# Patient Record
Sex: MALE | Race: WHITE | NOT HISPANIC OR LATINO | Employment: FULL TIME | URBAN - METROPOLITAN AREA
[De-identification: names, ages, dates, MRNs, and addresses within clinical notes are randomized per-mention and may not be internally consistent; named-entity substitution may affect disease eponyms.]

---

## 2017-04-11 ENCOUNTER — HOSPITAL ENCOUNTER (EMERGENCY)
Facility: HOSPITAL | Age: 57
Discharge: HOME | End: 2017-04-11
Attending: EMERGENCY MEDICINE | Admitting: EMERGENCY MEDICINE
Payer: COMMERCIAL

## 2017-04-11 ENCOUNTER — APPOINTMENT (EMERGENCY)
Dept: RADIOLOGY | Facility: HOSPITAL | Age: 57
End: 2017-04-11
Payer: COMMERCIAL

## 2017-04-11 VITALS
HEIGHT: 68 IN | SYSTOLIC BLOOD PRESSURE: 161 MMHG | HEART RATE: 71 BPM | BODY MASS INDEX: 27.58 KG/M2 | OXYGEN SATURATION: 97 % | DIASTOLIC BLOOD PRESSURE: 97 MMHG | RESPIRATION RATE: 18 BRPM | WEIGHT: 182 LBS | TEMPERATURE: 97.5 F

## 2017-04-11 DIAGNOSIS — M25.561 RIGHT ANTERIOR KNEE PAIN: Primary | ICD-10-CM

## 2017-04-11 PROCEDURE — 99283 EMERGENCY DEPT VISIT LOW MDM: CPT

## 2017-04-11 PROCEDURE — 73564 X-RAY EXAM KNEE 4 OR MORE: CPT

## 2017-04-11 RX ORDER — NAPROXEN 500 MG/1
500 TABLET ORAL 2 TIMES DAILY WITH MEALS
Qty: 14 TABLET | Refills: 0 | Status: SHIPPED | OUTPATIENT
Start: 2017-04-11 | End: 2017-09-20

## 2017-04-11 RX ORDER — OXYCODONE HYDROCHLORIDE AND ACETAMINOPHEN 5; 325 MG/1; MG/1
1 TABLET ORAL EVERY 4 HOURS PRN
Qty: 10 TABLET | Refills: 0 | Status: SHIPPED | OUTPATIENT
Start: 2017-04-11 | End: 2017-04-21

## 2017-09-20 ENCOUNTER — HOSPITAL ENCOUNTER (EMERGENCY)
Facility: HOSPITAL | Age: 57
Discharge: HOME | End: 2017-09-20
Payer: COMMERCIAL

## 2017-09-20 VITALS
SYSTOLIC BLOOD PRESSURE: 136 MMHG | HEIGHT: 68 IN | WEIGHT: 175 LBS | RESPIRATION RATE: 18 BRPM | OXYGEN SATURATION: 97 % | TEMPERATURE: 97.3 F | HEART RATE: 78 BPM | DIASTOLIC BLOOD PRESSURE: 80 MMHG | BODY MASS INDEX: 26.52 KG/M2

## 2017-09-20 DIAGNOSIS — M77.8 RIGHT SHOULDER TENDONITIS: Primary | ICD-10-CM

## 2017-09-20 PROCEDURE — 99283 EMERGENCY DEPT VISIT LOW MDM: CPT

## 2017-09-20 RX ORDER — MELOXICAM 7.5 MG/1
7.5 TABLET ORAL 2 TIMES DAILY
Qty: 30 TABLET | Refills: 0 | Status: SHIPPED | OUTPATIENT
Start: 2017-09-20 | End: 2018-09-19 | Stop reason: ALTCHOICE

## 2017-09-20 RX ORDER — PREDNISONE 20 MG/1
40 TABLET ORAL DAILY
Qty: 14 TABLET | Refills: 0 | Status: SHIPPED | OUTPATIENT
Start: 2017-09-20 | End: 2017-09-27

## 2018-07-24 DIAGNOSIS — I10 ESSENTIAL HYPERTENSION: Primary | ICD-10-CM

## 2018-07-25 RX ORDER — AMLODIPINE BESYLATE AND BENAZEPRIL HYDROCHLORIDE 10; 20 MG/1; MG/1
CAPSULE ORAL
Qty: 30 CAPSULE | Refills: 2 | Status: SHIPPED | OUTPATIENT
Start: 2018-07-25 | End: 2018-09-19 | Stop reason: SDDI

## 2018-09-19 ENCOUNTER — HOSPITAL ENCOUNTER (INPATIENT)
Facility: HOSPITAL | Age: 58
LOS: 15 days | Discharge: HOME | DRG: 753 | End: 2018-10-04
Attending: EMERGENCY MEDICINE | Admitting: PSYCHIATRY & NEUROLOGY
Payer: COMMERCIAL

## 2018-09-19 DIAGNOSIS — F31.81 BIPOLAR 2 DISORDER, MAJOR DEPRESSIVE EPISODE (HCC): Chronic | ICD-10-CM

## 2018-09-19 DIAGNOSIS — G47.00 INSOMNIA: ICD-10-CM

## 2018-09-19 DIAGNOSIS — Z00.8 MEDICAL CLEARANCE FOR PSYCHIATRIC ADMISSION: ICD-10-CM

## 2018-09-19 DIAGNOSIS — R45.851 SUICIDAL IDEATION: Primary | ICD-10-CM

## 2018-09-19 DIAGNOSIS — Z00.8 MEDICAL CLEARANCE FOR PSYCHIATRIC ADMISSION: Primary | ICD-10-CM

## 2018-09-19 LAB
AMPHETAMINES SERPL QL SCN: NEGATIVE
ANION GAP SERPL CALCULATED.3IONS-SCNC: 6 MMOL/L (ref 4–13)
APAP SERPL-MCNC: <2 UG/ML (ref 10–30)
BACTERIA UR QL AUTO: ABNORMAL /HPF
BARBITURATES UR QL: NEGATIVE
BASOPHILS # BLD AUTO: 0.03 THOUSANDS/ΜL (ref 0–0.1)
BASOPHILS NFR BLD AUTO: 0 % (ref 0–1)
BENZODIAZ UR QL: NEGATIVE
BILIRUB UR QL STRIP: NEGATIVE
BUN SERPL-MCNC: 16 MG/DL (ref 5–25)
CALCIUM SERPL-MCNC: 8.9 MG/DL (ref 8.3–10.1)
CHLORIDE SERPL-SCNC: 109 MMOL/L (ref 100–108)
CLARITY UR: CLEAR
CLARITY, POC: CLEAR
CO2 SERPL-SCNC: 24 MMOL/L (ref 21–32)
COCAINE UR QL: NEGATIVE
COLOR UR: YELLOW
COLOR, POC: YELLOW
CREAT SERPL-MCNC: 1.17 MG/DL (ref 0.6–1.3)
EOSINOPHIL # BLD AUTO: 0.1 THOUSAND/ΜL (ref 0–0.61)
EOSINOPHIL NFR BLD AUTO: 1 % (ref 0–6)
ERYTHROCYTE [DISTWIDTH] IN BLOOD BY AUTOMATED COUNT: 14.3 % (ref 11.6–15.1)
ETHANOL SERPL-MCNC: <3 MG/DL (ref 0–3)
FOLATE SERPL-MCNC: >20 NG/ML (ref 3.1–17.5)
GFR SERPL CREATININE-BSD FRML MDRD: 69 ML/MIN/1.73SQ M
GLUCOSE SERPL-MCNC: 85 MG/DL (ref 65–140)
GLUCOSE UR STRIP-MCNC: NEGATIVE MG/DL
HCT VFR BLD AUTO: 40.3 % (ref 36.5–49.3)
HGB BLD-MCNC: 13.6 G/DL (ref 12–17)
HGB UR QL STRIP.AUTO: NEGATIVE
HYALINE CASTS #/AREA URNS LPF: ABNORMAL /LPF
IMM GRANULOCYTES # BLD AUTO: 0.02 THOUSAND/UL (ref 0–0.2)
IMM GRANULOCYTES NFR BLD AUTO: 0 % (ref 0–2)
KETONES UR STRIP-MCNC: NEGATIVE MG/DL
LEUKOCYTE ESTERASE UR QL STRIP: ABNORMAL
LYMPHOCYTES # BLD AUTO: 1.91 THOUSANDS/ΜL (ref 0.6–4.47)
LYMPHOCYTES NFR BLD AUTO: 24 % (ref 14–44)
MCH RBC QN AUTO: 31.2 PG (ref 26.8–34.3)
MCHC RBC AUTO-ENTMCNC: 33.7 G/DL (ref 31.4–37.4)
MCV RBC AUTO: 92 FL (ref 82–98)
METHADONE UR QL: NEGATIVE
MONOCYTES # BLD AUTO: 0.73 THOUSAND/ΜL (ref 0.17–1.22)
MONOCYTES NFR BLD AUTO: 9 % (ref 4–12)
NEUTROPHILS # BLD AUTO: 5.32 THOUSANDS/ΜL (ref 1.85–7.62)
NEUTS SEG NFR BLD AUTO: 66 % (ref 43–75)
NITRITE UR QL STRIP: NEGATIVE
NON-SQ EPI CELLS URNS QL MICRO: ABNORMAL /HPF
NRBC BLD AUTO-RTO: 0 /100 WBCS
OPIATES UR QL SCN: POSITIVE
PCP UR QL: NEGATIVE
PH UR STRIP.AUTO: 6 [PH] (ref 4.5–8)
PLATELET # BLD AUTO: 146 THOUSANDS/UL (ref 149–390)
PMV BLD AUTO: 10.8 FL (ref 8.9–12.7)
POTASSIUM SERPL-SCNC: 3.9 MMOL/L (ref 3.5–5.3)
PROT UR STRIP-MCNC: NEGATIVE MG/DL
RBC # BLD AUTO: 4.36 MILLION/UL (ref 3.88–5.62)
RBC #/AREA URNS AUTO: ABNORMAL /HPF
SALICYLATES SERPL-MCNC: 4 MG/DL (ref 3–20)
SODIUM SERPL-SCNC: 139 MMOL/L (ref 136–145)
SP GR UR STRIP.AUTO: 1.02 (ref 1–1.03)
THC UR QL: POSITIVE
TSH SERPL DL<=0.05 MIU/L-ACNC: 1.41 UIU/ML (ref 0.36–3.74)
UROBILINOGEN UR QL STRIP.AUTO: 0.2 E.U./DL
WBC # BLD AUTO: 8.11 THOUSAND/UL (ref 4.31–10.16)
WBC #/AREA URNS AUTO: ABNORMAL /HPF

## 2018-09-19 PROCEDURE — 80320 DRUG SCREEN QUANTALCOHOLS: CPT | Performed by: EMERGENCY MEDICINE

## 2018-09-19 PROCEDURE — 36415 COLL VENOUS BLD VENIPUNCTURE: CPT | Performed by: EMERGENCY MEDICINE

## 2018-09-19 PROCEDURE — 90471 IMMUNIZATION ADMIN: CPT

## 2018-09-19 PROCEDURE — 93005 ELECTROCARDIOGRAM TRACING: CPT

## 2018-09-19 PROCEDURE — 80048 BASIC METABOLIC PNL TOTAL CA: CPT | Performed by: EMERGENCY MEDICINE

## 2018-09-19 PROCEDURE — 81001 URINALYSIS AUTO W/SCOPE: CPT

## 2018-09-19 PROCEDURE — 80329 ANALGESICS NON-OPIOID 1 OR 2: CPT | Performed by: EMERGENCY MEDICINE

## 2018-09-19 PROCEDURE — 90715 TDAP VACCINE 7 YRS/> IM: CPT | Performed by: EMERGENCY MEDICINE

## 2018-09-19 PROCEDURE — 99285 EMERGENCY DEPT VISIT HI MDM: CPT

## 2018-09-19 PROCEDURE — 80307 DRUG TEST PRSMV CHEM ANLYZR: CPT | Performed by: EMERGENCY MEDICINE

## 2018-09-19 PROCEDURE — 85025 COMPLETE CBC W/AUTO DIFF WBC: CPT | Performed by: EMERGENCY MEDICINE

## 2018-09-19 PROCEDURE — 84443 ASSAY THYROID STIM HORMONE: CPT | Performed by: EMERGENCY MEDICINE

## 2018-09-19 PROCEDURE — 82746 ASSAY OF FOLIC ACID SERUM: CPT | Performed by: PSYCHIATRY & NEUROLOGY

## 2018-09-19 RX ORDER — RISPERIDONE 1 MG/1
1 TABLET, ORALLY DISINTEGRATING ORAL EVERY 6 HOURS PRN
Status: CANCELLED | OUTPATIENT
Start: 2018-09-19

## 2018-09-19 RX ORDER — ACETAMINOPHEN 325 MG/1
650 TABLET ORAL EVERY 6 HOURS PRN
Status: CANCELLED | OUTPATIENT
Start: 2018-09-19

## 2018-09-19 RX ORDER — TRAZODONE HYDROCHLORIDE 50 MG/1
50 TABLET ORAL
Status: DISCONTINUED | OUTPATIENT
Start: 2018-09-19 | End: 2018-09-27

## 2018-09-19 RX ORDER — BENZTROPINE MESYLATE 1 MG/1
1 TABLET ORAL EVERY 6 HOURS PRN
Status: CANCELLED | OUTPATIENT
Start: 2018-09-19

## 2018-09-19 RX ORDER — BENZTROPINE MESYLATE 1 MG/1
1 TABLET ORAL EVERY 6 HOURS PRN
Status: DISCONTINUED | OUTPATIENT
Start: 2018-09-19 | End: 2018-10-04 | Stop reason: HOSPADM

## 2018-09-19 RX ORDER — IBUPROFEN 400 MG/1
800 TABLET ORAL EVERY 8 HOURS PRN
Status: CANCELLED | OUTPATIENT
Start: 2018-09-19

## 2018-09-19 RX ORDER — HYDROXYZINE HYDROCHLORIDE 25 MG/1
25 TABLET, FILM COATED ORAL EVERY 6 HOURS PRN
COMMUNITY
End: 2019-06-25 | Stop reason: ALTCHOICE

## 2018-09-19 RX ORDER — IBUPROFEN 400 MG/1
800 TABLET ORAL EVERY 8 HOURS PRN
Status: DISCONTINUED | OUTPATIENT
Start: 2018-09-19 | End: 2018-10-04 | Stop reason: HOSPADM

## 2018-09-19 RX ORDER — HALOPERIDOL 5 MG/ML
5 INJECTION INTRAMUSCULAR EVERY 6 HOURS PRN
Status: DISCONTINUED | OUTPATIENT
Start: 2018-09-19 | End: 2018-10-04 | Stop reason: HOSPADM

## 2018-09-19 RX ORDER — ESCITALOPRAM OXALATE 20 MG/1
20 TABLET ORAL DAILY
Status: ON HOLD | COMMUNITY
End: 2018-10-04

## 2018-09-19 RX ORDER — IBUPROFEN 600 MG/1
600 TABLET ORAL EVERY 8 HOURS PRN
Status: CANCELLED | OUTPATIENT
Start: 2018-09-19

## 2018-09-19 RX ORDER — RISPERIDONE 1 MG/1
1 TABLET, ORALLY DISINTEGRATING ORAL EVERY 6 HOURS PRN
Status: DISCONTINUED | OUTPATIENT
Start: 2018-09-19 | End: 2018-10-04 | Stop reason: HOSPADM

## 2018-09-19 RX ORDER — ACETAMINOPHEN 325 MG/1
650 TABLET ORAL EVERY 6 HOURS PRN
Status: DISCONTINUED | OUTPATIENT
Start: 2018-09-19 | End: 2018-10-04 | Stop reason: HOSPADM

## 2018-09-19 RX ORDER — MAGNESIUM HYDROXIDE/ALUMINUM HYDROXICE/SIMETHICONE 120; 1200; 1200 MG/30ML; MG/30ML; MG/30ML
30 SUSPENSION ORAL EVERY 4 HOURS PRN
Status: CANCELLED | OUTPATIENT
Start: 2018-09-19

## 2018-09-19 RX ORDER — IBUPROFEN 600 MG/1
600 TABLET ORAL EVERY 8 HOURS PRN
Status: DISCONTINUED | OUTPATIENT
Start: 2018-09-19 | End: 2018-10-04 | Stop reason: HOSPADM

## 2018-09-19 RX ORDER — HALOPERIDOL 5 MG
5 TABLET ORAL EVERY 6 HOURS PRN
Status: CANCELLED | OUTPATIENT
Start: 2018-09-19

## 2018-09-19 RX ORDER — LAMOTRIGINE 100 MG/1
100 TABLET ORAL DAILY
COMMUNITY
End: 2018-10-04 | Stop reason: HOSPADM

## 2018-09-19 RX ORDER — TRAZODONE HYDROCHLORIDE 50 MG/1
50 TABLET ORAL
Status: CANCELLED | OUTPATIENT
Start: 2018-09-19

## 2018-09-19 RX ORDER — HALOPERIDOL 5 MG/ML
5 INJECTION INTRAMUSCULAR EVERY 6 HOURS PRN
Status: CANCELLED | OUTPATIENT
Start: 2018-09-19

## 2018-09-19 RX ORDER — BENZTROPINE MESYLATE 1 MG/ML
1 INJECTION INTRAMUSCULAR; INTRAVENOUS EVERY 6 HOURS PRN
Status: CANCELLED | OUTPATIENT
Start: 2018-09-19

## 2018-09-19 RX ORDER — MAGNESIUM HYDROXIDE/ALUMINUM HYDROXICE/SIMETHICONE 120; 1200; 1200 MG/30ML; MG/30ML; MG/30ML
30 SUSPENSION ORAL EVERY 4 HOURS PRN
Status: DISCONTINUED | OUTPATIENT
Start: 2018-09-19 | End: 2018-10-04 | Stop reason: HOSPADM

## 2018-09-19 RX ORDER — HYDROXYZINE HYDROCHLORIDE 25 MG/1
25 TABLET, FILM COATED ORAL EVERY 6 HOURS PRN
Status: CANCELLED | OUTPATIENT
Start: 2018-09-19

## 2018-09-19 RX ORDER — HALOPERIDOL 5 MG
5 TABLET ORAL EVERY 6 HOURS PRN
Status: DISCONTINUED | OUTPATIENT
Start: 2018-09-19 | End: 2018-10-04 | Stop reason: HOSPADM

## 2018-09-19 RX ORDER — BENZTROPINE MESYLATE 1 MG/ML
1 INJECTION INTRAMUSCULAR; INTRAVENOUS EVERY 6 HOURS PRN
Status: DISCONTINUED | OUTPATIENT
Start: 2018-09-19 | End: 2018-10-04 | Stop reason: HOSPADM

## 2018-09-19 RX ORDER — HYDROXYZINE HYDROCHLORIDE 25 MG/1
25 TABLET, FILM COATED ORAL EVERY 6 HOURS PRN
Status: DISCONTINUED | OUTPATIENT
Start: 2018-09-19 | End: 2018-10-04 | Stop reason: HOSPADM

## 2018-09-19 RX ADMIN — TETANUS TOXOID, REDUCED DIPHTHERIA TOXOID AND ACELLULAR PERTUSSIS VACCINE, ADSORBED 0.5 ML: 5; 2.5; 8; 8; 2.5 SUSPENSION INTRAMUSCULAR at 20:12

## 2018-09-19 RX ADMIN — TRAZODONE HYDROCHLORIDE 50 MG: 50 TABLET ORAL at 22:29

## 2018-09-19 NOTE — ED NOTES
Patient had an attempted suicide after [facility] for "[facility]"  Patient states he smoked THC 2 months ago, but not recent  Patient took 6 lexepro to end his life  Patient is currently homeless  Patient denied homicidal ideation, auditory and visual hallucinations  Patient willing to sign himself in

## 2018-09-19 NOTE — ED PROVIDER NOTES
History  Chief Complaint   Patient presents with    Psychiatric Evaluation     Pt took 6 Lexapro 20mg approx 20 min ago and sliced his left FA with a knife, he stated that he is going to take a knife and finish the job if he doesn't get help  Patient is a 69-year-old male presenting to the ED today after suicide attempt  Patient states he was kicked out of house yesterday for having a urine positive for marijuana, which he states he has not smoked in approximately 2 months  Says he started cut his left wrist to kill himself and then realized that he could just take his Lexapro to overdose, states he took 6 of his 20 mg Lexapro approximately 20 minutes ago  Patient states that he has approximately 6 or 7 suicide attempts in this past   States if he were to leave today he would try to kill himself by their cutting himself, overdosing or drinking self to death  Denies any homicidal ideation, audio or visual hallucinations, alcohol or drug usage today  Denies fever, night sweats, chills, headache, dizziness, chest pain, shortness of breath, abdominal pain, nausea/vomiting, diarrhea /constipation, dysuria  History provided by:  Patient   used: No    Psychiatric Evaluation   Presenting symptoms: depression, self-mutilation, suicidal thoughts, suicidal threats and suicide attempt    Presenting symptoms: no aggressive behavior, no agitation, no bizarre behavior, no delusions, no disorganized speech, no disorganized thought process, no hallucinations, no homicidal ideas and no paranoid behavior    Associated symptoms: no abdominal pain, no appetite change, no chest pain, no fatigue and no headaches        Prior to Admission Medications   Prescriptions Last Dose Informant Patient Reported? Taking?    Multiple Vitamins-Minerals (ONE-A-DAY 50 PLUS PO) 9/19/2018 at Unknown time  Yes Yes   Sig: Take by mouth   escitalopram (LEXAPRO) 20 mg tablet 9/19/2018 at Unknown time  Yes Yes   Sig: Take 20 mg by mouth daily   esomeprazole (NexIUM) 20 mg capsule 9/19/2018 at Unknown time  Yes Yes   Sig: Take 20 mg by mouth daily in the early morning   hydrOXYzine HCL (ATARAX) 25 mg tablet 9/19/2018 at Unknown time  Yes Yes   Sig: Take 25 mg by mouth every 6 (six) hours as needed for itching   lamoTRIgine (LaMICtal) 100 mg tablet 9/19/2018 at Unknown time  Yes Yes   Sig: Take 100 mg by mouth daily      Facility-Administered Medications: None       Past Medical History:   Diagnosis Date    Drug therapy     Hypertension 01/2012    Psychiatric disorder     depression, anxiety       Past Surgical History:   Procedure Laterality Date    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    HERNIA REPAIR  1997       Family History   Problem Relation Age of Onset    Cancer Family     Mental illness Family      I have reviewed and agree with the history as documented  Social History   Substance Use Topics    Smoking status: Current Every Day Smoker     Packs/day: 0 50     Types: Cigarettes, Cigars    Smokeless tobacco: Never Used    Alcohol use No        Review of Systems   Constitutional: Negative for activity change, appetite change, chills, diaphoresis, fatigue and fever  HENT: Negative for congestion, postnasal drip, rhinorrhea, sinus pain, sinus pressure, sneezing and sore throat  Respiratory: Negative for apnea, cough, chest tightness, shortness of breath, wheezing and stridor  Cardiovascular: Negative for chest pain, palpitations and leg swelling  Gastrointestinal: Negative for abdominal distention, abdominal pain, constipation, diarrhea, nausea and vomiting  Genitourinary: Negative for difficulty urinating, dysuria, frequency and urgency  Musculoskeletal: Negative for arthralgias, back pain, gait problem, joint swelling, myalgias, neck pain and neck stiffness  Skin: Negative for color change, pallor, rash and wound     Neurological: Negative for dizziness, facial asymmetry, weakness, light-headedness, numbness and headaches  Psychiatric/Behavioral: Positive for dysphoric mood, self-injury and suicidal ideas  Negative for agitation, hallucinations, homicidal ideas and paranoia  Physical Exam  ED Triage Vitals   Temp Pulse Resp BP SpO2   -- -- -- -- --      Temp src Heart Rate Source Patient Position - Orthostatic VS BP Location FiO2 (%)   -- -- -- -- --      Pain Score       --           Orthostatic Vital Signs  There were no vitals filed for this visit  Physical Exam   Constitutional: He is oriented to person, place, and time  He appears well-developed and well-nourished  No distress  HENT:   Head: Normocephalic and atraumatic  Right Ear: External ear normal    Left Ear: External ear normal    Nose: Nose normal    Eyes: Conjunctivae are normal  Right eye exhibits no discharge  Left eye exhibits no discharge  No scleral icterus  Neck: Normal range of motion  Neck supple  Cardiovascular: Normal rate, regular rhythm, normal heart sounds and intact distal pulses  Exam reveals no gallop and no friction rub  No murmur heard  Pulmonary/Chest: Effort normal and breath sounds normal  No respiratory distress  He has no wheezes  He has no rales  Abdominal: Soft  Bowel sounds are normal  He exhibits no distension and no mass  There is no tenderness  There is no guarding  Musculoskeletal: Normal range of motion  He exhibits no edema, tenderness or deformity  Neurological: He is alert and oriented to person, place, and time  Skin: Skin is warm and dry  No rash noted  He is not diaphoretic  No erythema  No pallor  Approximately 3 1cm shallow clean cuts noted   Psychiatric: He exhibits a depressed mood  He expresses suicidal ideation  He expresses suicidal plans  Patient states that in the past he has had multiple suicide attempts in the past including shooting, hanging and overdosing   Nursing note and vitals reviewed        ED Medications  Medications tetanus-diphtheria-acellular pertussis (BOOSTRIX) IM injection 0 5 mL (not administered)       Diagnostic Studies  Results Reviewed     Procedure Component Value Units Date/Time    TSH [[id number]]  (Normal) Collected:  09/19/18 1804    Lab Status:  Final result Specimen:  Blood from Arm, Right Updated:  09/19/18 1857     TSH 3RD GENERATON 1 410 uIU/mL     Narrative:         Patients undergoing fluorescein dye angiography may retain small amounts of fluorescein in the body for 48-72 hours post procedure  Samples containing fluorescein can produce falsely depressed TSH values  If the patient had this procedure,a specimen should be resubmitted post fluorescein clearance  Basic metabolic panel [[id number]]  (Abnormal) Collected:  09/19/18 1804    Lab Status:  Final result Specimen:  Blood from Arm, Right Updated:  09/19/18 1857     Sodium 139 mmol/L      Potassium 3 9 mmol/L      Chloride 109 (H) mmol/L      CO2 24 mmol/L      ANION GAP 6 mmol/L      BUN 16 mg/dL      Creatinine 1 17 mg/dL      Glucose 85 mg/dL      Calcium 8 9 mg/dL      eGFR 69 ml/min/1 73sq m     Narrative:         National Kidney Disease Education Program recommendations are as follows:  GFR calculation is accurate only with a steady state creatinine  Chronic Kidney disease less than 60 ml/min/1 73 sq  meters  Kidney failure less than 15 ml/min/1 73 sq  meters      Ethanol [[id number]]  (Normal) Collected:  09/19/18 1804    Lab Status:  Final result Specimen:  Blood from Arm, Right Updated:  09/19/18 1844     Ethanol Lvl <3 mg/dL     Salicylate level [[id number]]  (Normal) Collected:  09/19/18 1804    Lab Status:  Final result Specimen:  Blood from Arm, Right Updated:  34/46/62 0717     Salicylate Lvl 4 mg/dL     Acetaminophen level [[id number]]  (Abnormal) Collected:  09/19/18 1804    Lab Status:  Final result Specimen:  Blood from Arm, Right Updated:  09/19/18 1844     Acetaminophen Level <2 (L) ug/mL     Rapid drug screen, urine [[id number]]  (Abnormal) Collected:  09/19/18 1748    Lab Status:  Final result Specimen:  Urine from Urine, Other Updated:  09/19/18 1830     Amph/Meth UR Negative     Barbiturate Ur Negative     Benzodiazepine Urine Negative     Cocaine Urine Negative     Methadone Urine Negative     Opiate Urine Positive (A)     PCP Ur Negative     THC Urine Positive (A)    Narrative:         Presumptive report  If requested, specimen will be sent to reference lab for confirmation  FOR MEDICAL PURPOSES ONLY  IF CONFIRMATION NEEDED PLEASE CONTACT THE LAB WITHIN 5 DAYS      Drug Screen Cutoff Levels:  AMPHETAMINE/METHAMPHETAMINES  1000 ng/mL  BARBITURATES     200 ng/mL  BENZODIAZEPINES     200 ng/mL  COCAINE      300 ng/mL  METHADONE      300 ng/mL  OPIATES      300 ng/mL  PHENCYCLIDINE     25 ng/mL  THC       50 ng/mL    CBC and differential [[id number]]  (Abnormal) Collected:  09/19/18 1804    Lab Status:  Final result Specimen:  Blood from Arm, Right Updated:  09/19/18 1817     WBC 8 11 Thousand/uL      RBC 4 36 Million/uL      Hemoglobin 13 6 g/dL      Hematocrit 40 3 %      MCV 92 fL      MCH 31 2 pg      MCHC 33 7 g/dL      RDW 14 3 %      MPV 10 8 fL      Platelets 038 (L) Thousands/uL      nRBC 0 /100 WBCs      Neutrophils Relative 66 %      Immat GRANS % 0 %      Lymphocytes Relative 24 %      Monocytes Relative 9 %      Eosinophils Relative 1 %      Basophils Relative 0 %      Neutrophils Absolute 5 32 Thousands/µL      Immature Grans Absolute 0 02 Thousand/uL      Lymphocytes Absolute 1 91 Thousands/µL      Monocytes Absolute 0 73 Thousand/µL      Eosinophils Absolute 0 10 Thousand/µL      Basophils Absolute 0 03 Thousands/µL     Urine Microscopic [[id number]]  (Abnormal) Collected:  09/19/18 1746    Lab Status:  Final result Specimen:  Urine from Urine, Clean Catch Updated:  09/19/18 1803     RBC, UA None Seen /hpf      WBC, UA 4-10 (A) /hpf      Epithelial Cells None Seen /hpf      Bacteria, UA None Seen /hpf      Hyaline Casts, UA 3-5 (A) /lpf POCT urinalysis dipstick [[id number]]  (Normal) Resulted:  09/19/18 1749    Lab Status:  Final result Updated:  09/19/18 1749     Color, UA yellow     Clarity, UA clear    ED Urine Macroscopic [[id number]]  (Abnormal) Collected:  09/19/18 1746    Lab Status:  Final result Specimen:  Urine Updated:  09/19/18 1747     Color, UA Yellow     Clarity, UA Clear     pH, UA 6 0     Leukocytes, UA Trace (A)     Nitrite, UA Negative     Protein, UA Negative mg/dl      Glucose, UA Negative mg/dl      Ketones, UA Negative mg/dl      Urobilinogen, UA 0 2 E U /dl      Bilirubin, UA Negative     Blood, UA Negative     Specific Gravity, UA 1 020    Narrative:       [facility] RESULT                 No orders to display         Procedures  Procedures      Phone Consults  ED Phone Contact    ED Course  ED Course as of Sep 19 1916   Wed Sep 19, 2018   1856 Discussed patient with Crisis, they are unsure if any male beds are available at this time but they will follow up, and come see patient  MDM  CritCare Time    Disposition  Final diagnoses:   Suicidal ideation     Time reflects when diagnosis was documented in both MDM as applicable and the Disposition within this note     Time User Action Codes Description Comment    9/19/2018  7:16 PM Abi Ye Add [D23 [id number]] Suicidal ideation       ED Disposition     None      Follow-up Information    None         Patient's Medications   Discharge Prescriptions    No medications on file     No discharge procedures on file  ED Provider  Attending physically available and evaluated Shahnaz Eckert I managed the patient along with the ED Attending      Electronically Signed by         Mary Carmen Ayala DO  09/19/18 1916

## 2018-09-20 PROBLEM — F31.81 BIPOLAR 2 DISORDER, MAJOR DEPRESSIVE EPISODE (HCC): Chronic | Status: ACTIVE | Noted: 2018-09-20

## 2018-09-20 PROBLEM — F90.2 ATTENTION DEFICIT HYPERACTIVITY DISORDER (ADHD), COMBINED TYPE: Chronic | Status: ACTIVE | Noted: 2018-09-20

## 2018-09-20 LAB
ALBUMIN SERPL BCP-MCNC: 3.3 G/DL (ref 3.5–5)
ALP SERPL-CCNC: 82 U/L (ref 46–116)
ALT SERPL W P-5'-P-CCNC: 72 U/L (ref 12–78)
ANION GAP SERPL CALCULATED.3IONS-SCNC: 5 MMOL/L (ref 4–13)
AST SERPL W P-5'-P-CCNC: 44 U/L (ref 5–45)
ATRIAL RATE: 74 BPM
BASOPHILS # BLD AUTO: 0.04 THOUSANDS/ΜL (ref 0–0.1)
BASOPHILS NFR BLD AUTO: 1 % (ref 0–1)
BILIRUB SERPL-MCNC: 0.65 MG/DL (ref 0.2–1)
BUN SERPL-MCNC: 18 MG/DL (ref 5–25)
CALCIUM SERPL-MCNC: 8.5 MG/DL (ref 8.3–10.1)
CHLORIDE SERPL-SCNC: 109 MMOL/L (ref 100–108)
CHOLEST SERPL-MCNC: 185 MG/DL (ref 50–200)
CO2 SERPL-SCNC: 25 MMOL/L (ref 21–32)
CREAT SERPL-MCNC: 1.14 MG/DL (ref 0.6–1.3)
EOSINOPHIL # BLD AUTO: 0.18 THOUSAND/ΜL (ref 0–0.61)
EOSINOPHIL NFR BLD AUTO: 3 % (ref 0–6)
ERYTHROCYTE [DISTWIDTH] IN BLOOD BY AUTOMATED COUNT: 14.5 % (ref 11.6–15.1)
GFR SERPL CREATININE-BSD FRML MDRD: 71 ML/MIN/1.73SQ M
GLUCOSE SERPL-MCNC: 87 MG/DL (ref 65–140)
HCT VFR BLD AUTO: 41.1 % (ref 36.5–49.3)
HDLC SERPL-MCNC: 29 MG/DL (ref 40–60)
HGB BLD-MCNC: 13.9 G/DL (ref 12–17)
IMM GRANULOCYTES # BLD AUTO: 0.01 THOUSAND/UL (ref 0–0.2)
IMM GRANULOCYTES NFR BLD AUTO: 0 % (ref 0–2)
LDLC SERPL CALC-MCNC: 125 MG/DL (ref 0–100)
LYMPHOCYTES # BLD AUTO: 1.48 THOUSANDS/ΜL (ref 0.6–4.47)
LYMPHOCYTES NFR BLD AUTO: 23 % (ref 14–44)
MCH RBC QN AUTO: 31.4 PG (ref 26.8–34.3)
MCHC RBC AUTO-ENTMCNC: 33.8 G/DL (ref 31.4–37.4)
MCV RBC AUTO: 93 FL (ref 82–98)
MONOCYTES # BLD AUTO: 0.73 THOUSAND/ΜL (ref 0.17–1.22)
MONOCYTES NFR BLD AUTO: 11 % (ref 4–12)
NEUTROPHILS # BLD AUTO: 4.08 THOUSANDS/ΜL (ref 1.85–7.62)
NEUTS SEG NFR BLD AUTO: 62 % (ref 43–75)
NONHDLC SERPL-MCNC: 156 MG/DL
NRBC BLD AUTO-RTO: 0 /100 WBCS
P AXIS: 63 DEGREES
PLATELET # BLD AUTO: 145 THOUSANDS/UL (ref 149–390)
PMV BLD AUTO: 11.2 FL (ref 8.9–12.7)
POTASSIUM SERPL-SCNC: 4.3 MMOL/L (ref 3.5–5.3)
PR INTERVAL: 216 MS
PROT SERPL-MCNC: 6.8 G/DL (ref 6.4–8.2)
QRS AXIS: -10 DEGREES
QRSD INTERVAL: 100 MS
QT INTERVAL: 382 MS
QTC INTERVAL: 424 MS
RBC # BLD AUTO: 4.43 MILLION/UL (ref 3.88–5.62)
RPR SER QL: NORMAL
SODIUM SERPL-SCNC: 139 MMOL/L (ref 136–145)
T WAVE AXIS: 68 DEGREES
TRIGL SERPL-MCNC: 154 MG/DL
TSH SERPL DL<=0.05 MIU/L-ACNC: 1.39 UIU/ML (ref 0.36–3.74)
VENTRICULAR RATE: 74 BPM
WBC # BLD AUTO: 6.52 THOUSAND/UL (ref 4.31–10.16)

## 2018-09-20 PROCEDURE — 80061 LIPID PANEL: CPT | Performed by: PSYCHIATRY & NEUROLOGY

## 2018-09-20 PROCEDURE — 99223 1ST HOSP IP/OBS HIGH 75: CPT | Performed by: PSYCHIATRY & NEUROLOGY

## 2018-09-20 PROCEDURE — 99253 IP/OBS CNSLTJ NEW/EST LOW 45: CPT | Performed by: PHYSICIAN ASSISTANT

## 2018-09-20 PROCEDURE — 85025 COMPLETE CBC W/AUTO DIFF WBC: CPT | Performed by: PSYCHIATRY & NEUROLOGY

## 2018-09-20 PROCEDURE — 80053 COMPREHEN METABOLIC PANEL: CPT | Performed by: PSYCHIATRY & NEUROLOGY

## 2018-09-20 PROCEDURE — 93010 ELECTROCARDIOGRAM REPORT: CPT | Performed by: INTERNAL MEDICINE

## 2018-09-20 PROCEDURE — 84443 ASSAY THYROID STIM HORMONE: CPT | Performed by: PSYCHIATRY & NEUROLOGY

## 2018-09-20 PROCEDURE — 86592 SYPHILIS TEST NON-TREP QUAL: CPT | Performed by: PSYCHIATRY & NEUROLOGY

## 2018-09-20 RX ORDER — ESCITALOPRAM OXALATE 20 MG/1
20 TABLET ORAL DAILY
Status: DISCONTINUED | OUTPATIENT
Start: 2018-09-20 | End: 2018-10-04 | Stop reason: HOSPADM

## 2018-09-20 RX ORDER — LAMOTRIGINE 100 MG/1
100 TABLET ORAL DAILY
Status: DISCONTINUED | OUTPATIENT
Start: 2018-09-20 | End: 2018-09-25

## 2018-09-20 RX ADMIN — LAMOTRIGINE 100 MG: 100 TABLET ORAL at 15:49

## 2018-09-20 RX ADMIN — ESCITALOPRAM OXALATE 20 MG: 20 TABLET, FILM COATED ORAL at 15:49

## 2018-09-20 NOTE — PROGRESS NOTES
Pt is a 201 admission from [facility] ED  Pt recently lost his place of residence at [facility] because of a positive THC test in his urine  Pt states he was "Very angry" and he "wanted to end things " Pt proceeded to take 6 Lexapro pills and cut his arm in a suicide attempt  Pt has a history of suicide attempts  Pt has attempted to hang himself, shoot himself and has overdose attempts in the past  Pt states most of his "depression is from women " Pt denies HI, hallucinations  Pt states he is depressed and does have anxiety  Pt stated he still wants to "end things, and if I wasn't here I would cut myself with a pocket knife " Pt does contract for safety while on the unit  Pt is calm and cooperative with care

## 2018-09-20 NOTE — ED NOTES
Patient is accepted at [facility]  Patient is accepted by Dr Tonio Bridges per Chelsie Dickerson  Patient may go to the floor after report is completed    Nurse report is to be called to [phone] prior to patient transfer

## 2018-09-20 NOTE — ED NOTES
Second attempt to give the floor report first time I was put on hold and then hung up on  Second time a pt answered and then a nurse came to the phone and placed me on hold for over 8mins then hung up on again        Pj Courser, MARY JO  09/19/18 [id number]

## 2018-09-20 NOTE — PROGRESS NOTES
Patient is medication compliant and cooperative with care  Pt is present in the milieu, but resorts back to his room  Pt denies feeling suicidal currently, advised that he is just feeling tired and wanted to lay down  Will continue to monitor

## 2018-09-20 NOTE — CONSULTS
History and Physical - General Surgery   Robina Rodgers 63 y o  male MRN: [id number]  Unit/Bed#: IE4 618-69 Encounter: [id number]    History of Present Illness     HPI:  Robina Rodgers is a 63 y o  male who presented with worsening depression and suicidal attempt by overdosing on 6 Lexapro ( 20 mg each ) and cutting his left forearm with a knife  Patient admits to being kicked out of the [facility] where he was residing secondary to having a positive urine for marijuana  He stated that he had not smoked any for the past 2 months  He became very despondent after recently losing his job at a [employer] in August and now becoming homeless, pushing him over the edge  He admitted to multiple past suicidal attempts which included hanging, shooting himself, and overdosing on medications  He denies homicidal ideations or hallucinations  Patient states being treated for hypertension and GERD with complaints of hip and knee pain with ambulation  Now admitted to the inpatient psych unit for further evaluation and treatment of worsening depression with suicidal attempt by cutting and overdosing on medications  Review of Systems   Constitutional: Negative for chills, diaphoresis, fatigue, fever and unexpected weight change  HENT: Negative for congestion, ear pain, hearing loss, postnasal drip, rhinorrhea, sore throat and trouble swallowing  The states breaking both his upper and lower dentures  Eyes: Negative for photophobia, redness and visual disturbance  Respiratory: Negative for cough, chest tightness, shortness of breath, wheezing and stridor  Cardiovascular: Negative for chest pain, palpitations and leg swelling  He has history of hypertension  Gastrointestinal: Negative for abdominal distention, abdominal pain, constipation, diarrhea, nausea and vomiting  States history of GERD and is taking Nexium daily     Endocrine: Negative for cold intolerance, heat intolerance, polydipsia, polyphagia and polyuria  States nocturia X 2  Denies any other urinary complaints  Genitourinary: Negative for decreased urine volume, difficulty urinating, dysuria, flank pain, frequency, hematuria and urgency  Musculoskeletal: Positive for arthralgias, back pain and myalgias  Negative for gait problem and joint swelling  States history of lumbar spinal stenosis and has knee and hip pain with ambulation  Skin: Negative for color change, pallor and rash  Neurological: Negative for dizziness, tremors, seizures, syncope, facial asymmetry, speech difficulty, weakness, light-headedness, numbness and headaches  Hematological: Negative for adenopathy  Does not bruise/bleed easily  Psychiatric/Behavioral: Positive for dysphoric mood, self-injury and suicidal ideas  Negative for behavioral problems, confusion and hallucinations  Historical Information   Past Medical History:   Diagnosis Date    Alcohol abuse     Depression     DJD (degenerative joint disease) of knee     bilateral    Drug therapy     GERD (gastroesophageal reflux disease)     Hypertension 01/2012    Psychiatric disorder     depression, anxiety    Self-injurious behavior     Spinal stenosis of lumbar region     Suicide attempt [facility])      Past Surgical History:   Procedure Laterality Date    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    HERNIA REPAIR  1997     Social History   History   Alcohol Use No     Comment: Stopped drinking for several months     History   Drug Use    Types: Marijuana     Comment: Last used marijuana 2 months ago       History   Smoking Status    Current Every Day Smoker    Packs/day: 0 50    Types: Cigarettes, Cigars   Smokeless Tobacco    Never Used     Comment: Years     Family History:   Family History   Problem Relation Age of Onset    Cancer Family     Mental illness Family        Meds/Allergies   Current Facility-Administered Medications   Medication Dose Route Frequency    acetaminophen (TYLENOL) tablet 650 mg  650 mg Oral Q6H PRN    aluminum-magnesium hydroxide-simethicone (MYLANTA) 200-200-20 mg/5 mL oral suspension 30 mL  30 mL Oral Q4H PRN    benztropine (COGENTIN) injection 1 mg  1 mg Intramuscular Q6H PRN    benztropine (COGENTIN) tablet 1 mg  1 mg Oral Q6H PRN    haloperidol (HALDOL) tablet 5 mg  5 mg Oral Q6H PRN    haloperidol lactate (HALDOL) injection 5 mg  5 mg Intramuscular Q6H PRN    hydrOXYzine HCL (ATARAX) tablet 25 mg  25 mg Oral Q6H PRN    ibuprofen (MOTRIN) tablet 600 mg  600 mg Oral Q8H PRN    ibuprofen (MOTRIN) tablet 800 mg  800 mg Oral Q8H PRN    magnesium hydroxide (MILK OF MAGNESIA) 400 mg/5 mL oral suspension 30 mL  30 mL Oral Daily PRN    risperiDONE (RisperDAL M-TABS) dispersible tablet 1 mg  1 mg Oral Q6H PRN    traZODone (DESYREL) tablet 50 mg  50 mg Oral HS PRN     Prescriptions Prior to Admission   Medication    escitalopram (LEXAPRO) 20 mg tablet    esomeprazole (NexIUM) 20 mg capsule    hydrOXYzine HCL (ATARAX) 25 mg tablet    lamoTRIgine (LaMICtal) 100 mg tablet    Multiple Vitamins-Minerals (ONE-A-DAY 50 PLUS PO)     No Known Allergies    Objective     /56 (BP Location: Right arm)   Pulse 62   Temp 98 4 °F (36 9 °C) (Oral)   Resp 18   Ht 5' 8" (1 727 m)   Wt 76 2 kg (167 lb 15 9 oz)   SpO2 95%   BMI 25 54 kg/m²     Current Vitals:   Blood Pressure: 108/56 (09/20/18 0707)  Pulse: 62 (09/20/18 0707)  Temperature: 98 4 °F (36 9 °C) (09/20/18 0707)  Temp Source: Oral (09/20/18 0707)  Respirations: 18 (09/20/18 0707)  Height: 5' 8" (172 7 cm) (09/19/18 2237)  Weight - Scale: 76 2 kg (167 lb 15 9 oz) (09/19/18 2237)  SpO2: 95 % (09/20/18 0707)    No intake or output data in the 24 hours ending 09/20/18 1038    Invasive Devices          No matching active lines, drains, or airways          Physical Exam   Constitutional: He is oriented to person, place, and time   He appears well-developed and well-nourished  No distress  HENT:   Head: Normocephalic and atraumatic  Right Ear: External ear normal    Left Ear: External ear normal    Nose: Nose normal    Mouth/Throat: Oropharynx is clear and moist    He has no teeth or dentures intact  Eyes: Conjunctivae and EOM are normal  Pupils are equal, round, and reactive to light  No scleral icterus  Neck: Normal range of motion  Neck supple  No tracheal deviation present  No thyromegaly present  Cardiovascular: Normal rate, regular rhythm, normal heart sounds and intact distal pulses  Exam reveals no gallop and no friction rub  No murmur heard  Pulmonary/Chest: Effort normal and breath sounds normal  No respiratory distress  He has no wheezes  He has no rales  Abdominal: Soft  Bowel sounds are normal  He exhibits no distension and no mass  There is no tenderness  There is no rebound and no guarding  Musculoskeletal: Normal range of motion  He exhibits no edema, tenderness or deformity  Lymphadenopathy:     He has no cervical adenopathy  Neurological: He is alert and oriented to person, place, and time  He has normal reflexes  No cranial nerve deficit  He exhibits normal muscle tone  Coordination normal    Skin: Skin is warm and dry  No rash noted  He is not diaphoretic  No erythema  He as superficial scabbed scrape markings of his left forearm with mild surrounding skin erythema and without warmth  Psychiatric: His behavior is normal  Thought content normal    Patient appears to have a subdued and depressed mood but is cooperative and appropriate              Lab Results:   Admission on 09/19/2018   Component Date Value    TSH 3RD GENERATON 09/19/2018 1 410     WBC 09/19/2018 8 11     RBC 09/19/2018 4 36     Hemoglobin 09/19/2018 13 6     Hematocrit 09/19/2018 40 3     MCV 09/19/2018 92     MCH 09/19/2018 31 2     MCHC 09/19/2018 33 7     RDW 09/19/2018 14 3     MPV 09/19/2018 10 8     Platelets 29/45/7877 146*    nRBC 09/19/2018 0     Neutrophils Relative 09/19/2018 66     Immat GRANS % 09/19/2018 0     Lymphocytes Relative 09/19/2018 24     Monocytes Relative 09/19/2018 9     Eosinophils Relative 09/19/2018 1     Basophils Relative 09/19/2018 0     Neutrophils Absolute 09/19/2018 5 32     Immature Grans Absolute 09/19/2018 0 02     Lymphocytes Absolute 09/19/2018 1 91     Monocytes Absolute 09/19/2018 0 73     Eosinophils Absolute 09/19/2018 0 10     Basophils Absolute 09/19/2018 0 03     Sodium 09/19/2018 139     Potassium 09/19/2018 3 9     Chloride 09/19/2018 109*    CO2 09/19/2018 24     ANION GAP 09/19/2018 6     BUN 09/19/2018 16     Creatinine 09/19/2018 1 17     Glucose 09/19/2018 85     Calcium 09/19/2018 8 9     eGFR 09/19/2018 69     Amph/Meth UR 09/19/2018 Negative     Barbiturate Ur 09/19/2018 Negative     Benzodiazepine Urine 09/19/2018 Negative     Cocaine Urine 09/19/2018 Negative     Methadone Urine 09/19/2018 Negative     Opiate Urine 09/19/2018 Positive*    PCP Ur 09/19/2018 Negative     THC Urine 09/19/2018 Positive*    Color, UA 09/19/2018 yellow     Clarity, UA 09/19/2018 clear     Ethanol Lvl 03/33/5747 <3     Salicylate Lvl 02/62/9833 4     Acetaminophen Level 09/19/2018 <2*    Color, UA 09/19/2018 Yellow     Clarity, UA 09/19/2018 Clear     pH, UA 09/19/2018 6 0     Leukocytes, UA 09/19/2018 Trace*    Nitrite, UA 09/19/2018 Negative     Protein, UA 09/19/2018 Negative     Glucose, UA 09/19/2018 Negative     Ketones, UA 09/19/2018 Negative     Urobilinogen, UA 09/19/2018 0 2     Bilirubin, UA 09/19/2018 Negative     Blood, UA 09/19/2018 Negative     Specific Gravity, UA 09/19/2018 1 020     RBC, UA 09/19/2018 None Seen     WBC, UA 09/19/2018 4-10*    Epithelial Cells 09/19/2018 None Seen     Bacteria, UA 09/19/2018 None Seen     Hyaline Casts, UA 09/19/2018 3-5*    Folate 09/19/2018 >20 0*    WBC 09/20/2018 6 52     RBC 09/20/2018 4 43     Hemoglobin 09/20/2018 13 9     Hematocrit 09/20/2018 41 1     MCV 09/20/2018 93     MCH 09/20/2018 31 4     MCHC 09/20/2018 33 8     RDW 09/20/2018 14 5     MPV 09/20/2018 11 2     Platelets 04/99/8273 145*    nRBC 09/20/2018 0     Neutrophils Relative 09/20/2018 62     Immat GRANS % 09/20/2018 0     Lymphocytes Relative 09/20/2018 23     Monocytes Relative 09/20/2018 11     Eosinophils Relative 09/20/2018 3     Basophils Relative 09/20/2018 1     Neutrophils Absolute 09/20/2018 4 08     Immature Grans Absolute 09/20/2018 0 01     Lymphocytes Absolute 09/20/2018 1 48     Monocytes Absolute 09/20/2018 0 73     Eosinophils Absolute 09/20/2018 0 18     Basophils Absolute 09/20/2018 0 04     Sodium 09/20/2018 139     Potassium 09/20/2018 4 3     Chloride 09/20/2018 109*    CO2 09/20/2018 25     ANION GAP 09/20/2018 5     BUN 09/20/2018 18     Creatinine 09/20/2018 1 14     Glucose 09/20/2018 87     Calcium 09/20/2018 8 5     AST 09/20/2018 44     ALT 09/20/2018 72     Alkaline Phosphatase 09/20/2018 82     Total Protein 09/20/2018 6 8     Albumin 09/20/2018 3 3*    Total Bilirubin 09/20/2018 0 65     eGFR 09/20/2018 71     Cholesterol 09/20/2018 185     Triglycerides 09/20/2018 154*    HDL, Direct 09/20/2018 29*    LDL Calculated 09/20/2018 125*    Non-HDL-Chol (CHOL-HDL) 09/20/2018 156     TSH 3RD GENERATON 09/20/2018 1 390      Imaging: none  EKG, Pathology, and Other Studies: none    Assessment/Plan     Assessment:  1  This is a depressed appearing 64 yo white male who states worsening depression secondary to recent life stressors and suicidal attempt by cutting left forearm and overdosing on 6 Lexapro  2  Multiple past history of suicidal attempts  3  History of alcohol abuse- stopped drinking for several months  4  History of hypertension  5  GERD- takes Nexium 20 mg daily  6  Knee and hip pain with ambulation- chronic  7  History of lumbar spinal stenosis  8   Mild thrombocytopenia on labs- Plts- 145 K        Plan:  1  Admit to inpatient psych unit for further evaluation and treatment of worsening depression and suicidal attempt with similar past history  Counseling / Coordination of Care  Total floor / unit time spent today 1 hour  Greater than 50% of total time was spent with the patient and / or family counseling and / or coordination of care        Margarita Bradford PA-C  9/20/2018

## 2018-09-20 NOTE — PLAN OF CARE
Anxiety     Anxiety is at manageable level Progressing        Depression     Treatment Goal: Demonstrate behavioral control of depressive symptoms, verbalize feelings of improved mood/affect, and adopt new coping skills prior to discharge Progressing     Verbalize thoughts and feelings Progressing     Refrain from harming self Progressing     Refrain from [address] from self-neglect Progressing     Attend and participate in unit activities, including therapeutic, recreational, and educational groups Progressing     Complete daily ADLs, including personal hygiene independently, as able Progressing        Risk for Self Injury/Neglect     Treatment Goal: Remain safe during length of stay, learn and adopt new coping skills, and be free of self-injurious ideation, impulses and acts at the time of discharge Progressing     Verbalize thoughts and feelings Progressing     Refrain from harming self Progressing     Recognize maladaptive responses and adopt new coping mechanisms Progressing     Complete daily ADLs, including personal hygiene independently, as able Progressing

## 2018-09-20 NOTE — H&P
Psychiatric Evaluation - Behavioral Health     Identification Data:Chuy Bangura 63 y o  male MRN: [id number]  Unit/Bed#: GU3 949-84 Encounter: [id number]    Chief Complaint:   Depressed with suicide attempt  History of present illness:    Patient is a 63 yrs old male admitted voluntarily following a suicide attempt which he attributes to feeling overwhelmed by multiple stressors including losing his job and homelessness  Also, a woman he was interested in got engaged  A random urine test showed THC and he was evicted from his place  He feels that he has no reason to go on anymore and does not want to be alive and indicates that if he leaves the hospital the way he feels now, he is going to kill himself  Other than lack of of energy and anxiety in some guilt feelings and hopelessness, he does not endorse any other neurovegetative signs of depression  He reports that he is receiving mental health treatment at [address] [place] [address]  The most recent notes that I could find through [facility] system is from February 2015  Denies drinking alcoholic beverages at this time  Psychiatric Review Of Systems:  Change in sleep: no  Appetite changes: no  Weight changes: no  Change in energy/anergy: yes  Change in interest/pleasure/anhedonia: no  Somatic symptoms: no  Anxiety/panic: yes  Manic symptoms: no  Guilt feelings:yes  Hopeless: yes  Self injurious behavior/risky behavior: yes    Historical Information     Past Psychiatric History:     Multiple admissions with at least 6 previous suicide attempts  He does endorse episodes of what appears to be hypomania  The patient states that he has not attempted suicide or been hospitalized for at least 30 years  He is receiving psychiatric treatment through [address] [place] [address] Dr Tash Purcell  Childhood evidence of ADHD  Considers himself smart but did not apply himself  Got bored easily and was restless and fidgety and disruptive    He did a lot of dangerous stuff causing multiple falls and concussions  Substance Abuse History:  Heavy drinking x 30 years with admissions to alcohol rehab x 13  Also history of polysubstance abuse  This included crank, cocaine and crystal meth  He has a history of intravenous use as well but denies sharing needles  He indicates that he has hep C but is HIV negative  Family Psychiatric History:     Uncle attempted suicide several times  Social History:  Developmental:not close to family- Oldest of 4 and the only male  Defiant with family  Education: high school diploma/GED  Marital history: single  Living arrangement, social support: the patient is homeless-No contact with siblings-never  and has no children  Occupational History: unemployed-worked at a [employer] for two and half years but was fired on the spot after he was caught eating a product, mashed potatoes  Access to firearms:Denies    Traumatic History:   Abuse:none is reported  Other Traumatic Events: none    Past Medical History:   Diagnosis Date    Alcohol abuse     Depression     Drug therapy     GERD (gastroesophageal reflux disease)     Hypertension 01/2012    Knee pain, bilateral     Psychiatric disorder     depression, anxiety    Self-injurious behavior     Spinal stenosis of lumbar region     Suicide attempt [facility])        Medical Review Of Systems:  Pertinent items are noted in HPI  Meds/Allergies   all current active meds have been reviewed  No Known Allergies  Objective      Mental Status Evaluation:  Appearance:  {Marginal/poor hygiene and Poor eye contact   Behavior:  cooperative and psychomotor retardation   Speech:   Language Normal rate and Normal volume  No overt abnormality   Mood:  depressed   Affect:    Thought process constricted and mood-congruent  Goal directed and coherent   Associations: Tightly connected   Thought Content:  Does not verbalize delusional material   Perceptual Disturbances: Denies hallucinations and does not appear to be responding to internal stimuli   Risk Potential: Suicidal Ideations without plan   Orientation  Oriented x 3   Memory grossly intact   Attention/Concentration attention span appeared shorter than expected for age   [place] Stains of knowledge aware of current events, Aware of past history and vocabulary Average   Insight:  limited   Judgment: Good judgment   Gait/Station: normal gait/station and normal balance   Motor Activity: No abnormal movement noted         Lab Results: I have personally reviewed pertinent lab results  Imaging Studies: no pertinent data  EKG, Pathology, and Other Studies:no data    Code Status:Full code    Patient Strengths/Assets: cooperative    Patient Barriers/Limitations: difficulty adapting, financial instability, homeless, lack of financial means, lack of social/family support, lack of stable employment, poor insight, poor support system    Assessment/Plan     Principal Problem:    Bipolar 2 disorder, major depressive episode ([facility] [address] )  Active Problems:    Attention deficit hyperactivity disorder (ADHD), combined type    Plan: We will resume lamotrigine and Lexapro  Risks, benefits and possible side effects of Medications:   Risks, benefits, and possible side effects of medications explained to patient and patient verbalizes understanding

## 2018-09-20 NOTE — PLAN OF CARE
Problem: Ineffective Coping  Goal: Participates in unit activities  Interventions:  - Provide therapeutic environment   - Provide required programming   - Redirect inappropriate behaviors    Outcome: Progressing  Pt was cooperative during bedside self assessment interview  He did respond to direct staff requests to abstain from cursing during the interview  Pt reports that he will be ready for discharge when he is no longer thinking of killing himself  Pt  Holds himself responsible for relapse and [facility]

## 2018-09-20 NOTE — CASE MANAGEMENT
Met with pt to review Tx plan and initiate discharge planning  He reports he is single, no children  Has recently been accepted into [facility] the [facility], but his urine tested positive for [facility] and they have now kicked him out  He has not held a steady jopb in years, last working for a [employer] but had too many run in's with [employer] and drinking that they fired him  Prior to the he was a   Pt has not had stable housing for several years  He currently has no income, no supports  He served in the [employer] for 1 year in [place] - but would not elaborate about his discharge  Pt denies access to weapons, denies current drug or ETTOH use  Has hx of at least 13x in rehab years ago, used to smoke crack, drink 1 pint of vodka qd, and smoke 1-2 bowls of marijuana qd  But states he most recently only got high about 2 5 months ago  He has no PCP, but goes to the [facility] for his mental health Tx   Dr Johnathon Mosqueda and Alice President are his Dr  And therapist

## 2018-09-20 NOTE — ED NOTES
Insurance Authorization:   Phone call placed to [facility]  Phone number: [phone]  Spoke to Danelle Perez  3 days approved    Level of care: Inpatient Mental Health  Review on 9/21/18  Authorization # [id number]

## 2018-09-21 PROCEDURE — 99232 SBSQ HOSP IP/OBS MODERATE 35: CPT | Performed by: PSYCHIATRY & NEUROLOGY

## 2018-09-21 RX ADMIN — LAMOTRIGINE 100 MG: 100 TABLET ORAL at 08:30

## 2018-09-21 RX ADMIN — ESCITALOPRAM OXALATE 20 MG: 20 TABLET, FILM COATED ORAL at 08:30

## 2018-09-21 NOTE — PLAN OF CARE
Problem: Ineffective Coping  Goal: Participates in unit activities  Interventions:  - Provide therapeutic environment   - Provide required programming   - Redirect inappropriate behaviors    Outcome: Progressing  Pt did attend morning groups with min  prompting yet returns to bed between groups  He missed afternoon session due to sleeping soundly  Pt continues to display a negative attitude toward therapeutic intervention but has been cooperative in groups

## 2018-09-21 NOTE — PROGRESS NOTES
Patients states, "I don't want to be alive"  Patient expresses that he intends to commit suicide, "as soon as they let me out of here"  Patient did contract for safety but stated that he would not attempt to hurt himself in the hospital only because, "they have to many ways to keep you alive " Patient reports having a history of violence but denies violence in the last 2 years  Patient has an irritable edge but was cooperative through interview

## 2018-09-21 NOTE — PLAN OF CARE
Depression     Treatment Goal: Demonstrate behavioral control of depressive symptoms, verbalize feelings of improved mood/affect, and adopt new coping skills prior to discharge Not Progressing        Risk for Self Injury/Neglect     Treatment Goal: Remain safe during length of stay, learn and adopt new coping skills, and be free of self-injurious ideation, impulses and acts at the time of discharge Not Progressing        Patient reports SI     Anxiety     Anxiety is at manageable level Progressing        Depression     Verbalize thoughts and feelings Progressing     Refrain from harming self Progressing     Refrain from isolation Progressing     Refrain from self-neglect Progressing     Complete daily ADLs, including personal hygiene independently, as able Progressing        Risk for Self Injury/Neglect     Verbalize thoughts and feelings Progressing     Refrain from harming self Progressing     Recognize maladaptive responses and adopt new coping mechanisms Progressing     Complete daily ADLs, including personal hygiene independently, as able Progressing

## 2018-09-21 NOTE — CASE MANAGEMENT
Have notified [facility] of this admission  I have also faxed a copy of the KRISTEN to her asking for them to send us copies of pt's most recent medication list  I have been directed to call Aimee Marshall at that site when we are aware of discharge  Placed a call to Roya Miranda [facility] [phone] informing him of the [facility], serving only in [id number]  The purpose of the call was o see if Lisandro Or would be eligible for Demetris's services in finding housing for this Vet   Await his call back
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
## 2018-09-21 NOTE — PROGRESS NOTES
Patient seclusive to room and spent evening in bed  Patient would not engage in conversation and would only answer yes or no questions or would not answer at all  He did state he was tired

## 2018-09-21 NOTE — MEDICAL STUDENT
Progress Note - Behavioral Health   Neel Enriquez 63 y o  male MRN: [id number]  Unit/Bed#: ST9 562-01 Encounter: [id number]    Assessment/Plan   Principal Problem:    Bipolar 2 disorder, major depressive episode ([address] )  Active Problems:    Attention deficit hyperactivity disorder (ADHD), combined type      Behavior over the last 24 hours:    Pt seen for continuing care and review with treatment team  Staff report the patient has been unsocial, short and irritable with staff, and angry in group  Pt is homeless and has no income  Today patient was in dinning room with arms crossed, guarded but willing to talk  Pt states he is doing "alright" his mood is the "same" but he denies depression and anxiety  He states he is just "pissed off, mostly at myself for getting myself into this mess " Pt has mild psychomotor aggitation, denies any physical complaints  Pt states that he has been taking his meds willingly but that they aren't doing anything   Pt complains that group doesn't help either, it just makes him more "pissed off" Pt states that he is too old with "no future and no hope "      Sleep: pt reports sleeping okay  Appetite: pt reports good appetite  Medication side effects: no   ROS: no complaints    Mental Status Evaluation:  Appearance:  age appropriate   Behavior:  guarded   Speech:  normal pitch and normal volume   Mood:  constricted   Affect:  constricted and mood-congruent   Thought Process:  circumstantial   Thought Content:  normal, denies VH/AH   Perceptual Disturbances: None   Risk Potential: Suicidal Ideations with plan pt states that if he were to leave he would hang himself  and just end it   Sensorium:  person and place   Cognition:  grossly intact   Consciousness:  alert and awake    Attention: [place] than expected   Insight:  poor   Judgment: poor judgment, states he would end his life if he were let out today   Gait/Station: normal gait/station   Motor Activity: mild psychomotor aggitation Progress Toward Goals: not progressing    Recommended Treatment: Continue with pharmacotherapy, group therapy, milieu therapy and occupational therapy  Risks, benefits and possible side effects of Medications:   Patient does not verbalize understanding at this time and will require further explanation  Medications:   current meds:   Current Facility-Administered Medications   Medication Dose Route Frequency    acetaminophen (TYLENOL) tablet 650 mg  650 mg Oral Q6H PRN    aluminum-magnesium hydroxide-simethicone (MYLANTA) 200-200-20 mg/5 mL oral suspension 30 mL  30 mL Oral Q4H PRN    benztropine (COGENTIN) injection 1 mg  1 mg Intramuscular Q6H PRN    benztropine (COGENTIN) tablet 1 mg  1 mg Oral Q6H PRN    escitalopram (LEXAPRO) tablet 20 mg  20 mg Oral Daily    haloperidol (HALDOL) tablet 5 mg  5 mg Oral Q6H PRN    haloperidol lactate (HALDOL) injection 5 mg  5 mg Intramuscular Q6H PRN    hydrOXYzine HCL (ATARAX) tablet 25 mg  25 mg Oral Q6H PRN    ibuprofen (MOTRIN) tablet 600 mg  600 mg Oral Q8H PRN    ibuprofen (MOTRIN) tablet 800 mg  800 mg Oral Q8H PRN    lamoTRIgine (LaMICtal) tablet 100 mg  100 mg Oral Daily    magnesium hydroxide (MILK OF MAGNESIA) 400 mg/5 mL oral suspension 30 mL  30 mL Oral Daily PRN    risperiDONE (RisperDAL M-TABS) dispersible tablet 1 mg  1 mg Oral Q6H PRN    traZODone (DESYREL) tablet 50 mg  50 mg Oral HS PRN     Labs: unremarkable    Counseling / Coordination of Care  Total floor / unit time spent today 30 minutes  Greater than 50% of total time was spent with the patient and / or family counseling and / or coordination of care   A description of the counseling / coordination of care:

## 2018-09-22 PROCEDURE — 99232 SBSQ HOSP IP/OBS MODERATE 35: CPT | Performed by: PSYCHIATRY & NEUROLOGY

## 2018-09-22 RX ORDER — QUETIAPINE FUMARATE 25 MG/1
50 TABLET, FILM COATED ORAL
Status: DISCONTINUED | OUTPATIENT
Start: 2018-09-22 | End: 2018-09-23

## 2018-09-22 RX ADMIN — NICOTINE 1 PATCH: 7 PATCH, EXTENDED RELEASE TRANSDERMAL at 09:14

## 2018-09-22 RX ADMIN — LAMOTRIGINE 100 MG: 100 TABLET ORAL at 08:24

## 2018-09-22 RX ADMIN — QUETIAPINE 50 MG: 25 TABLET ORAL at 21:22

## 2018-09-22 RX ADMIN — ESCITALOPRAM OXALATE 20 MG: 20 TABLET, FILM COATED ORAL at 08:24

## 2018-09-22 NOTE — PROGRESS NOTES
Patient initially stated he was "a little bit depressed" and denied SI but through conversation reported SI with a plan to cut his arms after discharge  Patient's affect became somewhat tearful and reported he had feelings of guilt  Patient has been out in milieu at times and watching movies  Patient is calm and cooperative

## 2018-09-22 NOTE — PLAN OF CARE
Depression     Treatment Goal: Demonstrate behavioral control of depressive symptoms, verbalize feelings of improved mood/affect, and adopt new coping skills prior to discharge Not Progressing        Risk for Self Injury/Neglect     Treatment Goal: Remain safe during length of stay, learn and adopt new coping skills, and be free of self-injurious ideation, impulses and acts at the time of discharge Not Progressing        Patient is reporting suicidal intent to commit suicide by cutting wrists at discharge     Anxiety     Anxiety is at manageable level Progressing        Depression     Verbalize thoughts and feelings Progressing     Refrain from harming self Progressing     Refrain from isolation Progressing     Refrain from self-neglect Progressing     Complete daily ADLs, including personal hygiene independently, as able Progressing        Risk for Self Injury/Neglect     Verbalize thoughts and feelings Progressing     Refrain from harming self Progressing     Recognize maladaptive responses and adopt new coping mechanisms Progressing     Complete daily ADLs, including personal hygiene independently, as able Progressing

## 2018-09-22 NOTE — PROGRESS NOTES
Pt cooperative  Admits to SI, but contracts for safety  Denies AH/VH/HI  Feels depressed and anxious  Somewhat irritable
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
## 2018-09-23 PROCEDURE — 99232 SBSQ HOSP IP/OBS MODERATE 35: CPT | Performed by: PSYCHIATRY & NEUROLOGY

## 2018-09-23 RX ORDER — QUETIAPINE FUMARATE 100 MG/1
100 TABLET, FILM COATED ORAL
Status: DISCONTINUED | OUTPATIENT
Start: 2018-09-23 | End: 2018-09-25

## 2018-09-23 RX ADMIN — NICOTINE 1 PATCH: 7 PATCH, EXTENDED RELEASE TRANSDERMAL at 08:47

## 2018-09-23 RX ADMIN — LAMOTRIGINE 100 MG: 100 TABLET ORAL at 08:42

## 2018-09-23 RX ADMIN — ESCITALOPRAM OXALATE 20 MG: 20 TABLET, FILM COATED ORAL at 08:42

## 2018-09-23 RX ADMIN — QUETIAPINE FUMARATE 100 MG: 100 TABLET ORAL at 21:03

## 2018-09-23 NOTE — PROGRESS NOTES
Admits to feeling depressed, but denies SI  Denies pain or other problems  Remained calm and cooperative  Did not attend group   Medication compliant

## 2018-09-23 NOTE — PLAN OF CARE
Risk for Self Injury/Neglect     Treatment Goal: Remain safe during length of stay, learn and adopt new coping skills, and be free of self-injurious ideation, impulses and acts at the time of discharge Not Progressing        Patient has SI    Anxiety     Anxiety is at manageable level Progressing        Depression     Treatment Goal: Demonstrate behavioral control of depressive symptoms, verbalize feelings of improved mood/affect, and adopt new coping skills prior to discharge Progressing     Verbalize thoughts and feelings Progressing     Refrain from harming self Progressing     Refrain from isolation Progressing     Refrain from self-neglect Progressing     Complete daily ADLs, including personal hygiene independently, as able Progressing        Risk for Self Injury/Neglect     Verbalize thoughts and feelings Progressing     Refrain from harming self Progressing     Recognize maladaptive responses and adopt new coping mechanisms Progressing     Complete daily ADLs, including personal hygiene independently, as able Progressing

## 2018-09-23 NOTE — PROGRESS NOTES
Progress Note - Behavioral Health   Dilan Peres 63 y o  male MRN: [id number]  Unit/Bed#: LO1 996-83 Encounter: [id number]    Patient seen, chart reviewed, discussed with staff  Nursing staff notes the patient continues to be seclusive to his room but is cooperative with medications  Continues to complain of depression to staff  Patient was seen after breakfast this morning lying in bed  Patient states that he did sleep a little better last night with Seroquel  Patient states that he is feeling angry and irritable because people keep asking me the same questions"  States that he does not want to talk about his past and is trying to forget everything  Patient did answer questions this morning but was visibly angry and irritated  Contracts for safety here in the hospital but states that he would likely attempt suicide if he is discharged from the hospital     Physically denies any complaints of pain or discomfort      Behavior over the last 24 hours:  unchanged  Sleep:  Improved  Appetite: normal  Medication side effects: No  ROS: no complaints  /71 (BP Location: Right arm)   Pulse 78   Temp 98 1 °F (36 7 °C) (Tympanic)   Resp 18   Ht 5' 8" (1 727 m)   Wt 76 2 kg (167 lb 15 9 oz)   SpO2 96%   BMI 25 54 kg/m²     Medications:   Current Facility-Administered Medications   Medication Dose Route Frequency    acetaminophen (TYLENOL) tablet 650 mg  650 mg Oral Q6H PRN    aluminum-magnesium hydroxide-simethicone (MYLANTA) 200-200-20 mg/5 mL oral suspension 30 mL  30 mL Oral Q4H PRN    benztropine (COGENTIN) injection 1 mg  1 mg Intramuscular Q6H PRN    benztropine (COGENTIN) tablet 1 mg  1 mg Oral Q6H PRN    escitalopram (LEXAPRO) tablet 20 mg  20 mg Oral Daily    haloperidol (HALDOL) tablet 5 mg  5 mg Oral Q6H PRN    haloperidol lactate (HALDOL) injection 5 mg  5 mg Intramuscular Q6H PRN    hydrOXYzine HCL (ATARAX) tablet 25 mg  25 mg Oral Q6H PRN    ibuprofen (MOTRIN) tablet 600 mg  600 mg Oral Q8H PRN    ibuprofen (MOTRIN) tablet 800 mg  800 mg Oral Q8H PRN    lamoTRIgine (LaMICtal) tablet 100 mg  100 mg Oral Daily    magnesium hydroxide (MILK OF MAGNESIA) 400 mg/5 mL oral suspension 30 mL  30 mL Oral Daily PRN    nicotine (NICODERM CQ) 7 mg/24hr TD 24 hr patch 1 patch  1 patch Transdermal Daily    QUEtiapine (SEROquel) tablet 50 mg  50 mg Oral HS    risperiDONE (RisperDAL M-TABS) dispersible tablet 1 mg  1 mg Oral Q6H PRN    traZODone (DESYREL) tablet 50 mg  50 mg Oral HS PRN       Labs:   Admission on 09/19/2018   Component Date Value Ref Range Status    TSH 3RD GENERATON 09/19/2018 1 410  0 358 - 3 740 uIU/mL Final    WBC 09/19/2018 8 11  4 31 - 10 16 Thousand/uL Final    RBC 09/19/2018 4 36  3 88 - 5 62 Million/uL Final    Hemoglobin 09/19/2018 13 6  12 0 - 17 0 g/dL Final    Hematocrit 09/19/2018 40 3  36 5 - 49 3 % Final    MCV 09/19/2018 92  82 - 98 fL Final    MCH 09/19/2018 31 2  26 8 - 34 3 pg Final    MCHC 09/19/2018 33 7  31 4 - 37 4 g/dL Final    RDW 09/19/2018 14 3  11 6 - 15 1 % Final    MPV 09/19/2018 10 8  8 9 - 12 7 fL Final    Platelets 60/47/1180 146* 149 - 390 Thousands/uL Final    nRBC 09/19/2018 0  /100 WBCs Final    Neutrophils Relative 09/19/2018 66  43 - 75 % Final    Immat GRANS % 09/19/2018 0  0 - 2 % Final    Lymphocytes Relative 09/19/2018 24  14 - 44 % Final    Monocytes Relative 09/19/2018 9  4 - 12 % Final    Eosinophils Relative 09/19/2018 1  0 - 6 % Final    Basophils Relative 09/19/2018 0  0 - 1 % Final    Neutrophils Absolute 09/19/2018 5 32  1 85 - 7 62 Thousands/µL Final    Immature Grans Absolute 09/19/2018 0 02  0 00 - 0 20 Thousand/uL Final    Lymphocytes Absolute 09/19/2018 1 91  0 60 - 4 47 Thousands/µL Final    Monocytes Absolute 09/19/2018 0 73  0 17 - 1 22 Thousand/µL Final    Eosinophils Absolute 09/19/2018 0 10  0 00 - 0 61 Thousand/µL Final    Basophils Absolute 09/19/2018 0 03  0 00 - 0 10 Thousands/µL Final    Sodium 09/19/2018 139  136 - 145 mmol/L Final    Potassium 09/19/2018 3 9  3 5 - 5 3 mmol/L Final    Chloride 09/19/2018 109* 100 - 108 mmol/L Final    CO2 09/19/2018 24  21 - 32 mmol/L Final    ANION GAP 09/19/2018 6  4 - 13 mmol/L Final    BUN 09/19/2018 16  5 - 25 mg/dL Final    Creatinine 09/19/2018 1 17  0 60 - 1 30 mg/dL Final    Glucose 09/19/2018 85  65 - 140 mg/dL Final    Calcium 09/19/2018 8 9  8 3 - 10 1 mg/dL Final    eGFR 09/19/2018 69  ml/min/1 73sq m Final    Amph/Meth UR 09/19/2018 Negative  Negative Final    Barbiturate Ur 09/19/2018 Negative  Negative Final    Benzodiazepine Urine 09/19/2018 Negative  Negative Final    Cocaine Urine 09/19/2018 Negative  Negative Final    Methadone Urine 09/19/2018 Negative  Negative Final    Opiate Urine 09/19/2018 Positive* Negative Final    PCP Ur 09/19/2018 Negative  Negative Final    THC Urine 09/19/2018 Positive* Negative Final    Color, UA 09/19/2018 yellow   Final    Clarity, UA 09/19/2018 clear   Final    Ethanol Lvl 09/19/2018 <3  0 - 3 mg/dL Final    Salicylate Lvl 59/47/4081 4  3 - 20 mg/dL Final    Acetaminophen Level 09/19/2018 <2* 10 - 30 ug/mL Final    Color, UA 09/19/2018 Yellow   Final    Clarity, UA 09/19/2018 Clear   Final    pH, UA 09/19/2018 6 0  4 5 - 8 0 Final    Leukocytes, UA 09/19/2018 Trace* Negative Final    Nitrite, UA 09/19/2018 Negative  Negative Final    Protein, UA 09/19/2018 Negative  Negative mg/dl Final    Glucose, UA 09/19/2018 Negative  Negative mg/dl Final    Ketones, UA 09/19/2018 Negative  Negative mg/dl Final    Urobilinogen, UA 09/19/2018 0 2  0 2, 1 0 E U /dl E U /dl Final    Bilirubin, UA 09/19/2018 Negative  Negative Final    Blood, UA 09/19/2018 Negative  Negative Final    Specific Gravity, UA 09/19/2018 1 020  1 003 - 1 030 Final    RBC, UA 09/19/2018 None Seen  None Seen, 0-5 /hpf Final    WBC, UA 09/19/2018 4-10* None Seen, 0-5, 5-55, 5-65 /hpf Final    Epithelial Cells 09/19/2018 None Seen  None Seen, Occasional /hpf Final    Bacteria, UA 09/19/2018 None Seen  None Seen, Occasional /hpf Final    Hyaline Casts, UA 09/19/2018 3-5* None Seen /lpf Final    Folate 09/19/2018 >20 0* 3 1 - 17 5 ng/mL Final    WBC 09/20/2018 6 52  4 31 - 10 16 Thousand/uL Final    RBC 09/20/2018 4 43  3 88 - 5 62 Million/uL Final    Hemoglobin 09/20/2018 13 9  12 0 - 17 0 g/dL Final    Hematocrit 09/20/2018 41 1  36 5 - 49 3 % Final    MCV 09/20/2018 93  82 - 98 fL Final    MCH 09/20/2018 31 4  26 8 - 34 3 pg Final    MCHC 09/20/2018 33 8  31 4 - 37 4 g/dL Final    RDW 09/20/2018 14 5  11 6 - 15 1 % Final    MPV 09/20/2018 11 2  8 9 - 12 7 fL Final    Platelets 22/59/4842 145* 149 - 390 Thousands/uL Final    nRBC 09/20/2018 0  /100 WBCs Final    Neutrophils Relative 09/20/2018 62  43 - 75 % Final    Immat GRANS % 09/20/2018 0  0 - 2 % Final    Lymphocytes Relative 09/20/2018 23  14 - 44 % Final    Monocytes Relative 09/20/2018 11  4 - 12 % Final    Eosinophils Relative 09/20/2018 3  0 - 6 % Final    Basophils Relative 09/20/2018 1  0 - 1 % Final    Neutrophils Absolute 09/20/2018 4 08  1 85 - 7 62 Thousands/µL Final    Immature Grans Absolute 09/20/2018 0 01  0 00 - 0 20 Thousand/uL Final    Lymphocytes Absolute 09/20/2018 1 48  0 60 - 4 47 Thousands/µL Final    Monocytes Absolute 09/20/2018 0 73  0 17 - 1 22 Thousand/µL Final    Eosinophils Absolute 09/20/2018 0 18  0 00 - 0 61 Thousand/µL Final    Basophils Absolute 09/20/2018 0 04  0 00 - 0 10 Thousands/µL Final    Sodium 09/20/2018 139  136 - 145 mmol/L Final    Potassium 09/20/2018 4 3  3 5 - 5 3 mmol/L Final    Chloride 09/20/2018 109* 100 - 108 mmol/L Final    CO2 09/20/2018 25  21 - 32 mmol/L Final    ANION GAP 09/20/2018 5  4 - 13 mmol/L Final    BUN 09/20/2018 18  5 - 25 mg/dL Final    Creatinine 09/20/2018 1 14  0 60 - 1 30 mg/dL Final    Glucose 09/20/2018 87  65 - 140 mg/dL Final    Calcium 09/20/2018 8 5  8 3 - 10 1 mg/dL Final    AST 09/20/2018 44  5 - 45 U/L Final    ALT 09/20/2018 72  12 - 78 U/L Final    Alkaline Phosphatase 09/20/2018 82  46 - 116 U/L Final    Total Protein 09/20/2018 6 8  6 4 - 8 2 g/dL Final    Albumin 09/20/2018 3 3* 3 5 - 5 0 g/dL Final    Total Bilirubin 09/20/2018 0 65  0 20 - 1 00 mg/dL Final    eGFR 09/20/2018 71  ml/min/1 73sq m Final    Cholesterol 09/20/2018 185  50 - 200 mg/dL Final    Triglycerides 09/20/2018 154* <=150 mg/dL Final    HDL, Direct 09/20/2018 29* 40 - 60 mg/dL Final    LDL Calculated 09/20/2018 125* 0 - 100 mg/dL Final    Non-HDL-Chol (CHOL-HDL) 09/20/2018 156  mg/dl Final    RPR 09/20/2018 Non-Reactive  Non-Reactive Final    TSH 3RD GENERATON 09/20/2018 1 390  0 358 - 3 740 uIU/mL Final    Ventricular Rate 09/19/2018 74  BPM Final    Atrial Rate 09/19/2018 74  BPM Final    NM Interval 09/19/2018 216  ms Final    QRSD Interval 09/19/2018 100  ms Final    QT Interval 09/19/2018 382  ms Final    QTC Interval 09/19/2018 424  ms Final    P Axis 09/19/2018 63  degrees Final    QRS Axis 09/19/2018 -10  degrees Final    T Wave [place] 09/19/2018 68  degrees Final       Mental Status Evaluation:  Appearance:  age appropriate and casually dressed, variable eye contact   Behavior:  uncooperative and Irritable   Speech:  normal pitch and normal volume   Mood:  angry and irritable   Affect:  mood-congruent   Thought Process:  Poverty of thought   Thought Content:  No delusions voiced   Perceptual Disturbances: Denies auditory or visual hallucinations   Risk Potential: Suicidal Ideations with plan To hang self discharge, Homicidal Ideations none and Potential for Aggression No   Sensorium:  person, place and time/date   Cognition:  grossly intact   Consciousness:  awake    Attention: attention span appeared shorter than expected for age   Insight:  limited   Judgment: fair   Gait/Station: Not observed, patient lying in bed   Motor Activity: no abnormal movements     Progress Toward Goals:  Minimal change    Assessment/Plan   Principal Problem:    Bipolar 2 disorder, major depressive episode (HCC)  Active Problems:    Attention deficit hyperactivity disorder (ADHD), combined type      Recommended Treatment:   Will increase Seroquel to 100 mg at HS for residual symptoms and mood lability  Continue with Lexapro 20 mg daily and Lamictal 100 mg daily  Continue to encourage in milieu participation    Continue with group therapy, milieu therapy and occupational therapy  Risks, benefits and possible side effects of Medications:   Risks, benefits, and possible side effects of medications explained to patient and patient verbalizes understanding  Counseling / Coordination of Care  Total floor / unit time spent today 25 minutes  Greater than 50% of total time was spent with the patient and / or family counseling and / or coordination of care  A description of the counseling / coordination of care:  Medication management, chart review, patient interview

## 2018-09-24 PROCEDURE — 99232 SBSQ HOSP IP/OBS MODERATE 35: CPT | Performed by: PSYCHIATRY & NEUROLOGY

## 2018-09-24 RX ORDER — HYDROXYZINE 50 MG/1
50 TABLET, FILM COATED ORAL 3 TIMES DAILY
Status: DISCONTINUED | OUTPATIENT
Start: 2018-09-24 | End: 2018-10-01

## 2018-09-24 RX ADMIN — LAMOTRIGINE 100 MG: 100 TABLET ORAL at 08:27

## 2018-09-24 RX ADMIN — QUETIAPINE FUMARATE 100 MG: 100 TABLET ORAL at 21:01

## 2018-09-24 RX ADMIN — NICOTINE 1 PATCH: 7 PATCH, EXTENDED RELEASE TRANSDERMAL at 08:28

## 2018-09-24 RX ADMIN — HYDROXYZINE HYDROCHLORIDE 50 MG: 50 TABLET, FILM COATED ORAL at 17:02

## 2018-09-24 RX ADMIN — ESCITALOPRAM OXALATE 20 MG: 20 TABLET, FILM COATED ORAL at 08:28

## 2018-09-24 RX ADMIN — HYDROXYZINE HYDROCHLORIDE 50 MG: 50 TABLET, FILM COATED ORAL at 21:01

## 2018-09-24 NOTE — PROGRESS NOTES
Pt is present in the milieu, scant in conversation - selectively social with peers  Pt is medication compliant  Pt has an irritable edge, is scant but makes needs known  Pt is currently out in group, will monitor

## 2018-09-24 NOTE — PLAN OF CARE
Problem: Ineffective Coping  Goal: Participates in unit activities  Interventions:  - Provide therapeutic environment   - Provide required programming   - Redirect inappropriate behaviors    Outcome: Progressing  Attends group if prompted to get out of bed yet continues to avocate for use of pot

## 2018-09-24 NOTE — PROGRESS NOTES
Pt is calm and cooperative  Pt is seen in the milieu but does not associate with peers  Pt states that he is depressed today because he "doesn't want to be here " Pt denies SI, anxiety  Pt is medication compliant
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
## 2018-09-24 NOTE — PROGRESS NOTES
Pt reports feeling the same as when he came in  Still reports some anxiety and depression  Medication compliant  denies SI, and he agrees to notify staff if he becomes suicidal  No aggression or irritability this evening

## 2018-09-25 PROCEDURE — 99232 SBSQ HOSP IP/OBS MODERATE 35: CPT | Performed by: PSYCHIATRY & NEUROLOGY

## 2018-09-25 RX ORDER — LAMOTRIGINE 25 MG/1
75 TABLET ORAL 2 TIMES DAILY
Status: DISCONTINUED | OUTPATIENT
Start: 2018-09-25 | End: 2018-10-04 | Stop reason: HOSPADM

## 2018-09-25 RX ORDER — QUETIAPINE FUMARATE 100 MG/1
200 TABLET, FILM COATED ORAL
Status: DISCONTINUED | OUTPATIENT
Start: 2018-09-25 | End: 2018-09-26

## 2018-09-25 RX ADMIN — HYDROXYZINE HYDROCHLORIDE 50 MG: 50 TABLET, FILM COATED ORAL at 21:13

## 2018-09-25 RX ADMIN — HYDROXYZINE HYDROCHLORIDE 50 MG: 50 TABLET, FILM COATED ORAL at 17:28

## 2018-09-25 RX ADMIN — LAMOTRIGINE 100 MG: 100 TABLET ORAL at 08:41

## 2018-09-25 RX ADMIN — LAMOTRIGINE 75 MG: 25 TABLET ORAL at 17:28

## 2018-09-25 RX ADMIN — QUETIAPINE FUMARATE 200 MG: 100 TABLET ORAL at 21:13

## 2018-09-25 RX ADMIN — ESCITALOPRAM OXALATE 20 MG: 20 TABLET, FILM COATED ORAL at 08:41

## 2018-09-25 RX ADMIN — NICOTINE 1 PATCH: 7 PATCH, EXTENDED RELEASE TRANSDERMAL at 08:42

## 2018-09-25 RX ADMIN — HYDROXYZINE HYDROCHLORIDE 50 MG: 50 TABLET, FILM COATED ORAL at 08:41
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
## 2018-09-25 NOTE — PLAN OF CARE
Depression     Treatment Goal: Demonstrate behavioral control of depressive symptoms, verbalize feelings of improved mood/affect, and adopt new coping skills prior to discharge Not Progressing     Verbalize thoughts and feelings Not Progressing        Risk for Self Injury/Neglect     Verbalize thoughts and feelings Not Progressing     Recognize maladaptive responses and adopt new coping mechanisms Not Progressing          Anxiety     Anxiety is at manageable level Progressing        Depression     Refrain from harming self Progressing     Refrain from isolation Progressing     Refrain from self-neglect Progressing     Attend and participate in unit activities, including therapeutic, recreational, and educational groups Progressing     Complete daily ADLs, including personal hygiene independently, as able Progressing        Risk for Self Injury/Neglect     Treatment Goal: Remain safe during length of stay, learn and adopt new coping skills, and be free of self-injurious ideation, impulses and acts at the time of discharge Progressing     Refrain from harming self Progressing     Complete daily ADLs, including personal hygiene independently, as able Progressing

## 2018-09-25 NOTE — PLAN OF CARE
Problem: Ineffective Coping  Goal: Participates in unit activities  Interventions:  - Provide therapeutic environment   - Provide required programming   - Redirect inappropriate behaviors    Outcome: Progressing  Pt attending groups with min  prompting now  He completed an art expression project and titled it "[place]"  He depicted unity as a peaceful thing yet reported that he doesn't want unity  Pt also focused on his thoughts that people in general are all "F  [place] Stupid " He was unable to articulate his thoughts any further

## 2018-09-25 NOTE — PROGRESS NOTES
Pt appears irritable and depressed  Pt denied pain and stated his "stomach feels fine " Denies all symptoms / SI HI  Minimal interest in verbal communication with nurses and peers  Present in milieu but not social   Compliant with meds

## 2018-09-26 PROCEDURE — 99232 SBSQ HOSP IP/OBS MODERATE 35: CPT | Performed by: PSYCHIATRY & NEUROLOGY

## 2018-09-26 RX ORDER — QUETIAPINE FUMARATE 300 MG/1
300 TABLET, FILM COATED ORAL
Status: DISCONTINUED | OUTPATIENT
Start: 2018-09-26 | End: 2018-10-04 | Stop reason: HOSPADM

## 2018-09-26 RX ADMIN — LAMOTRIGINE 75 MG: 25 TABLET ORAL at 08:26

## 2018-09-26 RX ADMIN — ESCITALOPRAM OXALATE 20 MG: 20 TABLET, FILM COATED ORAL at 08:25

## 2018-09-26 RX ADMIN — HYDROXYZINE HYDROCHLORIDE 50 MG: 50 TABLET, FILM COATED ORAL at 08:25

## 2018-09-26 RX ADMIN — HYDROXYZINE HYDROCHLORIDE 25 MG: 25 TABLET ORAL at 16:40

## 2018-09-26 RX ADMIN — LAMOTRIGINE 75 MG: 25 TABLET ORAL at 17:36

## 2018-09-26 RX ADMIN — QUETIAPINE FUMARATE 300 MG: 300 TABLET ORAL at 21:01

## 2018-09-26 RX ADMIN — HYDROXYZINE HYDROCHLORIDE 50 MG: 50 TABLET, FILM COATED ORAL at 21:01

## 2018-09-26 RX ADMIN — NICOTINE 1 PATCH: 7 PATCH, EXTENDED RELEASE TRANSDERMAL at 08:26

## 2018-09-26 RX ADMIN — HYDROXYZINE HYDROCHLORIDE 50 MG: 50 TABLET, FILM COATED ORAL at 17:36

## 2018-09-26 NOTE — PROGRESS NOTES
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

## 2018-09-26 NOTE — PLAN OF CARE
Anxiety     Anxiety is at manageable level Progressing        Depression     Treatment Goal: Demonstrate behavioral control of depressive symptoms, verbalize feelings of improved mood/affect, and adopt new coping skills prior to discharge Progressing     Verbalize thoughts and feelings Progressing     Refrain from harming self Progressing     Refrain from [address] from self-neglect Progressing     Attend and participate in unit activities, including therapeutic, recreational, and educational groups Progressing     Complete daily ADLs, including personal hygiene independently, as able Progressing        Risk for Self Injury/Neglect     Treatment Goal: Remain safe during length of stay, learn and adopt new coping skills, and be free of self-injurious ideation, impulses and acts at the time of discharge Progressing     Verbalize thoughts and feelings Progressing     Refrain from harming self Progressing     Complete daily ADLs, including personal hygiene independently, as able Progressing          Risk for Self Injury/Neglect     Recognize maladaptive responses and adopt new coping mechanisms Not Progressing

## 2018-09-26 NOTE — PLAN OF CARE
Anxiety     Anxiety is at manageable level Progressing        Depression     Treatment Goal: Demonstrate behavioral control of depressive symptoms, verbalize feelings of improved mood/affect, and adopt new coping skills prior to discharge Progressing     Verbalize thoughts and feelings Progressing     Refrain from harming self Progressing     Refrain from [address] from self-neglect Progressing     Attend and participate in unit activities, including therapeutic, recreational, and educational groups Progressing     Complete daily ADLs, including personal hygiene independently, as able Progressing        Ineffective Coping     Participates in unit activities Progressing        Risk for Self Injury/Neglect     Treatment Goal: Remain safe during length of stay, learn and adopt new coping skills, and be free of self-injurious ideation, impulses and acts at the time of discharge Progressing     Verbalize thoughts and feelings Progressing     Refrain from harming self Progressing     Recognize maladaptive responses and adopt new coping mechanisms Progressing     Complete daily ADLs, including personal hygiene independently, as able Progressing

## 2018-09-26 NOTE — CASE MANAGEMENT
Met with pt as he lay in bed  He was agreeable to meet and mostly cordial  He states he has no place he can think of to live p dc  I asked about his contacting his sister to get contact information on an Uncle who may allow him top stay with him  He states his sister may not talk to him because he was not nice to her last contact  We then talked about that and he states he yelled at her form not allowing him to sleep in her garage  I asked if he felt he owed her an apology and he declined  I ask him if he had his own home with a garage and an unemployed addict wanted to sleep in his garage would osei allow that to happen and he responded, "Carmel Morales no " I then asked why he would be angry with his sister for saying no to him, and he looked away  He next stated he wants to go to rehab  When I began to question his drugs of choice and last use and last rehab - he claims he hasn't used in 4+ months  His drugs of choice were 2 bowls of marijuana qd(Last use 1 month ago), Vodka(last use 4 mo  Ago) and he smoked cocaine (last use 6 mo  Ago) I then suggested that if he has been clean for months, then rehab may not be an option  He yelled, Why not?" I stated because he hasn't been actively using and he stated that was a lie and who did I hear that from  I paused and stated , "You just told me that  Were you lying?" He lay back down and said no, but I need a place to live so get me into rehab  We then talked about the appropriatness of that comment  He finally agreed he would call his sister and Jolynn Mai of the [employer] and he would also call the [facility] to try to mend that relationship   I complimented him for remaining most calm and cooperative and he accepted that compliment

## 2018-09-26 NOTE — PROGRESS NOTES
Patient spent most of evening sitting in small TV room; no interaction with peers  Patient did engage in conversation but eye contact was poor and did not initiate conversation  Blames all his problems on others saying he wouldn't be in this mess if his sister had let him stay in her garage and states he got fired because of someone else  States he has no money and no place to go  Denies SI and depression but states he is sick of this place and wants out  When asked how he plans to live if he were discharged, he would not answer

## 2018-09-26 NOTE — PROGRESS NOTES
Progress Note - Behavioral Health   Lashanda Dumont 63 y o  male MRN: [id number]  Unit/Bed#: UZ7 403-12 Encounter: [id number]    The patient was seen for continuing care and reviewed with treatment team   Staff reports that the patient has been irritable, seclusive and continues to blame others for his situation  The patient tells me he is very depressed and anxious about his living situation  He said he feels very irritable because he is not working and feels unproductive and unwanted  He has been having arguments with other patients over changing the TV channel and things like that  He acknowledges that he has an impulse control problem with his anger and says that he thinks he would really benefit from anger management classes  He said he slept well and his appetite is good  He says he is not feeling suicidal today although "suicide is always an option"  He said he will not harm himself on the unit  He says he still has angry thoughts towards others but does not have homicidal ideation  He said he thought about it and he does not want to get more guilt from harming anyone      Mental Status Evaluation:  Appearance:  Intermittent eye contact, disheveled   Behavior:  calm and cooperative   Mood:  irritable, anxious and depressed   Affect: constricted   Speech: Normal rate and Normal volume   Thought Process:  Goal directed and coherent   Thought Content:  Does not verbalize delusional material   Perceptual Disturbances: Denies hallucinations and does not appear to be responding to internal stimuli   Risk Potential: No suicidal or homicidal ideation   Attention/Concentration [place] than expected   Orientation:   Oriented x3   Gait/Station: normal gait/station and normal balance   Motor Activity: No abnormal movement noted     Progress Toward Goals:  Slowly improving    Assessment/Plan    Principal Problem:    Bipolar 2 disorder, major depressive episode ([facility] [address] )  Active Problems:    Attention deficit hyperactivity disorder (ADHD), combined type      Recommended Treatment:      Continue Lexapro 20 mg daily  Continue lamotrigine 75 mg b i d       Increase Seroquel to 300 mg HS  Continue with pharmacotherapy, group therapy, milieu therapy and occupational therapy  The patient will be maintained on the following medications:    Current Facility-Administered Medications:  acetaminophen 650 mg Oral Q6H PRN Chavaartemio Luna MD   aluminum-magnesium hydroxide-simethicone 30 mL Oral Q4H PRN Chava MD Jeremy   benztropine 1 mg Intramuscular Q6H PRN Chava Jeremy, MD   benztropine 1 mg Oral Q6H PRN Chava Jeremy, MD   escitalopram 20 mg Oral Daily Mimi Jain MD   haloperidol 5 mg Oral Q6H PRN Chava Jeremy, MD   haloperidol lactate 5 mg Intramuscular Q6H PRN Chava Jeremy, MD   hydrOXYzine HCL 25 mg Oral Q6H PRN Chava Jeremy, MD   hydrOXYzine HCL 50 mg Oral TID NEHEMIAH Goodson   ibuprofen 600 mg Oral Q8H PRN Chava Jeremy, MD   ibuprofen 800 mg Oral Q8H PRN Chava MD Jeremy   lamoTRIgine 75 mg Oral BID Mimi Jain MD   magnesium hydroxide 30 mL Oral Daily PRN Chava Jeremy, MD   nicotine 1 patch Transdermal Daily Dawn Bermudez PA-C   QUEtiapine 200 mg Oral HS Mimi Jain MD   risperiDONE 1 mg Oral Q6H PRN Chava MD Jeremy   traZODone 50 mg Oral HS PRN Chava MD Jeremy       Risks, benefits and possible side effects of Medications:   Patient does not verbalize understanding at this time and will require further explanation

## 2018-09-26 NOTE — PLAN OF CARE
Problem: Ineffective Coping  Goal: Participates in unit activities  Interventions:  - Provide therapeutic environment   - Provide required programming   - Redirect inappropriate behaviors    Outcome: Progressing  Pt calmer and more cooperative today  He was able to reflect on his anger style during anger quiz when given a non verbal way to communicate (through the use of tokens for yes responses)He identified that his anger stems from trust issues

## 2018-09-27 PROCEDURE — 99232 SBSQ HOSP IP/OBS MODERATE 35: CPT | Performed by: PSYCHIATRY & NEUROLOGY

## 2018-09-27 RX ORDER — TRAZODONE HYDROCHLORIDE 100 MG/1
100 TABLET ORAL
Status: DISCONTINUED | OUTPATIENT
Start: 2018-09-27 | End: 2018-10-04 | Stop reason: HOSPADM

## 2018-09-27 RX ORDER — TRAZODONE HYDROCHLORIDE 50 MG/1
50 TABLET ORAL
Status: DISCONTINUED | OUTPATIENT
Start: 2018-09-27 | End: 2018-10-04 | Stop reason: HOSPADM

## 2018-09-27 RX ADMIN — NICOTINE 1 PATCH: 7 PATCH, EXTENDED RELEASE TRANSDERMAL at 08:11

## 2018-09-27 RX ADMIN — TRAZODONE HYDROCHLORIDE 100 MG: 100 TABLET ORAL at 21:07

## 2018-09-27 RX ADMIN — HYDROXYZINE HYDROCHLORIDE 50 MG: 50 TABLET, FILM COATED ORAL at 08:10

## 2018-09-27 RX ADMIN — LAMOTRIGINE 75 MG: 25 TABLET ORAL at 17:11

## 2018-09-27 RX ADMIN — ESCITALOPRAM OXALATE 20 MG: 20 TABLET, FILM COATED ORAL at 08:10

## 2018-09-27 RX ADMIN — HYDROXYZINE HYDROCHLORIDE 50 MG: 50 TABLET, FILM COATED ORAL at 17:11

## 2018-09-27 RX ADMIN — ALUMINUM HYDROXIDE, MAGNESIUM HYDROXIDE, AND SIMETHICONE 30 ML: 200; 200; 20 SUSPENSION ORAL at 17:41

## 2018-09-27 RX ADMIN — QUETIAPINE FUMARATE 300 MG: 300 TABLET ORAL at 21:07

## 2018-09-27 RX ADMIN — HYDROXYZINE HYDROCHLORIDE 50 MG: 50 TABLET, FILM COATED ORAL at 21:07

## 2018-09-27 RX ADMIN — LAMOTRIGINE 75 MG: 25 TABLET ORAL at 08:10

## 2018-09-27 NOTE — PROGRESS NOTES
Pt calm and cooperative  Pt reports depression and anxiety but states that he "feels better because he may have a shot to get into [facility] " Pt reports that today is a "better day " Pt denies SI/HI at this time  Pt medication compliant

## 2018-09-27 NOTE — PROGRESS NOTES
Pt compliant with medication and treatment  Pt calm and cooperative  Pt stated he is feeling "ok", but expressed anger towards brother in law for not allowing him to live there  Stated he plans to move with family in [place] at discharge  Will continue to monitor

## 2018-09-27 NOTE — PROGRESS NOTES
Progress Note - Behavioral Health   Delphine Tucker 63 y o  male MRN: [id number]  Unit/Bed#: WG7 773-49 Encounter: [id number]    The patient was seen for continuing care and reviewed with treatment team   Staff reports that the patient has cooperative and medication compliant  He is still depressed and telling staff that he would kill himself if he went home  He has been participating in groups  The patient tells me today his depression is a 6 or 7/10 and his anxiety is a 4 or 5/10  He said he is not having any suicidal thoughts here however if he is discharged when "life slaps me in the face" he says he might kill himself by drinking a bottle of vodka and overdosing on medications or cutting his arms  He is no longer expressing homicidal ideation  Instead he says now "they are all fine, the problem is me"  He says yesterday he made phone calls to the rescue [place] and they will not take him back until March of next year  He also called Demetris with the [address] but has not heard back him  He called his sister and ended up having a fight and cursing at her  Now he says he feels guilty about that but he has no plans to call her back  His current plan is to save up $135 dollars and get to [address] [place] [address] somehow since his uncle is there  His appetite has been fine  He says that he had trouble falling asleep last night and only slept about 4 hours      Mental Status Evaluation:  Appearance:  Disheveled, intermittent eye contact   Behavior:  calm and cooperative   Mood:  Apathetic   Affect: constricted   Speech: Normal rate and Normal volume   Thought Process:  Goal directed and coherent   Thought Content:  Does not verbalize delusional material   Perceptual Disturbances: Denies hallucinations and does not appear to be responding to internal stimuli   Risk Potential: Conditional suicidal ideations if discharged   Attention/Concentration [place] than expected   Orientation:   Oriented x3   Gait/Station: normal gait/station and normal balance   Motor Activity: No abnormal movement noted     Progress Toward Goals:  Slowly improving    Assessment/Plan    Principal Problem:    Bipolar 2 disorder, major depressive episode (HCC)  Active Problems:    Attention deficit hyperactivity disorder (ADHD), combined type      Recommended Treatment:      Continue Lexapro 20 mg daily  Continue Atarax 50 mg t i d       Continue on lamictal 75 mg b i d  Continue Seroquel 300 mg hs  Increase trazodone to 100 mg hs  Continue with pharmacotherapy, group therapy, milieu therapy and occupational therapy  The patient will be maintained on the following medications:    Current Facility-Administered Medications:  acetaminophen 650 mg Oral Q6H PRN Josafat Costa MD   aluminum-magnesium hydroxide-simethicone 30 mL Oral Q4H PRN Josafat Costa MD   benztropine 1 mg Intramuscular Q6H PRN Josafat Costa MD   benztropine 1 mg Oral Q6H PRN Josafat Costa MD   escitalopram 20 mg Oral Daily Racquel Negrete MD   haloperidol 5 mg Oral Q6H PRN Josafat Costa MD   haloperidol lactate 5 mg Intramuscular Q6H PRN Josafat Costa MD   hydrOXYzine HCL 25 mg Oral Q6H PRN Josafat Costa MD   hydrOXYzine HCL 50 mg Oral TID Tashia Alonzo, CRCONNOR   ibuprofen 600 mg Oral Q8H PRLYN Costa MD   ibuprofen 800 mg Oral Q8H PRLYN Costa MD   lamoTRIgine 75 mg Oral BID Racquel Negrete MD   magnesium hydroxide 30 mL Oral Daily PRN Josafat Costa MD   nicotine 1 patch Transdermal Daily Tad Lemus PA-C   QUEtiapine 300 mg Oral HS Tashia Alonzo, CRNP   risperiDONE 1 mg Oral Q6H PRN Josafat Costa MD   traZODone 50 mg Oral HS PRN Josafat Costa MD       Risks, benefits and possible side effects of Medications:   Patient does not verbalize understanding at this time and will require further explanation

## 2018-09-27 NOTE — CASE MANAGEMENT
SPOKE TO  AND HE PLACED THE CALLS HE PROMISED, BUT HAS NOT RECEIVED A CALL BACK FROM [facility]   DID TALK TO HIS SISTER, BUT THEN WAS YELLED AT BY HIS BROTHER-IN-LAW   NEXT CALLED THE "[employer] [facility] AND WAS TOLD HE WAS NOT WELCOME TO RETURN TILL MARCH 2019  I have placed calls to [facility] [employer] [employer]  And left a VM asking fr a call back  I next called a local rehab to ask if a person would be eligible under the circumstances of pt's recent substance use  He reports no ETOH for past 4 mo , no Cocaine use for past 6 mo  And occassional use of Marijuana  He would not be eligible for inpatient stay

## 2018-09-27 NOTE — PLAN OF CARE
Problem: Ineffective Coping  Goal: Participates in unit activities  Interventions:  - Provide therapeutic environment   - Provide required programming   - Redirect inappropriate behaviors    Outcome: Progressing  Pt less defensive and attempting to make amends with his family (unsuccessfully)He reports that processing in therapy groups is" no longer bullshit" Pt better motivated to work toward compliance and housing

## 2018-09-27 NOTE — PROGRESS NOTES
Pt was calm and cooperative with care  Pt was medication compliant  Pt reported depression, and stated "If I were home, I would cut myself"  Pt appeared anxious  Pt was more social with staff  By the end of the conversation, pt reported feeling hopeful towards the future  Will continue to monitor

## 2018-09-27 NOTE — CASE MANAGEMENT
Spoke to [address] and he asked that we fax Psych eval, H&P and med list and he will try to get pt into the [facility]   Demetris plans to visit on 9/28

## 2018-09-28 PROCEDURE — 99232 SBSQ HOSP IP/OBS MODERATE 35: CPT | Performed by: PSYCHIATRY & NEUROLOGY

## 2018-09-28 RX ADMIN — TRAZODONE HYDROCHLORIDE 100 MG: 100 TABLET ORAL at 21:38

## 2018-09-28 RX ADMIN — QUETIAPINE FUMARATE 300 MG: 300 TABLET ORAL at 21:38

## 2018-09-28 RX ADMIN — HYDROXYZINE HYDROCHLORIDE 50 MG: 50 TABLET, FILM COATED ORAL at 21:39

## 2018-09-28 RX ADMIN — LAMOTRIGINE 75 MG: 25 TABLET ORAL at 08:20

## 2018-09-28 RX ADMIN — NICOTINE 1 PATCH: 7 PATCH, EXTENDED RELEASE TRANSDERMAL at 08:22

## 2018-09-28 RX ADMIN — ALUMINUM HYDROXIDE, MAGNESIUM HYDROXIDE, AND SIMETHICONE 30 ML: 200; 200; 20 SUSPENSION ORAL at 17:47

## 2018-09-28 RX ADMIN — HYDROXYZINE HYDROCHLORIDE 50 MG: 50 TABLET, FILM COATED ORAL at 08:20

## 2018-09-28 RX ADMIN — HYDROXYZINE HYDROCHLORIDE 50 MG: 50 TABLET, FILM COATED ORAL at 17:13

## 2018-09-28 RX ADMIN — LAMOTRIGINE 75 MG: 25 TABLET ORAL at 17:13

## 2018-09-28 RX ADMIN — ESCITALOPRAM OXALATE 20 MG: 20 TABLET, FILM COATED ORAL at 08:20

## 2018-09-28 NOTE — PLAN OF CARE
Anxiety     Anxiety is at manageable level Progressing        Depression     Treatment Goal: Demonstrate behavioral control of depressive symptoms, verbalize feelings of improved mood/affect, and adopt new coping skills prior to discharge Progressing     Verbalize thoughts and feelings Progressing     Refrain from harming self Progressing     Refrain from isolation Progressing     Refrain from self-neglect Progressing     Complete daily ADLs, including personal hygiene independently, as able Progressing        Risk for Self Injury/Neglect     Treatment Goal: Remain safe during length of stay, learn and adopt new coping skills, and be free of self-injurious ideation, impulses and acts at the time of discharge Progressing     Verbalize thoughts and feelings Progressing     Refrain from harming self Progressing     Recognize maladaptive responses and adopt new coping mechanisms Progressing     Complete daily ADLs, including personal hygiene independently, as able Progressing

## 2018-09-28 NOTE — CASE MANAGEMENT
[facility] was [facility] with [facility] and the interview went well   Juan Dotson was given clinical information and will now try to convince the Director of [employer] to allow Todd Luu back into the program  We will await that decision

## 2018-09-28 NOTE — PLAN OF CARE
Problem: Ineffective Coping  Goal: Participates in unit activities  Interventions:  - Provide therapeutic environment   - Provide required programming   - Redirect inappropriate behaviors    Outcome: Progressing  Pt continues to attend all groups as assigned and fully engages in group tasks  He has been exploring healthy problem solving options and outward anger has decreased when he discusses his stressors

## 2018-09-28 NOTE — PROGRESS NOTES
Patient reports having "a little" depression and anxiety  When asked if he had SI patient answered "not yet"  Patient implied that he would have SI if his housing did not work out well  Patient is calm and cooperative

## 2018-09-28 NOTE — PROGRESS NOTES
Progress Note - Behavioral Health   Lashanda Dumont 63 y o  male MRN: [id number]  Unit/Bed#: QU4 882-99 Encounter: [id number]    The patient was seen for continuing care and reviewed with treatment team   Staff reports that the patient has been calm and cooperative  He continues to have depression and anxiety but it is improving related to his possible housing opportunities from the [place]  The patient states his depression is 4/10  He said he is feeling "anticipation" towards the pending decision about [employer]  He is very hopeful about this working out for him  He is not currently having suicidal ideation but does report that that is where his mind will go if he does not get this housing opportunity  Denies homicidal ideation  States that he knows he caused all these problems himself and it is up to him to get things straightened out  He spoke a little bit about his desire to stay sober so he does not keeps messing things up  He does not believe in NA or anything like that but says he has an ex boss who will be supportive to him  Sleeping and eating okay      Mental Status Evaluation:  Appearance:  Good eye contact and disheveled   Behavior:  calm and cooperative   Mood:  anxious and depressed   Affect: constricted   Speech: Normal rate and Normal volume   Thought Process:  Goal directed and coherent   Thought Content:  Does not verbalize delusional material   Perceptual Disturbances: Denies hallucinations and does not appear to be responding to internal stimuli   Risk Potential: No suicidal or homicidal ideation   Attention/Concentration [place] than expected   Orientation:   Oriented x3   Gait/Station: normal gait/station and normal balance   Motor Activity: No abnormal movement noted     Progress Toward Goals:  Slowly improving    Assessment/Plan    Principal Problem:    Bipolar 2 disorder, major depressive episode ([facility] )  Active Problems:    Attention deficit hyperactivity disorder (ADHD), combined type      Recommended Treatment:      Continue Lexapro 20 mg daily  Continue Atarax 50 mg t i d       Continue lamotrigine 75 mg b i d       Continue Seroquel 300 mg HS     Continue trazodone 100 mg HS  Continue with pharmacotherapy, group therapy, milieu therapy and occupational therapy  The patient will be maintained on the following medications:    Current Facility-Administered Medications:  acetaminophen 650 mg Oral Q6H PRN Josafat Costa MD   aluminum-magnesium hydroxide-simethicone 30 mL Oral Q4H PRN Josafat Costa MD   benztropine 1 mg Intramuscular Q6H PRN Josafat Costa MD   benztropine 1 mg Oral Q6H PRN Josafat Costa MD   escitalopram 20 mg Oral Daily Racquel Negrete MD   haloperidol 5 mg Oral Q6H PRN Josafat Costa MD   haloperidol lactate 5 mg Intramuscular Q6H PRN Josafat Costa MD   hydrOXYzine HCL 25 mg Oral Q6H PRN Josafat Costa MD   hydrOXYzine HCL 50 mg Oral TID Caroorrmikhail Linear, CRNP   ibuprofen 600 mg Oral Q8H PRN Josafat Costa MD   ibuprofen 800 mg Oral Q8H PRN Josafat Costa MD   lamoTRIgine 75 mg Oral BID Racquel Negrete MD   magnesium hydroxide 30 mL Oral Daily PRN Josafat Costa MD   nicotine 1 patch Transdermal Daily Tad Lemus PA-C   QUEtiapine 300 mg Oral HS Deborrah Linear, CRNP   risperiDONE 1 mg Oral Q6H PRN Josafat Costa MD   traZODone 100 mg Oral HS Deborrah Linear, CRNP   traZODone 50 mg Oral HS PRN Tashia Linear, CRNP       Risks, benefits and possible side effects of Medications:   Patient does not verbalize understanding at this time and will require further explanation

## 2018-09-29 PROCEDURE — 99232 SBSQ HOSP IP/OBS MODERATE 35: CPT | Performed by: PSYCHIATRY & NEUROLOGY

## 2018-09-29 RX ADMIN — QUETIAPINE FUMARATE 300 MG: 300 TABLET ORAL at 21:24

## 2018-09-29 RX ADMIN — HYDROXYZINE HYDROCHLORIDE 50 MG: 50 TABLET, FILM COATED ORAL at 17:18

## 2018-09-29 RX ADMIN — LAMOTRIGINE 75 MG: 25 TABLET ORAL at 17:18

## 2018-09-29 RX ADMIN — HYDROXYZINE HYDROCHLORIDE 50 MG: 50 TABLET, FILM COATED ORAL at 08:24

## 2018-09-29 RX ADMIN — NICOTINE 1 PATCH: 7 PATCH, EXTENDED RELEASE TRANSDERMAL at 08:27

## 2018-09-29 RX ADMIN — HYDROXYZINE HYDROCHLORIDE 50 MG: 50 TABLET, FILM COATED ORAL at 21:03

## 2018-09-29 RX ADMIN — TRAZODONE HYDROCHLORIDE 100 MG: 100 TABLET ORAL at 21:01

## 2018-09-29 RX ADMIN — LAMOTRIGINE 75 MG: 25 TABLET ORAL at 08:24

## 2018-09-29 RX ADMIN — ESCITALOPRAM OXALATE 20 MG: 20 TABLET, FILM COATED ORAL at 08:24

## 2018-09-29 NOTE — PROGRESS NOTES
Patient reports being anxious and depressed  He denies SI  Patient is out in milieu, minimal engagement with others  Patient is calm and cooperative  Problem: Patient Care Overview  Goal: Plan of Care Review  Plan of care reviewed, all questions and concerns addressed. Patient vital signs remain normal and stable for patient, with no complaints of SOB, headaches, or dizziness. Patient urine output remains adequate, Patient is tolerating diet well with no complaints of nausea or vomiting. Patient pain well controlled with ordered pain medications. Patient is resting quietly with side rails up and call light with in reach. Will continue to monitor patient status.

## 2018-09-29 NOTE — PROGRESS NOTES
Progress Note - Behavioral Health   Yesi Toure 63 y o  male MRN: [id number]  Unit/Bed#: XB2 562-01 Encounter: [id number]    Assessment/Plan   Principal Problem:    Bipolar 2 disorder, major depressive episode ([address] )  Active Problems:    Attention deficit hyperactivity disorder (ADHD), combined type      Subjective:  Patient reports passive death wishes that are conditional upon finding stable housing  If he had somewhere to go he reports he would not feel suicidal   Currently in the hospital he is not suicidal and contracts for safety  Depression rated as moderate with hopelessness, helplessness, and guilt  Does report sleeping and eating well  Was slightly irritable, but overall cooperative  Anxiety reported as mild due to disposition plan being unknown  Focused on the [facility]  No agitation  Denied psychosis and does not show manic symptoms  Medication compliant  Tolerating medications well without somatic complaints  Has facial twitching which he said began after he stopped smoking marijuana  Does not appear to be from Seroquel        Current Medications:  Current Facility-Administered Medications   Medication Dose Route Frequency    acetaminophen (TYLENOL) tablet 650 mg  650 mg Oral Q6H PRN    aluminum-magnesium hydroxide-simethicone (MYLANTA) 200-200-20 mg/5 mL oral suspension 30 mL  30 mL Oral Q4H PRN    benztropine (COGENTIN) injection 1 mg  1 mg Intramuscular Q6H PRN    benztropine (COGENTIN) tablet 1 mg  1 mg Oral Q6H PRN    escitalopram (LEXAPRO) tablet 20 mg  20 mg Oral Daily    haloperidol (HALDOL) tablet 5 mg  5 mg Oral Q6H PRN    haloperidol lactate (HALDOL) injection 5 mg  5 mg Intramuscular Q6H PRN    hydrOXYzine HCL (ATARAX) tablet 25 mg  25 mg Oral Q6H PRN    hydrOXYzine HCL (ATARAX) tablet 50 mg  50 mg Oral TID    ibuprofen (MOTRIN) tablet 600 mg  600 mg Oral Q8H PRN    ibuprofen (MOTRIN) tablet 800 mg  800 mg Oral Q8H PRN    lamoTRIgine (LaMICtal) tablet 75 mg  75 mg Oral BID    magnesium hydroxide (MILK OF MAGNESIA) 400 mg/5 mL oral suspension 30 mL  30 mL Oral Daily PRN    nicotine (NICODERM CQ) 7 mg/24hr TD 24 hr patch 1 patch  1 patch Transdermal Daily    QUEtiapine (SEROquel) tablet 300 mg  300 mg Oral HS    risperiDONE (RisperDAL M-TABS) dispersible tablet 1 mg  1 mg Oral Q6H PRN    traZODone (DESYREL) tablet 100 mg  100 mg Oral HS    traZODone (DESYREL) tablet 50 mg  50 mg Oral HS PRN       Behavioral Health Medications: all current active meds have been reviewed and continue current psychiatric medications  Vitals:  Vitals:    09/29/18 0708   BP: 114/68   Pulse: 75   Resp: 16   Temp: 98 5 °F (36 9 °C)   SpO2: 95%       Laboratory results:    I have personally reviewed all pertinent laboratory/tests results    Most Recent Labs:   Lab Results   Component Value Date    WBC 6 52 09/20/2018    RBC 4 43 09/20/2018    HGB 13 9 09/20/2018    HCT 41 1 09/20/2018     (L) 09/20/2018    RDW 14 5 09/20/2018    NEUTROABS 4 08 09/20/2018     09/20/2018    K 4 3 09/20/2018     (H) 09/20/2018    CO2 25 09/20/2018    BUN 18 09/20/2018    CREATININE 1 14 09/20/2018    GLUC 87 09/20/2018    CALCIUM 8 5 09/20/2018    AST 44 09/20/2018    ALT 72 09/20/2018    ALKPHOS 82 09/20/2018    TP 6 8 09/20/2018    ALB 3 3 (L) 09/20/2018    TBILI 0 65 09/20/2018    CHOLESTEROL 185 09/20/2018    HDL 29 (L) 09/20/2018    TRIG 154 (H) 09/20/2018    LDLCALC 125 (H) 09/20/2018    NONHDLC 156 09/20/2018    PNJ9HCISJBIQ 1 390 09/20/2018    RPR Non-Reactive 09/20/2018       Psychiatric Review of Systems:  Behavior over the last 24 hours:  unchanged  Sleep: normal  Appetite: normal  Medication side effects: No  ROS: no complaints    Mental Status Evaluation:  Appearance:  casually dressed   Behavior:  guarded   Speech:  normal pitch and normal volume   Mood:  dysthymic   Affect:  constricted   Language appropriate   Thought Process:  goal directed and linear   Thought Content:  normal  Denied delusions/obsessions   Perceptual Disturbances: None   Risk Potential: Passive SI  Denied HI  Potential for aggression: no   Sensorium:  person, place and time/date   Cognition:  grossly intact   Consciousness:  alert and awake    Recent and Remote Memory intact   Attention: attention span and concentration were age appropriate   Insight:  partial   Judgment: partial   Gait/Station: normal gait/station and normal balance   Motor Activity: Facial tics/grimacing     Progress Toward Goals: unchanged    Recommended Treatment: Continue with group therapy, milieu therapy and occupational therapy  1   Continue current medications  2  Disposition planning    Risks, benefits and possible side effects of Medications:   Risks, benefits, and possible side effects of medications explained to patient and patient verbalizes understanding        Nirali Gardner PA-C

## 2018-09-29 NOTE — PROGRESS NOTES
Pt reports having an "OK" day  Reports he's waiting to hear whether he will be able to return to [facility] or not  Enjoyed staying there in past  Says there's a 50% chance that he will commit suicide upon discharge  Contracts for safety on unit

## 2018-09-30 PROCEDURE — 99232 SBSQ HOSP IP/OBS MODERATE 35: CPT | Performed by: PSYCHIATRY & NEUROLOGY

## 2018-09-30 RX ADMIN — ESCITALOPRAM OXALATE 20 MG: 20 TABLET, FILM COATED ORAL at 08:27

## 2018-09-30 RX ADMIN — NICOTINE 1 PATCH: 7 PATCH, EXTENDED RELEASE TRANSDERMAL at 08:30

## 2018-09-30 RX ADMIN — ALUMINUM HYDROXIDE, MAGNESIUM HYDROXIDE, AND SIMETHICONE 30 ML: 200; 200; 20 SUSPENSION ORAL at 20:15

## 2018-09-30 RX ADMIN — HYDROXYZINE HYDROCHLORIDE 50 MG: 50 TABLET, FILM COATED ORAL at 08:27

## 2018-09-30 RX ADMIN — HYDROXYZINE HYDROCHLORIDE 50 MG: 50 TABLET, FILM COATED ORAL at 17:26

## 2018-09-30 RX ADMIN — LAMOTRIGINE 75 MG: 25 TABLET ORAL at 08:27

## 2018-09-30 RX ADMIN — HYDROXYZINE HYDROCHLORIDE 50 MG: 50 TABLET, FILM COATED ORAL at 21:39

## 2018-09-30 RX ADMIN — QUETIAPINE FUMARATE 300 MG: 300 TABLET ORAL at 21:39

## 2018-09-30 RX ADMIN — TRAZODONE HYDROCHLORIDE 100 MG: 100 TABLET ORAL at 21:39

## 2018-09-30 RX ADMIN — LAMOTRIGINE 75 MG: 25 TABLET ORAL at 17:26

## 2018-09-30 NOTE — PROGRESS NOTES
Pt c/o depression  Says he will have to see what living arrangements can be made for him as outpt, but was "unsure" about suicide because of this  Reports he will have to wait until Monday to determine the outcome  Remained visible in milieu, but not really interacting with peers other than in group  Medication compliant

## 2018-09-30 NOTE — PROGRESS NOTES
Progress Note - Behavioral Health   Alena Joyce 63 y o  male MRN: [id number]  Unit/Bed#: FX4 023-72 Encounter: [id number]    Assessment/Plan   Principal Problem:    Bipolar 2 disorder, major depressive episode ([address] )  Active Problems:    Attention deficit hyperactivity disorder (ADHD), combined type      Subjective:  Patient seclusive to room this morning  Is focused on case management and housing  States he is not suicidal while on unit  Reports if discharged without some form of housing he will attempt suicide  Contracts for safety  Appears disinterested and somewhat blunted this morning  States he is "content," at this time  No signs of edyta and denied psychosis  Denied excessive anxiety  Depression rated as chronic with lack of energy and anhedonia  Medication compliant  Tolerating medications well without serious side effects       Current Medications:  Current Facility-Administered Medications   Medication Dose Route Frequency    acetaminophen (TYLENOL) tablet 650 mg  650 mg Oral Q6H PRN    aluminum-magnesium hydroxide-simethicone (MYLANTA) 200-200-20 mg/5 mL oral suspension 30 mL  30 mL Oral Q4H PRN    benztropine (COGENTIN) injection 1 mg  1 mg Intramuscular Q6H PRN    benztropine (COGENTIN) tablet 1 mg  1 mg Oral Q6H PRN    escitalopram (LEXAPRO) tablet 20 mg  20 mg Oral Daily    haloperidol (HALDOL) tablet 5 mg  5 mg Oral Q6H PRN    haloperidol lactate (HALDOL) injection 5 mg  5 mg Intramuscular Q6H PRN    hydrOXYzine HCL (ATARAX) tablet 25 mg  25 mg Oral Q6H PRN    hydrOXYzine HCL (ATARAX) tablet 50 mg  50 mg Oral TID    ibuprofen (MOTRIN) tablet 600 mg  600 mg Oral Q8H PRN    ibuprofen (MOTRIN) tablet 800 mg  800 mg Oral Q8H PRN    lamoTRIgine (LaMICtal) tablet 75 mg  75 mg Oral BID    magnesium hydroxide (MILK OF MAGNESIA) 400 mg/5 mL oral suspension 30 mL  30 mL Oral Daily PRN    nicotine (NICODERM CQ) 7 mg/24hr TD 24 hr patch 1 patch  1 patch Transdermal Daily    QUEtiapine (SEROquel) tablet 300 mg  300 mg Oral HS    risperiDONE (RisperDAL M-TABS) dispersible tablet 1 mg  1 mg Oral Q6H PRN    traZODone (DESYREL) tablet 100 mg  100 mg Oral HS    traZODone (DESYREL) tablet 50 mg  50 mg Oral HS PRN       Behavioral Health Medications: all current active meds have been reviewed and continue current psychiatric medications  Vitals:  Vitals:    09/30/18 0711   BP: 122/69   Pulse: 74   Resp: 16   Temp: 98 4 °F (36 9 °C)   SpO2: 94%       Laboratory results:    I have personally reviewed all pertinent laboratory/tests results  Most Recent Labs:   Lab Results   Component Value Date    WBC 6 52 09/20/2018    RBC 4 43 09/20/2018    HGB 13 9 09/20/2018    HCT 41 1 09/20/2018     (L) 09/20/2018    RDW 14 5 09/20/2018    NEUTROABS 4 08 09/20/2018     09/20/2018    K 4 3 09/20/2018     (H) 09/20/2018    CO2 25 09/20/2018    BUN 18 09/20/2018    CREATININE 1 14 09/20/2018    GLUC 87 09/20/2018    CALCIUM 8 5 09/20/2018    AST 44 09/20/2018    ALT 72 09/20/2018    ALKPHOS 82 09/20/2018    TP 6 8 09/20/2018    ALB 3 3 (L) 09/20/2018    TBILI 0 65 09/20/2018    CHOLESTEROL 185 09/20/2018    HDL 29 (L) 09/20/2018    TRIG 154 (H) 09/20/2018    LDLCALC 125 (H) 09/20/2018    [place] 156 09/20/2018    TDZ6KVFJWABE 1 390 09/20/2018    RPR Non-Reactive 09/20/2018       Psychiatric Review of Systems:  Behavior over the last 24 hours:  unchanged  Sleep: normal  Appetite: normal  Medication side effects: No  ROS: no complaints    Mental Status Evaluation:  Appearance:  casually dressed   Behavior:  guarded   Speech:  soft   Mood:  anxious and depressed   Affect:  constricted   Language sparse   Thought Process:  goal directed and linear   Thought Content:  normal  Denied delusions/obsessions   Perceptual Disturbances: None   Risk Potential: Passive death wishes  Denied HI   Potential for aggression: No   Sensorium:  person, place and time/date   Cognition:  grossly intact   Consciousness: awake    Recent and Remote Memory intact   Attention: attention span and concentration were age appropriate   Insight:  partial   Judgment: partial   Gait/Station: normal gait/station and normal balance   Motor Activity: facial tics/grimacing     Progress Toward Goals: unchanged    Recommended Treatment: Continue with group therapy, milieu therapy and occupational therapy  1   Continue current medications  2  Disposition planning     Risks, benefits and possible side effects of Medications:   Risks, benefits, and possible side effects of medications explained to patient and patient verbalizes understanding        Galina Gonzales PA-C

## 2018-09-30 NOTE — PROGRESS NOTES
Patient reports being anxious and depressed  He has been laying in bed today  Patient denies SI  Patient is calm and cooperative

## 2018-10-01 PROCEDURE — 99232 SBSQ HOSP IP/OBS MODERATE 35: CPT | Performed by: PSYCHIATRY & NEUROLOGY

## 2018-10-01 RX ORDER — HYDROXYZINE HYDROCHLORIDE 25 MG/1
25 TABLET, FILM COATED ORAL 3 TIMES DAILY
Status: DISCONTINUED | OUTPATIENT
Start: 2018-10-01 | End: 2018-10-04 | Stop reason: HOSPADM

## 2018-10-01 RX ADMIN — HYDROXYZINE HYDROCHLORIDE 25 MG: 25 TABLET ORAL at 21:02

## 2018-10-01 RX ADMIN — NICOTINE 1 PATCH: 7 PATCH, EXTENDED RELEASE TRANSDERMAL at 08:25

## 2018-10-01 RX ADMIN — TRAZODONE HYDROCHLORIDE 100 MG: 100 TABLET ORAL at 21:02

## 2018-10-01 RX ADMIN — LAMOTRIGINE 75 MG: 25 TABLET ORAL at 17:16

## 2018-10-01 RX ADMIN — HYDROXYZINE HYDROCHLORIDE 50 MG: 50 TABLET, FILM COATED ORAL at 08:24

## 2018-10-01 RX ADMIN — LAMOTRIGINE 75 MG: 25 TABLET ORAL at 08:24

## 2018-10-01 RX ADMIN — ESCITALOPRAM OXALATE 20 MG: 20 TABLET, FILM COATED ORAL at 08:24

## 2018-10-01 RX ADMIN — QUETIAPINE FUMARATE 300 MG: 300 TABLET ORAL at 21:02

## 2018-10-01 RX ADMIN — HYDROXYZINE HYDROCHLORIDE 25 MG: 25 TABLET ORAL at 17:16

## 2018-10-01 NOTE — PLAN OF CARE
Problem: Ineffective Coping  Goal: Participates in unit activities  Interventions:  - Provide therapeutic environment   - Provide required programming   - Redirect inappropriate behaviors    Outcome: Progressing  Pt attending groups with min  prompting yet displayed a more quiet demeanor during group discussion  He engaged in some of mid morning seated exercise yet needed staff redirection to maintain full focus to the video  (displayed periods of staring)

## 2018-10-01 NOTE — CASE MANAGEMENT
Pt has now signed a full [facility] for [address] ) from that site will be coming on 10/2 with pt's past  Jina Vuong to interview pt for possible acceptance back into the [facility]  Pt is pleased with this news and feels he has hit his new bottom and is willing to work with them this round  Pt reports that his last try at [facility] he became involved with a woman who convinced him to break the ground rules of [employer] and when he was caught, he got kicked out of that program  Ga Escalona is now remorseful and states he is ready to change

## 2018-10-01 NOTE — PROGRESS NOTES
Pt calm and cooperative  Pt reports depression and anxiety but that it is "getting better " Pt reports he is "feeling positive" this evening and anticipating his meeting with [facility] tomorrow  Pt denies SI/HI  Pt seen in the milieu not associating with peers  Pt also seen in his room praying for a good outcome tomorrow  Pt medication compliant

## 2018-10-01 NOTE — PROGRESS NOTES
Pt was calm and cooperative with care  Pt was medication compliant  Pt reported depression and anxiety related to discharge  Pt has passive SI reporting that he might try to hurt himself, outside the hospital, depending on his housing situation  Pt had a flat affect but is hopeful regarding his future  Pt was out in the milieu, but with minimal interaction with his peers  Will continue to monitor

## 2018-10-01 NOTE — PROGRESS NOTES
Pt denies SI tonight, but is much more tentative when discussing his future  Was much more on edge while discussing this tonight as compared to previous two nights  Continues to contract for safety  Went further to discuss strained relations with 3 sisters  Was disappointed in their apparent  refusal to assist him with housing  Provided support for pt  Britntey given for indigestion-Effective

## 2018-10-01 NOTE — CASE MANAGEMENT
[facility] have [facility] to see if any decisions have been made regarding Marquis Robert and being accepted to [facility]   I will await his call back

## 2018-10-02 PROCEDURE — 99232 SBSQ HOSP IP/OBS MODERATE 35: CPT | Performed by: PSYCHIATRY & NEUROLOGY

## 2018-10-02 RX ADMIN — NICOTINE 1 PATCH: 7 PATCH, EXTENDED RELEASE TRANSDERMAL at 08:38

## 2018-10-02 RX ADMIN — LAMOTRIGINE 75 MG: 25 TABLET ORAL at 08:35

## 2018-10-02 RX ADMIN — TRAZODONE HYDROCHLORIDE 100 MG: 100 TABLET ORAL at 21:01

## 2018-10-02 RX ADMIN — ESCITALOPRAM OXALATE 20 MG: 20 TABLET, FILM COATED ORAL at 08:36

## 2018-10-02 RX ADMIN — LAMOTRIGINE 75 MG: 25 TABLET ORAL at 17:16

## 2018-10-02 RX ADMIN — HYDROXYZINE HYDROCHLORIDE 25 MG: 25 TABLET ORAL at 21:01

## 2018-10-02 RX ADMIN — QUETIAPINE FUMARATE 300 MG: 300 TABLET ORAL at 21:01

## 2018-10-02 RX ADMIN — HYDROXYZINE HYDROCHLORIDE 25 MG: 25 TABLET ORAL at 17:16

## 2018-10-02 RX ADMIN — HYDROXYZINE HYDROCHLORIDE 25 MG: 25 TABLET ORAL at 08:36

## 2018-10-02 NOTE — PROGRESS NOTES
Progress Note - Behavioral Health   Shahnaz Eckert 63 y o  male MRN: [id number]  Unit/Bed#: DJ4 567-97 Encounter: [id number]    The patient was seen for continuing care and reviewed with treatment team  Staff reports that the patient has been depressed, anxious, but calmed pleasant and cooperative  [facility] assessed him at 10:00 a m  This morning  The patient says he is feeling very optimistic about getting into [facility]  He said the interview went quite well this morning and they were happy that he is finally starting to admit his choices caused most of his problems  He said he is not depressed anymore, he just feels very anxious waiting for the final decision  He said he is not suicidal at all and is definitely more future oriented  He is looking for to going to [address] [place] [address] and getting a job  He said he has been trying to identify his triggers towards relapse on drugs and alcohol and he feels that heavy metal music is 1 so he can no longer listen to that  He also says that women lead to his relapse because he gets fixated on them and they become my God  He said that time at [facility] explained all the house rules to him and he feels that he will be able to meet all the criteria as long as he can stay sober  He said his sleep and appetite have been good      Mental Status Evaluation:  Appearance:  Adequate hygiene and grooming and Good eye contact   Behavior:  calm, cooperative and friendly   Mood:  anxious   Affect: constricted   Speech: Normal rate and Normal volume   Thought Process:  Goal directed and coherent   Thought Content:  Does not verbalize delusional material   Perceptual Disturbances: Denies hallucinations and does not appear to be responding to internal stimuli   Risk Potential: No suicidal or homicidal ideation   Attention/Concentration [place] than expected   Orientation:   Oriented x3   Gait/Station: normal gait/station and normal balance   Motor Activity: No abnormal movement noted     Progress Toward Goals:  Slowly improving    Assessment/Plan    Principal Problem:    Bipolar 2 disorder, major depressive episode (HCC)  Active Problems:    Attention deficit hyperactivity disorder (ADHD), combined type      Recommended Treatment:      Continue Lexapro 20 mg daily  Continue Atarax 25 mg t i d       Continue lamotrigine 75 mg b i d       Continue Seroquel 300 mg HS  Continue trazodone 100 mg HS  Continue with pharmacotherapy, group therapy, milieu therapy and occupational therapy  The patient will be maintained on the following medications:    Current Facility-Administered Medications:  acetaminophen 650 mg Oral Q6H PRN Milka Hawthorne MD   aluminum-magnesium hydroxide-simethicone 30 mL Oral Q4H PRN Milka Hawthorne MD   benztropine 1 mg Intramuscular Q6H PRLYN Hawthorne MD   benztropine 1 mg Oral Q6H PRLYN Hawthorne MD   escitalopram 20 mg Oral Daily Kassi Villar MD   haloperidol 5 mg Oral Q6H PRLYN Hawthorne MD   haloperidol lactate 5 mg Intramuscular Q6H PRLYN Hawthorne MD   hydrOXYzine HCL 25 mg Oral Q6H PRLYN Hawthorne MD   hydrOXYzine HCL 25 mg Oral TID [place] Alert, CRNP   ibuprofen 600 mg Oral Q8H PRLYN Hawthorne MD   ibuprofen 800 mg Oral Q8H PRLYN Hawthorne MD   lamoTRIgine 75 mg Oral BID Kassi Villar MD   magnesium hydroxide 30 mL Oral Daily PRN Milka Hawthorne MD   nicotine 1 patch Transdermal Daily Ary Cockayne, PA-C   QUEtiapine 300 mg Oral HS Damian Alert, CRNP   risperiDONE 1 mg Oral Q6H PRN Milka Hawthorne MD   traZODone 100 mg Oral HS Damian Alert, CRNP   traZODone 50 mg Oral HS PRN [place] Alert, CRNP       Risks, benefits and possible side effects of Medications:   Patient does not verbalize understanding at this time and will require further explanation

## 2018-10-02 NOTE — CASE MANAGEMENT
Martinez Ellsworth of [facility] was in today to interview Lion Cruz and reported that the interview went well  Laura Barrera will report to his own Tx team and call with their decision by Wednesday afternoon

## 2018-10-02 NOTE — PLAN OF CARE
Problem: Ineffective Coping  Goal: Participates in unit activities  Interventions:  - Provide therapeutic environment   - Provide required programming   - Redirect inappropriate behaviors    Outcome: Progressing  Pt  Attended all groups without prompting  He reported relief from guided imagery session and was actively descriptive of self in art expression group  He describes self as a gilamonster,usually quiet unless provoked then hisses to appear deadly

## 2018-10-02 NOTE — PROGRESS NOTES
Pt calm, pleasant and brightens on approach; remains medication compliant and denies SI/HI, reports less depression and anxiety  Pt visible on the unit, but not socializing with peers  Will continue to monitor

## 2018-10-03 PROCEDURE — 99231 SBSQ HOSP IP/OBS SF/LOW 25: CPT | Performed by: PSYCHIATRY & NEUROLOGY

## 2018-10-03 RX ADMIN — HYDROXYZINE HYDROCHLORIDE 25 MG: 25 TABLET ORAL at 17:25

## 2018-10-03 RX ADMIN — NICOTINE 1 PATCH: 7 PATCH, EXTENDED RELEASE TRANSDERMAL at 08:20

## 2018-10-03 RX ADMIN — LAMOTRIGINE 75 MG: 25 TABLET ORAL at 08:19

## 2018-10-03 RX ADMIN — HYDROXYZINE HYDROCHLORIDE 25 MG: 25 TABLET ORAL at 21:04

## 2018-10-03 RX ADMIN — QUETIAPINE FUMARATE 300 MG: 300 TABLET ORAL at 21:04

## 2018-10-03 RX ADMIN — LAMOTRIGINE 75 MG: 25 TABLET ORAL at 17:25

## 2018-10-03 RX ADMIN — HYDROXYZINE HYDROCHLORIDE 25 MG: 25 TABLET ORAL at 08:19

## 2018-10-03 RX ADMIN — TRAZODONE HYDROCHLORIDE 100 MG: 100 TABLET ORAL at 21:04

## 2018-10-03 RX ADMIN — ESCITALOPRAM OXALATE 20 MG: 20 TABLET, FILM COATED ORAL at 08:19

## 2018-10-03 NOTE — PLAN OF CARE

## 2018-10-03 NOTE — PROGRESS NOTES
Patient spent evening in small TV room; attended and participated in evening wrap up  Reports anxiety and depression about discharge and where he will live but denies SI/HI

## 2018-10-03 NOTE — PROGRESS NOTES
Pt compliant with medications  Pt calm, cooperative, and pleasant  Pt verbalized being excited about leaving and was saying he now has "a reason to live " Denies all symptoms  Visualized in milieu and social with peers

## 2018-10-03 NOTE — PLAN OF CARE
Problem: Ineffective Coping  Goal: Participates in unit activities  Interventions:  - Provide therapeutic environment   - Provide required programming   - Redirect inappropriate behaviors    Outcome: Progressing  Pt reports plan to give up [address] now that he can go back to [employer]  He completed a revised relapse prevention plan that was more appropriate for his needs  Engaged in groups and displayed very upbeat demeanor at being given a second chance

## 2018-10-03 NOTE — CASE MANAGEMENT
Received a call from Josephine Merchant at [facility] and they have accepted this pt  Pt will be discharged on 10/4 at 10:30 via Erika Jernigan to [phone]  I have informed pt and he was ecstatic, dancing, raising his arms overhead krish Obrien request was sent for 10:30 pick-up on 10/4  I have placed a call to [phone] to make arrangements for pt's aftercare  I will await call back

## 2018-10-04 VITALS
SYSTOLIC BLOOD PRESSURE: 132 MMHG | HEIGHT: 68 IN | WEIGHT: 167.99 LBS | RESPIRATION RATE: 16 BRPM | BODY MASS INDEX: 25.46 KG/M2 | OXYGEN SATURATION: 94 % | TEMPERATURE: 97.8 F | HEART RATE: 76 BPM | DIASTOLIC BLOOD PRESSURE: 81 MMHG

## 2018-10-04 PROCEDURE — 99239 HOSP IP/OBS DSCHRG MGMT >30: CPT | Performed by: PSYCHIATRY & NEUROLOGY

## 2018-10-04 RX ORDER — QUETIAPINE FUMARATE 300 MG/1
300 TABLET, FILM COATED ORAL
Qty: 21 TABLET | Refills: 0 | Status: SHIPPED | OUTPATIENT
Start: 2018-10-04 | End: 2019-06-25 | Stop reason: ALTCHOICE

## 2018-10-04 RX ORDER — TRAZODONE HYDROCHLORIDE 100 MG/1
100 TABLET ORAL
Qty: 21 TABLET | Refills: 0 | Status: SHIPPED | OUTPATIENT
Start: 2018-10-04

## 2018-10-04 RX ORDER — LAMOTRIGINE 25 MG/1
75 TABLET ORAL 2 TIMES DAILY
Qty: 126 TABLET | Refills: 0 | Status: SHIPPED | OUTPATIENT
Start: 2018-10-04 | End: 2019-06-25 | Stop reason: ALTCHOICE

## 2018-10-04 RX ORDER — ESCITALOPRAM OXALATE 20 MG/1
20 TABLET ORAL DAILY
Qty: 21 TABLET | Refills: 0 | Status: SHIPPED | OUTPATIENT
Start: 2018-10-04

## 2018-10-04 RX ADMIN — LAMOTRIGINE 75 MG: 25 TABLET ORAL at 08:49

## 2018-10-04 RX ADMIN — ESCITALOPRAM OXALATE 20 MG: 20 TABLET, FILM COATED ORAL at 08:49

## 2018-10-04 RX ADMIN — NICOTINE 1 PATCH: 7 PATCH, EXTENDED RELEASE TRANSDERMAL at 08:50

## 2018-10-04 RX ADMIN — HYDROXYZINE HYDROCHLORIDE 25 MG: 25 TABLET ORAL at 08:49

## 2018-10-04 NOTE — PROGRESS NOTES
Pt was calm and cooperative with care  Pt was medication compliant  Pt denied all symptoms and is looking forward to discharge  Pt was out in the milieu, interacting with his peers  Will continue to monitor

## 2018-10-04 NOTE — DISCHARGE SUMMARY
Discharge Summary - [address] El[address]ina Stack 63 y o  male MRN: [id number]  Unit/Bed#: PC2 562-01 Encounter: [id number]     Admission Date: 9/19/2018         Discharge Date: 10/4/2018 10:45 AM    Attending Psychiatrist: Dr Neo Berger    Reason for Admission/HPI:  The patient is a 60-year-old  male who was admitted on a voluntary 201 commitment basis following a suicide attempt which he attributed to feeling overwhelmed by multiple stressors including losing his job, homelessness, and the engagement of a woman he was interested in to another man  He recently lost his housing after a UDS showed THC in his urine  After that he felt he did not want to go on living any more  He was also experiencing increased anxiety, lack of energy, some guilty feelings, and hopelessness        Meds/Allergies     current meds:   Current Facility-Administered Medications   Medication Dose Route Frequency    acetaminophen (TYLENOL) tablet 650 mg  650 mg Oral Q6H PRN    aluminum-magnesium hydroxide-simethicone (MYLANTA) 200-200-20 mg/5 mL oral suspension 30 mL  30 mL Oral Q4H PRN    benztropine (COGENTIN) injection 1 mg  1 mg Intramuscular Q6H PRN    benztropine (COGENTIN) tablet 1 mg  1 mg Oral Q6H PRN    escitalopram (LEXAPRO) tablet 20 mg  20 mg Oral Daily    haloperidol (HALDOL) tablet 5 mg  5 mg Oral Q6H PRN    haloperidol lactate (HALDOL) injection 5 mg  5 mg Intramuscular Q6H PRN    hydrOXYzine HCL (ATARAX) tablet 25 mg  25 mg Oral Q6H PRN    hydrOXYzine HCL (ATARAX) tablet 25 mg  25 mg Oral TID    ibuprofen (MOTRIN) tablet 600 mg  600 mg Oral Q8H PRN    ibuprofen (MOTRIN) tablet 800 mg  800 mg Oral Q8H PRN    lamoTRIgine (LaMICtal) tablet 75 mg  75 mg Oral BID    magnesium hydroxide (MILK OF MAGNESIA) 400 mg/5 mL oral suspension 30 mL  30 mL Oral Daily PRN    nicotine (NICODERM CQ) 7 mg/24hr TD 24 hr patch 1 patch  1 patch Transdermal Daily    QUEtiapine (SEROquel) tablet 300 mg  300 mg Oral HS    risperiDONE (RisperDAL M-TABS) dispersible tablet 1 mg  1 mg Oral Q6H PRN    traZODone (DESYREL) tablet 100 mg  100 mg Oral HS    traZODone (DESYREL) tablet 50 mg  50 mg Oral HS PRN       No Known Allergies    Objective     Vital signs in last 24 hours:  Temp:  [97 8 °F (36 6 °C)-97 9 °F (36 6 °C)] 97 8 °F (36 6 °C)  HR:  [76-82] 76  Resp:  [16] 16  BP: (132-137)/(81-82) 132/81    No intake or output data in the 24 hours ending 10/04/18 [address] [place] [address] Course: The patient was admitted to the inpatient psychiatric unit and started on every 15 minutes precautions  During the hospitalization the patient was attending individual therapy, group therapy, milieu therapy and occupational therapy  Psychiatric medications were titrated over the hospital stay  To address depressive symptoms, mood instability, impulsivity and insomnia the patient was started on antidepressant Lexapro, mood stabilizer Lamictal, antipsychotic medication Seroquel, anxiolytic medication Hydroxyzine and hypnotic medication Trazodone  Medication doses were titrated during the hospital course  Prior to beginning of treatment medications risks and benefits and possible side effects including risk of rash related to treatment with Lamictal, risk of parkinsonian symptoms, Tardive Dyskinesia and metabolic syndrome related to treatment with antipsychotic medications, risk of cardiovascular events in elderly related to treatment with antipsychotic medications and risk of suicidality related to treatment with antidepressants were reviewed with the patient  The patient verbalized understanding and agreement for treatment  At the beginning of admission the patient was very irritable and depressed  He had increased anxiety  He was having homicidal ideation toward several people who we felt wronged him in the past   He also had thoughts to kill himself since he felt he had no way out of his situation    Patient's symptoms improved gradually over the hospital course  At the end of treatment the patient was doing well  He developed insight into his situation and started taking responsibility for his own poor choices which led to his eviction  He spoke at length about changes he was going to make in his personal life in order to make a new living situation work  He was accepted into [facility] which greatly improved his mood  He was less irritable and more engaged in treatment  He no longer had any homicidal thoughts or suicidal thoughts  Mood was stable at the time of discharge  The patient denied suicidal ideation, intent or plan at the time of discharge and denied homicidal ideation, intent or plan at the time of discharge  There was no overt psychosis at the time of discharge  Sleep and appetite were improved  The patient was tolerating medications and was not reporting any significant side effects at the time of discharge  Since the patient was doing well at the end of the hospitalization, treatment team felt that the patient could be safely discharged to outpatient care  The outpatient follow up was arranged by the unit  upon discharge      Mental Status at Time of Discharge:   Appearance:  Adequate hygiene and grooming and Good eye contact   Behavior:  calm, cooperative and friendly   Speech:   Language: Normal rate and Normal volume  No overt abnormality   Mood:  euthymic   Affect:   Associations: appropriate  Tightly connected   Thought Process:  Goal directed and coherent   Thought Content:  Does not verbalize delusional material   Perceptual Disturbances: Denies hallucinations and does not appear to be responding to internal stimuli     Risk Potential: No suicidal or homicidal ideation   Orientation   Language Oriented x 3  No overt abnormality   Memory  Fund of knowledge grossly intact  Not assessed   Attention/Concentration Nice than expected   Insight:  Good insight   Judgment: Good judgment   Gait/Station: Not observed   Motor Activity: No abnormal movement noted       Admission Diagnosis:  Principal Problem:    Bipolar 2 disorder, major depressive episode ([facility] )  Active Problems:    Attention deficit hyperactivity disorder (ADHD), combined type      Discharge Diagnosis:     Principal Problem:    Bipolar 2 disorder, major depressive episode ([facility] )  Active Problems:    Attention deficit hyperactivity disorder (ADHD), combined type  Resolved Problems:    * No resolved hospital problems   *      Lab results:    Admission on 09/19/2018, Discharged on 10/04/2018   Component Date Value    TSH 3RD GENERATON 09/19/2018 1 410     WBC 09/19/2018 8 11     RBC 09/19/2018 4 36     Hemoglobin 09/19/2018 13 6     Hematocrit 09/19/2018 40 3     MCV 09/19/2018 92     MCH 09/19/2018 31 2     MCHC 09/19/2018 33 7     RDW 09/19/2018 14 3     MPV 09/19/2018 10 8     Platelets 85/56/4672 146*    nRBC 09/19/2018 0     Neutrophils Relative 09/19/2018 66     Immat GRANS % 09/19/2018 0     Lymphocytes Relative 09/19/2018 24     Monocytes Relative 09/19/2018 9     Eosinophils Relative 09/19/2018 1     Basophils Relative 09/19/2018 0     Neutrophils Absolute 09/19/2018 5 32     Immature Grans Absolute 09/19/2018 0 02     Lymphocytes Absolute 09/19/2018 1 91     Monocytes Absolute 09/19/2018 0 73     Eosinophils Absolute 09/19/2018 0 10     Basophils Absolute 09/19/2018 0 03     Sodium 09/19/2018 139     Potassium 09/19/2018 3 9     Chloride 09/19/2018 109*    CO2 09/19/2018 24     ANION GAP 09/19/2018 6     BUN 09/19/2018 16     Creatinine 09/19/2018 1 17     Glucose 09/19/2018 85     Calcium 09/19/2018 8 9     eGFR 09/19/2018 69     Amph/Meth UR 09/19/2018 Negative     Barbiturate Ur 09/19/2018 Negative     Benzodiazepine Urine 09/19/2018 Negative     Cocaine Urine 09/19/2018 Negative     Methadone Urine 09/19/2018 Negative     Opiate Urine 09/19/2018 Positive*    PCP Ur 09/19/2018 Negative     THC Urine 09/19/2018 Positive*    Color, UA 09/19/2018 yellow     Clarity, UA 09/19/2018 clear     Ethanol Lvl 19/34/7629 <3     Salicylate Lvl 93/26/0180 4     Acetaminophen Level 09/19/2018 <2*    Color, UA 09/19/2018 Yellow     Clarity, UA 09/19/2018 Clear     pH, UA 09/19/2018 6 0     Leukocytes, UA 09/19/2018 Trace*    Nitrite, UA 09/19/2018 Negative     Protein, UA 09/19/2018 Negative     Glucose, UA 09/19/2018 Negative     Ketones, UA 09/19/2018 Negative     Urobilinogen, UA 09/19/2018 0 2     Bilirubin, UA 09/19/2018 Negative     Blood, UA 09/19/2018 Negative     Specific Gravity, UA 09/19/2018 1 020     RBC, UA 09/19/2018 None Seen     WBC, UA 09/19/2018 4-10*    Epithelial Cells 09/19/2018 None Seen     Bacteria, UA 09/19/2018 None Seen     Hyaline Casts, UA 09/19/2018 3-5*    Folate 09/19/2018 >20 0*    WBC 09/20/2018 6 52     RBC 09/20/2018 4 43     Hemoglobin 09/20/2018 13 9     Hematocrit 09/20/2018 41 1     MCV 09/20/2018 93     MCH 09/20/2018 31 4     MCHC 09/20/2018 33 8     RDW 09/20/2018 14 5     MPV 09/20/2018 11 2     Platelets 56/00/2282 145*    nRBC 09/20/2018 0     Neutrophils Relative 09/20/2018 62     Immat GRANS % 09/20/2018 0     Lymphocytes Relative 09/20/2018 23     Monocytes Relative 09/20/2018 11     Eosinophils Relative 09/20/2018 3     Basophils Relative 09/20/2018 1     Neutrophils Absolute 09/20/2018 4 08     Immature Grans Absolute 09/20/2018 0 01     Lymphocytes Absolute 09/20/2018 1 48     Monocytes Absolute 09/20/2018 0 73     Eosinophils Absolute 09/20/2018 0 18     Basophils Absolute 09/20/2018 0 04     Sodium 09/20/2018 139     Potassium 09/20/2018 4 3     Chloride 09/20/2018 109*    CO2 09/20/2018 25     ANION GAP 09/20/2018 5     BUN 09/20/2018 18     Creatinine 09/20/2018 1 14     Glucose 09/20/2018 87     Calcium 09/20/2018 8 5     AST 09/20/2018 44     ALT 09/20/2018 72     Alkaline Phosphatase 09/20/2018 82     Total Protein 09/20/2018 6 8     Albumin 09/20/2018 3 3*    Total Bilirubin 09/20/2018 0 65     eGFR 09/20/2018 71     Cholesterol 09/20/2018 185     Triglycerides 09/20/2018 154*    HDL, Direct 09/20/2018 29*    LDL Calculated 09/20/2018 125*    Non-HDL-Chol (CHOL-HDL) 09/20/2018 156     RPR 09/20/2018 Non-Reactive     TSH 3RD GENERATON 09/20/2018 1 390     Ventricular Rate 09/19/2018 74     Atrial Rate 09/19/2018 74     WA Interval 09/19/2018 216     QRSD Interval 09/19/2018 100     QT Interval 09/19/2018 382     QTC Interval 09/19/2018 424     P Axis 09/19/2018 63     QRS [place] 09/19/2018 -10     T Wave Axis 09/19/2018 68        Discharge Medications:    See after visit summary for reconciled discharge medications provided to patient and family  Discharge instructions/Information to patient and family:     See after visit summary for information provided to patient and family  Provisions for Follow-Up Care:    See after visit summary for information related to follow-up care and any pertinent home health orders  Discharge Statement     I spent 20 minutes discharging the patient  This time was spent on the day of discharge  I had direct contact with the patient on the day of discharge

## 2018-10-04 NOTE — NURSING NOTE
Pt was discharged accompanied by transport  AVS and scripts reviewed with pt  Pt's belongings returned  Will continue to monitor

## 2018-10-04 NOTE — PLAN OF CARE
Problem: Ineffective Coping  Goal: Participates in unit activities  Interventions:  - Provide therapeutic environment   - Provide required programming   - Redirect inappropriate behaviors    Outcome: Completed Date Met: 10/04/18  Pt  displayed bright, social and appropriately jovial demeanor re:his scheduled discharge this morning  He was able to discuss signs of wellness and reflected back on missed signs of illness that had lead to past drug use

## 2018-10-04 NOTE — PROGRESS NOTES
Progress Note - Behavioral Health   Yoli Dunbar 63 y o  male MRN: @MRN   Unit/Bed#: SP3 906-18 Encounter: [id number]        The patient was seen for continuing care and reviewed with staff  Report from staff regarding this patient received and discussed, and records reviewed prior to seeing this patient   Patient condition is stable  He is no longer severely depressed, not anxious not psychotic not manic his appropriately smiling and stating that his mood is good  Admitting the patient to reached his baseline  The patient is known to the writer before when he was deeply depressed  At this point of time add patient is no longer depressed  Sleep is improved  Appetite normal    Medication side effects:no complaints  ROS: No     Mental Status Evaluation:    Appearance:  dressed appropriately, casually dressed   Behavior:  pleasant, cooperative, calm   Mood:  improved, anxious   Affect: appropriate, reactive, brighter, less constricted    Speech:  normal rate and volume, normal pitch   Language: appropriate   Thought Process:  concrete   Associations: concrete associations   Thought Content:  normal   Perceptual Disturbances: no auditory hallucinations, no visual hallucinations, denies auditory hallucinations when asked, does not appear responding to internal stimuli   Risk Potential: Suicidal ideation - None, contracts for safety on the unit  Homicidal ideation - None  Potential for aggression - No   Sensorium:  oriented to person, place and time   Memory:  recent and remote memory grossly intact   Consciousness:  alert and awake   Attention: attention span and concentration are normal   Fund of Knowledge: awareness of current events appropriate   Insight:  improved   Judgment: improved   Muscle Tone: normal   Gait/Station: normal gait/station and normal balance   Motor Activity: no abnormal movements         Laboratory results:  I have personally reviewed all pertinent laboratory results      No results for input(s): HGBA1C, NA, K, CL, CO2, GLUCOSE, CREATININE, BUN, MG, PHOS in the last 72 hours  Invalid input(s): CA  No results for input(s): WBC, RBC, HGB, HCT, MCV, MCH, RDW, PLT in the last 72 hours  No results for input(s): CREATININE, BUN, NA, K, CL, CO2, GLUCOSE, PROT, ALT, AST, BILIDIR in the last 72 hours  Invalid input(s): CA, AKLPHOS  No results for input(s): ALKPHOS, AST, ALT, GGT, BILITOT, BILIDIR, ALBUMIN, INR, AMYLASE, LIPASE in the last 72 hours  No results for input(s): TROPONINI, CKMB, CKTOTAL in the last 72 hours  Invalid input(s): PBNP  No results for input(s): CHOL, LDLDIRECT, HDL, TRIG in the last 72 hours  No results for input(s): CRP, SEDRATE, LORENA, HAV, HEPAIGM, HEPBIGM, HEPBCAB, HEPCAB in the last 72 hours  Invalid input(s): HEAG    CBC: No results for input(s): WBC, RBC, HGB, HCT, PLT in the last 72 hours  BMP: No results for input(s): NA, K, CL, CO2, BUN, GLU in the last 72 hours  Invalid input(s): CREA        Progress Toward Goals: significant improvement    Assessment/Plan   Principal Problem:    Bipolar 2 disorder, major depressive episode (HCC)  Active Problems:    Attention deficit hyperactivity disorder (ADHD), combined type      Recommended Treatment:   Continue the present medications the patient condition improved  Patient is  reaching baseline  Planned medication and treatment changes: All current active medications have been reviewed  Continue treatment with group therapy, milieu therapy, occupational therapy and medication management  Risks / Benefits of Treatment:    Risks, benefits, and possible side effects of medications explained to patient and patient verbalizes understanding and agreement for treatment  Counseling / Coordination of Care:    Patient's progress discussed with staff in treatment team meeting  Medication changes reviewed with staff in treatment team meeting        ** Please Note: This note has been constructed using a voice recognition system   **

## 2018-10-04 NOTE — PROGRESS NOTES
Pt calm, pleasant and cooperative  Pt seen in the milieu watching television with his peers  Pt denies all s/s and is "happy to be going to [facility] " Pt medication compliant

## 2018-10-04 NOTE — PLAN OF CARE
Anxiety     Anxiety is at manageable level Adequate for Discharge        Depression     Treatment Goal: Demonstrate behavioral control of depressive symptoms, verbalize feelings of improved mood/affect, and adopt new coping skills prior to discharge Adequate for Discharge     Verbalize thoughts and feelings Adequate for Discharge     Refrain from harming self Adequate for Discharge     Refrain from isolation Adequate for Discharge     Refrain from self-neglect Adequate for Discharge     Attend and participate in unit activities, including therapeutic, recreational, and educational groups Adequate for Discharge     Complete daily ADLs, including personal hygiene independently, as able Adequate for Discharge        DISCHARGE PLANNING     Discharge to home or other facility with appropriate resources Adequate for Discharge        Ineffective Coping     Participates in unit activities Adequate for Discharge        Risk for Self Injury/Neglect     Treatment Goal: Remain safe during length of stay, learn and adopt new coping skills, and be free of self-injurious ideation, impulses and acts at the time of discharge Adequate for Discharge     Verbalize thoughts and feelings Adequate for Discharge     Refrain from harming self Adequate for Discharge     Recognize maladaptive responses and adopt new coping mechanisms Adequate for Discharge     Complete daily ADLs, including personal hygiene independently, as able Adequate for Discharge

## 2018-10-08 ENCOUNTER — HOSPITAL ENCOUNTER (EMERGENCY)
Facility: HOSPITAL | Age: 58
Discharge: HOME | End: 2018-10-08
Attending: EMERGENCY MEDICINE
Payer: COMMERCIAL

## 2018-10-08 VITALS
OXYGEN SATURATION: 96 % | SYSTOLIC BLOOD PRESSURE: 142 MMHG | WEIGHT: 180 LBS | DIASTOLIC BLOOD PRESSURE: 92 MMHG | TEMPERATURE: 97.4 F | BODY MASS INDEX: 27.37 KG/M2 | HEART RATE: 72 BPM | RESPIRATION RATE: 17 BRPM

## 2018-10-08 DIAGNOSIS — R10.13 EPIGASTRIC ABDOMINAL PAIN: Primary | ICD-10-CM

## 2018-10-08 LAB
ATRIAL RATE: 79 BPM
P AXIS: 63 DEGREES
PR INTERVAL: 210 MS
QRS AXIS: -24 DEGREES
QRSD INTERVAL: 100 MS
QT INTERVAL: 352 MS
QTC INTERVAL: 403 MS
T WAVE AXIS: 87 DEGREES
VENTRICULAR RATE: 79 BPM

## 2018-10-08 PROCEDURE — 99283 EMERGENCY DEPT VISIT LOW MDM: CPT

## 2018-10-08 PROCEDURE — 93010 ELECTROCARDIOGRAM REPORT: CPT | Performed by: INTERNAL MEDICINE

## 2018-10-08 PROCEDURE — 93005 ELECTROCARDIOGRAM TRACING: CPT

## 2018-10-08 RX ORDER — ASPIRIN 81 MG/1
TABLET ORAL EVERY 24 HOURS
COMMUNITY
Start: 2017-02-08 | End: 2019-06-25 | Stop reason: ALTCHOICE

## 2018-10-08 RX ORDER — AMLODIPINE BESYLATE AND BENAZEPRIL HYDROCHLORIDE 10; 20 MG/1; MG/1
CAPSULE ORAL EVERY 24 HOURS
COMMUNITY
Start: 2018-04-18 | End: 2019-06-25 | Stop reason: ALTCHOICE

## 2018-10-08 RX ORDER — AMITRIPTYLINE HYDROCHLORIDE 25 MG/1
TABLET, FILM COATED ORAL EVERY 24 HOURS
COMMUNITY
Start: 2016-12-06 | End: 2019-06-25 | Stop reason: ALTCHOICE

## 2018-10-08 RX ORDER — HYDROCHLOROTHIAZIDE 25 MG/1
25 TABLET ORAL
COMMUNITY
Start: 2014-10-23 | End: 2019-06-25 | Stop reason: ALTCHOICE

## 2018-10-08 RX ORDER — NAPROXEN 500 MG/1
500 TABLET ORAL
COMMUNITY
Start: 2018-02-01 | End: 2018-10-22

## 2018-10-08 RX ORDER — PANTOPRAZOLE SODIUM 20 MG/1
40 TABLET, DELAYED RELEASE ORAL DAILY
Qty: 28 TABLET | Refills: 0 | Status: SHIPPED | OUTPATIENT
Start: 2018-10-08 | End: 2018-10-10 | Stop reason: DRUGHIGH

## 2018-10-08 RX ORDER — IBUPROFEN 200 MG
400 TABLET ORAL EVERY 6 HOURS
COMMUNITY
End: 2018-10-22 | Stop reason: SDUPTHER

## 2018-10-08 RX ORDER — SUCRALFATE 1 G/1
1 TABLET ORAL 4 TIMES DAILY
Qty: 56 TABLET | Refills: 0 | Status: SHIPPED | OUTPATIENT
Start: 2018-10-08 | End: 2019-06-25 | Stop reason: ALTCHOICE

## 2018-10-08 NOTE — SOCIAL WORK
Pt does not have PCP listed  CM met with Pt at bedside who reported he would like to be scheduled with a PCP  Pt has [facility] listed as his PCP on his insurance card  Pt is agreeable  CM contacted [facility] and spoke with Deri Goldberg who scheduled Pt an appointment for Wednesday, Oct 10th at 1pm  CM provided Pt with appointment card  Pt reported he will be able to attend

## 2018-10-08 NOTE — DISCHARGE INSTRUCTIONS
Gastroesophageal Reflux Disease   WHAT YOU NEED TO KNOW:   Gastroesophageal reflux occurs when acid and food in the stomach back up into the esophagus  Gastroesophageal reflux disease (GERD) is reflux that occurs more than twice a week for a few weeks  It usually causes heartburn and other symptoms  GERD can cause other health problems over time if it is not treated  DISCHARGE INSTRUCTIONS:   Return to the emergency department if:   · You feel full and cannot burp or vomit  · You have severe chest pain and sudden trouble breathing  · Your bowel movements are black, bloody, or tarry-looking  · Your vomit looks like coffee grounds or has blood in it  Contact your healthcare provider if:   · You vomit large amounts, or you vomit often  · You have trouble breathing after you vomit  · You have trouble swallowing, or pain with swallowing  · You are losing weight without trying  · Your symptoms get worse or do not improve with treatment  · You have questions or concerns about your condition or care  Medicines:   · Medicines  are used to decrease stomach acid  Medicine may also be used to help your lower esophageal sphincter and stomach contract (tighten) more  · Take your medicine as directed  Contact your healthcare provider if you think your medicine is not helping or if you have side effects  Tell him of her if you are allergic to any medicine  Keep a list of the medicines, vitamins, and herbs you take  Include the amounts, and when and why you take them  Bring the list or the pill bottles to follow-up visits  Carry your medicine list with you in case of an emergency  Manage GERD:   · Do not have foods or drinks that may increase heartburn  These include chocolate, peppermint, fried or fatty foods, drinks that contain caffeine, or carbonated drinks (soda)  Other foods include spicy foods, onions, tomatoes, and tomato-based foods   Do not have foods or drinks that can irritate your esophagus, such as citrus fruits, juices, and alcohol  · Do not eat large meals  When you eat a lot of food at one time, your stomach needs more acid to digest it  Eat 6 small meals each day instead of 3 large ones, and eat slowly  Do not eat meals 2 to 3 hours before bedtime  · Elevate the head of your bed  Place 6-inch blocks under the head of your bed frame  You may also use more than one pillow under your head and shoulders while you sleep  · Maintain a healthy weight  If you are overweight, weight loss may help relieve symptoms of GERD  · Do not smoke  Smoking weakens the lower esophageal sphincter and increases the risk of GERD  Ask your healthcare provider for information if you currently smoke and need help to quit  E-cigarettes or smokeless tobacco still contain nicotine  Talk to your healthcare provider before you use these products  · Do not wear clothing that is tight around your waist   Tight clothing can put pressure on your stomach and cause or worsen GERD symptoms  Follow up with your healthcare provider as directed:  Write down your questions so you remember to ask them during your visits  © 2017 [address] [facility] Information is for End User's use only and may not be sold, redistributed or otherwise used for commercial purposes  All illustrations and images included in CareNotes® are the copyrighted property of [employer]  or [employer]  The above information is an  only  It is not intended as medical advice for individual conditions or treatments  Talk to your doctor, nurse or pharmacist before following any medical regimen to see if it is safe and effective for you  Epigastric Pain   WHAT YOU NEED TO KNOW:   Epigastric pain is felt in the middle of the upper abdomen, between the ribs and the bellybutton  The pain may be mild or severe  Pain may spread from or to another part of your body   Epigastric pain may be a sign of a serious health problem that needs to be treated  DISCHARGE INSTRUCTIONS:   Call 911 for any of the following:   · You have any of the following signs of a heart attack:      ¨ Squeezing, pressure, or pain in your chest that lasts longer than 5 minutes or returns    ¨ Discomfort or pain in your back, neck, jaw, stomach, or arm     ¨ Trouble breathing    ¨ Nausea or vomiting    ¨ Lightheadedness or a sudden cold sweat, especially with chest pain or trouble breathing    · You have severe pain that radiates to your jaw or back  Return to the emergency department if:   · You have severe pain that starts suddenly and quickly gets worse  · You cannot have a bowel movement and are vomiting  · You vomit or cough up blood  · You see blood in your urine or bowel movement  · You feel drowsy and your breathing is slower than usual   Contact your healthcare provider if:   · You have a fever or chills  · You have yellowing of your skin or the whites of your eyes  · You vomit often or several times in a row  · You lose weight without trying  · You have symptoms for longer than 2 weeks  · You have questions or concerns about your condition or care  Medicines:   · Medicines  may be given to treat pain or stop vomiting  You may also need medicines to reduce or control stomach acid, or treat an infection  · Take your medicine as directed  Contact your healthcare provider if you think your medicine is not helping or if you have side effects  Tell him of her if you are allergic to any medicine  Keep a list of the medicines, vitamins, and herbs you take  Include the amounts, and when and why you take them  Bring the list or the pill bottles to follow-up visits  Carry your medicine list with you in case of an emergency  Follow up with your healthcare provider as directed:  Write down your questions so you remember to ask them during your visits     Manage your symptoms:   · Keep a record of your symptoms  Include when the pain starts, how long it lasts, and if it is sharp or dull  Also include any foods you ate or activities you did before the pain started  Keep track of anything that helped the pain  · Eat a variety of healthy foods  Healthy foods include fruits, vegetables, whole-grain breads, low-fat dairy products, beans, lean meats, and fish  Ask if you need to be on a special diet  Certain foods may cause your pain, such as alcohol or foods that are high in fat  You may need to eat smaller meals and to eat more often than usual     · Drink liquids as directed  Ask how much liquid to drink each day and which liquids are best for you  Do not have drinks that contain alcohol or caffeine  © 2017 [address] [employer] Information is for End User's use only and may not be sold, redistributed or otherwise used for commercial purposes  All illustrations and images included in CareNotes® are the copyrighted property of A [employer] M [employer]  or Gabriel Parks  The above information is an  only  It is not intended as medical advice for individual conditions or treatments  Talk to your doctor, nurse or pharmacist before following any medical regimen to see if it is safe and effective for you

## 2018-10-08 NOTE — ED ATTENDING ATTESTATION
Constantin Marques DO, saw and evaluated the patient  I have discussed the patient with the resident/non-physician practitioner and agree with the resident's/non-physician practitioner's findings, Plan of Care, and MDM as documented in the resident's/non-physician practitioner's note, except where noted  All available labs and Radiology studies were reviewed  At this point I agree with the current assessment done in the Emergency Department  I have conducted an independent evaluation of this patient a history and physical is as follows: The patient is a 60-year-old male complaining of heartburn for approximately 2 weeks  Patient states he has episodes of gastritis previously and feels similar previous episodes  Worse throughout the night and upon awakening this morning  Does admit to previous heavy alcohol consumption and states he drank approximately a L of vodka daily but states he has been off of alcohol and crack cocaine recently  Denies any chest pain, shortness of breath, fever, chills, headache, calf pain tenderness or asymmetry  Physical exam:  Afebrile, heart is regular without murmurs or gallops, lungs are clear, abdomen soft nontender nondistended, there is no zoster rash, there is no lower extremity edema or calf tenderness  EKG is normal sinus rhythm with no acute ischemic changes and normal intervals  Patient remains asymptomatic at this time  Patient brought a prescription for PPI and Carafate, follow up with primary care physician, return if worsens        Critical Care Time  CritCare Time    Procedures

## 2018-10-08 NOTE — ED PROVIDER NOTES
History  Chief Complaint   Patient presents with    Heartburn     pt states his acid reflux is bothering him again  pt states no pain because he "took some generic pill "     HPI   43-year-old male with past medical history of bipolar disorder, depression, alcohol abuse, and acid reflux presents the ED with chest pain that woke him from sleep at 0600 this morning  Patient describes a burning pain that radiates from his epigastric abdomen up to his mid sternal chest   Pain is worse with lying flat,  It is not exertional, and is better after eating  Denies any radiation of pain to neck, arms, back  No nausea, vomiting, or shortness of breath  Notes some lightheadedness  Pain was relieved by an unknown over-the-counter medication, and he has no pain in the ED at this time  Patient reports similar pain 2 evenings ago, and states he has had it intermittently since he stopped drinking alcohol in August    Patient has no history of cardiac problems  He does not have a primary care physician at this time  No fever or recent illness  Prior to Admission Medications   Prescriptions Last Dose Informant Patient Reported? Taking?    Multiple Vitamins-Minerals (ONE-A-DAY 50 PLUS PO) Unknown at Unknown time  Yes No   Sig: Take by mouth   QUEtiapine (SEROquel) 300 mg tablet Unknown at Unknown time  No No   Sig: Take 1 tablet (300 mg total) by mouth daily at bedtime   escitalopram (LEXAPRO) 20 mg tablet Unknown at Unknown time  No No   Sig: Take 1 tablet (20 mg total) by mouth daily   esomeprazole (NexIUM) 20 mg capsule Unknown at Unknown time  Yes No   Sig: Take 20 mg by mouth daily in the early morning   hydrOXYzine HCL (ATARAX) 25 mg tablet Unknown at Unknown time  Yes No   Sig: Take 25 mg by mouth every 6 (six) hours as needed for itching   lamoTRIgine (LaMICtal) 25 mg tablet Unknown at Unknown time  No No   Sig: Take 3 tablets (75 mg total) by mouth 2 (two) times a day for 21 days   traZODone (DESYREL) 100 mg tablet Unknown at Unknown time  No No   Sig: Take 1 tablet (100 mg total) by mouth daily at bedtime      Facility-Administered Medications: None       Past Medical History:   Diagnosis Date    Alcohol abuse     Depression     Drug therapy     GERD (gastroesophageal reflux disease)     Hypertension 01/2012    Knee pain, bilateral     Psychiatric disorder     depression, anxiety    Self-injurious behavior     Spinal stenosis of lumbar region     [facility]icide attempt [facility])        Past Surgical History:   Procedure Laterality Date    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    HERNIA REPAIR  1997       Family History   Problem Relation Age of Onset    Cancer Family     Mental illness Family      I have reviewed and agree with the history as documented  Social History   Substance Use Topics    Smoking status: Current Every Day Smoker     Packs/day: 0 50     Types: Cigarettes, Cigars    Smokeless tobacco: Never Used      Comment: Years    Alcohol use No      Comment: Stopped drinking for several months        Review of Systems   Constitutional: Negative for chills and fever  HENT: Negative for congestion, rhinorrhea and sore throat  Respiratory: Negative for cough and shortness of breath  Cardiovascular: Positive for chest pain  Negative for palpitations  Gastrointestinal: Positive for abdominal pain  Negative for nausea and vomiting  Genitourinary: Negative for dysuria and hematuria  Musculoskeletal: Negative for arthralgias and myalgias  Skin: Negative for rash  Neurological: Positive for light-headedness  Negative for dizziness, weakness, numbness and headaches  All other systems reviewed and are negative        Physical Exam  ED Triage Vitals [10/08/18 0945]   Temperature Pulse Respirations Blood Pressure SpO2   (!) 97 4 °F (36 3 °C) 86 20 160/87 98 %      Temp Source Heart Rate Source Patient Position - Orthostatic VS BP Location FiO2 (%)   Tympanic Monitor Sitting Right arm --      Pain Score       No Pain           Orthostatic Vital Signs  Vitals:    10/08/18 1030 10/08/18 1045 10/08/18 1100 10/08/18 1115   BP:    142/92   Pulse: 74 74 72 72   Patient Position - Orthostatic VS:           Physical Exam   Constitutional: He is oriented to person, place, and time  He appears well-developed and well-nourished  No distress  HENT:   Head: Normocephalic and atraumatic  Right Ear: External ear normal    Left Ear: External ear normal    Mouth/Throat: Oropharynx is clear and moist    Eyes: Pupils are equal, round, and reactive to light  Conjunctivae are normal    Neck: Normal range of motion  Neck supple  Cardiovascular: Normal rate, regular rhythm, normal heart sounds and intact distal pulses  Exam reveals no gallop and no friction rub  No murmur heard  Pulmonary/Chest: Effort normal and breath sounds normal  No respiratory distress  He has no wheezes  He has no rhonchi  He has no rales  Abdominal: Soft  Bowel sounds are normal  He exhibits no distension  There is no tenderness  Musculoskeletal: Normal range of motion  Lymphadenopathy:     He has no cervical adenopathy  Neurological: He is alert and oriented to person, place, and time  He has normal strength  No cranial nerve deficit or sensory deficit  Skin: Skin is warm and dry  He is not diaphoretic         ED Medications  Medications - No data to display    Diagnostic Studies  Results Reviewed     None                 No orders to display         Procedures  ECG 12 Lead Documentation  Date/Time: 10/8/2018 11:01 AM  Performed by: Olu Shankar  Authorized by: Olu Shankar     ECG reviewed by me, the ED Provider: yes    Patient location:  ED  Previous ECG:     Previous ECG:  Compared to current    Similarity:  No change  Interpretation:     Interpretation: normal    Rate:     ECG rate:  79    ECG rate assessment: normal    Rhythm:     Rhythm: sinus rhythm and A-V block    Ectopy:     Ectopy: none    QRS:     QRS axis:  Left QRS intervals:  Normal  Conduction:     Conduction: abnormal      Abnormal conduction: 1st degree    ST segments:     ST segments:  Normal  T waves:     T waves: normal            Phone Consults  ED Phone Contact    ED Course                               MDM  Number of Diagnoses or Management Options  Epigastric abdominal pain:   Diagnosis management comments: 58-year-old male with past medical history of bipolar disorder, depression, alcohol abuse, and acid reflux presenting with burning epigastric chest pain, now resolved after an unknown OTC medication  Will get EKG to evaluate for cardiac abnormality  Pt is currently asymptomatic at this time  Will discharge with PPI and sucralfate prescriptions and advise to follow up with PCP  CritCare Time    Disposition  Final diagnoses:   Epigastric abdominal pain     Time reflects when diagnosis was documented in both MDM as applicable and the Disposition within this note     Time User Action Codes Description Comment    10/8/2018 10:46 AM Aurora Graf Add [R10 13] Epigastric abdominal pain       ED Disposition     ED Disposition Condition Comment    Discharge  Albertina Díaz discharge to home/self care      Condition at discharge: Stable        Follow-up Information     Follow up With Specialties Details Why Cassy Ibanez   Call to get an appointment with a primary care physician  [phone]      or your PCP  Schedule an appointment as soon as possible for a visit            Patient's Medications   Discharge Prescriptions    PANTOPRAZOLE (PROTONIX) 20 MG TABLET    Take 2 tablets (40 mg total) by mouth daily for 14 days       Start Date: 10/8/2018 End Date: 10/22/2018       Order Dose: 40 mg       Quantity: 28 tablet    Refills: 0    SUCRALFATE (CARAFATE) 1 G TABLET    Take 1 tablet (1 g total) by mouth 4 (four) times a day for 14 days       Start Date: 10/8/2018 End Date: 10/22/2018       Order Dose: 1 g       Quantity: 56 tablet    Refills: 0     No discharge procedures on file  ED Provider  Attending physically available and evaluated Zofia Blandon I managed the patient along with the ED Attending      Electronically Signed by         Mary Lou Park MD  10/08/18 6896

## 2018-10-09 ENCOUNTER — TELEPHONE (OUTPATIENT)
Dept: PSYCHOLOGY | Facility: CLINIC | Age: 58
End: 2018-10-09

## 2018-10-09 NOTE — TELEPHONE ENCOUNTER
[facility] Intake Assessment     Presenting Stressors: RECOVERING DRUG ADDICT AND ALCOHOLIC HAS DEPRESSION FROM WAS IN-PT 2 WEEKS AGO AT [facility] 3 TO KILL SELF  Referral Source: [address]   he is employed at [employer] to weapons? No  Is he a smoker?  yes    Symptoms: suicidal ideation, depressed mood, anxiety and NO PLAN        Current Outpatient Prescriptions:     amitriptyline (ELAVIL) 25 mg tablet, every 24 hours, Disp: , Rfl:     amLODIPine-benazepril (LOTREL) 10-20 MG per capsule, every 24 hours, Disp: , Rfl:     aspirin (ASPIR-81) 81 mg EC tablet, every 24 hours, Disp: , Rfl:     escitalopram (LEXAPRO) 20 mg tablet, Take 1 tablet (20 mg total) by mouth daily, Disp: 21 tablet, Rfl: 0    esomeprazole (NexIUM) 20 mg capsule, Take 20 mg by mouth daily in the early morning, Disp: , Rfl:     hydrochlorothiazide (HYDRODIURIL) 25 mg tablet, 25 mg, Disp: , Rfl:     hydrOXYzine HCL (ATARAX) 25 mg tablet, Take 25 mg by mouth every 6 (six) hours as needed for itching, Disp: , Rfl:     ibuprofen (MOTRIN) 200 mg tablet, Take 400 mg by mouth every 6 (six) hours, Disp: , Rfl:     lamoTRIgine (LaMICtal) 25 mg tablet, Take 3 tablets (75 mg total) by mouth 2 (two) times a day for 21 days, Disp: 126 tablet, Rfl: 0    Multiple Vitamins-Minerals (ONE-A-DAY 50 PLUS PO), Take by mouth, Disp: , Rfl:     naproxen (NAPROSYN) 500 mg tablet, Take 500 mg by mouth, Disp: , Rfl:     pantoprazole (PROTONIX) 20 mg tablet, Take 2 tablets (40 mg total) by mouth daily for 14 days, Disp: 28 tablet, Rfl: 0    QUEtiapine (SEROquel) 300 mg tablet, Take 1 tablet (300 mg total) by mouth daily at bedtime, Disp: 21 tablet, Rfl: 0    sucralfate (CARAFATE) 1 g tablet, Take 1 tablet (1 g total) by mouth 4 (four) times a day for 14 days, Disp: 56 tablet, Rfl: 0    traZODone (DESYREL) 100 mg tablet, Take 1 tablet (100 mg total) by mouth daily at bedtime, Disp: 21 tablet, Rfl: 0    Medications: SEE ABOVE    Allergies Allergen Reactions    Haloperidol      Other reaction(s): jittery    Other        Allergies: SEE ABOVE    Provisional Diagnosis:   Axis I:DEPRESSION WITH ANXIETY   Axis II: NONE    Substance Abuse: There are suspicions of alcohol abuse reported by the patient      Psychiatric Treatment History:     Current psychiatrist:DR ACEVEDO  Therapist: Zina Cormier  [facility] Supports: NO  The patient requires ambulatory assistance: NO    Legal Issues: NO LEGAL ISSUES    Action: NONE    ACCEPTED Appointment Date: NONE    DENIED Reason: NONE

## 2018-10-10 ENCOUNTER — OFFICE VISIT (OUTPATIENT)
Dept: INTERNAL MEDICINE CLINIC | Facility: CLINIC | Age: 58
End: 2018-10-10
Payer: COMMERCIAL

## 2018-10-10 VITALS
BODY MASS INDEX: 27 KG/M2 | HEIGHT: 68 IN | TEMPERATURE: 97.9 F | DIASTOLIC BLOOD PRESSURE: 70 MMHG | WEIGHT: 178.13 LBS | SYSTOLIC BLOOD PRESSURE: 122 MMHG | HEART RATE: 80 BPM

## 2018-10-10 DIAGNOSIS — K21.9 GASTROESOPHAGEAL REFLUX DISEASE WITHOUT ESOPHAGITIS: Primary | ICD-10-CM

## 2018-10-10 DIAGNOSIS — F41.9 ANXIETY DISORDER: ICD-10-CM

## 2018-10-10 DIAGNOSIS — F17.200 TOBACCO DEPENDENCE SYNDROME: ICD-10-CM

## 2018-10-10 DIAGNOSIS — F10.21 ALCOHOL DEPENDENCE IN REMISSION (HCC): ICD-10-CM

## 2018-10-10 DIAGNOSIS — F32.A DEPRESSIVE DISORDER: ICD-10-CM

## 2018-10-10 DIAGNOSIS — F31.81 BIPOLAR 2 DISORDER, MAJOR DEPRESSIVE EPISODE (HCC): Chronic | ICD-10-CM

## 2018-10-10 DIAGNOSIS — I10 BENIGN ESSENTIAL HYPERTENSION: ICD-10-CM

## 2018-10-10 PROBLEM — Z00.00 ENCOUNTER FOR MEDICAL EXAMINATION TO ESTABLISH CARE: Status: ACTIVE | Noted: 2018-10-10

## 2018-10-10 PROCEDURE — 3725F SCREEN DEPRESSION PERFORMED: CPT | Performed by: INTERNAL MEDICINE

## 2018-10-10 PROCEDURE — 99203 OFFICE O/P NEW LOW 30 MIN: CPT | Performed by: INTERNAL MEDICINE

## 2018-10-10 RX ORDER — RANITIDINE 300 MG/1
300 TABLET ORAL
Qty: 90 TABLET | Refills: 1 | Status: SHIPPED | OUTPATIENT
Start: 2018-10-10 | End: 2018-11-20

## 2018-10-10 RX ORDER — INFLUENZA VIRUS VACCINE 15; 15; 15; 15 UG/.5ML; UG/.5ML; UG/.5ML; UG/.5ML
SUSPENSION INTRAMUSCULAR
Refills: 0 | COMMUNITY
Start: 2018-10-08 | End: 2019-06-25 | Stop reason: ALTCHOICE

## 2018-10-10 NOTE — PATIENT INSTRUCTIONS
Chronic Hypertension   AMBULATORY CARE:   Hypertension  is high blood pressure (BP)  Your BP is the force of your blood moving against the walls of your arteries  Normal BP is less than 120/80  Prehypertension is between 120/80 and 139/89  Hypertension is 140/90 or higher  Hypertension causes your BP to get so high that your heart has to work much harder than normal  This can damage your heart  Chronic hypertension is a long-term condition that you can control with a healthy lifestyle or medicines  A controlled blood pressure helps protect your organs, such as your heart, lungs, brain, and kidneys  Common symptoms include the following:   · Headache     · Blurred vision    · Chest pain     · Dizziness or weakness     · Trouble breathing     · Nosebleeds  Call 911 for any of the following:   · You have discomfort in your chest that feels like squeezing, pressure, fullness, or pain  · You become confused or have difficulty speaking  · You suddenly feel lightheaded or have trouble breathing  · You have pain or discomfort in your back, neck, jaw, stomach, or arm  Seek care immediately if:   · You have a severe headache or vision loss  · You have weakness in an arm or leg  Contact your healthcare provider if:   · You feel faint, dizzy, confused, or drowsy  · You have been taking your BP medicine and your BP is still higher than your healthcare provider says it should be  · You have questions or concerns about your condition or care  Treatment for chronic hypertension  may include medicine to lower your BP and lower your cholesterol level  A low cholesterol level helps prevent heart disease and makes it easier to control your blood pressure  Heart disease can make your blood pressure harder to control  You may also need to make lifestyle changes  Take your medicine exactly as directed    Manage chronic hypertension:  Talk with your healthcare provider about these and other ways to manage hypertension:  · Take your BP at home  Sit and rest for 5 minutes before you take your BP  Extend your arm and support it on a flat surface  Your arm should be at the same level as your heart  Follow the directions that came with your BP monitor  If possible, take at least 2 BP readings each time  Take your BP at least twice a day at the same times each day, such as morning and evening  Keep a record of your BP readings and bring it to your follow-up visits  Ask your healthcare provider what your blood pressure should be  · Limit sodium (salt) as directed  Too much sodium can affect your fluid balance  Check labels to find low-sodium or no-salt-added foods  Some low-sodium foods use potassium salts for flavor  Too much potassium can also cause health problems  Your healthcare provider will tell you how much sodium and potassium are safe for you to have in a day  He or she may recommend that you limit sodium to 2,300 mg a day  · Follow the meal plan recommended by your healthcare provider  A dietitian or your provider can give you more information on low-sodium plans or the DASH (Dietary Approaches to Stop Hypertension) eating plan  The DASH plan is low in sodium, unhealthy fats, and total fat  It is high in potassium, calcium, and fiber  · Exercise to maintain a healthy weight  Exercise at least 30 minutes per day, on most days of the week  This will help decrease your blood pressure  Ask about the best exercise plan for you  · Decrease stress  This may help lower your BP  Learn ways to relax, such as deep breathing or listening to music  · Limit alcohol  Women should limit alcohol to 1 drink a day  Men should limit alcohol to 2 drinks a day  A drink of alcohol is 12 ounces of beer, 5 ounces of wine, or 1½ ounces of liquor  · Do not smoke  Nicotine and other chemicals in cigarettes and cigars can increase your BP and also cause lung damage   Ask your healthcare provider for information if you currently smoke and need help to quit  E-cigarettes or smokeless tobacco still contain nicotine  Talk to your healthcare provider before you use these products  Follow up with your healthcare provider as directed: You will need to return to have your BP checked and to have other lab tests done  Write down your questions so you remember to ask them during your visits  © 2017 [address] [employer] Information is for End User's use only and may not be sold, redistributed or otherwise used for commercial purposes  All illustrations and images included in CareNotes® are the copyrighted property of A D A M [employer]  or Gabriel Parks  The above information is an  only  It is not intended as medical advice for individual conditions or treatments  Talk to your doctor, nurse or pharmacist before following any medical regimen to see if it is safe and effective for you  Gastroesophageal Reflux Disease   AMBULATORY CARE:   Gastroesophageal reflux  reflux occurs when acid and food in the stomach back up into the esophagus  Gastroesophageal reflux disease (GERD) is reflux that occurs more than twice a week for a few weeks  It usually causes heartburn and other symptoms  GERD can cause other health problems over time if it is not treated  Common symptoms include:  Heartburn is the most common symptom of GERD  You may feel burning pain in your chest or below the breast bone  This usually occurs after meals and spreads to your neck, jaw, or shoulder  The pain gets better when you change positions  You may also have any of the following:  · Bitter or acid taste in your mouth    · Dry cough    · Trouble swallowing or pain with swallowing    · Hoarseness or sore throat    · Frequent burping or hiccups    · Feeling of fullness soon after you start eating  Seek care immediately if:  · You feel full and cannot burp or vomit      · You have severe chest pain and sudden trouble breathing  · Your bowel movements are black, bloody, or tarry-looking  · Your vomit looks like coffee grounds or has blood in it  Contact your healthcare provider if:   · You vomit large amounts, or you vomit often  · You have trouble breathing after you vomit  · You have trouble swallowing, or pain with swallowing  · You are losing weight without trying  · Your symptoms get worse or do not improve with treatment  · You have questions or concerns about your condition or care  Treatment for GERD:  Your healthcare provider may prescribe medicine to decrease stomach acid  He may also prescribe medicine that help your esophagus and stomach move food and liquid to your intestines  Surgery may be done if other treatments do not work  You may need surgery to wrap the upper part of the stomach around the esophageal sphincter  This will strengthen the sphincter and prevent reflux  Manage GERD:   · Do not have foods or drinks that may increase heartburn  These include chocolate, peppermint, fried or fatty foods, drinks that contain caffeine, or carbonated drinks (soda)  Other foods include spicy foods, onions, tomatoes, and tomato-based foods  Do not have foods or drinks that can irritate your esophagus, such as citrus fruits, juices, and alcohol  · Do not eat large meals  When you eat a lot of food at one time, your stomach needs more acid to digest it  Eat 6 small meals each day instead of 3 large ones, and eat slowly  Do not eat meals 2 to 3 hours before bedtime  · Elevate the head of your bed  Place 6-inch blocks under the head of your bed frame  You may also use more than one pillow under your head and shoulders while you sleep  · Maintain a healthy weight  If you are overweight, weight loss may help relieve symptoms of GERD  · Do not smoke  Smoking weakens the lower esophageal sphincter and increases the risk of GERD   Ask your healthcare provider for information if you currently smoke and need help to quit  E-cigarettes or smokeless tobacco still contain nicotine  Talk to your healthcare provider before you use these products  · Do not wear clothing that is tight around your waist   Tight clothing can put pressure on your stomach and cause or worsen GERD symptoms  Follow up with your healthcare provider as directed:  Write down your questions so you remember to ask them during your visits  © 2017 [address] Information is for End User's use only and may not be sold, redistributed or otherwise used for commercial purposes  All illustrations and images included in CareNotes® are the copyrighted property of [employer]  or Gabriel Parks  The above information is an  only  It is not intended as medical advice for individual conditions or treatments  Talk to your doctor, nurse or pharmacist before following any medical regimen to see if it is safe and effective for you

## 2018-10-10 NOTE — PROGRESS NOTES
CLINIC VISIT NOTE  Jamie La   63 y o  male   MRN: [id number]    ASSESSMENT/PLAN:  Diagnoses and all orders for this visit:    Gastroesophageal reflux disease without esophagitis - well controlled   - discontinue Nexium   - start trial of ranitidine 300 milligrams daily at bedtime   - advised to call us if this new medication does not control symptoms    Bipolar 2 disorder, major depressive episode ([facility] ) - well controlled   - continue medications as prescribed   - follow with Psychiatry as scheduled    Benign essential hypertension - well controlled   - continue medications as prescribed   - call with refills needed    Alcohol dependence in remission ([facility] ) - well controlled   - continue therapy and counseling as scheduled    Anxiety disorder - well controlled   - continue medications and counseling    Depressive disorder - well controlled    Tobacco dependence syndrome   - current half pack-a-day smoker   - patient in pre contemplative phase, not ready to quit at this time   - informed patient of the multitude of resources we have it for when he is ready to quit    Other orders  -     amitriptyline (ELAVIL) 25 mg tablet; every 24 hours  -     amLODIPine-benazepril (LOTREL) 10-20 MG per capsule; every 24 hours  -     aspirin (ASPIR-81) 81 mg EC tablet; every 24 hours  -     hydrochlorothiazide (HYDRODIURIL) 25 mg tablet; 25 mg  -     ibuprofen (MOTRIN) 200 mg tablet; Take 400 mg by mouth every 6 (six) hours  -     naproxen (NAPROSYN) 500 mg tablet; Take 500 mg by mouth  -     FLUARIX QUADRIVALENT 0 5 ML GIRISH; inject 0 5 milliliter intramuscularly    Health Maintenance:  Colonoscopy - patient declines at this time  States he will think about it  Reports family history of terminal cancer in both parents, however he he does not know what kind  Will revisit at six-month follow-up  Schedule a follow-up appointment in 6 months       Chief Complaint:   Establish care    History of Present Illness:  61-year-old male with past medical history of hypertension, GERD, polysubstance abuse in remission, nicotine dependence, and bipolar/depression with suicidal ideation presents to establish care with a primary care physician  Patient underwent inpatient psychiatric admission in September for suicidal ideation, depression, and self-mutilation  His medications were adjusted and patient much improved over the course of his stay  He states he feels well, is stable, compliant with medications, and has excellent follow-up with both counselors and a psychiatrist    He also states he has no further issues with joint pain and does not currently need referrals to PT or OT  Symptoms of heartburn which have been chronically intermittent were relieved quickly with start of Nexium 2 days ago  Remaining review of systems are negative and patient denies any other symptoms including fever, chills, CP, SOB, abd pain, N/V/D, dysuria, difficulty urinating, or any other symptoms  His right index finger is missing following a drill press accident at work in approximately the year 2000, with which she has had no further issues  Review of Systems   Constitutional: Negative for activity change, appetite change, chills, diaphoresis and fatigue  HENT: Negative  Respiratory: Negative for cough, chest tightness and shortness of breath  Cardiovascular: Negative for chest pain, palpitations and leg swelling  Gastrointestinal: Negative for abdominal pain, diarrhea, nausea and vomiting  Genitourinary: Negative for dysuria and frequency  Musculoskeletal: Negative for gait problem and joint swelling  Skin: Negative for rash  Neurological: Negative for dizziness, weakness, light-headedness and headaches         Objective:  Vitals:    10/10/18 1316   BP: 122/70   BP Location: Left arm   Patient Position: Sitting   Cuff Size: Adult   Pulse: 80   Temp: 97 9 °F (36 6 °C)   TempSrc: Oral   Weight: 80 8 kg (178 lb 2 1 oz)   Height: 5' 7 75" (1 721 m)     Physical Exam   Constitutional: He is oriented to person, place, and time  He appears well-developed and well-nourished  No distress  HENT:   Head: Normocephalic and atraumatic  Eyes: Pupils are equal, round, and reactive to light  Conjunctivae and EOM are normal  No scleral icterus  Neck: Normal range of motion  Neck supple  No tracheal deviation present  Cardiovascular: Normal rate, regular rhythm and normal heart sounds  No murmur heard  Pulmonary/Chest: Effort normal and breath sounds normal  No respiratory distress  He has no wheezes  He has no rales  Abdominal: Soft  Bowel sounds are normal  He exhibits no distension  There is no tenderness  Musculoskeletal: Normal range of motion  He exhibits no edema, tenderness or deformity  R index finger missing distal to the MCP joint, chronic   Neurological: He is alert and oriented to person, place, and time  No cranial nerve deficit  Coordination normal    Skin: Skin is warm and dry  No rash noted  He is not diaphoretic  No erythema  Psychiatric: He has a normal mood and affect   His behavior is normal  Judgment and thought content normal          Current Outpatient Prescriptions:     amitriptyline (ELAVIL) 25 mg tablet, every 24 hours, Disp: , Rfl:     amLODIPine-benazepril (LOTREL) 10-20 MG per capsule, every 24 hours, Disp: , Rfl:     aspirin (ASPIR-81) 81 mg EC tablet, every 24 hours, Disp: , Rfl:     escitalopram (LEXAPRO) 20 mg tablet, Take 1 tablet (20 mg total) by mouth daily, Disp: 21 tablet, Rfl: 0    esomeprazole (NexIUM) 20 mg capsule, Take 20 mg by mouth daily in the early morning, Disp: , Rfl:     hydrochlorothiazide (HYDRODIURIL) 25 mg tablet, 25 mg, Disp: , Rfl:     hydrOXYzine HCL (ATARAX) 25 mg tablet, Take 25 mg by mouth every 6 (six) hours as needed for itching, Disp: , Rfl:     ibuprofen (MOTRIN) 200 mg tablet, Take 400 mg by mouth every 6 (six) hours, Disp: , Rfl:     lamoTRIgine (LaMICtal) 25 mg tablet, Take 3 tablets (75 mg total) by mouth 2 (two) times a day for 21 days, Disp: 126 tablet, Rfl: 0    Multiple Vitamins-Minerals (ONE-A-DAY 50 PLUS PO), Take by mouth, Disp: , Rfl:     QUEtiapine (SEROquel) 300 mg tablet, Take 1 tablet (300 mg total) by mouth daily at bedtime, Disp: 21 tablet, Rfl: 0    traZODone (DESYREL) 100 mg tablet, Take 1 tablet (100 mg total) by mouth daily at bedtime, Disp: 21 tablet, Rfl: 0    FLUARIX QUADRIVALENT 0 5 ML GIRISH, inject 0 5 milliliter intramuscularly, Disp: , Rfl: 0    naproxen (NAPROSYN) 500 mg tablet, Take 500 mg by mouth, Disp: , Rfl:     pantoprazole (PROTONIX) 20 mg tablet, Take 2 tablets (40 mg total) by mouth daily for 14 days, Disp: 28 tablet, Rfl: 0    sucralfate (CARAFATE) 1 g tablet, Take 1 tablet (1 g total) by mouth 4 (four) times a day for 14 days, Disp: 56 tablet, Rfl: 0    Past Medical History:   Diagnosis Date    Alcohol abuse     Depression     Drug therapy     GERD (gastroesophageal reflux disease)     Hypertension 01/2012    Knee pain, bilateral     Psychiatric disorder     depression, anxiety    Self-injurious behavior     Spinal stenosis of lumbar region     Suicide attempt [facility])      Past Surgical History:   Procedure Laterality Date    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    HERNIA REPAIR  1997     Social History     Social History    Marital status: Single     Spouse name: N/A    Number of children: 0    Years of education: N/A     Occupational History    Unemployed      Recently lost his job in August at [address] History Main Topics    Smoking status: Current Every Day Smoker     Packs/day: 0 50     Types: Cigarettes, Cigars    Smokeless tobacco: Never Used      Comment: Years    Alcohol use No      Comment: Stopped drinking for several months    Drug use: No      Comment: Last used marijuana 2 months ago      Sexual activity: No     Other Topics Concern    Not on file     Social History Narrative    He was told to leave the [facility] when he tested positive for BEGUM [facility]  Now states that he is unemployed and homeless  He has 3 sisters , 2 of which live in [place] and the other in [place]  Family History   Problem Relation Age of Onset    Cancer Family     Mental illness Family        ==  [place] Curling, [place] [address] [facility] Internal Medicine PGY-1    [facility]  [address]   [facility] [place] , [address] [address] [facility] [facility]  Office: [phone]  Fax: [phone]

## 2018-10-22 ENCOUNTER — OFFICE VISIT (OUTPATIENT)
Dept: INTERNAL MEDICINE CLINIC | Facility: CLINIC | Age: 58
End: 2018-10-22
Payer: COMMERCIAL

## 2018-10-22 VITALS
DIASTOLIC BLOOD PRESSURE: 88 MMHG | HEIGHT: 68 IN | BODY MASS INDEX: 28 KG/M2 | TEMPERATURE: 98.3 F | SYSTOLIC BLOOD PRESSURE: 142 MMHG | HEART RATE: 100 BPM | WEIGHT: 184.75 LBS

## 2018-10-22 DIAGNOSIS — M54.50 ACUTE LEFT-SIDED LOW BACK PAIN WITHOUT SCIATICA: Primary | ICD-10-CM

## 2018-10-22 DIAGNOSIS — F17.200 CURRENT EVERY DAY SMOKER: ICD-10-CM

## 2018-10-22 PROCEDURE — 99213 OFFICE O/P EST LOW 20 MIN: CPT | Performed by: INTERNAL MEDICINE

## 2018-10-22 RX ORDER — IBUPROFEN 400 MG/1
400 TABLET ORAL EVERY 6 HOURS PRN
Qty: 56 TABLET | Refills: 0 | Status: SHIPPED | OUTPATIENT
Start: 2018-10-22 | End: 2019-03-20 | Stop reason: SDDI

## 2018-10-22 RX ORDER — METHOCARBAMOL 500 MG/1
500 TABLET, FILM COATED ORAL 4 TIMES DAILY PRN
Qty: 56 TABLET | Refills: 0 | Status: SHIPPED | OUTPATIENT
Start: 2018-10-22 | End: 2019-03-20 | Stop reason: SDDI

## 2018-10-22 NOTE — PROGRESS NOTES
Assessment/Plan:      Diagnoses and all orders for this visit:    Acute left-sided low back pain without sciatica  -     methocarbamol (ROBAXIN) 500 mg tablet; Take 1 tablet (500 mg total) by mouth 4 (four) times a day as needed for muscle spasms  -     ibuprofen (MOTRIN) 400 mg tablet; Take 1 tablet (400 mg total) by mouth every 6 (six) hours as needed for mild pain    Current every day smoker        Patient told to return to clinic if pain does not improve in the next week, or if it worsens, or if he develops fevers or systemic symptoms  Subjective:     Patient ID: Dilan Peres is a 63 y o  male  Case of a 56-year-old male with past medical history significant for hypertension, bipolar type 2, current every day smoker, alcohol dependence in remission, hepatitis-C antibody positive, lumbar spinal stenosis, old left basal ganglial lacunar infarct and chronic low back pain  Patient comes to the clinic this afternoon for same-day appointment for increasing left lower back pain times 2-3 days  Patient states the pain is worse while walking up hills and does not get worse while walking down pills  Patient states that he does not have pain at rest and has moderate pain with walking across a flat surface  Patient denies any fevers, recent use of IV drugs, tuberculosis, sexual activity  Patient refers a history of spinal stenosis that was diagnosed a few years ago at which time he states he had an MRI and a CT scan  He originally was treated with physical therapy, but states that his pain increased with physical therapy in was told to stop going  Patient states that he normally takes ibuprofen for his chronic low back pain which did help before but has not tried taking ibuprofen over the past 2-3 days  Of note, patient was recently seen in the emergency department for GERD on 09/09/2018 at which time a UDS was positive for opiates and THC  Patient currently lives at the [facility]          Review of Systems   Constitutional: Negative for appetite change, chills, diaphoresis, fatigue, fever and unexpected weight change  HENT: Negative for sore throat  Eyes: Negative for visual disturbance  Respiratory: Negative for cough, chest tightness, shortness of breath and wheezing  Cardiovascular: Negative for chest pain, palpitations and leg swelling  Gastrointestinal: Negative for abdominal distention, abdominal pain, blood in stool, constipation, diarrhea, nausea and vomiting  Genitourinary: Negative for difficulty urinating, flank pain and urgency  Musculoskeletal: Positive for back pain  Negative for myalgias  Skin: Negative for pallor and rash  Neurological: Negative for dizziness, weakness, light-headedness and headaches  Objective:     Physical Exam   Constitutional: He is oriented to person, place, and time  He appears well-developed and well-nourished  BMI 28, smells of smoke   HENT:   Head: Normocephalic and atraumatic  Mouth/Throat: No oropharyngeal exudate  Eyes: Conjunctivae are normal  No scleral icterus  Neck: Normal range of motion  Cardiovascular: Normal rate and regular rhythm  Exam reveals no gallop and no friction rub  No murmur heard  Pulmonary/Chest: Effort normal and breath sounds normal  No respiratory distress  He has no wheezes  Musculoskeletal: Normal range of motion  He exhibits no edema or deformity  Missing right 2nd digit beyond MCP and PIP joints, no point tenderness along spine, no paraspinal tenderness to palpation, positive pain with extension of spine, increased pain with straight leg raise of left side beyond 30°   Neurological: He is alert and oriented to person, place, and time  Nursing note and vitals reviewed

## 2018-11-20 ENCOUNTER — OFFICE VISIT (OUTPATIENT)
Dept: INTERNAL MEDICINE CLINIC | Facility: CLINIC | Age: 58
End: 2018-11-20
Payer: COMMERCIAL

## 2018-11-20 VITALS
HEART RATE: 88 BPM | TEMPERATURE: 97.7 F | SYSTOLIC BLOOD PRESSURE: 100 MMHG | HEIGHT: 68 IN | BODY MASS INDEX: 28.53 KG/M2 | WEIGHT: 188.27 LBS | DIASTOLIC BLOOD PRESSURE: 72 MMHG

## 2018-11-20 DIAGNOSIS — K21.9 GASTROESOPHAGEAL REFLUX DISEASE WITHOUT ESOPHAGITIS: Primary | ICD-10-CM

## 2018-11-20 PROCEDURE — 99213 OFFICE O/P EST LOW 20 MIN: CPT | Performed by: INTERNAL MEDICINE

## 2018-11-20 PROCEDURE — 3008F BODY MASS INDEX DOCD: CPT | Performed by: INTERNAL MEDICINE

## 2018-11-20 RX ORDER — ESOMEPRAZOLE MAGNESIUM 40 MG/1
40 CAPSULE, DELAYED RELEASE ORAL DAILY
Qty: 90 CAPSULE | Refills: 1 | Status: SHIPPED | OUTPATIENT
Start: 2018-11-20 | End: 2018-12-02 | Stop reason: ALTCHOICE

## 2018-11-20 RX ORDER — PANTOPRAZOLE SODIUM 40 MG/1
TABLET, DELAYED RELEASE ORAL
COMMUNITY
Start: 2018-05-01 | End: 2018-11-20

## 2018-11-20 NOTE — PATIENT INSTRUCTIONS
Gastroesophageal Reflux Disease   AMBULATORY CARE:   Gastroesophageal reflux  reflux occurs when acid and food in the stomach back up into the esophagus  Gastroesophageal reflux disease (GERD) is reflux that occurs more than twice a week for a few weeks  It usually causes heartburn and other symptoms  GERD can cause other health problems over time if it is not treated  Common symptoms include:  Heartburn is the most common symptom of GERD  You may feel burning pain in your chest or below the breast bone  This usually occurs after meals and spreads to your neck, jaw, or shoulder  The pain gets better when you change positions  You may also have any of the following:  · Bitter or acid taste in your mouth    · Dry cough    · Trouble swallowing or pain with swallowing    · Hoarseness or sore throat    · Frequent burping or hiccups    · Feeling of fullness soon after you start eating  Seek care immediately if:  · You feel full and cannot burp or vomit  · You have severe chest pain and sudden trouble breathing  · Your bowel movements are black, bloody, or tarry-looking  · Your vomit looks like coffee grounds or has blood in it  Contact your healthcare provider if:   · You vomit large amounts, or you vomit often  · You have trouble breathing after you vomit  · You have trouble swallowing, or pain with swallowing  · You are losing weight without trying  · Your symptoms get worse or do not improve with treatment  · You have questions or concerns about your condition or care  Treatment for GERD:  Your healthcare provider may prescribe medicine to decrease stomach acid  He may also prescribe medicine that help your esophagus and stomach move food and liquid to your intestines  Surgery may be done if other treatments do not work  You may need surgery to wrap the upper part of the stomach around the esophageal sphincter  This will strengthen the sphincter and prevent reflux     Manage GERD: · Do not have foods or drinks that may increase heartburn  These include chocolate, peppermint, fried or fatty foods, drinks that contain caffeine, or carbonated drinks (soda)  Other foods include spicy foods, onions, tomatoes, and tomato-based foods  Do not have foods or drinks that can irritate your esophagus, such as citrus fruits, juices, and alcohol  · Do not eat large meals  When you eat a lot of food at one time, your stomach needs more acid to digest it  Eat 6 small meals each day instead of 3 large ones, and eat slowly  Do not eat meals 2 to 3 hours before bedtime  · Elevate the head of your bed  Place 6-inch blocks under the head of your bed frame  You may also use more than one pillow under your head and shoulders while you sleep  · Maintain a healthy weight  If you are overweight, weight loss may help relieve symptoms of GERD  · Do not smoke  Smoking weakens the lower esophageal sphincter and increases the risk of GERD  Ask your healthcare provider for information if you currently smoke and need help to quit  E-cigarettes or smokeless tobacco still contain nicotine  Talk to your healthcare provider before you use these products  · Do not wear clothing that is tight around your waist   Tight clothing can put pressure on your stomach and cause or worsen GERD symptoms  Follow up with your healthcare provider as directed:  Write down your questions so you remember to ask them during your visits  © 2017 [address] [employer] Information is for End User's use only and may not be sold, redistributed or otherwise used for commercial purposes  All illustrations and images included in CareNotes® are the copyrighted property of [employer]  or [employer]  The above information is an  only  It is not intended as medical advice for individual conditions or treatments   Talk to your doctor, nurse or pharmacist before following any medical regimen to see if it is safe and effective for you

## 2018-11-20 NOTE — PROGRESS NOTES
INTERNAL MEDICINE FOLLOW-UP OFFICE VISIT  [facility]  [address] [place] [address]    NAME: Anuel Dinh  AGE: 63 y o  SEX: male    DATE OF ENCOUNTER: 11/20/2018    Assessment and Plan     Diagnoses and all orders for this visit:    Gastroesophageal reflux disease without esophagitis  -     esomeprazole (NexIUM) 40 MG capsule; Take 1 capsule (40 mg total) by mouth daily    Other orders  -     Discontinue: pantoprazole (PROTONIX) 40 mg tablet; take 1 tablet by oral route  twice a day        No orders of the defined types were placed in this encounter  GERD - Improved with PPI 1 month ago, was subsequently titrated down to H2 blocker but now symptoms have recurred  Will restart PPI  Stressed the importance of avoiding tobacco , alcohol , and foods that trigger his GERD like spicy foods        - Counseling Documentation: patient was counseled regarding: diagnostic results, instructions for management, risk factor reductions, prognosis, patient and family education, impressions, risks and benefits of treatment options and importance of compliance with treatment  - Counseling Time: counseling time more than 50% of visit: 20 minutes  - Barriers to treatment: no  - Medication Side Effects: Adverse side effects of medications were reviewed with the patient/guardian today  Chief Complaint     Chief Complaint   Patient presents with    Heartburn     PT "FLARE UP;"       History of Present Illness     Heartburn   He reports no abdominal pain, no belching, no chest pain, no choking, no coughing, no dysphagia, no early satiety, no heartburn, no hoarse voice, no nausea, no sore throat or no wheezing  This is a chronic problem  The current episode started more than 1 year ago  The problem has been waxing and waning  The symptoms are aggravated by certain foods, ETOH, caffeine and smoking   Pertinent negatives include no anemia, fatigue, melena, muscle weakness, orthopnea or weight loss  Risk factors include caffeine use, ETOH use and smoking/tobacco exposure  He has tried a histamine-2 antagonist for the symptoms  The treatment provided no relief  Past procedures do not include an EGD or a UGI  The following portions of the patient's history were reviewed and updated as appropriate: allergies, current medications, past family history, past medical history, past social history, past surgical history and problem list     Review of Systems     Review of Systems   Constitutional: Negative for appetite change, chills, diaphoresis, fatigue, fever, unexpected weight change and weight loss  HENT: Negative for congestion, hoarse voice, rhinorrhea and sore throat  Eyes: Negative for photophobia and visual disturbance  Respiratory: Negative for cough, choking, shortness of breath and wheezing  Cardiovascular: Negative for chest pain, palpitations and leg swelling  Gastrointestinal: Negative for abdominal pain, anal bleeding, blood in stool, constipation, diarrhea, dysphagia, heartburn, melena, nausea and vomiting  Genitourinary: Negative for decreased urine volume, difficulty urinating, dysuria, flank pain, frequency, hematuria and urgency  Musculoskeletal: Negative for arthralgias, back pain, joint swelling, myalgias and muscle weakness  Skin: Negative for color change and rash  Neurological: Negative for dizziness, seizures, facial asymmetry, speech difficulty, numbness and headaches  Psychiatric/Behavioral: Negative for agitation, confusion and decreased concentration  The patient is not nervous/anxious          Active Problem List     Patient Active Problem List   Diagnosis    Bipolar 2 disorder, major depressive episode ([facility] )    Attention deficit hyperactivity disorder (ADHD), combined type    Alcohol dependence in remission ([facility] )    Anxiety disorder    Depressive disorder    Benign essential hypertension    Gastroesophageal reflux disease    Hyperlipidemia  Knee pain    Low back pain    Spinal stenosis of lumbar region    Tobacco dependence syndrome    Vitamin D deficiency    Encounter for medical examination to establish care       Objective     /72 (BP Location: Left arm, Patient Position: Sitting, Cuff Size: Adult)   Pulse 88   Temp 97 7 °F (36 5 °C) (Oral)   Ht 5' 7 5" (1 715 m)   Wt 85 4 kg (188 lb 4 4 oz)   BMI 29 05 kg/m²     Physical Exam   Constitutional: He is oriented to person, place, and time  He appears well-developed and well-nourished  No distress  HENT:   Head: Normocephalic and atraumatic  Nose: Nose normal    Mouth/Throat: Oropharynx is clear and moist  No oropharyngeal exudate  Eyes: Pupils are equal, round, and reactive to light  EOM are normal  Right eye exhibits no discharge  Left eye exhibits no discharge  No scleral icterus  Neck: Normal range of motion  Neck supple  No JVD present  Cardiovascular: Normal rate, regular rhythm, normal heart sounds and intact distal pulses  Exam reveals no gallop and no friction rub  No murmur heard  Pulmonary/Chest: Effort normal and breath sounds normal  No respiratory distress  He has no wheezes  He has no rales  He exhibits no tenderness  Abdominal: Soft  Bowel sounds are normal  He exhibits no distension and no mass  There is no tenderness  There is no rebound and no guarding  Musculoskeletal: Normal range of motion  He exhibits no edema, tenderness or deformity  Lymphadenopathy:     He has no cervical adenopathy  Neurological: He is alert and oriented to person, place, and time  He has normal reflexes  Skin: Skin is warm and dry  No rash noted  He is not diaphoretic  No erythema  No pallor  Psychiatric: He has a normal mood and affect   His behavior is normal        Pertinent Laboratory/Diagnostic Studies:  CBC:   Lab Results   Component Value Date/Time    WBC 6 52 09/20/2018 06:39 AM    RBC 4 43 09/20/2018 06:39 AM    HGB 13 9 09/20/2018 06:39 AM    HCT 41 1 09/20/2018 06:39 AM    MCV 93 09/20/2018 06:39 AM    MCH 31 4 09/20/2018 06:39 AM    MCHC 33 8 09/20/2018 06:39 AM    RDW 14 5 09/20/2018 06:39 AM    MPV 11 2 09/20/2018 06:39 AM     (L) 09/20/2018 06:39 AM    NRBC 0 09/20/2018 06:39 AM    NEUTOPHILPCT 62 09/20/2018 06:39 AM    LYMPHOPCT 23 09/20/2018 06:39 AM    MONOPCT 11 09/20/2018 06:39 AM    EOSPCT 3 09/20/2018 06:39 AM    BASOPCT 1 09/20/2018 06:39 AM    NEUTROABS 4 08 09/20/2018 06:39 AM    LYMPHSABS 1 48 09/20/2018 06:39 AM    MONOSABS 0 73 09/20/2018 06:39 AM    EOSABS 0 18 09/20/2018 06:39 AM     Chemistry Profile:   Lab Results   Component Value Date/Time    K 4 3 09/20/2018 06:39 AM     (H) 09/20/2018 06:39 AM    CO2 25 09/20/2018 06:39 AM    BUN 18 09/20/2018 06:39 AM    CREATININE 1 14 09/20/2018 06:39 AM    GLUC 87 09/20/2018 06:39 AM    CALCIUM 8 5 09/20/2018 06:39 AM    AST 44 09/20/2018 06:39 AM    ALT 72 09/20/2018 06:39 AM    ALKPHOS 82 09/20/2018 06:39 AM    EGFR 71 09/20/2018 06:39 AM     CBC:     Results from last 6 Months  Lab Units 09/20/18  0639   WBC Thousand/uL 6 52   RBC Million/uL 4 43   HEMOGLOBIN g/dL 13 9   HEMATOCRIT % 41 1   MCV fL 93   MCH pg 31 4   MCHC g/dL 33 8   RDW % 14 5   MPV fL 11 2   PLATELETS Thousands/uL 145*   NRBC AUTO /100 WBCs 0   NEUTROS PCT % 62   LYMPHS PCT % 23   MONOS PCT % 11   EOS PCT % 3   BASOS PCT % 1   NEUTROS ABS Thousands/µL 4 08   LYMPHS ABS Thousands/µL 1 48   MONOS ABS Thousand/µL 0 73   EOS ABS Thousand/µL 0 18       Current Medications     Current Outpatient Prescriptions:     amitriptyline (ELAVIL) 25 mg tablet, every 24 hours, Disp: , Rfl:     amLODIPine-benazepril (LOTREL) 10-20 MG per capsule, every 24 hours, Disp: , Rfl:     aspirin (ASPIR-81) 81 mg EC tablet, every 24 hours, Disp: , Rfl:     escitalopram (LEXAPRO) 20 mg tablet, Take 1 tablet (20 mg total) by mouth daily, Disp: 21 tablet, Rfl: 0    esomeprazole (NexIUM) 40 MG capsule, Take 1 capsule (40 mg total) by mouth daily, Disp: 90 capsule, Rfl: 1    FLUARIX QUADRIVALENT 0 5 ML GIRISH, inject 0 5 milliliter intramuscularly, Disp: , Rfl: 0    hydrochlorothiazide (HYDRODIURIL) 25 mg tablet, 25 mg, Disp: , Rfl:     hydrOXYzine HCL (ATARAX) 25 mg tablet, Take 25 mg by mouth every 6 (six) hours as needed for itching, Disp: , Rfl:     ibuprofen (MOTRIN) 400 mg tablet, Take 1 tablet (400 mg total) by mouth every 6 (six) hours as needed for mild pain, Disp: 56 tablet, Rfl: 0    lamoTRIgine (LaMICtal) 25 mg tablet, Take 3 tablets (75 mg total) by mouth 2 (two) times a day for 21 days, Disp: 126 tablet, Rfl: 0    methocarbamol (ROBAXIN) 500 mg tablet, Take 1 tablet (500 mg total) by mouth 4 (four) times a day as needed for muscle spasms, Disp: 56 tablet, Rfl: 0    Multiple Vitamins-Minerals (ONE-A-DAY 50 PLUS PO), Take by mouth, Disp: , Rfl:     QUEtiapine (SEROquel) 300 mg tablet, Take 1 tablet (300 mg total) by mouth daily at bedtime, Disp: 21 tablet, Rfl: 0    sucralfate (CARAFATE) 1 g tablet, Take 1 tablet (1 g total) by mouth 4 (four) times a day for 14 days, Disp: 56 tablet, Rfl: 0    traZODone (DESYREL) 100 mg tablet, Take 1 tablet (100 mg total) by mouth daily at bedtime, Disp: 21 tablet, Rfl: 0    Health Maintenance     Health Maintenance   Topic Date Due    Hepatitis C Screening  1960    CRC Screening: Colonoscopy  1960    Pneumococcal PPSV23 Medium Risk Adult (1 of 1 - PPSV23) 03/26/1980    DTaP,Tdap,and Td Vaccines (2 - Td) 09/19/2028    INFLUENZA VACCINE  Completed     Immunization History   Administered Date(s) Administered    Influenza 11/02/2016, 10/08/2018    Tdap 09/19/2018       Apoorva RUIZ  Internal Medicine PGY-2  11/20/2018 4:06 PM

## 2018-11-28 ENCOUNTER — TELEPHONE (OUTPATIENT)
Dept: INTERNAL MEDICINE CLINIC | Facility: CLINIC | Age: 58
End: 2018-11-28

## 2018-11-28 NOTE — TELEPHONE ENCOUNTER
nexium is not covered by pt's insurance  They will only cover omeprazole  Please send new script   If this is not an option a prior auth will be needed for the nexium

## 2018-12-02 DIAGNOSIS — K21.9 GERD (GASTROESOPHAGEAL REFLUX DISEASE): Primary | ICD-10-CM

## 2018-12-02 RX ORDER — RANITIDINE 300 MG/1
300 CAPSULE ORAL EVERY EVENING
Qty: 90 CAPSULE | Refills: 1 | Status: SHIPPED | OUTPATIENT
Start: 2018-12-02 | End: 2019-06-25 | Stop reason: ALTCHOICE

## 2018-12-02 NOTE — TELEPHONE ENCOUNTER
His prescription should have been changed as his GERD is well controlled, I sent Zantac to his pharmacy to take once daily at bedtime  Thank you

## 2019-01-29 ENCOUNTER — OFFICE VISIT (OUTPATIENT)
Dept: INTERNAL MEDICINE CLINIC | Facility: CLINIC | Age: 59
End: 2019-01-29

## 2019-01-29 VITALS
SYSTOLIC BLOOD PRESSURE: 140 MMHG | BODY MASS INDEX: 30.2 KG/M2 | HEIGHT: 68 IN | TEMPERATURE: 97.5 F | WEIGHT: 199.3 LBS | HEART RATE: 96 BPM | DIASTOLIC BLOOD PRESSURE: 90 MMHG

## 2019-01-29 DIAGNOSIS — M25.552 LEFT HIP PAIN: Primary | ICD-10-CM

## 2019-01-29 PROCEDURE — 99213 OFFICE O/P EST LOW 20 MIN: CPT | Performed by: INTERNAL MEDICINE

## 2019-01-29 RX ORDER — GABAPENTIN 100 MG/1
100 CAPSULE ORAL 3 TIMES DAILY
Qty: 90 CAPSULE | Refills: 0 | Status: SHIPPED | OUTPATIENT
Start: 2019-01-29 | End: 2019-03-20 | Stop reason: SDDI

## 2019-01-29 NOTE — PROGRESS NOTES
Assessment/Plan:    No problem-specific Assessment & Plan notes found for this encounter  Diagnoses and all orders for this visit:    Left hip pain  -     gabapentin (NEURONTIN) 100 mg capsule; Take 1 capsule (100 mg total) by mouth 3 (three) times a day  -     VAS PRISCILA & waveform analysis, multiple levels; Future    Patient's description of pain in the buttock area that occurs with walking a specific distance that resolves quickly with rest is suspicious for claudication  He reports it is only occurring on the left and only in the buttock/hip region  There is no tenderness on exam and range of motion is normal  He has a 40 pack year smoking history and has a history of hyperlipidemia, so is certainly at risk for vascular disease, though does not have a formal diagnosis of it currently  Posterior tibial pulses 1+ bilaterally  Will check PRISCILA and refer to vascular surgery if there is evidence of vascular disease  Pt's description of pain that shoots down his leg and history of spinal stenosis are also concerning for nerve pain such as radiculopathy or (more likely) sciatica  Unable to fully perform straight leg raise as table does not fully recline, though it was negative on my attempt  Will trial gabapentin 100 TID  Subjective:      Patient ID: Gucci Landry is a 63 y o  male  Here for hip pain  Started 6 months ago, gradually worsened to the point that it is hard to walk now  Has to stop and rest  Notices numbness after walking for a little while  Taking ibuprofen and robaxin which both do not help  Does have a history of spinal stenosis  No pain while sitting  Walked 2 blocks here today and was very painful  Seems to always happen after a block or two  Pain feels like something rubbing against something  Numbness and pain are both in the left buttock area  No other pain in legs or elsewhere though sometimes the pain does radiate down his leg when it gets severe      Current smoker since age 13, pack a day, lately half a pack  The following portions of the patient's history were reviewed and updated as appropriate: past medical history and past social history  Review of Systems   Constitutional: Negative for chills, fever and unexpected weight change  Respiratory: Negative for shortness of breath  Cardiovascular: Negative for chest pain and leg swelling  Gastrointestinal: Negative for abdominal pain  Genitourinary: Negative for difficulty urinating  Denies incontinence   Neurological: Negative for weakness and numbness  Hematological: Negative  Psychiatric/Behavioral: Negative  Objective:      /90   Pulse 96   Temp 97 5 °F (36 4 °C)   Ht 5' 7 5" (1 715 m)   Wt 90 4 kg (199 lb 4 7 oz)   BMI 30 75 kg/m²          Physical Exam   Constitutional: He is oriented to person, place, and time  He appears well-developed and well-nourished  No distress  HENT:   Head: Normocephalic and atraumatic  Cardiovascular: Normal rate, regular rhythm and intact distal pulses  No murmur heard  Pulmonary/Chest: Effort normal and breath sounds normal  He has no wheezes  He has no rales  Musculoskeletal: Normal range of motion  He exhibits no tenderness  Straight leg raise negative bilaterally   Neurological: He is alert and oriented to person, place, and time  He exhibits normal muscle tone  Sensation intact to light touch over bilateral lower extremities   Skin: He is not diaphoretic  Psychiatric: He has a normal mood and affect   His behavior is normal

## 2019-03-20 ENCOUNTER — LAB (OUTPATIENT)
Dept: LAB | Facility: HOSPITAL | Age: 59
End: 2019-03-20
Payer: COMMERCIAL

## 2019-03-20 ENCOUNTER — HOSPITAL ENCOUNTER (EMERGENCY)
Facility: HOSPITAL | Age: 59
Discharge: HOME | End: 2019-03-20
Attending: EMERGENCY MEDICINE | Admitting: EMERGENCY MEDICINE
Payer: COMMERCIAL

## 2019-03-20 ENCOUNTER — OFFICE VISIT (OUTPATIENT)
Dept: INTERNAL MEDICINE CLINIC | Facility: CLINIC | Age: 59
End: 2019-03-20

## 2019-03-20 ENCOUNTER — TRANSCRIBE ORDERS (OUTPATIENT)
Dept: RADIOLOGY | Facility: HOSPITAL | Age: 59
End: 2019-03-20

## 2019-03-20 ENCOUNTER — APPOINTMENT (EMERGENCY)
Dept: RADIOLOGY | Facility: HOSPITAL | Age: 59
End: 2019-03-20
Payer: COMMERCIAL

## 2019-03-20 ENCOUNTER — HOSPITAL ENCOUNTER (OUTPATIENT)
Dept: RADIOLOGY | Facility: HOSPITAL | Age: 59
Discharge: HOME | End: 2019-03-20
Payer: COMMERCIAL

## 2019-03-20 VITALS
TEMPERATURE: 97.4 F | HEART RATE: 96 BPM | WEIGHT: 194 LBS | DIASTOLIC BLOOD PRESSURE: 90 MMHG | BODY MASS INDEX: 29.4 KG/M2 | HEIGHT: 68 IN | SYSTOLIC BLOOD PRESSURE: 134 MMHG

## 2019-03-20 VITALS
WEIGHT: 180 LBS | RESPIRATION RATE: 20 BRPM | HEART RATE: 92 BPM | SYSTOLIC BLOOD PRESSURE: 189 MMHG | DIASTOLIC BLOOD PRESSURE: 107 MMHG | OXYGEN SATURATION: 94 % | TEMPERATURE: 97.5 F | BODY MASS INDEX: 27.28 KG/M2 | HEIGHT: 68 IN

## 2019-03-20 DIAGNOSIS — R07.81 PLEURITIC CHEST PAIN: ICD-10-CM

## 2019-03-20 DIAGNOSIS — R07.81 PLEURITIC CHEST PAIN: Primary | ICD-10-CM

## 2019-03-20 DIAGNOSIS — I26.99 OTHER ACUTE PULMONARY EMBOLISM WITHOUT ACUTE COR PULMONALE (HCC): Primary | ICD-10-CM

## 2019-03-20 DIAGNOSIS — R07.9 CHEST PAIN: ICD-10-CM

## 2019-03-20 DIAGNOSIS — R79.89 ELEVATED D-DIMER: ICD-10-CM

## 2019-03-20 LAB
ANION GAP SERPL CALCULATED.3IONS-SCNC: 8 MMOL/L (ref 4–13)
BASOPHILS # BLD AUTO: 0.03 THOUSANDS/ΜL (ref 0–0.1)
BASOPHILS NFR BLD AUTO: 0 % (ref 0–1)
BUN SERPL-MCNC: 16 MG/DL (ref 5–25)
CALCIUM SERPL-MCNC: 9.1 MG/DL (ref 8.3–10.1)
CHLORIDE SERPL-SCNC: 105 MMOL/L (ref 100–108)
CO2 SERPL-SCNC: 24 MMOL/L (ref 21–32)
CREAT SERPL-MCNC: 1.02 MG/DL (ref 0.6–1.3)
DEPRECATED D DIMER PPP: 2867 NG/ML (FEU)
EOSINOPHIL # BLD AUTO: 0.12 THOUSAND/ΜL (ref 0–0.61)
EOSINOPHIL NFR BLD AUTO: 2 % (ref 0–6)
ERYTHROCYTE [DISTWIDTH] IN BLOOD BY AUTOMATED COUNT: 14.6 % (ref 11.6–15.1)
GFR SERPL CREATININE-BSD FRML MDRD: 81 ML/MIN/1.73SQ M
GLUCOSE SERPL-MCNC: 95 MG/DL (ref 65–140)
HCT VFR BLD AUTO: 44.9 % (ref 36.5–49.3)
HGB BLD-MCNC: 15.3 G/DL (ref 12–17)
IMM GRANULOCYTES # BLD AUTO: 0.02 THOUSAND/UL (ref 0–0.2)
IMM GRANULOCYTES NFR BLD AUTO: 0 % (ref 0–2)
LYMPHOCYTES # BLD AUTO: 1.95 THOUSANDS/ΜL (ref 0.6–4.47)
LYMPHOCYTES NFR BLD AUTO: 24 % (ref 14–44)
MCH RBC QN AUTO: 30.7 PG (ref 26.8–34.3)
MCHC RBC AUTO-ENTMCNC: 34.1 G/DL (ref 31.4–37.4)
MCV RBC AUTO: 90 FL (ref 82–98)
MONOCYTES # BLD AUTO: 0.84 THOUSAND/ΜL (ref 0.17–1.22)
MONOCYTES NFR BLD AUTO: 10 % (ref 4–12)
NEUTROPHILS # BLD AUTO: 5.27 THOUSANDS/ΜL (ref 1.85–7.62)
NEUTS SEG NFR BLD AUTO: 64 % (ref 43–75)
NRBC BLD AUTO-RTO: 0 /100 WBCS
PLATELET # BLD AUTO: 125 THOUSANDS/UL (ref 149–390)
PMV BLD AUTO: 11.7 FL (ref 8.9–12.7)
POTASSIUM SERPL-SCNC: 3.9 MMOL/L (ref 3.5–5.3)
RBC # BLD AUTO: 4.98 MILLION/UL (ref 3.88–5.62)
SODIUM SERPL-SCNC: 137 MMOL/L (ref 136–145)
TROPONIN I SERPL-MCNC: <0.02 NG/ML
WBC # BLD AUTO: 8.23 THOUSAND/UL (ref 4.31–10.16)

## 2019-03-20 PROCEDURE — 85025 COMPLETE CBC W/AUTO DIFF WBC: CPT | Performed by: EMERGENCY MEDICINE

## 2019-03-20 PROCEDURE — 99213 OFFICE O/P EST LOW 20 MIN: CPT | Performed by: INTERNAL MEDICINE

## 2019-03-20 PROCEDURE — 93005 ELECTROCARDIOGRAM TRACING: CPT

## 2019-03-20 PROCEDURE — 3008F BODY MASS INDEX DOCD: CPT | Performed by: INTERNAL MEDICINE

## 2019-03-20 PROCEDURE — 99285 EMERGENCY DEPT VISIT HI MDM: CPT

## 2019-03-20 PROCEDURE — 85379 FIBRIN DEGRADATION QUANT: CPT

## 2019-03-20 PROCEDURE — 84484 ASSAY OF TROPONIN QUANT: CPT | Performed by: EMERGENCY MEDICINE

## 2019-03-20 PROCEDURE — 71046 X-RAY EXAM CHEST 2 VIEWS: CPT

## 2019-03-20 PROCEDURE — 71275 CT ANGIOGRAPHY CHEST: CPT

## 2019-03-20 PROCEDURE — 36415 COLL VENOUS BLD VENIPUNCTURE: CPT

## 2019-03-20 PROCEDURE — 80048 BASIC METABOLIC PNL TOTAL CA: CPT | Performed by: EMERGENCY MEDICINE

## 2019-03-20 RX ADMIN — RIVAROXABAN 15 MG: 15 TABLET, FILM COATED ORAL at 19:48

## 2019-03-20 RX ADMIN — IOHEXOL 85 ML: 350 INJECTION, SOLUTION INTRAVENOUS at 18:57

## 2019-03-20 NOTE — ED PROVIDER NOTES
History  Chief Complaint   Patient presents with    Shortness of Breath     Pt reports having pain on R side and being SOB for 3 days  Pt reports being sent here by PCP     This is a 25-year-old male with a history of hypertension hyperlipidemia who presents with an abnormal blood test   Since Monday, the patient woke up experiencing right-sided chest pain  He describes the pain as sharp, right lower ribcage, nonradiating, constant, without any associated symptoms  Pain is exacerbated by deep inspiration and coughing  He has not tried taking anything for the pain  Denies any inciting event such as trauma  Denies nausea/vomiting, diaphoresis, back/arm/neck pain, lightheadedness/dizziness, change in vision, shortness of breath, palpitations  Because the pain was getting worse, he saw his primary care doctor today  A D-dimer was ordered which resulted as elevated  He was sent to the emergency department for evaluation  Denies history of DVT/PE (also, no objective results indicating DVT/PE), unilateral calf pain/swelling, hemoptysis, recent trauma/surgery (</= 4 weeks ago requiring general anesthesia), recent travel, cancer/cancer treatment (in last 6 months), exogenous estrogen use  Denies history of diabetes, obesity, family history of CAD (before age 72), personal history of MI, PAD, CVA  Denies fever/chills, nausea/vomiting, lightheadedness/dizziness, numbness/weakness, headache, change in vision, URI symptoms, neck pain, palpitations, shortness of breath, cough, back pain, flank pain, abdominal pain, diarrhea, hematochezia, melena, dysuria, hematuria  7:25 PM  multiple right-sided segmental pulmonary emboli without evidence of cardiac strain  The patient meets criteria for outpatient  treatment for pulmonary embolism by Hestia Criteria      The patient meets none of the following criteria:  -  Hemodynamic instability (sBP < 100, HR > 100, or clinical judgement )  -  Need for thrombolysis or embelectomy  -  Active bleeding or high risk for bleeding (GI bleed or surgery <2 weeks ago, stroke <1month ago, bleeding disorder or platelet count < 75 X 10^9/L, uncontrolled HTN SBP>180 or DBP >110, clinical judgement)  -  >24 hrs on supplemental oxygen required to maintain SaO2 > 90%  -  PE diagnosed on anticoagulation  -  Severe pain needed IV pain meds  -  Medical or social reason for admission  -  Creatinine clearance <30 ml/min  -  Severe liver impairment  -  Pregnant  -  Documented history of HIT    Patient is low risk by Hestia criteria (0% mortality and 2% VTE recurrence)    Will do an anti-coagulant, either  - Eliquis (10mg PO BID x7days then 5mg PO BID x3 months)  or   - Xarelto (15 mg twice daily with food for 21 days followed by 20 mg once daily with food x3 months)  or  - Pradaxa (150 mg twice daily)            Prior to Admission Medications   Prescriptions Last Dose Informant Patient Reported? Taking?    FLUARIX QUADRIVALENT 0 5 ML GIRISH Unknown at Unknown time Self Yes No   Sig: inject 0 5 milliliter intramuscularly   Multiple Vitamins-Minerals (ONE-A-DAY 50 PLUS PO) More than a month at Unknown time Self Yes No   Sig: Take by mouth   QUEtiapine (SEROquel) 300 mg tablet 3/19/2019 at Unknown time Self No Yes   Sig: Take 1 tablet (300 mg total) by mouth daily at bedtime   amLODIPine-benazepril (LOTREL) 10-20 MG per capsule 3/20/2019 at Unknown time Self Yes Yes   Sig: every 24 hours   amitriptyline (ELAVIL) 25 mg tablet 3/20/2019 at Unknown time Self Yes Yes   Sig: every 24 hours   aspirin (ASPIR-81) 81 mg EC tablet Not Taking at Unknown time Self Yes No   Sig: every 24 hours   escitalopram (LEXAPRO) 20 mg tablet 3/20/2019 at Unknown time Self No Yes   Sig: Take 1 tablet (20 mg total) by mouth daily   hydrOXYzine HCL (ATARAX) 25 mg tablet Unknown at Unknown time Self Yes No   Sig: Take 25 mg by mouth every 6 (six) hours as needed for itching   hydrochlorothiazide (HYDRODIURIL) 25 mg tablet 3/20/2019 at Unknown time Self Yes Yes   Si mg   lamoTRIgine (LaMICtal) 25 mg tablet 3/20/2019 at Unknown time Self No Yes   Sig: Take 3 tablets (75 mg total) by mouth 2 (two) times a day for 21 days   ranitidine (ZANTAC) 300 MG capsule 3/19/2019 at Unknown time  No Yes   Sig: Take 1 capsule (300 mg total) by mouth every evening   sucralfate (CARAFATE) 1 g tablet  Self No No   Sig: Take 1 tablet (1 g total) by mouth 4 (four) times a day for 14 days   traZODone (DESYREL) 100 mg tablet 3/19/2019 at Unknown time Self No Yes   Sig: Take 1 tablet (100 mg total) by mouth daily at bedtime      Facility-Administered Medications: None       Past Medical History:   Diagnosis Date    Alcohol abuse     Depression     Drug therapy     GERD (gastroesophageal reflux disease)     Hypertension 2012    Knee pain, bilateral     Psychiatric disorder     depression, anxiety    Self-injurious behavior     Spinal stenosis of lumbar region     Suicide attempt [facility])        Past Surgical History:   Procedure Laterality Date    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    HERNIA REPAIR         Family History   Problem Relation Age of Onset    No Known Problems Mother     No Known Problems Father     No Known Problems Sister     No Known Problems Brother      I have reviewed and agree with the history as documented  Social History     Tobacco Use    Smoking status: Current Every Day Smoker     Packs/day: 0 50     Types: Cigarettes, Cigars    Smokeless tobacco: Never Used    Tobacco comment: PT "3 CIGARS A DAY"   Substance Use Topics    Alcohol use: No     Comment: Stopped drinking for several months    Drug use: No     Types: Marijuana     Comment: Last used marijuana 2 months ago  Review of Systems   Constitutional: Negative for chills, fatigue and fever  HENT: Negative for rhinorrhea, sore throat and trouble swallowing  Eyes: Negative for photophobia and visual disturbance     Respiratory: Positive for chest tightness  Negative for cough and shortness of breath  Cardiovascular: Positive for chest pain  Negative for palpitations and leg swelling  Gastrointestinal: Negative for abdominal pain, blood in stool, diarrhea, nausea and vomiting  Endocrine: Negative for polyuria  Genitourinary: Negative for dysuria, flank pain and hematuria  Musculoskeletal: Negative for back pain and neck pain  Skin: Negative for color change and rash  Allergic/Immunologic: Negative for immunocompromised state  Neurological: Negative for dizziness, weakness, light-headedness, numbness and headaches  All other systems reviewed and are negative  Physical Exam  ED Triage Vitals [03/20/19 1727]   Temperature Pulse Respirations Blood Pressure SpO2   97 5 °F (36 4 °C) (!) 106 20 (!) 190/105 94 %      Temp Source Heart Rate Source Patient Position - Orthostatic VS BP Location FiO2 (%)   Tympanic Monitor Sitting Left arm --      Pain Score       5             Orthostatic Vital Signs  Vitals:    03/20/19 1727 03/20/19 1757 03/20/19 1923   BP: (!) 190/105 (!) 182/105 (!) 189/107   Pulse: (!) 106 93 92   Patient Position - Orthostatic VS: Sitting Lying        Physical Exam   Constitutional: Vital signs are normal  He appears well-developed and well-nourished  He is cooperative  No distress  HENT:   Mouth/Throat: Uvula is midline and oropharynx is clear and moist    Eyes: Pupils are equal, round, and reactive to light  Conjunctivae, EOM and lids are normal    Neck: Trachea normal  No thyroid mass and no thyromegaly present  Cardiovascular: Normal rate, regular rhythm, normal heart sounds, intact distal pulses and normal pulses  No murmur heard  Pulmonary/Chest: Effort normal and breath sounds normal    Tenderness over right lower ribcage  No crepitus or deformity  Abdominal: Soft  Normal appearance and bowel sounds are normal  There is no tenderness   There is no rebound, no guarding, no CVA tenderness and negative Ventura's sign    Neurological: He is alert  Skin: Skin is warm, dry and intact  Psychiatric: He has a normal mood and affect  His speech is normal and behavior is normal  Thought content normal        ED Medications  Medications   iohexol (OMNIPAQUE) 350 MG/ML injection (MULTI-DOSE) 85 mL (85 mL Intravenous Given 3/20/19 1857)   rivaroxaban (XARELTO) tablet 15 mg (15 mg Oral Given 3/20/19 1948)       Diagnostic Studies  Results Reviewed     Procedure Component Value Units Date/Time    Troponin I [[id number]]  (Normal) Collected:  03/20/19 1753    Lab Status:  Final result Specimen:  Blood from Arm, Right Updated:  03/20/19 1824     Troponin I <0 02 ng/mL     Basic metabolic panel [[id number]] Collected:  03/20/19 1751    Lab Status:  Final result Specimen:  Blood from Arm, Right Updated:  03/20/19 1821     Sodium 137 mmol/L      Potassium 3 9 mmol/L      Chloride 105 mmol/L      CO2 24 mmol/L      ANION GAP 8 mmol/L      BUN 16 mg/dL      Creatinine 1 02 mg/dL      Glucose 95 mg/dL      Calcium 9 1 mg/dL      eGFR 81 ml/min/1 73sq m     Narrative:       National Kidney Disease Education Program recommendations are as follows:  GFR calculation is accurate only with a steady state creatinine  Chronic Kidney disease less than 60 ml/min/1 73 sq  meters  Kidney failure less than 15 ml/min/1 73 sq  meters      CBC and differential [[id number]]  (Abnormal) Collected:  03/20/19 1751    Lab Status:  Final result Specimen:  Blood from Arm, Right Updated:  03/20/19 1808     WBC 8 23 Thousand/uL      RBC 4 98 Million/uL      Hemoglobin 15 3 g/dL      Hematocrit 44 9 %      MCV 90 fL      MCH 30 7 pg      MCHC 34 1 g/dL      RDW 14 6 %      MPV 11 7 fL      Platelets 329 Thousands/uL      nRBC 0 /100 WBCs      Neutrophils Relative 64 %      Immat GRANS % 0 %      Lymphocytes Relative 24 %      Monocytes Relative 10 %      Eosinophils Relative 2 %      Basophils Relative 0 %      Neutrophils Absolute 5 27 Thousands/µL      Immature Grans Absolute 0 02 Thousand/uL      Lymphocytes Absolute 1 95 Thousands/µL      Monocytes Absolute 0 84 Thousand/µL      Eosinophils Absolute 0 12 Thousand/µL      Basophils Absolute 0 03 Thousands/µL                  CTA ED chest PE study   Final Result by Karthikeyan Barraza MD (03/20 1923)         1  Acute pulmonary emboli throughout right upper, middle and lower lobe segmental branches  No evidence of acutely elevated right-sided pressures  2   Subsegmental atelectasis and airspace opacities at the right lung base may represent developing pulmonary infarct  3   Small hiatal hernia        I personally discussed this study with Sesar Osman Marylu on 3/20/2019 at 7:17 PM                               Workstation performed: [id number]               Procedures  ECG 12 Lead Documentation  Date/Time: 3/20/2019 5:54 PM  Performed by: Alban Altman MD  Authorized by: Alban Altman MD     ECG reviewed by me, the ED Provider: yes    Patient location:  ED  Previous ECG:     Previous ECG:  Compared to current    Similarity:  No change  Interpretation:     Interpretation: normal    Rate:     ECG rate:  92    ECG rate assessment: normal    Rhythm:     Rhythm: sinus rhythm    Ectopy:     Ectopy: none    QRS:     QRS axis:  Normal    QRS intervals:  Normal  Conduction:     Conduction: normal    ST segments:     ST segments:  Normal  T waves:     T waves: normal            Phone Consults  ED Phone Contact    ED Course         HEART Risk Score      Most Recent Value   History  0 Filed at: 03/20/2019 1753   ECG  0 Filed at: 03/20/2019 1753   Age  1 Filed at: 03/20/2019 1753   Risk Factors  2 Filed at: 03/20/2019 1753   Troponin  0 Filed at: 03/20/2019 1753   Heart Score Risk Calculator   History  0 Filed at: 03/20/2019 1753   ECG  0 Filed at: 03/20/2019 1753   Age  1 Filed at: 03/20/2019 1753   Risk Factors  2 Filed at: 03/20/2019 1753   Troponin  0 Filed at: 03/20/2019 1753   HEART Score  3 Filed at: 03/20/2019 1753   HEART Score  3 Filed at: 03/20/2019 1753            PERC Rule for PE      Most Recent Value   PERC Rule for PE   Age >=50  1 Filed at: 03/20/2019 1754   HR >=100  1 Filed at: 03/20/2019 1754   O2 Sat on room air < 95%  1 Filed at: 03/20/2019 1754   History of PE or DVT  0 Filed at: 03/20/2019 1754   Recent trauma or surgery  0 Filed at: 03/20/2019 1754   Hemoptysis  0 Filed at: 03/20/2019 1754   Exogenous estrogen  0 Filed at: 03/20/2019 1754   Unilateral leg swelling  0 Filed at: 03/20/2019 1754   PERC Rule for PE Results  3 Filed at: 03/20/2019 1754                Wells' Criteria for PE      Most Recent Value   Wells' Criteria for PE   Clinical signs and symptoms of DVT  0 Filed at: 03/20/2019 1754   PE is primary diagnosis or equally likely  0 Filed at: 03/20/2019 1754   HR >100  1 5 Filed at: 03/20/2019 1754   Immobilization at least 3 days or Surgery in the previous 4 weeks  0 Filed at: 03/20/2019 1754   Previous, objectively diagnosed PE or DVT  0 Filed at: 03/20/2019 1754   Hemoptysis  0 Filed at: 03/20/2019 1754   Malignancy with treatment within 6 months or palliative  0 Filed at: 03/20/2019 1754   Wells' Criteria Total  1 5 Filed at: 03/20/2019 1754            MDM  Number of Diagnoses or Management Options  Diagnosis management comments: Concern for PE  Likely musculoskeletal however  Will check basic lab work with EKG  PE study  Disposition pending results        Disposition  Final diagnoses:   Other acute pulmonary embolism without acute cor pulmonale (HCC)   Chest pain     Time reflects when diagnosis was documented in both MDM as applicable and the Disposition within this note     Time User Action Codes Description Comment    3/20/2019  7:55 PM Steve ENGEL Add [I26 99] Other acute pulmonary embolism without acute cor pulmonale ([facility] [address] )     3/20/2019  7:55 PM Sami Anglin Add [R07 9] Chest pain       ED Disposition     ED Disposition Condition Date/Time Comment    Discharge Stable Wed Mar 20, 2019 7:55 PM Rachna Fong discharge to home/self care  Follow-up Information     Follow up With Specialties Details Why Contact Info Additional [address] Emergency Department Emergency Medicine Go to  If symptoms worsen [address]  [phone]  ED, [address] [facility], [place], [place], [address], [address] Internal Medicine Call in 1 day for follow up [address]  [address] [facility]  [phone]             Patient's Medications   Discharge Prescriptions    RIVAROXABAN (XARELTO) 15 MG TABLET    Take 1 tablet (15 mg total) by mouth 2 (two) times a day with meals for 21 days       Start Date: 3/20/2019 End Date: 4/10/2019       Order Dose: 15 mg       Quantity: 42 tablet    Refills: 0    RIVAROXABAN (XARELTO) 20 MG TABLET    Take 1 tablet (20 mg total) by mouth daily for 30 days       Start Date: 3/20/2019 End Date: 4/19/2019       Order Dose: 20 mg       Quantity: 30 tablet    Refills: 0     No discharge procedures on file  ED Provider  Attending physically available and evaluated Rachna Fong I managed the patient along with the ED Attending      Electronically Signed by         Km Rader MD  03/20/19 4934

## 2019-03-20 NOTE — PROGRESS NOTES
Pt seen today in office 3/20 for acute onset R-sided chest pain  CXR was normal and I spoke with patient about those results  However, his D-dimer is significantly elevated at almost 3,000  I am concerned for pulmonary embolism  I spoke with patient at 15:30 on 3/20 and explained the results to patient and need for additional imaging STAT (CTA PE study) to definitively r/o PE  Pt states he will might not be able to go this evening to the emergency department for the imaging and if unable will go first thing in the morning  I explained to the patient the need to go tonight as a blood clot in the lungs can be very serious  Patient is stable without shortness of breath or chest pain though I also explained to patient that this could change at any moment  I stressed the need for further imaging ASAP so patient can be started on appropriate treatment if necessary so that his condition does not worsen  Pt verbalizes understanding of the risks associated with delaying possible definitive diagnosis and treatment, which as stated above can result in acute worsening of his condition and possible result in even death    Pt also verbalized understanding that if he were to develop acute shortness of breath or chest pain he needs to be evaluated immediately in the ED

## 2019-03-20 NOTE — ASSESSMENT & PLAN NOTE
Patient with complaints of right-sided chest pain x4 days  No identifiable trauma  Worse with inspiration  Described as sharp in nature without radiation  Denies shortness of breath or cough  He is saturating 97% on room air  He is not tachycardic  Does have history of nicotine dependence 1/3 PPD smoker times 20+ years  Lungs are clear to auscultation without crackles or dullness to percussion bilaterally    Unclear etiology:  Consider pneumothorax/atelectasis vs PE versus malignancy versus a lower suspicion for infectious etiology such as pneumonia    May be musculoskeletal though do not think this is costochondritis as chest wall is not tender with palpation    Wells PE score 0 - low-risk, though PERC rule not satisfied due to patient's age therefore cannot rule out PE, patient needs D-dimer --> if greater than 500 for patient's age will need CTA to definitively rule out PE    Will also check CXR PA and lateral

## 2019-03-20 NOTE — ED ATTENDING ATTESTATION
Araseli Milton DO, saw and evaluated the patient  I have discussed the patient with the resident/non-physician practitioner and agree with the resident's/non-physician practitioner's findings, Plan of Care, and MDM as documented in the resident's/non-physician practitioner's note, except where noted  All available labs and Radiology studies were reviewed  I was present for key portions of any procedure(s) performed by the resident/non-physician practitioner and I was immediately available to provide assistance  At this point I agree with the current assessment done in the Emergency Department  I have conducted an independent evaluation of this patient a history and physical is as follows:    62 yo male presents for evaluation of sharp R sided lower chest pain for past few days  Rated 5/10, constant, worse with deep breath, cough  No other c/o at this time  Went to his PMD, ordered outpt labs which revealed a D-dimer of 2500  Pt was referred to ED for further evaluation  No hx of DVT/PE, no risk factors for DVT/PE  Imp: chest pain, elevated D-dimer plan: CTA PE study  Reassess        Critical Care Time  Procedures

## 2019-03-20 NOTE — DISCHARGE INSTRUCTIONS
Pulmonary Embolism   WHAT YOU NEED TO KNOW:   A pulmonary embolism (PE) is the sudden blockage of a blood vessel in the lungs by an embolus  An embolus is a small piece of blood clot, fat, air, or tumor cells  The embolus cuts off the blood supply to your lungs  A pulmonary embolism can become life-threatening  DISCHARGE INSTRUCTIONS:   Call 911 for any of the following:   · You feel lightheaded, short of breath, and have chest pain  · You cough up blood  · You have a seizure  · You have slurred speech, increased sleepiness, or problems seeing, talking, or thinking  · You have weakness or cannot move your arm or leg on one side of your body  Seek care immediately if:   · You feel faint  · You have a severe headache  · Your heart is beating faster than normal   Contact your healthcare provider if:   · The skin on any part of your legs or hips turns purple  · Your gums or nose bleed  · You see blood in your urine or bowel movements  · Your bowel movements are black or darker than normal     · You have questions or concerns about your condition or care  Medicines:   · Blood thinners  help treat the PE and prevent new clots from forming  Examples of blood thinners include heparin, rivaroxaban, apixiban, and warfarin  The following are general safety guidelines to follow while you are taking a blood thinner:     ¨ Watch for bleeding and bruising  Watch for bleeding from your gums or nose  Watch for blood in your urine and bowel movements  Use a soft washcloth on your skin, and a soft toothbrush to brush your teeth  This can keep your skin and gums from bleeding  If you shave, use an electric shaver  Do not play contact sports  ¨ Tell your dentist and other healthcare providers that you take a blood thinner  Wear a bracelet or necklace that says you take this medicine  ¨ Do not start or stop any medicines unless your healthcare provider tells you to   Many medicines cannot be used with blood thinners  ¨ Tell your healthcare provider right away if you forget to take the blood thinner , or if you take too much  ¨ Warfarin  is a blood thinner that you may need to take  The following are additional things you should be aware of if you take warfarin:    § Foods and medicines can affect the amount of warfarin in your blood  Do not make major changes to your diet  Warfarin works best when you eat about the same amount of vitamin K every day  Vitamin K is found in green leafy vegetables and certain other foods  Ask for more information about what to eat or not to eat  § You will need to see your healthcare provider for follow-up visits  You will need regular blood tests to decide how much warfarin you need  · Take your medicine as directed  Contact your healthcare provider if you think your medicine is not helping or if you have side effects  Tell him or her if you are allergic to any medicine  Keep a list of the medicines, vitamins, and herbs you take  Include the amounts, and when and why you take them  Bring the list or the pill bottles to follow-up visits  Carry your medicine list with you in case of an emergency  Prevent another PE:   · Wear pressure stockings  The stockings are tight and put pressure on your legs  This improves blood flow and helps prevent clots  Wear the stockings during the day  Do not wear them when you sleep  · Exercise regularly  Ask about the best exercise plan for you  When you travel by car or work at a desk, take breaks to stand up and move around as much as possible  Rotate your feet in circles often if you sit for a long period of time  · Maintain a healthy weight  Ask your healthcare provider how much you should weigh  Ask him to help you create a weight loss plan if you are overweight  · Do not smoke  Nicotine and other chemicals in cigarettes and cigars can damage blood vessels and increase your risk for another PE   Ask your healthcare provider for information if you currently smoke and need help to quit  E-cigarettes or smokeless tobacco still contain nicotine  Talk to your healthcare provider before you use these products  Follow up with your healthcare provider as directed:  Write down your questions so you remember to ask them during your visits  © 2017 [address] [employer] Information is for End User's use only and may not be sold, redistributed or otherwise used for commercial purposes  All illustrations and images included in CareNotes® are the copyrighted property of [employer] , [employer]  or [employer]  The above information is an  only  It is not intended as medical advice for individual conditions or treatments  Talk to your doctor, nurse or pharmacist before following any medical regimen to see if it is safe and effective for you

## 2019-03-20 NOTE — PROGRESS NOTES
INTERNAL MEDICINE FOLLOW-UP OFFICE VISIT  [facility]  [address] [place] [address]    NAME: Megan Huggins  AGE: 63 y o  SEX: male    DATE OF ENCOUNTER: 3/20/2019    Assessment and Plan     Problem List Items Addressed This Visit        Other    Pleuritic chest pain - Primary     Patient with complaints of right-sided chest pain x4 days  No identifiable trauma  Worse with inspiration  Described as sharp in nature without radiation  Denies shortness of breath or cough  He is saturating 97% on room air  He is not tachycardic  Does have history of nicotine dependence 1/3 PPD smoker times 20+ years  Lungs are clear to auscultation without crackles or dullness to percussion bilaterally    Unclear etiology:  Consider pneumothorax/atelectasis vs PE versus malignancy versus a lower suspicion for infectious etiology such as pneumonia  May be musculoskeletal though do not think this is costochondritis as chest wall is not tender with palpation    Wells PE score 0 - low-risk, though PERC rule not satisfied due to patient's age therefore cannot rule out PE, patient needs D-dimer --> if greater than 500 for patient's age will need CTA to definitively rule out PE    Will also check CXR PA and lateral         Relevant Orders    XR chest pa & lateral (Completed)    D-dimer, quantitative (Completed)          Orders Placed This Encounter   Procedures    XR chest pa & lateral    D-dimer, quantitative       - Counseling Documentation: patient was counseled regarding: diagnostic results, instructions for management, risk factor reductions, prognosis, patient and family education, impressions, risks and benefits of treatment options and importance of compliance with treatment  - Counseling Time: not applicable  - Barriers to treatment: none  - Medication Side Effects: Adverse side effects of medications were reviewed with the patient/guardian today    - Self Referrals: No    Chief Complaint     Chief Complaint   Patient presents with    Pain     right flank       History of Present Illness     See above assessment and plan for HPI      The following portions of the patient's history were reviewed and updated as appropriate: allergies, current medications, past family history, past medical history, past social history, past surgical history and problem list     Review of Systems     Review of Systems   Constitutional: Negative for chills and fever  HENT: Negative  Respiratory: Negative for cough, shortness of breath and wheezing  Cardiovascular: Positive for chest pain (Right-sided, worse with inspiration)  Neurological: Negative for weakness  Hematological: Negative for adenopathy  Psychiatric/Behavioral: Negative  Active Problem List     Patient Active Problem List   Diagnosis    Bipolar 2 disorder, major depressive episode ([facility] )    Attention deficit hyperactivity disorder (ADHD), combined type    Alcohol dependence in remission ([facility] )    Anxiety disorder    Depressive disorder    Benign essential hypertension    Gastroesophageal reflux disease    Hyperlipidemia    Knee pain    Low back pain    Spinal stenosis of lumbar region    Tobacco dependence syndrome    Vitamin D deficiency    Encounter for medical examination to establish care    Left hip pain    Pleuritic chest pain       Objective     /90   Pulse 96   Temp (!) 97 4 °F (36 3 °C)   Ht 5' 7 5" (1 715 m)   Wt 88 kg (194 lb 0 1 oz)   BMI 29 94 kg/m²     Physical Exam   Constitutional: He is oriented to person, place, and time  He appears well-developed and well-nourished  No distress  HENT:   Head: Normocephalic and atraumatic  Eyes: Pupils are equal, round, and reactive to light  Neck: Normal range of motion  Cardiovascular: Normal rate and regular rhythm  Exam reveals no friction rub  No murmur heard  Pulmonary/Chest: Effort normal  He has no wheezes   He has no rales (No crackles)  He exhibits no tenderness (Nontender with palpation)  Tympanic to percussion bilateral lung fields  Right-sided chest pain with inspiration   Abdominal: There is tenderness (No right upper quadrant tenderness appreciated)  Neurological: He is alert and oriented to person, place, and time  Skin: Skin is warm and dry  He is not diaphoretic  Vitals reviewed        Pertinent Laboratory/Diagnostic Studies:  CBC:   Lab Results   Component Value Date/Time    WBC 6 52 09/20/2018 06:39 AM    RBC 4 43 09/20/2018 06:39 AM    HGB 13 9 09/20/2018 06:39 AM    HCT 41 1 09/20/2018 06:39 AM    MCV 93 09/20/2018 06:39 AM    MCH 31 4 09/20/2018 06:39 AM    MCHC 33 8 09/20/2018 06:39 AM    RDW 14 5 09/20/2018 06:39 AM    MPV 11 2 09/20/2018 06:39 AM     (L) 09/20/2018 06:39 AM    NRBC 0 09/20/2018 06:39 AM    NEUTOPHILPCT 62 09/20/2018 06:39 AM    LYMPHOPCT 23 09/20/2018 06:39 AM    MONOPCT 11 09/20/2018 06:39 AM    EOSPCT 3 09/20/2018 06:39 AM    BASOPCT 1 09/20/2018 06:39 AM    NEUTROABS 4 08 09/20/2018 06:39 AM    LYMPHSABS 1 48 09/20/2018 06:39 AM    MONOSABS 0 73 09/20/2018 06:39 AM    EOSABS 0 18 09/20/2018 06:39 AM     Chemistry Profile:   Lab Results   Component Value Date/Time    K 4 3 09/20/2018 06:39 AM     (H) 09/20/2018 06:39 AM    CO2 25 09/20/2018 06:39 AM    BUN 18 09/20/2018 06:39 AM    CREATININE 1 14 09/20/2018 06:39 AM    GLUC 87 09/20/2018 06:39 AM    CALCIUM 8 5 09/20/2018 06:39 AM    AST 44 09/20/2018 06:39 AM    ALT 72 09/20/2018 06:39 AM    ALKPHOS 82 09/20/2018 06:39 AM    EGFR 71 09/20/2018 06:39 AM     Coagulation Studies: No results found for: PROTIME, INR, PTT  Cardiac Studies: No results found for: NTBNP, BNP, TROPONINI, POCTROP      Current Medications     Current Outpatient Medications:     escitalopram (LEXAPRO) 20 mg tablet, Take 1 tablet (20 mg total) by mouth daily, Disp: 21 tablet, Rfl: 0    FLUARIX QUADRIVALENT 0 5 ML GIRISH, inject 0 5 milliliter intramuscularly, Disp: , Rfl: 0    QUEtiapine (SEROquel) 300 mg tablet, Take 1 tablet (300 mg total) by mouth daily at bedtime, Disp: 21 tablet, Rfl: 0    ranitidine (ZANTAC) 300 MG capsule, Take 1 capsule (300 mg total) by mouth every evening, Disp: 90 capsule, Rfl: 1    traZODone (DESYREL) 100 mg tablet, Take 1 tablet (100 mg total) by mouth daily at bedtime, Disp: 21 tablet, Rfl: 0    amitriptyline (ELAVIL) 25 mg tablet, every 24 hours, Disp: , Rfl:     amLODIPine-benazepril (LOTREL) 10-20 MG per capsule, every 24 hours, Disp: , Rfl:     aspirin (ASPIR-81) 81 mg EC tablet, every 24 hours, Disp: , Rfl:     hydrochlorothiazide (HYDRODIURIL) 25 mg tablet, 25 mg, Disp: , Rfl:     hydrOXYzine HCL (ATARAX) 25 mg tablet, Take 25 mg by mouth every 6 (six) hours as needed for itching, Disp: , Rfl:     lamoTRIgine (LaMICtal) 25 mg tablet, Take 3 tablets (75 mg total) by mouth 2 (two) times a day for 21 days, Disp: 126 tablet, Rfl: 0    Multiple Vitamins-Minerals (ONE-A-DAY 50 PLUS PO), Take by mouth, Disp: , Rfl:     sucralfate (CARAFATE) 1 g tablet, Take 1 tablet (1 g total) by mouth 4 (four) times a day for 14 days, Disp: 56 tablet, Rfl: 0    Health Maintenance     Health Maintenance   Topic Date Due    Hepatitis C Screening  1960    CRC Screening: Colonoscopy  1960    BMI: Followup Plan  03/26/1979    Pneumococcal PPSV23 Medium Risk Adult (1 of 1 - PPSV23) 03/26/1980    BMI: Adult  03/20/2020    DTaP,Tdap,and Td Vaccines (2 - Td) 09/19/2028    INFLUENZA VACCINE  Completed    HEPATITIS B VACCINES  Aged Out     Immunization History   Administered Date(s) Administered    INFLUENZA 11/02/2016, 10/08/2018    Tdap 09/19/2018       Charu Cross Cardio  3/20/2019 3:27 PM

## 2019-03-21 LAB
ATRIAL RATE: 92 BPM
P AXIS: 68 DEGREES
PR INTERVAL: 194 MS
QRS AXIS: -23 DEGREES
QRSD INTERVAL: 100 MS
QT INTERVAL: 326 MS
QTC INTERVAL: 403 MS
T WAVE AXIS: 85 DEGREES
VENTRICULAR RATE: 92 BPM

## 2019-03-21 PROCEDURE — 93010 ELECTROCARDIOGRAM REPORT: CPT | Performed by: INTERNAL MEDICINE

## 2019-06-25 ENCOUNTER — HOSPITAL ENCOUNTER (EMERGENCY)
Facility: HOSPITAL | Age: 59
Discharge: HOME | End: 2019-06-25
Attending: EMERGENCY MEDICINE | Admitting: EMERGENCY MEDICINE
Payer: COMMERCIAL

## 2019-06-25 ENCOUNTER — APPOINTMENT (EMERGENCY)
Dept: NON INVASIVE DIAGNOSTICS | Facility: HOSPITAL | Age: 59
End: 2019-06-25
Payer: COMMERCIAL

## 2019-06-25 VITALS
OXYGEN SATURATION: 100 % | DIASTOLIC BLOOD PRESSURE: 77 MMHG | BODY MASS INDEX: 27.35 KG/M2 | RESPIRATION RATE: 20 BRPM | HEART RATE: 76 BPM | TEMPERATURE: 98.2 F | SYSTOLIC BLOOD PRESSURE: 123 MMHG | WEIGHT: 179.9 LBS

## 2019-06-25 DIAGNOSIS — M79.661 RIGHT CALF PAIN: Primary | ICD-10-CM

## 2019-06-25 DIAGNOSIS — M79.609 LIMB PAIN: ICD-10-CM

## 2019-06-25 PROCEDURE — 93971 EXTREMITY STUDY: CPT

## 2019-06-25 PROCEDURE — 99284 EMERGENCY DEPT VISIT MOD MDM: CPT | Performed by: EMERGENCY MEDICINE

## 2019-06-25 PROCEDURE — 99283 EMERGENCY DEPT VISIT LOW MDM: CPT

## 2019-06-25 PROCEDURE — 93971 EXTREMITY STUDY: CPT | Performed by: INTERNAL MEDICINE

## 2019-06-25 RX ORDER — HYDROXYZINE HYDROCHLORIDE 25 MG/1
25 TABLET, FILM COATED ORAL EVERY 6 HOURS PRN
COMMUNITY
Start: 2019-04-15 | End: 2020-01-24 | Stop reason: ALTCHOICE

## 2019-06-25 RX ORDER — QUETIAPINE FUMARATE 300 MG/1
300 TABLET, FILM COATED ORAL
COMMUNITY
Start: 2019-04-03

## 2020-01-24 ENCOUNTER — APPOINTMENT (EMERGENCY)
Dept: CT IMAGING | Facility: HOSPITAL | Age: 60
End: 2020-01-24
Payer: COMMERCIAL

## 2020-01-24 ENCOUNTER — HOSPITAL ENCOUNTER (EMERGENCY)
Facility: HOSPITAL | Age: 60
Discharge: HOME | End: 2020-01-24
Attending: EMERGENCY MEDICINE | Admitting: EMERGENCY MEDICINE
Payer: COMMERCIAL

## 2020-01-24 ENCOUNTER — APPOINTMENT (EMERGENCY)
Dept: RADIOLOGY | Facility: HOSPITAL | Age: 60
End: 2020-01-24
Payer: COMMERCIAL

## 2020-01-24 VITALS
OXYGEN SATURATION: 95 % | HEART RATE: 70 BPM | SYSTOLIC BLOOD PRESSURE: 167 MMHG | BODY MASS INDEX: 27.37 KG/M2 | RESPIRATION RATE: 25 BRPM | TEMPERATURE: 97.3 F | WEIGHT: 180 LBS | DIASTOLIC BLOOD PRESSURE: 89 MMHG

## 2020-01-24 DIAGNOSIS — R07.9 CHEST PAIN: Primary | ICD-10-CM

## 2020-01-24 DIAGNOSIS — R10.9 ABDOMINAL PAIN: ICD-10-CM

## 2020-01-24 LAB
ALBUMIN SERPL BCP-MCNC: 3.5 G/DL (ref 3.5–5)
ALP SERPL-CCNC: 82 U/L (ref 46–116)
ALT SERPL W P-5'-P-CCNC: 73 U/L (ref 12–78)
ANION GAP SERPL CALCULATED.3IONS-SCNC: 8 MMOL/L (ref 4–13)
AST SERPL W P-5'-P-CCNC: 45 U/L (ref 5–45)
BASOPHILS # BLD AUTO: 0.03 THOUSANDS/ΜL (ref 0–0.1)
BASOPHILS NFR BLD AUTO: 0 % (ref 0–1)
BILIRUB SERPL-MCNC: 0.3 MG/DL (ref 0.2–1)
BUN SERPL-MCNC: 17 MG/DL (ref 5–25)
CALCIUM SERPL-MCNC: 10.3 MG/DL (ref 8.3–10.1)
CHLORIDE SERPL-SCNC: 103 MMOL/L (ref 100–108)
CO2 SERPL-SCNC: 32 MMOL/L (ref 21–32)
CREAT SERPL-MCNC: 1.08 MG/DL (ref 0.6–1.3)
EOSINOPHIL # BLD AUTO: 0.13 THOUSAND/ΜL (ref 0–0.61)
EOSINOPHIL NFR BLD AUTO: 2 % (ref 0–6)
ERYTHROCYTE [DISTWIDTH] IN BLOOD BY AUTOMATED COUNT: 13.4 % (ref 11.6–15.1)
GFR SERPL CREATININE-BSD FRML MDRD: 75 ML/MIN/1.73SQ M
GLUCOSE SERPL-MCNC: 81 MG/DL (ref 65–140)
HCT VFR BLD AUTO: 40.9 % (ref 36.5–49.3)
HGB BLD-MCNC: 13.7 G/DL (ref 12–17)
IMM GRANULOCYTES # BLD AUTO: 0.02 THOUSAND/UL (ref 0–0.2)
IMM GRANULOCYTES NFR BLD AUTO: 0 % (ref 0–2)
LIPASE SERPL-CCNC: 352 U/L (ref 73–393)
LYMPHOCYTES # BLD AUTO: 2.14 THOUSANDS/ΜL (ref 0.6–4.47)
LYMPHOCYTES NFR BLD AUTO: 25 % (ref 14–44)
MCH RBC QN AUTO: 30.9 PG (ref 26.8–34.3)
MCHC RBC AUTO-ENTMCNC: 33.5 G/DL (ref 31.4–37.4)
MCV RBC AUTO: 92 FL (ref 82–98)
MONOCYTES # BLD AUTO: 0.66 THOUSAND/ΜL (ref 0.17–1.22)
MONOCYTES NFR BLD AUTO: 8 % (ref 4–12)
NEUTROPHILS # BLD AUTO: 5.65 THOUSANDS/ΜL (ref 1.85–7.62)
NEUTS SEG NFR BLD AUTO: 65 % (ref 43–75)
NRBC BLD AUTO-RTO: 0 /100 WBCS
PLATELET # BLD AUTO: 116 THOUSANDS/UL (ref 149–390)
PMV BLD AUTO: 11.9 FL (ref 8.9–12.7)
POTASSIUM SERPL-SCNC: 3.5 MMOL/L (ref 3.5–5.3)
PROT SERPL-MCNC: 7.1 G/DL (ref 6.4–8.2)
RBC # BLD AUTO: 4.43 MILLION/UL (ref 3.88–5.62)
SODIUM SERPL-SCNC: 143 MMOL/L (ref 136–145)
TROPONIN I SERPL-MCNC: <0.02 NG/ML
WBC # BLD AUTO: 8.63 THOUSAND/UL (ref 4.31–10.16)

## 2020-01-24 PROCEDURE — 83690 ASSAY OF LIPASE: CPT

## 2020-01-24 PROCEDURE — 84484 ASSAY OF TROPONIN QUANT: CPT

## 2020-01-24 PROCEDURE — 71046 X-RAY EXAM CHEST 2 VIEWS: CPT

## 2020-01-24 PROCEDURE — 71275 CT ANGIOGRAPHY CHEST: CPT

## 2020-01-24 PROCEDURE — 36415 COLL VENOUS BLD VENIPUNCTURE: CPT

## 2020-01-24 PROCEDURE — 85025 COMPLETE CBC W/AUTO DIFF WBC: CPT

## 2020-01-24 PROCEDURE — 93005 ELECTROCARDIOGRAM TRACING: CPT

## 2020-01-24 PROCEDURE — 99285 EMERGENCY DEPT VISIT HI MDM: CPT

## 2020-01-24 PROCEDURE — 80053 COMPREHEN METABOLIC PANEL: CPT

## 2020-01-24 PROCEDURE — 96374 THER/PROPH/DIAG INJ IV PUSH: CPT

## 2020-01-24 PROCEDURE — 99285 EMERGENCY DEPT VISIT HI MDM: CPT | Performed by: EMERGENCY MEDICINE

## 2020-01-24 RX ORDER — MAGNESIUM HYDROXIDE/ALUMINUM HYDROXICE/SIMETHICONE 120; 1200; 1200 MG/30ML; MG/30ML; MG/30ML
30 SUSPENSION ORAL ONCE
Status: COMPLETED | OUTPATIENT
Start: 2020-01-24 | End: 2020-01-24

## 2020-01-24 RX ORDER — FAMOTIDINE 20 MG/1
20 TABLET, FILM COATED ORAL 2 TIMES DAILY
Qty: 30 TABLET | Refills: 0 | Status: SHIPPED | OUTPATIENT
Start: 2020-01-24

## 2020-01-24 RX ORDER — LIDOCAINE HYDROCHLORIDE 20 MG/ML
15 SOLUTION OROPHARYNGEAL ONCE
Status: COMPLETED | OUTPATIENT
Start: 2020-01-24 | End: 2020-01-24

## 2020-01-24 RX ADMIN — FAMOTIDINE 20 MG: 10 INJECTION INTRAVENOUS at 19:48

## 2020-01-24 RX ADMIN — ALUMINUM HYDROXIDE, MAGNESIUM HYDROXIDE, AND SIMETHICONE 30 ML: 200; 200; 20 SUSPENSION ORAL at 19:47

## 2020-01-24 RX ADMIN — IOHEXOL 85 ML: 350 INJECTION, SOLUTION INTRAVENOUS at 18:52

## 2020-01-24 RX ADMIN — LIDOCAINE HYDROCHLORIDE 15 ML: 20 SOLUTION ORAL; TOPICAL at 19:47

## 2020-01-24 NOTE — ED NOTES
Patient reports a 4/10 pain in abdomen  Physician notified        Dionna Hernandez RN  01/24/20 1071

## 2020-01-25 NOTE — ED NOTES
Patient removed all monitoring devices and was going to remove IV  No discharge vitals were obtained as patient wanted to leave and was becoming agitated       Werner Skiff, RN  01/24/20 2021

## 2020-01-25 NOTE — ED PROVIDER NOTES
History  Chief Complaint   Patient presents with    Abdominal Pain     To ED with c/o lower chest/upper abd  pain x 3 days  Patient states that he kevin fatigued and tired  Denies any SOB, nausea, vomiting  Swelling in extremities  Was given Nitro by a friend with improvement   Chest Pain     56-year-old male presents for evaluation of epigastric abdominal pain and lower chest pain this been ongoing for the last 3 days  Patient reports a history of acid reflux type symptoms and has been taking Tums with mild relief  Patient was also given a nitro tablet from a friend and symptoms resolved shortly after but uncertain if they were related  Patient denies associated shortness of breath, nausea, vomiting or radiation of symptoms  Per chart review, patient did have a pulmonary embolism last year and states he completed his course of anticoagulation  Patient states the chest pain is intermittent but currently is asymptomatic  Prior to Admission Medications   Prescriptions Last Dose Informant Patient Reported? Taking?    QUEtiapine (SEROquel) 300 mg tablet   Yes No   Sig: Take 300 mg by mouth   escitalopram (LEXAPRO) 20 mg tablet  Self No No   Sig: Take 1 tablet (20 mg total) by mouth daily   traZODone (DESYREL) 100 mg tablet  Self No No   Sig: Take 1 tablet (100 mg total) by mouth daily at bedtime      Facility-Administered Medications: None       Past Medical History:   Diagnosis Date    Alcohol abuse     Depression     Drug therapy     GERD (gastroesophageal reflux disease)     Hypertension 01/2012    Knee pain, bilateral     Psychiatric disorder     depression, anxiety    Self-injurious behavior     Spinal stenosis of lumbar region     Suicide attempt [facility])        Past Surgical History:   Procedure Laterality Date    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    HERNIA REPAIR  1997       Family History   Problem Relation Age of Onset    No Known Problems Mother     No Known Problems Father  No Known Problems Sister     No Known Problems Brother      I have reviewed and agree with the history as documented  Social History     Tobacco Use    Smoking status: Current Every Day Smoker     Packs/day: 0 50     Types: Cigarettes, Cigars    Smokeless tobacco: Never Used    Tobacco comment: PT "3 CIGARS A DAY"   Substance Use Topics    Alcohol use: No     Comment: Stopped drinking for several months    Drug use: No     Types: Marijuana     Comment: Last used marijuana 2 months ago  Review of Systems   Constitutional: Negative for chills, diaphoresis and fever  HENT: Negative for congestion and rhinorrhea  Eyes: Negative for pain and visual disturbance  Respiratory: Negative for cough, shortness of breath and wheezing  Cardiovascular: Positive for chest pain  Negative for leg swelling  Gastrointestinal: Positive for abdominal pain  Negative for diarrhea, nausea and vomiting  Genitourinary: Negative for difficulty urinating, dysuria, frequency and urgency  Musculoskeletal: Negative for back pain and neck pain  Skin: Negative for color change and rash  Neurological: Negative for syncope, numbness and headaches  All other systems reviewed and are negative  Physical Exam  Physical Exam   Constitutional: He is oriented to person, place, and time  He appears well-developed and well-nourished  HENT:   Head: Normocephalic and atraumatic  Eyes: Conjunctivae and EOM are normal    Neck: Normal range of motion  Neck supple  Cardiovascular: Normal rate and regular rhythm  Pulmonary/Chest: Effort normal and breath sounds normal  No respiratory distress  He has no wheezes  He has no rales  Abdominal: Soft  Bowel sounds are normal  There is tenderness in the epigastric area  There is no guarding  Musculoskeletal: Normal range of motion  He exhibits no edema or tenderness  Neurological: He is alert and oriented to person, place, and time  No cranial nerve deficit  Skin: Skin is warm  No erythema  Psychiatric: He has a normal mood and affect  His behavior is normal    Nursing note and vitals reviewed        Vital Signs  ED Triage Vitals [01/24/20 1701]   Temperature Pulse Respirations Blood Pressure SpO2   (!) 97 3 °F (36 3 °C) 84 20 123/69 98 %      Temp Source Heart Rate Source Patient Position - Orthostatic VS BP Location FiO2 (%)   Tympanic Monitor Sitting Left arm --      Pain Score       3           Vitals:    01/24/20 1715 01/24/20 1745 01/24/20 1815 01/24/20 1845   BP: 141/79 147/84 143/83 167/89   Pulse: 70 69  70   Patient Position - Orthostatic VS: Sitting  Sitting Lying         Visual Acuity      ED Medications  Medications   iohexol (OMNIPAQUE) 350 MG/ML injection (MULTI-DOSE) 100 mL (85 mL Intravenous Given 1/24/20 1852)   aluminum-magnesium hydroxide-simethicone (MYLANTA) 200-200-20 mg/5 mL oral suspension 30 mL (30 mL Oral Given 1/24/20 1947)   famotidine (PEPCID) injection 20 mg (20 mg Intravenous Given 1/24/20 1948)   Lidocaine Viscous HCl (XYLOCAINE) 2 % mucosal solution 15 mL (15 mL Swish & Spit Given 1/24/20 1947)       Diagnostic Studies  Results Reviewed     Procedure Component Value Units Date/Time    Comprehensive metabolic panel [[id number]]  (Abnormal) Collected:  01/24/20 1707    Lab Status:  Final result Specimen:  Blood from Arm, Right Updated:  01/24/20 1819     Sodium 143 mmol/L      Potassium 3 5 mmol/L      Chloride 103 mmol/L      CO2 32 mmol/L      ANION GAP 8 mmol/L      BUN 17 mg/dL      Creatinine 1 08 mg/dL      Glucose 81 mg/dL      Calcium 10 3 mg/dL      AST 45 U/L      ALT 73 U/L      Alkaline Phosphatase 82 U/L      Total Protein 7 1 g/dL      Albumin 3 5 g/dL      Total Bilirubin 0 30 mg/dL      eGFR 75 ml/min/1 73sq m     Narrative:       [place] guidelines for Chronic Kidney Disease (CKD):     Stage 1 with normal or high GFR (GFR > 90 mL/min/1 73 square meters)    Stage 2 Mild CKD (GFR = 60-89 mL/min/1 73 square meters)    Stage 3A Moderate CKD (GFR = 45-59 mL/min/1 73 square meters)    Stage 3B Moderate CKD (GFR = 30-44 mL/min/1 73 square meters)    Stage 4 Severe CKD (GFR = 15-29 mL/min/1 73 square meters)    Stage 5 End Stage CKD (GFR <15 mL/min/1 73 square meters)  Note: GFR calculation is accurate only with a steady state creatinine    Lipase [[id number]]  (Normal) Collected:  01/24/20 1707    Lab Status:  Final result Specimen:  Blood from Arm, Right Updated:  01/24/20 1819     Lipase 352 u/L     Troponin I [[id number]]  (Normal) Collected:  01/24/20 1707    Lab Status:  Final result Specimen:  Blood from Arm, Right Updated:  01/24/20 1740     Troponin I <0 02 ng/mL     CBC and differential [[id number]]  (Abnormal) Collected:  01/24/20 1707    Lab Status:  Final result Specimen:  Blood from Arm, Right Updated:  01/24/20 1736     WBC 8 63 Thousand/uL      RBC 4 43 Million/uL      Hemoglobin 13 7 g/dL      Hematocrit 40 9 %      MCV 92 fL      MCH 30 9 pg      MCHC 33 5 g/dL      RDW 13 4 %      MPV 11 9 fL      Platelets 303 Thousands/uL      nRBC 0 /100 WBCs      Neutrophils Relative 65 %      Immat GRANS % 0 %      Lymphocytes Relative 25 %      Monocytes Relative 8 %      Eosinophils Relative 2 %      Basophils Relative 0 %      Neutrophils Absolute 5 65 Thousands/µL      Immature Grans Absolute 0 02 Thousand/uL      Lymphocytes Absolute 2 14 Thousands/µL      Monocytes Absolute 0 66 Thousand/µL      Eosinophils Absolute 0 13 Thousand/µL      Basophils Absolute 0 03 Thousands/µL                  CTA ED chest PE Study   Final Result by Kelly Maier MD (01/24 1939)         1  No evidence of proximal pulmonary artery embolus  Respiratory motion artifact limits evaluation of distal order vessels  2   No acute cardiopulmonary process                    Workstation performed: [id number]         XR chest 2 views    (Results Pending)              Procedures  Procedures         ED Course  ED Course as of Jan 24 2233 Fri Jan 24, 2020   9082 Procedure Note: EKG  Date/Time: 01/24/20 5:13 PM   Performed by: Chioma Walsh  Authorized by: Chioma Walsh  ECG interpreted by me, the ED Provider: yes   The EKG demonstrates:  Rate 68  Rhythm Sinus with 1st degree block  QTc 376  Discordant WARD V3, present on prior              HEART Risk Score      Most Recent Value   History  0 Filed at: 01/24/2020 2232   ECG  1 Filed at: 01/24/2020 2232   Age  1 Filed at: 01/24/2020 2232   Risk Factors  1 Filed at: 01/24/2020 2232   Troponin  0 Filed at: 01/24/2020 2232   Heart Score Risk Calculator   History  0 Filed at: 01/24/2020 2232   ECG  1 Filed at: 01/24/2020 2232   Age  1 Filed at: 01/24/2020 2232   Risk Factors  1 Filed at: 01/24/2020 2232   Troponin  0 Filed at: 01/24/2020 2232   HEART Score  3 Filed at: 01/24/2020 2232   HEART Score  3 Filed at: 01/24/2020 2232                            MDM  Number of Diagnoses or Management Options  Abdominal pain:   Chest pain:   Diagnosis management comments: 56-year-old male presenting with chest pain and abdominal pain  Obtain labs, EKG, chest x-ray  Given history of prior PE just 6 months ago, will obtain CTA PE study  Discussed findings with patient and also discussed admission for observation  Patient adamant that he would like to be discharged  Risks and benefits discussed and patient aware and agreeable with strict return precautions  Will follow-up with PCP  Disposition  Final diagnoses:   Chest pain   Abdominal pain     Time reflects when diagnosis was documented in both MDM as applicable and the Disposition within this note     Time User Action Codes Description Comment    1/24/2020  8:12 PM Chioma Walsh Add [R07 9] Chest pain     1/24/2020  8:12 PM Chioma Walsh Add [R10 9] Abdominal pain       ED Disposition     ED Disposition Condition Date/Time Comment    Discharge Stable Fri Jan 24, 2020  8:12 PM Gary Nephew discharge to home/self care  Follow-up Information     Follow up With Specialties Details Why Contact Info Additional Information    [employer]    [phone]        [address] Emergency Department Emergency Medicine  If symptoms worsen [address] [place] [address] [phone]  ED, [address] [facility], [facility], [address]          Discharge Medication List as of 1/24/2020  8:13 PM      START taking these medications    Details   famotidine (PEPCID) 20 mg tablet Take 1 tablet (20 mg total) by mouth 2 (two) times a day, Starting Fri 1/24/2020, Print         CONTINUE these medications which have NOT CHANGED    Details   escitalopram (LEXAPRO) 20 mg tablet Take 1 tablet (20 mg total) by mouth daily, Starting Thu 10/4/2018, Print      QUEtiapine (SEROquel) 300 mg tablet Take 300 mg by mouth, Starting Wed 4/3/2019, Historical Med      traZODone (DESYREL) 100 mg tablet Take 1 tablet (100 mg total) by mouth daily at bedtime, Starting Thu 10/4/2018, Print           No discharge procedures on file      ED Provider  Electronically Signed by           Dominic Rincon, DO  01/24/20 1095

## 2020-01-27 LAB
ATRIAL RATE: 68 BPM
P AXIS: 56 DEGREES
PR INTERVAL: 224 MS
QRS AXIS: -5 DEGREES
QRSD INTERVAL: 104 MS
QT INTERVAL: 354 MS
QTC INTERVAL: 376 MS
T WAVE AXIS: 82 DEGREES
VENTRICULAR RATE: 68 BPM

## 2020-01-27 PROCEDURE — 93010 ELECTROCARDIOGRAM REPORT: CPT | Performed by: INTERNAL MEDICINE

## 2020-09-09 ENCOUNTER — HOSPITAL ENCOUNTER (EMERGENCY)
Facility: HOSPITAL | Age: 60
Discharge: HOME | End: 2020-09-09
Attending: EMERGENCY MEDICINE | Admitting: EMERGENCY MEDICINE

## 2020-09-09 VITALS
HEART RATE: 85 BPM | OXYGEN SATURATION: 98 % | TEMPERATURE: 98.8 F | SYSTOLIC BLOOD PRESSURE: 136 MMHG | DIASTOLIC BLOOD PRESSURE: 83 MMHG | RESPIRATION RATE: 18 BRPM | BODY MASS INDEX: 27.28 KG/M2 | WEIGHT: 180 LBS | HEIGHT: 68 IN

## 2020-09-09 DIAGNOSIS — S61.215A LACERATION OF LEFT RING FINGER: Primary | ICD-10-CM

## 2020-09-09 PROCEDURE — 99284 EMERGENCY DEPT VISIT MOD MDM: CPT | Performed by: PHYSICIAN ASSISTANT

## 2020-09-09 PROCEDURE — 12001 RPR S/N/AX/GEN/TRNK 2.5CM/<: CPT | Performed by: PHYSICIAN ASSISTANT

## 2020-09-09 PROCEDURE — 99282 EMERGENCY DEPT VISIT SF MDM: CPT

## 2020-09-09 RX ORDER — GINSENG 100 MG
1 CAPSULE ORAL ONCE
Status: COMPLETED | OUTPATIENT
Start: 2020-09-09 | End: 2020-09-09

## 2020-09-09 RX ORDER — LIDOCAINE HYDROCHLORIDE 10 MG/ML
5 INJECTION, SOLUTION EPIDURAL; INFILTRATION; INTRACAUDAL; PERINEURAL ONCE
Status: COMPLETED | OUTPATIENT
Start: 2020-09-09 | End: 2020-09-09

## 2020-09-09 RX ADMIN — LIDOCAINE HYDROCHLORIDE 5 ML: 10 INJECTION, SOLUTION EPIDURAL; INFILTRATION; INTRACAUDAL; PERINEURAL at 17:58

## 2020-09-09 RX ADMIN — BACITRACIN 1 SMALL APPLICATION: 500 OINTMENT TOPICAL at 17:58

## 2020-09-09 NOTE — ED PROVIDER NOTES
History  Chief Complaint   Patient presents with    Finger Laceration     pt was making his lunch about an twent minutes ago and cut his left ring finger      Patient is a 63 y/o M that presents to the ED with laceration to left ring finger that occurred 20 minutes ago  He states he cut his finger on a can  He denies numbness/tingling  He is right handed  Last tetanus was in 2018  History provided by:  Patient  Finger Laceration   Location:  Finger  Finger laceration location:  L ring finger  Length:  2cm  Depth: Through dermis  Quality: straight    Time since incident:  20 minutes  Laceration mechanism:  Metal edge  Pain details:     Quality:  Burning    Severity:  Mild    Timing:  Constant    Progression:  Unchanged  Foreign body present:  No foreign bodies  Relieved by:  Nothing  Worsened by:  Nothing  Ineffective treatments:  None tried  Tetanus status:  Up to date  Associated symptoms: no fever, no numbness, no redness and no swelling        Prior to Admission Medications   Prescriptions Last Dose Informant Patient Reported? Taking?    QUEtiapine (SEROquel) 300 mg tablet   Yes No   Sig: Take 300 mg by mouth   escitalopram (LEXAPRO) 20 mg tablet  Self No No   Sig: Take 1 tablet (20 mg total) by mouth daily   famotidine (PEPCID) 20 mg tablet   No No   Sig: Take 1 tablet (20 mg total) by mouth 2 (two) times a day   traZODone (DESYREL) 100 mg tablet  Self No No   Sig: Take 1 tablet (100 mg total) by mouth daily at bedtime      Facility-Administered Medications: None       Past Medical History:   Diagnosis Date    Alcohol abuse     Depression     Drug therapy     GERD (gastroesophageal reflux disease)     Hypertension 01/2012    Knee pain, bilateral     Psychiatric disorder     depression, anxiety    Self-injurious behavior     Spinal stenosis of lumbar region     Suicide attempt [facility])        Past Surgical History:   Procedure Laterality Date    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    HERNIA REPAIR  1997       Family History   Problem Relation Age of Onset    No Known Problems Mother     No Known Problems Father     No Known Problems Sister     No Known Problems Brother      I have reviewed and agree with the history as documented  E-Cigarette/Vaping     E-Cigarette/Vaping Substances     Social History     Tobacco Use    Smoking status: Current Every Day Smoker     Packs/day: 0 50     Types: Cigarettes, Cigars    Smokeless tobacco: Never Used    Tobacco comment: PT "3 CIGARS A DAY"   Substance Use Topics    Alcohol use: No     Comment: Stopped drinking for several months    Drug use: No     Types: Marijuana     Comment: Last used marijuana 2 months ago  Review of Systems   Constitutional: Negative for chills and fever  Skin: Positive for wound  Neurological: Negative for dizziness, weakness and numbness  All other systems reviewed and are negative  Physical Exam  Physical Exam  Vitals signs and nursing note reviewed  Constitutional:       Appearance: Normal appearance  HENT:      Head: Normocephalic and atraumatic  Nose: Nose normal    Eyes:      Conjunctiva/sclera: Conjunctivae normal    Neck:      Musculoskeletal: Normal range of motion  Cardiovascular:      Rate and Rhythm: Normal rate  Pulses:           Radial pulses are 2+ on the left side  Pulmonary:      Effort: Pulmonary effort is normal    Musculoskeletal:      Left hand: He exhibits laceration (2cm linear laceration to distal left ring finger  Bleeding controlled with pressure  )  He exhibits normal range of motion, no tenderness, no bony tenderness, normal capillary refill, no deformity and no swelling  Normal sensation noted  Normal strength noted  Skin:     General: Skin is warm and dry  Findings: Laceration (2cm linear laceration to distal left ring finger  ) present  Neurological:      General: No focal deficit present        Mental Status: He is alert and oriented to person, place, and time  Sensory: Sensation is intact  No sensory deficit  Motor: Motor function is intact  Vital Signs  ED Triage Vitals [09/09/20 1753]   Temperature Pulse Respirations Blood Pressure SpO2   98 8 °F (37 1 °C) 85 18 136/83 98 %      Temp Source Heart Rate Source Patient Position - Orthostatic VS BP Location FiO2 (%)   Temporal -- Sitting Right arm --      Pain Score       --           Vitals:    09/09/20 1753   BP: 136/83   Pulse: 85   Patient Position - Orthostatic VS: Sitting         Visual Acuity      ED Medications  Medications   lidocaine (PF) (XYLOCAINE-MPF) 1 % injection 5 mL (5 mL Infiltration Given 9/9/20 1758)   bacitracin topical ointment 1 small application (1 small application Topical Given 9/9/20 1758)       Diagnostic Studies  Results Reviewed     None                 No orders to display              Procedures  Laceration repair    Date/Time: 9/9/2020 6:00 PM  Performed by: Rach Cordero PA-C  Authorized by: Rach Cordero PA-C   Consent: Verbal consent obtained  Risks and benefits: risks, benefits and alternatives were discussed  Body area: upper extremity  Location details: left ring finger  Laceration length: 2 cm  Foreign bodies: no foreign bodies  Tendon involvement: none  Nerve involvement: none  Vascular damage: no  Anesthesia: local infiltration    Anesthesia:  Local Anesthetic: lidocaine 1% without epinephrine  Anesthetic total: 2 mL      Procedure Details:  Preparation: Patient was prepped and draped in the usual sterile fashion    Irrigation solution: saline  Irrigation method: tap  Amount of cleaning: standard  Debridement: none  Degree of undermining: none  Skin closure: 4-0 nylon  Number of sutures: 5  Technique: simple  Approximation: close  Approximation difficulty: simple  Dressing: antibiotic ointment and gauze roll  Patient tolerance: Patient tolerated the procedure well with no immediate complications               ED Course MDM  Number of Diagnoses or Management Options  Laceration of left ring finger: new and does not require workup  Patient Progress  Patient progress: improved      Disposition  Final diagnoses:   Laceration of left ring finger     Time reflects when diagnosis was documented in both MDM as applicable and the Disposition within this note     Time User Action Codes Description Comment    9/9/2020  6:19 PM Kimberly Arrow Add [[id number]] Laceration of left ring finger       ED Disposition     ED Disposition Condition Date/Time Comment    Discharge Stable Wed Sep 9, 2020  6:19 PM Richard Baez discharge to home/self care  Follow-up Information     Follow up With Specialties Details Why Contact Info Additional Lisa Collins [address] [facility] Department Emergency Medicine Go in 10 days For suture removal [address] [address] [phone]  ED, [address] [place] [address] Dat Hdez Dean 10          Patient's Medications   Discharge Prescriptions    No medications on file     No discharge procedures on file      PDMP Review     None          ED Provider  Electronically Signed by           Priya Castillo PA-C  09/09/20 [place]

## 2020-09-09 NOTE — DISCHARGE INSTRUCTIONS
Rest, elevate hand  Tylenol/motrin for discomfort  Keep bandage dry and intact for 2 days, then clean daily with soap and water and apply antibiotic ointment  Follow up with family doctor, urgent care or return to ER for suture removal in 7-10 days  Patient Weight (Optional But Required For Cumulative Dose-Numbers And Decimals Only): 110 Most Recent Flp (Optional): pending Completed Therapy?: No Female Completion Statement: After discussing her treatment course we decided to discontinue isotretinoin therapy at this time. I explained that she would need to continue her birth control methods for at least one month after the last dosage. She should also get a pregnancy test one month after the last dose. She shouldn't donate blood for one month after the last dose. She should call with any new symptoms of depression. Dosing Month 3 (Required For Cumulative Dosing): 30mg BID Detail Level: Zone Male Completion Statement: After discussing his treatment course we decided to discontinue isotretinoin therapy at this time. He shouldn't donate blood for one month after the last dose. He should call with any new symptoms of depression. Weight Units: pounds Most Recent Beta Hcg (Optional): Negative Are Labs Available For Review?: Yes Months Of Therapy Completed: 3 Kilograms Preamble Statement (Weight Entered In Details Tab): Reported Weight in pounds: Kilograms Preamble Statement (Weight Entered In Details Tab): Reported Weight in kilograms:

## 2020-09-23 ENCOUNTER — OFFICE VISIT (OUTPATIENT)
Dept: URGENT CARE | Facility: CLINIC | Age: 60
End: 2020-09-23

## 2020-09-23 ENCOUNTER — APPOINTMENT (OUTPATIENT)
Dept: RADIOLOGY | Facility: CLINIC | Age: 60
End: 2020-09-23

## 2020-09-23 VITALS
HEIGHT: 68 IN | TEMPERATURE: 97.8 F | RESPIRATION RATE: 16 BRPM | HEART RATE: 91 BPM | OXYGEN SATURATION: 96 % | DIASTOLIC BLOOD PRESSURE: 100 MMHG | WEIGHT: 177.8 LBS | SYSTOLIC BLOOD PRESSURE: 150 MMHG | BODY MASS INDEX: 26.95 KG/M2

## 2020-09-23 DIAGNOSIS — M22.2X1 PATELLOFEMORAL PAIN SYNDROME OF RIGHT KNEE: Primary | ICD-10-CM

## 2020-09-23 DIAGNOSIS — M25.561 ACUTE PAIN OF RIGHT KNEE: ICD-10-CM

## 2020-09-23 PROCEDURE — 73564 X-RAY EXAM KNEE 4 OR MORE: CPT

## 2020-09-23 PROCEDURE — 99213 OFFICE O/P EST LOW 20 MIN: CPT | Performed by: FAMILY MEDICINE

## 2020-09-23 RX ORDER — MELOXICAM 15 MG/1
15 TABLET ORAL DAILY
Qty: 60 TABLET | Refills: 0 | Status: SHIPPED | OUTPATIENT
Start: 2020-09-23 | End: 2021-06-17 | Stop reason: HOSPADM

## 2020-09-23 NOTE — PROGRESS NOTES
[facility]        NAME: Anuel Dinh is a 62 y o  male  : 1960    MRN: [id number]  DATE: 2020  TIME: 12:13 PM    Assessment and Plan   Patellofemoral pain syndrome of right knee [M22 2X1]  1  Patellofemoral pain syndrome of right knee  Ambulatory referral to Physical Therapy    meloxicam (MOBIC) 15 mg tablet    diclofenac sodium (Voltaren) 1 %   2  Acute pain of right knee  XR knee 4+ vw right injury    Ambulatory referral to Physical Therapy    meloxicam (MOBIC) 15 mg tablet    diclofenac sodium (Voltaren) 1 %         Patient Instructions       Follow up with PCP in 3-5 days  Proceed to  ER if symptoms worsen  Chief Complaint     Chief Complaint   Patient presents with    Knee Pain     x 6 months, denies injury; radiates down shin - pt does landscaping and having difficulty working         History of Present Illness       Patient is a 60-year-old male with a three-week history of right knee pain  He states he notes the pain beginning after starting a new job doing [employer]  Pain is located along the anterior medial aspect knee reproduced with prolonged standing ambulation  He states his pain is most noticeable at the end of his work shifts  He denies any knee locking or clicking  He denies any joint swelling  Review of Systems   Review of Systems   Constitutional: Negative  HENT: Negative  Eyes: Negative  Respiratory: Negative  Cardiovascular: Negative  Gastrointestinal: Negative  Genitourinary: Negative  Musculoskeletal: Positive for arthralgias and myalgias  Skin: Negative  Allergic/Immunologic: Negative  Neurological: Negative  Hematological: Negative  Psychiatric/Behavioral: Negative            Current Medications       Current Outpatient Medications:     escitalopram (LEXAPRO) 20 mg tablet, Take 1 tablet (20 mg total) by mouth daily, Disp: 21 tablet, Rfl: 0    famotidine (PEPCID) 20 mg tablet, Take 1 tablet (20 mg total) by mouth 2 (two) times a day, Disp: 30 tablet, Rfl: 0    QUEtiapine (SEROquel) 300 mg tablet, Take 300 mg by mouth, Disp: , Rfl:     traZODone (DESYREL) 100 mg tablet, Take 1 tablet (100 mg total) by mouth daily at bedtime, Disp: 21 tablet, Rfl: 0    diclofenac sodium (Voltaren) 1 %, Apply 2 g topically 4 (four) times a day, Disp: 1 Tube, Rfl: 0    meloxicam (MOBIC) 15 mg tablet, Take 1 tablet (15 mg total) by mouth daily, Disp: 60 tablet, Rfl: 0    Current Allergies     Allergies as of 09/23/2020 - Reviewed 09/23/2020   Allergen Reaction Noted    Haloperidol  03/18/2015    No known allergies  05/01/2018            The following portions of the patient's history were reviewed and updated as appropriate: allergies, current medications, past family history, past medical history, past social history, past surgical history and problem list      Past Medical History:   Diagnosis Date    Alcohol abuse     Depression     Drug therapy     GERD (gastroesophageal reflux disease)     Hypertension 01/2012    Knee pain, bilateral     Psychiatric disorder     depression, anxiety    Self-injurious behavior     Spinal stenosis of lumbar region     [facility])        Past Surgical History:   Procedure Laterality Date    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    HERNIA REPAIR  1997       Family History   Problem Relation Age of Onset    No Known Problems Mother     No Known Problems Father     No Known Problems Sister     No Known Problems Brother          Medications have been verified  Objective   /100   Pulse 91   Temp 97 8 °F (36 6 °C) (Tympanic)   Resp 16   Ht 5' 8" (1 727 m)   Wt 80 6 kg (177 lb 12 8 oz)   SpO2 96%   BMI 27 03 kg/m²        Physical Exam     Physical Exam  Cardiovascular:      Rate and Rhythm: Normal rate     Pulmonary:      Effort: Pulmonary effort is normal    Musculoskeletal:      Right knee: He exhibits normal range of motion, no swelling, no effusion, no ecchymosis, no LCL laxity and no MCL laxity  Tenderness found  Medial joint line and MCL tenderness noted  Neurological:      Mental Status: He is alert  X-ray findings demonstrate no acute osseous abnormalities

## 2020-10-31 ENCOUNTER — APPOINTMENT (EMERGENCY)
Dept: CT IMAGING | Facility: HOSPITAL | Age: 60
End: 2020-10-31

## 2020-10-31 ENCOUNTER — HOSPITAL ENCOUNTER (EMERGENCY)
Facility: HOSPITAL | Age: 60
Discharge: HOME | End: 2020-10-31
Attending: EMERGENCY MEDICINE | Admitting: EMERGENCY MEDICINE

## 2020-10-31 ENCOUNTER — APPOINTMENT (EMERGENCY)
Dept: RADIOLOGY | Facility: HOSPITAL | Age: 60
End: 2020-10-31

## 2020-10-31 VITALS
TEMPERATURE: 99.4 F | DIASTOLIC BLOOD PRESSURE: 96 MMHG | RESPIRATION RATE: 16 BRPM | SYSTOLIC BLOOD PRESSURE: 182 MMHG | BODY MASS INDEX: 27.28 KG/M2 | WEIGHT: 180 LBS | HEIGHT: 68 IN | HEART RATE: 120 BPM | OXYGEN SATURATION: 94 %

## 2020-10-31 DIAGNOSIS — S09.90XA INJURY OF HEAD, INITIAL ENCOUNTER: ICD-10-CM

## 2020-10-31 DIAGNOSIS — E86.0 DEHYDRATION: ICD-10-CM
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
## 2020-10-31 DIAGNOSIS — Z72.89 ALCOHOL USE: ICD-10-CM

## 2020-10-31 LAB
ALBUMIN SERPL BCP-MCNC: 4.1 G/DL (ref 3.5–5)
ALP SERPL-CCNC: 94 U/L (ref 46–116)
ALT SERPL W P-5'-P-CCNC: 94 U/L (ref 12–78)
AMPHETAMINES SERPL QL SCN: NEGATIVE
ANION GAP SERPL CALCULATED.3IONS-SCNC: 17 MMOL/L (ref 4–13)
APTT PPP: 30 SECONDS (ref 23–37)
AST SERPL W P-5'-P-CCNC: 72 U/L (ref 5–45)
BARBITURATES UR QL: NEGATIVE
BASOPHILS # BLD AUTO: 0.02 THOUSANDS/ΜL (ref 0–0.1)
BASOPHILS NFR BLD AUTO: 0 % (ref 0–1)
BENZODIAZ UR QL: NEGATIVE
BILIRUB SERPL-MCNC: 0.5 MG/DL (ref 0.2–1)
BILIRUB UR QL STRIP: NEGATIVE
BUN SERPL-MCNC: 21 MG/DL (ref 5–25)
CALCIUM SERPL-MCNC: 8.8 MG/DL (ref 8.3–10.1)
CHLORIDE SERPL-SCNC: 104 MMOL/L (ref 100–108)
CK MB SERPL-MCNC: 3.1 NG/ML (ref 0–5)
CK MB SERPL-MCNC: <1 % (ref 0–2.5)
CK SERPL-CCNC: 684 U/L (ref 39–308)
CLARITY UR: CLEAR
CO2 SERPL-SCNC: 21 MMOL/L (ref 21–32)
COCAINE UR QL: NEGATIVE
COLOR UR: ABNORMAL
CREAT SERPL-MCNC: 1.21 MG/DL (ref 0.6–1.3)
EOSINOPHIL # BLD AUTO: 0 THOUSAND/ΜL (ref 0–0.61)
EOSINOPHIL NFR BLD AUTO: 0 % (ref 0–6)
ERYTHROCYTE [DISTWIDTH] IN BLOOD BY AUTOMATED COUNT: 13.6 % (ref 11.6–15.1)
ETHANOL SERPL-MCNC: 115 MG/DL (ref 0–3)
GFR SERPL CREATININE-BSD FRML MDRD: 65 ML/MIN/1.73SQ M
GLUCOSE SERPL-MCNC: 112 MG/DL (ref 65–140)
GLUCOSE UR STRIP-MCNC: NEGATIVE MG/DL
HCT VFR BLD AUTO: 50.1 % (ref 36.5–49.3)
HGB BLD-MCNC: 16.6 G/DL (ref 12–17)
HGB UR QL STRIP.AUTO: NEGATIVE
IMM GRANULOCYTES # BLD AUTO: 0.03 THOUSAND/UL (ref 0–0.2)
IMM GRANULOCYTES NFR BLD AUTO: 0 % (ref 0–2)
INR PPP: 1.02 (ref 0.84–1.19)
KETONES UR STRIP-MCNC: ABNORMAL MG/DL
LACTATE SERPL-SCNC: 5.5 MMOL/L (ref 0.5–2)
LEUKOCYTE ESTERASE UR QL STRIP: NEGATIVE
LYMPHOCYTES # BLD AUTO: 1.82 THOUSANDS/ΜL (ref 0.6–4.47)
LYMPHOCYTES NFR BLD AUTO: 17 % (ref 14–44)
MCH RBC QN AUTO: 31 PG (ref 26.8–34.3)
MCHC RBC AUTO-ENTMCNC: 33.1 G/DL (ref 31.4–37.4)
MCV RBC AUTO: 94 FL (ref 82–98)
METHADONE UR QL: NEGATIVE
MONOCYTES # BLD AUTO: 0.46 THOUSAND/ΜL (ref 0.17–1.22)
MONOCYTES NFR BLD AUTO: 4 % (ref 4–12)
NEUTROPHILS # BLD AUTO: 8.26 THOUSANDS/ΜL (ref 1.85–7.62)
NEUTS SEG NFR BLD AUTO: 79 % (ref 43–75)
NITRITE UR QL STRIP: NEGATIVE
NRBC BLD AUTO-RTO: 0 /100 WBCS
OPIATES UR QL SCN: NEGATIVE
OXYCODONE+OXYMORPHONE UR QL SCN: NEGATIVE
PCP UR QL: NEGATIVE
PH UR STRIP.AUTO: 5.5 [PH]
PLATELET # BLD AUTO: 163 THOUSANDS/UL (ref 149–390)
PMV BLD AUTO: 12 FL (ref 8.9–12.7)
POTASSIUM SERPL-SCNC: 4.2 MMOL/L (ref 3.5–5.3)
PROCALCITONIN SERPL-MCNC: <0.05 NG/ML
PROT SERPL-MCNC: 8.3 G/DL (ref 6.4–8.2)
PROT UR STRIP-MCNC: NEGATIVE MG/DL
PROTHROMBIN TIME: 13.4 SECONDS (ref 11.6–14.5)
RBC # BLD AUTO: 5.35 MILLION/UL (ref 3.88–5.62)
SODIUM SERPL-SCNC: 142 MMOL/L (ref 136–145)
SP GR UR STRIP.AUTO: >=1.03 (ref 1–1.03)
THC UR QL: NEGATIVE
TROPONIN I SERPL-MCNC: <0.02 NG/ML
UROBILINOGEN UR QL STRIP.AUTO: 0.2 E.U./DL
WBC # BLD AUTO: 10.59 THOUSAND/UL (ref 4.31–10.16)

## 2020-10-31 PROCEDURE — 84145 PROCALCITONIN (PCT): CPT | Performed by: EMERGENCY MEDICINE

## 2020-10-31 PROCEDURE — 93005 ELECTROCARDIOGRAM TRACING: CPT

## 2020-10-31 PROCEDURE — 99285 EMERGENCY DEPT VISIT HI MDM: CPT | Performed by: EMERGENCY MEDICINE

## 2020-10-31 PROCEDURE — 99284 EMERGENCY DEPT VISIT MOD MDM: CPT

## 2020-10-31 PROCEDURE — 70450 CT HEAD/BRAIN W/O DYE: CPT

## 2020-10-31 PROCEDURE — 85610 PROTHROMBIN TIME: CPT | Performed by: EMERGENCY MEDICINE

## 2020-10-31 PROCEDURE — 96361 HYDRATE IV INFUSION ADD-ON: CPT

## 2020-10-31 PROCEDURE — 84484 ASSAY OF TROPONIN QUANT: CPT | Performed by: EMERGENCY MEDICINE

## 2020-10-31 PROCEDURE — 81003 URINALYSIS AUTO W/O SCOPE: CPT | Performed by: EMERGENCY MEDICINE

## 2020-10-31 PROCEDURE — 83605 ASSAY OF LACTIC ACID: CPT | Performed by: EMERGENCY MEDICINE

## 2020-10-31 PROCEDURE — 71045 X-RAY EXAM CHEST 1 VIEW: CPT

## 2020-10-31 PROCEDURE — 80307 DRUG TEST PRSMV CHEM ANLYZR: CPT | Performed by: EMERGENCY MEDICINE

## 2020-10-31 PROCEDURE — G1004 CDSM NDSC: HCPCS

## 2020-10-31 PROCEDURE — 85025 COMPLETE CBC W/AUTO DIFF WBC: CPT | Performed by: EMERGENCY MEDICINE

## 2020-10-31 PROCEDURE — 87040 BLOOD CULTURE FOR BACTERIA: CPT | Performed by: EMERGENCY MEDICINE

## 2020-10-31 PROCEDURE — 82550 ASSAY OF CK (CPK): CPT | Performed by: EMERGENCY MEDICINE

## 2020-10-31 PROCEDURE — 96360 HYDRATION IV INFUSION INIT: CPT

## 2020-10-31 PROCEDURE — 80053 COMPREHEN METABOLIC PANEL: CPT | Performed by: EMERGENCY MEDICINE

## 2020-10-31 PROCEDURE — 80320 DRUG SCREEN QUANTALCOHOLS: CPT | Performed by: EMERGENCY MEDICINE

## 2020-10-31 PROCEDURE — 85730 THROMBOPLASTIN TIME PARTIAL: CPT | Performed by: EMERGENCY MEDICINE

## 2020-10-31 PROCEDURE — 82553 CREATINE MB FRACTION: CPT | Performed by: EMERGENCY MEDICINE

## 2020-10-31 PROCEDURE — 36415 COLL VENOUS BLD VENIPUNCTURE: CPT | Performed by: EMERGENCY MEDICINE

## 2020-10-31 RX ORDER — SODIUM CHLORIDE 9 MG/ML
3 INJECTION INTRAVENOUS
Status: DISCONTINUED | OUTPATIENT
Start: 2020-10-31 | End: 2020-10-31 | Stop reason: HOSPADM

## 2020-10-31 RX ADMIN — SODIUM CHLORIDE 1000 ML: 0.9 INJECTION, SOLUTION INTRAVENOUS at 14:55

## 2020-11-01 LAB
ATRIAL RATE: 119 BPM
P AXIS: 61 DEGREES
PR INTERVAL: 198 MS
QRS AXIS: -30 DEGREES
QRSD INTERVAL: 98 MS
QT INTERVAL: 288 MS
QTC INTERVAL: 405 MS
T WAVE AXIS: 81 DEGREES
VENTRICULAR RATE: 119 BPM

## 2020-11-01 PROCEDURE — 93010 ELECTROCARDIOGRAM REPORT: CPT | Performed by: INTERNAL MEDICINE

## 2020-11-05 LAB
BACTERIA BLD CULT: NORMAL
BACTERIA BLD CULT: NORMAL

## 2021-05-31 ENCOUNTER — OFFICE VISIT (OUTPATIENT)
Dept: URGENT CARE | Facility: CLINIC | Age: 61
End: 2021-05-31

## 2021-05-31 ENCOUNTER — APPOINTMENT (OUTPATIENT)
Dept: RADIOLOGY | Facility: CLINIC | Age: 61
End: 2021-05-31

## 2021-05-31 VITALS
DIASTOLIC BLOOD PRESSURE: 99 MMHG | SYSTOLIC BLOOD PRESSURE: 134 MMHG | RESPIRATION RATE: 16 BRPM | HEIGHT: 68 IN | TEMPERATURE: 98.2 F | HEART RATE: 106 BPM | OXYGEN SATURATION: 96 % | WEIGHT: 185 LBS | BODY MASS INDEX: 28.04 KG/M2

## 2021-05-31 DIAGNOSIS — M25.561 ACUTE PAIN OF RIGHT KNEE: ICD-10-CM

## 2021-05-31 DIAGNOSIS — M25.561 ACUTE PAIN OF RIGHT KNEE: Primary | ICD-10-CM

## 2021-05-31 PROCEDURE — 99213 OFFICE O/P EST LOW 20 MIN: CPT | Performed by: PHYSICIAN ASSISTANT

## 2021-05-31 PROCEDURE — 73564 X-RAY EXAM KNEE 4 OR MORE: CPT

## 2021-05-31 NOTE — PROGRESS NOTES
NAME: Shahnaz Eckert is a 62 y o  male  : 1960    MRN: [id number]      Assessment and Plan   Acute pain of right knee [M25 561]  1  Acute pain of right knee  Ambulatory referral to Orthopedic Surgery    CANCELED: XR knee 4+ vw left injury       X-ray right knee:  No acute fractures visualized     discussed with patient this is the same thing that he was here for back in September  Discussed ice and heat being helpful along with ibuprofen /Tylenol  Will place referral for Orthopedics given that this is repeated occurrence  He acknowledges      Patient Instructions   Patient Instructions   Alternate ice and heat to the area  Ibuprofen/ tylenol for pain   Rest  Ice  Light stretching  F/u with ortho- referral placed for you today       Proceed to ER if symptoms worsen  Chief Complaint     Chief Complaint   Patient presents with    Knee Pain     Pt c/o 6/10 sharp pain in R knee that worsens with weight-bearing, walking  Pt walking with cane  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, no known injury  History of Present Illness    Patient presents complaining of right knee pain x1 week  States he had no injury but does remember 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 and feeling some pain  Reports throughout the week his pain has been steady and not getting any better  Reports 6/10 pain with walking  Denies any numbness or tingling to the toes  Denies any history of this knee  Has not tried anything over-the-counter or any ice or heat  Review of Systems   Review of Systems   Constitutional: Negative for chills and fever  Musculoskeletal:        Right knee pain   Skin: Negative for rash  Neurological: Negative for weakness and numbness           Current Medications       Current Outpatient Medications:     diclofenac sodium (Voltaren) 1 %, Apply 2 g topically 4 (four) times a day, Disp: 1 Tube, Rfl: 0    escitalopram (LEXAPRO) 20 mg tablet, Take 1 tablet (20 mg total) by mouth daily, Disp: 21 tablet, Rfl: 0    famotidine (PEPCID) 20 mg tablet, Take 1 tablet (20 mg total) by mouth 2 (two) times a day, Disp: 30 tablet, Rfl: 0    meloxicam (MOBIC) 15 mg tablet, Take 1 tablet (15 mg total) by mouth daily, Disp: 60 tablet, Rfl: 0    QUEtiapine (SEROquel) 300 mg tablet, Take 300 mg by mouth, Disp: , Rfl:     traZODone (DESYREL) 100 mg tablet, Take 1 tablet (100 mg total) by mouth daily at bedtime, Disp: 21 tablet, Rfl: 0    Current Allergies     Allergies as of 05/31/2021 - Reviewed 05/31/2021   Allergen Reaction Noted    Haloperidol  03/18/2015    No known allergies  05/01/2018              Past Medical History:   Diagnosis Date    Alcohol abuse     Depression     Drug therapy     GERD (gastroesophageal reflux disease)     Hypertension 01/2012    Knee pain, bilateral     Psychiatric disorder     depression, anxiety    Self-injurious behavior     Spinal stenosis of lumbar region     Suicide attempt [facility])        Past Surgical History:   Procedure Laterality Date    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    HERNIA REPAIR  1997       Family History   Problem Relation Age of Onset    No Known Problems Mother     No Known Problems Father     No Known Problems Sister     No Known Problems Brother          Medications have been verified  The following portions of the patient's history were reviewed and updated as appropriate: allergies, current medications, past family history, past medical history, past social history, past surgical history and problem list     Objective   /99   Pulse (!) 106   Temp 98 2 °F (36 8 °C)   Resp 16   Ht 5' 8" (1 727 m)   Wt 83 9 kg (185 lb)   SpO2 96%   BMI 28 13 kg/m²      Physical Exam     Physical Exam  Vitals signs and nursing note reviewed  Constitutional:       General: He is not in acute distress  Appearance: Normal appearance  He is not ill-appearing, toxic-appearing or diaphoretic  Musculoskeletal:      Comments: Right knee: No erythema, edema, ecchymosis or abrasions  Tender to palpation over the inferior aspect of the patella and into the patellar tendon bilateral joint aspects  Full extension and flexion with pain  Full strength against resistance with pain  Negative valgus and varus and anterior/posterior drawer test   Full sensation to light touch  Cap refill less than 2 seconds  Neurological:      Mental Status: He is alert

## 2021-05-31 NOTE — PATIENT INSTRUCTIONS
Alternate ice and heat to the area  Ibuprofen/ tylenol for pain   Rest  Ice  Light stretching  F/u with ortho- referral placed for you today

## 2021-06-02 ENCOUNTER — TELEPHONE (OUTPATIENT)
Dept: OBGYN CLINIC | Facility: HOSPITAL | Age: 61
End: 2021-06-02

## 2021-06-02 NOTE — TELEPHONE ENCOUNTER
The assistance office was calling in trying to make an appointment for this patient but did not know what medicaid product insurance he had, he will call back and confirm this information

## 2021-06-13 ENCOUNTER — HOSPITAL ENCOUNTER (EMERGENCY)
Facility: HOSPITAL | Age: 61
Discharge: HOME | DRG: 469 | End: 2021-06-13
Attending: EMERGENCY MEDICINE | Admitting: EMERGENCY MEDICINE
Payer: COMMERCIAL

## 2021-06-13 VITALS
TEMPERATURE: 97.4 F | SYSTOLIC BLOOD PRESSURE: 199 MMHG | OXYGEN SATURATION: 97 % | HEART RATE: 109 BPM | DIASTOLIC BLOOD PRESSURE: 116 MMHG | RESPIRATION RATE: 20 BRPM

## 2021-06-13 DIAGNOSIS — K42.9 UMBILICAL HERNIA WITHOUT OBSTRUCTION AND WITHOUT GANGRENE: Primary | ICD-10-CM

## 2021-06-13 PROCEDURE — 36415 COLL VENOUS BLD VENIPUNCTURE: CPT

## 2021-06-13 PROCEDURE — 99284 EMERGENCY DEPT VISIT MOD MDM: CPT

## 2021-06-13 PROCEDURE — 99282 EMERGENCY DEPT VISIT SF MDM: CPT | Performed by: PHYSICIAN ASSISTANT

## 2021-06-13 NOTE — ED PROVIDER NOTES
History  Chief Complaint   Patient presents with    Abdominal Pain     pt started with abd pain yesterday that is in the middle of his stomach  pt states it hurts more when he stands up  denies vomitting      72-year-old male with history of polysubstance abuse, depression, and GERD presents to the emergency department for evaluation of periumbilical pain beginning yesterday  Pain is only present when standing, resolves when lying down  No history of similar  Denies fevers, chills, sweats, nausea, vomiting, or diarrhea  Prior to Admission Medications   Prescriptions Last Dose Informant Patient Reported? Taking?    QUEtiapine (SEROquel) 300 mg tablet 6/13/2021 at Unknown time  Yes Yes   Sig: Take 300 mg by mouth   diclofenac sodium (Voltaren) 1 %   No No   Sig: Apply 2 g topically 4 (four) times a day   escitalopram (LEXAPRO) 20 mg tablet 6/13/2021 at Unknown time Self No Yes   Sig: Take 1 tablet (20 mg total) by mouth daily   famotidine (PEPCID) 20 mg tablet 6/13/2021 at Unknown time  No Yes   Sig: Take 1 tablet (20 mg total) by mouth 2 (two) times a day   meloxicam (MOBIC) 15 mg tablet 6/13/2021 at Unknown time  No Yes   Sig: Take 1 tablet (15 mg total) by mouth daily   traZODone (DESYREL) 100 mg tablet 6/13/2021 at Unknown time Self No Yes   Sig: Take 1 tablet (100 mg total) by mouth daily at bedtime      Facility-Administered Medications: None       Past Medical History:   Diagnosis Date    Alcohol abuse     Depression     Drug therapy     GERD (gastroesophageal reflux disease)     Hypertension 01/2012    Knee pain, bilateral     Psychiatric disorder     depression, anxiety    Self-injurious behavior     Spinal stenosis of lumbar region     [facility] attempt [facility])        Past Surgical History:   Procedure Laterality Date    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    HERNIA REPAIR  1997       Family History   Problem Relation Age of Onset    No Known Problems Mother     No Known Problems Father     No Known Problems Sister     No Known Problems Brother      I have reviewed and agree with the history as documented  E-Cigarette/Vaping     E-Cigarette/Vaping Substances     Social History     Tobacco Use    Smoking status: Current Every Day Smoker     Packs/day: 0 50     Types: Cigarettes, Cigars    Smokeless tobacco: Never Used    Tobacco comment: PT "3 CIGARS A DAY" - quit smoking cigars   Substance Use Topics    Alcohol use: No     Comment: Stopped drinking for several months - recovered alcoholic    Drug use: No     Types: Marijuana     Comment: Last used marijuana 2 months ago  - no longer smoking       Review of Systems   Constitutional: Negative for chills, diaphoresis and fever  Eyes: Negative for visual disturbance  Respiratory: Negative for cough and shortness of breath  Cardiovascular: Negative for chest pain and palpitations  Gastrointestinal: Positive for abdominal pain  Negative for diarrhea, nausea and vomiting  Genitourinary: Negative for dysuria, flank pain and frequency  Musculoskeletal: Negative for arthralgias and myalgias  Skin: Negative for color change, rash and wound  Allergic/Immunologic: Negative for immunocompromised state  Neurological: Negative for dizziness and light-headedness  Hematological: Does not bruise/bleed easily  Psychiatric/Behavioral: Negative for confusion  The patient is not nervous/anxious  Physical Exam  Physical Exam  Vitals and nursing note reviewed  Constitutional:       Appearance: He is well-developed  HENT:      Head: Normocephalic and atraumatic  Mouth/Throat:      Mouth: Mucous membranes are moist    Eyes:      Conjunctiva/sclera: Conjunctivae normal    Cardiovascular:      Rate and Rhythm: Normal rate and regular rhythm  Heart sounds: No murmur heard  Pulmonary:      Effort: Pulmonary effort is normal  No respiratory distress  Breath sounds: Normal breath sounds     Abdominal: Palpations: Abdomen is soft  Tenderness: There is no abdominal tenderness  There is no guarding or rebound  Negative signs include Ventura's sign and McBurney's sign  Hernia: A hernia is present  Hernia is present in the umbilical area  Musculoskeletal:      Cervical back: Neck supple  Skin:     General: Skin is warm and dry  Capillary Refill: Capillary refill takes less than 2 seconds  Neurological:      General: No focal deficit present  Mental Status: He is alert and oriented to person, place, and time  Psychiatric:         Mood and Affect: Mood normal          Behavior: Behavior normal          Vital Signs  ED Triage Vitals   Temperature Pulse Respirations Blood Pressure SpO2   06/13/21 1145 06/13/21 1137 06/13/21 1137 06/13/21 1137 06/13/21 1137   (!) 97 4 °F (36 3 °C) (!) 111 20 (!) 221/123 95 %      Temp Source Heart Rate Source Patient Position - Orthostatic VS BP Location FiO2 (%)   06/13/21 1145 06/13/21 1145 -- -- --   Temporal Monitor         Pain Score       --                  Vitals:    06/13/21 1137 06/13/21 1145   BP: (!) 221/123 (!) 199/116   Pulse: (!) 111 (!) 109         Visual Acuity      ED Medications  Medications - No data to display    Diagnostic Studies  Results Reviewed     None                 No orders to display              Procedures  Procedures         ED Course                             SBIRT 20yo+      Most Recent Value   SBIRT (23 yo +)   In order to provide better care to our patients, we are screening all of our patients for alcohol and drug use  Would it be okay to ask you these screening questions? Yes Filed at: 06/13/2021 1146   Initial Alcohol Screen: US AUDIT-C    1  How often do you have a drink containing alcohol?  0 Filed at: 06/13/2021 1146   2  How many drinks containing alcohol do you have on a typical day you are drinking? 0 Filed at: 06/13/2021 1146   3a  Male UNDER 65:  How often do you have five or more drinks on one occasion?  0 Filed at: 06/13/2021 1146   Audit-C Score  0 Filed at: 06/13/2021 1146   PETRA: How many times in the past year have you    Used an illegal drug or used a prescription medication for non-medical reasons? Never Filed at: 06/13/2021 1146                    [facility]  Number of Diagnoses or Management Options  Umbilical hernia without obstruction and without gangrene: new and does not require workup  Diagnosis management comments:   Pain is associated with a palpable umbilical hernia, easily reduced  Referred to surgery as an outpatient       Amount and/or Complexity of Data Reviewed  Review and summarize past medical records: yes        Disposition  Final diagnoses:   Umbilical hernia without obstruction and without gangrene     Time reflects when diagnosis was documented in both MDM as applicable and the Disposition within this note     Time User Action Codes Description Comment    6/13/2021 11:50 AM Luis Gómez Add [M28 6] Umbilical hernia without obstruction and without gangrene       ED Disposition     ED Disposition Condition Date/Time Comment    Discharge Stable Ralph Jun 13, 2021 11:50 AM Megan Huggins discharge to home/self care              Follow-up Information     Follow up With Specialties Details Why Contact Info    Parker Ramos MD General Surgery Schedule an appointment as soon as possible for a visit   [address] [place] [address]            Discharge Medication List as of 6/13/2021 11:50 AM      CONTINUE these medications which have NOT CHANGED    Details   escitalopram (LEXAPRO) 20 mg tablet Take 1 tablet (20 mg total) by mouth daily, Starting Thu 10/4/2018, Print      famotidine (PEPCID) 20 mg tablet Take 1 tablet (20 mg total) by mouth 2 (two) times a day, Starting Fri 1/24/2020, Print      meloxicam (MOBIC) 15 mg tablet Take 1 tablet (15 mg total) by mouth daily, Starting Wed 9/23/2020, Normal      QUEtiapine (SEROquel) 300 mg tablet Take 300 mg by mouth, Starting Wed 4/3/2019, Historical Med      traZODone (DESYREL) 100 mg tablet Take 1 tablet (100 mg total) by mouth daily at bedtime, Starting Thu 10/4/2018, Print      diclofenac sodium (Voltaren) 1 % Apply 2 g topically 4 (four) times a day, Starting Wed 9/23/2020, Normal           No discharge procedures on file      PDMP Review     None          ED Provider  Electronically Signed by           Davi Pitts PA-C  06/13/21 5959

## 2021-06-15 ENCOUNTER — HOSPITAL ENCOUNTER (INPATIENT)
Facility: HOSPITAL | Age: 61
LOS: 2 days | Discharge: HOME | DRG: 469 | End: 2021-06-17
Attending: EMERGENCY MEDICINE | Admitting: INTERNAL MEDICINE
Payer: COMMERCIAL

## 2021-06-15 ENCOUNTER — APPOINTMENT (EMERGENCY)
Dept: CT IMAGING | Facility: HOSPITAL | Age: 61
DRG: 469 | End: 2021-06-15
Payer: COMMERCIAL

## 2021-06-15 ENCOUNTER — APPOINTMENT (EMERGENCY)
Dept: RADIOLOGY | Facility: HOSPITAL | Age: 61
DRG: 469 | End: 2021-06-15
Payer: COMMERCIAL

## 2021-06-15 DIAGNOSIS — G25.2 FINE TREMOR: ICD-10-CM

## 2021-06-15 DIAGNOSIS — N17.9 AKI (ACUTE KIDNEY INJURY) (HCC): ICD-10-CM

## 2021-06-15 DIAGNOSIS — R41.82 ALTERED MENTAL STATUS: ICD-10-CM

## 2021-06-15 DIAGNOSIS — Z86.73 HISTORY OF CVA (CEREBROVASCULAR ACCIDENT): ICD-10-CM

## 2021-06-15 DIAGNOSIS — D64.9 ANEMIA: ICD-10-CM

## 2021-06-15 DIAGNOSIS — R65.10 SIRS (SYSTEMIC INFLAMMATORY RESPONSE SYNDROME) (HCC): ICD-10-CM

## 2021-06-15 DIAGNOSIS — R09.02 HYPOXIA: Primary | ICD-10-CM

## 2021-06-15 DIAGNOSIS — R56.9 SEIZURE-LIKE ACTIVITY (HCC): ICD-10-CM

## 2021-06-15 PROBLEM — J96.01 ACUTE RESPIRATORY FAILURE WITH HYPOXIA (HCC): Status: ACTIVE | Noted: 2021-06-15

## 2021-06-15 LAB
ALBUMIN SERPL BCP-MCNC: 3.8 G/DL (ref 3.5–5)
ALP SERPL-CCNC: 76 U/L (ref 46–116)
ALT SERPL W P-5'-P-CCNC: 43 U/L (ref 12–78)
ANION GAP SERPL CALCULATED.3IONS-SCNC: 15 MMOL/L (ref 4–13)
APTT PPP: 26 SECONDS (ref 23–37)
AST SERPL W P-5'-P-CCNC: 30 U/L (ref 5–45)
BASOPHILS # BLD AUTO: 0.03 THOUSANDS/ΜL (ref 0–0.1)
BASOPHILS NFR BLD AUTO: 0 % (ref 0–1)
BILIRUB SERPL-MCNC: 0.6 MG/DL (ref 0.2–1)
BUN SERPL-MCNC: 48 MG/DL (ref 5–25)
CALCIUM SERPL-MCNC: 9.1 MG/DL (ref 8.3–10.1)
CHLORIDE SERPL-SCNC: 101 MMOL/L (ref 100–108)
CO2 SERPL-SCNC: 21 MMOL/L (ref 21–32)
CREAT SERPL-MCNC: 1.65 MG/DL (ref 0.6–1.3)
EOSINOPHIL # BLD AUTO: 0.01 THOUSAND/ΜL (ref 0–0.61)
EOSINOPHIL NFR BLD AUTO: 0 % (ref 0–6)
ERYTHROCYTE [DISTWIDTH] IN BLOOD BY AUTOMATED COUNT: 13.3 % (ref 11.6–15.1)
ETHANOL SERPL-MCNC: 3 MG/DL (ref 0–3)
GFR SERPL CREATININE-BSD FRML MDRD: 44 ML/MIN/1.73SQ M
GLUCOSE SERPL-MCNC: 163 MG/DL (ref 65–140)
HCT VFR BLD AUTO: 39.6 % (ref 36.5–49.3)
HGB BLD-MCNC: 13.5 G/DL (ref 12–17)
IMM GRANULOCYTES # BLD AUTO: 0.11 THOUSAND/UL (ref 0–0.2)
IMM GRANULOCYTES NFR BLD AUTO: 1 % (ref 0–2)
INR PPP: 1.09 (ref 0.84–1.19)
LACTATE SERPL-SCNC: 3.2 MMOL/L (ref 0.5–2)
LYMPHOCYTES # BLD AUTO: 2.61 THOUSANDS/ΜL (ref 0.6–4.47)
LYMPHOCYTES NFR BLD AUTO: 15 % (ref 14–44)
MCH RBC QN AUTO: 31.5 PG (ref 26.8–34.3)
MCHC RBC AUTO-ENTMCNC: 34.1 G/DL (ref 31.4–37.4)
MCV RBC AUTO: 92 FL (ref 82–98)
MONOCYTES # BLD AUTO: 1.31 THOUSAND/ΜL (ref 0.17–1.22)
MONOCYTES NFR BLD AUTO: 7 % (ref 4–12)
NEUTROPHILS # BLD AUTO: 13.75 THOUSANDS/ΜL (ref 1.85–7.62)
NEUTS SEG NFR BLD AUTO: 77 % (ref 43–75)
NRBC BLD AUTO-RTO: 0 /100 WBCS
PLATELET # BLD AUTO: 162 THOUSANDS/UL (ref 149–390)
PMV BLD AUTO: 12.3 FL (ref 8.9–12.7)
POTASSIUM SERPL-SCNC: 3.4 MMOL/L (ref 3.5–5.3)
PROT SERPL-MCNC: 7.6 G/DL (ref 6.4–8.2)
PROTHROMBIN TIME: 14.1 SECONDS (ref 11.6–14.5)
RBC # BLD AUTO: 4.29 MILLION/UL (ref 3.88–5.62)
SODIUM SERPL-SCNC: 137 MMOL/L (ref 136–145)
TROPONIN I SERPL-MCNC: 0.04 NG/ML
WBC # BLD AUTO: 17.82 THOUSAND/UL (ref 4.31–10.16)

## 2021-06-15 PROCEDURE — 71045 X-RAY EXAM CHEST 1 VIEW: CPT

## 2021-06-15 PROCEDURE — 85025 COMPLETE CBC W/AUTO DIFF WBC: CPT | Performed by: EMERGENCY MEDICINE

## 2021-06-15 PROCEDURE — 84146 ASSAY OF PROLACTIN: CPT | Performed by: EMERGENCY MEDICINE

## 2021-06-15 PROCEDURE — 80053 COMPREHEN METABOLIC PANEL: CPT | Performed by: EMERGENCY MEDICINE

## 2021-06-15 PROCEDURE — 83605 ASSAY OF LACTIC ACID: CPT | Performed by: EMERGENCY MEDICINE

## 2021-06-15 PROCEDURE — 85730 THROMBOPLASTIN TIME PARTIAL: CPT | Performed by: EMERGENCY MEDICINE

## 2021-06-15 PROCEDURE — 70450 CT HEAD/BRAIN W/O DYE: CPT

## 2021-06-15 PROCEDURE — 87040 BLOOD CULTURE FOR BACTERIA: CPT | Performed by: EMERGENCY MEDICINE

## 2021-06-15 PROCEDURE — 36415 COLL VENOUS BLD VENIPUNCTURE: CPT | Performed by: EMERGENCY MEDICINE

## 2021-06-15 PROCEDURE — 84484 ASSAY OF TROPONIN QUANT: CPT | Performed by: EMERGENCY MEDICINE

## 2021-06-15 PROCEDURE — 82550 ASSAY OF CK (CPK): CPT | Performed by: INTERNAL MEDICINE

## 2021-06-15 PROCEDURE — 82077 ASSAY SPEC XCP UR&BREATH IA: CPT | Performed by: EMERGENCY MEDICINE

## 2021-06-15 PROCEDURE — 93005 ELECTROCARDIOGRAM TRACING: CPT

## 2021-06-15 PROCEDURE — 99291 CRITICAL CARE FIRST HOUR: CPT | Performed by: EMERGENCY MEDICINE

## 2021-06-15 PROCEDURE — 85610 PROTHROMBIN TIME: CPT | Performed by: EMERGENCY MEDICINE

## 2021-06-15 RX ORDER — TRAZODONE HYDROCHLORIDE 100 MG/1
100 TABLET ORAL
Status: DISCONTINUED | OUTPATIENT
Start: 2021-06-16 | End: 2021-06-17 | Stop reason: HOSPADM

## 2021-06-15 RX ORDER — HEPARIN SODIUM 5000 [USP'U]/ML
5000 INJECTION, SOLUTION INTRAVENOUS; SUBCUTANEOUS EVERY 8 HOURS SCHEDULED
Status: DISCONTINUED | OUTPATIENT
Start: 2021-06-16 | End: 2021-06-17 | Stop reason: HOSPADM

## 2021-06-15 RX ORDER — SODIUM CHLORIDE 9 MG/ML
100 INJECTION, SOLUTION INTRAVENOUS CONTINUOUS
Status: DISCONTINUED | OUTPATIENT
Start: 2021-06-16 | End: 2021-06-16

## 2021-06-15 RX ORDER — ESCITALOPRAM OXALATE 20 MG/1
20 TABLET ORAL DAILY
Status: DISCONTINUED | OUTPATIENT
Start: 2021-06-16 | End: 2021-06-17 | Stop reason: HOSPADM

## 2021-06-15 RX ORDER — CEFEPIME HYDROCHLORIDE 2 G/50ML
2000 INJECTION, SOLUTION INTRAVENOUS ONCE
Status: COMPLETED | OUTPATIENT
Start: 2021-06-15 | End: 2021-06-16

## 2021-06-15 RX ORDER — FAMOTIDINE 20 MG/1
20 TABLET, FILM COATED ORAL DAILY
Status: DISCONTINUED | OUTPATIENT
Start: 2021-06-16 | End: 2021-06-17 | Stop reason: HOSPADM

## 2021-06-15 RX ORDER — QUETIAPINE FUMARATE 300 MG/1
300 TABLET, FILM COATED ORAL DAILY
Status: DISCONTINUED | OUTPATIENT
Start: 2021-06-16 | End: 2021-06-17 | Stop reason: HOSPADM

## 2021-06-15 RX ORDER — CEFEPIME HYDROCHLORIDE 2 G/50ML
2000 INJECTION, SOLUTION INTRAVENOUS EVERY 12 HOURS
Status: DISCONTINUED | OUTPATIENT
Start: 2021-06-16 | End: 2021-06-16

## 2021-06-15 RX ORDER — ACETAMINOPHEN 325 MG/1
650 TABLET ORAL EVERY 6 HOURS PRN
Status: DISCONTINUED | OUTPATIENT
Start: 2021-06-15 | End: 2021-06-17 | Stop reason: HOSPADM

## 2021-06-15 RX ORDER — FAMOTIDINE 20 MG/1
20 TABLET, FILM COATED ORAL 2 TIMES DAILY
Status: DISCONTINUED | OUTPATIENT
Start: 2021-06-16 | End: 2021-06-15 | Stop reason: DRUGHIGH

## 2021-06-15 RX ADMIN — SODIUM CHLORIDE 1000 ML: 0.9 INJECTION, SOLUTION INTRAVENOUS at 21:43

## 2021-06-15 RX ADMIN — SODIUM CHLORIDE 100 ML/HR: 0.9 INJECTION, SOLUTION INTRAVENOUS at 23:59

## 2021-06-15 RX ADMIN — CEFEPIME HYDROCHLORIDE 2000 MG: 2 INJECTION, SOLUTION INTRAVENOUS at 22:56

## 2021-06-16 PROBLEM — Z86.73 HISTORY OF STROKE: Status: ACTIVE | Noted: 2021-06-16

## 2021-06-16 PROBLEM — G93.41 ACUTE METABOLIC ENCEPHALOPATHY: Status: ACTIVE | Noted: 2021-06-16

## 2021-06-16 PROBLEM — R25.1 TREMORS OF NERVOUS SYSTEM: Status: ACTIVE | Noted: 2021-06-16

## 2021-06-16 PROBLEM — R25.1 TREMORS OF NERVOUS SYSTEM: Status: ACTIVE | Noted: 2021-06-15

## 2021-06-16 LAB
AMPHETAMINES SERPL QL SCN: NEGATIVE
ANION GAP SERPL CALCULATED.3IONS-SCNC: 7 MMOL/L (ref 4–13)
ATRIAL RATE: 119 BPM
BARBITURATES UR QL: NEGATIVE
BASOPHILS # BLD AUTO: 0 THOUSANDS/ΜL (ref 0–0.1)
BASOPHILS NFR BLD AUTO: 0 % (ref 0–1)
BENZODIAZ UR QL: NEGATIVE
BILIRUB UR QL STRIP: NEGATIVE
BUN SERPL-MCNC: 31 MG/DL (ref 5–25)
CALCIUM SERPL-MCNC: 8.1 MG/DL (ref 8.3–10.1)
CHLORIDE SERPL-SCNC: 108 MMOL/L (ref 100–108)
CK SERPL-CCNC: 98 U/L (ref 39–308)
CLARITY UR: CLEAR
CO2 SERPL-SCNC: 24 MMOL/L (ref 21–32)
COCAINE UR QL: NEGATIVE
COLOR UR: YELLOW
CREAT SERPL-MCNC: 1.13 MG/DL (ref 0.6–1.3)
EOSINOPHIL # BLD AUTO: 0 THOUSAND/ΜL (ref 0–0.61)
EOSINOPHIL NFR BLD AUTO: 0 % (ref 0–6)
ERYTHROCYTE [DISTWIDTH] IN BLOOD BY AUTOMATED COUNT: 13.5 % (ref 11.6–15.1)
GFR SERPL CREATININE-BSD FRML MDRD: 70 ML/MIN/1.73SQ M
GLUCOSE SERPL-MCNC: 138 MG/DL (ref 65–140)
GLUCOSE UR STRIP-MCNC: NEGATIVE MG/DL
HCT VFR BLD AUTO: 34.1 % (ref 36.5–49.3)
HGB BLD-MCNC: 11.3 G/DL (ref 12–17)
HGB UR QL STRIP.AUTO: NEGATIVE
IMM GRANULOCYTES # BLD AUTO: 0.02 THOUSAND/UL (ref 0–0.2)
IMM GRANULOCYTES NFR BLD AUTO: 0 % (ref 0–2)
KETONES UR STRIP-MCNC: NEGATIVE MG/DL
LACTATE SERPL-SCNC: 1.6 MMOL/L (ref 0.5–2)
LEUKOCYTE ESTERASE UR QL STRIP: NEGATIVE
LYMPHOCYTES # BLD AUTO: 1.36 THOUSANDS/ΜL (ref 0.6–4.47)
LYMPHOCYTES NFR BLD AUTO: 12 % (ref 14–44)
MCH RBC QN AUTO: 30.5 PG (ref 26.8–34.3)
MCHC RBC AUTO-ENTMCNC: 33.1 G/DL (ref 31.4–37.4)
MCV RBC AUTO: 92 FL (ref 82–98)
METHADONE UR QL: NEGATIVE
MONOCYTES # BLD AUTO: 0.97 THOUSAND/ΜL (ref 0.17–1.22)
MONOCYTES NFR BLD AUTO: 9 % (ref 4–12)
NEUTROPHILS # BLD AUTO: 9.03 THOUSANDS/ΜL (ref 1.85–7.62)
NEUTS SEG NFR BLD AUTO: 79 % (ref 43–75)
NITRITE UR QL STRIP: NEGATIVE
OPIATES UR QL SCN: NEGATIVE
OXYCODONE+OXYMORPHONE UR QL SCN: NEGATIVE
P AXIS: 82 DEGREES
PCP UR QL: NEGATIVE
PH UR STRIP.AUTO: 6 [PH]
PLATELET # BLD AUTO: 101 THOUSANDS/UL (ref 149–390)
PLATELET # BLD AUTO: 102 THOUSANDS/UL (ref 149–390)
PMV BLD AUTO: 11.9 FL (ref 8.9–12.7)
PMV BLD AUTO: 12.1 FL (ref 8.9–12.7)
POTASSIUM SERPL-SCNC: 4.1 MMOL/L (ref 3.5–5.3)
PR INTERVAL: 160 MS
PROCALCITONIN SERPL-MCNC: <0.05 NG/ML
PROLACTIN SERPL-MCNC: 38.5 NG/ML (ref 2.5–17.4)
PROT UR STRIP-MCNC: NEGATIVE MG/DL
QRS AXIS: -41 DEGREES
QRSD INTERVAL: 102 MS
QT INTERVAL: 332 MS
QTC INTERVAL: 467 MS
RBC # BLD AUTO: 3.71 MILLION/UL (ref 3.88–5.62)
SODIUM SERPL-SCNC: 139 MMOL/L (ref 136–145)
SP GR UR STRIP.AUTO: 1.01 (ref 1–1.03)
T WAVE AXIS: 94 DEGREES
THC UR QL: POSITIVE
UROBILINOGEN UR QL STRIP.AUTO: 0.2 E.U./DL
VENTRICULAR RATE: 119 BPM
WBC # BLD AUTO: 11.38 THOUSAND/UL (ref 4.31–10.16)

## 2021-06-16 PROCEDURE — 93010 ELECTROCARDIOGRAM REPORT: CPT | Performed by: INTERNAL MEDICINE

## 2021-06-16 PROCEDURE — 99254 IP/OBS CNSLTJ NEW/EST MOD 60: CPT | Performed by: PSYCHIATRY & NEUROLOGY

## 2021-06-16 PROCEDURE — 80048 BASIC METABOLIC PNL TOTAL CA: CPT | Performed by: INTERNAL MEDICINE

## 2021-06-16 PROCEDURE — 81003 URINALYSIS AUTO W/O SCOPE: CPT | Performed by: INTERNAL MEDICINE

## 2021-06-16 PROCEDURE — 80307 DRUG TEST PRSMV CHEM ANLYZR: CPT | Performed by: INTERNAL MEDICINE

## 2021-06-16 PROCEDURE — 85049 AUTOMATED PLATELET COUNT: CPT | Performed by: INTERNAL MEDICINE

## 2021-06-16 PROCEDURE — 99223 1ST HOSP IP/OBS HIGH 75: CPT | Performed by: INTERNAL MEDICINE

## 2021-06-16 PROCEDURE — 97165 OT EVAL LOW COMPLEX 30 MIN: CPT

## 2021-06-16 PROCEDURE — 83605 ASSAY OF LACTIC ACID: CPT | Performed by: INTERNAL MEDICINE

## 2021-06-16 PROCEDURE — 85025 COMPLETE CBC W/AUTO DIFF WBC: CPT | Performed by: INTERNAL MEDICINE

## 2021-06-16 PROCEDURE — 97162 PT EVAL MOD COMPLEX 30 MIN: CPT

## 2021-06-16 PROCEDURE — 92610 EVALUATE SWALLOWING FUNCTION: CPT

## 2021-06-16 PROCEDURE — 84145 PROCALCITONIN (PCT): CPT | Performed by: INTERNAL MEDICINE

## 2021-06-16 RX ORDER — ATORVASTATIN CALCIUM 40 MG/1
40 TABLET, FILM COATED ORAL
Status: DISCONTINUED | OUTPATIENT
Start: 2021-06-16 | End: 2021-06-17 | Stop reason: HOSPADM

## 2021-06-16 RX ORDER — ASPIRIN 81 MG/1
81 TABLET ORAL DAILY
Status: DISCONTINUED | OUTPATIENT
Start: 2021-06-16 | End: 2021-06-17 | Stop reason: HOSPADM

## 2021-06-16 RX ORDER — SODIUM CHLORIDE 9 MG/ML
75 INJECTION, SOLUTION INTRAVENOUS CONTINUOUS
Status: DISCONTINUED | OUTPATIENT
Start: 2021-06-16 | End: 2021-06-17

## 2021-06-16 RX ADMIN — SODIUM CHLORIDE 75 ML/HR: 0.9 INJECTION, SOLUTION INTRAVENOUS at 13:51

## 2021-06-16 RX ADMIN — TRAZODONE HYDROCHLORIDE 100 MG: 100 TABLET ORAL at 00:00

## 2021-06-16 RX ADMIN — ESCITALOPRAM OXALATE 20 MG: 20 TABLET ORAL at 08:40

## 2021-06-16 RX ADMIN — HEPARIN SODIUM 5000 UNITS: 5000 INJECTION INTRAVENOUS; SUBCUTANEOUS at 06:32

## 2021-06-16 RX ADMIN — ASPIRIN 81 MG: 81 TABLET, COATED ORAL at 14:59

## 2021-06-16 RX ADMIN — HEPARIN SODIUM 5000 UNITS: 5000 INJECTION INTRAVENOUS; SUBCUTANEOUS at 22:12

## 2021-06-16 RX ADMIN — QUETIAPINE FUMARATE 300 MG: 300 TABLET ORAL at 08:41

## 2021-06-16 RX ADMIN — ATORVASTATIN CALCIUM 40 MG: 40 TABLET, FILM COATED ORAL at 17:09

## 2021-06-16 RX ADMIN — SODIUM CHLORIDE 100 ML/HR: 0.9 INJECTION, SOLUTION INTRAVENOUS at 08:39

## 2021-06-16 RX ADMIN — CEFEPIME HYDROCHLORIDE 2000 MG: 2 INJECTION, SOLUTION INTRAVENOUS at 10:02

## 2021-06-16 RX ADMIN — HEPARIN SODIUM 5000 UNITS: 5000 INJECTION INTRAVENOUS; SUBCUTANEOUS at 14:57

## 2021-06-16 RX ADMIN — FAMOTIDINE 20 MG: 20 TABLET, FILM COATED ORAL at 08:40

## 2021-06-16 RX ADMIN — METRONIDAZOLE 500 MG: 500 SOLUTION INTRAVENOUS at 00:00

## 2021-06-16 RX ADMIN — SODIUM CHLORIDE 75 ML/HR: 0.9 INJECTION, SOLUTION INTRAVENOUS at 22:13

## 2021-06-16 RX ADMIN — TRAZODONE HYDROCHLORIDE 100 MG: 100 TABLET ORAL at 22:12

## 2021-06-16 RX ADMIN — METRONIDAZOLE 500 MG: 500 SOLUTION INTRAVENOUS at 08:39

## 2021-06-16 RX ADMIN — HEPARIN SODIUM 5000 UNITS: 5000 INJECTION INTRAVENOUS; SUBCUTANEOUS at 00:00

## 2021-06-16 NOTE — ASSESSMENT & PLAN NOTE
· Patient currently oriented x3   · Occasionally throughout the exam patient changing details of his story or stating random details that are not relevant  · Per staff at [facility] this is patient's baseline

## 2021-06-16 NOTE — PROGRESS NOTES
[place]  Progress Note - Shellie Rodriguez 1960, 62 y o  male MRN: [id number]  Unit/Bed#: -01 Encounter: [id number]  Primary Care Provider: Vera Burden DO   Date and time admitted to hospital: 6/15/2021  8:37 PM    * Acute metabolic encephalopathy  Assessment & Plan  Patient was admitted to the hospital for evaluation of acute encephalopathy characterized by episode of shaking, please refer to the ER attending's description of the event  Etiologies include dehydration, acute kidney injury  Urine drug screen is positive for marijuana  He has evidence of old lacunar CVA on CT of the head, need to evaluate for seizures  I do not believe patient had acute hypoxic respiratory failure on admission  Currently his oxygen saturation is 95-97% on room air, lungs are clear to auscultation, patient has no evidence of CHF and chest x-ray shows no CHF, infiltrates or masses  The shaking episode has not recurred since admission    Will monitor patient closely    MARIELY (acute kidney injury) [facility])  Assessment & Plan  Patient was admitted with acute kidney injury  Denies any obstructive urinary complaints  Acute kidney injury is resolving with hydration, I suspect this was prerenal   Patient was taking meloxicam at home which may have caused acute kidney injury    Will continue hydration with normal saline solution at 75 cc an hour and will monitor renal function  Continue urinary retention protocol  Will avoid NSAIDs    SIRS (systemic inflammatory response syndrome) ([facility] [address] )  Assessment & Plan  Patient met criteria for SIRS admission as evidenced by heart of more than 100, leukocytosis of 17,000  No infectious source has been identified yet that could account for those findings    Procalcitonin is normal, leukocytosis is decreasing:  From 75927 to 11 38K    I will observe patient without antibiotics    Bipolar 2 disorder, major depressive episode ([facility] [address] )  Assessment & Plan  Patient denies depression, his moods are at baseline he claims  Continue Seroquel, Lexapro, trazodone    History of CVA (cerebrovascular accident)  Assessment & Plan  CT head revealed old lacunar infarct  Patient is a smoker    Will start aspirin and atorvastatin for secondary prevention        VTE Prophylaxis: in place    Patient Centered Rounds: I rounded with patient's nurse    Current Length of Stay: 1 day(s)    Current Patient Status: Inpatient    Certification Statement: Pt requires additional inpatient hospital stay due to: see assessment and plan        Subjective:   Patient's nurse reports that patient has had no tremors or shakes since admission  Patient denies any chest pain, cough, shortness of breath, dysphagia, abdominal pain, diarrhea, constipation, difficulty urinating, signs of bleeding  All other ROS are negative    Objective:     Vitals:   Temp (24hrs), Av 2 °F (36 8 °C), Min:97 8 °F (36 6 °C), Max:98 6 °F (37 °C)    Temp:  [97 8 °F (36 6 °C)-98 6 °F (37 °C)] 98 2 °F (36 8 °C)  HR:  [] 78  Resp:  [13-20] 18  BP: (106-147)/(67-97) 123/77  SpO2:  [77 %-100 %] 95 %  Body mass index is 25 73 kg/m²  Input and Output Summary (last 24 hours): Intake/Output Summary (Last 24 hours) at 2021 1402  Last data filed at 2021 0736  Gross per 24 hour   Intake 1480 ml   Output 1162 ml   Net 318 ml       Physical Exam:     Physical Exam  Constitutional:       General: He is not in acute distress  Appearance: He is not ill-appearing or toxic-appearing  HENT:      Head: Normocephalic  Mouth/Throat:      Mouth: Mucous membranes are moist       Pharynx: Oropharynx is clear  Eyes:      Conjunctiva/sclera: Conjunctivae normal    Cardiovascular:      Rate and Rhythm: Normal rate and regular rhythm  Heart sounds: No murmur heard  Pulmonary:      Effort: No respiratory distress  Breath sounds: No wheezing or rales     Abdominal:      General: Bowel sounds are normal  There is no distension  Palpations: Abdomen is soft  Tenderness: There is no abdominal tenderness  Musculoskeletal:         General: No swelling  Cervical back: Neck supple  Skin:     General: Skin is warm and dry  Comments: Patient has no lower extremity edema, no rash, cellulitis or ulcers   Neurological:      Mental Status: He is alert  Cranial Nerves: No cranial nerve deficit  Motor: No weakness (Moves all extremities on command)  Comments: No tremors or seizure activity seen   Psychiatric:      Comments: Flat affect             I personally reviewed labs and imaging reports for today  Last 24 Hours Medication List:   Current Facility-Administered Medications   Medication Dose Route Frequency Provider Last Rate    acetaminophen  650 mg Oral Q6H PRN Dave Warren PA-C      aspirin  81 mg Oral Daily Saurabh Mishra MD      atorvastatin  40 mg Oral Daily With Josephine Boo MD      escitalopram  20 mg Oral Daily Dave Warren PA-C      famotidine  20 mg Oral Daily Dave Warren PA-C      heparin (porcine)  5,000 Units Subcutaneous On license of [facility] Dave Warren PA-C      QUEtiapine  300 mg Oral Daily Dave Warren PA-C      sodium chloride  75 mL/hr Intravenous Continuous Saurabh Mishra MD 75 mL/hr (06/16/21 8521)    traZODone  100 mg Oral HS Dave Warren PA-C            Today, Patient Was Seen By: Saurabh Mishra MD    ** Please Note: Dictation voice to text software may have been used in the creation of this document   **

## 2021-06-16 NOTE — ASSESSMENT & PLAN NOTE
Patient was admitted to the hospital for evaluation of acute encephalopathy characterized by episode of shaking, please refer to the ER attending's description of the event  Etiologies include dehydration, acute kidney injury  Urine drug screen is positive for marijuana  He has evidence of old lacunar CVA on CT of the head, need to evaluate for seizures  I do not believe patient had acute hypoxic respiratory failure on admission  Currently his oxygen saturation is 95-97% on room air, lungs are clear to auscultation, patient has no evidence of CHF and chest x-ray shows no CHF, infiltrates or masses      The shaking episode has not recurred since admission    Will monitor patient closely

## 2021-06-16 NOTE — ASSESSMENT & PLAN NOTE
Patient met criteria for SIRS admission as evidenced by heart of more than 100, leukocytosis of 17,000  No infectious source has been identified yet that could account for those findings    Procalcitonin is normal, leukocytosis is decreasing:  From 55824 to 11 38K    I will observe patient without antibiotics

## 2021-06-16 NOTE — PLAN OF CARE
Problem: Potential for Falls  Goal: Patient will remain free of falls  Description: INTERVENTIONS:  - Educate patient/family on patient safety including physical limitations  - Instruct patient to call for assistance with activity   - Consult OT/PT to assist with strengthening/mobility   - Keep Call bell within reach  - Keep bed low and locked with side rails adjusted as appropriate  - Keep care items and personal belongings within reach  - Initiate and maintain comfort rounds  - Make Fall Risk Sign visible to staff  - Offer Toileting every x Hours, in advance of need  - Initiate/Maintain xalarm  - Obtain necessary fall risk management equipment: x  - Apply yellow socks and bracelet for high fall risk patients  - Consider moving patient to room near nurses station  Outcome: Progressing     Problem: Nutrition/Hydration-ADULT  Goal: Nutrient/Hydration intake appropriate for improving, restoring or maintaining nutritional needs  Description: Monitor and assess patient's nutrition/hydration status for malnutrition  Collaborate with interdisciplinary team and initiate plan and interventions as ordered  Monitor patient's weight and dietary intake as ordered or per policy  Utilize nutrition screening tool and intervene as necessary  Determine patient's food preferences and provide high-protein, high-caloric foods as appropriate       INTERVENTIONS:  - Monitor oral intake, urinary output, labs, and treatment plans  - Assess nutrition and hydration status and recommend course of action  - Evaluate amount of meals eaten  - Assist patient with eating if necessary   - Allow adequate time for meals  - Recommend/ encourage appropriate diets, oral nutritional supplements, and vitamin/mineral supplements  - Order, calculate, and assess calorie counts as needed  - Recommend, monitor, and adjust tube feedings and TPN/PPN based on assessed needs  - Assess need for intravenous fluids  - Provide specific nutrition/hydration education as appropriate  - Include patient/family/caregiver in decisions related to nutrition  Outcome: Progressing     Problem: NEUROSENSORY - ADULT  Goal: Achieves stable or improved neurological status  Description: INTERVENTIONS  - Monitor and report changes in neurological status  - Monitor vital signs such as temperature, blood pressure, glucose, and any other labs ordered   - Initiate measures to prevent increased intracranial pressure  - Monitor for seizure activity and implement precautions if appropriate      Outcome: Progressing  Goal: Remains free of injury related to seizures activity  Description: INTERVENTIONS  - Maintain airway, patient safety  and administer oxygen as ordered  - Monitor patient for seizure activity, document and report duration and description of seizure to physician/advanced practitioner  - If seizure occurs,  ensure patient safety during seizure  - Reorient patient post seizure  - Seizure pads on all 4 side rails  - Instruct patient/family to notify RN of any seizure activity including if an aura is experienced  - Instruct patient/family to call for assistance with activity based on nursing assessment  - Administer anti-seizure medications if ordered    Outcome: Progressing  Goal: Achieves maximal functionality and self care  Description: INTERVENTIONS  - Monitor swallowing and airway patency with patient fatigue and changes in neurological status  - Encourage and assist patient to increase activity and self care     - Encourage visually impaired, hearing impaired and aphasic patients to use assistive/communication devices  Outcome: Progressing     Problem: GASTROINTESTINAL - ADULT  Goal: Maintains adequate nutritional intake  Description: INTERVENTIONS:  - Monitor percentage of each meal consumed  - Identify factors contributing to decreased intake, treat as appropriate  - Assist with meals as needed  - Monitor I&O, weight, and lab values if indicated  - Obtain nutrition services referral as needed  Outcome: Progressing     Problem: GENITOURINARY - ADULT  Goal: Maintains or returns to baseline urinary function  Description: INTERVENTIONS:  - Assess urinary function  - Encourage oral fluids to ensure adequate hydration if ordered  - Administer IV fluids as ordered to ensure adequate hydration  - Administer ordered medications as needed  - Offer frequent toileting  - Follow urinary retention protocol if ordered  Outcome: Progressing  Goal: Absence of urinary retention  Description: INTERVENTIONS:  - Assess patients ability to void and empty bladder  - Monitor I/O  - Bladder scan as needed  - Discuss with physician/AP medications to alleviate retention as needed  - Discuss catheterization for long term situations as appropriate  Outcome: Progressing     Problem: METABOLIC, FLUID AND ELECTROLYTES - ADULT  Goal: Electrolytes maintained within normal limits  Description: INTERVENTIONS:  - Monitor labs and assess patient for signs and symptoms of electrolyte imbalances  - Administer electrolyte replacement as ordered  - Monitor response to electrolyte replacements, including repeat lab results as appropriate  - Instruct patient on fluid and nutrition as appropriate  Outcome: Progressing  Goal: Fluid balance maintained  Description: INTERVENTIONS:  - Monitor labs   - Monitor I/O and WT  - Instruct patient on fluid and nutrition as appropriate  - Assess for signs & symptoms of volume excess or deficit  Outcome: Progressing     Problem: PAIN - ADULT  Goal: Verbalizes/displays adequate comfort level or baseline comfort level  Description: Interventions:  - Encourage patient to monitor pain and request assistance  - Assess pain using appropriate pain scale  - Administer analgesics based on type and severity of pain and evaluate response  - Implement non-pharmacological measures as appropriate and evaluate response  - Consider cultural and social influences on pain and pain management  - Notify physician/advanced practitioner if interventions unsuccessful or patient reports new pain  Outcome: Progressing     Problem: SAFETY ADULT  Goal: Patient will remain free of falls  Description: INTERVENTIONS:  - Educate patient/family on patient safety including physical limitations  - Instruct patient to call for assistance with activity   - Consult OT/PT to assist with strengthening/mobility   - Keep Call bell within reach  - Keep bed low and locked with side rails adjusted as appropriate  - Keep care items and personal belongings within reach  - Initiate and maintain comfort rounds  - Make Fall Risk Sign visible to staff  - Offer Toileting every x Hours, in advance of need  - Initiate/Maintain xalarm  - Obtain necessary fall risk management equipment: x  - Apply yellow socks and bracelet for high fall risk patients  - Consider moving patient to room near nurses station  Outcome: Progressing  Goal: Maintain or return to baseline ADL function  Description: INTERVENTIONS:  -  Assess patient's ability to carry out ADLs; assess patient's baseline for ADL function and identify physical deficits which impact ability to perform ADLs (bathing, care of mouth/teeth, toileting, grooming, dressing, etc )  - Assess/evaluate cause of self-care deficits   - Assess range of motion  - Assess patient's mobility; develop plan if impaired  - Assess patient's need for assistive devices and provide as appropriate  - Encourage maximum independence but intervene and supervise when necessary  - Involve family in performance of ADLs  - Assess for home care needs following discharge   - Consider OT consult to assist with ADL evaluation and planning for discharge  - Provide patient education as appropriate  Outcome: Progressing  Goal: Maintains/Returns to pre admission functional level  Description: INTERVENTIONS:  - Perform BMAT or MOVE assessment daily    - Set and communicate daily mobility goal to care team and patient/family/caregiver  - Collaborate with rehabilitation services on mobility goals if consulted  - Perform Range of Motion x times a day  - Reposition patient every x hours  - Dangle patient x times a day  - Stand patient x times a day  - Ambulate patient x times a day  - Out of bed to chair x times a day   - Out of bed for meals x times a day  - Out of bed for toileting  - Record patient progress and toleration of activity level   Outcome: Progressing     Problem: DISCHARGE PLANNING  Goal: Discharge to home or other facility with appropriate resources  Description: INTERVENTIONS:  - Identify barriers to discharge w/patient and caregiver  - Arrange for needed discharge resources and transportation as appropriate  - Identify discharge learning needs (meds, wound care, etc )  - Arrange for interpretive services to assist at discharge as needed  - Refer to Case Management Department for coordinating discharge planning if the patient needs post-hospital services based on physician/advanced practitioner order or complex needs related to functional status, cognitive ability, or social support system  Outcome: Progressing     Problem: Knowledge Deficit  Goal: Patient/family/caregiver demonstrates understanding of disease process, treatment plan, medications, and discharge instructions  Description: Complete learning assessment and assess knowledge base    Interventions:  - Provide teaching at level of understanding  - Provide teaching via preferred learning methods  Outcome: Progressing     Problem: INFECTION - ADULT  Goal: Absence or prevention of progression during hospitalization  Description: INTERVENTIONS:  - Assess and monitor for signs and symptoms of infection  - Monitor lab/diagnostic results  - Monitor all insertion sites, i e  indwelling lines, tubes, and drains  - Monitor endotracheal if appropriate and nasal secretions for changes in amount and color  - [place] appropriate cooling/warming therapies per order  - Administer medications as ordered  - Instruct and encourage patient and family to use good hand hygiene technique  - Identify and instruct in appropriate isolation precautions for identified infection/condition  Outcome: Progressing     Problem: SKIN/TISSUE INTEGRITY - ADULT  Goal: Skin Integrity remains intact(Skin Breakdown Prevention)  Description: Assess:  -Perform Porfirio assessment every x  -Clean and moisturize skin every x  -Inspect skin when repositioning, toileting, and assisting with ADLS  -Assess under medical devices such as x every x  -Assess extremities for adequate circulation and sensation     Bed Management:  -Have minimal linens on bed & keep smooth, unwrinkled  -Change linens as needed when moist or perspiring  -Avoid sitting or lying in one position for more than x hours while in bed  -Keep HOB at [facility]     Toileting:  -Offer bedside commode  -Assess for incontinence every x  -Use incontinent care products after each incontinent episode such as x    Activity:  -Mobilize patient x times a day  -Encourage activity and walks on unit  -Encourage or provide ROM exercises   -Turn and reposition patient every x Hours  -Use appropriate equipment to lift or move patient in bed  -Instruct/ Assist with weight shifting every x when out of bed in chair  -Consider limitation of chair time x hour intervals    Skin Care:  -Avoid use of baby powder, tape, friction and shearing, hot water or constrictive clothing  -Relieve pressure over bony prominences using x  -Do not massage red bony areas    Next Steps:  -Teach patient strategies to minimize risks such as x   -Consider consults to  interdisciplinary teams such as x  Outcome: Progressing     Problem: HEMATOLOGIC - ADULT  Goal: Maintains hematologic stability  Description: INTERVENTIONS  - Assess for signs and symptoms of bleeding or hemorrhage  - Monitor labs  - Administer supportive blood products/factors as ordered and appropriate  Outcome: Progressing     Problem: MUSCULOSKELETAL - ADULT  Goal: Maintain or return mobility to safest level of function  Description: INTERVENTIONS:  - Assess patient's ability to carry out ADLs; assess patient's baseline for ADL function and identify physical deficits which impact ability to perform ADLs (bathing, care of mouth/teeth, toileting, grooming, dressing, etc )  - Assess/evaluate cause of self-care deficits   - Assess range of motion  - Assess patient's mobility  - Assess patient's need for assistive devices and provide as appropriate  - Encourage maximum independence but intervene and supervise when necessary  - Involve family in performance of ADLs  - Assess for home care needs following discharge   - Consider OT consult to assist with ADL evaluation and planning for discharge  - Provide patient education as appropriate  Outcome: Progressing

## 2021-06-16 NOTE — ASSESSMENT & PLAN NOTE
62year old male with bipolar disorder, tobacco use, prior suicide attempt, and prior alcohol abuse, presents from his group home due to a 1-2 minute sudden episode of tremors and gait unsteadiness  Denies loss of consciousness, tongue bite, bowel or bladder incontinence, or amnesia  Unclear etiology, but doubt seizure  · CTH identified chronic infarcts, at least present on imaging since 2015/2017  · Prolactin elevation may be secondary to Risperdal   · No obvious sources of infection identified to explain leukocytosis  He did require 2L NC in ED due to hypoxia but CXR unremarkable  · CK WNL  Plan:  - Will obtain routine EEG given unclear history, prior episodes of fall/"blacking out "    - Will defer additional neuroimaging at this time   - Medical management and supportive care per primary team  Correction of any metabolic or infectious disturbances

## 2021-06-16 NOTE — ASSESSMENT & PLAN NOTE
Patient denies depression, his moods are at baseline he claims      Continue Seroquel, Lexapro, trazodone

## 2021-06-16 NOTE — PLAN OF CARE
Problem: Potential for Falls  Goal: Patient will remain free of falls  Description: INTERVENTIONS:  - Educate patient/family on patient safety including physical limitations  - Instruct patient to call for assistance with activity   - Consult OT/PT to assist with strengthening/mobility   - Keep Call bell within reach  - Keep bed low and locked with side rails adjusted as appropriate  - Keep care items and personal belongings within reach  - Initiate and maintain comfort rounds  - Make Fall Risk Sign visible to staff  - Offer Toileting every two Hours, in advance of need  - Initiate/Maintain bed alarm  - Obtain necessary fall risk management equipment:   - Apply yellow socks and bracelet for high fall risk patients  - Consider moving patient to room near nurses station  Outcome: Progressing

## 2021-06-16 NOTE — ASSESSMENT & PLAN NOTE
- CTH on admission identified chronic areas of infarct in the right frontal and parietal lobes, which were previously identified on MRI in 2017, as well as left caudate infarct, identified as far back as 2015    - Recommend aspirin 81 mg daily and atorvastatin 40 mg daily for stroke prevention   - Patient believes these were asymptomatic strokes

## 2021-06-16 NOTE — SPEECH THERAPY NOTE
Speech Language/Pathology    Speech-Language Pathology Bedside Swallow Evaluation      Patient Name: Robina Rodgers    BVQJP'N Date: 6/16/2021     Problem List  Principal Problem:    Tremors   Active Problems:    Bipolar 2 disorder, major depressive episode ([facility] )    Al[facility] dependence in remission ([facility] )    Tobacco dependence syndrome    AMS (altered mental status)    Acute respiratory failure with hypoxia (HCC)    SIRS (systemic inflammatory response syndrome) (HCC)    MARIELY (acute kidney injury) ([facility] )    History of stroke, incidental finding      Past Medical History  Past Medical History:   Diagnosis Date    Alcohol abuse     Depression     Drug therapy     GERD (gastroesophageal reflux disease)     Hypertension 01/2012    Knee pain, bilateral     Psychiatric disorder     depression, anxiety    Self-injurious behavior     Spinal stenosis of lumbar region     Suicide attempt [facility])        Past Surgical History  Past Surgical History:   Procedure Laterality Date    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   [facility]       Summary   Pt presented with s/s suggestive of mild oral and suspected minimal pharyngeal dysphagia  Pt refuses to sit upright for PO, eating lunch meal horizontally  Pt takes very large bites (half of salmon filet at a time)  Mastication prolonged, lingual palatal mashing  Pt unable to follow cues to slow rate, transfer material, or take smaller bites  Swallow initiation suspected fairly prompt once transferred  Cough x1 while drinking thin liquids w/ mod amount of PO in oral cavity  Risk/s for Aspiration:  Mod     Recommended Diet: soft/level 3 diet and thin liquids   Recommended Form of Meds: whole with liquid   Aspiration precautions and swallowing strategies: upright posture, only feed when fully alert, slow rate of feeding and small bites/sips  Other Recommendations: Continue frequent oral care, SUPERVISE PT AND SET UP FULLY UPRIGHT         Current Medical Status  Pt is a 62 y o  male who presented to [address] [place] [address] with bipolar disorder, ADHD, anxiety, hyperlipidemia, tobacco use, alcohol abuse, and prior suicide attempt who presented to the ED from a rehab facility for evaluation of tremors and altered mental status  The patient reportedly was going to dinner according to chart review, however patient reports that he was going to tell a friend that it was time for medications  He knocked on his friend's door  He began shaking all over and could not control it  He states that it was subtle tremors throughout his whole body  The patient reports that he remembers the episode and he did not lose conscioussness  He denies any prior history of this  Other residents at the facility noted that the patient was unsteady on his feet  They stated that he seemed to be "bouncing off the walls and a door " He almost fell down the stairs  Entire episode lasted approximately 1-2 minutes  There was no report of tongue bite or bowel/bladder incontinence  In the ED, he was identified to have some muscle fasciculations as well as a tremor of the left eye, left side of neck, and bilateral hands  He was tachycardic on arrival to the ED  Blood pressure and other vitals were stable  CTH identified chronic infarcts in the right parietal and right frontal lobes as well as a chronic lacunar infarction in the left caudate and lentiform nuclei  UA revealed THC  Lactic acid initially elevated at 3 2 and then normalized 2 hours later  CK WNL  WBC 17 82  Creatinine 1 65, BUN 48  Prolactin 38 5  He was noted to become hypoxic in the ED and required 2L nasal cannula  CXR unremarkable  He was prophylactically started on cefepime and Flagyl       Of note, there was an MRI brain obtained in 2017 for left eyelid twitching  The chronic bilateral infarcts as described above were identified at that time   He also had a CTH in 2015 after a fall identifying the L sided infarcts       Patient denies any recent medication changes  He denies ever seeing a neurologist in the past  He denies any recurrent tremors since admission  Current Precautions:  Seizure      Allergies:  No known food allergies    Past medical history:  Please see H&P for details    Special Studies:  CXR 6/15/21: No acute abnormality in the chest     CT head 6/15/21: No acute intracranial abnormality  Social/Education/Vocational Hx:  Pt lives [employer]   Current Risks for Dysphagia & Aspiration: AMS and impulsivity  Current Symptoms/Concerns: dentures not present  Current Diet: regular diet and thin liquids   Baseline Diet: regular diet and thin liquids      Baseline Assessment   Behavior/Cognition: alert and agitated, impulsive  Speech/Language Status: able to participate in conversation and able to follow commands inconsistently  Patient Positioning: pt refused to maintain upright position   Pain Status/Interventions/Response to Interventions:  No report of or nonverbal indications of pain  Swallow Mechanism Exam  Facial: symmetrical  Labial: WFL  Lingual: unable to test 2/2 limited command following  Velum: unable to visualize  Mandible: unable to test 2/2 limited command following  Dentition: edentulous  Vocal quality:clear/adequate   Volitional Cough: unable to initiate volitional cough   Respiratory Status: on RA       Consistencies Assessed and Performance   Consistencies Administered: regular texture lunch tray  Materials administered included salmon filet, hard sandwich cookie     Oral Stage: mild  Mastication was sig prolonged with the materials administered today, bite size not appropriate  Mastication eventually effective  Bolus formation and transfer also sig prolonged 2* bolus size  No overt s/s reduced oral control  Pharyngeal Stage: minimal  Swallow Mechanics:  Swallowing initiation appeared prompt  Laryngeal rise was palpated and judged to be within functional limits    Cough x1 while laying down, sipping thin liquid while oral cavity full of PO  Esophageal Concerns: none reported    Strategies and Efficacy: Cued for smaller bites, transfer of material in oral cavity - not effective    Summary and Recommendations (see above)    Results Reviewed with: patient, RN and MD     Treatment Recommended: Yes     Frequency of treatment: As able      Patient Stated Goal: none stated     Dysphagia LTG  -Patient will demonstrate safe and effective oral intake (without overt s/s significant oral/pharyngeal dysphagia including s/s penetration or aspiration) for the highest appropriate diet level  Short Term Goals:    -Pt will tolerate Dysphagia 3/advanced (dental soft) diet and honey thick nectar thick thin liquid with no significant s/s oral or pharyngeal dysphagia across 1-3 diagnostic session/s     -Patient will tolerate trials of upgraded food and/or liquid texture with no significant s/s of oral or pharyngeal dysphagia including aspiration across 1-3 diagnostic sessions     -Patient will comply with a Video/Modified Barium Swallow study for more complete assessment of swallowing anatomy/physiology/aspiration risk and to assess efficacy of treatment techniques so as to best guide treatment plan    Re: Compensatory Strategies    -Patient will demonstrate independent use of recommended safe swallowing strategies during a clinician assessed meal across 1-3 diagnostic sessions                Speech Therapy Prognosis   Prognosis: fair    Prognosis Considerations: age, medical status, prior medical history and cognitive status

## 2021-06-16 NOTE — ASSESSMENT & PLAN NOTE
· Meet sirs criteria due to tachycardia, tachypnea and elevated white blood cell  · Patient presented after "shaking episode" at group home  · Elevated lactic and WBC could be reactive if a seizure did occur  · Patient became hypoxic in the ED requiring 2 L nasal cannula  Cefepime and Flagyl started as precaution in case of aspiration during shaking  · Chest x-ray normal, lungs clear during exam, UA negative  · Patient without tongue biting or emesis during shaking episode  · Procal and blood cultures ordered

## 2021-06-16 NOTE — PHYSICAL THERAPY NOTE
PHYSICAL THERAPY Evaluation    Physical Therapy Evaluation      Patient Active Problem List   Diagnosis    Bipolar 2 disorder, major depressive episode ([facility] )    Attention deficit hyperactivity disorder (ADHD), combined type    Alcohol dependence in remission ([facility] )    Anxiety disorder    Depressive disorder    Benign essential hypertension    Gastroesophageal reflux disease    Hyperlipidemia    Knee pain    Low back pain    Spinal stenosis of lumbar region    Tobacco dependence syndrome    Vitamin D deficiency    Encounter for medical examination to establish care    Left hip pain    Pleuritic chest pain    Elevated d-dimer    Patellofemoral pain syndrome of right knee    AMS (altered mental status)    Acute respiratory failure with hypoxia (HCC)    SIRS (systemic inflammatory response syndrome) ([facility] )    Seizure-like activity ([facility] )    MARIELY (acute kidney injury) ([facility] )       Past Medical History:   Diagnosis Date    Alcohol abuse     Depression     Drug therapy     GERD (gastroesophageal reflux disease)     Hypertension 01/2012    Knee pain, bilateral     Psychiatric disorder     depression, anxiety    Self-injurious behavior     Spinal stenosis of lumbar region     Suicide attempt [facility])        Past Surgical History:   Procedure Laterality Date    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    HERNIA REPAIR  1997 06/16/21 0750   PT Last Visit   PT Visit Date 06/16/21   Note Type   Note type Evaluation   Pain Assessment   Pain Assessment Tool 0-10   Pain Score No Pain   Home Living   Type of Home Other (Comment)  ([place] [facility])   Prior Function   Level of [place] Independent with ADLs and functional mobility   Lives With Facility staff   ADL Assistance Independent   IADLs Independent   Restrictions/Precautions   Other Precautions Seizure   Cognition   Overall Cognitive Status [facility] Arousal/Participation Alert   Orientation Level Oriented to person   Memory Within functional limits   Following Commands Follows all commands and directions without difficulty   RUE Assessment   RUE Assessment WFL   LUE Assessment   LUE Assessment WFL   RLE Assessment   RLE Assessment WFL   LLE Assessment   LLE Assessment WFL   Bed Mobility   Rolling R 7  Independent   Rolling L 7  Independent   Supine to Sit 7  Independent   Sit to Supine 7  Independent   Transfers   Sit to Stand 7  Independent   Stand to Sit 7  Independent   Ambulation/Elevation   Gait pattern   (increased lateral sway, no LOB or unsteadiness)   Gait Assistance 6  Modified independent   Additional items Assist x 1  (decreased awareness for IV line)   Assistive Device None   Distance 50ft without AD, no LOB or unsteadiness, increased lateral sway   Balance   Static Sitting Good   Dynamic Sitting Good   Static Standing Good   Dynamic Standing Good   Ambulatory Good   Activity Tolerance   Activity Tolerance   (no adverse effects to PT IE noted)   Nurse Made Aware [employer]   Assessment   Prognosis Fair   Assessment Pt is a 61 y/o male who presented to ED with c/o seizure like activity  Past medical history of bipolar 2 disorder, alcohol dependence in remission and tobacco dependence who presents from [facility] after seizure-like activity  Dx seizure-like activity, SIRS (tachycardia, tachypnea, elevated WBC), Acute hypocix respiratory failure, EOTH abuse (Sober x 6 months)  Pt currently presents at baseline, flat affect, minimal conversation; able to demo safe mobility, good balance and strength; Pt appears to have decreased awareness for IV line therefore will require supervision while in hospital for all mobility  Pt is safe to return to [facility] when medically ready, no need for skilled PT follow up     Goals   Patient Goals pt does not verbalize goal   Recommendation   PT Discharge Recommendation   (return to facility, no PT needs) PT - OK to Discharge Yes  (When medically ready)   AM-PAC Basic Mobility Inpatient   Turning in Bed Without Bedrails 4   Lying on Back to Sitting on Edge of Flat Bed 4   Moving Bed to Chair 4   Standing Up From Chair 4   Walk in Room 4   Climb 3-5 Stairs 4   Basic Mobility Inpatient Raw Score 24   Basic Mobility Standardized Score 57 68         Ga Pires, PT              Patient Name: Emilee Matos  [id number] Date: 6/16/2021

## 2021-06-16 NOTE — CONSULTS
Consultation - Neurology   Cruzito Neumann 62 y o  male MRN: [id number]  Unit/Bed#: -01 Encounter: [id number]      Assessment/Plan     Tremors of nervous system  Assessment & Plan  62year old male with bipolar disorder, tobacco use, prior suicide attempt, and prior alcohol abuse, presents from his group home due to a 1-2 minute sudden episode of tremors and gait unsteadiness  Denies loss of consciousness, tongue bite, bowel or bladder incontinence, or amnesia  Unclear etiology, but doubt seizure  May be metabolic tremors in the setting of dehydration/MARIELY      · CTH identified chronic infarcts, at least present on imaging since 2015/2017  · Prolactin elevation may be secondary to Risperdal   · No obvious sources of infection identified to explain leukocytosis  He did require 2L NC in ED due to hypoxia but CXR unremarkable  · CK WNL  · BUN 48, Cr 1 65     Plan:  - Will defer routine EEG as do not suspect seizure   - Recommend hydration   - Will defer additional neuroimaging at this time   - Medical management and supportive care per primary team  Correction of any metabolic or infectious disturbances    - No additional inpatient neurologic recs     History of CVA (cerebrovascular accident)  Assessment & Plan  - CTH on admission identified chronic areas of infarct in the right frontal and parietal lobes, which were previously identified on MRI in 2017, as well as left caudate infarct, identified as far back as 2015    - Recommend aspirin 81 mg daily and atorvastatin 40 mg daily for stroke prevention   - Patient believes these were asymptomatic strokes     Alcohol dependence in remission [facility])  Assessment & Plan  - No alcohol intake for at least 6 months per internal medicine team         Recommendations for outpatient neurological follow up have yet to be determined      History of Present Illness     Reason for Consult / Principal Problem: Tremors  Hx and PE limited by: Poor historian     HPI: Cruzito Neumann is a 62 y o  male with bipolar disorder, ADHD, anxiety, hyperlipidemia, tobacco use, alcohol abuse, and prior suicide attempt who presented to the ED from a rehab facility for evaluation of tremors and altered mental status  The patient reportedly was going to dinner according to chart review, however patient reports that he was going to tell a friend that it was time for medications  He knocked on his friend's door  He began shaking all over and could not control it  He states that it was subtle tremors throughout his whole body  The patient reports that he remembers the episode and he did not lose conscioussness  He denies any prior history of this  Other residents at the facility noted that the patient was unsteady on his feet  They stated that he seemed to be "bouncing off the walls and a door " He almost fell down the stairs  Entire episode lasted approximately 1-2 minutes  There was no report of tongue bite or bowel/bladder incontinence  In the ED, he was identified to have some muscle fasciculations as well as a tremor of the left eye, left side of neck, and bilateral hands  He was tachycardic on arrival to the ED  Blood pressure and other vitals were stable  CTH identified chronic infarcts in the right parietal and right frontal lobes as well as a chronic lacunar infarction in the left caudate and lentiform nuclei  UA revealed THC  Lactic acid initially elevated at 3 2 and then normalized 2 hours later  CK WNL  WBC 17 82  Creatinine 1 65, BUN 48  Prolactin 38 5  He was noted to become hypoxic in the ED and required 2L nasal cannula  CXR unremarkable  He was prophylactically started on cefepime and Flagyl  Of note, there was an MRI brain obtained in 2017 for left eyelid twitching  The chronic bilateral infarcts as described above were identified at that time  He also had a CTH in 2015 after a fall identifying the L sided infarcts  Patient denies any recent medication changes   He denies ever seeing a neurologist in the past  He denies any recurrent tremors since admission  Patient was recently seen in the ED on 6/13 for evaluation of abdominal pain  Of note, at the time, his BP was 221/123  Inpatient consult to Neurology  Consult performed by: Keenan Jesus PA-C  Consult ordered by: Raven Rivera MD          Review of Systems   Constitutional: Positive for fatigue  All other systems reviewed and are negative  Historical Information   Past Medical History:   Diagnosis Date    Alcohol abuse     Depression     Drug therapy     GERD (gastroesophageal reflux disease)     Hypertension 01/2012    Knee pain, bilateral     Psychiatric disorder     depression, anxiety    Self-injurious behavior     Spinal stenosis of lumbar region     Suicide attempt [facility])      Past Surgical History:   Procedure Laterality Date    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   233 VIVA     Social History   Social History     Substance and Sexual Activity   Alcohol Use Not Currently    Alcohol/week: 0 0 standard drinks    Comment: Stopped drinking for several months - recovered alcoholic     Social History     Substance and Sexual Activity   Drug Use No    Types: Marijuana    Comment: Last used marijuana 2 months ago  - no longer smoking     E-Cigarette/Vaping     E-Cigarette/Vaping Substances     Social History     Tobacco Use   Smoking Status Current Every Day Smoker    Packs/day: 0 50    Types: Cigarettes, Cigars   Smokeless Tobacco Never Used   Tobacco Comment    PT "3 CIGARS A DAY" - quit smoking cigars     Family History:   Family History   Problem Relation Age of Onset    Depression Mother     Depression Father     No Known Problems Sister     No Known Problems Brother        Review of previous medical records was completed       Meds/Allergies   all current active meds have been reviewed, current meds:   Current Facility-Administered Medications   Medication Dose Route Frequency    acetaminophen (TYLENOL) tablet 650 mg  650 mg Oral Q6H PRN    aspirin (ECOTRIN LOW STRENGTH) EC tablet 81 mg  81 mg Oral Daily    atorvastatin (LIPITOR) tablet 40 mg  40 mg Oral Daily With Dinner    escitalopram (LEXAPRO) tablet 20 mg  20 mg Oral Daily    famotidine (PEPCID) tablet 20 mg  20 mg Oral Daily    heparin (porcine) subcutaneous injection 5,000 Units  5,000 Units Subcutaneous Q8H [address]    QUEtiapine (SEROquel) tablet 300 mg  300 mg Oral Daily    sodium chloride 0 9 % infusion  75 mL/hr Intravenous Continuous    traZODone (DESYREL) tablet 100 mg  100 mg Oral HS    and PTA meds:   Prior to Admission Medications   Prescriptions Last Dose Informant Patient Reported? Taking? QUEtiapine (SEROquel) 300 mg tablet 6/14/2021 at Unknown time  Yes Yes   Sig: Take 300 mg by mouth   diclofenac sodium (Voltaren) 1 %   No No   Sig: Apply 2 g topically 4 (four) times a day   escitalopram (LEXAPRO) 20 mg tablet 6/15/2021 at Unknown time Self No Yes   Sig: Take 1 tablet (20 mg total) by mouth daily   famotidine (PEPCID) 20 mg tablet 6/15/2021 at Unknown time  No Yes   Sig: Take 1 tablet (20 mg total) by mouth 2 (two) times a day   meloxicam (MOBIC) 15 mg tablet   No No   Sig: Take 1 tablet (15 mg total) by mouth daily   traZODone (DESYREL) 100 mg tablet 6/15/2021 at Unknown time Self No Yes   Sig: Take 1 tablet (100 mg total) by mouth daily at bedtime      Facility-Administered Medications: None       Allergies   Allergen Reactions    Haloperidol      Other reaction(s): jittery       Objective   Vitals:Blood pressure 127/83, pulse 94, temperature 98 8 °F (37 1 °C), temperature source Oral, resp  rate 19, height 5' 8" (1 727 m), weight 76 7 kg (169 lb 3 2 oz), SpO2 93 %  ,Body mass index is 25 73 kg/m²  Intake/Output Summary (Last 24 hours) at 6/16/2021 1635  Last data filed at 6/16/2021 0736  Gross per 24 hour   Intake 1480 ml   Output 1162 ml   Net 318 ml       Invasive Devices:    Invasive Devices     Peripheral Intravenous Line            Peripheral IV 06/16/21 Right Forearm <1 day                Physical Exam:   Constitutional: Patient is resting, easily arousable  Appears older than stated age  No acute distress  No diaphoresis  HENT: Normocephalic, atraumatic  Right and left external ear normal  Oropharynx clear and moist  Nose normal  Subtle left eyelid twitching  Eyes: EOMs intact without nystagmus  No scleral icterus or injection  No discharge  Neck: Supple, normal ROM  No stridor noted  Cardiovascular: Regular rate  Pulmonary: No respiratory distress  Effort normal    Musculoskeletal: Normal ROM  No tenderness, edema  R hand finger deformity  Neurological: A&Ox3  Follows all commands  Skin: Warm and dry  No erythema or rashes  Psychiatric: Flat affect  Limited insight  Question degree of psychomotor slowing  Neurologic Exam:  Mental Status: Patient is sleeping on arrival, but easily arousable  Oriented x 3  Follows all commands without difficulty  No dysarthria  No aphasia  Cranial Nerves: Cranial nerves 2-12 intact  Motor: 5/5 strength throughout bilateral upper and lower extremities  Sensation: Sensation to light touch intact throughout  Coordination: No ataxia  L eyelid tremor noted  Slightly tremulous in hands  Gait: Deferred        Lab Results: I have personally reviewed pertinent reports  Imaging Studies: I have personally reviewed pertinent reports  and I have personally reviewed pertinent films in PACS [facility], prior MRI report)  EKG, Pathology, and Other Studies: I have personally reviewed pertinent reports      VTE Prophylaxis: Heparin    Code Status: Level 1 - Full Code

## 2021-06-16 NOTE — ED PROVIDER NOTES
History  Chief Complaint   Patient presents with    Altered Mental Status     61y M gee from [facility] for "shaking" episode  Per EMS, pt reports going to get friend for dinner, knocking on his door and then shaking all over  Pt told EMS he remembered everything but couldn't control the shaking  When I question him, the sequence of events keeps changing and pt will occas use wrong word or say something that doesn't make much sense  When staff arrived from [employer] we were told the patient went upstairs to let other resident know that dinner was ready  Per other residents, he became unsteady on his feet and seemed about to collapse a couple of times and was shaking all over  Was "bouncing off the walls and a door" and almost fell down the stairs  Other residents were able to lower him to the ground  Episode lasted 1-2 minutes at the most   No evidence of b/b incont  When I asked pt why he didn't tell me all of that he said "you didn't ask"  I said that I had asked what happened and he just shrugged  Noted to be having a fine tremor w/ some fasciculations / tremors to the left eye, left side of the neck and b/l hands  History provided by:  Patient, EMS personnel and caregiver  History limited by:  Mental status change   used: No    Altered Mental Status  Presenting symptoms: disorientation    Presenting symptoms comment:  Shaking  Severity:  Severe  Most recent episode: Today  Episode history:  Single  Timing:  Constant  Progression:  Resolved  Chronicity:  New  Context: not head injury, not nursing home resident, not recent change in medication and not recent illness    Associated symptoms: no agitation, no bladder incontinence, no fever, no hallucinations, no nausea and no weakness        Prior to Admission Medications   Prescriptions Last Dose Informant Patient Reported? Taking?    QUEtiapine (SEROquel) 300 mg tablet 6/14/2021 at Unknown time  Yes Yes   Sig: Take 300 mg by mouth   diclofenac sodium (Voltaren) 1 %   No No   Sig: Apply 2 g topically 4 (four) times a day   escitalopram (LEXAPRO) 20 mg tablet 6/15/2021 at Unknown time Self No Yes   Sig: Take 1 tablet (20 mg total) by mouth daily   famotidine (PEPCID) 20 mg tablet 6/15/2021 at Unknown time  No Yes   Sig: Take 1 tablet (20 mg total) by mouth 2 (two) times a day   meloxicam (MOBIC) 15 mg tablet   No No   Sig: Take 1 tablet (15 mg total) by mouth daily   traZODone (DESYREL) 100 mg tablet 6/15/2021 at Unknown time Self No Yes   Sig: Take 1 tablet (100 mg total) by mouth daily at bedtime      Facility-Administered Medications: None       Past Medical History:   Diagnosis Date    Alcohol abuse     Depression     Drug therapy     GERD (gastroesophageal reflux disease)     Hypertension 01/2012    Knee pain, bilateral     Psychiatric disorder     depression, anxiety    Self-injurious behavior     Spinal stenosis of lumbar region     Suicide attempt [facility])        Past Surgical History:   Procedure Laterality Date    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    HERNIA REPAIR  1997       Family History   Problem Relation Age of Onset    Depression Mother     Depression Father     No Known Problems Sister     No Known Problems Brother      I have reviewed and agree with the history as documented  E-Cigarette/Vaping     E-Cigarette/Vaping Substances     Social History     Tobacco Use    Smoking status: Current Every Day Smoker     Packs/day: 0 50     Types: Cigarettes, Cigars    Smokeless tobacco: Never Used    Tobacco comment: PT "3 CIGARS A DAY" - quit smoking cigars   Substance Use Topics    Alcohol use: Not Currently     Alcohol/week: 0 0 standard drinks     Comment: Stopped drinking for several months - recovered alcoholic    Drug use: No     Types: Marijuana     Comment: Last used marijuana 2 months ago  - no longer smoking       Review of Systems   Unable to perform ROS: Mental status change   Constitutional: Negative for fever  Gastrointestinal: Negative for nausea  Genitourinary: Negative for bladder incontinence  Neurological: Negative for weakness  Psychiatric/Behavioral: Negative for agitation and hallucinations  Physical Exam  Physical Exam  Vitals and nursing note reviewed  Constitutional:       General: He is not in acute distress  Appearance: He is not toxic-appearing  Comments: Appears older than stated age, chronically ill   HENT:      Nose: Nose normal       Mouth/Throat:      Mouth: Mucous membranes are moist    Eyes:      Conjunctiva/sclera: Conjunctivae normal    Cardiovascular:      Rate and Rhythm: Regular rhythm  Tachycardia present  Pulmonary:      Effort: Pulmonary effort is normal  No respiratory distress  Breath sounds: Normal breath sounds  No wheezing or rhonchi  Abdominal:      General: Abdomen is flat  Musculoskeletal:         General: No tenderness  Cervical back: Normal range of motion  Skin:     General: Skin is warm  Capillary Refill: Capillary refill takes less than 2 seconds  Neurological:      General: No focal deficit present  Mental Status: He is alert     Psychiatric:         Mood and Affect: Mood normal          Vital Signs  ED Triage Vitals [06/15/21 2038]   Temperature Pulse Respirations Blood Pressure SpO2   97 8 °F (36 6 °C) (!) 120 13 106/67 100 %      Temp Source Heart Rate Source Patient Position - Orthostatic VS BP Location FiO2 (%)   Temporal Monitor Sitting Right arm --      Pain Score       4           Vitals:    06/15/21 2200 06/15/21 2327 06/16/21 1003 06/16/21 1543   BP: 126/85 138/97 123/77 127/83   Pulse: (!) 109 78 78 94   Patient Position - Orthostatic VS:  Sitting Lying Lying         Visual Acuity      ED Medications  Medications   escitalopram (LEXAPRO) tablet 20 mg (20 mg Oral Given 6/16/21 0840)   traZODone (DESYREL) tablet 100 mg (100 mg Oral Given 6/16/21 0000)   QUEtiapine (SEROquel) tablet 300 mg (300 mg Oral Given 6/16/21 0841)   acetaminophen (TYLENOL) tablet 650 mg (has no administration in time range)   heparin (porcine) subcutaneous injection 5,000 Units (5,000 Units Subcutaneous Given 6/16/21 1457)   famotidine (PEPCID) tablet 20 mg (20 mg Oral Given 6/16/21 0840)   sodium chloride 0 9 % infusion (75 mL/hr Intravenous New Bag 6/16/21 1351)   aspirin (ECOTRIN LOW STRENGTH) EC tablet 81 mg (81 mg Oral Given 6/16/21 1459)   atorvastatin (LIPITOR) tablet 40 mg (40 mg Oral Given 6/16/21 1709)   sodium chloride 0 9 % bolus 1,000 mL (0 mL Intravenous Stopped 6/15/21 2144)   cefepime (MAXIPIME) IVPB (premix in dextrose) 2,000 mg 50 mL (0 mg Intravenous Stopped 6/16/21 0116)       Diagnostic Studies  Results Reviewed     Procedure Component Value Units Date/Time    Blood culture #1 [[id number]] Collected: 06/15/21 2216    Lab Status: Preliminary result Specimen: Blood from Arm, Left Updated: 06/16/21 0904     Blood Culture Received in Microbiology Lab  Culture in Progress  Blood culture #2 [[id number]] Collected: 06/15/21 2216    Lab Status: Preliminary result Specimen: Blood from Arm, Left Updated: 06/16/21 0904     Blood Culture Received in Microbiology Lab  Culture in Progress  Prolactin [[id number]]  (Abnormal) Collected: 06/15/21 2043    Lab Status: Final result Specimen: Blood from Arm, Left Updated: 06/16/21 0733     Prolactin 38 5 ng/mL     Lactic acid 2 Hours [[id number]]  (Normal) Collected: 06/16/21 0018    Lab Status: Final result Specimen: Blood from Arm, Right Updated: 06/16/21 0047     LACTIC ACID 1 6 mmol/L     Narrative:      Result may be elevated if tourniquet was used during collection      Rapid drug screen, urine [[id number]]  (Abnormal) Collected: 06/16/21 0018    Lab Status: Final result Specimen: Urine, Clean Catch Updated: 06/16/21 0036     Amph/Meth UR Negative     Barbiturate Ur Negative     Benzodiazepine Urine Negative     Cocaine Urine Negative     Methadone Urine Negative     Opiate Urine Negative     PCP Ur Negative     THC Urine Positive     Oxycodone Urine Negative    Narrative:      Presumptive report  If requested, specimen will be sent to reference lab for confirmation  FOR MEDICAL PURPOSES ONLY  IF CONFIRMATION NEEDED PLEASE CONTACT THE LAB WITHIN 5 DAYS  Drug Screen Cutoff Levels:  AMPHETAMINE/METHAMPHETAMINES  1000 ng/mL  BARBITURATES     200 ng/mL  BENZODIAZEPINES     200 ng/mL  COCAINE      300 ng/mL  METHADONE      300 ng/mL  OPIATES      300 ng/mL  PHENCYCLIDINE     25 ng/mL  THC       50 ng/mL  OXYCODONE      100 ng/mL    CK (with reflex to MB) [[id number]]  (Normal) Collected: 06/15/21 2043    Lab Status: Final result Specimen: Blood from Arm, Left Updated: 06/16/21 0025     Total CK 98 U/L     Lactic acid [[id number]]  (Abnormal) Collected: 06/15/21 2216    Lab Status: Final result Specimen: Blood from Arm, Left Updated: 06/15/21 2249     LACTIC ACID 3 2 mmol/L     Narrative:      Result may be elevated if tourniquet was used during collection      Awildakatelyn Mary [[id number]]  (Normal) Collected: 06/15/21 2216    Lab Status: Final result Specimen: Blood from Arm, Left Updated: 06/15/21 2237     Protime 14 1 seconds      INR 1 09    APTT [[id number]]  (Normal) Collected: 06/15/21 2216    Lab Status: Final result Specimen: Blood from Arm, Left Updated: 06/15/21 2237     PTT 26 seconds     Troponin I [[id number]]  (Normal) Collected: 06/15/21 2043    Lab Status: Final result Specimen: Blood from Arm, Left Updated: 06/15/21 2115     Troponin I 0 04 ng/mL     Comprehensive metabolic panel [[id number]]  (Abnormal) Collected: 06/15/21 2043    Lab Status: Final result Specimen: Blood from Arm, Left Updated: 06/15/21 2113     Sodium 137 mmol/L      Potassium 3 4 mmol/L      Chloride 101 mmol/L      CO2 21 mmol/L      ANION GAP 15 mmol/L      BUN 48 mg/dL      Creatinine 1 65 mg/dL      Glucose 163 mg/dL      Calcium 9 1 mg/dL      AST 30 U/L      ALT 43 U/L      Alkaline Phosphatase 76 U/L Total Protein 7 6 g/dL      Albumin 3 8 g/dL      Total Bilirubin 0 60 mg/dL      eGFR 44 ml/min/1 73sq m     Narrative:      National Kidney Disease Foundation guidelines for Chronic Kidney Disease (CKD):     Stage 1 with normal or high GFR (GFR > 90 mL/min/1 73 square meters)    Stage 2 Mild CKD (GFR = 60-89 mL/min/1 73 square meters)    Stage 3A Moderate CKD (GFR = 45-59 mL/min/1 73 square meters)    Stage 3B Moderate CKD (GFR = 30-44 mL/min/1 73 square meters)    Stage 4 Severe CKD (GFR = 15-29 mL/min/1 73 square meters)    Stage 5 End Stage CKD (GFR <15 mL/min/1 73 square meters)  Note: GFR calculation is accurate only with a steady state creatinine    Ethanol [[id number]]  (Normal) Collected: 06/15/21 2043    Lab Status: Final result Specimen: Blood from Arm, Left Updated: 06/15/21 2107     Ethanol Lvl 3 mg/dL     CBC and differential [[id number]]  (Abnormal) Collected: 06/15/21 2043    Lab Status: Final result Specimen: Blood from Arm, Left Updated: 06/15/21 2049     WBC 17 82 Thousand/uL      RBC 4 29 Million/uL      Hemoglobin 13 5 g/dL      Hematocrit 39 6 %      MCV 92 fL      MCH 31 5 pg      MCHC 34 1 g/dL      RDW 13 3 %      MPV 12 3 fL      Platelets 394 Thousands/uL      nRBC 0 /100 WBCs      Neutrophils Relative 77 %      Immat GRANS % 1 %      Lymphocytes Relative 15 %      Monocytes Relative 7 %      Eosinophils Relative 0 %      Basophils Relative 0 %      Neutrophils Absolute 13 75 Thousands/µL      Immature Grans Absolute 0 11 Thousand/uL      Lymphocytes Absolute 2 61 Thousands/µL      Monocytes Absolute 1 31 Thousand/µL      Eosinophils Absolute 0 01 Thousand/µL      Basophils Absolute 0 03 Thousands/µL                  XR chest 1 view portable   ED Interpretation by Shankar Vo DO (06/15 2125)   No acute findings      Final Result by Lily Morris MD (06/16 1033)      No acute abnormality in the chest                   Workstation performed: [id number]         CT head without contrast   ED Interpretation by Shahnaz Romano DO (06/15 2212)   No acute findings      Final Result by Attila Sethi MD (06/15 2126)      No acute intracranial abnormality                    Workstation performed: [id number]                    Procedures  ECG 12 Lead Documentation Only    Date/Time: 6/15/2021 8:44 PM  Performed by: Shahnaz Romano DO  Authorized by: Shahnaz Romano DO     ECG reviewed by me, the ED Provider: yes    Patient location:  ED  Previous ECG:     Previous ECG:  Compared to current    Similarity:  No change  Interpretation:     Interpretation: non-specific    Rate:     ECG rate:  119    ECG rate assessment: tachycardic    Rhythm:     Rhythm: sinus tachycardia    QRS:     QRS axis:  Left  Other findings:     Other findings: poor R wave progression      CriticalCare Time  Performed by: Shahnaz Romano DO  Authorized by: Shahnaz Romano DO     Critical care provider statement:     Critical care time (minutes):  45    Critical care time was exclusive of:  Separately billable procedures and treating other patients and teaching time    Critical care was necessary to treat or prevent imminent or life-threatening deterioration of the following conditions:  Respiratory failure and CNS failure or compromise    Critical care was time spent personally by me on the following activities:  Blood draw for specimens, obtaining history from patient or surrogate, development of treatment plan with patient or surrogate, evaluation of patient's response to treatment, examination of patient, review of old charts, re-evaluation of patient's condition, ordering and review of radiographic studies, ordering and review of laboratory studies and ordering and performing treatments and interventions    I assumed direction of critical care for this patient from another provider in my specialty: no               ED Course  ED Course as of Jun 16 2137   Tue Noble 15, 2021   2125 1 21 seven months ago Creatinine(!): 1 65   7031 Just informed by nursing pt w/ pulse ox in the upper 70s, good pleth, no complaints  Placed on [place] oxygen  ?may have aspirated during event at facility? SBIRT 22yo+      Most Recent Value   SBIRT (24 yo +)   In order to provide better care to our patients, we are screening all of our patients for alcohol and drug use  Would it be okay to ask you these screening questions? No Filed at: 06/15/2021 2059                    MDM  Number of Diagnoses or Management Options  MARIELY (acute kidney injury) [facility]): new and requires workup  Altered mental status: new and requires workup  Fine tremor: new and requires workup  Hypoxia: new and requires workup  SIRS (systemic inflammatory response syndrome) [facility]): new and requires workup  Diagnosis management comments: 60y M here for evaluation of ams/shaking episode, ?sz like activity  Hx different from patient, staff/ems  Will need ams w/u including cth, labs, ua, ekg    May need admission       Amount and/or Complexity of Data Reviewed  Clinical lab tests: reviewed and ordered  Tests in the radiology section of CPT®: reviewed and ordered  Tests in the medicine section of CPT®: ordered and reviewed  Decide to obtain previous medical records or to obtain history from someone other than the patient: yes  Obtain history from someone other than the patient: yes  Independent visualization of images, tracings, or specimens: yes        Disposition  Final diagnoses:   Hypoxia   SIRS (systemic inflammatory response syndrome) (HCC)   Altered mental status   [facility]   Fine tremor     Time reflects when diagnosis was documented in both MDM as applicable and the Disposition within this note     Time User Action Codes Description Comment    6/15/2021 10:28 PM Mariela Martinez Add [R09 02] Hypoxia     6/15/2021 10:28 PM Mariela Martinez Add [R65 10] SIRS (systemic inflammatory response syndrome) ([facility] ) 6/15/2021 10:28 PM JonMariela tejeda ONIEL Add [R41 82] Altered mental status     6/15/2021 10:29 PM Jonnikhil Mariela ENGLE Add [N17 9] MARIELY (acute kidney injury) ([facility] )     6/15/2021 10:29 PM Mariela Martinez L Add [G25 2] Fine tremor     6/15/2021 11:46 PM Mikal Saini Add [R56 9] Seizure-like activity [facility])       ED Disposition     ED Disposition Condition Date/Time Comment    Admit Stable Tue Noble 15, 2021 10:28 PM Case was discussed with SUSSY Abbasi and the patient's admission status was agreed to be Admission Status: inpatient status to the service of Dr Som Soler   Follow-up Information    None         Current Discharge Medication List      CONTINUE these medications which have NOT CHANGED    Details   escitalopram (LEXAPRO) 20 mg tablet Take 1 tablet (20 mg total) by mouth daily  Qty: 21 tablet, Refills: 0    Associated Diagnoses: Bipolar 2 disorder, major depressive episode (HCC)      famotidine (PEPCID) 20 mg tablet Take 1 tablet (20 mg total) by mouth 2 (two) times a day  Qty: 30 tablet, Refills: 0    Associated Diagnoses: Abdominal pain      QUEtiapine (SEROquel) 300 mg tablet Take 300 mg by mouth      traZODone (DESYREL) 100 mg tablet Take 1 tablet (100 mg total) by mouth daily at bedtime  Qty: 21 tablet, Refills: 0    Associated Diagnoses: Insomnia      diclofenac sodium (Voltaren) 1 % Apply 2 g topically 4 (four) times a day  Qty: 1 Tube, Refills: 0    Associated Diagnoses: Acute pain of right knee; Patellofemoral pain syndrome of right knee      meloxicam (MOBIC) 15 mg tablet Take 1 tablet (15 mg total) by mouth daily  Qty: 60 tablet, Refills: 0    Associated Diagnoses: Acute pain of right knee; Patellofemoral pain syndrome of right knee           No discharge procedures on file      PDMP Review     None          ED Provider  Electronically Signed by           Dottie Henry DO  06/16/21 9368

## 2021-06-16 NOTE — H&P
Shubham Porter  H&P- Cy Doc 1960, 62 y o  male MRN: [id number]  Unit/Bed#: -01 Encounter: [id number]  Primary Care Provider: Aileen Other, DO   Date and time admitted to hospital: 6/15/2021  8:37 PM    * Seizure-like activity [facility])  Assessment & Plan  · Presented from [facility] for a shaking episode  · Staff reports that patient went up stairs to get a another resident per dinner when he became unsteady on his feet and began to shake all over causing him to hit walls and a door  Patient almost fell down the stairs due to shaking but other residents were able to bring him to the ground  · Shaking lasted for about 1-2 minutes  · No incontinence, tongue biting, patient is able to recall the event  · Patient does report some confusion after and feels tired  · Consult neurology  · Patient with elevated lactic     SIRS (systemic inflammatory response syndrome) ([facility] )  Assessment & Plan  · Meet sirs criteria due to tachycardia, tachypnea and elevated white blood cell  · Patient presented after "shaking episode" at group home  · Elevated lactic and WBC could be reactive if a seizure did occur  · Patient became hypoxic in the ED requiring 2 L nasal cannula  Cefepime and Flagyl started as precaution in case of aspiration during shaking  · Chest x-ray normal, lungs clear during exam, UA negative  · Patient without tongue biting or emesis during shaking episode  · Procal and blood cultures ordered  Acute respiratory failure with hypoxia (HCC)  Assessment & Plan  · In the ED patient became hypoxic at 77% on room air    Patient was then placed on 2 L nasal cannula at 95%  · Continue to wean oxygen as able  · Possible cause is aspiration due to "shaking event" that occurred earlier  · Start cefapime and Flagyl  · Meeting SIRS criteria due to tachycardia, tachypnea, elevated white blood cell    AMS (altered mental status)  Assessment & Plan  · Patient currently oriented x3   · Occasionally throughout the exam patient changing details of his story or stating random details that are not relevant  · Per staff at [facility] this is patient's baseline    MARIELY (acute kidney injury) ([facility] )  Assessment & Plan  · Creatinine 1 65 on admission   · Baseline appears to be around 1-1 2   · Received 1 L bolus of fluids   · Continue IVF   · Continue to monitor    Tobacco dependence syndrome  Assessment & Plan  · Smokes a half a pack per day  · Encouraged smoking cessation    Alcohol dependence in remission [facility])  Assessment & Plan  · Patient has been sober for 6 months   · Encouraged continued cessation    Bipolar 2 disorder, major depressive episode ([facility] )  Assessment & Plan  · Continue Seroquel 300 mg daily    VTE Prophylaxis: Heparin  / sequential compression device   Code Status: Level 1 Full Code   POLST: There is no POLST form on file for this patient (pre-hospital)  Discussion with family: No    Anticipated Length of Stay:  Patient will be admitted on an Inpatient basis with an anticipated length of stay of  > 2 midnights  Justification for Hospital Stay: Seizure like activity     Total Time for Visit, including Counseling / Coordination of Care: 60 minutes  Greater than 50% of this total time spent on direct patient counseling and coordination of care  Chief Complaint:   Shaking all over     History of Present Illness:    Марина Orozco is a 62 y o  male with past medical history of bipolar 2 disorder, alcohol dependence in remission and tobacco dependence who presents from [facility] after seizure-like activity  Patient reports that he had gone upstairs to get another resident for dinner  Upon reaching the top of the steps he then felt his whole body shaking, arms more than legs  During the event he knew what was happening but was unable to stop his shaking    Shaking was so severe that he hit walls and was then needed to be brought down to the ground by staff due to concern that he might fall down the steps  Patient denies episode of incontinence, biting his tongue, emesis  Denies history of seizures in the past   He is not on any new medications  Patient has history of alcohol abuse  He has been sober for about 6 months now  Patient smokes cigarettes and marijuana  Denies other drug use  Patient denies chest pain, shortness of breath, abdominal pain, nausea, vomiting, diarrhea, dysuria, fevers or chills  Review of Systems:    Review of Systems   Constitutional: Positive for fatigue  Negative for fever  HENT: Negative for sore throat  Respiratory: Negative for cough, chest tightness and shortness of breath  Cardiovascular: Negative for chest pain  Gastrointestinal: Negative for abdominal distention, abdominal pain, diarrhea, nausea and vomiting  Genitourinary: Negative for difficulty urinating  Musculoskeletal: Negative for arthralgias  Neurological: Negative for weakness and headaches  "Shaking of whole body"   Psychiatric/Behavioral: Negative for agitation and behavioral problems  All other systems reviewed and are negative  Past Medical and Surgical History:     Past Medical History:   Diagnosis Date    Alcohol abuse     Depression     Drug therapy     GERD (gastroesophageal reflux disease)     Hypertension 01/2012    Knee pain, bilateral     Psychiatric disorder     depression, anxiety    Self-injurious behavior     Spinal stenosis of lumbar region     Suicide attempt [facility])        Past Surgical History:   Procedure Laterality Date    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  Authorizing Provider   diclofenac sodium (Voltaren) 1 % Apply 2 g topically 4 (four) times a day 9/23/20   Mckenzie Aguilar DO   escitalopram (LEXAPRO) 20 mg tablet Take 1 tablet (20 mg total) by mouth daily 10/4/18   Barbie Rousseau NEHEMIAH   famotidine (PEPCID) 20 mg tablet Take 1 tablet (20 mg total) by mouth 2 (two) times a day 1/24/20   Kyra eLe DO   meloxicam (MOBIC) 15 mg tablet Take 1 tablet (15 mg total) by mouth daily 9/23/20   Krystal TavonDO   QUEtiapine (SEROquel) 300 mg tablet Take 300 mg by mouth 4/3/19   Historical Provider, MD   traZODone (DESYREL) 100 mg tablet Take 1 tablet (100 mg total) by mouth daily at bedtime 10/4/18   NEHEMIAH Herndon     I have reveiwed home medications using records provided by [facility]  Allergies: Allergies   Allergen Reactions    Haloperidol      Other reaction(s): jittery    No Known Allergies        Social History:     Marital Status: Single   Occupation:  Unknown  Patient Pre-hospital Living Situation:  Group home  Patient Pre-hospital Level of Mobility:  Full  Patient Pre-hospital Diet Restrictions:  Regular  Substance Use History:   Social History     Substance and Sexual Activity   Alcohol Use Not Currently    Alcohol/week: 0 0 standard drinks    Comment: Stopped drinking for several months - recovered alcoholic     Social History     Tobacco Use   Smoking Status Current Every Day Smoker    Packs/day: 0 50    Types: Cigarettes, Cigars   Smokeless Tobacco Never Used   Tobacco Comment    PT "3 CIGARS A DAY" - quit smoking cigars     Social History     Substance and Sexual Activity   Drug Use No    Types: Marijuana    Comment: Last used marijuana 2 months ago  - no longer smoking       Family History:    Family History   Problem Relation Age of Onset    No Known Problems Mother     No Known Problems Father     No Known Problems Sister     No Known Problems Brother        Physical Exam:     Vitals:   Blood Pressure: 138/97 (06/15/21 2327)  Pulse: 78 (06/15/21 2327)  Temperature: 98 6 °F (37 °C) (06/15/21 2327)  Temp Source: Oral (06/15/21 2327)  Respirations: 18 (06/15/21 2327)  Height: 5' 8" (172 7 cm) (06/15/21 2200)  Weight - Scale: 76 7 kg (169 lb 3 2 oz) (06/15/21 2200)  SpO2: 97 % (06/15/21 2327)    Physical Exam  Vitals and nursing note reviewed  Constitutional:       Appearance: Normal appearance  Interventions: Nasal cannula in place  HENT:      Head: Normocephalic  Eyes:      Extraocular Movements: Extraocular movements intact  Pupils: Pupils are equal, round, and reactive to light  Cardiovascular:      Rate and Rhythm: Normal rate and regular rhythm  Heart sounds: No murmur heard  No gallop  Pulmonary:      Effort: No respiratory distress  Breath sounds: Normal breath sounds  No wheezing  Abdominal:      General: Bowel sounds are normal  There is no distension  Tenderness: There is no abdominal tenderness  Musculoskeletal:         General: Normal range of motion  Cervical back: Normal range of motion  Right lower leg: No edema  Left lower leg: No edema  Skin:     General: Skin is warm  Neurological:      General: No focal deficit present  Mental Status: He is alert and oriented to person, place, and time  Mental status is at baseline  Psychiatric:         Mood and Affect: Mood normal          Behavior: Behavior normal          Thought Content: Thought content normal          Additional Data:     Lab Results: I have personally reviewed pertinent reports        Results from last 7 days   Lab Units 06/16/21  0018 06/15/21  2043   WBC Thousand/uL  --  17 82*   HEMOGLOBIN g/dL  --  13 5   HEMATOCRIT %  --  39 6   PLATELETS Thousands/uL 102* 162   NEUTROS PCT %  --  77*   LYMPHS PCT %  --  15   MONOS PCT %  --  7   EOS PCT %  --  0     Results from last 7 days   Lab Units 06/15/21  2043   SODIUM mmol/L 137   POTASSIUM mmol/L 3 4*   CHLORIDE mmol/L 101   CO2 mmol/L 21   BUN mg/dL 48*   CREATININE mg/dL 1 65*   ANION GAP mmol/L 15*   CALCIUM mg/dL 9 1   ALBUMIN g/dL 3 8   TOTAL BILIRUBIN mg/dL 0 60   ALK PHOS U/L 76   ALT U/L 43   AST U/L 30   GLUCOSE RANDOM mg/dL 163*     Results from last 7 days   Lab Units 06/15/21  2216   INR  1 09             Results from last 7 days   Lab Units 06/16/21  0018 06/15/21  2216   LACTIC ACID mmol/L 1 6 3 2*       Imaging: I have personally reviewed pertinent reports  XR chest 1 view portable   ED Interpretation by Trinh Yu DO (06/15 2125)   No acute findings      CT head without contrast   ED Interpretation by Trinh Yu DO (06/15 2212)   No acute findings      Final Result by Srikanth Mas MD (06/15 2126)      No acute intracranial abnormality  Workstation performed: [id number]               [facility] Reviewed: Yes     ** Please Note: This note has been constructed using a voice recognition system   **

## 2021-06-16 NOTE — ASSESSMENT & PLAN NOTE
CT head revealed old lacunar infarct    Patient is a smoker    Will start aspirin and atorvastatin for secondary prevention

## 2021-06-16 NOTE — UTILIZATION REVIEW
Initial Clinical Review    Admission: Date/Time/Statement:   Admission Orders (From admission, onward)     Ordered        06/15/21 2230  Inpatient Admission  Once                   Orders Placed This Encounter   Procedures    Inpatient Admission     Standing Status:   Standing     Number of Occurrences:   1     Order Specific Question:   Level of Care     Answer:   Med Surg [16]     Order Specific Question:   Estimated length of stay     Answer:   More than 2 Midnights     Order Specific Question:   Certification     Answer:   I certify that inpatient services are medically necessary for this patient for a duration of greater than two midnights  See H&P and MD Progress Notes for additional information about the patient's course of treatment  ED Arrival Information     Expected Arrival Acuity    - 6/15/2021 20:36 Emergent         Means of arrival Escorted by Service Admission type    [facility]         Arrival complaint    DIZZY        Chief Complaint   Patient presents with    Altered Mental Status       Initial Presentation: 63 yo m w/hx bipolar, etoh dependence from group home by EMS admitted as inpatient due to seizure like activity, acute hypoxic resp failure, and MARIELY  Presented after felt whole body shaking after walking up stairs, arms more than legs  Was unable to stop shaking, was fully aware of what happened  It was so severe that he hit walls and staff had to help him to the ground so he didn't fall down steps  Did not bite tongue, no new meds, no etoh in 6 months  Exam unremarkable  While in ED, desat to 77%RA, improved to 95% on 2 li  CXR/CT head nothing acute  Neuro consulted, avoiding nephrotoxins, IVF and lab rechecks in progress  Date: 6/16   Day 2: flat affect, no edema or rashes, no seizure activity, exam unremarkable  do not believe pt had acute hypoxic resp failure on admission   His sat is now 95-97%RA, lungs clear, no evidence of chf or other findings  Shaking episode hasn't recurred since admission  Differential includes mariely and dehydration  He had SIRS criteria on arrival; however, no infectious source is identified  procalcitonin is normal and wbc improving  UDS positive for marijuana  MARIELY resolving with IVF, suspect it was prerenal and due to home meloxicam use  Continue IVF, avoid nsaids  Keeping off antbx  Per neuro: Dx is 'altered mental status', episode lasted 1-2 minutes w/sudden tremors and gait unsteadiness  CT head showed chronic infarcts since 2015  Has elevated prolactin, which could be from risperdal  No obvious infectious source, do not suspect seizure  No EEG or further neuro imaging needed  Continue IVF and medical management       6/17: h/h dropped, iron panel ordered; no growth on blood cultures thus far    ED Triage Vitals [06/15/21 2038]   Temperature Pulse Respirations Blood Pressure SpO2   97 8 °F (36 6 °C) (!) 120 13 106/67 100 %      Temp Source Heart Rate Source Patient Position - Orthostatic VS BP Location FiO2 (%)   Temporal Monitor Sitting Right arm --      Pain Score       4          Wt Readings from Last 1 Encounters:   06/15/21 76 7 kg (169 lb 3 2 oz)     Additional Vital Signs:   06/16/21 23:19:12  98 4 °F (36 9 °C)  --  18  125/79  94  --   --  --   06/16/21 15:43:34  98 8 °F (37 1 °C)  94  19  127/83  98  93 %   None (Room air)  Lying       Date/Time  Temp  Pulse  Resp  BP  MAP (mmHg)  SpO2  Calculated FIO2 (%) - Nasal Cannula  Nasal Cannula O2 Flow Rate (L/min)  O2 Device  Patient Position - Orthostatic VS   06/16/21 10:03:56  98 2 °F (36 8 °C)  78  18  123/77  92  95 %  --  --  None (Room air)  Lying   06/16/21 0238  --  --  --  --  --  97 %  --  --  None (Room air)  --   06/15/21 23:27:03  98 6 °F (37 °C)  78  18  138/97  111  97 %  28  2 L/min  Nasal cannula  Sitting   06/15/21 2200  --  109Abnormal   20  126/85  99  95 %  28  2 L/min  Nasal cannula  --   06/15/21 2130  --  106Abnormal   20  147/71  98  77 %Abnormal   --  --  None (Room air)  --       Pertinent Labs/Diagnostic Test Results:        XR chest 1 view portable   ED Interpretation by Dottie Henry DO (06/15 2125)   No acute findings       CT head without contrast   ED Interpretation by Dottie Henry DO (06/15 2212)   No acute findings           XR chest 1 view portable   ED Interpretation by Dottie Henry DO (06/15 2125)   No acute findings      Final Result by Deepika Montemayor MD (06/16 1033)      No acute abnormality in the chest       CT head without contrast   ED Interpretation by Dottie Henry DO (06/15 2212)   No acute findings      Final Result by Nicole Ramon MD (06/15 2126)      No acute intracranial abnormality       6/15 EKG sinus tach, poor R progression, L axis        Results from last 7 days   Lab Units 06/17/21 0439 06/16/21 0448 06/16/21  0018 06/15/21  2043   WBC Thousand/uL 6 84 11 38*  --  17 82*   HEMOGLOBIN g/dL 9 9* 11 3*  --  13 5   HEMATOCRIT % 29 4* 34 1*  --  39 6   PLATELETS Thousands/uL 101* 101* 102* 162   NEUTROS ABS Thousands/µL  --  9 03*  --  13 75*         Results from last 7 days   Lab Units 06/17/21  0439 06/16/21  0448 06/15/21  2043   SODIUM mmol/L 142 139 137   POTASSIUM mmol/L 3 5 4 1 3 4*   CHLORIDE mmol/L 110* 108 101   CO2 mmol/L 21 24 21   ANION GAP mmol/L 11 7 15*   BUN mg/dL 15 31* 48*   CREATININE mg/dL 0 92 1 13 1 65*   EGFR ml/min/1 73sq m 90 70 44   CALCIUM mg/dL 7 5* 8 1* 9 1     Results from last 7 days   Lab Units 06/15/21  2043   AST U/L 30   ALT U/L 43   ALK PHOS U/L 76   TOTAL PROTEIN g/dL 7 6   ALBUMIN g/dL 3 8   TOTAL BILIRUBIN mg/dL 0 60         Results from last 7 days   Lab Units 06/17/21 0439 06/16/21 0448 06/15/21  2043   GLUCOSE RANDOM mg/dL 97 138 163*       Results from last 7 days   Lab Units 06/15/21  2043   CK TOTAL U/L 98     Results from last 7 days   Lab Units 06/15/21  2043   TROPONIN I ng/mL 0 04         Results from last 7 days   Lab Units 06/15/21  8840 PROTIME seconds 14 1   INR  1 09   PTT seconds 26         Results from last 7 days   Lab Units 06/16/21  0018   PROCALCITONIN ng/ml <0 05     Results from last 7 days   Lab Units 06/16/21  0018 06/15/21  2216   LACTIC ACID mmol/L 1 6 3 2*     Results from last 7 days   Lab Units 06/15/21  2043   PROLACTIN ng/mL 38 5*       Results from last 7 days   Lab Units 06/16/21  0019   CLARITY UA  Clear   COLOR UA  Yellow   SPEC GRAV UA  1 010   PH UA  6 0   GLUCOSE UA mg/dl Negative   KETONES UA mg/dl Negative   BLOOD UA  Negative   PROTEIN UA mg/dl Negative   NITRITE UA  Negative   BILIRUBIN UA  Negative   UROBILINOGEN UA E U /dl 0 2   LEUKOCYTES UA  Negative     Results from last 7 days   Lab Units 06/16/21  0018   AMPH/METH  Negative   BARBITURATE UR  Negative   BENZODIAZEPINE UR  Negative   COCAINE UR  Negative   METHADONE URINE  Negative   OPIATE UR  Negative   PCP UR  Negative   THC UR  Positive*     Results from last 7 days   Lab Units 06/15/21  2043   ETHANOL LVL mg/dL 3     Results from last 7 days   Lab Units 06/15/21  2216   BLOOD CULTURE  No Growth at 24 hrs  No Growth at 24 hrs    Specimen Collected: 06/15/21 22:16 Last Resulted: 06/17/21 08:01                    ED Treatment:   Medication Administration from 06/15/2021 2036 to 06/15/2021 2307       Date/Time Order Dose Route Action     06/15/2021 2143 sodium chloride 0 9 % bolus 1,000 mL 1,000 mL Intravenous New Bag     06/15/2021 2256 cefepime (MAXIPIME) IVPB (premix in dextrose) 2,000 mg 50 mL 2,000 mg Intravenous New Bag        Past Medical History:   Diagnosis Date    Alcohol abuse     Depression     Drug therapy     GERD (gastroesophageal reflux disease)     Hypertension 01/2012    Knee pain, bilateral     Psychiatric disorder     depression, anxiety    Self-injurious behavior     Spinal stenosis of lumbar region     Suicide attempt [facility])      Present on Admission:   Bipolar 2 disorder, major depressive episode ([facility] )   [facility]   Tobacco dependence syndrome   SIRS (systemic inflammatory response syndrome) (HCC)   MARIELY (acute kidney injury) [facility]   Acute metabolic encephalopathy      Admitting Diagnosis: Dizziness [R42]  Altered mental status [R41 82]  Fine tremor [G25 2]  Hypoxia [R09 02]  SIRS (systemic inflammatory response syndrome) (HCC) [R65 10]  MARIELY (acute kidney injury) ([facility] ) [N17 9]  Age/Sex: 62 y o  male  Admission Orders:  Scheduled Medications:  cefepime, 2,000 mg, Intravenous, Q12H  escitalopram, 20 mg, Oral, Daily  famotidine, 20 mg, Oral, Daily  heparin (porcine), 5,000 Units, Subcutaneous, Q8H SHANNA  metroNIDAZOLE, 500 mg, Intravenous, Q8H  QUEtiapine, 300 mg, Oral, Daily  traZODone, 100 mg, Oral, HS      Continuous IV Infusions:    sodium chloride 0 9 % infusion   Rate: 100 mL/hr Dose: 100 mL/hr, decreased to 75/hr  Freq: Continuous Route: IV      PRN Meds:  acetaminophen, 650 mg, Oral, Q6H PRN    SCD  House diet      IP CONSULT TO NEUROLOGY  IP CONSULT TO CASE MANAGEMENT    Network Utilization Review Department  ATTENTION: Please call with any questions or concerns to [phone] and carefully listen to the prompts so that you are directed to the right person  All voicemails are confidential   Demarco Badillo all requests for admission clinical reviews, approved or denied determinations and any other requests to dedicated fax number below belonging to the campus where the patient is receiving treatment   List of dedicated fax numbers for the Facilities:  [address] DENIALS (Administrative/Medical Necessity) [phone]   [address] [place] [address] (Maternity/NICU/Pediatrics) 261 [facility] [address] [place] [address] [facility] [id number] [facility] Jannie Benavidez [address] Rosalina Guzman [phone] [facility] [phone]

## 2021-06-16 NOTE — OCCUPATIONAL THERAPY NOTE
Occupational Therapy Evaluation     Patient Name: Roberto Pitts  ITZBH'[id number] Date: 6/16/2021  Problem List  Principal Problem:    Acute metabolic encephalopathy  Active Problems:    Bipolar 2 disorder, major depressive episode ([facility] )    Alcohol dependence in remission ([facility] )    Tobacco dependence syndrome    SIRS (systemic inflammatory response syndrome) (HCC)    MARIELY (acute kidney injury) ([facility] )    History of CVA (cerebrovascular accident)    Tremors of nervous system    Past Medical History  Past Medical History:   Diagnosis Date    Alcohol abuse     Depression     Drug therapy     GERD (gastroesophageal reflux disease)     Hypertension 01/2012    Knee pain, bilateral     Psychiatric disorder     depression, anxiety    Self-injurious behavior     Spinal stenosis of lumbar region     Suicide attempt [facility])      Past Surgical History  Past Surgical History:   Procedure Laterality Date    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    HERNIA REPAIR  1997 06/16/21 1515   OT Last Visit   OT Visit Date 06/16/21   Note Type   Note type Evaluation   Restrictions/Precautions   Other Precautions Bed Alarm  (Seizure)   Pain Assessment   Pain Assessment Tool 0-10   Pain Score No Pain   Home Living   Type of Home Group Home  ([facility], 3rd floor)   Home Layout Stairs to enter with rails   Additional Comments no AD at baseline   Prior Function   Level of [place] Independent with ADLs and functional mobility   Lives With Facility staff   ADL Assistance Independent   IADLs Needs assistance   Psychosocial   Psychosocial (WDL) X   Patient Behaviors/Mood Irritable   Subjective   Subjective Pt received standing in room with nursing staff   ADL   Eating Assistance 7  Independent   Grooming Assistance 7  Independent   UB Bathing Assistance 5  Supervision/Setup   LB Bathing Assistance 5  Supervision/Setup   UB Dressing Assistance 5  Supervision/Setup   LB Dressing Assistance 5  Supervision/Setup   Toileting Assistance 5  Supervision/Setup   Bed Mobility   Supine to Sit 7  Independent   Sit to Supine 7  Independent   Transfers   Sit to Stand 7  Independent   Stand to Sit 7  Independent   Functional Mobility   Functional Mobility 7  Independent   Balance   Static Sitting Good   Dynamic Sitting Good   Static Standing Good   Dynamic Standing Good   Activity Tolerance   Activity Tolerance Patient tolerated treatment well   Nurse Made Aware MARY JO VALDEZ Assessment   RUE Assessment WFL   LUE Assessment   LUE Assessment WFL   Cognition   Overall Cognitive Status Impaired   Arousal/Participation Alert   Attention Within functional limits   Orientation Level Oriented to person;Oriented to place; Disoriented to situation;Disoriented to time   Memory Decreased recall of precautions   Following Commands Follows one step commands without difficulty   Comments Pt denies that he presented s/p episodes of shaking  Assessment   Assessment Pt is a 62 y o  male seen for OT evaluation at [facility], admitted 7/18/2757 w/ Acute metabolic encephalopathy  OT completed brief review of pt's medical and social history  Comorbidities affecting pt's functional performance at time of assessment include: MARIELY, bipolar 2 disorder, hx of CVA, alcohol dependence in remission, tremors of nervous system, etc (see chart)   Prior to admission, pt was living at [facility] and was independent with ADLs  Relied on no AD for mobility  Upon evaluation, pt presents to OT at functional baseline  Based on findings, pt is of low complexity  The patient's raw score on the AM-PAC Daily Activity inpatient short form is 24, standardized score is 57 54, greater than 39 4  Patients at this level are likely to benefit from DC to home  Please refer to the recommendation of the Occupational Therapist for safe DC planning  At this time, OT recommendations at time of discharge are return to facility with no OT needs   No further acute OT needs indicated at this time - Recommend pt continue to be OOB for meals, ambulation to/from BR, perform self care tasks, and mobility in hallway with nursing  D/C from OT caseload with above recommendations     Goals   Patient Goals Pt wishes to get back home   Plan   OT Frequency Eval only  (Pt appears to be at baseline for ADLs/mobility)   Recommendation   OT Discharge Recommendation No rehabilitation needs   AM-PAC Daily Activity Inpatient   Lower Body Dressing 4   Bathing 4   Toileting 4   Upper Body Dressing 4   Grooming 4   Eating 4   Daily Activity Raw Score 24   Daily Activity Standardized Score (Calc for Raw Score >=11) 57 54   AM-PAC Applied Cognition Inpatient   Following a Speech/Presentation 3   Understanding Ordinary Conversation 4   Taking Medications 3   Remembering Where Things Are Placed or Put Away 4   Remembering List of 4-5 Errands 3   Taking Care of Complicated Tasks 3   Applied Cognition Raw Score 20   Applied Cognition Standardized Score 41 76         Marsha Prasad OTR/L

## 2021-06-16 NOTE — ASSESSMENT & PLAN NOTE
· Presented from [address] [facility] for a shaking episode  · Staff reports that patient went up stairs to get a another resident per dinner when he became unsteady on his feet and began to shake all over causing him to hit walls and a door  Patient almost fell down the stairs due to shaking but other residents were able to bring him to the ground  · Shaking lasted for about 1-2 minutes      · No incontinence, tongue biting, patient is able to recall the event  · Patient does report some confusion after and feels tired  · Consult neurology  · Patient with elevated lactic

## 2021-06-16 NOTE — ASSESSMENT & PLAN NOTE
· In the ED patient became hypoxic at 77% on room air    Patient was then placed on 2 L nasal cannula at 95%  · Continue to wean oxygen as able  · Possible cause is aspiration due to "shaking event" that occurred earlier  · Start cefapime and Flagyl  · Meeting SIRS criteria due to tachycardia, tachypnea, elevated white blood cell

## 2021-06-16 NOTE — CASE MANAGEMENT
LOS: 1 day  Pt is not a documented bundle  Pt is not a 30 day readmission  Unplanned readmission score is 11 and green  Message received Jacob Trevino at [employer] inquiring if Pt can be evaluated by [facility] while in the hospital for placement  Acknowledge PT notes that Pt can return home and currently has not skilled needs  Met with Pt  Pt presents AA&Ox3  Discussed role of  and discharge planning  Pt reports he resides in 3sh, 3-4 steps to enter, bedroom on 3rd floor  Pt reports facility assists with medications  Pt reports he is independent with adls and ambulation  Pt reports he has cane but does not use it  Pt reports his PCP is Dr Marty Bowen  Pt reports he does not have POA or living will and declines information  Pt denies of VNA and SNF  Pt reports he has been sober from alcohol for 6 months  Pt reports he went to [address] [place] [address] in past for alcohol rehab  Pt denies hx of inpt tx for mental health treatment  Pt gave permission for CM to call his , Jacob Trevino at [employer]  Call placed to Jacob Trevino at [facility]), informed Jacob Trevino of Pt's status and PT cleared for Pt to return home and has no skilled needs and not looking at placement at this time  Jacob Trevino expressed understanding and  informed CM that they will be in contact with [facility] for eval  CM informed Jacob Trevino that CM will keep him updated on discharge date and any new medications that Pt will need upon discharge

## 2021-06-16 NOTE — ASSESSMENT & PLAN NOTE
62year old male with bipolar disorder, tobacco use, prior suicide attempt, and prior alcohol abuse, presents from his group home due to a 1-2 minute sudden episode of tremors and gait unsteadiness  Denies loss of consciousness, tongue bite, bowel or bladder incontinence, or amnesia  Unclear etiology, but doubt seizure  May be metabolic tremors in the setting of dehydration/MARIELY      · CTH identified chronic infarcts, at least present on imaging since 2015/2017  · Prolactin elevation may be secondary to Risperdal   · No obvious sources of infection identified to explain leukocytosis  He did require 2L NC in ED due to hypoxia but CXR unremarkable  · CK WNL     · BUN 48, Cr 1 65     Plan:  - Will defer routine EEG as do not suspect seizure   - Recommend hydration   - Will defer additional neuroimaging at this time   - Medical management and supportive care per primary team  Correction of any metabolic or infectious disturbances    - No additional inpatient neurologic recs

## 2021-06-16 NOTE — ASSESSMENT & PLAN NOTE
Patient was admitted with acute kidney injury  Denies any obstructive urinary complaints  Acute kidney injury is resolving with hydration, I suspect this was prerenal   Patient was taking meloxicam at home which may have caused acute kidney injury    Will continue hydration with normal saline solution at 75 cc an hour and will monitor renal function    Continue urinary retention protocol  Will avoid NSAIDs

## 2021-06-16 NOTE — ASSESSMENT & PLAN NOTE
· Creatinine 1 65 on admission   · Baseline appears to be around 1-1 2   · Received 1 L bolus of fluids   · Continue IVF   · Continue to monitor

## 2021-06-16 NOTE — ED NOTES
Pt placed on 2LPM oxygen via NC, patient had no positive response to oxygen therapy, rate increased to 818 Jurgen Martinez RN  06/15/21 5941

## 2021-06-16 NOTE — ED TRIAGE NOTES
Pt presents to the ED by [facility] with c/o altered mental status and seizure like activity from [facility]  Pt is said to have absent-like seizure activity, is not oriented upon arrival of the ED

## 2021-06-17 VITALS
HEART RATE: 89 BPM | DIASTOLIC BLOOD PRESSURE: 85 MMHG | BODY MASS INDEX: 25.64 KG/M2 | TEMPERATURE: 98.7 F | SYSTOLIC BLOOD PRESSURE: 144 MMHG | WEIGHT: 169.2 LBS | RESPIRATION RATE: 16 BRPM | OXYGEN SATURATION: 96 % | HEIGHT: 68 IN

## 2021-06-17 PROBLEM — R25.1 TREMORS OF NERVOUS SYSTEM: Status: RESOLVED | Noted: 2021-06-16 | Resolved: 2021-06-17

## 2021-06-17 PROBLEM — N17.9 AKI (ACUTE KIDNEY INJURY) (HCC): Status: RESOLVED | Noted: 2021-06-15 | Resolved: 2021-06-17

## 2021-06-17 LAB
ANION GAP SERPL CALCULATED.3IONS-SCNC: 11 MMOL/L (ref 4–13)
BUN SERPL-MCNC: 15 MG/DL (ref 5–25)
CALCIUM SERPL-MCNC: 7.5 MG/DL (ref 8.3–10.1)
CHLORIDE SERPL-SCNC: 110 MMOL/L (ref 100–108)
CO2 SERPL-SCNC: 21 MMOL/L (ref 21–32)
CREAT SERPL-MCNC: 0.92 MG/DL (ref 0.6–1.3)
ERYTHROCYTE [DISTWIDTH] IN BLOOD BY AUTOMATED COUNT: 13.4 % (ref 11.6–15.1)
FERRITIN SERPL-MCNC: 102 NG/ML (ref 8–388)
GFR SERPL CREATININE-BSD FRML MDRD: 90 ML/MIN/1.73SQ M
GLUCOSE SERPL-MCNC: 97 MG/DL (ref 65–140)
HCT VFR BLD AUTO: 29.4 % (ref 36.5–49.3)
HGB BLD-MCNC: 9.9 G/DL (ref 12–17)
IRON SATN MFR SERPL: 10 %
IRON SERPL-MCNC: 28 UG/DL (ref 65–175)
MCH RBC QN AUTO: 30.9 PG (ref 26.8–34.3)
MCHC RBC AUTO-ENTMCNC: 33.7 G/DL (ref 31.4–37.4)
MCV RBC AUTO: 92 FL (ref 82–98)
PLATELET # BLD AUTO: 101 THOUSANDS/UL (ref 149–390)
PMV BLD AUTO: 12.4 FL (ref 8.9–12.7)
POTASSIUM SERPL-SCNC: 3.5 MMOL/L (ref 3.5–5.3)
PROCALCITONIN SERPL-MCNC: <0.05 NG/ML
RBC # BLD AUTO: 3.2 MILLION/UL (ref 3.88–5.62)
SODIUM SERPL-SCNC: 142 MMOL/L (ref 136–145)
TIBC SERPL-MCNC: 293 UG/DL (ref 250–450)
WBC # BLD AUTO: 6.84 THOUSAND/UL (ref 4.31–10.16)

## 2021-06-17 PROCEDURE — 80048 BASIC METABOLIC PNL TOTAL CA: CPT | Performed by: INTERNAL MEDICINE

## 2021-06-17 PROCEDURE — 83540 ASSAY OF IRON: CPT | Performed by: PHYSICIAN ASSISTANT

## 2021-06-17 PROCEDURE — 83550 IRON BINDING TEST: CPT | Performed by: PHYSICIAN ASSISTANT

## 2021-06-17 PROCEDURE — 84145 PROCALCITONIN (PCT): CPT | Performed by: INTERNAL MEDICINE

## 2021-06-17 PROCEDURE — 85027 COMPLETE CBC AUTOMATED: CPT | Performed by: INTERNAL MEDICINE

## 2021-06-17 PROCEDURE — 99239 HOSP IP/OBS DSCHRG MGMT >30: CPT | Performed by: INTERNAL MEDICINE

## 2021-06-17 PROCEDURE — 82728 ASSAY OF FERRITIN: CPT | Performed by: PHYSICIAN ASSISTANT

## 2021-06-17 RX ORDER — ATORVASTATIN CALCIUM 40 MG/1
40 TABLET, FILM COATED ORAL
Qty: 30 TABLET | Refills: 0 | Status: SHIPPED | OUTPATIENT
Start: 2021-06-17

## 2021-06-17 RX ORDER — ASPIRIN 81 MG/1
81 TABLET ORAL DAILY
Qty: 30 TABLET | Refills: 0 | Status: SHIPPED | OUTPATIENT
Start: 2021-06-17

## 2021-06-17 RX ADMIN — ESCITALOPRAM OXALATE 20 MG: 20 TABLET ORAL at 11:39

## 2021-06-17 RX ADMIN — QUETIAPINE FUMARATE 300 MG: 300 TABLET ORAL at 11:40

## 2021-06-17 RX ADMIN — HEPARIN SODIUM 5000 UNITS: 5000 INJECTION INTRAVENOUS; SUBCUTANEOUS at 05:59

## 2021-06-17 RX ADMIN — FAMOTIDINE 20 MG: 20 TABLET, FILM COATED ORAL at 11:40

## 2021-06-17 RX ADMIN — ASPIRIN 81 MG: 81 TABLET, COATED ORAL at 11:39

## 2021-06-17 NOTE — ASSESSMENT & PLAN NOTE
· Creatinine 1 65 on admission   · Baseline appears to be around 1-1 2   · Received 1 L bolus and maintenance IVF  · Creatinine improved to 0 92, acute kidney injury resolved

## 2021-06-17 NOTE — PLAN OF CARE
Problem: Potential for Falls  Goal: Patient will remain free of falls  Description: INTERVENTIONS:  - Educate patient/family on patient safety including physical limitations  - Instruct patient to call for assistance with activity   - Consult OT/PT to assist with strengthening/mobility   - Keep Call bell within reach  - Keep bed low and locked with side rails adjusted as appropriate  - Keep care items and personal belongings within reach  - Initiate and maintain comfort rounds  - Make Fall Risk Sign visible to staff  - Offer Toileting every 1 Hours, in advance of need  - Initiate/Maintain constantalarm  - Obtain necessary fall risk management equipment: N/A  - Apply yellow socks and bracelet for high fall risk patients  - Consider moving patient to room near nurses station  Outcome: Progressing

## 2021-06-17 NOTE — UTILIZATION REVIEW
Inpatient Admission Authorization Request   NOTIFICATION OF INPATIENT ADMISSION/INPATIENT AUTHORIZATION REQUEST   SERVICING FACILITY:   [address] [address]  Tax ID: [id number]  NPI: [id number]  Place of Service: Inpatient [address] [facility]  60  Place of Service Code: 24     ATTENDING PROVIDER:  Attending Name and NPI#: King Nigel Md [de-identified]  Address: [address] [place] [address]  Phone: [phone]     UTILIZATION REVIEW CONTACT:  Logan Connelly, Utilization   Network Utilization Review Department  Phone: [phone]  Fax [phone]  Email: [email or web address]     PHYSICIAN ADVISORY SERVICES:  FOR [id number] REVIEW - MEDICAL NECESSITY DENIAL  Phone: [phone]  Fax: [phone]  Email: [email or web address]     TYPE OF REQUEST:  Inpatient Status     ADMISSION INFORMATION:  ADMISSION DATE/TIME: 6/15/21 10:30 PM  PATIENT DIAGNOSIS CODE/DESCRIPTION:  Dizziness [R42]  Altered mental status [R41 82]  Fine tremor [G25 2]  Hypoxia [R09 02]  SIRS (systemic inflammatory response syndrome) (HCC) [R65 10]  MARIELY (acute kidney injury) ([facility] ) [N17 9]  DISCHARGE DATE/TIME: No discharge date for patient encounter  DISCHARGE DISPOSITION (IF DISCHARGED): Home/Self Care     IMPORTANT INFORMATION:  Please contact the Berta Weinberg directly with any questions or concerns regarding this request  Department voicemails are confidential     Send requests for admission clinical reviews, concurrent reviews, approvals, and administrative denials due to lack of clinical to fax [phone]

## 2021-06-17 NOTE — DISCHARGE SUMMARY
Shubham Porter     Discharge- Jose Manuel Roach 1960, 62 y o  male MRN: [id number]  Unit/Bed#: -01 Encounter: [id number]  Primary Care Provider: Aileen Waterman,    Date and time admitted to hospital: 6/15/2021  8:37 PM    * Acute metabolic encephalopathy  Assessment & Plan  · Patient presented to the emergency department with altered mental status described as shaking episode"  · CT head negative for acute abnormalities, noted old infarct  · Suspect multifactorial in setting of acute kidney injury, dehydration  · Antibiotics discontinued  · Neurology was consulted, no indication for EEG  · Mentation improved and resolved to baseline    MARIELY (acute kidney injury) ([facility])-resolved as of 6/17/2021  Assessment & Plan  · Creatinine 1 65 on admission   · Baseline appears to be around 1-1 2   · Received 1 L bolus and maintenance IVF  · Creatinine improved to 0 92, acute kidney injury resolved    History of CVA (cerebrovascular accident)  Assessment & Plan  · Noted to have old infarcts on CT head  · Started on ASA and statin for stroke prevention    SIRS (systemic inflammatory response syndrome) ([facility] )  Assessment & Plan  · Meet sirs criteria due to tachycardia, tachypnea and elevated white blood cell  · Patient presented after "shaking episode" at group home  · Elevated lactic and WBC could be reactive if a seizure did occur  · Patient became hypoxic in the ED requiring 2 L nasal cannula  Cefepime and Flagyl started as precaution in case of aspiration during shaking     · Chest x-ray normal, lungs clear during exam, UA negative  · Patient without tongue biting or emesis during shaking episode  · Procal and blood cultures ordered - negative, antibiotics discontinued    Tobacco dependence syndrome  Assessment & Plan  · Smokes a half a pack per day  · Encouraged smoking cessation    Alcohol dependence in remission [facility])  Assessment & Plan  · Patient has been sober for 6 months · Encouraged continued cessation    Bipolar 2 disorder, major depressive episode ([facility] )  Assessment & Plan  · Continue Seroquel 300 mg daily    Tremors of nervous system-resolved as of 6/17/2021  Assessment & Plan  · No evidence of seizure activity  · Neurology consulted, no indication for EEG  · Resolved    Discharging Physician / Practitioner: Santo Jeffries PA-C  PCP: Buffy Baker DO  Admission Date:   Admission Orders (From admission, onward)     Ordered        06/15/21 2230  Inpatient Admission  Once                   Discharge Date: 06/17/21    Medical Problems     Resolved Problems  Date Reviewed: 6/17/2021        Resolved    MARIELY (acute kidney injury) ([facility] ) 6/17/2021     Resolved by  Santo Jeffries PA-C    Tremors of nervous system 6/17/2021     Resolved by  Santo Jeffries PA-C              Consultations During Hospital Stay:  · Neurology    Procedures Performed:   · None    Significant Findings / Test Results:   · CT head:  No acute intracranial abnormality  · CXR:  No acute abnormality  · Creatinine 1 65 on admission, potassium 3 4  · Lactic acid 3 2 on admission  · Blood cultures negative x2 at 24 hours  · UDS + THC  · Urinalysis negative UTI  · Procalcitonin normal    Incidental Findings:   · As above     Test Results Pending at Discharge (will require follow up): · None     Outpatient Tests Requested:  · CBC in 1 week    Complications:  None    Reason for Admission:  Acute metabolic encephalopathy    Hospital Course:     Roberto Pitts is a 62 y o  male patient who originally presented to the hospital on 6/15/2021 due to shaking episode  Past medical history significant for bipolar disorder, tobacco abuse, alcohol abuse  Patient presented to the emergency department with reported tremor like activity  Neurology was consulted and felt this was likely due to metabolic derangement of acute kidney injury  Patient was started on IV hydration and home meloxicam was held    No evidence of urinary retention  Lactic acidosis noted on admission likely due to dehydration/renal dysfunction  Blood cultures negative x2 at 24 hours, initial leukocytosis likely reactive  Patient's mentation returned to baseline in no further evidence of tremors/shaking episodes were noted  Neurology did not feel an EEG was warranted  Patient's creatinine returned to baseline with IV hydration and avoiding nephrotoxic agents  On day of discharge patient was hemodynamically stable and verbalized understanding for requested outpatient follow-up  PT/OT was also consulted and patient was cleared to return to previous environment  Please see above list of diagnoses and related plan for additional information  Condition at Discharge: stable     Discharge Day Visit / Exam:     Subjective:  "I feel fine "  Vitals: Blood Pressure: 144/85 (06/17/21 0707)  Pulse: 89 (06/17/21 0707)  Temperature: 98 7 °F (37 1 °C) (06/17/21 0707)  Temp Source: Oral (06/16/21 1543)  Respirations: 16 (06/17/21 0707)  Height: 5' 8" (172 7 cm) (06/15/21 2200)  Weight - Scale: 76 7 kg (169 lb 3 2 oz) (06/15/21 2200)  SpO2: 96 % (06/17/21 0707)  Exam:   Physical Exam  Vitals and nursing note reviewed  Constitutional:       Appearance: He is well-developed  Comments: Appears comfortable, no acute distress   HENT:      Head: Normocephalic and atraumatic  Eyes:      General: No scleral icterus  Extraocular Movements: Extraocular movements intact  Conjunctiva/sclera: Conjunctivae normal    Cardiovascular:      Rate and Rhythm: Normal rate and regular rhythm  Heart sounds: S1 normal and S2 normal  No murmur heard  Pulmonary:      Effort: Pulmonary effort is normal  No respiratory distress  Breath sounds: Normal breath sounds  No wheezing, rhonchi or rales  Abdominal:      General: Bowel sounds are normal       Palpations: Abdomen is soft  Tenderness: There is no abdominal tenderness   There is no guarding or rebound  Musculoskeletal:      Cervical back: Normal range of motion  Comments: Able to move upper/lower extremities bilaterally without difficulty, no edema   Skin:     General: Skin is warm and dry  Neurological:      Mental Status: He is alert and oriented to person, place, and time  Comments: No focal deficits   Psychiatric:         Mood and Affect: Affect is flat  Speech: Speech normal          Behavior: Behavior normal          Discussion with Family:  Discussed with patient's  Kelly De Oliveira at [employer]  Discharge instructions/Information to patient and family:   See after visit summary for information provided to patient and family  Provisions for Follow-Up Care:  See after visit summary for information related to follow-up care and any pertinent home health orders  Disposition:     Home    For Discharges to [address]:   · Not Applicable to this Patient - Not Applicable to this Patient    Planned Readmission: None     Discharge Statement:  I spent 45 minutes discharging the patient  This time was spent on the day of discharge  I had direct contact with the patient on the day of discharge  Greater than 50% of the total time was spent examining patient, answering all patient questions, arranging and discussing plan of care with patient as well as directly providing post-discharge instructions  Additional time then spent on discharge activities  Discharge Medications:  See after visit summary for reconciled discharge medications provided to patient and family        ** Please Note: This note has been constructed using a voice recognition system **

## 2021-06-17 NOTE — ASSESSMENT & PLAN NOTE
· Meet sirs criteria due to tachycardia, tachypnea and elevated white blood cell  · Patient presented after "shaking episode" at group home  · Elevated lactic and WBC could be reactive if a seizure did occur  · Patient became hypoxic in the ED requiring 2 L nasal cannula  Cefepime and Flagyl started as precaution in case of aspiration during shaking     · Chest x-ray normal, lungs clear during exam, UA negative  · Patient without tongue biting or emesis during shaking episode  · Procal and blood cultures ordered - negative, antibiotics discontinued

## 2021-06-17 NOTE — DISCHARGE INSTR - AVS FIRST PAGE
· Follow-up with PCP within 1 week for post hospitalization follow-up  · Discontinue meloxicam  · Patient was started on aspirin and Lipitor for stroke prevention during this admission  · Please have repeat blood work (CBC and differential) completed in 1 week to monitor blood count    Return to the emergency department for further evaluation with any chest pain/palpitations, shortness of breath, nausea vomiting, abdominal pain, fever/chills

## 2021-06-17 NOTE — ASSESSMENT & PLAN NOTE
· Patient presented to the emergency department with altered mental status described as shaking episode"  · CT head negative for acute abnormalities, noted old infarct  · Suspect multifactorial in setting of acute kidney injury, dehydration  · Antibiotics discontinued  · Neurology was consulted, no indication for EEG  · Mentation improved and resolved to baseline

## 2021-06-17 NOTE — CASE MANAGEMENT
LOS: 2  Patient for discharge  Placed call to Jacqui Earthgiovanni at [phone] and notified him of patient's discharge  He again expressed concerns about patient's ability to complete ALD's, ect and wanted patient to be assessed by [facility] [place]  Notified Radha Oconnor that PT/OT evaluated patient and he did not have any skilled needs and could return to his previous setting  Encourage Radha Oconnor to work with [facility] [place] on assessment once patient returns home  He was agreeable and someone from [facility] will [facility] him home at 11 am   Marcelina Cisneros, patient's nurse of transport time  [place]

## 2021-06-18 NOTE — UTILIZATION REVIEW
Notification of Discharge   This is a Notification of Discharge from our facility [address]  Please be advised that this patient has been discharge from our facility  Below you will find the admission and discharge date and time including the patients disposition  UTILIZATION REVIEW CONTACT:  Opal Hernández  Utilization   Network Utilization Review Department  Phone: [phone] x carefully listen to the prompts  All voicemails are confidential   Email: [email or web address]     PHYSICIAN ADVISORY SERVICES:  FOR [id number] REVIEW - MEDICAL NECESSITY DENIAL  Phone: [phone]  Fax: [phone]  Email: [email or web address]     PRESENTATION DATE: 6/15/2021  8:37 PM  OBERVATION ADMISSION DATE:   INPATIENT ADMISSION DATE: 6/15/21 10:30 PM   DISCHARGE DATE: 6/17/2021 11:44 AM  DISPOSITION: Home/Self Care Home/Self Care      IMPORTANT INFORMATION:  Send all requests for admission clinical reviews, approved or denied determinations and any other requests to dedicated fax number below belonging to the campus where the patient is receiving treatment   List of dedicated fax numbers:  [address] [place] [address] DENIALS (Administrative/Medical Necessity) [phone]   [address] (Maternity/NICU/Pediatrics) [phone]   Jeffery Seen [phone]   Mckenzie Villegas [phone]   Celestino Simpson [phone]   [address] [phone]   [facility]  [phone]   [address] [facility]  [address] [facility] [address] [facility] [phone]

## 2021-06-21 LAB
BACTERIA BLD CULT: NORMAL
BACTERIA BLD CULT: NORMAL

## 2022-05-09 ENCOUNTER — TELEPHONE (OUTPATIENT)
Dept: OBGYN CLINIC | Facility: OTHER | Age: 62
End: 2022-05-09

## 2022-05-09 NOTE — TELEPHONE ENCOUNTER
[facility] called to see if we ordered injections      Advised he never saw ortho    C/b # [phone]

## 2024-01-12 ENCOUNTER — HOSPITAL ENCOUNTER (EMERGENCY)
Facility: HOSPITAL | Age: 64
Discharge: HOME | End: 2024-01-12
Attending: EMERGENCY MEDICINE
Payer: COMMERCIAL

## 2024-01-12 ENCOUNTER — APPOINTMENT (EMERGENCY)
Dept: RADIOLOGY | Facility: HOSPITAL | Age: 64
End: 2024-01-12
Payer: COMMERCIAL

## 2024-01-12 VITALS
HEART RATE: 77 BPM | RESPIRATION RATE: 13 BRPM | SYSTOLIC BLOOD PRESSURE: 128 MMHG | OXYGEN SATURATION: 91 % | TEMPERATURE: 98.1 F | DIASTOLIC BLOOD PRESSURE: 66 MMHG

## 2024-01-12 DIAGNOSIS — R07.9 CHEST PAIN: Primary | ICD-10-CM

## 2024-01-12 DIAGNOSIS — R74.01 TRANSAMINITIS: ICD-10-CM

## 2024-01-12 LAB
2HR DELTA HS TROPONIN: 0 NG/L
ALBUMIN SERPL BCP-MCNC: 3.9 G/DL (ref 3.5–5)
ALP SERPL-CCNC: 90 U/L (ref 34–104)
ALT SERPL W P-5'-P-CCNC: 239 U/L (ref 7–52)
ANION GAP SERPL CALCULATED.3IONS-SCNC: 6 MMOL/L
APAP SERPL-MCNC: <2 UG/ML (ref 10–20)
APTT PPP: 35 SECONDS (ref 23–37)
AST SERPL W P-5'-P-CCNC: 117 U/L (ref 13–39)
ATRIAL RATE: 87 BPM
BASOPHILS # BLD AUTO: 0.02 THOUSANDS/ÂΜL (ref 0–0.1)
BASOPHILS NFR BLD AUTO: 0 % (ref 0–1)
BILIRUB SERPL-MCNC: 0.43 MG/DL (ref 0.2–1)
BUN SERPL-MCNC: 16 MG/DL (ref 5–25)
CALCIUM SERPL-MCNC: 8.6 MG/DL (ref 8.4–10.2)
CARDIAC TROPONIN I PNL SERPL HS: 12 NG/L
CARDIAC TROPONIN I PNL SERPL HS: 12 NG/L
CHLORIDE SERPL-SCNC: 107 MMOL/L (ref 96–108)
CO2 SERPL-SCNC: 24 MMOL/L (ref 21–32)
CREAT SERPL-MCNC: 1.1 MG/DL (ref 0.6–1.3)
EOSINOPHIL # BLD AUTO: 0.1 THOUSAND/ÂΜL (ref 0–0.61)
EOSINOPHIL NFR BLD AUTO: 2 % (ref 0–6)
ERYTHROCYTE [DISTWIDTH] IN BLOOD BY AUTOMATED COUNT: 17.3 % (ref 11.6–15.1)
GFR SERPL CREATININE-BSD FRML MDRD: 71 ML/MIN/1.73SQ M
GLUCOSE SERPL-MCNC: 116 MG/DL (ref 65–140)
HCT VFR BLD AUTO: 40.1 % (ref 36.5–49.3)
HGB BLD-MCNC: 12.3 G/DL (ref 12–17)
IMM GRANULOCYTES # BLD AUTO: 0.02 THOUSAND/UL (ref 0–0.2)
IMM GRANULOCYTES NFR BLD AUTO: 0 % (ref 0–2)
INR PPP: 1.44 (ref 0.84–1.19)
LYMPHOCYTES # BLD AUTO: 1.11 THOUSANDS/ÂΜL (ref 0.6–4.47)
LYMPHOCYTES NFR BLD AUTO: 24 % (ref 14–44)
MCH RBC QN AUTO: 24.9 PG (ref 26.8–34.3)
MCHC RBC AUTO-ENTMCNC: 30.7 G/DL (ref 31.4–37.4)
MCV RBC AUTO: 81 FL (ref 82–98)
MONOCYTES # BLD AUTO: 0.72 THOUSAND/ÂΜL (ref 0.17–1.22)
MONOCYTES NFR BLD AUTO: 16 % (ref 4–12)
NEUTROPHILS # BLD AUTO: 2.67 THOUSANDS/ÂΜL (ref 1.85–7.62)
NEUTS SEG NFR BLD AUTO: 58 % (ref 43–75)
NRBC BLD AUTO-RTO: 0 /100 WBCS
P AXIS: 33 DEGREES
PLATELET # BLD AUTO: 109 THOUSANDS/UL (ref 149–390)
POTASSIUM SERPL-SCNC: 3.9 MMOL/L (ref 3.5–5.3)
PR INTERVAL: 226 MS
PROT SERPL-MCNC: 7.2 G/DL (ref 6.4–8.4)
PROTHROMBIN TIME: 18 SECONDS (ref 11.6–14.5)
QRS AXIS: -33 DEGREES
QRSD INTERVAL: 98 MS
QT INTERVAL: 334 MS
QTC INTERVAL: 401 MS
RBC # BLD AUTO: 4.94 MILLION/UL (ref 3.88–5.62)
SODIUM SERPL-SCNC: 137 MMOL/L (ref 135–147)
T WAVE AXIS: 102 DEGREES
VENTRICULAR RATE: 87 BPM
WBC # BLD AUTO: 4.64 THOUSAND/UL (ref 4.31–10.16)

## 2024-01-12 PROCEDURE — 99285 EMERGENCY DEPT VISIT HI MDM: CPT

## 2024-01-12 PROCEDURE — 80143 DRUG ASSAY ACETAMINOPHEN: CPT | Performed by: EMERGENCY MEDICINE

## 2024-01-12 PROCEDURE — 80053 COMPREHEN METABOLIC PANEL: CPT | Performed by: EMERGENCY MEDICINE

## 2024-01-12 PROCEDURE — 84484 ASSAY OF TROPONIN QUANT: CPT | Performed by: EMERGENCY MEDICINE

## 2024-01-12 PROCEDURE — 85025 COMPLETE CBC W/AUTO DIFF WBC: CPT | Performed by: EMERGENCY MEDICINE

## 2024-01-12 PROCEDURE — 93005 ELECTROCARDIOGRAM TRACING: CPT

## 2024-01-12 PROCEDURE — 36415 COLL VENOUS BLD VENIPUNCTURE: CPT

## 2024-01-12 PROCEDURE — 85610 PROTHROMBIN TIME: CPT | Performed by: EMERGENCY MEDICINE

## 2024-01-12 PROCEDURE — 71045 X-RAY EXAM CHEST 1 VIEW: CPT

## 2024-01-12 PROCEDURE — 99284 EMERGENCY DEPT VISIT MOD MDM: CPT | Performed by: EMERGENCY MEDICINE

## 2024-01-12 PROCEDURE — 85730 THROMBOPLASTIN TIME PARTIAL: CPT | Performed by: EMERGENCY MEDICINE

## 2024-01-12 NOTE — ED NOTES
Contacted Leelee from [facility] to notify that Pt has been discharged. Phone number . .  Voicemail left. Awaiting call back.   Vipul Gonzalez RN  01/12/24 1534       Vipul Gonzalez RN  01/12/24 4567

## 2024-01-13 NOTE — ED PROVIDER NOTES
History  Chief Complaint   Patient presents with    Chest Pain     Pt had sudden on set of chest pain that radiated to left arm. Pt called 911. Ems gave two doses of nitro and aspirin. Pt denies chest pain at this time.      63-year-old male presents for evaluation of chest pain that started shortly prior to arrival.  Patient states he was sitting on the couch watching TV when he had a sudden onset of central chest pain.  Lasted a few minutes and then resolved spontaneously.  Patient states pain resolved prior to EMS arriving and has remained asymptomatic since.  EMS administered nitroglycerin and aspirin.  Patient has no further complaints at this time.        Prior to Admission Medications   Prescriptions Last Dose Informant Patient Reported? Taking?   QUEtiapine (SEROquel) 300 mg tablet   Yes No   Sig: Take 300 mg by mouth   aspirin (ECOTRIN LOW STRENGTH) 81 mg EC tablet   No No   Sig: Take 1 tablet (81 mg total) by mouth daily   atorvastatin (LIPITOR) 40 mg tablet   No No   Sig: Take 1 tablet (40 mg total) by mouth daily with dinner   diclofenac sodium (Voltaren) 1 %   No No   Sig: Apply 2 g topically 4 (four) times a day   escitalopram (LEXAPRO) 20 mg tablet  Self No No   Sig: Take 1 tablet (20 mg total) by mouth daily   famotidine (PEPCID) 20 mg tablet   No No   Sig: Take 1 tablet (20 mg total) by mouth 2 (two) times a day   traZODone (DESYREL) 100 mg tablet  Self No No   Sig: Take 1 tablet (100 mg total) by mouth daily at bedtime      Facility-Administered Medications: None       Past Medical History:   Diagnosis Date    Alcohol abuse     Depression     Drug therapy     GERD (gastroesophageal reflux disease)     Hypertension 01/2012    Knee pain, bilateral     Psychiatric disorder     depression, anxiety    Self-injurious behavior     Spinal stenosis of lumbar region     Suicide attempt (HCC)        Past Surgical History:   Procedure Laterality Date    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    HERNIA REPAIR  " 1997       Family History   Problem Relation Age of Onset    Depression Mother     Depression Father     No Known Problems Sister     No Known Problems Brother      I have reviewed and agree with the history as documented.    E-Cigarette/Vaping     E-Cigarette/Vaping Substances     Social History     Tobacco Use    Smoking status: Every Day     Current packs/day: 0.50     Types: Cigarettes, Cigars    Smokeless tobacco: Never    Tobacco comments:     PT \"3 CIGARS A DAY\" - quit smoking cigars   Substance Use Topics    Alcohol use: Not Currently     Alcohol/week: 0.0 standard drinks of alcohol     Comment: Stopped drinking for several months - recovered alcoholic    Drug use: No     Types: Marijuana     Comment: Last used marijuana 2 months ago.- no longer smoking       Review of Systems   Cardiovascular:  Positive for chest pain.       Physical Exam  Physical Exam  Vitals and nursing note reviewed.   Constitutional:       General: He is not in acute distress.     Appearance: He is well-developed.   HENT:      Head: Normocephalic and atraumatic.      Right Ear: External ear normal.      Left Ear: External ear normal.      Nose: Nose normal.   Eyes:      General: No scleral icterus.  Pulmonary:      Effort: Pulmonary effort is normal. No respiratory distress.   Abdominal:      General: There is no distension.      Palpations: Abdomen is soft.   Musculoskeletal:         General: No deformity. Normal range of motion.      Cervical back: Normal range of motion and neck supple.   Skin:     General: Skin is warm.      Findings: No rash.   Neurological:      General: No focal deficit present.      Mental Status: He is alert.      Gait: Gait normal.   Psychiatric:         Mood and Affect: Mood normal.         Vital Signs  ED Triage Vitals [01/12/24 1139]   Temperature Pulse Respirations Blood Pressure SpO2   98.1 °F (36.7 °C) 100 18 109/61 95 %      Temp src Heart Rate Source Patient Position - Orthostatic VS BP Location FiO2 " "(%)   -- -- -- -- --      Pain Score       --           Vitals:    01/12/24 1139 01/12/24 1230 01/12/24 1300 01/12/24 1400   BP: 109/61 139/69 138/71 128/66   Pulse: 100 74 76 77         Visual Acuity      ED Medications  Medications - No data to display    Diagnostic Studies  Results Reviewed       Procedure Component Value Units Date/Time    Acetaminophen level-\"If concentration is detectable, please discuss with medical  on call.\" [[id number]]  (Abnormal) Collected: 01/12/24 1500    Lab Status: Final result Specimen: Blood from Arm, Left Updated: 01/12/24 1523     Acetaminophen Level <2 ug/mL     HS Troponin I 2hr [[id number]]  (Normal) Collected: 01/12/24 1336    Lab Status: Final result Specimen: Blood from Arm, Left Updated: 01/12/24 1405     hs TnI 2hr 12 ng/L      Delta 2hr hsTnI 0 ng/L     APTT [[id number]]  (Normal) Collected: 01/12/24 1140    Lab Status: Final result Specimen: Blood from Arm, Left Updated: 01/12/24 1219     PTT 35 seconds     Protime-INR [[id number]]  (Abnormal) Collected: 01/12/24 1140    Lab Status: Final result Specimen: Blood from Arm, Left Updated: 01/12/24 1219     Protime 18.0 seconds      INR 1.44    HS Troponin 0hr (reflex protocol) [[id number]]  (Normal) Collected: 01/12/24 1140    Lab Status: Final result Specimen: Blood from Arm, Left Updated: 01/12/24 1209     hs TnI 0hr 12 ng/L     Comprehensive metabolic panel [[id number]]  (Abnormal) Collected: 01/12/24 1140    Lab Status: Final result Specimen: Blood from Arm, Left Updated: 01/12/24 1201     Sodium 137 mmol/L      Potassium 3.9 mmol/L      Chloride 107 mmol/L      CO2 24 mmol/L      ANION GAP 6 mmol/L      BUN 16 mg/dL      Creatinine 1.10 mg/dL      Glucose 116 mg/dL      Calcium 8.6 mg/dL       U/L       U/L      Alkaline Phosphatase 90 U/L      Total Protein 7.2 g/dL      Albumin 3.9 g/dL      Total Bilirubin 0.43 mg/dL      eGFR 71 ml/min/1.73sq m     Narrative:      National Kidney Disease " Foundation guidelines for Chronic Kidney Disease (CKD):     Stage 1 with normal or high GFR (GFR > 90 mL/min/1.73 square meters)    Stage 2 Mild CKD (GFR = 60-89 mL/min/1.73 square meters)    Stage 3A Moderate CKD (GFR = 45-59 mL/min/1.73 square meters)    Stage 3B Moderate CKD (GFR = 30-44 mL/min/1.73 square meters)    Stage 4 Severe CKD (GFR = 15-29 mL/min/1.73 square meters)    Stage 5 End Stage CKD (GFR <15 mL/min/1.73 square meters)  Note: GFR calculation is accurate only with a steady state creatinine    CBC and differential [[id number]]  (Abnormal) Collected: 01/12/24 1140    Lab Status: Final result Specimen: Blood from Arm, Left Updated: 01/12/24 1153     WBC 4.64 Thousand/uL      RBC 4.94 Million/uL      Hemoglobin 12.3 g/dL      Hematocrit 40.1 %      MCV 81 fL      MCH 24.9 pg      MCHC 30.7 g/dL      RDW 17.3 %      Platelets 109 Thousands/uL      nRBC 0 /100 WBCs      Neutrophils Relative 58 %      Immat GRANS % 0 %      Lymphocytes Relative 24 %      Monocytes Relative 16 %      Eosinophils Relative 2 %      Basophils Relative 0 %      Neutrophils Absolute 2.67 Thousands/µL      Immature Grans Absolute 0.02 Thousand/uL      Lymphocytes Absolute 1.11 Thousands/µL      Monocytes Absolute 0.72 Thousand/µL      Eosinophils Absolute 0.10 Thousand/µL      Basophils Absolute 0.02 Thousands/µL                    XR chest 1 view portable   Final Result by Yaw Meza DO (01/12 1451)      No acute cardiopulmonary disease.                  Resident: LISSET DESAI I, the attending radiologist, have reviewed the images and agree with the final report above.      Workstation performed: [id number]                    Procedures  Procedures         ED Course                                             Medical Decision Making  63-year-old male presenting with chest pain now resolved.  Cardiopulmonary evaluation with labs, EKG, troponin.    Discussed transaminitis and importance of close follow-up.   Return precautions discussed.    Amount and/or Complexity of Data Reviewed  Labs: ordered.  Radiology: ordered.             Disposition  Final diagnoses:   Chest pain   Transaminitis     Time reflects when diagnosis was documented in both MDM as applicable and the Disposition within this note       Time User Action Codes Description Comment    1/12/2024  2:11 PM Raciel Mata [R07.9] Chest pain     1/12/2024  2:20 PM Raciel Mata [R74.01] Transaminitis           ED Disposition       ED Disposition   Discharge    Condition   Stable    Date/Time   Fri Jan 12, 2024  2:11 PM    Comment   Chuy Norton discharge to home/self care.                   Follow-up Information       Follow up With Specialties Details Why Contact Info Additional Information    Your primary care provider         [facility]   [address]  [address]  [facility] [address]  [phone] [facility] [place], [address], [address], [facility]  [address]     [phone]     [facility] Emergency Medicine  If symptoms worsen [address] [facility] [id number] [phone] [facility], [address] [place], [place] [address]            Discharge Medication List as of 1/12/2024  3:28 PM        CONTINUE these medications which have NOT CHANGED    Details   aspirin (ECOTRIN LOW STRENGTH) 81 mg EC tablet Take 1 tablet (81 mg total) by mouth daily, Starting Thu 6/17/2021, Print      atorvastatin (LIPITOR) 40 mg tablet Take 1 tablet (40 mg total) by mouth daily with dinner, Starting Thu 6/17/2021, Print      diclofenac sodium (Voltaren) 1 % Apply 2 g topically 4 (four) times a day, Starting Wed 9/23/2020, Normal      escitalopram (LEXAPRO) 20 mg tablet Take 1 tablet (20 mg total) by mouth daily, Starting Thu  10/4/2018, Print      famotidine (PEPCID) 20 mg tablet Take 1 tablet (20 mg total) by mouth 2 (two) times a day, Starting Fri 1/24/2020, Print      QUEtiapine (SEROquel) 300 mg tablet Take 300 mg by mouth, Starting Wed 4/3/2019, Historical Med      traZODone (DESYREL) 100 mg tablet Take 1 tablet (100 mg total) by mouth daily at bedtime, Starting Thu 10/4/2018, Print             No discharge procedures on file.    PDMP Review         Value Time User    PDMP Reviewed  Yes 6/17/2021 10:15 AM Martha Gracia PA-C            ED Provider  Electronically Signed by             Raciel Mata DO  01/12/24 0229     Additional Area 3 Location: Lip Flip

## 2024-05-07 ENCOUNTER — APPOINTMENT (EMERGENCY)
Dept: RADIOLOGY | Facility: HOSPITAL | Age: 64
End: 2024-05-07
Payer: COMMERCIAL

## 2024-05-07 ENCOUNTER — HOSPITAL ENCOUNTER (EMERGENCY)
Facility: HOSPITAL | Age: 64
Discharge: HOME | End: 2024-05-07
Attending: EMERGENCY MEDICINE
Payer: COMMERCIAL

## 2024-05-07 VITALS
HEART RATE: 100 BPM | BODY MASS INDEX: 25.73 KG/M2 | OXYGEN SATURATION: 94 % | TEMPERATURE: 99.4 F | HEIGHT: 68 IN | SYSTOLIC BLOOD PRESSURE: 131 MMHG | DIASTOLIC BLOOD PRESSURE: 81 MMHG | RESPIRATION RATE: 20 BRPM

## 2024-05-07 DIAGNOSIS — R53.83 FATIGUE: Primary | ICD-10-CM

## 2024-05-07 DIAGNOSIS — N28.9 RENAL INSUFFICIENCY: ICD-10-CM

## 2024-05-07 LAB
2HR DELTA HS TROPONIN: -3 NG/L
ALBUMIN SERPL BCP-MCNC: 4.4 G/DL (ref 3.5–5)
ALP SERPL-CCNC: 93 U/L (ref 34–104)
ALT SERPL W P-5'-P-CCNC: 22 U/L (ref 7–52)
ANION GAP SERPL CALCULATED.3IONS-SCNC: 10 MMOL/L (ref 4–13)
APTT PPP: 35 SECONDS (ref 23–37)
AST SERPL W P-5'-P-CCNC: 22 U/L (ref 13–39)
BACTERIA UR QL AUTO: ABNORMAL /HPF
BASE EXCESS BLDA CALC-SCNC: -4 MMOL/L (ref -2–3)
BASOPHILS # BLD AUTO: 0.01 THOUSANDS/ÂΜL (ref 0–0.1)
BASOPHILS NFR BLD AUTO: 0 % (ref 0–1)
BILIRUB SERPL-MCNC: 0.57 MG/DL (ref 0.2–1)
BILIRUB UR QL STRIP: NEGATIVE
BNP SERPL-MCNC: 38 PG/ML (ref 0–100)
BUN SERPL-MCNC: 23 MG/DL (ref 5–25)
CA-I BLD-SCNC: 1.1 MMOL/L (ref 1.12–1.32)
CALCIUM SERPL-MCNC: 9 MG/DL (ref 8.4–10.2)
CARDIAC TROPONIN I PNL SERPL HS: 11 NG/L
CARDIAC TROPONIN I PNL SERPL HS: 14 NG/L
CHLORIDE SERPL-SCNC: 106 MMOL/L (ref 96–108)
CLARITY UR: CLEAR
CO2 SERPL-SCNC: 22 MMOL/L (ref 21–32)
COLOR UR: YELLOW
CREAT SERPL-MCNC: 1.39 MG/DL (ref 0.6–1.3)
EOSINOPHIL # BLD AUTO: 0.01 THOUSAND/ÂΜL (ref 0–0.61)
EOSINOPHIL NFR BLD AUTO: 0 % (ref 0–6)
ERYTHROCYTE [DISTWIDTH] IN BLOOD BY AUTOMATED COUNT: 18.1 % (ref 11.6–15.1)
FLUAV RNA RESP QL NAA+PROBE: NEGATIVE
FLUBV RNA RESP QL NAA+PROBE: NEGATIVE
GFR SERPL CREATININE-BSD FRML MDRD: 53 ML/MIN/1.73SQ M
GLUCOSE SERPL-MCNC: 107 MG/DL (ref 65–140)
GLUCOSE SERPL-MCNC: 110 MG/DL (ref 65–140)
GLUCOSE UR STRIP-MCNC: NEGATIVE MG/DL
HCO3 BLDA-SCNC: 18.7 MMOL/L (ref 24–30)
HCT VFR BLD AUTO: 42.3 % (ref 36.5–49.3)
HCT VFR BLD CALC: 40 % (ref 36.5–49.3)
HGB BLD-MCNC: 13.1 G/DL (ref 12–17)
HGB BLDA-MCNC: 13.6 G/DL (ref 12–17)
HGB UR QL STRIP.AUTO: NEGATIVE
IMM GRANULOCYTES # BLD AUTO: 0.02 THOUSAND/UL (ref 0–0.2)
IMM GRANULOCYTES NFR BLD AUTO: 0 % (ref 0–2)
INR PPP: 1.54 (ref 0.84–1.19)
KETONES UR STRIP-MCNC: NEGATIVE MG/DL
LACTATE SERPL-SCNC: 1.6 MMOL/L (ref 0.5–2)
LEUKOCYTE ESTERASE UR QL STRIP: NEGATIVE
LIPASE SERPL-CCNC: 14 U/L (ref 11–82)
LYMPHOCYTES # BLD AUTO: 0.69 THOUSANDS/ÂΜL (ref 0.6–4.47)
LYMPHOCYTES NFR BLD AUTO: 9 % (ref 14–44)
MCH RBC QN AUTO: 24.1 PG (ref 26.8–34.3)
MCHC RBC AUTO-ENTMCNC: 31 G/DL (ref 31.4–37.4)
MCV RBC AUTO: 78 FL (ref 82–98)
MONOCYTES # BLD AUTO: 0.28 THOUSAND/ÂΜL (ref 0.17–1.22)
MONOCYTES NFR BLD AUTO: 4 % (ref 4–12)
MUCOUS THREADS UR QL AUTO: ABNORMAL
NEUTROPHILS # BLD AUTO: 6.55 THOUSANDS/ÂΜL (ref 1.85–7.62)
NEUTS SEG NFR BLD AUTO: 87 % (ref 43–75)
NITRITE UR QL STRIP: NEGATIVE
NON-SQ EPI CELLS URNS QL MICRO: ABNORMAL /HPF
NRBC BLD AUTO-RTO: 0 /100 WBCS
PCO2 BLD: 20 MMOL/L (ref 21–32)
PCO2 BLD: 26.4 MM HG (ref 42–50)
PH BLD: 7.46 [PH] (ref 7.3–7.4)
PH UR STRIP.AUTO: 6 [PH]
PLATELET # BLD AUTO: 150 THOUSANDS/UL (ref 149–390)
PO2 BLD: 49 MM HG (ref 35–45)
POTASSIUM BLD-SCNC: 3.9 MMOL/L (ref 3.5–5.3)
POTASSIUM SERPL-SCNC: 4 MMOL/L (ref 3.5–5.3)
PROCALCITONIN SERPL-MCNC: 0.13 NG/ML
PROT SERPL-MCNC: 7.9 G/DL (ref 6.4–8.4)
PROT UR STRIP-MCNC: ABNORMAL MG/DL
PROTHROMBIN TIME: 19 SECONDS (ref 11.6–14.5)
RBC # BLD AUTO: 5.43 MILLION/UL (ref 3.88–5.62)
RBC #/AREA URNS AUTO: ABNORMAL /HPF
RSV RNA RESP QL NAA+PROBE: NEGATIVE
SAO2 % BLD FROM PO2: 87 % (ref 60–85)
SARS-COV-2 RNA RESP QL NAA+PROBE: NEGATIVE
SODIUM BLD-SCNC: 139 MMOL/L (ref 136–145)
SODIUM SERPL-SCNC: 138 MMOL/L (ref 135–147)
SP GR UR STRIP.AUTO: 1.02 (ref 1–1.03)
SPECIMEN SOURCE: ABNORMAL
UROBILINOGEN UR STRIP-ACNC: <2 MG/DL
WBC # BLD AUTO: 7.56 THOUSAND/UL (ref 4.31–10.16)
WBC #/AREA URNS AUTO: ABNORMAL /HPF

## 2024-05-07 PROCEDURE — 84145 PROCALCITONIN (PCT): CPT | Performed by: EMERGENCY MEDICINE

## 2024-05-07 PROCEDURE — 81001 URINALYSIS AUTO W/SCOPE: CPT | Performed by: EMERGENCY MEDICINE

## 2024-05-07 PROCEDURE — 83690 ASSAY OF LIPASE: CPT | Performed by: EMERGENCY MEDICINE

## 2024-05-07 PROCEDURE — 82803 BLOOD GASES ANY COMBINATION: CPT

## 2024-05-07 PROCEDURE — 71045 X-RAY EXAM CHEST 1 VIEW: CPT

## 2024-05-07 PROCEDURE — 82330 ASSAY OF CALCIUM: CPT

## 2024-05-07 PROCEDURE — 84132 ASSAY OF SERUM POTASSIUM: CPT

## 2024-05-07 PROCEDURE — 80053 COMPREHEN METABOLIC PANEL: CPT | Performed by: EMERGENCY MEDICINE

## 2024-05-07 PROCEDURE — 96360 HYDRATION IV INFUSION INIT: CPT

## 2024-05-07 PROCEDURE — 96361 HYDRATE IV INFUSION ADD-ON: CPT

## 2024-05-07 PROCEDURE — 99284 EMERGENCY DEPT VISIT MOD MDM: CPT | Performed by: EMERGENCY MEDICINE

## 2024-05-07 PROCEDURE — 99285 EMERGENCY DEPT VISIT HI MDM: CPT

## 2024-05-07 PROCEDURE — 85025 COMPLETE CBC W/AUTO DIFF WBC: CPT | Performed by: EMERGENCY MEDICINE

## 2024-05-07 PROCEDURE — 87040 BLOOD CULTURE FOR BACTERIA: CPT | Performed by: EMERGENCY MEDICINE

## 2024-05-07 PROCEDURE — 84295 ASSAY OF SERUM SODIUM: CPT

## 2024-05-07 PROCEDURE — 82947 ASSAY GLUCOSE BLOOD QUANT: CPT

## 2024-05-07 PROCEDURE — 85014 HEMATOCRIT: CPT

## 2024-05-07 PROCEDURE — 84484 ASSAY OF TROPONIN QUANT: CPT | Performed by: EMERGENCY MEDICINE

## 2024-05-07 PROCEDURE — 83605 ASSAY OF LACTIC ACID: CPT | Performed by: EMERGENCY MEDICINE

## 2024-05-07 PROCEDURE — 0241U HB NFCT DS VIR RESP RNA 4 TRGT: CPT | Performed by: EMERGENCY MEDICINE

## 2024-05-07 PROCEDURE — 83880 ASSAY OF NATRIURETIC PEPTIDE: CPT | Performed by: EMERGENCY MEDICINE

## 2024-05-07 PROCEDURE — 36415 COLL VENOUS BLD VENIPUNCTURE: CPT | Performed by: EMERGENCY MEDICINE

## 2024-05-07 PROCEDURE — 85610 PROTHROMBIN TIME: CPT | Performed by: EMERGENCY MEDICINE

## 2024-05-07 PROCEDURE — 85730 THROMBOPLASTIN TIME PARTIAL: CPT | Performed by: EMERGENCY MEDICINE

## 2024-05-07 RX ADMIN — SODIUM CHLORIDE 1000 ML: 0.9 INJECTION, SOLUTION INTRAVENOUS at 17:37

## 2024-05-08 NOTE — ED PROVIDER NOTES
History  Chief Complaint   Patient presents with    Weakness - Generalized     Pt reports feeling weak. Pt states feeling lightheaded.     64-year-old male presents for evaluation of generalized weakness that started this morning.  Patient denies any specific complaints of fever, chest pain, shortness of breath, abdominal pain, nausea, vomiting.  Denies any new medications.        Prior to Admission Medications   Prescriptions Last Dose Informant Patient Reported? Taking?   QUEtiapine (SEROquel) 300 mg tablet   Yes No   Sig: Take 300 mg by mouth   aspirin (ECOTRIN LOW STRENGTH) 81 mg EC tablet   No No   Sig: Take 1 tablet (81 mg total) by mouth daily   atorvastatin (LIPITOR) 40 mg tablet   No No   Sig: Take 1 tablet (40 mg total) by mouth daily with dinner   diclofenac sodium (Voltaren) 1 %   No No   Sig: Apply 2 g topically 4 (four) times a day   escitalopram (LEXAPRO) 20 mg tablet  Self No No   Sig: Take 1 tablet (20 mg total) by mouth daily   famotidine (PEPCID) 20 mg tablet   No No   Sig: Take 1 tablet (20 mg total) by mouth 2 (two) times a day   traZODone (DESYREL) 100 mg tablet  Self No No   Sig: Take 1 tablet (100 mg total) by mouth daily at bedtime      Facility-Administered Medications: None       Past Medical History:   Diagnosis Date    Alcohol abuse     Depression     Drug therapy     GERD (gastroesophageal reflux disease)     Hypertension 01/2012    Knee pain, bilateral     Psychiatric disorder     depression, anxiety    Self-injurious behavior     Spinal stenosis of lumbar region     Suicide attempt (HCC)        Past Surgical History:   Procedure Laterality Date    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    HERNIA REPAIR  1997       Family History   Problem Relation Age of Onset    Depression Mother     Depression Father     No Known Problems Sister     No Known Problems Brother      I have reviewed and agree with the history as documented.    E-Cigarette/Vaping     E-Cigarette/Vaping Substances  "    Social History     Tobacco Use    Smoking status: Every Day     Current packs/day: 0.50     Types: Cigarettes, Cigars    Smokeless tobacco: Never    Tobacco comments:     PT \"3 CIGARS A DAY\" - quit smoking cigars   Substance Use Topics    Alcohol use: Not Currently     Alcohol/week: 0.0 standard drinks of alcohol     Comment: Stopped drinking for several months - recovered alcoholic    Drug use: No     Types: Marijuana     Comment: Last used marijuana 2 months ago.- no longer smoking       Review of Systems   Constitutional:  Positive for fatigue. Negative for fever.       Physical Exam  Physical Exam  Vitals and nursing note reviewed.   Constitutional:       General: He is not in acute distress.     Appearance: He is well-developed.   HENT:      Head: Normocephalic and atraumatic.      Right Ear: External ear normal.      Left Ear: External ear normal.      Nose: Nose normal.   Eyes:      General: No scleral icterus.  Cardiovascular:      Rate and Rhythm: Normal rate.   Pulmonary:      Effort: Pulmonary effort is normal. No respiratory distress.   Abdominal:      General: There is no distension.      Palpations: Abdomen is soft.      Tenderness: There is no abdominal tenderness.   Musculoskeletal:         General: No deformity. Normal range of motion.      Cervical back: Normal range of motion and neck supple.      Comments: 5/5 strength of bilateral upper and lower extremities   Skin:     General: Skin is warm.      Findings: No rash.   Neurological:      General: No focal deficit present.      Mental Status: He is alert.      Gait: Gait normal.   Psychiatric:         Mood and Affect: Mood normal.         Vital Signs  ED Triage Vitals   Temperature Pulse Respirations Blood Pressure SpO2   05/07/24 1634 05/07/24 1633 05/07/24 1633 05/07/24 1634 05/07/24 1633   99.4 °F (37.4 °C) (!) 115 20 152/93 95 %      Temp Source Heart Rate Source Patient Position - Orthostatic VS BP Location FiO2 (%)   05/07/24 1634 " 05/07/24 1633 -- 05/07/24 1633 --   Temporal Monitor  Left arm       Pain Score       05/07/24 1903       No Pain           Vitals:    05/07/24 1633 05/07/24 1634 05/07/24 1645 05/07/24 1800   BP:  152/93 152/93 131/81   Pulse: (!) 115  104 100         Visual Acuity  Visual Acuity      Flowsheet Row Most Recent Value   L Pupil Size (mm) 3   R Pupil Size (mm) 3            ED Medications  Medications   sodium chloride 0.9 % bolus 1,000 mL (0 mL Intravenous Stopped 5/7/24 2009)       Diagnostic Studies  Results Reviewed       Procedure Component Value Units Date/Time    Urine Microscopic [[id number]]  (Abnormal) Collected: 05/07/24 1858    Lab Status: Final result Specimen: Urine, Other Updated: 05/07/24 1934     RBC, UA 0-1 /hpf      WBC, UA 0-1 /hpf      Epithelial Cells Occasional /hpf      Bacteria, UA Occasional /hpf      MUCUS THREADS Occasional    Narrative:      Microscopic performed by Julienne Godinez.     UA w Reflex to Microscopic w Reflex to Culture [[id number]]  (Abnormal) Collected: 05/07/24 1858    Lab Status: Final result Specimen: Urine, Other Updated: 05/07/24 1928     Color, UA Yellow     Clarity, UA Clear     Specific Gravity, UA 1.025     pH, UA 6.0     Leukocytes, UA Negative     Nitrite, UA Negative     Protein, UA Trace mg/dl      Glucose, UA Negative mg/dl      Ketones, UA Negative mg/dl      Urobilinogen, UA <2.0 mg/dl      Bilirubin, UA Negative     Occult Blood, UA Negative    HS Troponin I 2hr [[id number]]  (Normal) Collected: 05/07/24 1829    Lab Status: Final result Specimen: Blood from Arm, Right Updated: 05/07/24 1903     hs TnI 2hr 11 ng/L      Delta 2hr hsTnI -3 ng/L     HS Troponin I 4hr [[id number]]     Lab Status: No result Specimen: Blood     FLU/RSV/COVID - if FLU/RSV clinically relevant [[id number]]  (Normal) Collected: 05/07/24 1642    Lab Status: Final result Specimen: Nares from Nose Updated: 05/07/24 1731     SARS-CoV-2 Negative     INFLUENZA A PCR Negative     INFLUENZA B PCR  Negative     RSV PCR Negative    Narrative:      FOR PEDIATRIC PATIENTS - copy/paste COVID Guidelines URL to browser: [email or web address]    SARS-CoV-2 assay is a Nucleic Acid Amplification assay intended for the  qualitative detection of nucleic acid from SARS-CoV-2 in nasopharyngeal  swabs. Results are for the presumptive identification of SARS-CoV-2 RNA.    Positive results are indicative of infection with SARS-CoV-2, the virus  causing COVID-19, but do not rule out bacterial infection or co-infection  with other viruses. Laboratories within the [place] and its  territories are required to report all positive results to the appropriate  public health authorities. Negative results do not preclude SARS-CoV-2  infection and should not be used as the sole basis for treatment or other  patient management decisions. Negative results must be combined with  clinical observations, patient history, and epidemiological information.  This test has not been FDA cleared or approved.    This test has been authorized by FDA under an Emergency Use Authorization  (EUA). This test is only authorized for the duration of time the  declaration that circumstances exist justifying the authorization of the  emergency use of an in vitro diagnostic tests for detection of SARS-CoV-2  virus and/or diagnosis of COVID-19 infection under section 564(b)(1) of  the Act, 21 U.S.C. 360bbb-3(b)(1), unless the authorization is terminated  or revoked sooner. The test has been validated but independent review by FDA  and CLIA is pending.    Test performed using [employer]: This RT-PCR assay targets N2,  a region unique to SARS-CoV-2. A conserved region in the E-gene was chosen  for pan-Sarbecovirus detection which includes SARS-CoV-2.    According to CMS-2020-01-R, this platform meets the definition of high-throughput technology.    B-Type Natriuretic Peptide(BNP) [[id number]]  (Normal)  Collected: 05/07/24 1645    Lab Status: Final result Specimen: Blood from Hand, Left Updated: 05/07/24 1720     BNP 38 pg/mL     Procalcitonin [[id number]]  (Normal) Collected: 05/07/24 1642    Lab Status: Final result Specimen: Blood from Arm, Right Updated: 05/07/24 1719     Procalcitonin 0.13 ng/ml     HS Troponin 0hr (reflex protocol) [[id number]]  (Normal) Collected: 05/07/24 1642    Lab Status: Final result Specimen: Blood from Arm, Right Updated: 05/07/24 1719     hs TnI 0hr 14 ng/L     Lactic acid [[id number]]  (Normal) Collected: 05/07/24 1645    Lab Status: Final result Specimen: Blood from Hand, Left Updated: 05/07/24 1712     LACTIC ACID 1.6 mmol/L     Narrative:      Result may be elevated if tourniquet was used during collection.    Comprehensive metabolic panel [[id number]]  (Abnormal) Collected: 05/07/24 1642    Lab Status: Final result Specimen: Blood from Arm, Right Updated: 05/07/24 1710     Sodium 138 mmol/L      Potassium 4.0 mmol/L      Chloride 106 mmol/L      CO2 22 mmol/L      ANION GAP 10 mmol/L      BUN 23 mg/dL      Creatinine 1.39 mg/dL      Glucose 107 mg/dL      Calcium 9.0 mg/dL      AST 22 U/L      ALT 22 U/L      Alkaline Phosphatase 93 U/L      Total Protein 7.9 g/dL      Albumin 4.4 g/dL      Total Bilirubin 0.57 mg/dL      eGFR 53 ml/min/1.73sq m     Narrative:      National Kidney Disease Foundation guidelines for Chronic Kidney Disease (CKD):     Stage 1 with normal or high GFR (GFR > 90 mL/min/1.73 square meters)    Stage 2 Mild CKD (GFR = 60-89 mL/min/1.73 square meters)    Stage 3A Moderate CKD (GFR = 45-59 mL/min/1.73 square meters)    Stage 3B Moderate CKD (GFR = 30-44 mL/min/1.73 square meters)    Stage 4 Severe CKD (GFR = 15-29 mL/min/1.73 square meters)    Stage 5 End Stage CKD (GFR <15 mL/min/1.73 square meters)  Note: GFR calculation is accurate only with a steady state creatinine    Lipase [[id number]]  (Normal) Collected: 05/07/24 8872    Lab Status: Final result  Specimen: Blood from Arm, Right Updated: 05/07/24 1710     Lipase 14 u/L     Protime-INR [[id number]]  (Abnormal) Collected: 05/07/24 1642    Lab Status: Final result Specimen: Blood from Arm, Right Updated: 05/07/24 1710     Protime 19.0 seconds      INR 1.54    APTT [[id number]]  (Normal) Collected: 05/07/24 1642    Lab Status: Final result Specimen: Blood from Arm, Right Updated: 05/07/24 1710     PTT 35 seconds     CBC and differential [[id number]]  (Abnormal) Collected: 05/07/24 1642    Lab Status: Final result Specimen: Blood from Arm, Right Updated: 05/07/24 1655     WBC 7.56 Thousand/uL      RBC 5.43 Million/uL      Hemoglobin 13.1 g/dL      Hematocrit 42.3 %      MCV 78 fL      MCH 24.1 pg      MCHC 31.0 g/dL      RDW 18.1 %      Platelets 150 Thousands/uL      nRBC 0 /100 WBCs      Segmented % 87 %      Immature Grans % 0 %      Lymphocytes % 9 %      Monocytes % 4 %      Eosinophils Relative 0 %      Basophils Relative 0 %      Absolute Neutrophils 6.55 Thousands/µL      Absolute Immature Grans 0.02 Thousand/uL      Absolute Lymphocytes 0.69 Thousands/µL      Absolute Monocytes 0.28 Thousand/µL      Eosinophils Absolute 0.01 Thousand/µL      Basophils Absolute 0.01 Thousands/µL     POCT Blood Gas (CG8+) [[id number]]  (Abnormal) Collected: 05/07/24 1652    Lab Status: Final result Specimen: Venous Updated: 05/07/24 1655     ph, Singh ISTAT 7.460     pCO2, Singh i-STAT 26.4 mm HG      pO2, Singh i-STAT 49.0 mm HG      BE, i-STAT -4 mmol/L      HCO3, Singh i-STAT 18.7 mmol/L      CO2, i-STAT 20 mmol/L      O2 Sat, i-STAT 87 %      SODIUM, I-STAT 139 mmol/l      Potassium, i-STAT 3.9 mmol/L      Calcium, Ionized i-STAT 1.10 mmol/L      Hct, i-STAT 40 %      Hgb, i-STAT 13.6 g/dl      Glucose, i-STAT 110 mg/dl      Specimen Type VENOUS    Blood culture #2 [[id number]] Collected: 05/07/24 1642    Lab Status: In process Specimen: Blood from Arm, Right Updated: 05/07/24 1652    Blood culture #1 [[id number]] Collected: 05/07/24  1645    Lab Status: In process Specimen: Blood from Hand, Left Updated: 05/07/24 1652                   XR chest portable    (Results Pending)              Procedures  Procedures         ED Course                                             Medical Decision Making  64-year-old male presenting with fatigue.  Obtain sepsis/cardiopulmonary evaluation with labs, EKG, blood cultures.  Suspect viral syndrome.    Mild renal insufficiency.  Administer fluids.  Upon reassessment, patient reports significant improvement of symptoms.  Return precautions discussed    Amount and/or Complexity of Data Reviewed  Labs: ordered.  Radiology: ordered.             Disposition  Final diagnoses:   Fatigue   Renal insufficiency     Time reflects when diagnosis was documented in both MDM as applicable and the Disposition within this note       Time User Action Codes Description Comment    5/7/2024  7:43 PM Raciel Mata [R53.83] Fatigue     5/7/2024  7:43 PM Raciel Mata [N28.9] Renal insufficiency           ED Disposition       ED Disposition   Discharge    Condition   Stable    Date/Time   Tue May 7, 2024  7:43 PM    Comment   Chuy Norton discharge to home/self care.                   Follow-up Information       Follow up With Specialties Details Why Contact Info Additional Information    Your primary care provider          [facility] Emergency Medicine  If symptoms worsen [address] [facility] [address] [phone] [facility], [address] [place], [place] [address]            Discharge Medication List as of 5/7/2024  7:43 PM        CONTINUE these medications which have NOT CHANGED    Details   aspirin (ECOTRIN LOW STRENGTH) 81 mg EC tablet Take 1 tablet (81 mg total) by mouth daily, Starting Thu 6/17/2021, Print      atorvastatin (LIPITOR) 40 mg tablet Take 1 tablet (40 mg total) by mouth daily with  dinner, Starting Thu 6/17/2021, Print      diclofenac sodium (Voltaren) 1 % Apply 2 g topically 4 (four) times a day, Starting Wed 9/23/2020, Normal      escitalopram (LEXAPRO) 20 mg tablet Take 1 tablet (20 mg total) by mouth daily, Starting Thu 10/4/2018, Print      famotidine (PEPCID) 20 mg tablet Take 1 tablet (20 mg total) by mouth 2 (two) times a day, Starting Fri 1/24/2020, Print      QUEtiapine (SEROquel) 300 mg tablet Take 300 mg by mouth, Starting Wed 4/3/2019, Historical Med      traZODone (DESYREL) 100 mg tablet Take 1 tablet (100 mg total) by mouth daily at bedtime, Starting Thu 10/4/2018, Print             No discharge procedures on file.    PDMP Review         Value Time User    PDMP Reviewed  Yes 6/17/2021 10:15 AM Martha Gracia PA-C            ED Provider  Electronically Signed by             Raciel Mata DO  05/07/24 5436

## 2024-05-09 ENCOUNTER — APPOINTMENT (EMERGENCY)
Dept: CT IMAGING | Facility: HOSPITAL | Age: 64
DRG: 378 | End: 2024-05-09
Attending: EMERGENCY MEDICINE
Payer: OTHER GOVERNMENT

## 2024-05-09 ENCOUNTER — APPOINTMENT (EMERGENCY)
Dept: RADIOLOGY | Facility: HOSPITAL | Age: 64
DRG: 378 | End: 2024-05-09
Payer: OTHER GOVERNMENT

## 2024-05-09 ENCOUNTER — HOSPITAL ENCOUNTER (INPATIENT)
Facility: HOSPITAL | Age: 64
LOS: 6 days | Discharge: NURSING, REHAB OR LONG-TERM CARE | DRG: 378 | End: 2024-05-15
Attending: EMERGENCY MEDICINE | Admitting: INTERNAL MEDICINE
Payer: OTHER GOVERNMENT

## 2024-05-09 DIAGNOSIS — R10.9 ABDOMINAL PAIN: ICD-10-CM

## 2024-05-09 DIAGNOSIS — F31.81 BIPOLAR 2 DISORDER, MAJOR DEPRESSIVE EPISODE (HCC): Chronic | ICD-10-CM

## 2024-05-09 DIAGNOSIS — M54.50 ACUTE LEFT-SIDED LOW BACK PAIN WITHOUT SCIATICA: ICD-10-CM

## 2024-05-09 DIAGNOSIS — Z86.73 HISTORY OF CVA (CEREBROVASCULAR ACCIDENT): ICD-10-CM

## 2024-05-09 DIAGNOSIS — K74.60 CIRRHOSIS (HCC): ICD-10-CM

## 2024-05-09 DIAGNOSIS — M25.561 ACUTE PAIN OF RIGHT KNEE: ICD-10-CM

## 2024-05-09 DIAGNOSIS — G47.00 INSOMNIA: ICD-10-CM

## 2024-05-09 DIAGNOSIS — K92.2 GI BLEED: ICD-10-CM

## 2024-05-09 DIAGNOSIS — B19.20 HEPATITIS C: ICD-10-CM

## 2024-05-09 DIAGNOSIS — D64.9 SYMPTOMATIC ANEMIA: ICD-10-CM

## 2024-05-09 DIAGNOSIS — G93.40 ACUTE ENCEPHALOPATHY: Primary | ICD-10-CM

## 2024-05-09 DIAGNOSIS — I51.3 LEFT VENTRICULAR APICAL THROMBUS: ICD-10-CM

## 2024-05-09 LAB
2HR DELTA HS TROPONIN: 8 NG/L
4HR DELTA HS TROPONIN: 18 NG/L
ABO GROUP BLD: NORMAL
ABO GROUP BLD: NORMAL
AFP-TM SERPL-MCNC: 3.47 NG/ML (ref 0–9)
ALBUMIN SERPL BCP-MCNC: 3.5 G/DL (ref 3.5–5)
ALP SERPL-CCNC: 57 U/L (ref 34–104)
ALT SERPL W P-5'-P-CCNC: 17 U/L (ref 7–52)
AMMONIA PLAS-SCNC: 75 UMOL/L (ref 18–72)
ANION GAP SERPL CALCULATED.3IONS-SCNC: 14 MMOL/L (ref 4–13)
ANION GAP SERPL CALCULATED.3IONS-SCNC: 8 MMOL/L (ref 4–13)
APAP SERPL-MCNC: 2 UG/ML (ref 10–20)
APTT PPP: 36 SECONDS (ref 23–37)
AST SERPL W P-5'-P-CCNC: 19 U/L (ref 13–39)
BACTERIA UR QL AUTO: NORMAL /HPF
BASOPHILS # BLD AUTO: 0 THOUSANDS/ÂΜL (ref 0–0.1)
BASOPHILS NFR BLD AUTO: 0 % (ref 0–1)
BILIRUB SERPL-MCNC: 0.35 MG/DL (ref 0.2–1)
BILIRUB UR QL STRIP: NEGATIVE
BLD GP AB SCN SERPL QL: NEGATIVE
BUN SERPL-MCNC: 49 MG/DL (ref 5–25)
BUN SERPL-MCNC: 49 MG/DL (ref 5–25)
CALCIUM SERPL-MCNC: 7.6 MG/DL (ref 8.4–10.2)
CALCIUM SERPL-MCNC: 8 MG/DL (ref 8.4–10.2)
CARDIAC TROPONIN I PNL SERPL HS: 16 NG/L
CARDIAC TROPONIN I PNL SERPL HS: 24 NG/L
CARDIAC TROPONIN I PNL SERPL HS: 34 NG/L
CHLORIDE SERPL-SCNC: 107 MMOL/L (ref 96–108)
CHLORIDE SERPL-SCNC: 112 MMOL/L (ref 96–108)
CK SERPL-CCNC: 187 U/L (ref 39–308)
CLARITY UR: CLEAR
CO2 SERPL-SCNC: 17 MMOL/L (ref 21–32)
CO2 SERPL-SCNC: 18 MMOL/L (ref 21–32)
COLOR UR: YELLOW
CREAT SERPL-MCNC: 1.1 MG/DL (ref 0.6–1.3)
CREAT SERPL-MCNC: 1.33 MG/DL (ref 0.6–1.3)
D DIMER PPP FEU-MCNC: 0.79 UG/ML FEU
EOSINOPHIL # BLD AUTO: 0 THOUSAND/ÂΜL (ref 0–0.61)
EOSINOPHIL NFR BLD AUTO: 0 % (ref 0–6)
ERYTHROCYTE [DISTWIDTH] IN BLOOD BY AUTOMATED COUNT: 18 % (ref 11.6–15.1)
ERYTHROCYTE [DISTWIDTH] IN BLOOD BY AUTOMATED COUNT: 18.2 % (ref 11.6–15.1)
ETHANOL SERPL-MCNC: <10 MG/DL
FLUAV RNA RESP QL NAA+PROBE: NEGATIVE
FLUBV RNA RESP QL NAA+PROBE: NEGATIVE
GFR SERPL CREATININE-BSD FRML MDRD: 56 ML/MIN/1.73SQ M
GFR SERPL CREATININE-BSD FRML MDRD: 71 ML/MIN/1.73SQ M
GLUCOSE SERPL-MCNC: 112 MG/DL (ref 65–140)
GLUCOSE SERPL-MCNC: 126 MG/DL (ref 65–140)
GLUCOSE UR STRIP-MCNC: NEGATIVE MG/DL
HCT VFR BLD AUTO: 25.2 % (ref 36.5–49.3)
HCT VFR BLD AUTO: 27.2 % (ref 36.5–49.3)
HCT VFR BLD AUTO: 28.4 % (ref 36.5–49.3)
HGB BLD-MCNC: 7.6 G/DL (ref 12–17)
HGB BLD-MCNC: 8.3 G/DL (ref 12–17)
HGB BLD-MCNC: 8.4 G/DL (ref 12–17)
HGB UR QL STRIP.AUTO: NEGATIVE
IMM GRANULOCYTES # BLD AUTO: 0.05 THOUSAND/UL (ref 0–0.2)
IMM GRANULOCYTES NFR BLD AUTO: 1 % (ref 0–2)
INR PPP: 1.88 (ref 0.84–1.19)
KETONES UR STRIP-MCNC: ABNORMAL MG/DL
LACTATE SERPL-SCNC: 1.6 MMOL/L (ref 0.5–2)
LACTATE SERPL-SCNC: 2.1 MMOL/L (ref 0.5–2)
LACTATE SERPL-SCNC: 2.6 MMOL/L (ref 0.5–2)
LACTATE SERPL-SCNC: 3.5 MMOL/L (ref 0.5–2)
LEUKOCYTE ESTERASE UR QL STRIP: NEGATIVE
LYMPHOCYTES # BLD AUTO: 1.14 THOUSANDS/ÂΜL (ref 0.6–4.47)
LYMPHOCYTES NFR BLD AUTO: 15 % (ref 14–44)
MCH RBC QN AUTO: 23.9 PG (ref 26.8–34.3)
MCH RBC QN AUTO: 24.1 PG (ref 26.8–34.3)
MCHC RBC AUTO-ENTMCNC: 29.6 G/DL (ref 31.4–37.4)
MCHC RBC AUTO-ENTMCNC: 30.2 G/DL (ref 31.4–37.4)
MCV RBC AUTO: 80 FL (ref 82–98)
MCV RBC AUTO: 81 FL (ref 82–98)
MONOCYTES # BLD AUTO: 0.51 THOUSAND/ÂΜL (ref 0.17–1.22)
MONOCYTES NFR BLD AUTO: 7 % (ref 4–12)
NEUTROPHILS # BLD AUTO: 5.86 THOUSANDS/ÂΜL (ref 1.85–7.62)
NEUTS SEG NFR BLD AUTO: 77 % (ref 43–75)
NITRITE UR QL STRIP: NEGATIVE
NON-SQ EPI CELLS URNS QL MICRO: NORMAL /HPF
NRBC BLD AUTO-RTO: 0 /100 WBCS
PH UR STRIP.AUTO: 6 [PH]
PLATELET # BLD AUTO: 120 THOUSANDS/UL (ref 149–390)
PLATELET # BLD AUTO: 94 THOUSANDS/UL (ref 149–390)
POTASSIUM SERPL-SCNC: 3.6 MMOL/L (ref 3.5–5.3)
POTASSIUM SERPL-SCNC: 4 MMOL/L (ref 3.5–5.3)
PROCALCITONIN SERPL-MCNC: 0.41 NG/ML
PROT SERPL-MCNC: 5.8 G/DL (ref 6.4–8.4)
PROT UR STRIP-MCNC: ABNORMAL MG/DL
PROTHROMBIN TIME: 22.1 SECONDS (ref 11.6–14.5)
RBC # BLD AUTO: 3.15 MILLION/UL (ref 3.88–5.62)
RBC # BLD AUTO: 3.51 MILLION/UL (ref 3.88–5.62)
RBC #/AREA URNS AUTO: NORMAL /HPF
RH BLD: POSITIVE
RH BLD: POSITIVE
RSV RNA RESP QL NAA+PROBE: NEGATIVE
SALICYLATES SERPL-MCNC: <5 MG/DL (ref 3–20)
SARS-COV-2 RNA RESP QL NAA+PROBE: NEGATIVE
SODIUM SERPL-SCNC: 138 MMOL/L (ref 135–147)
SODIUM SERPL-SCNC: 138 MMOL/L (ref 135–147)
SP GR UR STRIP.AUTO: 1.01 (ref 1–1.03)
SPECIMEN EXPIRATION DATE: NORMAL
TSH SERPL DL<=0.05 MIU/L-ACNC: 1.87 UIU/ML (ref 0.45–4.5)
UROBILINOGEN UR STRIP-ACNC: <2 MG/DL
WBC # BLD AUTO: 6.15 THOUSAND/UL (ref 4.31–10.16)
WBC # BLD AUTO: 7.56 THOUSAND/UL (ref 4.31–10.16)
WBC #/AREA URNS AUTO: NORMAL /HPF

## 2024-05-09 PROCEDURE — 82077 ASSAY SPEC XCP UR&BREATH IA: CPT | Performed by: EMERGENCY MEDICINE

## 2024-05-09 PROCEDURE — 84443 ASSAY THYROID STIM HORMONE: CPT | Performed by: EMERGENCY MEDICINE

## 2024-05-09 PROCEDURE — 80179 DRUG ASSAY SALICYLATE: CPT | Performed by: EMERGENCY MEDICINE

## 2024-05-09 PROCEDURE — 85027 COMPLETE CBC AUTOMATED: CPT | Performed by: EMERGENCY MEDICINE

## 2024-05-09 PROCEDURE — 84145 PROCALCITONIN (PCT): CPT | Performed by: EMERGENCY MEDICINE

## 2024-05-09 PROCEDURE — 99223 1ST HOSP IP/OBS HIGH 75: CPT | Performed by: INTERNAL MEDICINE

## 2024-05-09 PROCEDURE — 84484 ASSAY OF TROPONIN QUANT: CPT | Performed by: HOSPITALIST

## 2024-05-09 PROCEDURE — C9113 INJ PANTOPRAZOLE SODIUM, VIA: HCPCS | Performed by: HOSPITALIST

## 2024-05-09 PROCEDURE — 0241U HB NFCT DS VIR RESP RNA 4 TRGT: CPT | Performed by: EMERGENCY MEDICINE

## 2024-05-09 PROCEDURE — 85730 THROMBOPLASTIN TIME PARTIAL: CPT | Performed by: EMERGENCY MEDICINE

## 2024-05-09 PROCEDURE — 30233N1 TRANSFUSION OF NONAUTOLOGOUS RED BLOOD CELLS INTO PERIPHERAL VEIN, PERCUTANEOUS APPROACH: ICD-10-PCS | Performed by: EMERGENCY MEDICINE

## 2024-05-09 PROCEDURE — 70450 CT HEAD/BRAIN W/O DYE: CPT

## 2024-05-09 PROCEDURE — 80048 BASIC METABOLIC PNL TOTAL CA: CPT | Performed by: PHYSICIAN ASSISTANT

## 2024-05-09 PROCEDURE — 85014 HEMATOCRIT: CPT | Performed by: PHYSICIAN ASSISTANT

## 2024-05-09 PROCEDURE — 96361 HYDRATE IV INFUSION ADD-ON: CPT

## 2024-05-09 PROCEDURE — 87040 BLOOD CULTURE FOR BACTERIA: CPT | Performed by: EMERGENCY MEDICINE

## 2024-05-09 PROCEDURE — 74177 CT ABD & PELVIS W/CONTRAST: CPT

## 2024-05-09 PROCEDURE — 85025 COMPLETE CBC W/AUTO DIFF WBC: CPT | Performed by: EMERGENCY MEDICINE

## 2024-05-09 PROCEDURE — 85018 HEMOGLOBIN: CPT | Performed by: PHYSICIAN ASSISTANT

## 2024-05-09 PROCEDURE — C9113 INJ PANTOPRAZOLE SODIUM, VIA: HCPCS | Performed by: EMERGENCY MEDICINE

## 2024-05-09 PROCEDURE — 71275 CT ANGIOGRAPHY CHEST: CPT

## 2024-05-09 PROCEDURE — 86923 COMPATIBILITY TEST ELECTRIC: CPT

## 2024-05-09 PROCEDURE — 99285 EMERGENCY DEPT VISIT HI MDM: CPT | Performed by: EMERGENCY MEDICINE

## 2024-05-09 PROCEDURE — 36415 COLL VENOUS BLD VENIPUNCTURE: CPT | Performed by: EMERGENCY MEDICINE

## 2024-05-09 PROCEDURE — 93005 ELECTROCARDIOGRAM TRACING: CPT

## 2024-05-09 PROCEDURE — 85379 FIBRIN DEGRADATION QUANT: CPT | Performed by: EMERGENCY MEDICINE

## 2024-05-09 PROCEDURE — 83605 ASSAY OF LACTIC ACID: CPT

## 2024-05-09 PROCEDURE — 94640 AIRWAY INHALATION TREATMENT: CPT

## 2024-05-09 PROCEDURE — 99223 1ST HOSP IP/OBS HIGH 75: CPT | Performed by: HOSPITALIST

## 2024-05-09 PROCEDURE — 82140 ASSAY OF AMMONIA: CPT | Performed by: EMERGENCY MEDICINE

## 2024-05-09 PROCEDURE — 85610 PROTHROMBIN TIME: CPT | Performed by: EMERGENCY MEDICINE

## 2024-05-09 PROCEDURE — 96360 HYDRATION IV INFUSION INIT: CPT

## 2024-05-09 PROCEDURE — 86850 RBC ANTIBODY SCREEN: CPT | Performed by: EMERGENCY MEDICINE

## 2024-05-09 PROCEDURE — 99285 EMERGENCY DEPT VISIT HI MDM: CPT

## 2024-05-09 PROCEDURE — 80053 COMPREHEN METABOLIC PANEL: CPT | Performed by: EMERGENCY MEDICINE

## 2024-05-09 PROCEDURE — 36430 TRANSFUSION BLD/BLD COMPNT: CPT

## 2024-05-09 PROCEDURE — 71045 X-RAY EXAM CHEST 1 VIEW: CPT

## 2024-05-09 PROCEDURE — 81001 URINALYSIS AUTO W/SCOPE: CPT | Performed by: EMERGENCY MEDICINE

## 2024-05-09 PROCEDURE — 83605 ASSAY OF LACTIC ACID: CPT | Performed by: EMERGENCY MEDICINE

## 2024-05-09 PROCEDURE — 84484 ASSAY OF TROPONIN QUANT: CPT | Performed by: EMERGENCY MEDICINE

## 2024-05-09 PROCEDURE — 82550 ASSAY OF CK (CPK): CPT | Performed by: EMERGENCY MEDICINE

## 2024-05-09 PROCEDURE — 87081 CULTURE SCREEN ONLY: CPT | Performed by: INTERNAL MEDICINE

## 2024-05-09 PROCEDURE — 86901 BLOOD TYPING SEROLOGIC RH(D): CPT | Performed by: EMERGENCY MEDICINE

## 2024-05-09 PROCEDURE — 80143 DRUG ASSAY ACETAMINOPHEN: CPT | Performed by: EMERGENCY MEDICINE

## 2024-05-09 PROCEDURE — 86900 BLOOD TYPING SEROLOGIC ABO: CPT | Performed by: EMERGENCY MEDICINE

## 2024-05-09 PROCEDURE — 82105 ALPHA-FETOPROTEIN SERUM: CPT | Performed by: PHYSICIAN ASSISTANT

## 2024-05-09 PROCEDURE — P9016 RBC LEUKOCYTES REDUCED: HCPCS

## 2024-05-09 RX ORDER — SODIUM CHLORIDE 9 MG/ML
75 INJECTION, SOLUTION INTRAVENOUS CONTINUOUS
Status: DISCONTINUED | OUTPATIENT
Start: 2024-05-09 | End: 2024-05-11

## 2024-05-09 RX ORDER — LEVALBUTEROL INHALATION SOLUTION 0.63 MG/3ML
0.63 SOLUTION RESPIRATORY (INHALATION) ONCE
Status: COMPLETED | OUTPATIENT
Start: 2024-05-09 | End: 2024-05-09

## 2024-05-09 RX ORDER — LACTULOSE 10 G/15ML
20 SOLUTION ORAL 3 TIMES DAILY
Status: DISCONTINUED | OUTPATIENT
Start: 2024-05-09 | End: 2024-05-09

## 2024-05-09 RX ORDER — OCTREOTIDE ACETATE 100 UG/ML
50 INJECTION, SOLUTION INTRAVENOUS; SUBCUTANEOUS ONCE
Status: COMPLETED | OUTPATIENT
Start: 2024-05-09 | End: 2024-05-09

## 2024-05-09 RX ORDER — ESCITALOPRAM OXALATE 20 MG/1
20 TABLET ORAL DAILY
Status: DISCONTINUED | OUTPATIENT
Start: 2024-05-09 | End: 2024-05-15 | Stop reason: HOSPADM

## 2024-05-09 RX ORDER — LACTULOSE 10 G/15ML
20 SOLUTION ORAL ONCE
Status: COMPLETED | OUTPATIENT
Start: 2024-05-09 | End: 2024-05-09

## 2024-05-09 RX ORDER — NICOTINE 21 MG/24HR
1 PATCH, TRANSDERMAL 24 HOURS TRANSDERMAL DAILY
Status: DISCONTINUED | OUTPATIENT
Start: 2024-05-09 | End: 2024-05-09 | Stop reason: SDUPTHER

## 2024-05-09 RX ORDER — ATORVASTATIN CALCIUM 40 MG/1
40 TABLET, FILM COATED ORAL
Status: DISCONTINUED | OUTPATIENT
Start: 2024-05-09 | End: 2024-05-15 | Stop reason: HOSPADM

## 2024-05-09 RX ORDER — FAMOTIDINE 20 MG/1
20 TABLET, FILM COATED ORAL 2 TIMES DAILY
Status: DISCONTINUED | OUTPATIENT
Start: 2024-05-09 | End: 2024-05-15 | Stop reason: HOSPADM

## 2024-05-09 RX ORDER — QUETIAPINE FUMARATE 300 MG/1
300 TABLET, FILM COATED ORAL
Status: DISCONTINUED | OUTPATIENT
Start: 2024-05-09 | End: 2024-05-15 | Stop reason: HOSPADM

## 2024-05-09 RX ORDER — NICOTINE 21 MG/24HR
21 PATCH, TRANSDERMAL 24 HOURS TRANSDERMAL DAILY
Status: DISCONTINUED | OUTPATIENT
Start: 2024-05-10 | End: 2024-05-15 | Stop reason: HOSPADM

## 2024-05-09 RX ORDER — CEFTRIAXONE 1 G/50ML
1000 INJECTION, SOLUTION INTRAVENOUS EVERY 24 HOURS
Status: DISCONTINUED | OUTPATIENT
Start: 2024-05-09 | End: 2024-05-14

## 2024-05-09 RX ORDER — TRAZODONE HYDROCHLORIDE 100 MG/1
100 TABLET ORAL
Status: DISCONTINUED | OUTPATIENT
Start: 2024-05-09 | End: 2024-05-15 | Stop reason: HOSPADM

## 2024-05-09 RX ADMIN — SODIUM CHLORIDE 8 MG/HR: 9 INJECTION, SOLUTION INTRAVENOUS at 20:00

## 2024-05-09 RX ADMIN — SODIUM CHLORIDE 8 MG/HR: 9 INJECTION, SOLUTION INTRAVENOUS at 10:47

## 2024-05-09 RX ADMIN — NICOTINE 1 PATCH: 14 PATCH, EXTENDED RELEASE TRANSDERMAL at 15:32

## 2024-05-09 RX ADMIN — TRAZODONE HYDROCHLORIDE 100 MG: 100 TABLET ORAL at 22:44

## 2024-05-09 RX ADMIN — CEFTRIAXONE 1000 MG: 1 INJECTION, SOLUTION INTRAVENOUS at 16:40

## 2024-05-09 RX ADMIN — ESCITALOPRAM OXALATE 20 MG: 20 TABLET ORAL at 15:33

## 2024-05-09 RX ADMIN — LEVALBUTEROL HYDROCHLORIDE 0.63 MG: 0.63 SOLUTION RESPIRATORY (INHALATION) at 08:56

## 2024-05-09 RX ADMIN — QUETIAPINE FUMARATE 300 MG: 300 TABLET ORAL at 22:44

## 2024-05-09 RX ADMIN — SODIUM CHLORIDE 75 ML/HR: 0.9 INJECTION, SOLUTION INTRAVENOUS at 15:34

## 2024-05-09 RX ADMIN — SODIUM CHLORIDE 80 MG: 9 INJECTION, SOLUTION INTRAVENOUS at 10:08

## 2024-05-09 RX ADMIN — OCTREOTIDE ACETATE 50 MCG: 100 INJECTION, SOLUTION INTRAVENOUS; SUBCUTANEOUS at 18:23

## 2024-05-09 RX ADMIN — IOHEXOL 100 ML: 350 INJECTION, SOLUTION INTRAVENOUS at 07:39

## 2024-05-09 RX ADMIN — SODIUM CHLORIDE 1000 ML: 0.9 INJECTION, SOLUTION INTRAVENOUS at 06:01

## 2024-05-09 RX ADMIN — ASPIRIN 81 MG: 81 TABLET, COATED ORAL at 15:32

## 2024-05-09 RX ADMIN — SODIUM CHLORIDE 75 ML/HR: 0.9 INJECTION, SOLUTION INTRAVENOUS at 17:59

## 2024-05-09 RX ADMIN — LACTULOSE 20 G: 20 SOLUTION ORAL at 09:40

## 2024-05-09 RX ADMIN — OCTREOTIDE ACETATE 50 MCG/HR: 500 INJECTION, SOLUTION INTRAVENOUS; SUBCUTANEOUS at 18:13

## 2024-05-09 RX ADMIN — ATORVASTATIN CALCIUM 40 MG: 40 TABLET, FILM COATED ORAL at 15:33

## 2024-05-09 NOTE — ED NOTES
No s/s of transfustion reaction. Pt resting, appears comfortable. Vitals wnl     Henny Woodward RN  05/09/24 8220

## 2024-05-09 NOTE — H&P
[facility]  H&P  Name: Chuy Norton 64 y.o. male I MRN: [id number]  Unit/Bed#: ED 04 I Date of Admission: 5/9/2024   Date of Service: 5/9/2024 I Hospital Day: 0       Chief Complaint   Patient presents with    Weakness - Generalized     Pt brought in via EMS,for weakness. Brought in 3 days ago for same sx. Weakness, SOB, fatigue.         Cirrhosis (HCC)  Assessment & Plan  Cirrhosis, noted on abd imaging  Suspected related to alcohol abuse  May benefit from additional workup such as varices screening etc.  Lactulose enemas for now as ER suspected mild hepatic encephalopathy which appears improved with non focal CT head   GI following      History of CVA (cerebrovascular accident)  Assessment & Plan  Cont asa, statin    Bipolar 2 disorder, major depressive episode (HCC)  Assessment & Plan  Mood stable  Cont trazodone and seroquel.    * Anemia  Assessment & Plan  Pt with hgb 7.6, baseline normallly 10-13  Transfuse 1 unit PRBC in ER for symptomatic anemia (pt reported dyspnea), suspected UGI bleed  GI consulted   Cont PPI gtt; GI has added octreotide and rocephin   Clears  NPO AM in case of need for endoscopic evaluation       HPI:  Chuy Norton is a 64 y.o. male who presents with shortness of breath and generalized weakness.  Patient states over the past 3 days he has been very fatigued and tired.  The patient does not have cough.  No chest pain.  No fevers.  No chills.  No diarrhea.  He states he does have a known history of cirrhosis but has not had too much investigation of there.  Apparently he was more confused on presentation, this cleared up with lactulose.  No neurological complaints.    Historical Information   Past Medical History:   Diagnosis Date    Alcohol abuse     Depression     Drug therapy     GERD (gastroesophageal reflux disease)     Hypertension 01/2012    Knee pain, bilateral     Psychiatric disorder     depression, anxiety    Self-injurious behavior     Spinal  "stenosis of lumbar region     Suicide attempt (HCC)      Past Surgical History:   Procedure Laterality Date    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    HERNIA REPAIR  1997     Social History   Social History     Substance and Sexual Activity   Alcohol Use Not Currently    Alcohol/week: 0.0 standard drinks of alcohol    Comment: Stopped drinking for several months - recovered alcoholic     Social History     Substance and Sexual Activity   Drug Use No    Types: Marijuana    Comment: Last used marijuana 2 months ago.- no longer smoking     Social History     Tobacco Use   Smoking Status Every Day    Current packs/day: 1.00    Types: Cigarettes, Cigars   Smokeless Tobacco Never   Tobacco Comments    PT \"3 CIGARS A DAY\" - quit smoking cigars     Family History   Problem Relation Age of Onset    Depression Mother     Depression Father     No Known Problems Sister     No Known Problems Brother        Meds/Allergies   Allergies   Allergen Reactions    Haloperidol Tremor     Other reaction(s): jittery       Meds:    Current Facility-Administered Medications:     [COMPLETED] pantoprazole (PROTONIX) 80 mg in sodium chloride 0.9 % 100 mL IVPB, 80 mg, Intravenous, Once, Stopped at 05/09/24 1023 **FOLLOWED BY** pantoprazole (PROTONIX) 80 mg in sodium chloride 0.9 % 100 mL infusion, 8 mg/hr, Intravenous, Continuous, Rogelio Vargas DO, Last Rate: 10 mL/hr at 05/09/24 1047, 8 mg/hr at 05/09/24 1047    Current Outpatient Medications:     aspirin (ECOTRIN LOW STRENGTH) 81 mg EC tablet, Take 1 tablet (81 mg total) by mouth daily, Disp: 30 tablet, Rfl: 0    atorvastatin (LIPITOR) 40 mg tablet, Take 1 tablet (40 mg total) by mouth daily with dinner, Disp: 30 tablet, Rfl: 0    diclofenac sodium (Voltaren) 1 %, Apply 2 g topically 4 (four) times a day, Disp: 1 Tube, Rfl: 0    escitalopram (LEXAPRO) 20 mg tablet, Take 1 tablet (20 mg total) by mouth daily, Disp: 21 tablet, Rfl: 0    famotidine (PEPCID) 20 mg tablet, Take 1 tablet (20 mg " "total) by mouth 2 (two) times a day, Disp: 30 tablet, Rfl: 0    QUEtiapine (SEROquel) 300 mg tablet, Take 300 mg by mouth, Disp: , Rfl:     traZODone (DESYREL) 100 mg tablet, Take 1 tablet (100 mg total) by mouth daily at bedtime, Disp: 21 tablet, Rfl: 0    (Not in a hospital admission)        Review of Systems:    A complete and comprehensive 14 point organ system review was performed and all other systems are negative other than stated above in the HPI    Current Vitals:   Blood Pressure: 119/63 (05/09/24 1245)  Pulse: (!) 117 (05/09/24 1245)  Temperature: 98.1 °F (36.7 °C) (05/09/24 1134)  Temp Source: Oral (05/09/24 1104)  Respirations: 22 (05/09/24 1245)  Height: 5' 8\" (172.7 cm) (05/09/24 0543)  Weight - Scale: 97.8 kg (215 lb 9.8 oz) (05/09/24 0543)  SpO2: 96 % (05/09/24 1245)  SPO2 RA Rest      [facility] ED from 5/9/2024 in  [facility] Emergency Department   SpO2 96 %   SpO2 Activity At Rest   O2 Device None (Room air)   O2 Flow Rate --          No intake or output data in the 24 hours ending 05/09/24 1349  Body mass index is 32.78 kg/m².     Physical Exam:       General: well appearing, no acute distress  HEENT: atraumatic, PERRLA, moist mucosa, normal pharynx, normal tonsils and adenoids, normal tongue, no fluid in sinuses  Neck: Trachea midline, no carotid bruit, no masses  Respiratory: normal chest wall expansion, CTA B, no r/r/w, no rubs  Cardiovascular: RRR, no m/r/g, Normal S1 and S2  Abdomen: Soft, non-tender, non-distended, normal bowel sounds in all quadrants, no hepatosplenomegaly, no tympany  Rectal: deferred  Musculoskeletal: normal ROM in upper and lower extremities  Integumentary: warm, dry, and pink, with no rash, purpura, or petechia  Heme/Lymph: no lymphadenopathy, no bruises  Neurological: Cranial Nerves II-XII grossly intact; no focal deficits in sensation or strength, A  x O x 3  Psychiatric: cooperative with normal mood, affect, and cognition    Lab Results: " "  CBC:   Lab Results   Component Value Date    WBC 6.15 05/09/2024    HGB 7.6 (L) 05/09/2024    HCT 25.2 (L) 05/09/2024    MCV 80 (L) 05/09/2024    PLT 94 (L) 05/09/2024    RBC 3.15 (L) 05/09/2024    MCH 24.1 (L) 05/09/2024    MCHC 30.2 (L) 05/09/2024    RDW 18.0 (H) 05/09/2024    MPV 12.4 06/17/2021    NRBC 0 05/09/2024     CMP:  Lab Results   Component Value Date     05/09/2024     02/28/2023    CO2 17 (L) 05/09/2024    CO2 20 (L) 05/07/2024    CO2 23 02/28/2023    BUN 49 (H) 05/09/2024    BUN 15 02/28/2023    CREATININE 1.33 (H) 05/09/2024    CREATININE 1.28 02/28/2023    GLUCOSE 110 05/07/2024    CALCIUM 8.0 (L) 05/09/2024    CALCIUM 8.3 (L) 02/28/2023    AST 19 05/09/2024    AST 26 02/28/2023    ALT 17 05/09/2024    ALT 29 02/28/2023    ALKPHOS 57 05/09/2024    ALKPHOS 62 02/28/2023    EGFR 56 05/09/2024    EGFR 64 02/28/2023     Lab Results   Component Value Date    TROPONINI <0.02 06/22/2021    CKMB 3.1 10/31/2020    CKTOTAL 187 05/09/2024     Coagulation:   Lab Results   Component Value Date    PT 14.6 06/25/2021    INR 1.88 (H) 05/09/2024    INR 1.2 06/25/2021    Urinalysis:  Lab Results   Component Value Date    COLORU Yellow 05/09/2024    CLARITYU Clear 05/09/2024    SPECGRAV 1.010 05/09/2024    PHUR 6.0 05/09/2024    PHUR 6.0 09/19/2018    LEUKOCYTESUR Negative 05/09/2024    NITRITE Negative 05/09/2024    GLUCOSEU Negative 05/09/2024    KETONESU 10 (1+) (A) 05/09/2024    BILIRUBINUR Negative 05/09/2024    BLOODU Negative 05/09/2024      Amylase: No results found for: \"AMYLASE\"  Lipase:   Lab Results   Component Value Date    LIPASE 14 05/07/2024        Imaging: PE Study with CT abdomen & pelvis with contrast    Result Date: 5/9/2024  Narrative: CT PULMONARY ANGIOGRAM OF THE CHEST AND CT ABDOMEN AND PELVIS WITH INTRAVENOUS CONTRAST INDICATION: elevated d-dimer, tachycardia, sob. COMPARISON: CTA chest PE study 5/9/2024. TECHNIQUE: CT examination of the chest, abdomen and pelvis was performed. " Thin section CT angiographic technique was used in the chest in order to evaluate for pulmonary embolus and coronal 3D MIP postprocessing was performed on the acquisition scanner. Multiplanar 2D reformatted images were created from the source data. This examination, like all CT scans performed in the [facility], was performed utilizing techniques to minimize radiation dose exposure, including the use of iterative reconstruction and automated exposure control. Radiation dose length product (DLP) for this visit: 1406.42 mGy-cm IV Contrast: 100 mL of iohexol (OMNIPAQUE) Enteric Contrast: Not administered. DIAGNOSTIC QUALITY: Chest images are very motion limited. FINDINGS: CHEST PULMONARY ARTERIAL TREE: Motion limited exam. No large central pulmonary arterial embolism. Limited evaluation of the lobar and segmental level vasculature secondary to the advanced motion artifact. LUNGS: Mild left basilar dependent hypoventilatory changes. The patient is tilted to the left. No focal consolidation. No endotracheal or endobronchial lesion. PLEURA: Unremarkable. HEART/AORTA: Unchanged cardiac contours. Coronary artery calcifications. No pericardial effusion.. No thoracic aortic aneurysm. MEDIASTINUM AND NATHANAEL: Large hiatal hernia. Diffuse esophageal distention with associated circumferential wall thickening indicating esophagitis. CHEST WALL AND LOWER NECK: Unremarkable. ABDOMEN LIVER/BILIARY TREE: Nodular hepatic contours may indicate cirrhosis. Subcentimeter hypoattenuating lesion(s), too small to characterize but statistically likely benign, which do not require follow-up (ACR White Paper 2017). No suspicious mass. No biliary dilation. GALLBLADDER: No calcified gallstones. No pericholecystic inflammatory change. SPLEEN: Unremarkable. PANCREAS: Mildly atrophic/fatty involuted. ADRENAL GLANDS: Unremarkable. KIDNEYS/URETERS: Unremarkable. No hydronephrosis. STOMACH AND BOWEL: Fluid distended stomach. Colonic  "diverticulosis without findings of acute diverticulitis. APPENDIX: Noninflamed. ABDOMINOPELVIC CAVITY: No ascites. No pneumoperitoneum. No lymphadenopathy. VESSELS: Unremarkable for patient's age. PELVIS REPRODUCTIVE ORGANS: Unremarkable for patient's age. URINARY BLADDER: Unremarkable. ABDOMINAL WALL/INGUINAL REGIONS: Unremarkable. BONES: No acute fracture or suspicious osseous lesion. Spinal degenerative changes. Unchanged T10 hemangioma.     Impression: Limited assessment for pulmonary arterial embolism secondary to motion artifact. No large central PE. The lobar and segmental level vasculature is obscured. Large hiatal hernia. Diffuse esophageal distention with associated circumferential wall thickening. Fluid distended stomach. No obstruction. Nodular hepatic contours may indicate cirrhosis/hepatocellular disease. The study was marked in EPIC for immediate notification. Workstation performed: [id number]     CT head without contrast    Result Date: 5/9/2024  Narrative: CT BRAIN - WITHOUT CONTRAST INDICATION:   ams.   \"64-year-old male presents for evaluation of generalized weakness, shortness of breath, differential diagnosis includes arrhythmia, ACS, less likely pneumonia given no cough, no fever, otherwise differential includes electrolyte abnormalities, dehydration, viral syndrome, hyperthyroidism  N\"o focal abnormalities on neurologic exam low suspicion for acute intracranial pathology such as bleeding or mass lesion. Ammonia to evaluate for hepatic encephalopathy\" COMPARISON: CT head 6/15/2021. TECHNIQUE:  CT examination of the brain was performed.  Multiplanar 2D reformatted images were created from the source data. Radiation dose length product (DLP) for this visit:  899.35 mGy-cm .  This examination, like all CT scans performed in the [facility], was performed utilizing techniques to minimize radiation dose exposure, including the use of iterative  reconstruction and automated exposure " "control. IMAGE QUALITY:  Diagnostic. FINDINGS: PARENCHYMA: Decreased attenuation is noted in periventricular and subcortical white matter demonstrating an appearance that is statistically most likely to represent moderate microangiopathic change; this appearance is similar when compared to most recent prior examination. Chronic right frontal and right parietal infarcts. Chronic bilateral basal ganglia lacunar infarcts. No CT signs of acute infarction.  No intracranial mass, mass effect or midline shift.  No acute parenchymal hemorrhage. VENTRICLES AND EXTRA-AXIAL SPACES:  Normal for the patient's age. VISUALIZED ORBITS: Normal visualized orbits. PARANASAL SINUSES: Normal visualized paranasal sinuses. CALVARIUM AND EXTRACRANIAL SOFT TISSUES:  Normal.     Impression: Chronic ischemic changes without acute intracranial abnormality. Workstation performed: [id number]     XR chest portable    Result Date: 5/8/2024  Narrative: XR CHEST PORTABLE INDICATION: sepsis. COMPARISON: January 12, 2024 FINDINGS: Low lung volumes, which causes crowding of bronchovascular markings.  Within that limitation, there is no focal lung opacity. No pneumothorax or pleural effusion. Normal cardiomediastinal silhouette. Bones are unremarkable for age. Normal upper abdomen.     Impression: No acute cardiopulmonary disease on this examination, which is somewhat limited secondary to low lung volumes. Workstation performed: [id number]     EKG, Pathology, and Other Studies: I have personally reviewed the results.  VTE   Prophylaxis: In place    Code Status: Prior    Anticipated Length of Stay:  Patient will be admitted on an Inpatient basis with an anticipated length of stay of  greater 2 midnights.       \"This note has been constructed using a voice recognition system\"      Adalberto Kamara MD  5/9/2024, 1:49 PM          "

## 2024-05-09 NOTE — ED PROVIDER NOTES
Called patient to let her know we received her psych eval.  Dr. Woodard wanted her to attend support group.  Pt states she did this in December and is planning on attending this month on the 15th.  Since pt has completed all tasks, I forwarded her to Kristi to schedule her to see the surgeon.   History  Chief Complaint   Patient presents with    Weakness - Generalized     Pt brought in via EMS,for weakness. Brought in 3 days ago for same sx. Weakness, SOB, fatigue.      64-year-old male with history of alcohol abuse depression, GERD, hypertension, depression, anxiety presents for evaluation of generalized weakness for the last few days as well as dyspnea on exertion, denies any specific chest pain denies any orthopnea denies any lower extremity swelling denies any fevers or chills denies any nausea vomiting or diarrhea denies any abdominal pain denies any headache.  He is able to answer all questions appropriately did struggle somewhat on the year however was able to tell me who the president was and able to tell me in detail why he was here at the emergency department, when I questioned him about the year he did say that he does not care what year it is        Prior to Admission Medications   Prescriptions Last Dose Informant Patient Reported? Taking?   QUEtiapine (SEROquel) 300 mg tablet   Yes No   Sig: Take 300 mg by mouth   aspirin (ECOTRIN LOW STRENGTH) 81 mg EC tablet   No No   Sig: Take 1 tablet (81 mg total) by mouth daily   atorvastatin (LIPITOR) 40 mg tablet   No No   Sig: Take 1 tablet (40 mg total) by mouth daily with dinner   diclofenac sodium (Voltaren) 1 %   No No   Sig: Apply 2 g topically 4 (four) times a day   escitalopram (LEXAPRO) 20 mg tablet  Self No No   Sig: Take 1 tablet (20 mg total) by mouth daily   famotidine (PEPCID) 20 mg tablet   No No   Sig: Take 1 tablet (20 mg total) by mouth 2 (two) times a day   traZODone (DESYREL) 100 mg tablet  Self No No   Sig: Take 1 tablet (100 mg total) by mouth daily at bedtime      Facility-Administered Medications: None       Past Medical History:   Diagnosis Date    Alcohol abuse     Depression     Drug therapy     GERD (gastroesophageal reflux disease)     Hypertension 01/2012    Knee pain, bilateral     Psychiatric disorder     depression,  "anxiety    Self-injurious behavior     Spinal stenosis of lumbar region     Suicide attempt (HCC)        Past Surgical History:   Procedure Laterality Date    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    HERNIA REPAIR  1997       Family History   Problem Relation Age of Onset    Depression Mother     Depression Father     No Known Problems Sister     No Known Problems Brother      I have reviewed and agree with the history as documented.    E-Cigarette/Vaping     E-Cigarette/Vaping Substances     Social History     Tobacco Use    Smoking status: Every Day     Current packs/day: 1.00     Types: Cigarettes, Cigars    Smokeless tobacco: Never    Tobacco comments:     PT \"3 CIGARS A DAY\" - quit smoking cigars   Substance Use Topics    Alcohol use: Not Currently     Alcohol/week: 0.0 standard drinks of alcohol     Comment: Stopped drinking for several months - recovered alcoholic    Drug use: No     Types: Marijuana     Comment: Last used marijuana 2 months ago.- no longer smoking       Review of Systems   Constitutional:  Positive for fatigue. Negative for appetite change, chills and fever.   HENT:  Negative for rhinorrhea and sore throat.    Eyes:  Negative for photophobia and visual disturbance.   Respiratory:  Positive for shortness of breath. Negative for cough.    Cardiovascular:  Negative for chest pain and palpitations.   Gastrointestinal:  Negative for abdominal pain and diarrhea.   Genitourinary:  Negative for dysuria, frequency and urgency.   Skin:  Negative for rash.   Neurological:  Negative for dizziness and weakness.   All other systems reviewed and are negative.      Physical Exam  Physical Exam  Vitals and nursing note reviewed.   Constitutional:       General: He is not in acute distress.     Appearance: He is well-developed.   HENT:      Head: Normocephalic and atraumatic.      Right Ear: Tympanic membrane and external ear normal.      Left Ear: Tympanic membrane and external ear normal.   Eyes:      " Conjunctiva/sclera: Conjunctivae normal.      Pupils: Pupils are equal, round, and reactive to light.   Neck:      Vascular: No JVD.      Trachea: No tracheal deviation.   Cardiovascular:      Rate and Rhythm: Normal rate and regular rhythm.      Heart sounds: Normal heart sounds. No murmur heard.     No friction rub. No gallop.   Pulmonary:      Effort: Pulmonary effort is normal. No respiratory distress.      Breath sounds: No stridor. No wheezing or rales.   Abdominal:      General: There is no distension.      Palpations: Abdomen is soft. There is no mass.      Tenderness: There is no abdominal tenderness. There is no guarding or rebound.   Musculoskeletal:         General: Normal range of motion.      Cervical back: Normal range of motion and neck supple.      Right lower leg: No edema.      Left lower leg: No edema.   Skin:     General: Skin is warm and dry.      Coloration: Skin is not pale.      Findings: No erythema or rash.   Neurological:      General: No focal deficit present.      Mental Status: He is alert. Mental status is at baseline.      Cranial Nerves: No cranial nerve deficit.      Motor: No weakness.      Coordination: Coordination normal.      Comments: Oriented to self, place, president, cause for visit however unable to give me the year   Psychiatric:      Comments: Flat affect         Vital Signs  ED Triage Vitals   Temperature Pulse Respirations Blood Pressure SpO2   05/09/24 0543 05/09/24 0543 05/09/24 0543 05/09/24 0543 05/09/24 0615   97.8 °F (36.6 °C) (!) 119 (!) 24 108/63 94 %      Temp Source Heart Rate Source Patient Position - Orthostatic VS BP Location FiO2 (%)   05/09/24 0543 05/09/24 0543 05/09/24 0543 05/09/24 0543 --   Temporal Monitor Lying Right arm       Pain Score       05/09/24 0543       No Pain           Vitals:    05/09/24 1433 05/09/24 1705 05/09/24 1905 05/09/24 2225   BP: 118/69 127/77 147/79 107/63   Pulse: (!) 115 (!) 113 (!) 109 95   Patient Position - Orthostatic  VS:  Lying Lying Lying         Visual Acuity  Visual Acuity      Flowsheet Row Most Recent Value   L Pupil Size (mm) 3   R Pupil Size (mm) 3   L Pupil Shape Round   R Pupil Shape Round            ED Medications  Medications   pantoprazole (PROTONIX) 80 mg in sodium chloride 0.9 % 100 mL IVPB (0 mg Intravenous Stopped 5/9/24 1023)     Followed by   pantoprazole (PROTONIX) 80 mg in sodium chloride 0.9 % 100 mL infusion (8 mg/hr Intravenous New Bag 5/9/24 2000)   atorvastatin (LIPITOR) tablet 40 mg (40 mg Oral Given 5/9/24 1533)   escitalopram (LEXAPRO) tablet 20 mg (20 mg Oral Given 5/9/24 1533)   famotidine (PEPCID) tablet 20 mg (20 mg Oral Not Given 5/9/24 1837)   QUEtiapine (SEROquel) tablet 300 mg (300 mg Oral Given 5/9/24 2244)   traZODone (DESYREL) tablet 100 mg (100 mg Oral Given 5/9/24 2244)   sodium chloride 0.9 % infusion (75 mL/hr Intravenous New Bag 5/9/24 1759)   cefTRIAXone (ROCEPHIN) IVPB (premix in dextrose) 1,000 mg 50 mL (1,000 mg Intravenous New Bag 5/9/24 1640)   octreotide (SandoSTATIN) injection 50 mcg (50 mcg Intravenous Given 5/9/24 1823)     Followed by   octreotide (SandoSTATIN) 500 mcg in sodium chloride 0.9 % 250 mL infusion (50 mcg/hr Intravenous New Bag 5/9/24 1813)   nicotine (NICODERM CQ) 21 mg/24 hr TD 24 hr patch 21 mg (has no administration in time range)   nicotine polacrilex (NICORETTE) gum 2 mg (has no administration in time range)   sodium chloride 0.9 % bolus 1,000 mL (0 mL Intravenous Stopped 5/9/24 1009)   iohexol (OMNIPAQUE) 350 MG/ML injection (MULTI-DOSE) 100 mL (100 mL Intravenous Given 5/9/24 0739)   levalbuterol (XOPENEX) inhalation solution 0.63 mg (0.63 mg Nebulization Given 5/9/24 0856)   lactulose (CHRONULAC) oral solution 20 g (20 g Oral Given 5/9/24 0940)       Diagnostic Studies  Results Reviewed       Procedure Component Value Units Date/Time    AFP tumor marker [[id number]]  (Normal) Collected: 05/09/24 3262    Lab Status: Final result Specimen: Blood from Arm,  Right Updated: 05/09/24 2246     AFP TUMOR MARKER 3.47 ng/mL     Narrative:      [employer] Access chemiluminescent immunoassay. Results cannot be interpreted for the presence or absence of malignant disease. Confirm baseline values for patients being serially monitored.       Hemoglobin and hematocrit, blood [[id number]]     Lab Status: No result Specimen: Blood     Hemoglobin and hematocrit, blood [[id number]]     Lab Status: No result Specimen: Blood     HS Troponin I 4hr [[id number]]  (Normal) Collected: 05/09/24 1822    Lab Status: Final result Specimen: Blood from Arm, Right Updated: 05/09/24 1859     hs TnI 4hr 34 ng/L      Delta 4hr hsTnI 18 ng/L     Hemoglobin and hematocrit, blood [[id number]]  (Abnormal) Collected: 05/09/24 1751    Lab Status: Final result Specimen: Blood from Arm, Right Updated: 05/09/24 1757     Hemoglobin 8.3 g/dL      Hematocrit 27.2 %     Blood culture [[id number]] Collected: 05/09/24 0553    Lab Status: Preliminary result Specimen: Blood from Arm, Right Updated: 05/09/24 1601     Blood Culture Received in Microbiology Lab. Culture in Progress.    Blood culture [[id number]] Collected: 05/09/24 0553    Lab Status: Preliminary result Specimen: Blood from Arm, Left Updated: 05/09/24 1601     Blood Culture Received in Microbiology Lab. Culture in Progress.    HS Troponin I 2hr [[id number]]  (Normal) Collected: 05/09/24 0841    Lab Status: Final result Specimen: Blood from Arm, Right Updated: 05/09/24 0910     hs TnI 2hr 24 ng/L      Delta 2hr hsTnI 8 ng/L     Lactic acid 2 Hours [[id number]]  (Abnormal) Collected: 05/09/24 0841    Lab Status: Final result Specimen: Blood from Arm, Right Updated: 05/09/24 0908     LACTIC ACID 2.6 mmol/L     Narrative:      Result may be elevated if tourniquet was used during collection.    Urine Microscopic [[id number]]  (Normal) Collected: 05/09/24 0801    Lab Status: Final result Specimen: Urine, Clean Catch Updated: 05/09/24 0816     RBC, UA None Seen /hpf       WBC, UA 0-1 /hpf      Epithelial Cells None Seen /hpf      Bacteria, UA None Seen /hpf     UA w Reflex to Microscopic w Reflex to Culture [[id number]]  (Abnormal) Collected: 05/09/24 0801    Lab Status: Final result Specimen: Urine, Clean Catch Updated: 05/09/24 0808     Color, UA Yellow     Clarity, UA Clear     Specific Gravity, UA 1.010     pH, UA 6.0     Leukocytes, UA Negative     Nitrite, UA Negative     Protein, UA Trace mg/dl      Glucose, UA Negative mg/dl      Ketones, UA 10 (1+) mg/dl      Urobilinogen, UA <2.0 mg/dl      Bilirubin, UA Negative     Occult Blood, UA Negative    CBC [[id number]]  (Abnormal) Collected: 05/09/24 0715    Lab Status: Final result Specimen: Blood from Arm, Left Updated: 05/09/24 0751     WBC 6.15 Thousand/uL      RBC 3.15 Million/uL      Hemoglobin 7.6 g/dL      Hematocrit 25.2 %      MCV 80 fL      MCH 24.1 pg      MCHC 30.2 g/dL      RDW 18.0 %      Platelets 94 Thousands/uL     Acetaminophen level-If concentration is detectable, please discuss with medical  on call. [[id number]]  (Abnormal) Collected: 05/09/24 0715    Lab Status: Final result Specimen: Blood from Arm, Left Updated: 05/09/24 0745     Acetaminophen Level 2 ug/mL     Salicylate level [[id number]]  (Normal) Collected: 05/09/24 0715    Lab Status: Final result Specimen: Blood from Arm, Left Updated: 05/09/24 0744     Salicylate Lvl <5.0 mg/dL     Ethanol [[id number]]  (Normal) Collected: 05/09/24 0715    Lab Status: Final result Specimen: Blood from Arm, Left Updated: 05/09/24 0739     Ethanol Lvl <10 mg/dL     TSH, 3rd generation with Free T4 reflex [[id number]]  (Normal) Collected: 05/09/24 0553    Lab Status: Final result Specimen: Blood from Arm, Right Updated: 05/09/24 0644     TSH 3RD GENERATON 1.871 uIU/mL     Comprehensive metabolic panel [[id number]]  (Abnormal) Collected: 05/09/24 0553    Lab Status: Final result Specimen: Blood from Arm, Right Updated: 05/09/24 0644     Sodium 138 mmol/L       Potassium 4.0 mmol/L      Chloride 107 mmol/L      CO2 17 mmol/L      ANION GAP 14 mmol/L      BUN 49 mg/dL      Creatinine 1.33 mg/dL      Glucose 126 mg/dL      Calcium 8.0 mg/dL      AST 19 U/L      ALT 17 U/L      Alkaline Phosphatase 57 U/L      Total Protein 5.8 g/dL      Albumin 3.5 g/dL      Total Bilirubin 0.35 mg/dL      eGFR 56 ml/min/1.73sq m     Narrative:      National Kidney Disease Foundation guidelines for Chronic Kidney Disease (CKD):     Stage 1 with normal or high GFR (GFR > 90 mL/min/1.73 square meters)    Stage 2 Mild CKD (GFR = 60-89 mL/min/1.73 square meters)    Stage 3A Moderate CKD (GFR = 45-59 mL/min/1.73 square meters)    Stage 3B Moderate CKD (GFR = 30-44 mL/min/1.73 square meters)    Stage 4 Severe CKD (GFR = 15-29 mL/min/1.73 square meters)    Stage 5 End Stage CKD (GFR <15 mL/min/1.73 square meters)  Note: GFR calculation is accurate only with a steady state creatinine    CK [[id number]]  (Normal) Collected: 05/09/24 0553    Lab Status: Final result Specimen: Blood from Arm, Right Updated: 05/09/24 0644     Total  U/L     FLU/RSV/COVID - if FLU/RSV clinically relevant [[id number]]  (Normal) Collected: 05/09/24 0553    Lab Status: Final result Specimen: Nares from Nose Updated: 05/09/24 0640     SARS-CoV-2 Negative     INFLUENZA A PCR Negative     INFLUENZA B PCR Negative     RSV PCR Negative    Narrative:      FOR PEDIATRIC PATIENTS - copy/paste COVID Guidelines URL to browser: [email or web address]    SARS-CoV-2 assay is a Nucleic Acid Amplification assay intended for the  qualitative detection of nucleic acid from SARS-CoV-2 in nasopharyngeal  swabs. Results are for the presumptive identification of SARS-CoV-2 RNA.    Positive results are indicative of infection with SARS-CoV-2, the virus  causing COVID-19, but do not rule out bacterial infection or co-infection  with other viruses. Laboratories within the [place] and  its  territories are required to report all positive results to the appropriate  public health authorities. Negative results do not preclude SARS-CoV-2  infection and should not be used as the sole basis for treatment or other  patient management decisions. Negative results must be combined with  clinical observations, patient history, and epidemiological information.  This test has not been FDA cleared or approved.    This test has been authorized by FDA under an Emergency Use Authorization  (EUA). This test is only authorized for the duration of time the  declaration that circumstances exist justifying the authorization of the  emergency use of an in vitro diagnostic tests for detection of SARS-CoV-2  virus and/or diagnosis of COVID-19 infection under section 564(b)(1) of  the Act, 21 U.S.C. 360bbb-3(b)(1), unless the authorization is terminated  or revoked sooner. The test has been validated but independent review by FDA  and CLIA is pending.    Test performed using [employer] GeneXpert: This RT-PCR assay targets N2,  a region unique to SARS-CoV-2. A conserved region in the E-gene was chosen  for pan-Sarbecovirus detection which includes SARS-CoV-2.    According to CMS-2020-01-R, this platform meets the definition of high-throughput technology.    Procalcitonin [[id number]]  (Abnormal) Collected: 05/09/24 0553    Lab Status: Final result Specimen: Blood from Arm, Right Updated: 05/09/24 0638     Procalcitonin 0.41 ng/ml     D-dimer, quantitative [[id number]]  (Abnormal) Collected: 05/09/24 0553    Lab Status: Final result Specimen: Blood from Arm, Right Updated: 05/09/24 0635     D-Dimer, Quant 0.79 ug/ml FEU     Narrative:      In the evaluation for possible pulmonary embolism, in the appropriate (Well's Score of 4 or less) patient, the age adjusted d-dimer cutoff for this patient can be calculated as:    Age x 0.01 (in ug/mL) for Age-adjusted D-dimer exclusion threshold for a patient over 50 years.    Protime-INR  [[id number]]  (Abnormal) Collected: 05/09/24 0553    Lab Status: Final result Specimen: Blood from Arm, Right Updated: 05/09/24 0635     Protime 22.1 seconds      INR 1.88    APTT [[id number]]  (Normal) Collected: 05/09/24 0553    Lab Status: Final result Specimen: Blood from Arm, Right Updated: 05/09/24 0635     PTT 36 seconds     CBC and differential [[id number]]  (Abnormal) Collected: 05/09/24 0553    Lab Status: Final result Specimen: Blood from Arm, Right Updated: 05/09/24 0631     WBC 7.56 Thousand/uL      RBC 3.51 Million/uL      Hemoglobin 8.4 g/dL      Hematocrit 28.4 %      MCV 81 fL      MCH 23.9 pg      MCHC 29.6 g/dL      RDW 18.2 %      Platelets 120 Thousands/uL      nRBC 0 /100 WBCs      Segmented % 77 %      Immature Grans % 1 %      Lymphocytes % 15 %      Monocytes % 7 %      Eosinophils Relative 0 %      Basophils Relative 0 %      Absolute Neutrophils 5.86 Thousands/µL      Absolute Immature Grans 0.05 Thousand/uL      Absolute Lymphocytes 1.14 Thousands/µL      Absolute Monocytes 0.51 Thousand/µL      Eosinophils Absolute 0.00 Thousand/µL      Basophils Absolute 0.00 Thousands/µL     Ammonia [[id number]]  (Abnormal) Collected: 05/09/24 0553    Lab Status: Final result Specimen: Blood from Arm, Right Updated: 05/09/24 0628     Ammonia 75 umol/L     HS Troponin 0hr (reflex protocol) [[id number]]  (Normal) Collected: 05/09/24 0553    Lab Status: Final result Specimen: Blood from Arm, Right Updated: 05/09/24 0628     hs TnI 0hr 16 ng/L     Lactic acid, plasma (w/reflex if result > 2.0) [[id number]]  (Abnormal) Collected: 05/09/24 0553    Lab Status: Final result Specimen: Blood from Arm, Right Updated: 05/09/24 0620     LACTIC ACID 3.5 mmol/L     Narrative:      Result may be elevated if tourniquet was used during collection.                   CT head without contrast   Final Result by Eugenio Villegas MD (05/09 0757)      Chronic ischemic changes without acute intracranial abnormality.             Workstation performed: [id number]         PE Study with CT abdomen & pelvis with contrast   Final Result by Eugenio Villegas MD (05/09 0830)      Limited assessment for pulmonary arterial embolism secondary to motion artifact. No large central PE. The lobar and segmental level vasculature is obscured.      Large hiatal hernia. Diffuse esophageal distention with associated circumferential wall thickening.      Fluid distended stomach. No obstruction.      Nodular hepatic contours may indicate cirrhosis/hepatocellular disease.         The study was marked in EPIC for immediate notification.         Workstation performed: [id number]         XR chest portable   Final Result by Breanne Barnett MD (05/09 2854)      No acute consolidation or congestion   Cardiomegaly            Workstation performed: [id number]                    Procedures  Procedures         ED Course  ED Course as of 05/10/24 0008   u May 09, 2024   0544 Medical record reviewed patient seen 3 days ago for generalized weakness, was initially tachycardic, lab work reviewed essentially unremarkable patient did have improved symptoms with no concern for acute infection discharged back to [facility]   0546 Lab work including blood cultures reviewed negative for 24 hours   0552 Respirations(!): 24   0552 Pulse(!): 119   0605 Procedure Note: EKG  Date/Time: 05/09/24 6:05 AM   Performed by: VERO KRUEGER  Authorized by: VERO KRUEGER  Indications / Diagnosis: Generalized weakness  ECG reviewed by me, the ED Provider: yes   The EKG demonstrates:  Rhythm: sinus tach  Intervals: normal intervals  Axis: normal axis  QRS/Blocks: normal QRS  ST Changes: No acute ST Changes, no STD/WARD.  Similar to previous       0632 Hemoglobin(!): 8.4   0634 Hemoglobin(!): 8.4  Significant  drop from labs 2 days ago where hemoglobin was 13, will recheck   0635 Ammonia(!): 75  Minimally elevated   0637 On further questioning patient states that he has not had  any alcohol for last 2 years, states that he does take Tylenol daily but only 1 extra strength tablet a day and has not taken any excess amount of Tylenol recently also denies any acute bleeding denies any melena   0639 Patient signed out to Dr. Vargas at the end of my shift, he will follow-up the lab work and imaging to establish further disposition.                               SBIRT 20yo+      Flowsheet Row Most Recent Value   Initial Alcohol Screen: US AUDIT-C     1. How often do you have a drink containing alcohol? 0 Filed at: 05/09/2024 0546   2. How many drinks containing alcohol do you have on a typical day you are drinking?  0 Filed at: 05/09/2024 0546   3a. Male UNDER 65: How often do you have five or more drinks on one occasion? 0 Filed at: 05/09/2024 0546   3b. FEMALE Any Age, or MALE 65+: How often do you have 4 or more drinks on one occassion? 0 Filed at: 05/09/2024 0546   Audit-C Score 0 Filed at: 05/09/2024 0546   PETRA: How many times in the past year have you...    Used an illegal drug or used a prescription medication for non-medical reasons? Never Filed at: 05/09/2024 0546                      Medical Decision Making  64-year-old male presents for evaluation of generalized weakness, shortness of breath, differential diagnosis includes arrhythmia, ACS, less likely pneumonia given no cough, no fever, otherwise differential includes electrolyte abnormalities, dehydration, viral syndrome, hyperthyroidism  No focal abnormalities on neurologic exam low suspicion for acute intracranial pathology such as bleeding or mass lesion.  Ammonia to evaluate for hepatic encephalopathy    Amount and/or Complexity of Data Reviewed  Labs: ordered. Decision-making details documented in ED Course.  Radiology: ordered.    Risk  Prescription drug management.  Decision regarding hospitalization.             Disposition  Final diagnoses:   Symptomatic anemia   Acute encephalopathy   Cirrhosis (HCC)   GI bleed     Time  reflects when diagnosis was documented in both MDM as applicable and the Disposition within this note       Time User Action Codes Description Comment    5/9/2024  8:59 AM Rogelio Vargas [D64.9] Symptomatic anemia     5/9/2024  9:00 AM Rogelio Vargas [G93.40] Acute encephalopathy     5/9/2024  9:00 AM Rogelio Vargas [K74.60] Cirrhosis (HCC)     5/9/2024  9:21 AM Rogelio Vargas Modify [D64.9] Symptomatic anemia     5/9/2024  9:21 AM Rogelio Vargas Modify [G93.40] Acute encephalopathy     5/9/2024  9:44 AM Rogelio Vargas [K92.2] GI bleed           ED Disposition       ED Disposition   Admit    Condition   Stable    Date/Time   Thu May 9, 2024 0921    Comment   Case was discussed with Herlinda and the patient's admission status was agreed to be Admission Status: inpatient status to the service of Dr. Pate .               Follow-up Information    None         Current Discharge Medication List        CONTINUE these medications which have NOT CHANGED    Details   aspirin (ECOTRIN LOW STRENGTH) 81 mg EC tablet Take 1 tablet (81 mg total) by mouth daily  Qty: 30 tablet, Refills: 0    Associated Diagnoses: History of CVA (cerebrovascular accident)      atorvastatin (LIPITOR) 40 mg tablet Take 1 tablet (40 mg total) by mouth daily with dinner  Qty: 30 tablet, Refills: 0    Associated Diagnoses: History of CVA (cerebrovascular accident)      diclofenac sodium (Voltaren) 1 % Apply 2 g topically 4 (four) times a day  Qty: 1 Tube, Refills: 0    Associated Diagnoses: Acute pain of right knee; Patellofemoral pain syndrome of right knee      escitalopram (LEXAPRO) 20 mg tablet Take 1 tablet (20 mg total) by mouth daily  Qty: 21 tablet, Refills: 0    Associated Diagnoses: Bipolar 2 disorder, major depressive episode (HCC)      famotidine (PEPCID) 20 mg tablet Take 1 tablet (20 mg total) by mouth 2 (two) times a day  Qty: 30 tablet, Refills: 0    Associated Diagnoses: Abdominal pain      QUEtiapine (SEROquel) 300 mg tablet Take 300 mg  by mouth      traZODone (DESYREL) 100 mg tablet Take 1 tablet (100 mg total) by mouth daily at bedtime  Qty: 21 tablet, Refills: 0    Associated Diagnoses: Insomnia             No discharge procedures on file.    PDMP Review         Value Time User    PDMP Reviewed  Yes 6/17/2021 10:15 AM Martha Gracia PA-C            ED Provider  Electronically Signed by             Valentine Tobar DO  05/10/24 0008

## 2024-05-09 NOTE — ASSESSMENT & PLAN NOTE
Suspected cirrhosis, noted on abd imaging  Suspected related to alcohol abuse  May benefit from additional workup such as varices screening etc.  Lactulose TID for now as ER suspected mild hepatic encephalopathy which appears improved   GI following

## 2024-05-09 NOTE — UTILIZATION REVIEW
NOTIFICATION OF INPATIENT ADMISSION   AUTHORIZATION REQUEST   SERVICING FACILITY:   [place] [address]  Tax ID: [id number]  NPI: [id number] ATTENDING PROVIDER:  Attending Name and NPI#: King Pate Md [[id number]]  Address: [address] [place] [address]  Phone: [phone]   ADMISSION INFORMATION:  Place of Service: Inpatient [facility]  Place of Service Code: 21  Inpatient Admission Date/Time: 5/9/24  9:22 AM  Discharge Date/Time: No discharge date for patient encounter.  Admitting Diagnosis Code/Description:  Weakness [R53.1]     UTILIZATION REVIEW CONTACT:  Landy Green Utilization   Network Utilization Review Department  Phone: [phone]  Fax: [phone]  Email: [email or web address]  Contact for approvals/pending authorizations, clinical reviews, and discharge.     PHYSICIAN ADVISORY SERVICES:  Medical Necessity Denial & Qfmy-eo-Uwnx Review  Phone: [phone]  Fax: [phone]  Email: [email or web address]     DISCHARGE SUPPORT TEAM:  For Patients Discharge Needs & Updates  Phone: [phone] opt. 2 Fax: [phone]  Email: [email or web address]

## 2024-05-09 NOTE — ED NOTES
Transfusion completed at this time with no s/s of reaction. Vitals wnl. Pt appears comfortable with no concerns     Henny Woodward RN  05/09/24 1483

## 2024-05-09 NOTE — ED NOTES
Report called to unit at this time and spoke with rn whose taking over       Henny Woodward, MARY JO  05/09/24 8784

## 2024-05-09 NOTE — ED NOTES
PT educated on s/s of transfusion reaction and to notify staff. Pt has no questions or concerns at this time     Henny Woodward RN  05/09/24 5639

## 2024-05-09 NOTE — ASSESSMENT & PLAN NOTE
Pt with hgb 7.6, on admission baseline normallly 10-13  Dropped to 6.6 - received 1u PRBC morning of 5/10  Transfused 1 unit PRBC in ER for symptomatic anemia (pt reported dyspnea), suspected UGI bleed  GI consulted   Cont PPI gtt; GI has added   Continue octreotide  Continue Rocephin  NPO AM in anticipation endoscopic evaluation

## 2024-05-09 NOTE — ED CARE HANDOFF
Emergency Department Sign Out Note        Sign out and transfer of care from NATIVIDAD Tobar. See Separate Emergency Department note.     The patient, Chuy Norton, was evaluated by the previous provider for generalized weakness, SOB.    Workup Completed:  Receiving IV fluids,     ED Course / Workup Pending (followup):  Labs, imaging pending possible hepatic encephalopathy                                  ED Course as of 05/09/24 1214   Thu May 09, 2024   0804 Patient declines rectal exam for hemoccult     Procedures  Medical Decision Making  Amount and/or Complexity of Data Reviewed  Labs: ordered.  Radiology: ordered.    Risk  Prescription drug management.  Decision regarding hospitalization.      reviewed with GI and hospitalist - no sign of infection - suspect hepatic encephalopathy, symptomatic anemia.    Patient walked to bathroom and developed significant SOB dyspnea, tachypnea - symptoms improved when he rested.    Patient states he had a BM here that was dark black and red - notified admitting team, ordered PRBC and Protonix.      Disposition  Final diagnoses:   Symptomatic anemia   Acute encephalopathy   Cirrhosis (HCC)   GI bleed     Time reflects when diagnosis was documented in both MDM as applicable and the Disposition within this note       Time User Action Codes Description Comment    5/9/2024  8:59 AM Rogelio Vargas [D64.9] Symptomatic anemia     5/9/2024  9:00 AM Rogelio Vargas [G93.40] Acute encephalopathy     5/9/2024  9:00 AM Rogelio Vargas [K74.60] Cirrhosis (HCC)     5/9/2024  9:21 AM Rogelio Vargas [D64.9] Symptomatic anemia     5/9/2024  9:21 AM Rogelio Vargas [G93.40] Acute encephalopathy     5/9/2024  9:44 AM Rogelio Vargas [K92.2] GI bleed           ED Disposition       ED Disposition   Admit    Condition   Stable    Date/Time   Thu May 9, 2024  9:21 AM    Comment   Case was discussed with Herlinda and the patient's admission status was agreed to be Admission Status: inpatient status to  the service of Dr. Pate .               Follow-up Information    None       Patient's Medications   Discharge Prescriptions    No medications on file     No discharge procedures on file.       ED Provider  Electronically Signed by     Rogelio Vargas DO  05/09/24 3041

## 2024-05-09 NOTE — ASSESSMENT & PLAN NOTE
Pt with hgb 7.6, baseline normallly 10-13  Transfuse 1 unit PRBC in ER for symptomatic anemia (pt reported dyspnea)  GI consulted   Cont PPI gtt  Clears  NPO AM in case of need for endoscopic evaluation

## 2024-05-09 NOTE — ASSESSMENT & PLAN NOTE
Cirrhosis, noted on abdominal imaging  Suspected related to alcohol abuse  EGD anticipated to evaluate for varicies  S/P Lactulose enemas as ER suspected mild hepatic encephalopathy which appears improved with non focal CT head   GI following  FFP prior to procedure  NPO at present

## 2024-05-09 NOTE — CONSULTS
Consultation - [facility]     Chuy Norton 64 y.o. male MRN: [id number]  Unit/Bed#: ED 04 Encounter: [id number]    Inpatient consult to gastroenterology  Consult performed by: Pauline Garcia PA-C  Consult ordered by: Adalberto Kamara MD          ASSESSMENT and PLAN  Chuy Norton is a 64 y.o. year old male with a past medical history of anxiety, depression, suicide attempt, h/o alcohol abuse, h/o cocaine use, hypertension, GERD, back pain presents to the ER with fatigue, weakness, shortness of breath and confusion, melena X3 starting today. This admission patient found to be tachycardic up to 115.  Hemoglobin 13.1 -> 8.4 ->7.6. S/P 1u PRBC's. BUN 49, creatinine 1.33, GFR 56,  INR elevated 1.88, ammonia elevated at 75, LFTs normal, blood culture negative x 1 day.  CT Head sowed chronic ischemic changes.  CT C/A/P with contrast was limited but no large PE, there is a large hiatal hernia with esophageal distention and thickening, fluid in the stomach,  liver looked cirrhotic with subcentimeter hypoattenuating lesions too small to characterize but statistically likely benign, no suspicious masses, no ascites.  Patient with cirrhosis and suspected upper GI bleed cannot rule out variceal bleed versus esophagogastro duodenitis, peptic ulcer disease.    1.  Upper GI bleed  2.  GERD/NSAID use  3.  Abn CT esophagus  4.  Symptomatic anemia   -N.p.o., IV fluids   -PPI and octreotide drip   -Ceftriaxone 1 g IV daily   -EGD this admission   -Monitor hemoglobin, transfuse as needed   -NSAID avoidance, stressed to patient   -Case discussed with Dr. Ortega, Dr. Kamara and Mirna Nuñez, of critical care, recommend stepdown 2 level care    5.  Suspected alcoholic cirrhosis   -MELD 3.0 = 15  -outpt cirrhosis work up   -continued sobriety ETOH/cocaine encouraged  -consider checking viral hepatitis panel      A.EV surveillance:  -egd this admission      B. HCC surveillance:   -liver lesion on CT without  ascites, check AFP, consider US vs MRI abd liver protocol      C. AMS suspect Hepatic encephalopathy:   -Mild confusion and asterixis on exam  - improved with p.o. lactulose on hold pending EGD vs started lactulose enemas  -monitor mental status  -defer to primary team regarding ruling out non GI etiology of AMS          Chief Complaint   Patient presents with    Weakness - Generalized     Pt brought in via EMS,for weakness. Brought in 3 days ago for same sx. Weakness, SOB, fatigue.        Physician Requesting Consult: Adalberto Kamara MD    HPI    Chuy Norton is a 64 y.o. year old male with a past medical history of anxiety, depression, suicide attempt, h/o alcohol abuse, h/o cocaine use, hypertension, GERD, back pain presents to the ER with fatigue, weakness, shortness of breath and confusion.  Was having formed brown stools without bleeding.  In the ER he has had 3 loose black stools consistent with melena.  Denies iron, Pepto-Bismol or Kaopectate use.  He is taking ibuprofen daily and possibly aspirin.  Has reflux at baseline controlled with Pepcid.  Denies abdominal pain or distention.  Eating well without weight loss.  Reports he drank alcohol heavily last drink was 1.5 years ago but denies ever being told he had cirrhosis before.  Last used cocaine 3 years ago.  Currently smokes.  Denies any history of viral hepatitis.      This admission patient found to be tachycardic up to 115.  Hemoglobin 13.1 -> 8.4 ->7.6. S/P 1u PRBC's. BUN 49, creatinine 1.33, GFR 56,  INR elevated 1.88, ammonia elevated at 75, LFTs normal, blood culture negative x 1 day.  CT Head sowed chronic ischemic changes.  CT C/A/P with contrast was limited but no large PE, there is a large hiatal hernia with esophageal distention and thickening, fluid in the stomach,  liver looked cirrhotic with subcentimeter hypoattenuating lesions too small to characterize but statistically likely benign, no suspicious masses, no ascites.     He has  "never had a colonoscopy.  Endoscopy 2/2021 at [facility] showed erosive esophagitis.        ROS:   Constitutional: + fatigue, weakness, fever.  HEENT: denies visual disturbance, postnasal drip, sore throat.  Respiratory: denies cough, + shortness of breath.  Cardiovascular: denies chest pain, leg swelling.  Gastrointestinal: as noted above in HPI.  : denies difficulty urinating, dysuria.  Musculoskeletal: denies arthralgias, back pain.  Neurological: denies dizziness, syncope. + confusion  Psychiatric: denies confusion, anxiety.    Historical Information   Past Medical History:   Diagnosis Date    Alcohol abuse     Depression     Drug therapy     GERD (gastroesophageal reflux disease)     Hypertension 01/2012    Knee pain, bilateral     Psychiatric disorder     depression, anxiety    Self-injurious behavior     Spinal stenosis of lumbar region     Suicide attempt (HCC)      Past Surgical History:   Procedure Laterality Date    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    HERNIA REPAIR  1997     Social History   Social History     Substance and Sexual Activity   Alcohol Use Not Currently    Alcohol/week: 0.0 standard drinks of alcohol    Comment: Stopped drinking for several months - recovered alcoholic     Social History     Substance and Sexual Activity   Drug Use No    Types: Marijuana    Comment: Last used marijuana 2 months ago.- no longer smoking     Social History     Tobacco Use   Smoking Status Every Day    Current packs/day: 1.00    Types: Cigarettes, Cigars   Smokeless Tobacco Never   Tobacco Comments    PT \"3 CIGARS A DAY\" - quit smoking cigars     Family History   Problem Relation Age of Onset    Depression Mother     Depression Father     No Known Problems Sister     No Known Problems Brother        Meds/Allergies     Current Facility-Administered Medications   Medication Dose Route Frequency    aspirin (ECOTRIN LOW STRENGTH) EC tablet 81 mg  81 mg Oral Daily    atorvastatin (LIPITOR) tablet 40 mg  40 mg Oral " Daily With Dinner    cefTRIAXone (ROCEPHIN) IVPB (premix in dextrose) 1,000 mg 50 mL  1,000 mg Intravenous Q24H    escitalopram (LEXAPRO) tablet 20 mg  20 mg Oral Daily    famotidine (PEPCID) tablet 20 mg  20 mg Oral BID    nicotine (NICODERM CQ) 14 mg/24hr TD 24 hr patch 1 patch  1 patch Transdermal Daily    octreotide (SandoSTATIN) injection 50 mcg  50 mcg Intravenous Once    Followed by    octreotide (SandoSTATIN) 500 mcg in sodium chloride 0.9 % 250 mL infusion  50 mcg/hr Intravenous Continuous    pantoprazole (PROTONIX) 80 mg in sodium chloride 0.9 % 100 mL infusion  8 mg/hr Intravenous Continuous    QUEtiapine (SEROquel) tablet 300 mg  300 mg Oral HS    sodium chloride 0.9 % infusion  75 mL/hr Intravenous Continuous    traZODone (DESYREL) tablet 100 mg  100 mg Oral HS     (Not in a hospital admission)      Allergies   Allergen Reactions    Haloperidol Tremor     Other reaction(s): jittery       PHYSICAL EXAM:    Constitutional: Well-developed, no acute distress  HEENT: normocephalic, mucous membranes moist.  Neck: Supple  Skin: warm and dry  Respiratory: Lungs are clear to auscultation B/L.  Cardiovascular: Tachycardic, Heart is regular rhythm.  Gastrointestinal: Soft, nontender, mild distended with normal active bowel sounds.  No masses, guarding, rebound.   Rectal Exam: Deferred.  Extremities: No edema.  Neurologic: Nonfocal. A & O ×3. Mild confusion, mild asterixis  Psychiatric: Normal affect.      Lab Results   Component Value Date    GLUCOSE 110 05/07/2024    CALCIUM 8.0 (L) 05/09/2024    K 4.0 05/09/2024    CO2 17 (L) 05/09/2024     05/09/2024    BUN 49 (H) 05/09/2024    CREATININE 1.33 (H) 05/09/2024    CREATININE 1.39 (H) 05/07/2024    CREATININE 1.10 01/12/2024     Lab Results   Component Value Date    WBC 6.15 05/09/2024    WBC 7.56 05/09/2024    WBC 7.56 05/07/2024    HGB 7.6 (L) 05/09/2024    HGB 8.4 (L) 05/09/2024    HGB 13.6 05/07/2024    MCV 80 (L) 05/09/2024    PLT 94 (L) 05/09/2024     " (L) 05/09/2024     05/07/2024     Lab Results   Component Value Date    ALT 17 05/09/2024    ALT 22 05/07/2024     (H) 01/12/2024    AST 19 05/09/2024    AST 22 05/07/2024     (H) 01/12/2024    ALKPHOS 57 05/09/2024    ALKPHOS 93 05/07/2024    ALKPHOS 90 01/12/2024    TBILI 0.35 05/09/2024    TBILI 0.57 05/07/2024    TBILI 0.43 01/12/2024     No results found for: \"AMYLASE\"  Lab Results   Component Value Date    LIPASE 14 05/07/2024     Lab Results   Component Value Date    IRON 28 (L) 06/17/2021    TIBC 293 06/17/2021    FERRITIN 102 06/17/2021     Lab Results   Component Value Date    INR 1.88 (H) 05/09/2024    INR 1.54 (H) 05/07/2024    INR 1.44 (H) 01/12/2024       PE Study with CT abdomen & pelvis with contrast    Result Date: 5/9/2024  Narrative: CT PULMONARY ANGIOGRAM OF THE CHEST AND CT ABDOMEN AND PELVIS WITH INTRAVENOUS CONTRAST INDICATION: elevated d-dimer, tachycardia, sob. COMPARISON: CTA chest PE study 5/9/2024. TECHNIQUE: CT examination of the chest, abdomen and pelvis was performed. Thin section CT angiographic technique was used in the chest in order to evaluate for pulmonary embolus and coronal 3D MIP postprocessing was performed on the acquisition scanner. Multiplanar 2D reformatted images were created from the source data. This examination, like all CT scans performed in the [facility], was performed utilizing techniques to minimize radiation dose exposure, including the use of iterative reconstruction and automated exposure control. Radiation dose length product (DLP) for this visit: 1406.42 mGy-cm IV Contrast: 100 mL of iohexol (OMNIPAQUE) Enteric Contrast: Not administered. DIAGNOSTIC QUALITY: Chest images are very motion limited. FINDINGS: CHEST PULMONARY ARTERIAL TREE: Motion limited exam. No large central pulmonary arterial embolism. Limited evaluation of the lobar and segmental level vasculature secondary to the advanced motion artifact. LUNGS: " Mild left basilar dependent hypoventilatory changes. The patient is tilted to the left. No focal consolidation. No endotracheal or endobronchial lesion. PLEURA: Unremarkable. HEART/AORTA: Unchanged cardiac contours. Coronary artery calcifications. No pericardial effusion.. No thoracic aortic aneurysm. MEDIASTINUM AND NATHANAEL: Large hiatal hernia. Diffuse esophageal distention with associated circumferential wall thickening indicating esophagitis. CHEST WALL AND LOWER NECK: Unremarkable. ABDOMEN LIVER/BILIARY TREE: Nodular hepatic contours may indicate cirrhosis. Subcentimeter hypoattenuating lesion(s), too small to characterize but statistically likely benign, which do not require follow-up (ACR White Paper 2017). No suspicious mass. No biliary dilation. GALLBLADDER: No calcified gallstones. No pericholecystic inflammatory change. SPLEEN: Unremarkable. PANCREAS: Mildly atrophic/fatty involuted. ADRENAL GLANDS: Unremarkable. KIDNEYS/URETERS: Unremarkable. No hydronephrosis. STOMACH AND BOWEL: Fluid distended stomach. Colonic diverticulosis without findings of acute diverticulitis. APPENDIX: Noninflamed. ABDOMINOPELVIC CAVITY: No ascites. No pneumoperitoneum. No lymphadenopathy. VESSELS: Unremarkable for patient's age. PELVIS REPRODUCTIVE ORGANS: Unremarkable for patient's age. URINARY BLADDER: Unremarkable. ABDOMINAL WALL/INGUINAL REGIONS: Unremarkable. BONES: No acute fracture or suspicious osseous lesion. Spinal degenerative changes. Unchanged T10 hemangioma.     Impression: Limited assessment for pulmonary arterial embolism secondary to motion artifact. No large central PE. The lobar and segmental level vasculature is obscured. Large hiatal hernia. Diffuse esophageal distention with associated circumferential wall thickening. Fluid distended stomach. No obstruction. Nodular hepatic contours may indicate cirrhosis/hepatocellular disease. The study was marked in EPIC for immediate notification. Workstation performed:  "[id number]     CT head without contrast    Result Date: 5/9/2024  Narrative: CT BRAIN - WITHOUT CONTRAST INDICATION:   ams.   \"64-year-old male presents for evaluation of generalized weakness, shortness of breath, differential diagnosis includes arrhythmia, ACS, less likely pneumonia given no cough, no fever, otherwise differential includes electrolyte abnormalities, dehydration, viral syndrome, hyperthyroidism  N\"o focal abnormalities on neurologic exam low suspicion for acute intracranial pathology such as bleeding or mass lesion. Ammonia to evaluate for hepatic encephalopathy\" COMPARISON: CT head 6/15/2021. TECHNIQUE:  CT examination of the brain was performed.  Multiplanar 2D reformatted images were created from the source data. Radiation dose length product (DLP) for this visit:  899.35 mGy-cm .  This examination, like all CT scans performed in the [facility], was performed utilizing techniques to minimize radiation dose exposure, including the use of iterative  reconstruction and automated exposure control. IMAGE QUALITY:  Diagnostic. FINDINGS: PARENCHYMA: Decreased attenuation is noted in periventricular and subcortical white matter demonstrating an appearance that is statistically most likely to represent moderate microangiopathic change; this appearance is similar when compared to most recent prior examination. Chronic right frontal and right parietal infarcts. Chronic bilateral basal ganglia lacunar infarcts. No CT signs of acute infarction.  No intracranial mass, mass effect or midline shift.  No acute parenchymal hemorrhage. VENTRICLES AND EXTRA-AXIAL SPACES:  Normal for the patient's age. VISUALIZED ORBITS: Normal visualized orbits. PARANASAL SINUSES: Normal visualized paranasal sinuses. CALVARIUM AND EXTRACRANIAL SOFT TISSUES:  Normal.     Impression: Chronic ischemic changes without acute intracranial abnormality. Workstation performed: [id number]     XR chest portable    Result " Date: 5/8/2024  Narrative: XR CHEST PORTABLE INDICATION: sepsis. COMPARISON: January 12, 2024 FINDINGS: Low lung volumes, which causes crowding of bronchovascular markings.  Within that limitation, there is no focal lung opacity. No pneumothorax or pleural effusion. Normal cardiomediastinal silhouette. Bones are unremarkable for age. Normal upper abdomen.     Impression: No acute cardiopulmonary disease on this examination, which is somewhat limited secondary to low lung volumes. Workstation performed: [id number]       Imaging Studies: I have personally reviewed pertinent reports.      Pathology, and Other Studies: I have personally reviewed pertinent reports.      Patient expressed understanding and had all questions and concerns addressed.    Pauline Garcia PA-C  05/09/24   3:55 PM     Counseling / Coordination of Care  Total floor / unit time spent today 45 minutes.     This chart was completed in part utilizing [employer] speech voice recognition software. Random word insertions, pronoun errors, and incomplete sentences are an occasional consequence of this system due to software limitations, and ambient noise. Any questions or concerns about the content, text, or information contained within the body of this dictation should be directly addressed to the provider for clarification.

## 2024-05-10 ENCOUNTER — APPOINTMENT (INPATIENT)
Dept: PERIOP | Facility: HOSPITAL | Age: 64
DRG: 378 | End: 2024-05-10
Payer: OTHER GOVERNMENT

## 2024-05-10 ENCOUNTER — ANESTHESIA (INPATIENT)
Dept: PERIOP | Facility: HOSPITAL | Age: 64
DRG: 378 | End: 2024-05-10
Payer: OTHER GOVERNMENT

## 2024-05-10 ENCOUNTER — ANESTHESIA (OUTPATIENT)
Dept: ANESTHESIOLOGY | Facility: HOSPITAL | Age: 64
End: 2024-05-10

## 2024-05-10 ENCOUNTER — ANESTHESIA EVENT (OUTPATIENT)
Dept: ANESTHESIOLOGY | Facility: HOSPITAL | Age: 64
End: 2024-05-10

## 2024-05-10 ENCOUNTER — ANESTHESIA EVENT (INPATIENT)
Dept: PERIOP | Facility: HOSPITAL | Age: 64
DRG: 378 | End: 2024-05-10
Payer: OTHER GOVERNMENT

## 2024-05-10 PROBLEM — I51.3 LV (LEFT VENTRICULAR) MURAL THROMBUS: Status: ACTIVE | Noted: 2024-05-10

## 2024-05-10 LAB
ABO GROUP BLD BPU: NORMAL
ALBUMIN SERPL BCP-MCNC: 2.8 G/DL (ref 3.5–5)
ALP SERPL-CCNC: 39 U/L (ref 34–104)
ALT SERPL W P-5'-P-CCNC: 13 U/L (ref 7–52)
ANION GAP SERPL CALCULATED.3IONS-SCNC: 6 MMOL/L (ref 4–13)
AST SERPL W P-5'-P-CCNC: 16 U/L (ref 13–39)
ATRIAL RATE: 104 BPM
ATRIAL RATE: 120 BPM
BASOPHILS # BLD AUTO: 0.02 THOUSANDS/ÂΜL (ref 0–0.1)
BASOPHILS NFR BLD AUTO: 0 % (ref 0–1)
BILIRUB SERPL-MCNC: 0.45 MG/DL (ref 0.2–1)
BPU ID: NORMAL
BUN SERPL-MCNC: 45 MG/DL (ref 5–25)
CA-I BLD-SCNC: 1.13 MMOL/L (ref 1.12–1.32)
CALCIUM ALBUM COR SERPL-MCNC: 9.2 MG/DL (ref 8.3–10.1)
CALCIUM SERPL-MCNC: 8.2 MG/DL (ref 8.4–10.2)
CHLORIDE SERPL-SCNC: 114 MMOL/L (ref 96–108)
CO2 SERPL-SCNC: 21 MMOL/L (ref 21–32)
CREAT SERPL-MCNC: 1.18 MG/DL (ref 0.6–1.3)
CROSSMATCH: NORMAL
EOSINOPHIL # BLD AUTO: 0.06 THOUSAND/ÂΜL (ref 0–0.61)
EOSINOPHIL NFR BLD AUTO: 1 % (ref 0–6)
ERYTHROCYTE [DISTWIDTH] IN BLOOD BY AUTOMATED COUNT: 17.6 % (ref 11.6–15.1)
GFR SERPL CREATININE-BSD FRML MDRD: 65 ML/MIN/1.73SQ M
GLUCOSE SERPL-MCNC: 105 MG/DL (ref 65–140)
GLUCOSE SERPL-MCNC: 76 MG/DL (ref 65–140)
HCT VFR BLD AUTO: 21.5 % (ref 36.5–49.3)
HCT VFR BLD AUTO: 24.7 % (ref 36.5–49.3)
HCT VFR BLD AUTO: 27.6 % (ref 36.5–49.3)
HCT VFR BLD AUTO: 28.4 % (ref 36.5–49.3)
HCT VFR BLD AUTO: 29.6 % (ref 36.5–49.3)
HGB BLD-MCNC: 6.6 G/DL (ref 12–17)
HGB BLD-MCNC: 7.5 G/DL (ref 12–17)
HGB BLD-MCNC: 8.8 G/DL (ref 12–17)
HGB BLD-MCNC: 9.2 G/DL (ref 12–17)
HGB BLD-MCNC: 9.3 G/DL (ref 12–17)
IMM GRANULOCYTES # BLD AUTO: 0.04 THOUSAND/UL (ref 0–0.2)
IMM GRANULOCYTES NFR BLD AUTO: 1 % (ref 0–2)
INR PPP: 1.59 (ref 0.84–1.19)
INR PPP: 2.22 (ref 0.84–1.19)
LYMPHOCYTES # BLD AUTO: 1.96 THOUSANDS/ÂΜL (ref 0.6–4.47)
LYMPHOCYTES NFR BLD AUTO: 31 % (ref 14–44)
MAGNESIUM SERPL-MCNC: 1.7 MG/DL (ref 1.9–2.7)
MCH RBC QN AUTO: 25 PG (ref 26.8–34.3)
MCHC RBC AUTO-ENTMCNC: 30.4 G/DL (ref 31.4–37.4)
MCV RBC AUTO: 82 FL (ref 82–98)
MONOCYTES # BLD AUTO: 0.56 THOUSAND/ÂΜL (ref 0.17–1.22)
MONOCYTES NFR BLD AUTO: 9 % (ref 4–12)
NEUTROPHILS # BLD AUTO: 3.71 THOUSANDS/ÂΜL (ref 1.85–7.62)
NEUTS SEG NFR BLD AUTO: 58 % (ref 43–75)
NRBC BLD AUTO-RTO: 0 /100 WBCS
P AXIS: 53 DEGREES
P AXIS: 62 DEGREES
PHOSPHATE SERPL-MCNC: 2.7 MG/DL (ref 2.3–4.1)
PLATELET # BLD AUTO: 96 THOUSANDS/UL (ref 149–390)
PMV BLD AUTO: 12.4 FL (ref 8.9–12.7)
POTASSIUM SERPL-SCNC: 3.6 MMOL/L (ref 3.5–5.3)
PR INTERVAL: 188 MS
PR INTERVAL: 222 MS
PROT SERPL-MCNC: 4.8 G/DL (ref 6.4–8.4)
PROTHROMBIN TIME: 19.4 SECONDS (ref 11.6–14.5)
PROTHROMBIN TIME: 25.1 SECONDS (ref 11.6–14.5)
QRS AXIS: -16 DEGREES
QRS AXIS: -31 DEGREES
QRSD INTERVAL: 86 MS
QRSD INTERVAL: 92 MS
QT INTERVAL: 292 MS
QT INTERVAL: 324 MS
QTC INTERVAL: 412 MS
QTC INTERVAL: 426 MS
RBC # BLD AUTO: 3 MILLION/UL (ref 3.88–5.62)
SODIUM SERPL-SCNC: 141 MMOL/L (ref 135–147)
T WAVE AXIS: 79 DEGREES
T WAVE AXIS: 91 DEGREES
UNIT DISPENSE STATUS: NORMAL
UNIT PRODUCT CODE: NORMAL
UNIT PRODUCT VOLUME: 300 ML
UNIT RH: NORMAL
VENTRICULAR RATE: 104 BPM
VENTRICULAR RATE: 120 BPM
WBC # BLD AUTO: 6.35 THOUSAND/UL (ref 4.31–10.16)

## 2024-05-10 PROCEDURE — P9016 RBC LEUKOCYTES REDUCED: HCPCS

## 2024-05-10 PROCEDURE — C9113 INJ PANTOPRAZOLE SODIUM, VIA: HCPCS | Performed by: HOSPITALIST

## 2024-05-10 PROCEDURE — 85014 HEMATOCRIT: CPT | Performed by: PHYSICIAN ASSISTANT

## 2024-05-10 PROCEDURE — 83735 ASSAY OF MAGNESIUM: CPT | Performed by: PHYSICIAN ASSISTANT

## 2024-05-10 PROCEDURE — 85014 HEMATOCRIT: CPT | Performed by: INTERNAL MEDICINE

## 2024-05-10 PROCEDURE — 85610 PROTHROMBIN TIME: CPT | Performed by: PHYSICIAN ASSISTANT

## 2024-05-10 PROCEDURE — 93010 ELECTROCARDIOGRAM REPORT: CPT | Performed by: INTERNAL MEDICINE

## 2024-05-10 PROCEDURE — NC001 PR NO CHARGE: Performed by: INTERNAL MEDICINE

## 2024-05-10 PROCEDURE — 85025 COMPLETE CBC W/AUTO DIFF WBC: CPT | Performed by: PHYSICIAN ASSISTANT

## 2024-05-10 PROCEDURE — P9035 PLATELET PHERES LEUKOREDUCED: HCPCS

## 2024-05-10 PROCEDURE — 85018 HEMOGLOBIN: CPT | Performed by: PHYSICIAN ASSISTANT

## 2024-05-10 PROCEDURE — 97163 PT EVAL HIGH COMPLEX 45 MIN: CPT

## 2024-05-10 PROCEDURE — 82330 ASSAY OF CALCIUM: CPT | Performed by: PHYSICIAN ASSISTANT

## 2024-05-10 PROCEDURE — 82948 REAGENT STRIP/BLOOD GLUCOSE: CPT

## 2024-05-10 PROCEDURE — 85018 HEMOGLOBIN: CPT | Performed by: INTERNAL MEDICINE

## 2024-05-10 PROCEDURE — C9113 INJ PANTOPRAZOLE SODIUM, VIA: HCPCS | Performed by: INTERNAL MEDICINE

## 2024-05-10 PROCEDURE — 30233R1 TRANSFUSION OF NONAUTOLOGOUS PLATELETS INTO PERIPHERAL VEIN, PERCUTANEOUS APPROACH: ICD-10-PCS | Performed by: HOSPITALIST

## 2024-05-10 PROCEDURE — 0W3P8ZZ CONTROL BLEEDING IN GASTROINTESTINAL TRACT, VIA NATURAL OR ARTIFICIAL OPENING ENDOSCOPIC: ICD-10-PCS | Performed by: INTERNAL MEDICINE

## 2024-05-10 PROCEDURE — 80053 COMPREHEN METABOLIC PANEL: CPT | Performed by: PHYSICIAN ASSISTANT

## 2024-05-10 PROCEDURE — 30233N1 TRANSFUSION OF NONAUTOLOGOUS RED BLOOD CELLS INTO PERIPHERAL VEIN, PERCUTANEOUS APPROACH: ICD-10-PCS | Performed by: HOSPITALIST

## 2024-05-10 PROCEDURE — 84100 ASSAY OF PHOSPHORUS: CPT | Performed by: PHYSICIAN ASSISTANT

## 2024-05-10 PROCEDURE — 43255 EGD CONTROL BLEEDING ANY: CPT | Performed by: INTERNAL MEDICINE

## 2024-05-10 PROCEDURE — 99232 SBSQ HOSP IP/OBS MODERATE 35: CPT | Performed by: PHYSICIAN ASSISTANT

## 2024-05-10 RX ORDER — PROPOFOL 10 MG/ML
INJECTION, EMULSION INTRAVENOUS AS NEEDED
Status: DISCONTINUED | OUTPATIENT
Start: 2024-05-10 | End: 2024-05-10

## 2024-05-10 RX ORDER — LIDOCAINE HYDROCHLORIDE 10 MG/ML
INJECTION, SOLUTION EPIDURAL; INFILTRATION; INTRACAUDAL; PERINEURAL AS NEEDED
Status: DISCONTINUED | OUTPATIENT
Start: 2024-05-10 | End: 2024-05-10

## 2024-05-10 RX ORDER — FENTANYL CITRATE 50 UG/ML
INJECTION, SOLUTION INTRAMUSCULAR; INTRAVENOUS AS NEEDED
Status: DISCONTINUED | OUTPATIENT
Start: 2024-05-10 | End: 2024-05-10

## 2024-05-10 RX ORDER — SUCCINYLCHOLINE/SOD CL,ISO/PF 100 MG/5ML
SYRINGE (ML) INTRAVENOUS AS NEEDED
Status: DISCONTINUED | OUTPATIENT
Start: 2024-05-10 | End: 2024-05-10

## 2024-05-10 RX ORDER — ONDANSETRON 2 MG/ML
INJECTION INTRAMUSCULAR; INTRAVENOUS AS NEEDED
Status: DISCONTINUED | OUTPATIENT
Start: 2024-05-10 | End: 2024-05-10

## 2024-05-10 RX ORDER — CALCIUM GLUCONATE 20 MG/ML
2 INJECTION, SOLUTION INTRAVENOUS ONCE
Status: COMPLETED | OUTPATIENT
Start: 2024-05-10 | End: 2024-05-10

## 2024-05-10 RX ORDER — ONDANSETRON 2 MG/ML
4 INJECTION INTRAMUSCULAR; INTRAVENOUS ONCE AS NEEDED
Status: DISCONTINUED | OUTPATIENT
Start: 2024-05-10 | End: 2024-05-10 | Stop reason: HOSPADM

## 2024-05-10 RX ORDER — MAGNESIUM SULFATE HEPTAHYDRATE 40 MG/ML
2 INJECTION, SOLUTION INTRAVENOUS ONCE
Status: COMPLETED | OUTPATIENT
Start: 2024-05-10 | End: 2024-05-10

## 2024-05-10 RX ORDER — SODIUM CHLORIDE 9 MG/ML
INJECTION, SOLUTION INTRAVENOUS CONTINUOUS PRN
Status: DISCONTINUED | OUTPATIENT
Start: 2024-05-10 | End: 2024-05-10

## 2024-05-10 RX ORDER — PHYTONADIONE 10 MG/ML
10 INJECTION, EMULSION INTRAMUSCULAR; INTRAVENOUS; SUBCUTANEOUS ONCE
Status: DISCONTINUED | OUTPATIENT
Start: 2024-05-10 | End: 2024-05-10

## 2024-05-10 RX ADMIN — Medication 100 MG: at 15:29

## 2024-05-10 RX ADMIN — PHYTONADIONE 10 MG: 10 INJECTION, EMULSION INTRAMUSCULAR; INTRAVENOUS; SUBCUTANEOUS at 10:17

## 2024-05-10 RX ADMIN — FENTANYL CITRATE 50 MCG: 50 INJECTION, SOLUTION INTRAMUSCULAR; INTRAVENOUS at 15:41

## 2024-05-10 RX ADMIN — LIDOCAINE HYDROCHLORIDE 50 MG: 10 INJECTION, SOLUTION EPIDURAL; INFILTRATION; INTRACAUDAL; PERINEURAL at 15:29

## 2024-05-10 RX ADMIN — OCTREOTIDE ACETATE 50 MCG/HR: 500 INJECTION, SOLUTION INTRAVENOUS; SUBCUTANEOUS at 11:39

## 2024-05-10 RX ADMIN — SODIUM CHLORIDE: 0.9 INJECTION, SOLUTION INTRAVENOUS at 15:29

## 2024-05-10 RX ADMIN — NICOTINE 21 MG: 21 PATCH, EXTENDED RELEASE TRANSDERMAL at 08:05

## 2024-05-10 RX ADMIN — FENTANYL CITRATE 50 MCG: 50 INJECTION, SOLUTION INTRAMUSCULAR; INTRAVENOUS at 15:29

## 2024-05-10 RX ADMIN — SODIUM CHLORIDE 8 MG/HR: 9 INJECTION, SOLUTION INTRAVENOUS at 06:03

## 2024-05-10 RX ADMIN — CALCIUM GLUCONATE 2 G: 20 INJECTION, SOLUTION INTRAVENOUS at 02:44

## 2024-05-10 RX ADMIN — OCTREOTIDE ACETATE 50 MCG/HR: 500 INJECTION, SOLUTION INTRAVENOUS; SUBCUTANEOUS at 02:03

## 2024-05-10 RX ADMIN — SODIUM CHLORIDE 8 MG/HR: 9 INJECTION, SOLUTION INTRAVENOUS at 20:07

## 2024-05-10 RX ADMIN — ONDANSETRON 4 MG: 2 INJECTION INTRAMUSCULAR; INTRAVENOUS at 15:29

## 2024-05-10 RX ADMIN — QUETIAPINE FUMARATE 300 MG: 300 TABLET ORAL at 21:21

## 2024-05-10 RX ADMIN — PROPOFOL 50 MG: 10 INJECTION, EMULSION INTRAVENOUS at 15:39

## 2024-05-10 RX ADMIN — ATORVASTATIN CALCIUM 40 MG: 40 TABLET, FILM COATED ORAL at 17:12

## 2024-05-10 RX ADMIN — MAGNESIUM SULFATE HEPTAHYDRATE 2 G: 2 INJECTION, SOLUTION INTRAVENOUS at 05:52

## 2024-05-10 RX ADMIN — CEFTRIAXONE 1000 MG: 1 INJECTION, SOLUTION INTRAVENOUS at 17:13

## 2024-05-10 RX ADMIN — TRAZODONE HYDROCHLORIDE 100 MG: 100 TABLET ORAL at 21:21

## 2024-05-10 RX ADMIN — FAMOTIDINE 20 MG: 20 TABLET, FILM COATED ORAL at 17:12

## 2024-05-10 RX ADMIN — SODIUM CHLORIDE 75 ML/HR: 0.9 INJECTION, SOLUTION INTRAVENOUS at 10:15

## 2024-05-10 RX ADMIN — PROPOFOL 150 MG: 10 INJECTION, EMULSION INTRAVENOUS at 15:29

## 2024-05-10 NOTE — PLAN OF CARE
Problem: PHYSICAL THERAPY ADULT  Goal: Performs mobility at highest level of function for planned discharge setting.  See evaluation for individualized goals.  Description: Treatment/Interventions: Functional transfer training, LE strengthening/ROM, Therapeutic exercise, Endurance training, Patient/family training, Equipment eval/education, Bed mobility, Gait training, Spoke to nursing, OT  Equipment Recommended: Walker       See flowsheet documentation for full assessment, interventions and recommendations.  Note: Prognosis: Good  Problem List: Decreased endurance, Impaired balance, Decreased mobility, Obesity  Assessment: Patient is a 63y/o M who presents with anemia, ? GI bleed, cirrhosis. Plan for EGD today. Patient received 1U PRBC this morning. Patient resides in a group home with steps. He is independent at baseline and does not use an AD. Current medical status includes ICU stay, multiple lines, O2, bed/chair alarm, fall risk, SOB, decreased strength, balance, endurance and mobility. Patient was received supine in bed. Needed some encouragement to participate. No reports of pain. Patient required min A for CG for transfers and a short amb distance. Currently limited by multiple lines and +SOB. Patients O2 saturation was stable. Anticipate progression once patient is medically stable. Recommending level 3 resources. Would currently benefit from a RW. The patient's AM-PAC Basic Mobility Inpatient Short Form Raw Score is 18. A Raw score of greater than 17 suggests the patient may benefit from discharge to home. Please also refer to the recommendation of the Physical Therapist for safe discharge planning.  Barriers to Discharge: Inaccessible home environment  Barriers to Discharge Comments: Will need a stair trial when appropriate  Rehab Resource Intensity Level, PT: III (Minimum Resource Intensity)    See flowsheet documentation for full assessment.

## 2024-05-10 NOTE — PLAN OF CARE
Problem: PAIN - ADULT  Goal: Verbalizes/displays adequate comfort level or baseline comfort level  Description: Interventions:  - Encourage patient to monitor pain and request assistance  - Assess pain using appropriate pain scale  - Administer analgesics based on type and severity of pain and evaluate response  - Implement non-pharmacological measures as appropriate and evaluate response  - Consider cultural and social influences on pain and pain management  - Notify physician/advanced practitioner if interventions unsuccessful or patient reports new pain  Outcome: Progressing     Problem: INFECTION - ADULT  Goal: Absence or prevention of progression during hospitalization  Description: INTERVENTIONS:  - Assess and monitor for signs and symptoms of infection  - Monitor lab/diagnostic results  - Monitor all insertion sites, i.e. indwelling lines, tubes, and drains  - Monitor endotracheal if appropriate and nasal secretions for changes in amount and color  - [place] appropriate cooling/warming therapies per order  - Administer medications as ordered  - Instruct and encourage patient and family to use good hand hygiene technique  - Identify and instruct in appropriate isolation precautions for identified infection/condition  Outcome: Progressing  Goal: Absence of fever/infection during neutropenic period  Description: INTERVENTIONS:  - Monitor WBC    Outcome: Progressing     Problem: SAFETY ADULT  Goal: Patient will remain free of falls  Description: INTERVENTIONS:  - Educate patient/family on patient safety including physical limitations  - Instruct patient to call for assistance with activity   - Consult OT/PT to assist with strengthening/mobility   - Keep Call bell within reach  - Keep bed low and locked with side rails adjusted as appropriate  - Keep care items and personal belongings within reach  - Initiate and maintain comfort rounds  - Make Fall Risk Sign visible to staff  - Offer Toileting every  Hours,  in advance of need  - Initiate/Maintain alarm  - Obtain necessary fall risk management equipment:   - Apply yellow socks and bracelet for high fall risk patients  - Consider moving patient to room near nurses station  Outcome: Progressing  Goal: Maintain or return to baseline ADL function  Description: INTERVENTIONS:  -  Assess patient's ability to carry out ADLs; assess patient's baseline for ADL function and identify physical deficits which impact ability to perform ADLs (bathing, care of mouth/teeth, toileting, grooming, dressing, etc.)  - Assess/evaluate cause of self-care deficits   - Assess range of motion  - Assess patient's mobility; develop plan if impaired  - Assess patient's need for assistive devices and provide as appropriate  - Encourage maximum independence but intervene and supervise when necessary  - Involve family in performance of ADLs  - Assess for home care needs following discharge   - Consider OT consult to assist with ADL evaluation and planning for discharge  - Provide patient education as appropriate  Outcome: Progressing  Goal: Maintains/Returns to pre admission functional level  Description: INTERVENTIONS:  - Perform AM-PAC 6 Click Basic Mobility/ Daily Activity assessment daily.  - Set and communicate daily mobility goal to care team and patient/family/caregiver.   - Collaborate with rehabilitation services on mobility goals if consulted  - Perform Range of Motion  times a day.  - Reposition patient every  hours.  - Dangle patient  times a day  - Stand patient  times a day  - Ambulate patient  times a day  - Out of bed to chair  times a day   - Out of bed for meal times a day  - Out of bed for toileting  - Record patient progress and toleration of activity level   Outcome: Progressing     Problem: DISCHARGE PLANNING  Goal: Discharge to home or other facility with appropriate resources  Description: INTERVENTIONS:  - Identify barriers to discharge w/patient and caregiver  - Arrange for  needed discharge resources and transportation as appropriate  - Identify discharge learning needs (meds, wound care, etc.)  - Arrange for interpretive services to assist at discharge as needed  - Refer to Case Management Department for coordinating discharge planning if the patient needs post-hospital services based on physician/advanced practitioner order or complex needs related to functional status, cognitive ability, or social support system  Outcome: Progressing     Problem: Knowledge Deficit  Goal: Patient/family/caregiver demonstrates understanding of disease process, treatment plan, medications, and discharge instructions  Description: Complete learning assessment and assess knowledge base.  Interventions:  - Provide teaching at level of understanding  - Provide teaching via preferred learning methods  Outcome: Progressing     Problem: GASTROINTESTINAL - ADULT  Goal: Maintains or returns to baseline bowel function  Description: INTERVENTIONS:  - Assess bowel function  - Encourage oral fluids to ensure adequate hydration  - Administer IV fluids if ordered to ensure adequate hydration  - Administer ordered medications as needed  - Encourage mobilization and activity  - Consider nutritional services referral to assist patient with adequate nutrition and appropriate food choices  Outcome: Progressing     Problem: CARDIOVASCULAR - ADULT  Goal: Maintains optimal cardiac output and hemodynamic stability  Description: INTERVENTIONS:  - Monitor I/O, vital signs and rhythm  - Monitor for S/S and trends of decreased cardiac output  - Administer and titrate ordered vasoactive medications to optimize hemodynamic stability  - Assess quality of pulses, skin color and temperature  - Assess for signs of decreased coronary artery perfusion  - Instruct patient to report change in severity of symptoms  Outcome: Progressing     Problem: HEMATOLOGIC - ADULT  Goal: Maintains hematologic stability  Description: INTERVENTIONS  -  Assess for signs and symptoms of bleeding or hemorrhage  - Monitor labs  - Administer supportive blood products/factors as ordered and appropriate  Outcome: Progressing     Problem: Prexisting or High Potential for Compromised Skin Integrity  Goal: Skin integrity is maintained or improved  Description: INTERVENTIONS:  - Identify patients at risk for skin breakdown  - Assess and monitor skin integrity  - Assess and monitor nutrition and hydration status  - Monitor labs   - Assess for incontinence   - Turn and reposition patient  - Assist with mobility/ambulation  - Relieve pressure over bony prominences  - Avoid friction and shearing  - Provide appropriate hygiene as needed including keeping skin clean and dry  - Evaluate need for skin moisturizer/barrier cream  - Collaborate with interdisciplinary team   - Patient/family teaching  - Consider wound care consult   Outcome: Progressing

## 2024-05-10 NOTE — ASSESSMENT & PLAN NOTE
Cirrhosis, noted on abdominal imaging  Suspected related to alcohol abuse  EGD anticipated to evaluate for varicies  S/P Lactulose enemas as ER suspected mild hepatic encephalopathy which appears improved with non focal CT head   GI following  FFP prior to procedure

## 2024-05-10 NOTE — ANESTHESIA PREPROCEDURE EVALUATION
Procedure:  PRE-OP ONLY    Presented to ED with fatigue,weakness, SOB, melena x3 and found to be tachycardic and anemic.  Concern for upper GI bleed with new diagnosis of liver cirrhosis    S/p FFP x 2, PRBC x 3, 10mg Vit K      Last EtOH/cocaine use approx 3 years back     Relevant Problems   CARDIO   (+) Benign essential hypertension   (+) Hyperlipidemia      GI/HEPATIC   (+) Cirrhosis (HCC)   (+) Gastroesophageal reflux disease      HEMATOLOGY   (+) Anemia      NEURO/PSYCH   (+) Anxiety disorder   (+) Depressive disorder      Behavioral Health   (+) Attention deficit hyperactivity disorder (ADHD), combined type   (+) Bipolar 2 disorder, major depressive episode (HCC)      Neurology/Sleep   (+) History of CVA (cerebrovascular accident)      Large hiatal hernia with esophageal distention/thickening seen on admission CT            Latest Reference Range & Units 05/10/24 03:59   Sodium 135 - 147 mmol/L 141   Potassium 3.5 - 5.3 mmol/L 3.6   Chloride 96 - 108 mmol/L 114 (H)   Carbon Dioxide 21 - 32 mmol/L 21   ANION GAP 4 - 13 mmol/L 6   BUN 5 - 25 mg/dL 45 (H)   Creatinine 0.60 - 1.30 mg/dL 1.18   GLUCOSE 65 - 140 mg/dL 105   Calcium 8.4 - 10.2 mg/dL 8.2 (L)   CORRECTED CALCIUM 8.3 - 10.1 mg/dL 9.2   AST 13 - 39 U/L 16   ALT 7 - 52 U/L 13   ALK PHOS 34 - 104 U/L 39   Total Protein 6.4 - 8.4 g/dL 4.8 (L)   Albumin 3.5 - 5.0 g/dL 2.8 (L)   Total Bilirubin 0.20 - 1.00 mg/dL 0.45   GFR, Calculated ml/min/1.73sq m 65   Phosphorus 2.3 - 4.1 mg/dL 2.7   MAGNESIUM 1.9 - 2.7 mg/dL 1.7 (L)   (H): Data is abnormally high  (L): Data is abnormally low     Latest Reference Range & Units 05/10/24 08:27   Hemoglobin 12.0 - 17.0 g/dL 9.2 (L)   Hematocrit 36.5 - 49.3 % 28.4 (L)   (L): Data is abnormally low       Latest Reference Range & Units 05/10/24 03:59   Platelet Count 149 - 390 Thousands/uL 96 (L)   (L): Data is abnormally low      EKG (5/2024)  Sinus tachycardia  Low voltage QRS  Inferior infarct (cited on or before  19-SEP-2018)  Anterolateral infarct (cited on or before 19-SEP-2018)  Abnormal ECG  When compared with ECG of 12-JAN-2024 11:51,  No significant change was found        ECHO (6/2021)  Left ventricle is normal in size. There is mild concentric hypertrophy.   Systolic function is low normal with an ejection fraction of 50%. Distal   LAD regional wall motion abnormality. Large LV apical thrombus measuring   3.3x3 cm. Prior hx of LV apical thrombus noted but no prior studies   available for comparison. There is grade I (mild) diastolic dysfunction   and normal left atrial pressure.     Normal right ventricular size and systolic function.     No hemodynamically significant valvular disease.        Anesthesia Plan  ASA Score- 4     Anesthesia Type- general with ASA Monitors.         Additional Monitors:     Airway Plan: ETT.    Comment: NPO after MN    RSI due to concern for esophageal varices with bleeding.       Plan Factors-Exercise tolerance (METS): <4 METS.    Chart reviewed. EKG reviewed.  Existing labs reviewed. Patient summary reviewed.              Intended use of anticholinesterase.    Induction- intravenous.    Postoperative Plan- Plan for postoperative opioid use. Planned trial extubation    Informed Consent- Anesthetic plan and risks discussed with patient.  I personally reviewed this patient with the CRNA. Discussed and agreed on the Anesthesia Plan with the CRNA..

## 2024-05-10 NOTE — PROGRESS NOTES
Patient's every 6 hemoglobin came back at 6.6, received 1 unit of RBCs and was appropriately at 7.5.  Did order another unit of blood for him because of the EGD to be performed by GI and put 2 units of RBCs on hold.   Repleted calcium and magnesium  Tere Sorto PA-C

## 2024-05-10 NOTE — PHYSICAL THERAPY NOTE
PHYSICAL THERAPY EVAL  Physical Therapy Evaluation    Performed at least 2 patient identifiers during session:  Patient Active Problem List   Diagnosis    Bipolar 2 disorder, major depressive episode (HCC)    Attention deficit hyperactivity disorder (ADHD), combined type    Alcohol dependence in remission (HCC)    Anxiety disorder    Depressive disorder    Benign essential hypertension    Gastroesophageal reflux disease    Hyperlipidemia    Knee pain    Low back pain    Spinal stenosis of lumbar region    Tobacco dependence syndrome    Vitamin D deficiency    Encounter for medical examination to establish care    Left hip pain    Pleuritic chest pain    Elevated d-dimer    Patellofemoral pain syndrome of right knee    SIRS (systemic inflammatory response syndrome) (HCC)    History of CVA (cerebrovascular accident)    Acute metabolic encephalopathy    Cirrhosis (HCC)    Anemia    Left ventricular apical thrombus       Past Medical History:   Diagnosis Date    Alcohol abuse     Depression     Drug therapy     GERD (gastroesophageal reflux disease)     Hypertension 01/2012    Knee pain, bilateral     Psychiatric disorder     depression, anxiety    Self-injurious behavior     Spinal stenosis of lumbar region     Suicide attempt (HCC)        Past Surgical History:   Procedure Laterality Date    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    HERNIA REPAIR  1997      05/10/24 1310   PT Last Visit   PT Visit Date 05/10/24   Note Type   Note type Evaluation   Pain Assessment   Pain Assessment Tool 0-10   Pain Score No Pain   Restrictions/Precautions   Other Precautions Fall Risk;O2;Telemetry;Multiple lines;Bed Alarm;Chair Alarm   Home Living   Type of Home Group Home  ([facility])   Home Layout Multi-level  (2 full flights to enter)   Home Equipment   (no DME)   Prior Function   Level of [place] Independent with ADLs;Independent with  "functional mobility;Needs assistance with IADLS   Lives With Facility staff   Receives Help From Personal care attendant   IADLs Family/Friend/Other provides meals  (Staff provides cleaning. Patient does own laundry)   Falls in the last 6 months 0   General   Additional Pertinent History Plan for EGD today. Patient has a history of a CVA , bipolar disorder and cirrhosis   Family/Caregiver Present No   Cognition   Overall Cognitive Status WFL   Arousal/Participation Alert   Orientation Level Oriented X4   Memory Within functional limits   Following Commands Follows one step commands without difficulty   Subjective   Subjective \"I am really tired.\"   RLE Assessment   RLE Assessment WFL   LLE Assessment   LLE Assessment WFL   Bed Mobility   Supine to Sit 5  Supervision   Additional items Bedrails;HOB elevated;Increased time required;Verbal cues   Additional Comments BP checked in seated position due to loghtheadedness-stable. BP also checked at end of session-stable.   Transfers   Sit to Stand 4  Minimal assistance   Additional items Assist x 1;Armrests;Verbal cues  (min A for CG)   Stand to Sit 4  Minimal assistance   Additional items Assist x 1;Armrests;Verbal cues  (Min A for CG)   Ambulation/Elevation   Gait pattern Forward Flexion;Short stride   Gait Assistance 4  Minimal assist   Additional items Assist x 1;Verbal cues;Tactile cues  (Min A for CG. Line management completed by therapist.)   Assistive Device Rolling walker   Distance 3ft  (bed to chair)   Ambulation/Elevation Additional Comments +SOB after getting to chair. O2 saturation stable.   Balance   Static Sitting Good   Dynamic Sitting Fair +   Static Standing Fair   Dynamic Standing Fair   Ambulatory Fair   Endurance Deficit   Endurance Deficit Yes   Endurance Deficit Description Limited by SOB   Activity Tolerance   Activity Tolerance Treatment limited secondary to medical complications (Comment)   Nurse Made Aware Ssuan CAMARGO   Assessment   Prognosis " Good   Problem List Decreased endurance;Impaired balance;Decreased mobility;Obesity   Assessment Patient is a 65y/o M who presents with anemia, ? GI bleed, cirrhosis. Plan for EGD today. Patient received 1U PRBC this morning. Patient resides in a group home with steps. He is independent at baseline and does not use an AD. Current medical status includes ICU stay, multiple lines, O2, bed/chair alarm, fall risk, SOB, decreased strength, balance, endurance and mobility. Patient was received supine in bed. Needed some encouragement to participate. No reports of pain. Patient required min A for CG for transfers and a short amb distance. Currently limited by multiple lines and +SOB. Patients O2 saturation was stable. Anticipate progression once patient is medically stable. Recommending level 3 resources. Would currently benefit from a RW. The patient's AM-PAC Basic Mobility Inpatient Short Form Raw Score is 18. A Raw score of greater than 17 suggests the patient may benefit from discharge to home. Please also refer to the recommendation of the Physical Therapist for safe discharge planning.   Barriers to Discharge Inaccessible home environment   Barriers to Discharge Comments Will need a stair trial when appropriate   Goals   Patient Goals To feel better and get out of the hospital   STG Expiration Date 05/24/24   Short Term Goal #1 1. Perform supine<>sit with HOB flat without the use of bedrails ind 2. Perform sit<>stand tranfers mod I 3. Ambulate 200ft with the least restrictive device at a mod I/I level 4. Ascend/descend 12 steps with railing nonreciprocal pattern mod I level   PT Treatment Day 0   Plan   Treatment/Interventions Functional transfer training;LE strengthening/ROM;Therapeutic exercise;Endurance training;Patient/family training;Equipment eval/education;Bed mobility;Gait training;Spoke to nursing;OT   PT Frequency 3-5x/wk   Discharge Recommendation   Rehab Resource Intensity Level, PT III (Minimum Resource  Intensity)   Equipment Recommended Walker   Walker Package Recommended Wheeled walker   AM-PAC Basic Mobility Inpatient   Turning in Flat Bed Without Bedrails 3   Lying on Back to Sitting on Edge of Flat Bed Without Bedrails 3   Moving Bed to Chair 3   Standing Up From Chair Using Arms 3   Walk in Room 3   Climb 3-5 Stairs With Railing 3   Basic Mobility Inpatient Raw Score 18   Basic Mobility Standardized Score 41.05   [facility] Highest Level Of Mobility   JH-HLM Goal 6: Walk 10 steps or more   JH-HLM Achieved 4: Move to chair/commode   End of Consult   Patient Position at End of Consult Bedside chair;Bed/Chair alarm activated;All needs within reach     Waleska Khan, PT             Patient Name: Chuy Norton  Today's Date: 5/10/2024

## 2024-05-10 NOTE — UTILIZATION REVIEW
Initial Clinical Review    Admission: Date/Time/Statement:   Admission Orders (From admission, onward)       Ordered        05/09/24 0922  INPATIENT ADMISSION  Once                          Orders Placed This Encounter   Procedures    INPATIENT ADMISSION     Standing Status:   Standing     Number of Occurrences:   1     Order Specific Question:   Level of Care     Answer:   Med Surg [16]     Order Specific Question:   Estimated length of stay     Answer:   More than 2 Midnights     Order Specific Question:   Certification     Answer:   I certify that inpatient services are medically necessary for this patient for a duration of greater than two midnights. See H&P and MD Progress Notes for additional information about the patient's course of treatment.     ED Arrival Information       Expected   -    Arrival   5/9/2024 05:42    Acuity   Urgent              Means of arrival   Ambulance    Escorted by   [facility]    Service   Hospitalist    Admission type   Emergency              Arrival complaint   Weakness             Chief Complaint   Patient presents with    Weakness - Generalized     Pt brought in via EMS,for weakness. Brought in 3 days ago for same sx. Weakness, SOB, fatigue.        Initial Presentation: 64 y.o. male  to ED via EMS from [facility].   Admitted to inpatient with Dx: Cirrhosis/anemia.  Presented to ED with  weakness and dyspnea on exertion starting few days prior to arrival.   Melena.     Seen in ED 3 days ago for similar . PMHx: alcohol abuse depression, GERD, hypertension, depression, anxiety . On exam: oriented to self, place, president and reason for ED visit.  Unable to state year.  Flat affect.    Tachycardia and tachypnea.   Ammonia 75.   H&H 8.4/28.4 and 2 days ago hgb 13, baseline is 10 - 13  INR 1.88.   D Dimer 0.79.   Procalcitonin 0.41.   Co2 17.  Anion gap 14.   Bun 49.  Creatinine 1.33.  lactic acid 3.5.    Imaging shows Large hiatal hernia. Diffuse esophageal distention with  associated circumferential wall thickening.  Fluid filled stomach. Possible Cirrhosis.  Ct head with chronic ischemic changes.    ED treatment: transfuse unit PRBC.  Given 1 liter IVF, lactulose, neb.  Protonix bolus and gtt given .    Plan includes:  consult GI, Lactulose enemas.  Monitor mental status.   Continue PPI gtt.   Octreotide and rocephin added.   Clears.      5/9/24 per GI - suspect Cirrhosis as has previous alcohol use.  Suspect GI bleed with differential of variceal bleed versus esophagogastroduodenitis, peptic ulcer disease , has Melena.  Recommend:  IV fluids octreotide Protonix serial H&H's transfuse to hemoglobin between 8 and 9.   Better if stomach clear previous to egd and for egd tomorrow.     Anticipated Length of Stay/Certification Statement:   Patient will be admitted on an Inpatient basis with an anticipated length of stay of  greater 2 midnights.      Date: 5/10/24    Day 2: overnight with large episode of melena.   On exam: MELS 3.0 =16. tachycardia.   Abdomen soft.  Alert and oriented x 2.  INR 2.22.   bun 45. Creatinine 1.18.   Mg 1.7.   overnight H&H to 6.6/21.5.   NPO.  Continue IVF, PPI and Octreotide drips.   Continue ceftriaxone.   Egd today.   Monitor H&H, transfuse to keep hgb > 7 and this admission has been transfused 3 units so far.   Given Vitamin K and FFP, repeat INR.   Avoid NSAIDs.  Monitor mental status.     5/10/24 procedure egd Medium type I hiatal hernia with Poli lesions present  Multiple ulcers in the cardia with flat pigmented spot (Eduin IIC); placed 1 clip successfully; hemostasis achieved  The duodenum appeared normal.  Recommendation:  Follow H&H and transfuse as needed  Okay to give clear liquids  Continue high-dose PPI, when present infusion is completed, can switch to pantoprazole 40 mg IV every 12  Okay to discontinue octreotide given absence of varices  If any further signs of significant bleeding may warrant also doing colonoscopy to exclude a lower GI  source  ED Triage Vitals   Temperature Pulse Respirations Blood Pressure SpO2   05/09/24 0543 05/09/24 0543 05/09/24 0543 05/09/24 0543 05/09/24 0615   97.8 °F (36.6 °C) (!) 119 (!) 24 108/63 94 %      Temp Source Heart Rate Source Patient Position - Orthostatic VS BP Location FiO2 (%)   05/09/24 0543 05/09/24 0543 05/09/24 0543 05/09/24 0543 --   Temporal Monitor Lying Right arm       Pain Score       05/09/24 0543       No Pain          Wt Readings from Last 1 Encounters:   05/10/24 93.8 kg (206 lb 12.7 oz)     Additional Vital Signs:   05/10/24 1048 98.6 °F (37 °C) 63 10 Abnormal  105/58 -- 96 % -- -- -- --   05/10/24 0707 98.7 °F (37.1 °C) 75 18 110/59 79 87 % Abnormal  36 4 L/min Nasal cannula Lying   05/10/24 0600 -- 77 17 122/72 92 90 % -- -- -- --   05/10/24 0500 98.4 °F (36.9 °C) 93 17 100/52 72 94 % 36 4 L/min Nasal cannula --   05/10/24 0452 98.1 °F (36.7 °C) 79 19 100/52 -- 97 % 36 4 L/min Nasal cannula --   05/10/24 0336 98.2 °F (36.8 °C) 75 18 107/55 77 -- 36 4 L/min Nasal cannula Lying   05/10/24 0233 -- 78 19 93/52 69 92 % -- -- -- --   05/10/24 0232 -- 76 20 82/44 Abnormal   58 Abnormal  93 % -- -- -- --   BP: AP aware at 05/10/24 0232   05/10/24 0210 98.2 °F (36.8 °C) 91 18 94/57 68 95 % 32 3 L/min Nasal cannula Lying   05/10/24 0200 -- 102 20 125/68 89 96 % -- -- -- --   05/10/24 0151 98.4 °F (36.9 °C) 86 18 105/71 -- 96 % 32 3 L/min Nasal cannula --   05/10/24 0000 -- 86 18 111/62 80 91 % -- -- -- --   05/09/24 2225 98.2 °F (36.8 °C) 95 25 Abnormal  107/63 79 -- 32 3 L/min Nasal cannula Lying   05/09/24 1905 98.8 °F (37.1 °C) 109 Abnormal  36 Abnormal  147/79 105 92 % -- -- None (Room air) Lying   05/09/24 1705 98.5 °F (36.9 °C) 113 Abnormal  22 127/77 94 95 % -- -- None (Room air) Lying   05/09/24 1400 -- 113 Abnormal  22 119/74 -- 95 % -- -- None (Room air) --   05/09/24 1200 -- 115 Abnormal  21 115/70 -- 97 % -- -- None (Room air) --   05/09/24 1104 98.2 °F (36.8 °C) 116 Abnormal  22 105/67 81  93 % -- -- None (Room air) --   05/09/24 0830 -- 110 Abnormal  22 113/70 88 94 % -- -- -- --   05/09/24 0615 -- 114 Abnormal  18 84/68 Abnormal  73 94 % -- -- None (Room air)      Pertinent Labs/Diagnostic Test Results:   CT head without contrast   Final Result by Eugenio Villegas MD (05/09 0757)      Chronic ischemic changes without acute intracranial abnormality.            Workstation performed: [id number]         PE Study with CT abdomen & pelvis with contrast   Final Result by Eugenio Villegas MD (05/09 0830)      Limited assessment for pulmonary arterial embolism secondary to motion artifact. No large central PE. The lobar and segmental level vasculature is obscured.      Large hiatal hernia. Diffuse esophageal distention with associated circumferential wall thickening.      Fluid distended stomach. No obstruction.      Nodular hepatic contours may indicate cirrhosis/hepatocellular disease.         The study was marked in EPIC for immediate notification.         Workstation performed: [id number]         XR chest portable   Final Result by Breanne Barnett MD (05/09 1654)      No acute consolidation or congestion   Cardiomegaly            Workstation performed: [id number]           5/9/24 ecg Sinus tachycardia  Low voltage QRS  Inferior infarct (cited on or before 19-SEP-2018)  Anterolateral infarct (cited on or before 19-SEP-2018)  Abnormal ECG  When compared with ECG of 12-JAN-2024 11:51,  No significant change was found     Results from last 7 days   Lab Units 05/09/24  0553 05/07/24  1642   SARS-COV-2  Negative Negative     Results from last 7 days   Lab Units 05/10/24  0827 05/10/24  0359 05/10/24  0106 05/09/24  1751 05/09/24  0715 05/09/24  0553 05/07/24  1652 05/07/24  1642   WBC Thousand/uL  --  6.35  --   --  6.15 7.56  --  7.56   HEMOGLOBIN g/dL 9.2* 7.5* 6.6* 8.3* 7.6* 8.4*  --  13.1   I STAT HEMOGLOBIN   --   --   --   --   --   --    < >  --    HEMATOCRIT % 28.4* 24.7* 21.5*  27.2* 25.2* 28.4*  --  42.3   HEMATOCRIT, ISTAT   --   --   --   --   --   --    < >  --    PLATELETS Thousands/uL  --  96*  --   --  94* 120*  --  150   TOTAL NEUT ABS Thousands/µL  --  3.71  --   --   --  5.86  --  6.55    < > = values in this interval not displayed.     Results from last 7 days   Lab Units 05/10/24  0359 05/09/24  2048 05/09/24  0553 05/07/24  1652 05/07/24  1642   SODIUM mmol/L 141 138 138  --  138   POTASSIUM mmol/L 3.6 3.6 4.0  --  4.0   CHLORIDE mmol/L 114* 112* 107  --  106   CO2 mmol/L 21 18* 17*  --  22   CO2, I-STAT mmol/L  --   --   --  20*  --    ANION GAP mmol/L 6 8 14*  --  10   BUN mg/dL 45* 49* 49*  --  23   CREATININE mg/dL 1.18 1.10 1.33*  --  1.39*   EGFR ml/min/1.73sq m 65 71 56  --  53   CALCIUM mg/dL 8.2* 7.6* 8.0*  --  9.0   CALCIUM, IONIZED mmol/L 1.13  --   --   --   --    CALCIUM, IONIZED, ISTAT mmol/L  --   --   --  1.10*  --    MAGNESIUM mg/dL 1.7*  --   --   --   --    PHOSPHORUS mg/dL 2.7  --   --   --   --      Results from last 7 days   Lab Units 05/10/24  0359 05/09/24  0553 05/07/24  1642   AST U/L 16 19 22   ALT U/L 13 17 22   ALK PHOS U/L 39 57 93   TOTAL PROTEIN g/dL 4.8* 5.8* 7.9   ALBUMIN g/dL 2.8* 3.5 4.4   TOTAL BILIRUBIN mg/dL 0.45 0.35 0.57   AMMONIA umol/L  --  75*  --      Results from last 7 days   Lab Units 05/10/24  0359 05/09/24  2048 05/09/24  0553 05/07/24  1642   GLUCOSE RANDOM mg/dL 105 112 126 107     Results from last 7 days   Lab Units 05/07/24  1652   PH, STEPHANIE I-STAT  7.460*   PCO2, STEPHANIE ISTAT mm HG 26.4*   PO2, STEPHANIE ISTAT mm HG 49.0*   HCO3, STEPHANIE ISTAT mmol/L 18.7*   I STAT BASE EXC mmol/L -4*   I STAT O2 SAT % 87*     Results from last 7 days   Lab Units 05/09/24  0553   CK TOTAL U/L 187     Results from last 7 days   Lab Units 05/09/24  1822 05/09/24  0841 05/09/24  0553 05/07/24  1829 05/07/24  1642   HS TNI 0HR ng/L  --   --  16  --  14   HS TNI 2HR ng/L  --  24  --  11  --    HSTNI D2 ng/L  --  8  --  -3  --    HS TNI 4HR ng/L 34  --   --    --   --    HSTNI D4 ng/L 18  --   --   --   --      Results from last 7 days   Lab Units 05/09/24  0553   D-DIMER QUANTITATIVE ug/ml FEU 0.79*     Results from last 7 days   Lab Units 05/10/24  0359 05/09/24  0553 05/07/24  1642   PROTIME seconds 25.1* 22.1* 19.0*   INR  2.22* 1.88* 1.54*   PTT seconds  --  36 35     Results from last 7 days   Lab Units 05/09/24  0553   TSH 3RD GENERATON uIU/mL 1.871     Results from last 7 days   Lab Units 05/09/24  0553 05/07/24  1642   PROCALCITONIN ng/ml 0.41* 0.13     Results from last 7 days   Lab Units 05/09/24  2048 05/09/24  1751 05/09/24  0841 05/09/24  0553 05/07/24  1645   LACTIC ACID mmol/L 1.6 2.1* 2.6* 3.5* 1.6     Results from last 7 days   Lab Units 05/07/24  1645   BNP pg/mL 38     Results from last 7 days   Lab Units 05/10/24  1025 05/10/24  0549 05/10/24  0505   UNIT PRODUCT CODE  [id number]  [id number]  [id number]  [id number]  [id number]   UNIT NUMBER  [id number]  [id number]  [id number]  [id number]  [id number]   UNITABO  O  O A  A  A A  A   UNITRH  POS  POS POS  POS  POS POS  POS   CROSSMATCH   --  Compatible  Compatible  Compatible Compatible  Compatible   UNIT DISPENSE STATUS  Issued  Crossmatched Presumed Trans  Presumed Trans  Presumed Trans Crossmatched  Crossmatched   UNIT PRODUCT VOL mL 235  252 300  300  300 300  300     Results from last 7 days   Lab Units 05/07/24  1642   LIPASE u/L 14     Results from last 7 days   Lab Units 05/09/24  0801 05/07/24  1858   CLARITY UA  Clear Clear   COLOR UA  Yellow Yellow   SPEC GRAV UA  1.010 1.025   PH UA  6.0 6.0   GLUCOSE UA mg/dl Negative Negative   KETONES UA mg/dl 10 (1+)* Negative   BLOOD UA  Negative Negative   PROTEIN UA mg/dl Trace* Trace*   NITRITE UA  Negative Negative   BILIRUBIN UA  Negative Negative   UROBILINOGEN UA (BE) mg/dl <2.0 <2.0   LEUKOCYTES UA  Negative Negative   WBC UA /hpf 0-1 0-1   RBC UA /hpf None  Seen 0-1   BACTERIA UA /hpf None Seen Occasional   EPITHELIAL CELLS WET PREP /hpf None Seen Occasional   MUCUS THREADS   --  Occasional*     Results from last 7 days   Lab Units 05/09/24  0553 05/07/24  1642   INFLUENZA A PCR  Negative Negative   INFLUENZA B PCR  Negative Negative   RSV PCR  Negative Negative     Results from last 7 days   Lab Units 05/09/24  0715   ETHANOL LVL mg/dL <10   ACETAMINOPHEN LVL ug/mL 2*   SALICYLATE LVL mg/dL <5.0     Results from last 7 days   Lab Units 05/09/24  0553 05/07/24  1645 05/07/24  1642   BLOOD CULTURE  Received in Microbiology Lab. Culture in Progress.  Received in Microbiology Lab. Culture in Progress. No Growth at 48 hrs. No Growth at 48 hrs.     ED Treatment:   Medication Administration from 05/09/2024 0542 to 05/09/2024 1704         Date/Time Order Dose Route Action Comments     05/09/2024 1009 EDT sodium chloride 0.9 % bolus 1,000 mL 0 mL Intravenous Stopped --     05/09/2024 0601 EDT sodium chloride 0.9 % bolus 1,000 mL 1,000 mL Intravenous New Bag --     05/09/2024 0739 EDT iohexol (OMNIPAQUE) 350 MG/ML injection (MULTI-DOSE) 100 mL 100 mL Intravenous Given --     05/09/2024 0856 EDT levalbuterol (XOPENEX) inhalation solution 0.63 mg 0.63 mg Nebulization Given --     05/09/2024 0940 EDT lactulose (CHRONULAC) oral solution 20 g 20 g Oral Given --     05/09/2024 1023 EDT pantoprazole (PROTONIX) 80 mg in sodium chloride 0.9 % 100 mL IVPB 0 mg Intravenous Stopped --     05/09/2024 1008 EDT pantoprazole (PROTONIX) 80 mg in sodium chloride 0.9 % 100 mL IVPB 80 mg Intravenous New Bag --     05/09/2024 1047 EDT pantoprazole (PROTONIX) 80 mg in sodium chloride 0.9 % 100 mL infusion 8 mg/hr Intravenous New Bag --     05/09/2024 1533 EDT atorvastatin (LIPITOR) tablet 40 mg 40 mg Oral Given --     05/09/2024 1532 EDT aspirin (ECOTRIN LOW STRENGTH) EC tablet 81 mg 81 mg Oral Given --     05/09/2024 1533 EDT escitalopram (LEXAPRO) tablet 20 mg 20 mg Oral Given --     05/09/2024  1534 EDT sodium chloride 0.9 % infusion 75 mL/hr Intravenous New Bag --     05/09/2024 1532 EDT nicotine (NICODERM CQ) 14 mg/24hr TD 24 hr patch 1 patch 1 patch Transdermal Medication Applied --     05/09/2024 1640 EDT cefTRIAXone (ROCEPHIN) IVPB (premix in dextrose) 1,000 mg 50 mL 1,000 mg Intravenous New Bag --          Past Medical History:   Diagnosis Date    Alcohol abuse     Depression     Drug therapy     GERD (gastroesophageal reflux disease)     Hypertension 01/2012    Knee pain, bilateral     Psychiatric disorder     depression, anxiety    Self-injurious behavior     Spinal stenosis of lumbar region     Suicide attempt (HCC)      Present on Admission:   Bipolar 2 disorder, major depressive episode (HCC)      Admitting Diagnosis: Cirrhosis (HCC) [K74.60]  Weakness [R53.1]  GI bleed [K92.2]  Acute encephalopathy [G93.40]  Symptomatic anemia [D64.9]  Age/Sex: 64 y.o. male  Admission Orders:  5/9/24 0922 inpatient   Scheduled Medications:  atorvastatin, 40 mg, Oral, Daily With Dinner  cefTRIAXone, 1,000 mg, Intravenous, Q24H  escitalopram, 20 mg, Oral, Daily  famotidine, 20 mg, Oral, BID  nicotine, 21 mg, Transdermal, Daily  QUEtiapine, 300 mg, Oral, HS  traZODone, 100 mg, Oral, HS    aspirin (ECOTRIN LOW STRENGTH) EC tablet 81 mg  Dose: 81 mg  Freq: Daily Route: PO  Start: 05/09/24 1445 End: 05/09/24 1800   calcium gluconate 2 g in sodium chloride 0.9% 100 mL (premix)  Dose: 2 g  Freq: Once Route: IV  Last Dose: Stopped (05/10/24 0558)  Start: 05/10/24 0245 End: 05/10/24 0558   magnesium sulfate 2 g/50 mL IVPB (premix) 2 g  Dose: 2 g  Freq: Once Route: IV  Last Dose: Stopped (05/10/24 0921)  Start: 05/10/24 0500 End: 05/10/24 0921   octreotide (SandoSTATIN) injection 50 mcg  Dose: 50 mcg  Freq: Once Route: IV  Start: 05/09/24 1600 End: 05/09/24 1823   phytonadione (AQUA-MEPHYTON) 10 mg/mL 10 mg in sodium chloride 0.9 % 50 mL IVPB  Dose: 10 mg  Freq: Once Route: IV  Last Dose: Stopped (05/10/24 1102)  Start:  05/10/24 1000 End: 05/10/24 1102     Continuous IV Infusions:  octreotide, 50 mcg/hr, Intravenous, Continuous  pantoprazole (PROTONIX) 80 mg in sodium chloride 0.9 % 100 mL infusion, 8 mg/hr, Intravenous, Continuous  sodium chloride, 75 mL/hr, Intravenous, Continuous    PRN Meds: not used.   nicotine polacrilex, 2 mg, Oral, Q3H PRN     5/10/24  Transfuse leukoreduced platelet pheresis- 2 products  5/10/24 transfuse leukoreduced RBC - 2 units  5/9/24 transfuse leukoreduced RBC - 1 unit     IP CONSULT TO GASTROENTEROLOGY    Network Utilization Review Department  ATTENTION: Please call with any questions or concerns to [phone] and carefully listen to the prompts so that you are directed to the right person. All voicemails are confidential.   For Discharge needs, contact Care Management DC Support Team at [phone] opt. 2  Send all requests for admission clinical reviews, approved or denied determinations and any other requests to dedicated fax number below belonging to the [facility] where the patient is receiving treatment. List of dedicated fax numbers for the Facilities:  FACILITY NAME UR FAX NUMBER   ADMISSION DENIALS (Administrative/Medical Necessity) [phone]   DISCHARGE SUPPORT TEAM ([facility]) [phone]   PARENT CHILD HEALTH (Maternity/NICU/Pediatrics) [phone]   [facility] [phone]   [facility] [phone]   Psychiatric hospital [phone]   [facility] [phone]   [facility] [phone]   [facility] [phone]   [facility] [phone]   [facility] [phone]   [facility] [phone]   [facility] [phone]   [facility] [phone]   [facility]  [phone]

## 2024-05-10 NOTE — ASSESSMENT & PLAN NOTE
Chronic per prior cardiology notes  Stable S/P MI  Cardiology referral on discharge  Ideally would start on coumadin when stable from GI bleed standpoint

## 2024-05-10 NOTE — PROGRESS NOTES
Progress note - [facility]   Chuy Norton 64 y.o. male MRN: [id number]  Unit/Bed#: -01 SDU Encounter: [id number]    ASSESSMENT and PLAN  Chuy Norton is a 64 y.o. year old male with a past medical history of anxiety, depression, suicide attempt, h/o alcohol abuse, h/o cocaine use, hypertension, GERD, back pain presents to the ER with fatigue, weakness, shortness of breath and confusion, melena X3 starting today. This admission patient found to be tachycardic up to 115.  Hemoglobin 13.1 -> 8.4 ->7.6. S/P 1u PRBC's. BUN 49, creatinine 1.33, GFR 56,  INR elevated 1.88, ammonia elevated at 75, LFTs normal, blood culture negative x 1 day.  CT Head sowed chronic ischemic changes.  CT C/A/P with contrast was limited but no large PE, there is a large hiatal hernia with esophageal distention and thickening, fluid in the stomach,  liver looked cirrhotic with subcentimeter hypoattenuating lesions too small to characterize but statistically likely benign, no suspicious masses, no ascites.  Patient with cirrhosis and suspected upper GI bleed cannot rule out variceal bleed versus esophagogastroduodenitis, peptic ulcer disease.     1.  Upper GI bleed  2.  GERD/NSAID use  3.  Abn CT esophagus  4.  Symptomatic anemia              -N.p.o., IV fluids              -cont PPI and octreotide drip              -cont Ceftriaxone 1 g IV daily              -EGD today              -Monitor hemoglobin, transfuse as needed (S/P 3u so far Hg 6.6-> 9.2)   -INR 1.8->2.2, will give 10mg Vit K IV and FFP X2, repeat INR at 12              -NSAID avoidance, stressed to patient              -Case discussed with Mari Rojas PA-C     5.  Suspected alcoholic cirrhosis              -MELD 3.0 = 16  -outpt cirrhosis work up   -continued sobriety ETOH/cocaine encouraged  -check viral hepatitis panel                             A.EV surveillance:  -egd today                             B. HCC surveillance:   -liver  lesion on CT without ascites, AFP normal, consider US vs MRI abd liver protocol                             C. AMS suspect Hepatic encephalopathy:   -Mild confusion without asterixis on exam  - improved with p.o. lactulose yesterday, on hold pending EGD, consider lactulose enemas  -monitor mental status  -defer to primary team regarding ruling out non GI etiology of AMS      Chief Complaint   Patient presents with    Weakness - Generalized     Pt brought in via EMS,for weakness. Brought in 3 days ago for same sx. Weakness, SOB, fatigue.        SUBJECTIVE  Pt seen and examined. Sleeping, A & O X2 (not time). D/w RN as well. 1 large episode of melena overnight. Pt NPO. No N/V/abd pain. Hg 6.6 S/P 2u pPRBC repeat Hg 9.2.       Temp:  [98.1 °F (36.7 °C)-98.8 °F (37.1 °C)] 98.7 °F (37.1 °C)  HR:  [] 75  Resp:  [17-36] 18  BP: ()/(44-79) 110/59     PHYSICAL EXAM:    Constitutional: Well-developed, no acute distress  HEENT: normocephalic, mucous membranes moist.  Neck: Supple  Skin: warm and dry  Respiratory: Lungs are clear to auscultation B/L.  Cardiovascular: Tachycardic regular rhythm.  Gastrointestinal: Soft, nontender, distended with normal active bowel sounds.  No masses, guarding, rebound.   Rectal Exam: Deferred.  Integumentary: Warm and dry  Extremities: No edema.  Neurologic: Nonfocal. A & O ×2 not time, no asterixis.   Psychiatric: Normal affect.      LABS & STUDIES  Lab Results   Component Value Date    GLUCOSE 110 05/07/2024    CALCIUM 8.2 (L) 05/10/2024    K 3.6 05/10/2024    CO2 21 05/10/2024     (H) 05/10/2024    BUN 45 (H) 05/10/2024    CREATININE 1.18 05/10/2024    CREATININE 1.10 05/09/2024    CREATININE 1.33 (H) 05/09/2024     Lab Results   Component Value Date    WBC 6.35 05/10/2024    WBC 6.15 05/09/2024    WBC 7.56 05/09/2024    HGB 9.2 (L) 05/10/2024    HGB 7.5 (L) 05/10/2024    HGB 6.6 (L) 05/10/2024    MCV 82 05/10/2024    PLT 96 (L) 05/10/2024    PLT 94 (L) 05/09/2024    PLT  "120 (L) 05/09/2024     Lab Results   Component Value Date    ALT 13 05/10/2024    ALT 17 05/09/2024    ALT 22 05/07/2024    AST 16 05/10/2024    AST 19 05/09/2024    AST 22 05/07/2024    ALKPHOS 39 05/10/2024    ALKPHOS 57 05/09/2024    ALKPHOS 93 05/07/2024    TBILI 0.45 05/10/2024    TBILI 0.35 05/09/2024    TBILI 0.57 05/07/2024     No results found for: \"AMYLASE\"  Lab Results   Component Value Date    LIPASE 14 05/07/2024     Lab Results   Component Value Date    IRON 28 (L) 06/17/2021    TIBC 293 06/17/2021    FERRITIN 102 06/17/2021     Lab Results   Component Value Date    INR 2.22 (H) 05/10/2024    INR 1.88 (H) 05/09/2024    INR 1.54 (H) 05/07/2024       XR chest portable    Result Date: 5/9/2024  Narrative: XR CHEST PORTABLE INDICATION: Acute Resp Failure. COMPARISON: May 7, 2024 FINDINGS: Clear lungs. No pneumothorax or pleural effusion. Cardiomegaly seen Bones are unremarkable for age. Normal upper abdomen.     Impression: No acute consolidation or congestion Cardiomegaly Workstation performed: [id number]     PE Study with CT abdomen & pelvis with contrast    Result Date: 5/9/2024  Narrative: CT PULMONARY ANGIOGRAM OF THE CHEST AND CT ABDOMEN AND PELVIS WITH INTRAVENOUS CONTRAST INDICATION: elevated d-dimer, tachycardia, sob. COMPARISON: CTA chest PE study 5/9/2024. TECHNIQUE: CT examination of the chest, abdomen and pelvis was performed. Thin section CT angiographic technique was used in the chest in order to evaluate for pulmonary embolus and coronal 3D MIP postprocessing was performed on the acquisition scanner. Multiplanar 2D reformatted images were created from the source data. This examination, like all CT scans performed in the [facility], was performed utilizing techniques to minimize radiation dose exposure, including the use of iterative reconstruction and automated exposure control. Radiation dose length product (DLP) for this visit: 1406.42 mGy-cm IV Contrast: 100 mL of iohexol " (OMNIPAQUE) Enteric Contrast: Not administered. DIAGNOSTIC QUALITY: Chest images are very motion limited. FINDINGS: CHEST PULMONARY ARTERIAL TREE: Motion limited exam. No large central pulmonary arterial embolism. Limited evaluation of the lobar and segmental level vasculature secondary to the advanced motion artifact. LUNGS: Mild left basilar dependent hypoventilatory changes. The patient is tilted to the left. No focal consolidation. No endotracheal or endobronchial lesion. PLEURA: Unremarkable. HEART/AORTA: Unchanged cardiac contours. Coronary artery calcifications. No pericardial effusion.. No thoracic aortic aneurysm. MEDIASTINUM AND NATHANAEL: Large hiatal hernia. Diffuse esophageal distention with associated circumferential wall thickening indicating esophagitis. CHEST WALL AND LOWER NECK: Unremarkable. ABDOMEN LIVER/BILIARY TREE: Nodular hepatic contours may indicate cirrhosis. Subcentimeter hypoattenuating lesion(s), too small to characterize but statistically likely benign, which do not require follow-up (ACR White Paper 2017). No suspicious mass. No biliary dilation. GALLBLADDER: No calcified gallstones. No pericholecystic inflammatory change. SPLEEN: Unremarkable. PANCREAS: Mildly atrophic/fatty involuted. ADRENAL GLANDS: Unremarkable. KIDNEYS/URETERS: Unremarkable. No hydronephrosis. STOMACH AND BOWEL: Fluid distended stomach. Colonic diverticulosis without findings of acute diverticulitis. APPENDIX: Noninflamed. ABDOMINOPELVIC CAVITY: No ascites. No pneumoperitoneum. No lymphadenopathy. VESSELS: Unremarkable for patient's age. PELVIS REPRODUCTIVE ORGANS: Unremarkable for patient's age. URINARY BLADDER: Unremarkable. ABDOMINAL WALL/INGUINAL REGIONS: Unremarkable. BONES: No acute fracture or suspicious osseous lesion. Spinal degenerative changes. Unchanged T10 hemangioma.     Impression: Limited assessment for pulmonary arterial embolism secondary to motion artifact. No large central PE. The lobar and  "segmental level vasculature is obscured. Large hiatal hernia. Diffuse esophageal distention with associated circumferential wall thickening. Fluid distended stomach. No obstruction. Nodular hepatic contours may indicate cirrhosis/hepatocellular disease. The study was marked in EPIC for immediate notification. Workstation performed: [id number]     CT head without contrast    Result Date: 5/9/2024  Narrative: CT BRAIN - WITHOUT CONTRAST INDICATION:   ams.   \"64-year-old male presents for evaluation of generalized weakness, shortness of breath, differential diagnosis includes arrhythmia, ACS, less likely pneumonia given no cough, no fever, otherwise differential includes electrolyte abnormalities, dehydration, viral syndrome, hyperthyroidism  N\"o focal abnormalities on neurologic exam low suspicion for acute intracranial pathology such as bleeding or mass lesion. Ammonia to evaluate for hepatic encephalopathy\" COMPARISON: CT head 6/15/2021. TECHNIQUE:  CT examination of the brain was performed.  Multiplanar 2D reformatted images were created from the source data. Radiation dose length product (DLP) for this visit:  899.35 mGy-cm .  This examination, like all CT scans performed in the [facility], was performed utilizing techniques to minimize radiation dose exposure, including the use of iterative  reconstruction and automated exposure control. IMAGE QUALITY:  Diagnostic. FINDINGS: PARENCHYMA: Decreased attenuation is noted in periventricular and subcortical white matter demonstrating an appearance that is statistically most likely to represent moderate microangiopathic change; this appearance is similar when compared to most recent prior examination. Chronic right frontal and right parietal infarcts. Chronic bilateral basal ganglia lacunar infarcts. No CT signs of acute infarction.  No intracranial mass, mass effect or midline shift.  No acute parenchymal hemorrhage. VENTRICLES AND EXTRA-AXIAL SPACES: "  Normal for the patient's age. VISUALIZED ORBITS: Normal visualized orbits. PARANASAL SINUSES: Normal visualized paranasal sinuses. CALVARIUM AND EXTRACRANIAL SOFT TISSUES:  Normal.     Impression: Chronic ischemic changes without acute intracranial abnormality. Workstation performed: [id number]     XR chest portable    Result Date: 5/8/2024  Narrative: XR CHEST PORTABLE INDICATION: sepsis. COMPARISON: January 12, 2024 FINDINGS: Low lung volumes, which causes crowding of bronchovascular markings.  Within that limitation, there is no focal lung opacity. No pneumothorax or pleural effusion. Normal cardiomediastinal silhouette. Bones are unremarkable for age. Normal upper abdomen.     Impression: No acute cardiopulmonary disease on this examination, which is somewhat limited secondary to low lung volumes. Workstation performed: [id number]       Patient expressed understanding and had all questions and concerns addressed.    Pauline Garcia PA-C   05/10/24  9:42 AM    This chart was completed in part utilizing [employer] speech voice recognition software. Random word insertions, pronoun errors, and incomplete sentences are an occasional consequence of this system due to software limitations, and ambient noise. Any questions or concerns about the content, text, or information contained within the body of this dictation should be directly addressed to the provider for clarification.

## 2024-05-10 NOTE — ANESTHESIA PREPROCEDURE EVALUATION
Procedure:  EGD    Presented to ED with fatigue,weakness, SOB, melena x3 and found to be tachycardic and anemic.  Concern for upper GI bleed with new diagnosis of liver cirrhosis    S/p FFP x 2, PRBC x 3, 10mg Vit K      Last EtOH/cocaine use approx 3 years back     Relevant Problems   CARDIO   (+) Benign essential hypertension   (+) Hyperlipidemia   (+) Pleuritic chest pain      GI/HEPATIC   (+) Cirrhosis (HCC)   (+) Gastroesophageal reflux disease      HEMATOLOGY   (+) Anemia      MUSCULOSKELETAL   (+) Low back pain      NEURO/PSYCH   (+) Anxiety disorder   (+) Depressive disorder      Large hiatal hernia with esophageal distention/thickening seen on admission CT    Physical Exam    Airway  Comment: Patient refusing to cooperate with exam            Dental   Comment: Patient refusing to cooperate with exam     Cardiovascular      Pulmonary      Other Findings           Latest Reference Range & Units 05/10/24 03:59   Sodium 135 - 147 mmol/L 141   Potassium 3.5 - 5.3 mmol/L 3.6   Chloride 96 - 108 mmol/L 114 (H)   Carbon Dioxide 21 - 32 mmol/L 21   ANION GAP 4 - 13 mmol/L 6   BUN 5 - 25 mg/dL 45 (H)   Creatinine 0.60 - 1.30 mg/dL 1.18   GLUCOSE 65 - 140 mg/dL 105   Calcium 8.4 - 10.2 mg/dL 8.2 (L)   CORRECTED CALCIUM 8.3 - 10.1 mg/dL 9.2   AST 13 - 39 U/L 16   ALT 7 - 52 U/L 13   ALK PHOS 34 - 104 U/L 39   Total Protein 6.4 - 8.4 g/dL 4.8 (L)   Albumin 3.5 - 5.0 g/dL 2.8 (L)   Total Bilirubin 0.20 - 1.00 mg/dL 0.45   GFR, Calculated ml/min/1.73sq m 65   Phosphorus 2.3 - 4.1 mg/dL 2.7   MAGNESIUM 1.9 - 2.7 mg/dL 1.7 (L)   (H): Data is abnormally high  (L): Data is abnormally low     Latest Reference Range & Units 05/10/24 08:27   Hemoglobin 12.0 - 17.0 g/dL 9.2 (L)   Hematocrit 36.5 - 49.3 % 28.4 (L)   (L): Data is abnormally low       Latest Reference Range & Units 05/10/24 03:59   Platelet Count 149 - 390 Thousands/uL 96 (L)   (L): Data is abnormally low      EKG (5/2024)  Sinus tachycardia  Low voltage  QRS  Inferior infarct (cited on or before 19-SEP-2018)  Anterolateral infarct (cited on or before 19-SEP-2018)  Abnormal ECG  When compared with ECG of 12-JAN-2024 11:51,  No significant change was found        ECHO (6/2021)  Left ventricle is normal in size. There is mild concentric hypertrophy.   Systolic function is low normal with an ejection fraction of 50%. Distal   LAD regional wall motion abnormality. Large LV apical thrombus measuring   3.3x3 cm. Prior hx of LV apical thrombus noted but no prior studies   available for comparison. There is grade I (mild) diastolic dysfunction   and normal left atrial pressure.     Normal right ventricular size and systolic function.     No hemodynamically significant valvular disease.        Anesthesia Plan  ASA Score- 4     Anesthesia Type- general with ASA Monitors.         Additional Monitors:     Airway Plan: ETT.    Comment: NPO after MN    RSI due to concern for esophageal varices with bleeding.       Plan Factors-Exercise tolerance (METS): <4 METS.    Chart reviewed. EKG reviewed.  Existing labs reviewed. Patient summary reviewed.              Intended use of anticholinesterase.    Induction- intravenous.    Postoperative Plan- Plan for postoperative opioid use. Planned trial extubation    Informed Consent- Anesthetic plan and risks discussed with patient.  I personally reviewed this patient with the CRNA. Discussed and agreed on the Anesthesia Plan with the CRNA..

## 2024-05-10 NOTE — PROGRESS NOTES
[facility]  Progress Note  Name: Chuy Norton I  MRN: [id number]  Unit/Bed#: -01 SDU I Date of Admission: 5/9/2024   Date of Service: 5/10/2024 I Hospital Day: 1    Assessment/Plan   * Anemia  Assessment & Plan  Pt with hgb 7.6, on admission baseline normallly 10-13  Dropped to 6.6 - received 1u PRBC morning of 5/10  Transfused 1 unit PRBC in ER for symptomatic anemia (pt reported dyspnea), suspected UGI bleed  GI consulted   Cont PPI gtt; GI has added   Continue octreotide  Continue Rocephin  NPO AM in anticipation endoscopic evaluation     Left ventricular apical thrombus  Assessment & Plan  Chronic per prior cardiology notes  Stable S/P MI  Cardiology referral on discharge  Ideally would start on coumadin when stable from GI bleed standpoint     Cirrhosis (HCC)  Assessment & Plan  Cirrhosis, noted on abdominal imaging  Suspected related to alcohol abuse  EGD anticipated to evaluate for varicies  S/P Lactulose enemas as ER suspected mild hepatic encephalopathy which appears improved with non focal CT head   GI following  FFP prior to procedure  NPO at present    History of CVA (cerebrovascular accident)  Assessment & Plan  Maintained aspirin, statin  Aspirin on hold due to GI bleed    Hyperlipidemia  Assessment & Plan  Continue Lipitor    Bipolar 2 disorder, major depressive episode (HCC)  Assessment & Plan  Mood stable  Continue trazodone and seroquel.         VTE Pharmacologic Prophylaxis: VTE Score: 3 Moderate Risk (Score 3-4) - Pharmacological DVT Prophylaxis Contraindicated. Sequential Compression Devices Ordered.    Mobility:   Basic Mobility Inpatient Raw Score: 18  JH-HLM Goal: 6: Walk 10 steps or more  JH-HLM Achieved: 4: Move to chair/commode  JH-HLM Goal achieved. Continue to encourage appropriate mobility.    Patient Centered Rounds: I performed bedside rounds with nursing staff today.   Discussions with Specialists or Other Care Team Provider: Discussed with GI,  would like FFP prior to EGD    Education and Discussions with Family / Patient: Patient declined call to .     Total Time Spent on Date of Encounter in care of patient: 35 mins. This time was spent on one or more of the following: performing physical exam; counseling and coordination of care; obtaining or reviewing history; documenting in the medical record; reviewing/ordering tests, medications or procedures; communicating with other healthcare professionals and discussing with patient's family/caregivers.    Current Length of Stay: 1 day(s)  Current Patient Status: Inpatient   Certification Statement: The patient will continue to require additional inpatient hospital stay due to Pending EGD, on PPI drip  Discharge Plan: Anticipate discharge in 48-72 hrs to discharge location to be determined pending rehab evaluations.    Code Status: Level 1 - Full Code    Subjective:   Patient reports he feels well, he is anticipating EGD later today, has not noted any further episodes of blood through his stool since admission.    Objective:     Vitals:   Temp (24hrs), Av.4 °F (36.9 °C), Min:97.7 °F (36.5 °C), Max:98.8 °F (37.1 °C)    Temp:  [97.7 °F (36.5 °C)-98.8 °F (37.1 °C)] 97.7 °F (36.5 °C)  HR:  [] 72  Resp:  [10-36] 18  BP: ()/(44-79) 140/74  SpO2:  [87 %-97 %] 95 %  Body mass index is 31.44 kg/m².     Input and Output Summary (last 24 hours):     Intake/Output Summary (Last 24 hours) at 5/10/2024 1403  Last data filed at 5/10/2024 1344  Gross per 24 hour   Intake 3683.41 ml   Output 1450 ml   Net 2233.41 ml       Physical Exam:   Physical Exam  Vitals and nursing note reviewed.   Constitutional:       General: He is not in acute distress.     Appearance: Normal appearance. He is well-developed. He is obese.   HENT:      Head: Normocephalic and atraumatic.   Eyes:      General: No scleral icterus.     Conjunctiva/sclera: Conjunctivae normal.   Cardiovascular:      Rate and Rhythm: Normal  rate and regular rhythm.      Heart sounds: No murmur heard.  Pulmonary:      Effort: Pulmonary effort is normal.      Breath sounds: No wheezing, rhonchi or rales.   Abdominal:      General: There is no distension.      Palpations: Abdomen is soft.      Tenderness: There is abdominal tenderness.   Skin:     General: Skin is warm and dry.   Neurological:      Mental Status: He is alert. Mental status is at baseline.   Psychiatric:         Mood and Affect: Mood normal.        Additional Data:     Labs:  Results from last 7 days   Lab Units 05/10/24  1217 05/10/24  0827 05/10/24  0359   WBC Thousand/uL  --   --  6.35   HEMOGLOBIN g/dL 9.3*   < > 7.5*   HEMATOCRIT % 29.6*   < > 24.7*   PLATELETS Thousands/uL  --   --  96*   SEGS PCT %  --   --  58   LYMPHO PCT %  --   --  31   MONO PCT %  --   --  9   EOS PCT %  --   --  1    < > = values in this interval not displayed.     Results from last 7 days   Lab Units 05/10/24  0359   SODIUM mmol/L 141   POTASSIUM mmol/L 3.6   CHLORIDE mmol/L 114*   CO2 mmol/L 21   BUN mg/dL 45*   CREATININE mg/dL 1.18   ANION GAP mmol/L 6   CALCIUM mg/dL 8.2*   ALBUMIN g/dL 2.8*   TOTAL BILIRUBIN mg/dL 0.45   ALK PHOS U/L 39   ALT U/L 13   AST U/L 16   GLUCOSE RANDOM mg/dL 105     Results from last 7 days   Lab Units 05/10/24  1217   INR  1.59*             Results from last 7 days   Lab Units 05/09/24  2048 05/09/24  1751 05/09/24  0841 05/09/24  0553 05/07/24  1645 05/07/24  1642   LACTIC ACID mmol/L 1.6 2.1* 2.6* 3.5*   < >  --    PROCALCITONIN ng/ml  --   --   --  0.41*  --  0.13    < > = values in this interval not displayed.       Lines/Drains:  Invasive Devices       Peripheral Intravenous Line  Duration             Peripheral IV 05/09/24 Left;Ventral (anterior) Forearm <1 day    Peripheral IV 05/09/24 Right Antecubital <1 day    Peripheral IV 05/10/24 Left;Upper;Ventral (anterior) Arm <1 day                          Imaging: Reviewed radiology reports from this admission including:  chest CT scan and abdominal/pelvic CT    Recent Cultures (last 7 days):   Results from last 7 days   Lab Units 05/09/24  0553 05/07/24  1645 05/07/24  1642   BLOOD CULTURE  Received in Microbiology Lab. Culture in Progress.  Received in Microbiology Lab. Culture in Progress. No Growth at 48 hrs. No Growth at 48 hrs.       Last 24 Hours Medication List:   Current Facility-Administered Medications   Medication Dose Route Frequency Provider Last Rate    atorvastatin  40 mg Oral Daily With Dinner Adalberto Kamara MD      cefTRIAXone  1,000 mg Intravenous Q24H Pauline Garcia PA-C 1,000 mg (05/09/24 1640)    escitalopram  20 mg Oral Daily Adalberto Kamara MD      famotidine  20 mg Oral BID Adalberto Kamara MD      nicotine  21 mg Transdermal Daily Tere Sorto PA-C      nicotine polacrilex  2 mg Oral Q3H PRN Tere Sorto PA-C      octreotide  50 mcg/hr Intravenous Continuous Pauline Garcia PA-C 50 mcg/hr (05/10/24 1139)    pantoprazole (PROTONIX) 80 mg in sodium chloride 0.9 % 100 mL infusion  8 mg/hr Intravenous Continuous Adalberto Kamara MD 8 mg/hr (05/10/24 0603)    QUEtiapine  300 mg Oral HS Adalberto Kamara MD      sodium chloride  75 mL/hr Intravenous Continuous Adalberto Kamara MD 75 mL/hr (05/10/24 1015)    traZODone  100 mg Oral HS Adalberto Kamara MD          Today, Patient Was Seen By: Mari Morales PA-C    **Please Note: This note may have been constructed using a voice recognition system.**

## 2024-05-10 NOTE — ANESTHESIA POSTPROCEDURE EVALUATION
Post-Op Assessment Note    CV Status:  Stable    Pain management: adequate       Mental Status:  Sleepy   Hydration Status:  Stable   PONV Controlled:  None   Airway Patency:  Patent     Post Op Vitals Reviewed: Yes    No anethesia notable event occurred.    Staff: CRNA               BP   121/65   Temp   97.2   Pulse  80   Resp   22   SpO2   93% on 6LFM   Postop VS in PACU noted above, SV non-obstructed

## 2024-05-10 NOTE — PLAN OF CARE
Problem: PAIN - ADULT  Goal: Verbalizes/displays adequate comfort level or baseline comfort level  Description: Interventions:  - Encourage patient to monitor pain and request assistance  - Assess pain using appropriate pain scale  - Administer analgesics based on type and severity of pain and evaluate response  - Implement non-pharmacological measures as appropriate and evaluate response  - Consider cultural and social influences on pain and pain management  - Notify physician/advanced practitioner if interventions unsuccessful or patient reports new pain  Outcome: Progressing     Problem: INFECTION - ADULT  Goal: Absence or prevention of progression during hospitalization  Description: INTERVENTIONS:  - Assess and monitor for signs and symptoms of infection  - Monitor lab/diagnostic results  - Monitor all insertion sites, i.e. indwelling lines, tubes, and drains  - Monitor endotracheal if appropriate and nasal secretions for changes in amount and color  - [place] appropriate cooling/warming therapies per order  - Administer medications as ordered  - Instruct and encourage patient and family to use good hand hygiene technique  - Identify and instruct in appropriate isolation precautions for identified infection/condition  Outcome: Progressing  Goal: Absence of fever/infection during neutropenic period  Description: INTERVENTIONS:  - Monitor WBC    Outcome: Progressing     Problem: SAFETY ADULT  Goal: Patient will remain free of falls  Description: INTERVENTIONS:  - Educate patient/family on patient safety including physical limitations  - Instruct patient to call for assistance with activity   - Consult OT/PT to assist with strengthening/mobility   - Keep Call bell within reach  - Keep bed low and locked with side rails adjusted as appropriate  - Keep care items and personal belongings within reach  - Initiate and maintain comfort rounds  - Make Fall Risk Sign visible to staff  - Offer Toileting every 2 Hours,  in advance of need  - Initiate/Maintain bed alarm  - Obtain necessary fall risk management equipment: yellow socks/bracelet  - Apply yellow socks and bracelet for high fall risk patients  - Consider moving patient to room near nurses station  Outcome: Progressing  Goal: Maintain or return to baseline ADL function  Description: INTERVENTIONS:  -  Assess patient's ability to carry out ADLs; assess patient's baseline for ADL function and identify physical deficits which impact ability to perform ADLs (bathing, care of mouth/teeth, toileting, grooming, dressing, etc.)  - Assess/evaluate cause of self-care deficits   - Assess range of motion  - Assess patient's mobility; develop plan if impaired  - Assess patient's need for assistive devices and provide as appropriate  - Encourage maximum independence but intervene and supervise when necessary  - Involve family in performance of ADLs  - Assess for home care needs following discharge   - Consider OT consult to assist with ADL evaluation and planning for discharge  - Provide patient education as appropriate  Outcome: Progressing  Goal: Maintains/Returns to pre admission functional level  Description: INTERVENTIONS:  - Perform AM-PAC 6 Click Basic Mobility/ Daily Activity assessment daily.  - Set and communicate daily mobility goal to care team and patient/family/caregiver.   - Collaborate with rehabilitation services on mobility goals if consulted  - Perform Range of Motion 3 times a day.  - Reposition patient every 2 hours.  - Dangle patient 3 times a day  - Stand patient 3 times a day  - Ambulate patient 3 times a day  - Out of bed to chair 3 times a day   - Out of bed for meals 3 times a day  - Out of bed for toileting  - Record patient progress and toleration of activity level   Outcome: Progressing     Problem: DISCHARGE PLANNING  Goal: Discharge to home or other facility with appropriate resources  Description: INTERVENTIONS:  - Identify barriers to discharge  w/patient and caregiver  - Arrange for needed discharge resources and transportation as appropriate  - Identify discharge learning needs (meds, wound care, etc.)  - Arrange for interpretive services to assist at discharge as needed  - Refer to Case Management Department for coordinating discharge planning if the patient needs post-hospital services based on physician/advanced practitioner order or complex needs related to functional status, cognitive ability, or social support system  Outcome: Progressing     Problem: Knowledge Deficit  Goal: Patient/family/caregiver demonstrates understanding of disease process, treatment plan, medications, and discharge instructions  Description: Complete learning assessment and assess knowledge base.  Interventions:  - Provide teaching at level of understanding  - Provide teaching via preferred learning methods  Outcome: Progressing     Problem: GASTROINTESTINAL - ADULT  Goal: Maintains or returns to baseline bowel function  Description: INTERVENTIONS:  - Assess bowel function  - Encourage oral fluids to ensure adequate hydration  - Administer IV fluids if ordered to ensure adequate hydration  - Administer ordered medications as needed  - Encourage mobilization and activity  - Consider nutritional services referral to assist patient with adequate nutrition and appropriate food choices  Outcome: Progressing     Problem: CARDIOVASCULAR - ADULT  Goal: Maintains optimal cardiac output and hemodynamic stability  Description: INTERVENTIONS:  - Monitor I/O, vital signs and rhythm  - Monitor for S/S and trends of decreased cardiac output  - Administer and titrate ordered vasoactive medications to optimize hemodynamic stability  - Assess quality of pulses, skin color and temperature  - Assess for signs of decreased coronary artery perfusion  - Instruct patient to report change in severity of symptoms  Outcome: Progressing     Problem: HEMATOLOGIC - ADULT  Goal: Maintains hematologic  stability  Description: INTERVENTIONS  - Assess for signs and symptoms of bleeding or hemorrhage  - Monitor labs  - Administer supportive blood products/factors as ordered and appropriate  Outcome: Progressing     Problem: Prexisting or High Potential for Compromised Skin Integrity  Goal: Skin integrity is maintained or improved  Description: INTERVENTIONS:  - Identify patients at risk for skin breakdown  - Assess and monitor skin integrity  - Assess and monitor nutrition and hydration status  - Monitor labs   - Assess for incontinence   - Turn and reposition patient  - Assist with mobility/ambulation  - Relieve pressure over bony prominences  - Avoid friction and shearing  - Provide appropriate hygiene as needed including keeping skin clean and dry  - Evaluate need for skin moisturizer/barrier cream  - Collaborate with interdisciplinary team   - Patient/family teaching  - Consider wound care consult   Outcome: Progressing

## 2024-05-10 NOTE — PLAN OF CARE
Problem: PAIN - ADULT  Goal: Verbalizes/displays adequate comfort level or baseline comfort level  Description: Interventions:  - Encourage patient to monitor pain and request assistance  - Assess pain using appropriate pain scale  - Administer analgesics based on type and severity of pain and evaluate response  - Implement non-pharmacological measures as appropriate and evaluate response  - Consider cultural and social influences on pain and pain management  - Notify physician/advanced practitioner if interventions unsuccessful or patient reports new pain  Outcome: Progressing     Problem: INFECTION - ADULT  Goal: Absence or prevention of progression during hospitalization  Description: INTERVENTIONS:  - Assess and monitor for signs and symptoms of infection  - Monitor lab/diagnostic results  - Monitor all insertion sites, i.e. indwelling lines, tubes, and drains  - Monitor endotracheal if appropriate and nasal secretions for changes in amount and color  - [place] appropriate cooling/warming therapies per order  - Administer medications as ordered  - Instruct and encourage patient and family to use good hand hygiene technique  - Identify and instruct in appropriate isolation precautions for identified infection/condition  Outcome: Progressing  Goal: Absence of fever/infection during neutropenic period  Description: INTERVENTIONS:  - Monitor WBC    Outcome: Progressing     Problem: SAFETY ADULT  Goal: Patient will remain free of falls  Description: INTERVENTIONS:  - Educate patient/family on patient safety including physical limitations  - Instruct patient to call for assistance with activity   - Consult OT/PT to assist with strengthening/mobility   - Keep Call bell within reach  - Keep bed low and locked with side rails adjusted as appropriate  - Keep care items and personal belongings within reach  - Initiate and maintain comfort rounds  - Make Fall Risk Sign visible to staff  - Offer Toileting every 2 Hours,  in advance of need  - Initiate/Maintain bed alarm  - Obtain necessary fall risk management equipment  - Apply yellow socks and bracelet for high fall risk patients  - Consider moving patient to room near nurses station  Outcome: Progressing  Goal: Maintain or return to baseline ADL function  Description: INTERVENTIONS:  -  Assess patient's ability to carry out ADLs; assess patient's baseline for ADL function and identify physical deficits which impact ability to perform ADLs (bathing, care of mouth/teeth, toileting, grooming, dressing, etc.)  - Assess/evaluate cause of self-care deficits   - Assess range of motion  - Assess patient's mobility; develop plan if impaired  - Assess patient's need for assistive devices and provide as appropriate  - Encourage maximum independence but intervene and supervise when necessary  - Involve family in performance of ADLs  - Assess for home care needs following discharge   - Consider OT consult to assist with ADL evaluation and planning for discharge  - Provide patient education as appropriate  Outcome: Progressing  Goal: Maintains/Returns to pre admission functional level  Description: INTERVENTIONS:  - Perform AM-PAC 6 Click Basic Mobility/ Daily Activity assessment daily.  - Set and communicate daily mobility goal to care team and patient/family/caregiver.   - Collaborate with rehabilitation services on mobility goals if consulted  - Perform Range of Motion 4 times a day.  - Reposition patient every 2 hours.  - Dangle patient 3 times a day  - Stand patient 3 times a day  - Ambulate patient 3 times a day  - Out of bed to chair 3 times a day   - Out of bed for meals 3 times a day  - Out of bed for toileting  - Record patient progress and toleration of activity level   Outcome: Progressing     Problem: DISCHARGE PLANNING  Goal: Discharge to home or other facility with appropriate resources  Description: INTERVENTIONS:  - Identify barriers to discharge w/patient and caregiver  -  Arrange for needed discharge resources and transportation as appropriate  - Identify discharge learning needs (meds, wound care, etc.)  - Arrange for interpretive services to assist at discharge as needed  - Refer to Case Management Department for coordinating discharge planning if the patient needs post-hospital services based on physician/advanced practitioner order or complex needs related to functional status, cognitive ability, or social support system  Outcome: Progressing     Problem: Knowledge Deficit  Goal: Patient/family/caregiver demonstrates understanding of disease process, treatment plan, medications, and discharge instructions  Description: Complete learning assessment and assess knowledge base.  Interventions:  - Provide teaching at level of understanding  - Provide teaching via preferred learning methods  Outcome: Progressing     Problem: GASTROINTESTINAL - ADULT  Goal: Maintains or returns to baseline bowel function  Description: INTERVENTIONS:  - Assess bowel function  - Encourage oral fluids to ensure adequate hydration  - Administer IV fluids if ordered to ensure adequate hydration  - Administer ordered medications as needed  - Encourage mobilization and activity  - Consider nutritional services referral to assist patient with adequate nutrition and appropriate food choices  Outcome: Progressing     Problem: HEMATOLOGIC - ADULT  Goal: Maintains hematologic stability  Description: INTERVENTIONS  - Assess for signs and symptoms of bleeding or hemorrhage  - Monitor labs  - Administer supportive blood products/factors as ordered and appropriate  Outcome: Progressing     Problem: CARDIOVASCULAR - ADULT  Goal: Maintains optimal cardiac output and hemodynamic stability  Description: INTERVENTIONS:  - Monitor I/O, vital signs and rhythm  - Monitor for S/S and trends of decreased cardiac output  - Administer and titrate ordered vasoactive medications to optimize hemodynamic stability  - Assess quality of  pulses, skin color and temperature  - Assess for signs of decreased coronary artery perfusion  - Instruct patient to report change in severity of symptoms  Outcome: Progressing

## 2024-05-10 NOTE — ASSESSMENT & PLAN NOTE
Pt with hgb 7.6, on admission baseline normallly 10-13  Dropped to 6.6 - received 1u PRBC morning of 5/10  Transfused 1 unit PRBC in ER for symptomatic anemia (pt reported dyspnea), suspected UGI bleed  GI consulted   Continue PPI  Octreotide discontinued as no varices were found on EGD  Continue Rocephin  Clear Liquid diet for now  EGD:  Medium type I hiatal hernia with Poli lesions present  Multiple ulcers in the cardia with flat pigmented spot (Eduin IIC); placed 1 clip successfully; hemostasis achieved

## 2024-05-10 NOTE — CASE MANAGEMENT
Case Management Assessment & Discharge Planning Note    Patient name Chuy Norton  Location  SDU/-01 S* MRN [id number]  : 1960 Date 5/10/2024       Current Admission Date: 2024  Current Admission Diagnosis:Anemia   Patient Active Problem List    Diagnosis Date Noted    Left ventricular apical thrombus 05/10/2024    Cirrhosis (HCC) 2024    Anemia 2024    History of CVA (cerebrovascular accident) 2021    Acute metabolic encephalopathy 2021    SIRS (systemic inflammatory response syndrome) (HCC) 06/15/2021    Patellofemoral pain syndrome of right knee 2020    Pleuritic chest pain 2019    Elevated d-dimer 2019    Left hip pain 2019    Encounter for medical examination to establish care 10/10/2018    Bipolar 2 disorder, major depressive episode (HCC) 2018    Attention deficit hyperactivity disorder (ADHD), combined type 2018    Gastroesophageal reflux disease 2015    Hyperlipidemia 2015    Knee pain 2015    Spinal stenosis of lumbar region 2015    Tobacco dependence syndrome 2015    Vitamin D deficiency 2015    Anxiety disorder 2015    Depressive disorder 2013    Low back pain 2013    Benign essential hypertension 10/18/2012    Alcohol dependence in remission ([facility]) 2011      LOS (days): 1  Geometric Mean LOS (GMLOS) (days):   Days to GMLOS:     OBJECTIVE:    Risk of Unplanned Readmission Score: 22.45      Current admission status: Inpatient    Preferred Pharmacy:   [id number] [place] - [id number]  [id number] [facility] [address]  Phone: [phone] Fax: [phone]    Primary Care Provider: Savanna Pan DO    Primary Insurance: JOEL MUNOZ  Secondary Insurance:     ASSESSMENT:  Active Health Care Proxies    There are no active Health Care Proxies on file.       Readmission Root Cause  30 Day Readmission:  No    Patient Information  Admitted from:: Facility ([facility])  Mental Status: Alert  During Assessment patient was accompanied by: Not accompanied during assessment  Assessment information provided by:: Other - please comment (William Gamez's  at [facility])  Primary Caregiver: Self  Support Systems: Self, Family members  County of Residence: [place]  What [facility] do you live in?: [facility] entry access options. Select all that apply.: Stairs  Number of steps to enter home.:  (2 flights of steps to bedroom)  Do the steps have railings?: Yes  Type of Current Residence: Group home  Upon entering residence, is there a bedroom on the main floor (no further steps)?: No  A bedroom is located on the following floor levels of residence (select all that apply):: 2nd Floor  Upon entering residence, is there a bathroom on the main floor (no further steps)?: No  Indicate which floors of current residence have a bathroom (select all the apply):: 2nd Floor  Number of steps to 2nd floor from main floor: One Flight  Living Arrangements: Other (Comment) (Resides at [facility])  Is patient a ?: Yes    Activities of Daily Living Prior to Admission  Functional Status: Independent  Completes ADLs independently?: Yes  Ambulates independently?: Yes  Does patient use assisted devices?: No  Does patient currently own DME?: No  Does patient have a history of Outpatient Therapy (PT/OT)?: No  Does the patient have a history of Short-Term Rehab?: No  Does patient have a history of HHC?: No  Does patient currently have HHC?: No    Patient Information Continued  Income Source: Government aid  Does patient have prescription coverage?: Yes  Does patient have a history of substance abuse?: Yes  Historical substance use preference: Alcohol/ETOH  Does patient have a history of Mental Health Diagnosis?: Yes (bipolar disorder , anxiety and depression)    Means of Transportation  Means of Transport to [facility]:: Other  (Comment) (staff at [facility])      Social Determinants of Health (SDOH)      Flowsheet Row Most Recent Value   Housing Stability    In the last 12 months, was there a time when you were not able to pay the mortgage or rent on time? N   In the last 12 months, how many places have you lived? 1   In the last 12 months, was there a time when you did not have a steady place to sleep or slept in a shelter (including now)? N   Transportation Needs    In the past 12 months, has lack of transportation kept you from medical appointments or from getting medications? no   In the past 12 months, has lack of transportation kept you from meetings, work, or from getting things needed for daily living? No   Food Insecurity    Within the past 12 months, you worried that your food would run out before you got the money to buy more. Never true   Within the past 12 months, the food you bought just didn't last and you didn't have money to get more. Never true   Utilities    In the past 12 months has the electric, gas, oil, or water company threatened to shut off services in your home? No            DISCHARGE DETAILS:    Additional Comments: Pt off floor at time of visit for EGD. Acknowledge Pt is from [facility]. Call placed to Pt's (Vera [phone] x [phone]) P't s  informed CM that Pt is independent PTA. Staff administers medication. Vera reports that Pt will need to do 2 flights of steps to bedroom. Vera reports Pt also has [facility] community insurance and will fax CM insurance card. Fax received from [employer] at [facility]. CM emailed hospital insurance verifiers updated insurance information. CM to follow for discharge planning.

## 2024-05-11 LAB
ABO GROUP BLD BPU: NORMAL
ABO GROUP BLD BPU: NORMAL
ALBUMIN SERPL BCP-MCNC: 3.1 G/DL (ref 3.5–5)
ALP SERPL-CCNC: 45 U/L (ref 34–104)
ALT SERPL W P-5'-P-CCNC: 15 U/L (ref 7–52)
ANION GAP SERPL CALCULATED.3IONS-SCNC: 4 MMOL/L (ref 4–13)
AST SERPL W P-5'-P-CCNC: 19 U/L (ref 13–39)
BASOPHILS # BLD AUTO: 0.02 THOUSANDS/ÂΜL (ref 0–0.1)
BASOPHILS NFR BLD AUTO: 0 % (ref 0–1)
BILIRUB SERPL-MCNC: 0.49 MG/DL (ref 0.2–1)
BPU ID: NORMAL
BPU ID: NORMAL
BUN SERPL-MCNC: 20 MG/DL (ref 5–25)
CALCIUM ALBUM COR SERPL-MCNC: 8.1 MG/DL (ref 8.3–10.1)
CALCIUM SERPL-MCNC: 7.4 MG/DL (ref 8.4–10.2)
CHLORIDE SERPL-SCNC: 114 MMOL/L (ref 96–108)
CO2 SERPL-SCNC: 25 MMOL/L (ref 21–32)
CREAT SERPL-MCNC: 1.05 MG/DL (ref 0.6–1.3)
EOSINOPHIL # BLD AUTO: 0.24 THOUSAND/ÂΜL (ref 0–0.61)
EOSINOPHIL NFR BLD AUTO: 5 % (ref 0–6)
ERYTHROCYTE [DISTWIDTH] IN BLOOD BY AUTOMATED COUNT: 18.3 % (ref 11.6–15.1)
GFR SERPL CREATININE-BSD FRML MDRD: 75 ML/MIN/1.73SQ M
GLUCOSE SERPL-MCNC: 99 MG/DL (ref 65–140)
HAV IGM SER QL: ABNORMAL
HBV CORE IGM SER QL: ABNORMAL
HBV SURFACE AG SER QL: ABNORMAL
HCT VFR BLD AUTO: 26.6 % (ref 36.5–49.3)
HCT VFR BLD AUTO: 27.8 % (ref 36.5–49.3)
HCT VFR BLD AUTO: 28.3 % (ref 36.5–49.3)
HCT VFR BLD AUTO: 30 % (ref 36.5–49.3)
HCV AB SER QL: REACTIVE
HGB BLD-MCNC: 8.2 G/DL (ref 12–17)
HGB BLD-MCNC: 8.6 G/DL (ref 12–17)
HGB BLD-MCNC: 8.7 G/DL (ref 12–17)
HGB BLD-MCNC: 9.2 G/DL (ref 12–17)
IMM GRANULOCYTES # BLD AUTO: 0.03 THOUSAND/UL (ref 0–0.2)
IMM GRANULOCYTES NFR BLD AUTO: 1 % (ref 0–2)
INR PPP: 1.16 (ref 0.84–1.19)
LYMPHOCYTES # BLD AUTO: 1.42 THOUSANDS/ÂΜL (ref 0.6–4.47)
LYMPHOCYTES NFR BLD AUTO: 29 % (ref 14–44)
MCH RBC QN AUTO: 26 PG (ref 26.8–34.3)
MCHC RBC AUTO-ENTMCNC: 30.8 G/DL (ref 31.4–37.4)
MCV RBC AUTO: 84 FL (ref 82–98)
MONOCYTES # BLD AUTO: 0.47 THOUSAND/ÂΜL (ref 0.17–1.22)
MONOCYTES NFR BLD AUTO: 10 % (ref 4–12)
MRSA NOSE QL CULT: NORMAL
NEUTROPHILS # BLD AUTO: 2.75 THOUSANDS/ÂΜL (ref 1.85–7.62)
NEUTS SEG NFR BLD AUTO: 55 % (ref 43–75)
NRBC BLD AUTO-RTO: 0 /100 WBCS
PLATELET # BLD AUTO: 153 THOUSANDS/UL (ref 149–390)
PMV BLD AUTO: 11.2 FL (ref 8.9–12.7)
POTASSIUM SERPL-SCNC: 3.5 MMOL/L (ref 3.5–5.3)
PROT SERPL-MCNC: 5.3 G/DL (ref 6.4–8.4)
PROTHROMBIN TIME: 15.2 SECONDS (ref 11.6–14.5)
RBC # BLD AUTO: 3.15 MILLION/UL (ref 3.88–5.62)
SODIUM SERPL-SCNC: 143 MMOL/L (ref 135–147)
UNIT DISPENSE STATUS: NORMAL
UNIT DISPENSE STATUS: NORMAL
UNIT PRODUCT CODE: NORMAL
UNIT PRODUCT CODE: NORMAL
UNIT PRODUCT VOLUME: 235 ML
UNIT PRODUCT VOLUME: 252 ML
UNIT RH: NORMAL
UNIT RH: NORMAL
WBC # BLD AUTO: 4.93 THOUSAND/UL (ref 4.31–10.16)

## 2024-05-11 PROCEDURE — 80053 COMPREHEN METABOLIC PANEL: CPT | Performed by: INTERNAL MEDICINE

## 2024-05-11 PROCEDURE — 85014 HEMATOCRIT: CPT | Performed by: INTERNAL MEDICINE

## 2024-05-11 PROCEDURE — 85610 PROTHROMBIN TIME: CPT | Performed by: PHYSICIAN ASSISTANT

## 2024-05-11 PROCEDURE — 85014 HEMATOCRIT: CPT | Performed by: PHYSICIAN ASSISTANT

## 2024-05-11 PROCEDURE — 99232 SBSQ HOSP IP/OBS MODERATE 35: CPT | Performed by: INTERNAL MEDICINE

## 2024-05-11 PROCEDURE — C9113 INJ PANTOPRAZOLE SODIUM, VIA: HCPCS | Performed by: PHYSICIAN ASSISTANT

## 2024-05-11 PROCEDURE — 97116 GAIT TRAINING THERAPY: CPT

## 2024-05-11 PROCEDURE — 85018 HEMOGLOBIN: CPT | Performed by: INTERNAL MEDICINE

## 2024-05-11 PROCEDURE — 97167 OT EVAL HIGH COMPLEX 60 MIN: CPT

## 2024-05-11 PROCEDURE — 85025 COMPLETE CBC W/AUTO DIFF WBC: CPT | Performed by: INTERNAL MEDICINE

## 2024-05-11 PROCEDURE — 80074 ACUTE HEPATITIS PANEL: CPT | Performed by: INTERNAL MEDICINE

## 2024-05-11 PROCEDURE — C9113 INJ PANTOPRAZOLE SODIUM, VIA: HCPCS | Performed by: INTERNAL MEDICINE

## 2024-05-11 PROCEDURE — 99232 SBSQ HOSP IP/OBS MODERATE 35: CPT | Performed by: PHYSICIAN ASSISTANT

## 2024-05-11 PROCEDURE — 85018 HEMOGLOBIN: CPT | Performed by: PHYSICIAN ASSISTANT

## 2024-05-11 RX ORDER — PANTOPRAZOLE SODIUM 40 MG/10ML
40 INJECTION, POWDER, LYOPHILIZED, FOR SOLUTION INTRAVENOUS EVERY 12 HOURS SCHEDULED
Status: DISCONTINUED | OUTPATIENT
Start: 2024-05-11 | End: 2024-05-15 | Stop reason: HOSPADM

## 2024-05-11 RX ADMIN — FAMOTIDINE 20 MG: 20 TABLET, FILM COATED ORAL at 08:12

## 2024-05-11 RX ADMIN — SODIUM CHLORIDE 75 ML/HR: 0.9 INJECTION, SOLUTION INTRAVENOUS at 00:07

## 2024-05-11 RX ADMIN — TRAZODONE HYDROCHLORIDE 100 MG: 100 TABLET ORAL at 21:08

## 2024-05-11 RX ADMIN — PANTOPRAZOLE SODIUM 40 MG: 40 INJECTION, POWDER, FOR SOLUTION INTRAVENOUS at 21:08

## 2024-05-11 RX ADMIN — NICOTINE 21 MG: 21 PATCH, EXTENDED RELEASE TRANSDERMAL at 08:12

## 2024-05-11 RX ADMIN — FAMOTIDINE 20 MG: 20 TABLET, FILM COATED ORAL at 17:17

## 2024-05-11 RX ADMIN — CEFTRIAXONE 1000 MG: 1 INJECTION, SOLUTION INTRAVENOUS at 16:09

## 2024-05-11 RX ADMIN — SODIUM CHLORIDE 8 MG/HR: 9 INJECTION, SOLUTION INTRAVENOUS at 05:18

## 2024-05-11 RX ADMIN — QUETIAPINE FUMARATE 300 MG: 300 TABLET ORAL at 21:08

## 2024-05-11 RX ADMIN — ESCITALOPRAM OXALATE 20 MG: 20 TABLET ORAL at 08:12

## 2024-05-11 RX ADMIN — SODIUM CHLORIDE 75 ML/HR: 0.9 INJECTION, SOLUTION INTRAVENOUS at 13:42

## 2024-05-11 RX ADMIN — ATORVASTATIN CALCIUM 40 MG: 40 TABLET, FILM COATED ORAL at 16:09

## 2024-05-11 NOTE — PLAN OF CARE
Problem: OCCUPATIONAL THERAPY ADULT  Goal: Performs self-care activities at highest level of function for planned discharge setting.  See evaluation for individualized goals.  Description: Treatment Interventions: ADL retraining, Endurance training, Patient/family training, Equipment evaluation/education, Compensatory technique education, Continued evaluation          See flowsheet documentation for full assessment, interventions and recommendations.   Note: Limitation: Decreased ADL status, Decreased endurance, Decreased self-care trans, Decreased high-level ADLs, Decreased Safe judgement during ADL  Prognosis: Good  Assessment: Pt is a 64 y.o. male seen for OT evaluation at [facility], admitted 5/9/2024 w/ Anemia.  OT completed extensive review of pt's medical and social history. Comorbidities affecting pt's functional performance at time of assessment include: bipolar 2 disorder, HLD, h/o CVA, cirrhosis, anxiety disorder, h/o ETOH/substance abuse. Personal factors affecting pt at time of IE include: steps to enter environment, behavioral pattern, difficulty performing ADLS, difficulty performing IADLS , limited insight into deficits, and environment. Prior to admission, pt was living at [facility] with 2 full flights to enter.  Pt was I w/  ADLS and A w/ IADLS, (-) drove, & required use of no DME/AD PTA. Upon evaluation: Pt requires S for functional transfers, S for functional mobility, S for UB ADLs and Min A for LB ADLS 2* the following deficits impacting occupational performance: weakness, decreased balance, decreased tolerance, impaired problem solving, and decreased safety awareness. Full objective findings from OT assessment regarding body systems outlined above. Pt to benefit from continued skilled OT tx while in the hospital to address deficits as defined above and maximize level of functional independence w/ ADL's and functional mobility. Occupational Performance areas to  address include: bathing/shower, toilet hygiene, dressing, functional mobility, community mobility, and clothing management. Based on findings, pt is of high complexity. The patient's raw score on the AM-PAC Daily Activity inpatient short form is 21, standardized score is 44.27, less than 39.4. Patients at this level are likely to benefit from DC to home. However, please refer to therapist recommendation for discharge planning given other factors that may influence destination. At this time, OT recommendations at time of discharge are DC with Level III resources.     Rehab Resource Intensity Level, OT: III (Minimum Resource Intensity)

## 2024-05-11 NOTE — PLAN OF CARE
Problem: PHYSICAL THERAPY ADULT  Goal: Performs mobility at highest level of function for planned discharge setting.  See evaluation for individualized goals.  Description: Treatment/Interventions: Functional transfer training, LE strengthening/ROM, Therapeutic exercise, Endurance training, Patient/family training, Equipment eval/education, Bed mobility, Gait training, Spoke to nursing, OT  Equipment Recommended: Walker       See flowsheet documentation for full assessment, interventions and recommendations.  Note: Prognosis: Fair  Problem List: Decreased strength, Decreased endurance, Impaired balance, Decreased mobility, Impaired judgement, Decreased safety awareness, Decreased skin integrity  Assessment: Pt able to perform sit<->stand transfers needing S level of A. Inc education and instruction for pt to use the RW during upright mobility, pt tends to leave RW to the side and performs )premature stand->sit transfer with twisting of body to reach recliner chair. Pt reports dizziness following ST and transfers with BP reading of 125/68 in static sit. Dizziness resolves quickly per pt following static sit in recliner. pt able to ambulate 50 feet with use of RW on various surfaces needing S level of A. Pt tends to have inc marisol and speed during turns with lifting of walker off of ground. Education and instruction for pt to have all legs of RW touch the ground during mobility and turns and to take wider movement and steps during turn. Pt able to demonstrate following instruction. Pt able to go up and down single curb step x4 with use of RW needing min Ax1. Verbal and physical instruction given for use of RW and gait sequence during ST. pt would cont to benefit from skilled inpt PT services to maximize functional independence and to dec caregiver burden upon being DC from the hospital.  Barriers to Discharge: Inaccessible home environment (2 SH)  Barriers to Discharge Comments: Will need a stair trial when  appropriate  Rehab Resource Intensity Level, PT: III (Minimum Resource Intensity)    See flowsheet documentation for full assessment.

## 2024-05-11 NOTE — PROGRESS NOTES
"Chuy Norton  [id number]    64 y.o.  male      ASSESSMENT and PLAN    Anemia/GI blood loss -acute presentation with dyspnea weakness and complaint of melena.  Dramatic drop in hemoglobin.  History of esophagitis but now has signs of underlying cirrhosis, also takes NSAIDs.  EGD revealed moderate size hiatal hernia with Poli lesions/ulcerations in the cardia, including 1 flat red spot which was clipped.  No further signs of GI bleeding.    Continue PPI  Follow H&H and transfuse as needed  Okay to advance diet to full liquids    Cirrhosis -with thrombocytopenia, coagulopathy, and cirrhotic texture to the liver on CT scan.  Suspect due to alcohol.  AFP normal but possible lesion on CT so follow-up will be needed for HCC screening.    Chief Complaint   Patient presents with    Weakness - Generalized     Pt brought in via EMS,for weakness. Brought in 3 days ago for same sx. Weakness, SOB, fatigue.        SUBJECTIVE/HPI  Feels well.  No GI complaints.  Tolerating clear liquids.  Denies melena.  No nausea or emesis.    /72 (BP Location: Right arm)   Pulse 73   Temp 98 °F (36.7 °C) (Oral)   Resp 18   Ht 5' 8\" (1.727 m)   Wt 94.7 kg (208 lb 12.4 oz)   SpO2 92%   BMI 31.74 kg/m²     PHYSICALEXAM  General appearance: alert, appears stated age and cooperative  Head: Normocephalic, without obvious abnormality, atraumatic  Lungs: clear to auscultation bilaterally  Heart: S1S2  Abdomen: Moderately distended but soft, non-tender; bowel sounds normal; no masses,  no organomegaly  Neurologic: Grossly normal    Lab Results   Component Value Date    GLUCOSE 110 05/07/2024    CALCIUM 7.4 (L) 05/11/2024    K 3.5 05/11/2024    CO2 25 05/11/2024     (H) 05/11/2024    BUN 20 05/11/2024    CREATININE 1.05 05/11/2024     Lab Results   Component Value Date    WBC 4.93 05/11/2024    HGB 8.7 (L) 05/11/2024    HCT 28.3 (L) 05/11/2024    MCV 84 05/11/2024     05/11/2024     Lab Results   Component Value Date    ALT 15 " "05/11/2024    AST 19 05/11/2024    ALKPHOS 45 05/11/2024     No results found for: \"AMYLASE\"  Lab Results   Component Value Date    LIPASE 14 05/07/2024     Lab Results   Component Value Date    IRON 28 (L) 06/17/2021    TIBC 293 06/17/2021    FERRITIN 102 06/17/2021     Lab Results   Component Value Date    INR 1.16 05/11/2024       Counseling / Coordination of Care  Total floor / unit time spent today 20 minutes.                           "

## 2024-05-11 NOTE — PHYSICAL THERAPY NOTE
"   Physical Therapy Treatment Session:       05/11/24 1037   PT Last Visit   PT Visit Date 05/11/24   Note Type   Note Type Treatment   Pain Assessment   Pain Assessment Tool 0-10   Pain Score No Pain   Restrictions/Precautions   Other Precautions Chair Alarm;Bed Alarm;Cognitive;Fall Risk;Multiple lines;Telemetry   General   Chart Reviewed Yes   Additional Pertinent History BP post mobility:125/68; taken per pt reports feeling dizzy following stand->sit transfer to recliner chair   Family/Caregiver Present No   Cognition   Overall Cognitive Status Impaired   Arousal/Participation Cooperative   Attention Attends with cues to redirect   Orientation Level Oriented X4   Following Commands Follows one step commands with increased time or repetition   Comments 2* dec cognition following simple one step commands repetatively   Subjective   Subjective Pt with OTR upon arrival; pt willing and agreeable to work with PT and to participate in additional ambulation and stair training; \"I can try, I have 2-3 flights to get up to where I live\". Pt reports having a rest break available inbetween each level of his home   Bed Mobility   Supine to Sit Unable to assess  (pt in static sit and static stand during PT tx session)   Transfers   Sit to Stand 5  Supervision   Additional items Assist x 1;Armrests;Increased time required;Verbal cues  (education for use of RW at all times during mobility, (+)premature stand->sit transfer with leaving RW to the side)   Stand to Sit 5  Supervision   Additional items Assist x 1;Armrests;Increased time required;Verbal cues   Ambulation/Elevation   Gait pattern Narrow DAVID;Forward Flexion;Shuffling;Inconsistent marisol;Foward flexed;Short stride;Ataxia   Gait Assistance 5  Supervision   Additional items Assist x 1;Verbal cues   Assistive Device Rolling walker  (50 feet with use of RW on tile and ahrdwood anna; education and instruction for wheels of RW to remain on the ground during turns; pt inc " "marisol and speed during turns ,\"whipping\" the walker around with small BLE forward gait steps)   Distance 50 feet with use of RW on tile and hardwood anna   Stair Management Assistance 4  Minimal assist   Additional items Assist x 1;Verbal cues;Tactile cues   Stair Management Technique With walker;Other (Comment);Foreward  (single curb step x4 with use of RW)   Number of Stairs   (x4 single curb steps with use of RW)   Ambulation/Elevation Additional Comments SOB with minimal exertion observed by pt. Education and cont instruction for use of RW during ambulation   Balance   Static Sitting Fair +  (chair alarm intact prior to leaving pts room)   Dynamic Sitting Fair   Static Standing Poor +   Dynamic Standing Poor +   Ambulatory Poor +   Endurance Deficit   Endurance Deficit Yes   Endurance Deficit Description SOB with minimal exertion, dec BLE strength, dec activity tolerance and endurance   Activity Tolerance   Activity Tolerance Patient limited by fatigue  (fair)   Nurse Made Aware yes   Assessment   Prognosis Fair   Problem List Decreased strength;Decreased endurance;Impaired balance;Decreased mobility;Impaired judgement;Decreased safety awareness;Decreased skin integrity   Assessment Pt able to perform sit<->stand transfers needing S level of A. Inc education and instruction for pt to use the RW during upright mobility, pt tends to leave RW to the side and performs )premature stand->sit transfer with twisting of body to reach recliner chair. Pt reports dizziness following ST and transfers with BP reading of 125/68 in static sit. Dizziness resolves quickly per pt following static sit in recliner. pt able to ambulate 50 feet with use of RW on various surfaces needing S level of A. Pt tends to have inc marisol and speed during turns with lifting of walker off of ground. Education and instruction for pt to have all legs of RW touch the ground during mobility and turns and to take wider movement and steps during " turn. Pt able to demonstrate following instruction. Pt able to go up and down single curb step x4 with use of RW needing min Ax1. Verbal and physical instruction given for use of RW and gait sequence during ST. pt would cont to benefit from skilled inpt PT services to maximize functional independence and to dec caregiver burden upon being DC from the hospital.   Barriers to Discharge Inaccessible home environment  (2 SH)   Goals   Patient Goals to get home   STG Expiration Date 05/24/24   PT Treatment Day 1   Plan   Treatment/Interventions Functional transfer training;LE strengthening/ROM;Elevations;Therapeutic exercise;Endurance training;Patient/family training;Equipment eval/education;Bed mobility;Gait training;Continued evaluation;Spoke to nursing;OT   Progress Slow progress, decreased activity tolerance   PT Frequency 3-5x/wk   Discharge Recommendation   Rehab Resource Intensity Level, PT III (Minimum Resource Intensity)   Equipment Recommended Walker   Walker Package Recommended Wheeled walker   Change/add to Walker Package? No   AM-PAC Basic Mobility Inpatient   Turning in Flat Bed Without Bedrails 3   Lying on Back to Sitting on Edge of Flat Bed Without Bedrails 3   Moving Bed to Chair 3   Standing Up From Chair Using Arms 3   Walk in Room 3   Climb 3-5 Stairs With Railing 3   Basic Mobility Inpatient Raw Score 18   Basic Mobility Standardized Score 41.05   [facility] Highest Level Of Mobility   -HLM Goal 6: Walk 10 steps or more   -HLM Achieved 7: Walk 25 feet or more

## 2024-05-11 NOTE — OCCUPATIONAL THERAPY NOTE
Occupational Therapy Evaluation     Patient Name: Chuy Norton  Today's Date: 5/11/2024  Problem List  Principal Problem:    Anemia  Active Problems:    Bipolar 2 disorder, major depressive episode (HCC)    Hyperlipidemia    History of CVA (cerebrovascular accident)    Cirrhosis (HCC)    Left ventricular apical thrombus    Past Medical History  Past Medical History:   Diagnosis Date    Alcohol abuse     Depression     Drug therapy     GERD (gastroesophageal reflux disease)     Hypertension 01/2012    Knee pain, bilateral     Psychiatric disorder     depression, anxiety    Self-injurious behavior     Spinal stenosis of lumbar region     Suicide attempt (HCC)      Past Surgical History  Past Surgical History:   Procedure Laterality Date    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    HERNIA REPAIR  1997 05/11/24 0957   OT Last Visit   OT Visit Date 05/11/24   Note Type   Note type Evaluation   Pain Assessment   Pain Assessment Tool 0-10   Pain Score No Pain   Restrictions/Precautions   Weight Bearing Precautions Per Order No   Other Precautions Chair Alarm;Fall Risk;Cognitive   Home Living   Type of Home Group Home  ([facility])   Home Layout Multi-level;Laundry in basement  (2 full flight to bed, full flight to laundry)   Bathroom Shower/Tub Walk-in shower   Bathroom Equipment Shower chair;Grab bars in shower   Additional Comments No DME   Prior Function   Level of [place] Independent with ADLs;Independent with functional mobility;Needs assistance with IADLS   Lives With Facility staff;Other (Comment)  (Housemates, pt reports 30 others)   Receives Help From Personal care attendant   IADLs Family/Friend/Other provides transportation;Family/Friend/Other provides meals;Family/Friend/Other provides medication management   Falls in the last 6 months 0   Comments Pt does his own laundry, pt states he can drive but group home staff members drive him   Lifestyle   Autonomy Independent with ADLs  "  Reciprocal Relationships Lives with 30 other housemates, pt reports he does not care for them   Intrinsic Gratification Snake hunting, bass playing   General   Additional Pertinent History H/o bipolar disorder, ETOH & subtance abuse, & CVA   Family/Caregiver Present No   Subjective   Subjective \"I've only been bit by one poisonous snake\"   ADL   Eating Assistance 7  Independent   Grooming Assistance 7  Independent   UB Bathing Assistance 7  Independent   LB Bathing Assistance 5  Supervision/Setup   UB Dressing Assistance 7  Independent   LB Dressing Assistance 4  Minimal Assistance   LB Dressing Deficit Fasteners;Thread RLE into pants  (Pt able to thread BLE, however, does not fully thread RLE through & attempts to pull pants over hips while stepping on pants. Impaired problem solving. Required VC's to sit & required Min A to complete task. Required A to tie pants)   Toileting Assistance  5  Supervision/Setup   Bed Mobility   Supine to Sit Unable to assess   Additional Comments Received OOB to recliner   Transfers   Sit to Stand 5  Supervision   Stand to Sit 5  Supervision   Functional Mobility   Functional Mobility 5  Supervision   Additional Comments Close supervision, initally without RW. However, pt then reaches for RW & reports he feels more comfortable utilizing RW.   Additional items Rolling walker   Balance   Static Sitting Good   Dynamic Sitting Fair +   Static Standing Fair   Dynamic Standing Fair   Ambulatory Fair   Activity Tolerance   Activity Tolerance Patient tolerated treatment well   Nurse Made Aware MARY JO Robins   RUE Assessment   RUE Assessment WFL  (R 2nd digit amputation at PIP joint)   LUE Assessment   LUE Assessment WFL   Vision-Basic Assessment   Current Vision Wears glasses all the time   Cognition   Overall Cognitive Status WFL   Arousal/Participation Alert   Attention Within functional limits   Orientation Level Oriented X4   Memory Within functional limits   Following Commands Follows one " step commands without difficulty   Assessment   Limitation Decreased ADL status;Decreased endurance;Decreased self-care trans;Decreased high-level ADLs;Decreased Safe judgement during ADL   Prognosis Good   Assessment Pt is a 64 y.o. male seen for OT evaluation at [facility], admitted 5/9/2024 w/ Anemia.  OT completed extensive review of pt's medical and social history. Comorbidities affecting pt's functional performance at time of assessment include: bipolar 2 disorder, HLD, h/o CVA, cirrhosis, anxiety disorder, h/o ETOH/substance abuse. Personal factors affecting pt at time of IE include: steps to enter environment, behavioral pattern, difficulty performing ADLS, difficulty performing IADLS , limited insight into deficits, and environment. Prior to admission, pt was living at [facility] with 2 full flights to enter.  Pt was I w/  ADLS and A w/ IADLS, (-) drove, & required use of no DME/AD PTA. Upon evaluation: Pt requires S for functional transfers, S for functional mobility, S for UB ADLs and Min A for LB ADLS 2* the following deficits impacting occupational performance: weakness, decreased balance, decreased tolerance, impaired problem solving, and decreased safety awareness. Full objective findings from OT assessment regarding body systems outlined above. Pt to benefit from continued skilled OT tx while in the hospital to address deficits as defined above and maximize level of functional independence w/ ADL's and functional mobility. Occupational Performance areas to address include: bathing/shower, toilet hygiene, dressing, functional mobility, community mobility, and clothing management. Based on findings, pt is of high complexity. The patient's raw score on the AM-PAC Daily Activity inpatient short form is 21, standardized score is 44.27, less than 39.4. Patients at this level are likely to benefit from DC to home. However, please refer to therapist recommendation for discharge  planning given other factors that may influence destination. At this time, OT recommendations at time of discharge are DC with Level III resources.   Goals   Patient Goals Pt wants to go home   Plan   Treatment Interventions ADL retraining;Endurance training;Patient/family training;Equipment evaluation/education;Compensatory technique education;Continued evaluation   Goal Expiration Date 05/21/24   OT Treatment Day 0   OT Frequency 2-3x/wk   Discharge Recommendation   Rehab Resource Intensity Level, OT III (Minimum Resource Intensity)   AM-PAC Daily Activity Inpatient   Lower Body Dressing 3   Bathing 3   Toileting 3   Upper Body Dressing 4   Grooming 4   Eating 4   Daily Activity Raw Score 21   Daily Activity Standardized Score (Calc for Raw Score >=11) 44.27   AM-PAC Applied Cognition Inpatient   Following a Speech/Presentation 4   Understanding Ordinary Conversation 4   Taking Medications 3   Remembering Where Things Are Placed or Put Away 3   Taking Care of Complicated Tasks 3   End of Consult   Education Provided Yes   Patient Position at End of Consult Seated edge of bed  (With PT Karo to complete stair trial)   Nurse Communication Nurse aware of consult     Pt will achieve the following goals within 10 days.    *Pt will complete LB bathing and dressing with Mod I & DME PRN.    *Pt will complete toileting w/ Mod I w/ G hygiene/thoroughness using DME PRN    *Pt will perform functional transfers with on/off all surfaces with Mod I using DME as needed w/ G balance/safety.    *Pt will demonstrate increased activity tolerance >20 min in order to complete ADL routine.    *Pt will independently identify 3-5 fall risks during ADL routine to ensure home safety upon discharge.    *Pt will improve functional mobility during ADL/IADL/leisure tasks to Mod I using DME as needed w/ G balance/safety.     *Pt will improve dynamic standing balance to G for 5+ minutes during purposeful activity w/ Mod I & G endurance.     *Assess  DME needs    Genia Virk, OTR/L

## 2024-05-11 NOTE — PROGRESS NOTES
[facility]  Progress Note  Name: Chuy Norton I  MRN: [id number]  Unit/Bed#: -01 SDU I Date of Admission: 5/9/2024   Date of Service: 5/11/2024 I Hospital Day: 2    Assessment/Plan   * Anemia  Assessment & Plan  Pt with hgb 7.6, on admission baseline normallly 10-13  Dropped to 6.6 - received 1u PRBC morning of 5/10  Transfused 1 unit PRBC in ER for symptomatic anemia (pt reported dyspnea), suspected UGI bleed  GI consulted   Continue PPI  Octreotide discontinued as no varices were found on EGD  Continue Rocephin  Clear Liquid diet for now  EGD:  Medium type I hiatal hernia with Poli lesions present  Multiple ulcers in the cardia with flat pigmented spot (Eduin IIC); placed 1 clip successfully; hemostasis achieved    Left ventricular apical thrombus  Assessment & Plan  Chronic per prior cardiology notes  Stable S/P MI  Cardiology referral on discharge  Ideally would start on coumadin when stable from GI bleed standpoint     Cirrhosis (HCC)  Assessment & Plan  Cirrhosis, noted on abdominal imaging  Suspected related to alcohol abuse  EGD anticipated to evaluate for varicies  S/P Lactulose enemas as ER suspected mild hepatic encephalopathy which appears improved with non focal CT head   GI following  FFP prior to procedure    History of CVA (cerebrovascular accident)  Assessment & Plan  Maintained aspirin, statin  Aspirin on hold due to GI bleed    Hyperlipidemia  Assessment & Plan  Continue Lipitor    Bipolar 2 disorder, major depressive episode (HCC)  Assessment & Plan  Mood stable  Continue trazodone and seroquel.         VTE Pharmacologic Prophylaxis: VTE Score: 3 Moderate Risk (Score 3-4) - Pharmacological DVT Prophylaxis Contraindicated. Sequential Compression Devices Ordered.    Mobility:   Basic Mobility Inpatient Raw Score: 18  JH-HLM Goal: 6: Walk 10 steps or more  JH-HLM Achieved: 7: Walk 25 feet or more  JH-HLM Goal achieved. Continue to encourage appropriate  mobility.    Patient Centered Rounds: I performed bedside rounds with nursing staff today.   Discussions with Specialists or Other Care Team Provider: Discussed with GI team, advance to CLD and monitor, continue PPI, DC octreotide    Education and Discussions with Family / Patient: Patient declined call to .     Total Time Spent on Date of Encounter in care of patient: 35 mins. This time was spent on one or more of the following: performing physical exam; counseling and coordination of care; obtaining or reviewing history; documenting in the medical record; reviewing/ordering tests, medications or procedures; communicating with other healthcare professionals and discussing with patient's family/caregivers.    Current Length of Stay: 2 day(s)  Current Patient Status: Inpatient   Certification Statement: The patient will continue to require additional inpatient hospital stay due to monitoring for further GI bleed, slow advancement of diet, trending hemoglobin  Discharge Plan: Anticipate discharge in 24-48 hrs to home.    Code Status: Level 1 - Full Code    Subjective:   Patient denies any abdominal pain or further melena that he has noted.  He feels well overall.    Objective:     Vitals:   Temp (24hrs), Av °F (36.7 °C), Min:97 °F (36.1 °C), Max:98.6 °F (37 °C)    Temp:  [97 °F (36.1 °C)-98.6 °F (37 °C)] 98 °F (36.7 °C)  HR:  [66-79] 68  Resp:  [16-24] 18  BP: (107-140)/(57-74) 133/66  SpO2:  [90 %-99 %] 94 %  Body mass index is 31.74 kg/m².     Input and Output Summary (last 24 hours):     Intake/Output Summary (Last 24 hours) at 2024 1146  Last data filed at 2024 0929  Gross per 24 hour   Intake 3630.92 ml   Output 2225 ml   Net 1405.92 ml       Physical Exam:   Physical Exam  Vitals and nursing note reviewed.   Constitutional:       General: He is not in acute distress.     Appearance: Normal appearance. He is well-developed.   HENT:      Head: Normocephalic and atraumatic.   Eyes:       General: No scleral icterus.     Conjunctiva/sclera: Conjunctivae normal.   Cardiovascular:      Rate and Rhythm: Normal rate and regular rhythm.      Heart sounds: No murmur heard.  Pulmonary:      Effort: Pulmonary effort is normal.      Breath sounds: No wheezing, rhonchi or rales.   Abdominal:      General: There is no distension.      Palpations: Abdomen is soft.   Skin:     General: Skin is warm and dry.   Neurological:      Mental Status: He is alert. Mental status is at baseline.   Psychiatric:         Mood and Affect: Mood normal.        Additional Data:     Labs:  Results from last 7 days   Lab Units 05/11/24  0523   WBC Thousand/uL 4.93   HEMOGLOBIN g/dL 8.2*   HEMATOCRIT % 26.6*   PLATELETS Thousands/uL 153   SEGS PCT % 55   LYMPHO PCT % 29   MONO PCT % 10   EOS PCT % 5     Results from last 7 days   Lab Units 05/11/24  0523   SODIUM mmol/L 143   POTASSIUM mmol/L 3.5   CHLORIDE mmol/L 114*   CO2 mmol/L 25   BUN mg/dL 20   CREATININE mg/dL 1.05   ANION GAP mmol/L 4   CALCIUM mg/dL 7.4*   ALBUMIN g/dL 3.1*   TOTAL BILIRUBIN mg/dL 0.49   ALK PHOS U/L 45   ALT U/L 15   AST U/L 19   GLUCOSE RANDOM mg/dL 99     Results from last 7 days   Lab Units 05/11/24  0523   INR  1.16     Results from last 7 days   Lab Units 05/10/24  1650   POC GLUCOSE mg/dl 76         Results from last 7 days   Lab Units 05/09/24  2048 05/09/24  1751 05/09/24  0841 05/09/24  0553 05/07/24  1645 05/07/24  1642   LACTIC ACID mmol/L 1.6 2.1* 2.6* 3.5*   < >  --    PROCALCITONIN ng/ml  --   --   --  0.41*  --  0.13    < > = values in this interval not displayed.       Lines/Drains:  Invasive Devices       Peripheral Intravenous Line  Duration             Peripheral IV 05/09/24 Left;Ventral (anterior) Forearm 1 day    Peripheral IV 05/10/24 Left;Upper;Ventral (anterior) Arm 1 day                          Imaging: Reviewed radiology reports from this admission including: procedure reports    Recent Cultures (last 7 days):   Results from last  7 days   Lab Units 05/09/24  0553 05/07/24  1645 05/07/24  1642   BLOOD CULTURE  No Growth at 24 hrs.  No Growth at 24 hrs. No Growth at 72 hrs. No Growth at 72 hrs.       Last 24 Hours Medication List:   Current Facility-Administered Medications   Medication Dose Route Frequency Provider Last Rate    atorvastatin  40 mg Oral Daily With Dinner Jayne Mc MD      cefTRIAXone  1,000 mg Intravenous Q24H Jayne Mc MD 1,000 mg (05/10/24 1713)    escitalopram  20 mg Oral Daily Jayne Mc MD      famotidine  20 mg Oral BID Jayne Mc MD      nicotine  21 mg Transdermal Daily Jayne Mc MD      nicotine polacrilex  2 mg Oral Q3H PRN Jayne Mc MD      pantoprazole (PROTONIX) 80 mg in sodium chloride 0.9 % 100 mL infusion  8 mg/hr Intravenous Continuous Jayne Mc MD 8 mg/hr (05/11/24 0518)    QUEtiapine  300 mg Oral HS Jayne Mc MD      sodium chloride  75 mL/hr Intravenous Continuous Jayne Mc MD 75 mL/hr (05/11/24 0007)    traZODone  100 mg Oral HS Jayne Mc MD          Today, Patient Was Seen By: Mari Morales PA-C    **Please Note: This note may have been constructed using a voice recognition system.**

## 2024-05-12 LAB
ALBUMIN SERPL BCP-MCNC: 3.3 G/DL (ref 3.5–5)
ALP SERPL-CCNC: 54 U/L (ref 34–104)
ALT SERPL W P-5'-P-CCNC: 15 U/L (ref 7–52)
ANION GAP SERPL CALCULATED.3IONS-SCNC: 9 MMOL/L (ref 4–13)
AST SERPL W P-5'-P-CCNC: 21 U/L (ref 13–39)
BACTERIA BLD CULT: NORMAL
BACTERIA BLD CULT: NORMAL
BASOPHILS # BLD AUTO: 0.02 THOUSANDS/ÂΜL (ref 0–0.1)
BASOPHILS NFR BLD AUTO: 0 % (ref 0–1)
BILIRUB SERPL-MCNC: 0.51 MG/DL (ref 0.2–1)
BUN SERPL-MCNC: 11 MG/DL (ref 5–25)
CALCIUM ALBUM COR SERPL-MCNC: 8.4 MG/DL (ref 8.3–10.1)
CALCIUM SERPL-MCNC: 7.8 MG/DL (ref 8.4–10.2)
CHLORIDE SERPL-SCNC: 110 MMOL/L (ref 96–108)
CO2 SERPL-SCNC: 22 MMOL/L (ref 21–32)
CREAT SERPL-MCNC: 1.12 MG/DL (ref 0.6–1.3)
EOSINOPHIL # BLD AUTO: 0.24 THOUSAND/ÂΜL (ref 0–0.61)
EOSINOPHIL NFR BLD AUTO: 4 % (ref 0–6)
ERYTHROCYTE [DISTWIDTH] IN BLOOD BY AUTOMATED COUNT: 18.1 % (ref 11.6–15.1)
GFR SERPL CREATININE-BSD FRML MDRD: 69 ML/MIN/1.73SQ M
GLUCOSE SERPL-MCNC: 94 MG/DL (ref 65–140)
HCT VFR BLD AUTO: 30.2 % (ref 36.5–49.3)
HCT VFR BLD AUTO: 31.3 % (ref 36.5–49.3)
HGB BLD-MCNC: 9.2 G/DL (ref 12–17)
HGB BLD-MCNC: 9.5 G/DL (ref 12–17)
IMM GRANULOCYTES # BLD AUTO: 0.05 THOUSAND/UL (ref 0–0.2)
IMM GRANULOCYTES NFR BLD AUTO: 1 % (ref 0–2)
INR PPP: 1.04 (ref 0.84–1.19)
LYMPHOCYTES # BLD AUTO: 2.12 THOUSANDS/ÂΜL (ref 0.6–4.47)
LYMPHOCYTES NFR BLD AUTO: 38 % (ref 14–44)
MCH RBC QN AUTO: 25.8 PG (ref 26.8–34.3)
MCHC RBC AUTO-ENTMCNC: 30.5 G/DL (ref 31.4–37.4)
MCV RBC AUTO: 85 FL (ref 82–98)
MONOCYTES # BLD AUTO: 0.48 THOUSAND/ÂΜL (ref 0.17–1.22)
MONOCYTES NFR BLD AUTO: 9 % (ref 4–12)
NEUTROPHILS # BLD AUTO: 2.73 THOUSANDS/ÂΜL (ref 1.85–7.62)
NEUTS SEG NFR BLD AUTO: 48 % (ref 43–75)
NRBC BLD AUTO-RTO: 0 /100 WBCS
PLATELET # BLD AUTO: 167 THOUSANDS/UL (ref 149–390)
PMV BLD AUTO: 11.6 FL (ref 8.9–12.7)
POTASSIUM SERPL-SCNC: 3.3 MMOL/L (ref 3.5–5.3)
PROT SERPL-MCNC: 5.7 G/DL (ref 6.4–8.4)
PROTHROMBIN TIME: 14 SECONDS (ref 11.6–14.5)
RBC # BLD AUTO: 3.57 MILLION/UL (ref 3.88–5.62)
SODIUM SERPL-SCNC: 141 MMOL/L (ref 135–147)
WBC # BLD AUTO: 5.64 THOUSAND/UL (ref 4.31–10.16)

## 2024-05-12 PROCEDURE — 87522 HEPATITIS C REVRS TRNSCRPJ: CPT | Performed by: INTERNAL MEDICINE

## 2024-05-12 PROCEDURE — 80053 COMPREHEN METABOLIC PANEL: CPT | Performed by: INTERNAL MEDICINE

## 2024-05-12 PROCEDURE — 85025 COMPLETE CBC W/AUTO DIFF WBC: CPT | Performed by: INTERNAL MEDICINE

## 2024-05-12 PROCEDURE — 85610 PROTHROMBIN TIME: CPT | Performed by: PHYSICIAN ASSISTANT

## 2024-05-12 PROCEDURE — 87521 HEPATITIS C PROBE&RVRS TRNSC: CPT | Performed by: INTERNAL MEDICINE

## 2024-05-12 PROCEDURE — 85018 HEMOGLOBIN: CPT | Performed by: PHYSICIAN ASSISTANT

## 2024-05-12 PROCEDURE — C9113 INJ PANTOPRAZOLE SODIUM, VIA: HCPCS | Performed by: PHYSICIAN ASSISTANT

## 2024-05-12 PROCEDURE — 99232 SBSQ HOSP IP/OBS MODERATE 35: CPT | Performed by: INTERNAL MEDICINE

## 2024-05-12 PROCEDURE — 99232 SBSQ HOSP IP/OBS MODERATE 35: CPT | Performed by: PHYSICIAN ASSISTANT

## 2024-05-12 PROCEDURE — 85014 HEMATOCRIT: CPT | Performed by: PHYSICIAN ASSISTANT

## 2024-05-12 RX ORDER — POTASSIUM CHLORIDE 20 MEQ/1
40 TABLET, EXTENDED RELEASE ORAL ONCE
Status: COMPLETED | OUTPATIENT
Start: 2024-05-12 | End: 2024-05-12

## 2024-05-12 RX ADMIN — PANTOPRAZOLE SODIUM 40 MG: 40 INJECTION, POWDER, FOR SOLUTION INTRAVENOUS at 08:45

## 2024-05-12 RX ADMIN — TRAZODONE HYDROCHLORIDE 100 MG: 100 TABLET ORAL at 21:52

## 2024-05-12 RX ADMIN — CEFTRIAXONE 1000 MG: 1 INJECTION, SOLUTION INTRAVENOUS at 16:23

## 2024-05-12 RX ADMIN — POTASSIUM CHLORIDE 40 MEQ: 1500 TABLET, EXTENDED RELEASE ORAL at 11:57

## 2024-05-12 RX ADMIN — ESCITALOPRAM OXALATE 20 MG: 20 TABLET ORAL at 08:45

## 2024-05-12 RX ADMIN — FAMOTIDINE 20 MG: 20 TABLET, FILM COATED ORAL at 08:45

## 2024-05-12 RX ADMIN — NICOTINE 21 MG: 21 PATCH, EXTENDED RELEASE TRANSDERMAL at 08:45

## 2024-05-12 RX ADMIN — ATORVASTATIN CALCIUM 40 MG: 40 TABLET, FILM COATED ORAL at 17:17

## 2024-05-12 RX ADMIN — QUETIAPINE FUMARATE 300 MG: 300 TABLET ORAL at 21:52

## 2024-05-12 RX ADMIN — FAMOTIDINE 20 MG: 20 TABLET, FILM COATED ORAL at 17:17

## 2024-05-12 NOTE — PLAN OF CARE
Problem: PAIN - ADULT  Goal: Verbalizes/displays adequate comfort level or baseline comfort level  Description: Interventions:  - Encourage patient to monitor pain and request assistance  - Assess pain using appropriate pain scale  - Administer analgesics based on type and severity of pain and evaluate response  - Implement non-pharmacological measures as appropriate and evaluate response  - Consider cultural and social influences on pain and pain management  - Notify physician/advanced practitioner if interventions unsuccessful or patient reports new pain  Outcome: Progressing     Problem: INFECTION - ADULT  Goal: Absence or prevention of progression during hospitalization  Description: INTERVENTIONS:  - Assess and monitor for signs and symptoms of infection  - Monitor lab/diagnostic results  - Monitor all insertion sites, i.e. indwelling lines, tubes, and drains  - Monitor endotracheal if appropriate and nasal secretions for changes in amount and color  - Kathleen appropriate cooling/warming therapies per order  - Administer medications as ordered  - Instruct and encourage patient and family to use good hand hygiene technique  - Identify and instruct in appropriate isolation precautions for identified infection/condition  Outcome: Progressing  Goal: Absence of fever/infection during neutropenic period  Description: INTERVENTIONS:  - Monitor WBC    Outcome: Progressing     Problem: SAFETY ADULT  Goal: Patient will remain free of falls  Description: INTERVENTIONS:  - Educate patient/family on patient safety including physical limitations  - Instruct patient to call for assistance with activity   - Consult OT/PT to assist with strengthening/mobility   - Keep Call bell within reach  - Keep bed low and locked with side rails adjusted as appropriate  - Keep care items and personal belongings within reach  - Initiate and maintain comfort rounds  - Make Fall Risk Sign visible to staff  - Offer Toileting every 2 Hours,  in advance of need  - Initiate/Maintain bed alarm  - Obtain necessary fall risk management equipment: yellow socks  - Apply yellow socks and bracelet for high fall risk patients  - Consider moving patient to room near nurses station  Outcome: Progressing  Goal: Maintain or return to baseline ADL function  Description: INTERVENTIONS:  -  Assess patient's ability to carry out ADLs; assess patient's baseline for ADL function and identify physical deficits which impact ability to perform ADLs (bathing, care of mouth/teeth, toileting, grooming, dressing, etc.)  - Assess/evaluate cause of self-care deficits   - Assess range of motion  - Assess patient's mobility; develop plan if impaired  - Assess patient's need for assistive devices and provide as appropriate  - Encourage maximum independence but intervene and supervise when necessary  - Involve family in performance of ADLs  - Assess for home care needs following discharge   - Consider OT consult to assist with ADL evaluation and planning for discharge  - Provide patient education as appropriate  Outcome: Progressing  Goal: Maintains/Returns to pre admission functional level  Description: INTERVENTIONS:  - Perform AM-PAC 6 Click Basic Mobility/ Daily Activity assessment daily.  - Set and communicate daily mobility goal to care team and patient/family/caregiver.   - Collaborate with rehabilitation services on mobility goals if consulted  - Perform Range of Motion 3 times a day.  - Reposition patient every 2 hours.  - Dangle patient 3 times a day  - Stand patient 3 times a day  - Ambulate patient 3 times a day  - Out of bed to chair 3 times a day   - Out of bed for meals 3 times a day  - Out of bed for toileting  - Record patient progress and toleration of activity level   Outcome: Progressing     Problem: DISCHARGE PLANNING  Goal: Discharge to home or other facility with appropriate resources  Description: INTERVENTIONS:  - Identify barriers to discharge w/patient and  caregiver  - Arrange for needed discharge resources and transportation as appropriate  - Identify discharge learning needs (meds, wound care, etc.)  - Arrange for interpretive services to assist at discharge as needed  - Refer to Case Management Department for coordinating discharge planning if the patient needs post-hospital services based on physician/advanced practitioner order or complex needs related to functional status, cognitive ability, or social support system  Outcome: Progressing     Problem: Knowledge Deficit  Goal: Patient/family/caregiver demonstrates understanding of disease process, treatment plan, medications, and discharge instructions  Description: Complete learning assessment and assess knowledge base.  Interventions:  - Provide teaching at level of understanding  - Provide teaching via preferred learning methods  Outcome: Progressing     Problem: GASTROINTESTINAL - ADULT  Goal: Maintains or returns to baseline bowel function  Description: INTERVENTIONS:  - Assess bowel function  - Encourage oral fluids to ensure adequate hydration  - Administer IV fluids if ordered to ensure adequate hydration  - Administer ordered medications as needed  - Encourage mobilization and activity  - Consider nutritional services referral to assist patient with adequate nutrition and appropriate food choices  Outcome: Progressing     Problem: CARDIOVASCULAR - ADULT  Goal: Maintains optimal cardiac output and hemodynamic stability  Description: INTERVENTIONS:  - Monitor I/O, vital signs and rhythm  - Monitor for S/S and trends of decreased cardiac output  - Administer and titrate ordered vasoactive medications to optimize hemodynamic stability  - Assess quality of pulses, skin color and temperature  - Assess for signs of decreased coronary artery perfusion  - Instruct patient to report change in severity of symptoms  Outcome: Progressing     Problem: HEMATOLOGIC - ADULT  Goal: Maintains hematologic  stability  Description: INTERVENTIONS  - Assess for signs and symptoms of bleeding or hemorrhage  - Monitor labs  - Administer supportive blood products/factors as ordered and appropriate  Outcome: Progressing     Problem: Prexisting or High Potential for Compromised Skin Integrity  Goal: Skin integrity is maintained or improved  Description: INTERVENTIONS:  - Identify patients at risk for skin breakdown  - Assess and monitor skin integrity  - Assess and monitor nutrition and hydration status  - Monitor labs   - Assess for incontinence   - Turn and reposition patient  - Assist with mobility/ambulation  - Relieve pressure over bony prominences  - Avoid friction and shearing  - Provide appropriate hygiene as needed including keeping skin clean and dry  - Evaluate need for skin moisturizer/barrier cream  - Collaborate with interdisciplinary team   - Patient/family teaching  - Consider wound care consult   Outcome: Progressing

## 2024-05-12 NOTE — PROGRESS NOTES
"Chuy Norton  [id number]    64 y.o.  male      ASSESSMENT and PLAN      ASSESSMENT and PLAN     Anemia/GI blood loss - acute presentation with dyspnea weakness and complaint of melena.  Dramatic drop in hemoglobin.  History of esophagitis but now has signs of underlying cirrhosis, also takes NSAIDs.  EGD revealed moderate size hiatal hernia with Poli lesions/ulcerations in the cardia, including 1 flat red spot which was clipped.  No further signs of GI bleeding.  Tolerating advancing diet  Continue PPI  Follow H&H and transfuse as needed  Okay to advance diet to low soft    Cirrhosis - with thrombocytopenia, coagulopathy, and cirrhotic texture to the liver on CT scan.  Suspect due to alcohol.  Hepatitis C antibody positive.  AFP normal but possible lesion on CT so follow-up will be needed for HCC screening.  Low-salt diet  Abstinence from alcohol  Check HCV viral load    Chief Complaint   Patient presents with    Weakness - Generalized     Pt brought in via EMS,for weakness. Brought in 3 days ago for same sx. Weakness, SOB, fatigue.        SUBJECTIVE/HPI   no GI complaints.  Tolerating full liquid diet.  Denies abdominal pain nausea or vomiting.  Has not had a bowel movement past day, so no melena or hematochezia.    /76 (BP Location: Right arm)   Pulse 74   Temp 97.7 °F (36.5 °C) (Oral)   Resp 20   Ht 5' 8\" (1.727 m)   Wt 95.5 kg (210 lb 8.6 oz)   SpO2 94%   BMI 32.01 kg/m²     PHYSICALEXAM  General appearance: alert, appears stated age and cooperative  Head: Normocephalic, without obvious abnormality, atraumatic  Lungs: clear to auscultation bilaterally  Heart: regular rate and rhythm, S1, S2 normal, no murmur, click, rub or gallop  Abdomen: Protuberant but soft, non-tender; bowel sounds normal; no masses,  no organomegaly  Neurologic: Grossly normal    Lab Results   Component Value Date    GLUCOSE 110 05/07/2024    CALCIUM 7.8 (L) 05/12/2024    K 3.3 (L) 05/12/2024    CO2 22 05/12/2024     " "(H) 05/12/2024    BUN 11 05/12/2024    CREATININE 1.12 05/12/2024     Lab Results   Component Value Date    WBC 5.64 05/12/2024    HGB 9.2 (L) 05/12/2024    HCT 30.2 (L) 05/12/2024    MCV 85 05/12/2024     05/12/2024     Lab Results   Component Value Date    ALT 15 05/12/2024    AST 21 05/12/2024    ALKPHOS 54 05/12/2024     No results found for: \"AMYLASE\"  Lab Results   Component Value Date    LIPASE 14 05/07/2024     Lab Results   Component Value Date    IRON 28 (L) 06/17/2021    TIBC 293 06/17/2021    FERRITIN 102 06/17/2021     Lab Results   Component Value Date    INR 1.04 05/12/2024       Counseling / Coordination of Care  Total floor / unit time spent today 20 minutes.                           "

## 2024-05-12 NOTE — PLAN OF CARE
Problem: PAIN - ADULT  Goal: Verbalizes/displays adequate comfort level or baseline comfort level  Description: Interventions:  - Encourage patient to monitor pain and request assistance  - Assess pain using appropriate pain scale  - Administer analgesics based on type and severity of pain and evaluate response  - Implement non-pharmacological measures as appropriate and evaluate response  - Consider cultural and social influences on pain and pain management  - Notify physician/advanced practitioner if interventions unsuccessful or patient reports new pain  Outcome: Progressing     Problem: INFECTION - ADULT  Goal: Absence or prevention of progression during hospitalization  Description: INTERVENTIONS:  - Assess and monitor for signs and symptoms of infection  - Monitor lab/diagnostic results  - Monitor all insertion sites, i.e. indwelling lines, tubes, and drains  - Monitor endotracheal if appropriate and nasal secretions for changes in amount and color  - [place] appropriate cooling/warming therapies per order  - Administer medications as ordered  - Instruct and encourage patient and family to use good hand hygiene technique  - Identify and instruct in appropriate isolation precautions for identified infection/condition  Outcome: Progressing  Goal: Absence of fever/infection during neutropenic period  Description: INTERVENTIONS:  - Monitor WBC    Outcome: Progressing     Problem: SAFETY ADULT  Goal: Patient will remain free of falls  Description: INTERVENTIONS:  - Educate patient/family on patient safety including physical limitations  - Instruct patient to call for assistance with activity   - Consult OT/PT to assist with strengthening/mobility   - Keep Call bell within reach  - Keep bed low and locked with side rails adjusted as appropriate  - Keep care items and personal belongings within reach  - Initiate and maintain comfort rounds  - Make Fall Risk Sign visible to staff  - Offer Toileting every 2 Hours,  in advance of need  - Initiate/Maintain alarm  - Obtain necessary fall risk management equipment:   - Apply yellow socks and bracelet for high fall risk patients  - Consider moving patient to room near nurses station  Outcome: Progressing  Goal: Maintain or return to baseline ADL function  Description: INTERVENTIONS:  -  Assess patient's ability to carry out ADLs; assess patient's baseline for ADL function and identify physical deficits which impact ability to perform ADLs (bathing, care of mouth/teeth, toileting, grooming, dressing, etc.)  - Assess/evaluate cause of self-care deficits   - Assess range of motion  - Assess patient's mobility; develop plan if impaired  - Assess patient's need for assistive devices and provide as appropriate  - Encourage maximum independence but intervene and supervise when necessary  - Involve family in performance of ADLs  - Assess for home care needs following discharge   - Consider OT consult to assist with ADL evaluation and planning for discharge  - Provide patient education as appropriate  Outcome: Progressing  Goal: Maintains/Returns to pre admission functional level  Description: INTERVENTIONS:  - Perform AM-PAC 6 Click Basic Mobility/ Daily Activity assessment daily.  - Set and communicate daily mobility goal to care team and patient/family/caregiver.   - Collaborate with rehabilitation services on mobility goals if consulted  - Perform Range of Motion 2 times a day.  - Reposition patient every 2 hours.  - Dangle patient 2 times a day  - Stand patient 2 times a day  - Ambulate patient 2 times a day  - Out of bed to chair 2 times a day   - Out of bed for meals 2 times a day  - Out of bed for toileting  - Record patient progress and toleration of activity level   Outcome: Progressing     Problem: DISCHARGE PLANNING  Goal: Discharge to home or other facility with appropriate resources  Description: INTERVENTIONS:  - Identify barriers to discharge w/patient and caregiver  -  Arrange for needed discharge resources and transportation as appropriate  - Identify discharge learning needs (meds, wound care, etc.)  - Arrange for interpretive services to assist at discharge as needed  - Refer to Case Management Department for coordinating discharge planning if the patient needs post-hospital services based on physician/advanced practitioner order or complex needs related to functional status, cognitive ability, or social support system  Outcome: Progressing     Problem: Knowledge Deficit  Goal: Patient/family/caregiver demonstrates understanding of disease process, treatment plan, medications, and discharge instructions  Description: Complete learning assessment and assess knowledge base.  Interventions:  - Provide teaching at level of understanding  - Provide teaching via preferred learning methods  Outcome: Progressing     Problem: GASTROINTESTINAL - ADULT  Goal: Maintains or returns to baseline bowel function  Description: INTERVENTIONS:  - Assess bowel function  - Encourage oral fluids to ensure adequate hydration  - Administer IV fluids if ordered to ensure adequate hydration  - Administer ordered medications as needed  - Encourage mobilization and activity  - Consider nutritional services referral to assist patient with adequate nutrition and appropriate food choices  Outcome: Progressing

## 2024-05-12 NOTE — PROGRESS NOTES
[facility]  Progress Note  Name: Chuy Norton I  MRN: [id number]  Unit/Bed#: -01 SDU I Date of Admission: 5/9/2024   Date of Service: 5/12/2024 I Hospital Day: 3    Assessment/Plan   * Anemia  Assessment & Plan  Pt with hgb 7.6, on admission baseline normallly 10-13  Dropped to 6.6 - received 1u PRBC morning of 5/10  Transfused 1 unit PRBC in ER for symptomatic anemia (pt reported dyspnea), suspected UGI bleed  GI consulted   Continue PPI  Octreotide discontinued as no varices were found on EGD  Continue Rocephin  Clear Liquid diet for now  EGD:  Medium type I hiatal hernia with Poli lesions present  Multiple ulcers in the cardia with flat pigmented spot (Eduin IIC); placed 1 clip successfully; hemostasis achieved    Left ventricular apical thrombus  Assessment & Plan  Chronic per prior cardiology notes  Stable S/P MI  Cardiology referral on discharge  Ideally would start on coumadin when stable from GI bleed standpoint     Cirrhosis (HCC)  Assessment & Plan  Cirrhosis, noted on abdominal imaging  Suspected related to alcohol abuse  EGD anticipated to evaluate for varicies  S/P Lactulose enemas as ER suspected mild hepatic encephalopathy which appears improved with non focal CT head   GI following  FFP prior to procedure    History of CVA (cerebrovascular accident)  Assessment & Plan  Maintained aspirin, statin  Aspirin on hold due to GI bleed    Hyperlipidemia  Assessment & Plan  Continue Lipitor    Bipolar 2 disorder, major depressive episode (HCC)  Assessment & Plan  Mood stable  Continue trazodone and seroquel.         VTE Pharmacologic Prophylaxis: VTE Score: 3 Moderate Risk (Score 3-4) - Pharmacological DVT Prophylaxis Contraindicated. Sequential Compression Devices Ordered.    Mobility:   Basic Mobility Inpatient Raw Score: 20  JH-HLM Goal: 6: Walk 10 steps or more  JH-HLM Achieved: 7: Walk 25 feet or more  JH-HLM Goal achieved. Continue to encourage appropriate  mobility.    Patient Centered Rounds: I performed bedside rounds with nursing staff today.   Discussions with Specialists or Other Care Team Provider: Appreciate most recent Gastroenterology note    Education and Discussions with Family / Patient: Patient declined call to .     Total Time Spent on Date of Encounter in care of patient: 35 mins. This time was spent on one or more of the following: performing physical exam; counseling and coordination of care; obtaining or reviewing history; documenting in the medical record; reviewing/ordering tests, medications or procedures; communicating with other healthcare professionals and discussing with patient's family/caregivers.    Current Length of Stay: 3 day(s)  Current Patient Status: Inpatient   Certification Statement: The patient will continue to require additional inpatient hospital stay due to Monitoring for bleed - trend Hb  Discharge Plan: Anticipate discharge in 48-72 hrs to home.    Code Status: Level 1 - Full Code    Subjective:   Patient feels well, denies abdominal pain, denies any further episodes of melena.    Objective:     Vitals:   Temp (24hrs), Av °F (36.7 °C), Min:97.6 °F (36.4 °C), Max:98.5 °F (36.9 °C)    Temp:  [97.6 °F (36.4 °C)-98.5 °F (36.9 °C)] 97.7 °F (36.5 °C)  HR:  [72-74] 74  Resp:  [18-22] 20  BP: (122-140)/(70-76) 140/76  SpO2:  [92 %-99 %] 94 %  Body mass index is 32.01 kg/m².     Input and Output Summary (last 24 hours):     Intake/Output Summary (Last 24 hours) at 2024 1053  Last data filed at 2024 0906  Gross per 24 hour   Intake 2171.25 ml   Output 1850 ml   Net 321.25 ml       Physical Exam:   Physical Exam  Vitals and nursing note reviewed.   Constitutional:       General: He is not in acute distress.     Appearance: Normal appearance. He is well-developed.   HENT:      Head: Normocephalic and atraumatic.   Eyes:      General: No scleral icterus.     Conjunctiva/sclera: Conjunctivae normal.    Cardiovascular:      Rate and Rhythm: Normal rate and regular rhythm.      Heart sounds: No murmur heard.  Pulmonary:      Effort: Pulmonary effort is normal.      Breath sounds: No wheezing, rhonchi or rales.   Abdominal:      General: There is no distension.      Palpations: Abdomen is soft.   Skin:     General: Skin is warm and dry.   Neurological:      Mental Status: He is alert. Mental status is at baseline.   Psychiatric:         Mood and Affect: Mood normal.        Additional Data:     Labs:  Results from last 7 days   Lab Units 05/12/24  0443   WBC Thousand/uL 5.64   HEMOGLOBIN g/dL 9.2*   HEMATOCRIT % 30.2*   PLATELETS Thousands/uL 167   SEGS PCT % 48   LYMPHO PCT % 38   MONO PCT % 9   EOS PCT % 4     Results from last 7 days   Lab Units 05/12/24  0443   SODIUM mmol/L 141   POTASSIUM mmol/L 3.3*   CHLORIDE mmol/L 110*   CO2 mmol/L 22   BUN mg/dL 11   CREATININE mg/dL 1.12   ANION GAP mmol/L 9   CALCIUM mg/dL 7.8*   ALBUMIN g/dL 3.3*   TOTAL BILIRUBIN mg/dL 0.51   ALK PHOS U/L 54   ALT U/L 15   AST U/L 21   GLUCOSE RANDOM mg/dL 94     Results from last 7 days   Lab Units 05/12/24  0625   INR  1.04     Results from last 7 days   Lab Units 05/10/24  1650   POC GLUCOSE mg/dl 76         Results from last 7 days   Lab Units 05/09/24  2048 05/09/24  1751 05/09/24  0841 05/09/24  0553 05/07/24  1645 05/07/24  1642   LACTIC ACID mmol/L 1.6 2.1* 2.6* 3.5*   < >  --    PROCALCITONIN ng/ml  --   --   --  0.41*  --  0.13    < > = values in this interval not displayed.       Lines/Drains:  Invasive Devices       Peripheral Intravenous Line  Duration             Peripheral IV 05/10/24 Left;Upper;Ventral (anterior) Arm 2 days                          Imaging: Reviewed radiology reports from this admission including: procedure reports    Recent Cultures (last 7 days):   Results from last 7 days   Lab Units 05/09/24  0553 05/07/24  1645 05/07/24  1642   BLOOD CULTURE  No Growth at 48 hrs.  No Growth at 48 hrs. No Growth  After 4 Days. No Growth After 4 Days.       Last 24 Hours Medication List:   Current Facility-Administered Medications   Medication Dose Route Frequency Provider Last Rate    atorvastatin  40 mg Oral Daily With Dinner Jayne Mc MD      cefTRIAXone  1,000 mg Intravenous Q24H Jayne Mc MD Stopped (05/11/24 7589)    escitalopram  20 mg Oral Daily Jayne Mc MD      famotidine  20 mg Oral BID Jayne Mc MD      nicotine  21 mg Transdermal Daily Jayne Mc MD      nicotine polacrilex  2 mg Oral Q3H PRN Jayne Mc MD      pantoprazole  40 mg Intravenous Q12H SHANNA Mari Morales PA-C      potassium chloride  40 mEq Oral Once Mari Morales PA-C      QUEtiapine  300 mg Oral HS Jayne Mc MD      traZODone  100 mg Oral HS Jayne Mc MD          Today, Patient Was Seen By: Mari Morales PA-C    **Please Note: This note may have been constructed using a voice recognition system.**

## 2024-05-13 LAB
ABO GROUP BLD BPU: NORMAL
ABO GROUP BLD BPU: NORMAL
ALBUMIN SERPL BCP-MCNC: 3.4 G/DL (ref 3.5–5)
ALP SERPL-CCNC: 59 U/L (ref 34–104)
ALT SERPL W P-5'-P-CCNC: 14 U/L (ref 7–52)
ANION GAP SERPL CALCULATED.3IONS-SCNC: 6 MMOL/L (ref 4–13)
AST SERPL W P-5'-P-CCNC: 17 U/L (ref 13–39)
BASOPHILS # BLD AUTO: 0.02 THOUSANDS/ÂΜL (ref 0–0.1)
BASOPHILS NFR BLD AUTO: 0 % (ref 0–1)
BILIRUB SERPL-MCNC: 0.48 MG/DL (ref 0.2–1)
BPU ID: NORMAL
BPU ID: NORMAL
BUN SERPL-MCNC: 9 MG/DL (ref 5–25)
CALCIUM ALBUM COR SERPL-MCNC: 8.7 MG/DL (ref 8.3–10.1)
CALCIUM SERPL-MCNC: 8.2 MG/DL (ref 8.4–10.2)
CHLORIDE SERPL-SCNC: 109 MMOL/L (ref 96–108)
CO2 SERPL-SCNC: 24 MMOL/L (ref 21–32)
CREAT SERPL-MCNC: 1.05 MG/DL (ref 0.6–1.3)
CROSSMATCH: NORMAL
CROSSMATCH: NORMAL
EOSINOPHIL # BLD AUTO: 0.12 THOUSAND/ÂΜL (ref 0–0.61)
EOSINOPHIL NFR BLD AUTO: 2 % (ref 0–6)
ERYTHROCYTE [DISTWIDTH] IN BLOOD BY AUTOMATED COUNT: 18.1 % (ref 11.6–15.1)
GFR SERPL CREATININE-BSD FRML MDRD: 75 ML/MIN/1.73SQ M
GLUCOSE SERPL-MCNC: 124 MG/DL (ref 65–140)
HCT VFR BLD AUTO: 29.7 % (ref 36.5–49.3)
HCV RNA SERPL NAA+PROBE-ACNC: NOT DETECTED K[IU]/ML
HGB BLD-MCNC: 9.2 G/DL (ref 12–17)
IMM GRANULOCYTES # BLD AUTO: 0.03 THOUSAND/UL (ref 0–0.2)
IMM GRANULOCYTES NFR BLD AUTO: 1 % (ref 0–2)
INR PPP: 1.12 (ref 0.84–1.19)
LYMPHOCYTES # BLD AUTO: 1.48 THOUSANDS/ÂΜL (ref 0.6–4.47)
LYMPHOCYTES NFR BLD AUTO: 24 % (ref 14–44)
MCH RBC QN AUTO: 25.6 PG (ref 26.8–34.3)
MCHC RBC AUTO-ENTMCNC: 31 G/DL (ref 31.4–37.4)
MCV RBC AUTO: 83 FL (ref 82–98)
MONOCYTES # BLD AUTO: 0.52 THOUSAND/ÂΜL (ref 0.17–1.22)
MONOCYTES NFR BLD AUTO: 8 % (ref 4–12)
NEUTROPHILS # BLD AUTO: 4.05 THOUSANDS/ÂΜL (ref 1.85–7.62)
NEUTS SEG NFR BLD AUTO: 65 % (ref 43–75)
NRBC BLD AUTO-RTO: 0 /100 WBCS
PLATELET # BLD AUTO: 156 THOUSANDS/UL (ref 149–390)
PMV BLD AUTO: 11.2 FL (ref 8.9–12.7)
POTASSIUM SERPL-SCNC: 3.8 MMOL/L (ref 3.5–5.3)
PROT SERPL-MCNC: 5.8 G/DL (ref 6.4–8.4)
PROTHROMBIN TIME: 14.8 SECONDS (ref 11.6–14.5)
RBC # BLD AUTO: 3.6 MILLION/UL (ref 3.88–5.62)
SODIUM SERPL-SCNC: 139 MMOL/L (ref 135–147)
UNIT DISPENSE STATUS: NORMAL
UNIT DISPENSE STATUS: NORMAL
UNIT PRODUCT CODE: NORMAL
UNIT PRODUCT CODE: NORMAL
UNIT PRODUCT VOLUME: 300 ML
UNIT PRODUCT VOLUME: 300 ML
UNIT RH: NORMAL
UNIT RH: NORMAL
WBC # BLD AUTO: 6.22 THOUSAND/UL (ref 4.31–10.16)

## 2024-05-13 PROCEDURE — 99232 SBSQ HOSP IP/OBS MODERATE 35: CPT | Performed by: PHYSICIAN ASSISTANT

## 2024-05-13 PROCEDURE — 97530 THERAPEUTIC ACTIVITIES: CPT

## 2024-05-13 PROCEDURE — 85610 PROTHROMBIN TIME: CPT | Performed by: STUDENT IN AN ORGANIZED HEALTH CARE EDUCATION/TRAINING PROGRAM

## 2024-05-13 PROCEDURE — C9113 INJ PANTOPRAZOLE SODIUM, VIA: HCPCS | Performed by: PHYSICIAN ASSISTANT

## 2024-05-13 PROCEDURE — 99232 SBSQ HOSP IP/OBS MODERATE 35: CPT | Performed by: INTERNAL MEDICINE

## 2024-05-13 PROCEDURE — 85025 COMPLETE CBC W/AUTO DIFF WBC: CPT | Performed by: STUDENT IN AN ORGANIZED HEALTH CARE EDUCATION/TRAINING PROGRAM

## 2024-05-13 PROCEDURE — 80053 COMPREHEN METABOLIC PANEL: CPT | Performed by: STUDENT IN AN ORGANIZED HEALTH CARE EDUCATION/TRAINING PROGRAM

## 2024-05-13 RX ORDER — SODIUM CHLORIDE, SODIUM GLUCONATE, SODIUM ACETATE, POTASSIUM CHLORIDE, MAGNESIUM CHLORIDE, SODIUM PHOSPHATE, DIBASIC, AND POTASSIUM PHOSPHATE .53; .5; .37; .037; .03; .012; .00082 G/100ML; G/100ML; G/100ML; G/100ML; G/100ML; G/100ML; G/100ML
100 INJECTION, SOLUTION INTRAVENOUS CONTINUOUS
Status: DISCONTINUED | OUTPATIENT
Start: 2024-05-13 | End: 2024-05-14

## 2024-05-13 RX ADMIN — PANTOPRAZOLE SODIUM 40 MG: 40 INJECTION, POWDER, FOR SOLUTION INTRAVENOUS at 21:51

## 2024-05-13 RX ADMIN — FAMOTIDINE 20 MG: 20 TABLET, FILM COATED ORAL at 08:26

## 2024-05-13 RX ADMIN — ESCITALOPRAM OXALATE 20 MG: 20 TABLET ORAL at 08:26

## 2024-05-13 RX ADMIN — SODIUM CHLORIDE, SODIUM GLUCONATE, SODIUM ACETATE, POTASSIUM CHLORIDE, MAGNESIUM CHLORIDE, SODIUM PHOSPHATE, DIBASIC, AND POTASSIUM PHOSPHATE 100 ML/HR: .53; .5; .37; .037; .03; .012; .00082 INJECTION, SOLUTION INTRAVENOUS at 13:56

## 2024-05-13 RX ADMIN — TRAZODONE HYDROCHLORIDE 100 MG: 100 TABLET ORAL at 21:51

## 2024-05-13 RX ADMIN — NICOTINE 21 MG: 21 PATCH, EXTENDED RELEASE TRANSDERMAL at 08:26

## 2024-05-13 RX ADMIN — ATORVASTATIN CALCIUM 40 MG: 40 TABLET, FILM COATED ORAL at 17:21

## 2024-05-13 RX ADMIN — FAMOTIDINE 20 MG: 20 TABLET, FILM COATED ORAL at 17:21

## 2024-05-13 RX ADMIN — PANTOPRAZOLE SODIUM 40 MG: 40 INJECTION, POWDER, FOR SOLUTION INTRAVENOUS at 08:27

## 2024-05-13 RX ADMIN — CEFTRIAXONE 1000 MG: 1 INJECTION, SOLUTION INTRAVENOUS at 17:21

## 2024-05-13 RX ADMIN — QUETIAPINE FUMARATE 300 MG: 300 TABLET ORAL at 21:51

## 2024-05-13 NOTE — ASSESSMENT & PLAN NOTE
Pt with hgb 7.6, on admission baseline normallly 10-13  Dropped to 6.6 - received 1u PRBC morning of 5/10  Transfused 1 unit PRBC in ER for symptomatic anemia (pt reported dyspnea), suspected UGI bleed  GI consulted   Continue PPI  Octreotide discontinued as no varices were found on EGD  Continue Rocephin  Tolerating regular low sodium diet  EGD:  Medium type I hiatal hernia with Poli lesions present  Multiple ulcers in the cardia with flat pigmented spot (Eduin IIC); placed 1 clip successfully; hemostasis achieved

## 2024-05-13 NOTE — PHYSICAL THERAPY NOTE
"                                                                                  PHYSICAL THERAPY NOTE     05/13/24 1023   PT Last Visit   PT Visit Date 05/13/24   Note Type   Note Type Treatment   Pain Assessment   Pain Assessment Tool 0-10   Pain Score No Pain   Restrictions/Precautions   Weight Bearing Precautions Per Order No   Other Precautions Chair Alarm;Bed Alarm;Cognitive;Fall Risk;Multiple lines   General   Chart Reviewed Yes   Additional Pertinent History (S)  +Symptomatic orthostatic hypotension. Supine 144/77 sitting 139/78 standing 100/64. Patient with c/o lightheadedness   Family/Caregiver Present No   Cognition   Overall Cognitive Status WFL   Arousal/Participation Cooperative   Attention Within functional limits   Orientation Level Oriented X4   Memory Within functional limits   Following Commands Follows one step commands without difficulty   Subjective   Subjective \"I am lightheaded.\"   Bed Mobility   Supine to Sit 5  Supervision   Additional items HOB elevated   Sit to Supine 5  Supervision   Additional items HOB elevated   Transfers   Sit to Stand 5  Supervision   Additional items Armrests   Stand to Sit 5  Supervision   Additional items Armrests   Ambulation/Elevation   Gait pattern Forward Flexion   Gait Assistance 5  Supervision   Assistive Device Rolling walker   Distance 3ft   Ambulation/Elevation Additional Comments Patient amb a short distance to get in a good position for stair training. However, once in stance- patient reported lightheadedness. Further mobility unable to be assessed due to orthostatic hypotension   Balance   Static Sitting Normal   Dynamic Sitting Good   Static Standing Fair +   Dynamic Standing Fair +   Ambulatory Fair +   Endurance Deficit   Endurance Deficit Yes   Endurance Deficit Description Limited by medical status   Activity Tolerance   Activity Tolerance Treatment limited secondary to medical complications (Comment)   Nurse Made Aware Cathy made aware of BP "   Assessment   Prognosis Guarded   Problem List Decreased strength;Decreased endurance;Impaired balance;Decreased mobility   Assessment Patient was received supine in bed. No report of pain. Patient performed bed mobility, transfers, and amb at a supervision level. Currently on 1L NC. O2 saturation stable during session. However, patient with +SOB in stance. Patient reported lightheadedness in stance. Patient found to have orthostatic hypotension. Symptoms did not resolve until patient was supine. Unable to assess stairs at this time. Will continue to follow. Continue to anticipate level 3 resources and use of a RW at all times. The patient's AM-PAC Basic Mobility Inpatient Short Form Raw Score is 20. A Raw score of greater than 17 suggests the patient may benefit from discharge to home. Please also refer to the recommendation of the Physical Therapist for safe discharge planning.   Barriers to Discharge Inaccessible home environment   Barriers to Discharge Comments Will need to trial a full flight when medically stable   Goals   Patient Goals To feel beter   STG Expiration Date 05/24/24   PT Treatment Day 2   Plan   Treatment/Interventions Functional transfer training;LE strengthening/ROM;Elevations;Endurance training;Patient/family training;Equipment eval/education;Bed mobility;Gait training;Spoke to nursing   Progress Slow progress, medical status limitations   PT Frequency 3-5x/wk   Discharge Recommendation   Rehab Resource Intensity Level, PT III (Minimum Resource Intensity)   Equipment Recommended Walker   Walker Package Recommended Wheeled walker   AM-PAC Basic Mobility Inpatient   Turning in Flat Bed Without Bedrails 4   Lying on Back to Sitting on Edge of Flat Bed Without Bedrails 4   Moving Bed to Chair 3   Standing Up From Chair Using Arms 3   Walk in Room 3   Climb 3-5 Stairs With Railing 3   Basic Mobility Inpatient Raw Score 20   Basic Mobility Standardized Score 43.99   [facility] Level Of  Mobility   JH-HLM Goal 6: Walk 10 steps or more   JH-HLM Achieved 5: Stand (1 or more minutes)   Education   Education Provided Mobility training;Assistive device   Patient Demonstrates acceptance/verbal understanding   End of Consult   Patient Position at End of Consult Supine;Bed/Chair alarm activated;All needs within reach   Waleska Khan            Patient Name: Chuy Norton  Today's Date: 5/13/2024

## 2024-05-13 NOTE — DISCHARGE SUMMARY
[facility]  Discharge- Chuy Norton 1960, 64 y.o. male MRN: [id number]  Unit/Bed#: -01 Encounter: [id number]  Primary Care Provider: Savanna Pan DO   Date and time admitted to hospital: 5/9/2024  5:52 AM    * Anemia  Assessment & Plan  Pt with hgb 7.6, on admission baseline normallly 10-13  Dropped to 6.6 - received 1u PRBC morning of 5/10  Transfused 1 unit PRBC in ER for symptomatic anemia (pt reported dyspnea), suspected UGI bleed  GI consulted   Continue PPI  Octreotide discontinued as no varices were found on EGD  Tolerating regular low sodium diet  EGD:  Medium type I hiatal hernia with Poli lesions present  Multiple ulcers in the cardia with flat pigmented spot (Eduin IIC); placed 1 clip successfully; hemostasis achieved    Hepatitis C  Assessment & Plan  Maintained on [place] outpatient, continue    Left ventricular apical thrombus  Assessment & Plan  Chronic per prior cardiology notes  Stable S/P MI  Cardiology referral on discharge  Per group home, patient is maintained on Coumadin 2 mg daily  Continue on discharge    Cirrhosis (HCC)  Assessment & Plan  Cirrhosis, noted on abdominal imaging  Suspected related to alcohol abuse  S/P 5 days Rocephin  EGD negative for varicies  S/P Lactulose enemas as ER suspected mild hepatic encephalopathy which appears improved with non focal CT head   GI following  Received FFP prior to procedure  HCV viral load not detected    History of CVA (cerebrovascular accident)  Assessment & Plan  Maintained aspirin, statin  Aspirin on hold due to GI bleed    Hyperlipidemia  Assessment & Plan  Continue Lipitor    Bipolar 2 disorder, major depressive episode (HCC)  Assessment & Plan  Mood stable  Continue trazodone and Seroquel.      Medical Problems       Resolved Problems  Date Reviewed: 5/15/2024   None       Discharging Physician / Practitioner: Mari Morales PA-C  PCP: Savanna Pan DO  Admission Date:   Admission  Orders (From admission, onward)       Ordered        05/09/24 0922  INPATIENT ADMISSION  Once                          Discharge Date: 05/15/24    Consultations During Hospital Stay:  Gastroenterology    Procedures Performed:   EGD 5/10:   Medium type I hiatal hernia with Poli lesions present  Multiple ulcers in the cardia with flat pigmented spot (Eduin IIC); placed 1 clip successfully; hemostasis achieved  The duodenum appeared normal.    Significant Findings / Test Results:   CXR 5/9: No acute consolidation or congestion.  Cardiomegaly.  CT head 5/9: Chronic hemic changes without acute intracranial abnormality    Incidental Findings:   None     Test Results Pending at Discharge (will require follow up):   None     Outpatient Tests Requested:  PFT    Complications:  None    Reason for Admission: GI bleed    Hospital Course:   Chuy Norton is a 64 y.o. male patient with a past medical history of hep C, cirrhosis, prior CVA, bipolar 2 disorder who originally presented to the hospital on 5/9/2024 due to shortness of breath and generalized weakness.  Patient reports that this is present for 3 days prior to presentation.   Patient also noted to have melena and was seen by GI.  GI bleed was suspected and given history of cirrhosis patient was placed on octreotide, Protonix and Rocephin.  Ultimately EGD with results as above and patient able to be transitioned back to home medications.  He did temporarily require supplemental oxygen, likely due to fluid administration and history of COPD.  He should have PFTs performed in the outpatient setting and follow-up with GI.  Hemodynamically stable at time of discharge and appropriate for outpatient follow-up.    Please see above list of diagnoses and related plan for additional information.     Condition at Discharge: stable    Discharge Day Visit / Exam:   Subjective: Patient eager for discharge home, feels well overall.  Denies any further bleeding.  Vitals: Blood  "Pressure: 115/74 (05/15/24 0753)  Pulse: 74 (05/15/24 0753)  Temperature: 98.4 °F (36.9 °C) (05/15/24 0753)  Temp Source: Temporal (05/14/24 1944)  Respirations: 20 (05/15/24 0753)  Height: 5' 8\" (172.7 cm) (05/09/24 1705)  Weight - Scale: 96.2 kg (212 lb 1.3 oz) (05/13/24 0533)  SpO2: 92 % (05/15/24 1014)  Exam:   Physical Exam  Vitals and nursing note reviewed.   Constitutional:       General: He is not in acute distress.     Appearance: Normal appearance. He is well-developed.   HENT:      Head: Normocephalic and atraumatic.   Eyes:      General: No scleral icterus.     Conjunctiva/sclera: Conjunctivae normal.   Cardiovascular:      Rate and Rhythm: Normal rate and regular rhythm.      Heart sounds: No murmur heard.  Pulmonary:      Effort: Pulmonary effort is normal.      Breath sounds: No wheezing, rhonchi or rales.   Abdominal:      General: There is no distension.      Palpations: Abdomen is soft.   Skin:     General: Skin is warm and dry.   Neurological:      Mental Status: He is alert. Mental status is at baseline.   Psychiatric:         Mood and Affect: Mood normal.        Discussion with Family: Patient declined call to .     Discharge instructions/Information to patient and family:   See after visit summary for information provided to patient and family.      Provisions for Follow-Up Care:  See after visit summary for information related to follow-up care and any pertinent home health orders.      Mobility at time of Discharge:   Basic Mobility Inpatient Raw Score: 20  JH-HLM Goal: 6: Walk 10 steps or more  JH-HLM Achieved: 7: Walk 25 feet or more  HLM Goal achieved. Continue to encourage appropriate mobility.     Disposition:   Home    Planned Readmission: None     Discharge Statement:  I spent 65 minutes discharging the patient. This time was spent on the day of discharge. I had direct contact with the patient on the day of discharge. Greater than 50% of the total time was spent examining " patient, answering all patient questions, arranging and discussing plan of care with patient as well as directly providing post-discharge instructions.  Additional time then spent on discharge activities.    Discharge Medications:  See after visit summary for reconciled discharge medications provided to patient and/or family.      **Please Note: This note may have been constructed using a voice recognition system**

## 2024-05-13 NOTE — PLAN OF CARE
Problem: PHYSICAL THERAPY ADULT  Goal: Performs mobility at highest level of function for planned discharge setting.  See evaluation for individualized goals.  Description: Treatment/Interventions: Functional transfer training, LE strengthening/ROM, Therapeutic exercise, Endurance training, Patient/family training, Equipment eval/education, Bed mobility, Gait training, Spoke to nursing, OT  Equipment Recommended: Walker       See flowsheet documentation for full assessment, interventions and recommendations.  Outcome: Not Progressing  Note: Prognosis: Guarded  Problem List: Decreased strength, Decreased endurance, Impaired balance, Decreased mobility  Assessment: Patient was received supine in bed. No report of pain. Patient performed bed mobility, transfers, and amb at a supervision level. Currently on 1L NC. O2 saturation stable during session. However, patient with +SOB in stance. Patient reported lightheadedness in stance. Patient found to have orthostatic hypotension. Symptoms did not resolve until patient was supine. Unable to assess stairs at this time. Will continue to follow. Continue to anticipate level 3 resources and use of a RW at all times. The patient's AM-PAC Basic Mobility Inpatient Short Form Raw Score is 20. A Raw score of greater than 17 suggests the patient may benefit from discharge to home. Please also refer to the recommendation of the Physical Therapist for safe discharge planning.  Barriers to Discharge: Inaccessible home environment  Barriers to Discharge Comments: Will need to trial a full flight when medically stable  Rehab Resource Intensity Level, PT: III (Minimum Resource Intensity)    See flowsheet documentation for full assessment.

## 2024-05-13 NOTE — PROGRESS NOTES
[facility]  Progress Note  Name: Chuy Norton I  MRN: [id number]  Unit/Bed#: -01 I Date of Admission: 5/9/2024   Date of Service: 5/13/2024 I Hospital Day: 4    Assessment/Plan   * Anemia  Assessment & Plan  Pt with hgb 7.6, on admission baseline normallly 10-13  Dropped to 6.6 - received 1u PRBC morning of 5/10  Transfused 1 unit PRBC in ER for symptomatic anemia (pt reported dyspnea), suspected UGI bleed  GI consulted   Continue PPI  Octreotide discontinued as no varices were found on EGD  Continue Rocephin  Tolerating regular low sodium diet  EGD:  Medium type I hiatal hernia with Poli lesions present  Multiple ulcers in the cardia with flat pigmented spot (Eduin IIC); placed 1 clip successfully; hemostasis achieved    Left ventricular apical thrombus  Assessment & Plan  Chronic per prior cardiology notes  Stable S/P MI  Cardiology referral on discharge  Ideally would start on coumadin when stable from GI bleed standpoint     Cirrhosis (HCC)  Assessment & Plan  Cirrhosis, noted on abdominal imaging  Suspected related to alcohol abuse  EGD anticipated to evaluate for varicies  S/P Lactulose enemas as ER suspected mild hepatic encephalopathy which appears improved with non focal CT head   GI following  FFP prior to procedure    History of CVA (cerebrovascular accident)  Assessment & Plan  Maintained aspirin, statin  Aspirin on hold due to GI bleed    Hyperlipidemia  Assessment & Plan  Continue Lipitor    Bipolar 2 disorder, major depressive episode (HCC)  Assessment & Plan  Mood stable  Continue trazodone and seroquel.           VTE Pharmacologic Prophylaxis: VTE Score: 3 Moderate Risk (Score 3-4) - Pharmacological DVT Prophylaxis Contraindicated. Sequential Compression Devices Ordered.    Mobility:   Basic Mobility Inpatient Raw Score: 20  JH-HLM Goal: 6: Walk 10 steps or more  JH-HLM Achieved: 6: Walk 10 steps or more  JH-HLM Goal achieved. Continue to encourage  appropriate mobility.    Patient Centered Rounds: I performed bedside rounds with nursing staff today.   Discussions with Specialists or Other Care Team Provider: Discussed discharge plan with CM, PT, OT.  Reviewed with GI, stable for discharge.  Patient is unable to do stairs and must do 2 flights of stairs before he is able to return to his prior living environment    Education and Discussions with Family / Patient: Patient declined call to .     Total Time Spent on Date of Encounter in care of patient: 35 mins. This time was spent on one or more of the following: performing physical exam; counseling and coordination of care; obtaining or reviewing history; documenting in the medical record; reviewing/ordering tests, medications or procedures; communicating with other healthcare professionals and discussing with patient's family/caregivers.    Current Length of Stay: 4 day(s)  Current Patient Status: Inpatient   Certification Statement: The patient will continue to require additional inpatient hospital stay due to discharge planning  Discharge Plan: Anticipate discharge in 24-48 hrs to discharge location to be determined pending rehab evaluations.    Code Status: Level 1 - Full Code    Subjective:   Patient denying abdominal pain, distention or melena.  He is eager for discharge and hopes to go back to [place] campus.    Objective:     Vitals:   Temp (24hrs), Av.7 °F (37.1 °C), Min:98.4 °F (36.9 °C), Max:99 °F (37.2 °C)    Temp:  [98.4 °F (36.9 °C)-99 °F (37.2 °C)] 99 °F (37.2 °C)  HR:  [] 91  BP: (100-144)/(64-87) 144/77  SpO2:  [87 %-94 %] 91 %  Body mass index is 32.25 kg/m².     Input and Output Summary (last 24 hours):     Intake/Output Summary (Last 24 hours) at 2024 1138  Last data filed at 2024 0711  Gross per 24 hour   Intake 862 ml   Output 1950 ml   Net -1088 ml       Physical Exam:   Physical Exam  Vitals and nursing note reviewed.   Constitutional:       General: He  is not in acute distress.     Appearance: Normal appearance. He is well-developed.   HENT:      Head: Normocephalic and atraumatic.   Eyes:      General: No scleral icterus.     Conjunctiva/sclera: Conjunctivae normal.   Cardiovascular:      Rate and Rhythm: Normal rate and regular rhythm.      Heart sounds: No murmur heard.  Pulmonary:      Effort: Pulmonary effort is normal.      Breath sounds: No wheezing, rhonchi or rales.   Abdominal:      General: There is no distension.      Palpations: Abdomen is soft.   Skin:     General: Skin is warm and dry.   Neurological:      Mental Status: He is alert. Mental status is at baseline.   Psychiatric:         Mood and Affect: Mood normal.        Additional Data:     Labs:  Results from last 7 days   Lab Units 05/13/24  0531   WBC Thousand/uL 6.22   HEMOGLOBIN g/dL 9.2*   HEMATOCRIT % 29.7*   PLATELETS Thousands/uL 156   SEGS PCT % 65   LYMPHO PCT % 24   MONO PCT % 8   EOS PCT % 2     Results from last 7 days   Lab Units 05/13/24  0531   SODIUM mmol/L 139   POTASSIUM mmol/L 3.8   CHLORIDE mmol/L 109*   CO2 mmol/L 24   BUN mg/dL 9   CREATININE mg/dL 1.05   ANION GAP mmol/L 6   CALCIUM mg/dL 8.2*   ALBUMIN g/dL 3.4*   TOTAL BILIRUBIN mg/dL 0.48   ALK PHOS U/L 59   ALT U/L 14   AST U/L 17   GLUCOSE RANDOM mg/dL 124     Results from last 7 days   Lab Units 05/13/24  0531   INR  1.12     Results from last 7 days   Lab Units 05/10/24  1650   POC GLUCOSE mg/dl 76         Results from last 7 days   Lab Units 05/09/24  2048 05/09/24  1751 05/09/24  0841 05/09/24  0553 05/07/24  1645 05/07/24  1642   LACTIC ACID mmol/L 1.6 2.1* 2.6* 3.5*   < >  --    PROCALCITONIN ng/ml  --   --   --  0.41*  --  0.13    < > = values in this interval not displayed.       Lines/Drains:  Invasive Devices       Peripheral Intravenous Line  Duration             Peripheral IV 05/12/24 Distal;Right;Upper;Ventral (anterior) Arm <1 day                          Imaging: No pertinent imaging reviewed.    Recent  Cultures (last 7 days):   Results from last 7 days   Lab Units 05/09/24  0553 05/07/24  1645 05/07/24  1642   BLOOD CULTURE  No Growth at 72 hrs.  No Growth at 72 hrs. No Growth After 5 Days. No Growth After 5 Days.       Last 24 Hours Medication List:   Current Facility-Administered Medications   Medication Dose Route Frequency Provider Last Rate    atorvastatin  40 mg Oral Daily With Dinner Jayne Mc MD      cefTRIAXone  1,000 mg Intravenous Q24H Jayne Mc MD 1,000 mg (05/12/24 1623)    escitalopram  20 mg Oral Daily Jayne Mc MD      famotidine  20 mg Oral BID Jayne Mc MD      nicotine  21 mg Transdermal Daily Jayne Mc MD      nicotine polacrilex  2 mg Oral Q3H PRN Jayne Mc MD      [Transfer Hold] pantoprazole  40 mg Intravenous Q12H SHANNA Mari Morales PA-C      QUEtiapine  300 mg Oral HS Jayne Mc MD      traZODone  100 mg Oral HS Jayne Mc MD          Today, Patient Was Seen By: Mari Morales PA-C    **Please Note: This note may have been constructed using a voice recognition system.**

## 2024-05-13 NOTE — PLAN OF CARE
Problem: PAIN - ADULT  Goal: Verbalizes/displays adequate comfort level or baseline comfort level  Description: Interventions:  - Encourage patient to monitor pain and request assistance  - Assess pain using appropriate pain scale  - Administer analgesics based on type and severity of pain and evaluate response  - Implement non-pharmacological measures as appropriate and evaluate response  - Consider cultural and social influences on pain and pain management  - Notify physician/advanced practitioner if interventions unsuccessful or patient reports new pain  Outcome: Progressing     Problem: INFECTION - ADULT  Goal: Absence or prevention of progression during hospitalization  Description: INTERVENTIONS:  - Assess and monitor for signs and symptoms of infection  - Monitor lab/diagnostic results  - Monitor all insertion sites, i.e. indwelling lines, tubes, and drains  - Monitor endotracheal if appropriate and nasal secretions for changes in amount and color  - Ruth appropriate cooling/warming therapies per order  - Administer medications as ordered  - Instruct and encourage patient and family to use good hand hygiene technique  - Identify and instruct in appropriate isolation precautions for identified infection/condition  Outcome: Progressing  Goal: Absence of fever/infection during neutropenic period  Description: INTERVENTIONS:  - Monitor WBC    Outcome: Progressing     Problem: SAFETY ADULT  Goal: Patient will remain free of falls  Description: INTERVENTIONS:  - Educate patient/family on patient safety including physical limitations  - Instruct patient to call for assistance with activity   - Consult OT/PT to assist with strengthening/mobility   - Keep Call bell within reach  - Keep bed low and locked with side rails adjusted as appropriate  - Keep care items and personal belongings within reach  - Initiate and maintain comfort rounds  - Make Fall Risk Sign visible to staff  - Offer Toileting every  Hours,  in advance of need  - Initiate/Maintain alarm  - Obtain necessary fall risk management equipment:   - Apply yellow socks and bracelet for high fall risk patients  - Consider moving patient to room near nurses station  Outcome: Progressing  Goal: Maintain or return to baseline ADL function  Description: INTERVENTIONS:  -  Assess patient's ability to carry out ADLs; assess patient's baseline for ADL function and identify physical deficits which impact ability to perform ADLs (bathing, care of mouth/teeth, toileting, grooming, dressing, etc.)  - Assess/evaluate cause of self-care deficits   - Assess range of motion  - Assess patient's mobility; develop plan if impaired  - Assess patient's need for assistive devices and provide as appropriate  - Encourage maximum independence but intervene and supervise when necessary  - Involve family in performance of ADLs  - Assess for home care needs following discharge   - Consider OT consult to assist with ADL evaluation and planning for discharge  - Provide patient education as appropriate  Outcome: Progressing  Goal: Maintains/Returns to pre admission functional level  Description: INTERVENTIONS:  - Perform AM-PAC 6 Click Basic Mobility/ Daily Activity assessment daily.  - Set and communicate daily mobility goal to care team and patient/family/caregiver.   - Collaborate with rehabilitation services on mobility goals if consulted  - Perform Range of Motion 3 times a day.  - Reposition patient every 2 hours.  - Dangle patient 3 times a day  - Stand patient 3 times a day  - Ambulate patient 3 times a day  - Out of bed to chair 3 times a day   - Out of bed for meals 3 times a day  - Out of bed for toileting  - Record patient progress and toleration of activity level   Outcome: Progressing     Problem: DISCHARGE PLANNING  Goal: Discharge to home or other facility with appropriate resources  Description: INTERVENTIONS:  - Identify barriers to discharge w/patient and caregiver  -  Arrange for needed discharge resources and transportation as appropriate  - Identify discharge learning needs (meds, wound care, etc.)  - Arrange for interpretive services to assist at discharge as needed  - Refer to Case Management Department for coordinating discharge planning if the patient needs post-hospital services based on physician/advanced practitioner order or complex needs related to functional status, cognitive ability, or social support system  Outcome: Progressing     Problem: Knowledge Deficit  Goal: Patient/family/caregiver demonstrates understanding of disease process, treatment plan, medications, and discharge instructions  Description: Complete learning assessment and assess knowledge base.  Interventions:  - Provide teaching at level of understanding  - Provide teaching via preferred learning methods  Outcome: Progressing     Problem: GASTROINTESTINAL - ADULT  Goal: Maintains or returns to baseline bowel function  Description: INTERVENTIONS:  - Assess bowel function  - Encourage oral fluids to ensure adequate hydration  - Administer IV fluids if ordered to ensure adequate hydration  - Administer ordered medications as needed  - Encourage mobilization and activity  - Consider nutritional services referral to assist patient with adequate nutrition and appropriate food choices  Outcome: Progressing     Problem: CARDIOVASCULAR - ADULT  Goal: Maintains optimal cardiac output and hemodynamic stability  Description: INTERVENTIONS:  - Monitor I/O, vital signs and rhythm  - Monitor for S/S and trends of decreased cardiac output  - Administer and titrate ordered vasoactive medications to optimize hemodynamic stability  - Assess quality of pulses, skin color and temperature  - Assess for signs of decreased coronary artery perfusion  - Instruct patient to report change in severity of symptoms  Outcome: Progressing     Problem: HEMATOLOGIC - ADULT  Goal: Maintains hematologic stability  Description:  INTERVENTIONS  - Assess for signs and symptoms of bleeding or hemorrhage  - Monitor labs  - Administer supportive blood products/factors as ordered and appropriate  Outcome: Progressing     Problem: Prexisting or High Potential for Compromised Skin Integrity  Goal: Skin integrity is maintained or improved  Description: INTERVENTIONS:  - Identify patients at risk for skin breakdown  - Assess and monitor skin integrity  - Assess and monitor nutrition and hydration status  - Monitor labs   - Assess for incontinence   - Turn and reposition patient  - Assist with mobility/ambulation  - Relieve pressure over bony prominences  - Avoid friction and shearing  - Provide appropriate hygiene as needed including keeping skin clean and dry  - Evaluate need for skin moisturizer/barrier cream  - Collaborate with interdisciplinary team   - Patient/family teaching  - Consider wound care consult   Outcome: Progressing     Problem: Potential for Falls  Goal: Patient will remain free of falls  Description: INTERVENTIONS:  - Educate patient/family on patient safety including physical limitations  - Instruct patient to call for assistance with activity   - Consult OT/PT to assist with strengthening/mobility   - Keep Call bell within reach  - Keep bed low and locked with side rails adjusted as appropriate  - Keep care items and personal belongings within reach  - Initiate and maintain comfort rounds  - Make Fall Risk Sign visible to staff  - Offer Toileting every  Hours, in advance of need  - Initiate/Maintain alarm  - Obtain necessary fall risk management equipment:   - Apply yellow socks and bracelet for high fall risk patients  - Consider moving patient to room near nurses station  Outcome: Progressing

## 2024-05-13 NOTE — PROGRESS NOTES
"Progress note - Gastroenterology   Chuy Norton 64 y.o. male MRN: [id number]  Unit/Bed#: -01 Encounter: [id number]    ASSESSMENT and PLAN    Anemia/GI blood loss - acute presentation with dyspnea weakness and complaint of melena.  Dramatic drop in hemoglobin.  History of esophagitis but now has signs of underlying cirrhosis, also takes NSAIDs.  EGD revealed moderate size hiatal hernia with Poli lesions/ulcerations in the cardia, including 1 flat red spot which was clipped.  No further signs of GI bleeding.  Tolerating advancing diet.  Hemoglobin 9.2 with a.m. labs today.    Continue PPI  Follow H&H and transfuse as needed  Okay to advance diet to low soft     Cirrhosis - with thrombocytopenia, coagulopathy, and cirrhotic texture to the liver on CT scan.  Suspect due to alcohol.  Hepatitis C antibody positive.  AFP normal but possible lesion on CT so follow-up will be needed for HCC screening.  Low-salt diet  Abstinence from alcohol  Check HCV viral load    Discussed patient with Mari DEAN PA-C/Hospitalist, patient currently to be discharged today.  Will need to follow-up with HCV viral load pending.      Chief Complaint   Patient presents with    Weakness - Generalized     Pt brought in via EMS,for weakness. Brought in 3 days ago for same sx. Weakness, SOB, fatigue.        SUBJECTIVE/HPI   Patient denies abdominal pain, nausea or vomiting.  States he is tolerating his meals well.  Patient's last bowel movement 5/11.  Small black formed bowel movement.  Patient's hemoglobin stable at 9.2.  Per [facility] IM patient to be discharged today.    /77   Pulse 91   Temp 99 °F (37.2 °C)   Resp 20   Ht 5' 8\" (1.727 m)   Wt 96.2 kg (212 lb 1.3 oz)   SpO2 91%   BMI 32.25 kg/m²     PHYSICALEXAM  General appearance: alert, appears stated age and cooperative  Eyes: PERLLA, EOMI, no icterus   Head: Normocephalic, without obvious abnormality, atraumatic  Lungs: clear to auscultation bilaterally  Heart: regular rate and " "rhythm, S1, S2 normal, no murmur, click, rub or gallop  Abdomen: Large, soft, non-tender; bowel sounds normal; no masses,  no organomegaly  Extremities: extremities normal, atraumatic, no cyanosis or edema  Neurologic: Grossly normal    Lab Results   Component Value Date    GLUCOSE 110 05/07/2024    CALCIUM 8.2 (L) 05/13/2024    K 3.8 05/13/2024    CO2 24 05/13/2024     (H) 05/13/2024    BUN 9 05/13/2024    CREATININE 1.05 05/13/2024     Lab Results   Component Value Date    WBC 6.22 05/13/2024    HGB 9.2 (L) 05/13/2024    HCT 29.7 (L) 05/13/2024    MCV 83 05/13/2024     05/13/2024     Lab Results   Component Value Date    ALT 14 05/13/2024    AST 17 05/13/2024    ALKPHOS 59 05/13/2024     No results found for: \"AMYLASE\"  Lab Results   Component Value Date    LIPASE 14 05/07/2024     Lab Results   Component Value Date    IRON 28 (L) 06/17/2021    TIBC 293 06/17/2021    FERRITIN 102 06/17/2021     Lab Results   Component Value Date    INR 1.12 05/13/2024     "

## 2024-05-13 NOTE — OCCUPATIONAL THERAPY NOTE
Occupational Therapy Cx Note     Patient Name: Chuy Norton  Today's Date: 5/13/2024  Problem List  Principal Problem:    Anemia  Active Problems:    Bipolar 2 disorder, major depressive episode (HCC)    Hyperlipidemia    History of CVA (cerebrovascular accident)    Cirrhosis (HCC)    Left ventricular apical thrombus              05/13/24 1027   OT Last Visit   OT Visit Date 05/13/24   Note Type   Note type Cancelled Session   Cancel Reasons Medical status   Additional Comments Per PT, pt currently orthostatic. Not appropriate for OT tx session at this time. Will hold & f/u as able & appropriate       Genia Virk OTR/L

## 2024-05-13 NOTE — UTILIZATION REVIEW
Continued Stay Review    Date: 5/13                          Current Patient Class: Inpatient  Current Level of Care: Med Surg    HPI:64 y.o. male initially admitted on 5/9     Assessment/Plan:   Continued Iv antibiotics and Iv PPI. Started IVFs.  Hgb 9.2 today. Tolerating diet. Small black formed BM.   Start on coumadin when stable from GI bleed standpoint        Vital Signs:   05/13/24 10:17:27 -- 107 Abnormal  -- 100/64 76 87 % Abnormal  -- --   05/13/24 07:11:43 99 °F (37.2 °C) 78 -- 127/72 90 88 % Abnormal  -- --   05/12/24 21:55:55 98.5 °F (36.9 °C) 78 -- 125/78 94 91 % -- --     Pertinent Labs/Diagnostic Results:   Results from last 7 days   Lab Units 05/09/24  0553 05/07/24  1642   SARS-COV-2  Negative Negative     Results from last 7 days   Lab Units 05/13/24 0531 05/12/24  1956 05/12/24 0443 05/11/24  1929 05/11/24  1150 05/11/24  0523   WBC Thousand/uL 6.22  --  5.64  --   --  4.93   HEMOGLOBIN g/dL 9.2* 9.5* 9.2* 9.2* 8.7* 8.2*   HEMATOCRIT % 29.7* 31.3* 30.2* 30.0* 28.3* 26.6*   PLATELETS Thousands/uL 156  --  167  --   --  153   TOTAL NEUT ABS Thousands/µL 4.05  --  2.73  --   --  2.75         Results from last 7 days   Lab Units 05/13/24 0531 05/12/24 0443 05/11/24  0523 05/10/24  0359 05/09/24 2048 05/09/24  0553 05/07/24  1652   SODIUM mmol/L 139 141 143 141 138   < >  --    POTASSIUM mmol/L 3.8 3.3* 3.5 3.6 3.6   < >  --    CHLORIDE mmol/L 109* 110* 114* 114* 112*   < >  --    CO2 mmol/L 24 22 25 21 18*   < >  --    CO2, I-STAT mmol/L  --   --   --   --   --   --  20*   ANION GAP mmol/L 6 9 4 6 8   < >  --    BUN mg/dL 9 11 20 45* 49*   < >  --    CREATININE mg/dL 1.05 1.12 1.05 1.18 1.10   < >  --    EGFR ml/min/1.73sq m 75 69 75 65 71   < >  --    CALCIUM mg/dL 8.2* 7.8* 7.4* 8.2* 7.6*   < >  --    CALCIUM, IONIZED mmol/L  --   --   --  1.13  --   --   --    CALCIUM, IONIZED, ISTAT mmol/L  --   --   --   --   --   --  1.10*   MAGNESIUM mg/dL  --   --   --  1.7*  --   --   --    PHOSPHORUS  mg/dL  --   --   --  2.7  --   --   --     < > = values in this interval not displayed.     Results from last 7 days   Lab Units 05/13/24  0531 05/12/24  0443 05/11/24  0523 05/10/24  0359 05/09/24  0553   AST U/L 17 21 19 16 19   ALT U/L 14 15 15 13 17   ALK PHOS U/L 59 54 45 39 57   TOTAL PROTEIN g/dL 5.8* 5.7* 5.3* 4.8* 5.8*   ALBUMIN g/dL 3.4* 3.3* 3.1* 2.8* 3.5   TOTAL BILIRUBIN mg/dL 0.48 0.51 0.49 0.45 0.35   AMMONIA umol/L  --   --   --   --  75*     Results from last 7 days   Lab Units 05/10/24  1650   POC GLUCOSE mg/dl 76     Results from last 7 days   Lab Units 05/13/24  0531 05/12/24  0443 05/11/24  0523 05/10/24  0359 05/09/24  2048 05/09/24  0553 05/07/24  1642   GLUCOSE RANDOM mg/dL 124 94 99 105 112 126 107       Results from last 7 days   Lab Units 05/07/24  1652   PH, STEPHANIE I-STAT  7.460*   PCO2, STEPHANIE ISTAT mm HG 26.4*   PO2, STEPHANIE ISTAT mm HG 49.0*   HCO3, STEPHANIE ISTAT mmol/L 18.7*   I STAT BASE EXC mmol/L -4*   I STAT O2 SAT % 87*     Results from last 7 days   Lab Units 05/09/24  0553   CK TOTAL U/L 187     Results from last 7 days   Lab Units 05/09/24  1822 05/09/24  0841 05/09/24  0553 05/07/24  1829 05/07/24  1642   HS TNI 0HR ng/L  --   --  16  --  14   HS TNI 2HR ng/L  --  24  --  11  --    HSTNI D2 ng/L  --  8  --  -3  --    HS TNI 4HR ng/L 34  --   --   --   --    HSTNI D4 ng/L 18  --   --   --   --      Results from last 7 days   Lab Units 05/09/24  0553   D-DIMER QUANTITATIVE ug/ml FEU 0.79*     Results from last 7 days   Lab Units 05/13/24  0531 05/12/24  0625 05/11/24  0523 05/10/24  0359 05/09/24  0553 05/07/24  1642   PROTIME seconds 14.8* 14.0 15.2*   < > 22.1* 19.0*   INR  1.12 1.04 1.16   < > 1.88* 1.54*   PTT seconds  --   --   --   --  36 35    < > = values in this interval not displayed.     Results from last 7 days   Lab Units 05/09/24  0553   TSH 3RD GENERATON uIU/mL 1.871     Results from last 7 days   Lab Units 05/09/24  0553 05/07/24  1642   PROCALCITONIN ng/ml 0.41* 0.13      Results from last 7 days   Lab Units 05/09/24  2048 05/09/24  1751 05/09/24  0841 05/09/24  0553 05/07/24  1645   LACTIC ACID mmol/L 1.6 2.1* 2.6* 3.5* 1.6             Results from last 7 days   Lab Units 05/07/24  1645   BNP pg/mL 38             Results from last 7 days   Lab Units 05/13/24  0724 05/11/24  0549 05/10/24  0549   UNIT PRODUCT CODE  [id number]  [id number]  [id number] [id number]  [id number]  Y8145U89   UNIT NUMBER  [id number]  [id number]  [id number]  [id number]  [id number]   UNITABO  A  A O  O A  A  A   UNITRH  POS  POS POS  POS POS  POS  POS   CROSSMATCH  Compatible  Compatible  --  Compatible  Compatible  Compatible   UNIT DISPENSE STATUS  Return to Inv  Return to Inv Presumed Trans  Presumed Trans Presumed Trans  Presumed Trans  Presumed Trans   UNIT PRODUCT VOL ml 300  300 235  252 300  300  300     Results from last 7 days   Lab Units 05/11/24  0523   HEP B S AG  Non-reactive   HEP C AB  Reactive*   HEP B C IGM  Non-reactive     Results from last 7 days   Lab Units 05/07/24  1642   LIPASE u/L 14                 Results from last 7 days   Lab Units 05/09/24  0801 05/07/24  1858   CLARITY UA  Clear Clear   COLOR UA  Yellow Yellow   SPEC GRAV UA  1.010 1.025   PH UA  6.0 6.0   GLUCOSE UA mg/dl Negative Negative   KETONES UA mg/dl 10 (1+)* Negative   BLOOD UA  Negative Negative   PROTEIN UA mg/dl Trace* Trace*   NITRITE UA  Negative Negative   BILIRUBIN UA  Negative Negative   UROBILINOGEN UA (BE) mg/dl <2.0 <2.0   LEUKOCYTES UA  Negative Negative   WBC UA /hpf 0-1 0-1   RBC UA /hpf None Seen 0-1   BACTERIA UA /hpf None Seen Occasional   EPITHELIAL CELLS WET PREP /hpf None Seen Occasional   MUCUS THREADS   --  Occasional*     Results from last 7 days   Lab Units 05/09/24  0553 05/07/24  1642   INFLUENZA A PCR  Negative Negative   INFLUENZA B PCR  Negative Negative   RSV PCR  Negative Negative             Results  from last 7 days   Lab Units 05/09/24  0715   ETHANOL LVL mg/dL <10   ACETAMINOPHEN LVL ug/mL 2*   SALICYLATE LVL mg/dL <5.0                 Results from last 7 days   Lab Units 05/09/24  0553 05/07/24  1645 05/07/24  1642   BLOOD CULTURE  No Growth at 72 hrs.  No Growth at 72 hrs. No Growth After 5 Days. No Growth After 5 Days.                   Medications:   Scheduled Medications:  atorvastatin, 40 mg, Oral, Daily With Dinner  cefTRIAXone, 1,000 mg, Intravenous, Q24H  escitalopram, 20 mg, Oral, Daily  famotidine, 20 mg, Oral, BID  nicotine, 21 mg, Transdermal, Daily  pantoprazole, 40 mg, Intravenous, Q12H SHANNA  QUEtiapine, 300 mg, Oral, HS  traZODone, 100 mg, Oral, HS      Continuous IV Infusions:  multi-electrolyte, 100 mL/hr, Intravenous, Continuous      PRN Meds:  nicotine polacrilex, 2 mg, Oral, Q3H PRN        Discharge Plan: [facility] following.    Network Utilization Review Department  ATTENTION: Please call with any questions or concerns to [phone] and carefully listen to the prompts so that you are directed to the right person. All voicemails are confidential.   For Discharge needs, contact Care Management DC Support Team at [phone] opt. 2  Send all requests for admission clinical reviews, approved or denied determinations and any other requests to dedicated fax number below belonging to the [facility] where the patient is receiving treatment. List of dedicated fax numbers for the Facilities:  FACILITY NAME UR FAX NUMBER   ADMISSION DENIALS (Administrative/Medical Necessity) [phone]   DISCHARGE SUPPORT TEAM (NETWORK) [phone]   PARENT CHILD HEALTH (Maternity/NICU/Pediatrics) [phone]   [facility] [phone]   [facility] [phone]   [facility] [phone]   [facility] [phone]   [facility] [phone]   [facility]  [phone]   [facility] [phone]   [facility] [phone]   [facility] [phone]   [facility] [phone]   [facility] [phone]   [facility] [phone]

## 2024-05-14 ENCOUNTER — APPOINTMENT (INPATIENT)
Dept: RADIOLOGY | Facility: HOSPITAL | Age: 64
DRG: 378 | End: 2024-05-14
Payer: OTHER GOVERNMENT

## 2024-05-14 PROBLEM — R07.9 CHEST PAIN: Status: ACTIVE | Noted: 2019-03-20

## 2024-05-14 PROBLEM — M54.50 ACUTE LEFT-SIDED LOW BACK PAIN WITHOUT SCIATICA: Status: ACTIVE | Noted: 2024-05-14

## 2024-05-14 PROBLEM — M25.561 ACUTE PAIN OF RIGHT KNEE: Status: ACTIVE | Noted: 2024-05-14

## 2024-05-14 LAB
ALBUMIN SERPL BCP-MCNC: 3.1 G/DL (ref 3.5–5)
ALP SERPL-CCNC: 52 U/L (ref 34–104)
ALT SERPL W P-5'-P-CCNC: 11 U/L (ref 7–52)
ANION GAP SERPL CALCULATED.3IONS-SCNC: 6 MMOL/L (ref 4–13)
AST SERPL W P-5'-P-CCNC: 13 U/L (ref 13–39)
BACTERIA BLD CULT: NORMAL
BACTERIA BLD CULT: NORMAL
BASOPHILS # BLD AUTO: 0.01 THOUSANDS/ÂΜL (ref 0–0.1)
BASOPHILS NFR BLD AUTO: 0 % (ref 0–1)
BILIRUB SERPL-MCNC: 0.47 MG/DL (ref 0.2–1)
BUN SERPL-MCNC: 13 MG/DL (ref 5–25)
CALCIUM ALBUM COR SERPL-MCNC: 8.5 MG/DL (ref 8.3–10.1)
CALCIUM SERPL-MCNC: 7.8 MG/DL (ref 8.4–10.2)
CHLORIDE SERPL-SCNC: 109 MMOL/L (ref 96–108)
CO2 SERPL-SCNC: 25 MMOL/L (ref 21–32)
CREAT SERPL-MCNC: 1.13 MG/DL (ref 0.6–1.3)
EOSINOPHIL # BLD AUTO: 0.14 THOUSAND/ÂΜL (ref 0–0.61)
EOSINOPHIL NFR BLD AUTO: 2 % (ref 0–6)
ERYTHROCYTE [DISTWIDTH] IN BLOOD BY AUTOMATED COUNT: 18 % (ref 11.6–15.1)
GFR SERPL CREATININE-BSD FRML MDRD: 68 ML/MIN/1.73SQ M
GLUCOSE SERPL-MCNC: 102 MG/DL (ref 65–140)
HCT VFR BLD AUTO: 28 % (ref 36.5–49.3)
HCT VFR BLD AUTO: 29.8 % (ref 36.5–49.3)
HGB BLD-MCNC: 8.7 G/DL (ref 12–17)
HGB BLD-MCNC: 9.3 G/DL (ref 12–17)
IMM GRANULOCYTES # BLD AUTO: 0.05 THOUSAND/UL (ref 0–0.2)
IMM GRANULOCYTES NFR BLD AUTO: 1 % (ref 0–2)
LYMPHOCYTES # BLD AUTO: 1.6 THOUSANDS/ÂΜL (ref 0.6–4.47)
LYMPHOCYTES NFR BLD AUTO: 26 % (ref 14–44)
MCH RBC QN AUTO: 25.9 PG (ref 26.8–34.3)
MCHC RBC AUTO-ENTMCNC: 31.1 G/DL (ref 31.4–37.4)
MCV RBC AUTO: 83 FL (ref 82–98)
MONOCYTES # BLD AUTO: 0.58 THOUSAND/ÂΜL (ref 0.17–1.22)
MONOCYTES NFR BLD AUTO: 10 % (ref 4–12)
NEUTROPHILS # BLD AUTO: 3.75 THOUSANDS/ÂΜL (ref 1.85–7.62)
NEUTS SEG NFR BLD AUTO: 61 % (ref 43–75)
NRBC BLD AUTO-RTO: 0 /100 WBCS
PLATELET # BLD AUTO: 144 THOUSANDS/UL (ref 149–390)
PMV BLD AUTO: 11.2 FL (ref 8.9–12.7)
POTASSIUM SERPL-SCNC: 3.6 MMOL/L (ref 3.5–5.3)
PROT SERPL-MCNC: 5.4 G/DL (ref 6.4–8.4)
RBC # BLD AUTO: 3.36 MILLION/UL (ref 3.88–5.62)
SODIUM SERPL-SCNC: 140 MMOL/L (ref 135–147)
WBC # BLD AUTO: 6.13 THOUSAND/UL (ref 4.31–10.16)

## 2024-05-14 PROCEDURE — 99232 SBSQ HOSP IP/OBS MODERATE 35: CPT | Performed by: INTERNAL MEDICINE

## 2024-05-14 PROCEDURE — 71045 X-RAY EXAM CHEST 1 VIEW: CPT

## 2024-05-14 PROCEDURE — 97116 GAIT TRAINING THERAPY: CPT

## 2024-05-14 PROCEDURE — 85014 HEMATOCRIT: CPT | Performed by: PHYSICIAN ASSISTANT

## 2024-05-14 PROCEDURE — 85018 HEMOGLOBIN: CPT | Performed by: PHYSICIAN ASSISTANT

## 2024-05-14 PROCEDURE — C9113 INJ PANTOPRAZOLE SODIUM, VIA: HCPCS | Performed by: PHYSICIAN ASSISTANT

## 2024-05-14 PROCEDURE — 80053 COMPREHEN METABOLIC PANEL: CPT | Performed by: STUDENT IN AN ORGANIZED HEALTH CARE EDUCATION/TRAINING PROGRAM

## 2024-05-14 PROCEDURE — 85025 COMPLETE CBC W/AUTO DIFF WBC: CPT | Performed by: STUDENT IN AN ORGANIZED HEALTH CARE EDUCATION/TRAINING PROGRAM

## 2024-05-14 PROCEDURE — 99232 SBSQ HOSP IP/OBS MODERATE 35: CPT | Performed by: PHYSICIAN ASSISTANT

## 2024-05-14 RX ORDER — DOCUSATE SODIUM 100 MG/1
100 CAPSULE, LIQUID FILLED ORAL 2 TIMES DAILY
Status: DISCONTINUED | OUTPATIENT
Start: 2024-05-14 | End: 2024-05-15 | Stop reason: HOSPADM

## 2024-05-14 RX ORDER — FUROSEMIDE 10 MG/ML
40 INJECTION INTRAMUSCULAR; INTRAVENOUS ONCE
Status: COMPLETED | OUTPATIENT
Start: 2024-05-14 | End: 2024-05-14

## 2024-05-14 RX ORDER — POLYETHYLENE GLYCOL 3350 17 G/17G
17 POWDER, FOR SOLUTION ORAL DAILY
Status: DISCONTINUED | OUTPATIENT
Start: 2024-05-14 | End: 2024-05-15 | Stop reason: HOSPADM

## 2024-05-14 RX ORDER — PANTOPRAZOLE SODIUM 40 MG/1
40 TABLET, DELAYED RELEASE ORAL DAILY
Qty: 30 TABLET | Refills: 0 | Status: SHIPPED | OUTPATIENT
Start: 2024-05-14 | End: 2024-05-15

## 2024-05-14 RX ORDER — POLYETHYLENE GLYCOL 3350 17 G/17G
17 POWDER, FOR SOLUTION ORAL DAILY PRN
Qty: 30 EACH | Refills: 0 | Status: SHIPPED | OUTPATIENT
Start: 2024-05-14 | End: 2024-05-15

## 2024-05-14 RX ADMIN — QUETIAPINE FUMARATE 300 MG: 300 TABLET ORAL at 20:27

## 2024-05-14 RX ADMIN — ESCITALOPRAM OXALATE 20 MG: 20 TABLET ORAL at 08:58

## 2024-05-14 RX ADMIN — FAMOTIDINE 20 MG: 20 TABLET, FILM COATED ORAL at 08:58

## 2024-05-14 RX ADMIN — FUROSEMIDE 40 MG: 10 INJECTION, SOLUTION INTRAMUSCULAR; INTRAVENOUS at 16:21

## 2024-05-14 RX ADMIN — NICOTINE 21 MG: 21 PATCH, EXTENDED RELEASE TRANSDERMAL at 08:59

## 2024-05-14 RX ADMIN — PANTOPRAZOLE SODIUM 40 MG: 40 INJECTION, POWDER, FOR SOLUTION INTRAVENOUS at 20:28

## 2024-05-14 RX ADMIN — TRAZODONE HYDROCHLORIDE 100 MG: 100 TABLET ORAL at 20:27

## 2024-05-14 RX ADMIN — NICOTINE POLACRILEX 2 MG: 2 GUM, CHEWING BUCCAL at 18:24

## 2024-05-14 RX ADMIN — FAMOTIDINE 20 MG: 20 TABLET, FILM COATED ORAL at 18:19

## 2024-05-14 RX ADMIN — PANTOPRAZOLE SODIUM 40 MG: 40 INJECTION, POWDER, FOR SOLUTION INTRAVENOUS at 10:39

## 2024-05-14 RX ADMIN — ATORVASTATIN CALCIUM 40 MG: 40 TABLET, FILM COATED ORAL at 18:19

## 2024-05-14 NOTE — PHYSICAL THERAPY NOTE
"                                                                                  PHYSICAL THERAPY NOTE       05/14/24 1006   PT Last Visit   PT Visit Date 05/14/24   Note Type   Note Type Treatment   Pain Assessment   Pain Assessment Tool 0-10   Pain Score No Pain   Restrictions/Precautions   Weight Bearing Precautions Per Order No   Other Precautions Fall Risk;O2;Multiple lines;Bed Alarm   General   Chart Reviewed Yes   Additional Pertinent History Orthostatic vitals taken- stable.   Family/Caregiver Present No   Cognition   Overall Cognitive Status WFL   Arousal/Participation Alert;Cooperative   Attention Within functional limits   Orientation Level Oriented X4   Memory Within functional limits   Following Commands Follows one step commands without difficulty   Comments Flat affect   Subjective   Subjective \"I am feeling better.\"   Bed Mobility   Supine to Sit 5  Supervision   Additional items HOB elevated   Sit to Supine 5  Supervision   Additional items HOB elevated   Transfers   Sit to Stand 5  Supervision   Additional items Armrests   Stand to Sit 5  Supervision   Additional items Armrests   Additional Comments 2 sit<>stand transfers completed during session.   Ambulation/Elevation   Gait pattern Forward Flexion   Gait Assistance 5  Supervision   Assistive Device Rolling walker   Distance 50ft   Stair Management Assistance 5  Supervision   Additional items Verbal cues  (for sequencing)   Stair Management Technique One rail L;Nonreciprocal   Number of Stairs 10   Balance   Static Sitting Normal   Dynamic Sitting Good   Static Standing Fair +   Dynamic Standing Fair +   Ambulatory Fair +   Endurance Deficit   Endurance Deficit Yes   Endurance Deficit Description Limited by SALAZAR   Activity Tolerance   Activity Tolerance Patient tolerated treatment well   Nurse Made Aware Erin JOHNSTON   Assessment   Prognosis Guarded   Problem List Decreased strength;Impaired balance;Decreased mobility   Assessment Patient was " received supine in bed. Currently on 3L O2. Patient Progressed this session. BP stable. Able to complete a household distance and stairs with supervision. Patient with no c/o lightheadedness. Patient mildly SOB which resolved with rest. Will continue to benefit from ongoing inpatient P.T. Recommending level 3 resources and a RW. Low intensity. The patient's AM-PAC Basic Mobility Inpatient Short Form Raw Score is 20. A Raw score of greater than 17 suggests the patient may benefit from discharge to home. Please also refer to the recommendation of the Physical Therapist for safe discharge planning.   Barriers to Discharge None   Goals   Patient Goals To go home   STG Expiration Date 05/24/24   PT Treatment Day 3   Plan   Treatment/Interventions Functional transfer training;LE strengthening/ROM;Elevations;Patient/family training;Equipment eval/education;Bed mobility;Gait training;Spoke to nursing;Spoke to case management;Spoke to advanced practitioner   Progress Progressing toward goals   PT Frequency 3-5x/wk   Discharge Recommendation   Rehab Resource Intensity Level, PT III (Minimum Resource Intensity)   Equipment Recommended Walker   Walker Package Recommended Wheeled walker   AM-PAC Basic Mobility Inpatient   Turning in Flat Bed Without Bedrails 4   Lying on Back to Sitting on Edge of Flat Bed Without Bedrails 4   Moving Bed to Chair 3   Standing Up From Chair Using Arms 3   Walk in Room 3   Climb 3-5 Stairs With Railing 3   Basic Mobility Inpatient Raw Score 20   Basic Mobility Standardized Score 43.99   [facility] Highest Level Of Mobility   -HLM Goal 6: Walk 10 steps or more   -HLM Achieved 7: Walk 25 feet or more   Education   Education Provided Mobility training;Assistive device  (Stair technique)   Patient Demonstrates acceptance/verbal understanding   End of Consult   Patient Position at End of Consult Seated edge of bed;All needs within reach;Bed/Chair alarm activated   Waleska DEAN  Bill            Patient Name: Chuy JIN Errol  Today's Date: 5/14/2024

## 2024-05-14 NOTE — PROGRESS NOTES
Progress note - Gastroenterology   Chuy Norton 64 y.o. male MRN: [id number]  Unit/Bed#: -01 Encounter: [id number]    ASSESSMENT and PLAN    Anemia/GI blood loss - acute presentation with dyspnea weakness and complaint of melena.  Dramatic drop in hemoglobin.  5 units PRBCs required this admission.   History of esophagitis but now has signs of underlying cirrhosis, also takes NSAIDs.  EGD revealed moderate size hiatal hernia with Poli lesions/ulcerations in the cardia, including 1 flat red spot which was clipped.  No further signs of GI bleeding.  Tolerating advancing diet.  Hemoglobin 8.7 with a.m. labs today.     Continue PPI  Follow H&H and transfuse as needed  Okay to advance diet to low soft     Cirrhosis - with thrombocytopenia, coagulopathy, and cirrhotic texture to the liver on CT scan.  Suspect due to alcohol.  Hepatitis C antibody positive.  AFP normal but possible lesion on CT so follow-up will be needed for HCC screening.  Low-salt diet  Abstinence from alcohol  HCV viral load, NOT DETECTED    Constipation   Patient unclear when last bowel movement was.  Per EMR last bowel movement was 5/11 small/black.    Patient denies abdominal pain, nausea or vomiting.  Discussed with patient adding bowel regiment.  Also noted when discharged needs to increase fiber and fluids.    -Colace 100 mg p.o. twice daily  -MiraLAX 17 g p.o. daily, hold for diarrhea.    Discuss patient with Dr. Mccann on rounds.  Recommend patient follow-up with GI as outpatient for anemia, cirrhosis and hiatal hernia with Poli lesion.       Chief Complaint   Patient presents with    Weakness - Generalized     Pt brought in via EMS,for weakness. Brought in 3 days ago for same sx. Weakness, SOB, fatigue.        SUBJECTIVE/HPI   Patient denies abdominal pain, nausea or vomiting.  States he is tolerating eating.  Patient is unclear when last bowel movement was.  Per patient's chart his last bowel movement was 5/11, small black  "stool.    /79   Pulse 103   Temp 99.9 °F (37.7 °C)   Resp 18   Ht 5' 8\" (1.727 m)   Wt 96.2 kg (212 lb 1.3 oz)   SpO2 92%   BMI 32.25 kg/m²     PHYSICALEXAM  General appearance: alert, appears stated age and cooperative.  Alert to self, easily reoriented.  Eyes: PERLLA, EOMI, no icterus   Head: Normocephalic, without obvious abnormality, atraumatic  Lungs: clear to auscultation bilaterally.  O2 nasal cannula  Heart: regular rate and rhythm, S1, S2 normal, no murmur, click, rub or gallop  Abdomen: soft, non-tender; bowel sounds normal; no masses,  no organomegaly  Extremities: extremities normal, atraumatic, no cyanosis or edema  Neurologic: Grossly normal    Lab Results   Component Value Date    GLUCOSE 110 05/07/2024    CALCIUM 7.8 (L) 05/14/2024    K 3.6 05/14/2024    CO2 25 05/14/2024     (H) 05/14/2024    BUN 13 05/14/2024    CREATININE 1.13 05/14/2024     Lab Results   Component Value Date    WBC 6.13 05/14/2024    HGB 9.3 (L) 05/14/2024    HCT 29.8 (L) 05/14/2024    MCV 83 05/14/2024     (L) 05/14/2024     Lab Results   Component Value Date    ALT 11 05/14/2024    AST 13 05/14/2024    ALKPHOS 52 05/14/2024     No results found for: \"AMYLASE\"  Lab Results   Component Value Date    LIPASE 14 05/07/2024     Lab Results   Component Value Date    IRON 28 (L) 06/17/2021    TIBC 293 06/17/2021    FERRITIN 102 06/17/2021     Lab Results   Component Value Date    INR 1.12 05/13/2024     "

## 2024-05-14 NOTE — PROGRESS NOTES
[facility]  Progress Note  Name: Chuy Norton I  MRN: [id number]  Unit/Bed#: -01 I Date of Admission: 5/9/2024   Date of Service: 5/14/2024 I Hospital Day: 5    Assessment/Plan   * Anemia  Assessment & Plan  Pt with hgb 7.6, on admission baseline normallly 10-13  Dropped to 6.6 - received 1u PRBC morning of 5/10  Transfused 1 unit PRBC in ER for symptomatic anemia (pt reported dyspnea), suspected UGI bleed  GI consulted   Continue PPI  Octreotide discontinued as no varices were found on EGD  Tolerating regular low sodium diet  EGD:  Medium type I hiatal hernia with Poli lesions present  Multiple ulcers in the cardia with flat pigmented spot (Eduin IIC); placed 1 clip successfully; hemostasis achieved    Left ventricular apical thrombus  Assessment & Plan  Chronic per prior cardiology notes  Stable S/P MI  Cardiology referral on discharge  Ideally would start on coumadin when stable from GI bleed standpoint     Cirrhosis (HCC)  Assessment & Plan  Cirrhosis, noted on abdominal imaging  Suspected related to alcohol abuse  S/P 5 days Rocephin  EGD negative for varicies  S/P Lactulose enemas as ER suspected mild hepatic encephalopathy which appears improved with non focal CT head   GI following  Received FFP prior to procedure  HCV viral load not detected    History of CVA (cerebrovascular accident)  Assessment & Plan  Maintained aspirin, statin  Aspirin on hold due to GI bleed    Hyperlipidemia  Assessment & Plan  Continue Lipitor    Bipolar 2 disorder, major depressive episode (HCC)  Assessment & Plan  Mood stable  Continue trazodone and Seroquel.         VTE Pharmacologic Prophylaxis: VTE Score: 3 Moderate Risk (Score 3-4) - Pharmacological DVT Prophylaxis Contraindicated. Sequential Compression Devices Ordered.    Mobility:   Basic Mobility Inpatient Raw Score: 20  JH-HLM Goal: 6: Walk 10 steps or more  JH-HLM Achieved: 2: Bed activities/Dependent transfer  JH-HLM Goal NOT  achieved. Continue with multidisciplinary rounding and encourage appropriate mobility to improve upon [facility] goals.    Patient Centered Rounds: I performed bedside rounds with nursing staff today.   Discussions with Specialists or Other Care Team Provider: Discussed with case management, patient needs to be able to do 2 flights of stairs prior to discharge to prior living arrangement otherwise will require rehab placement    Education and Discussions with Family / Patient: Patient declined call to .     Total Time Spent on Date of Encounter in care of patient: 35 mins. This time was spent on one or more of the following: performing physical exam; counseling and coordination of care; obtaining or reviewing history; documenting in the medical record; reviewing/ordering tests, medications or procedures; communicating with other healthcare professionals and discussing with patient's family/caregivers.    Current Length of Stay: 5 day(s)  Current Patient Status: Inpatient   Certification Statement: The patient will continue to require additional inpatient hospital stay due to discharge planning  Discharge Plan: Anticipate discharge in 24-48 hrs to rehab facility.  If patient is able to perform 2 flights of stairs, he can return to his prior living environment    Code Status: Level 1 - Full Code    Subjective:   Patient feels well, is tolerating diet.  He denies abdominal pain or melena.    Objective:     Vitals:   Temp (24hrs), Av.3 °F (37.4 °C), Min:97 °F (36.1 °C), Max:100.9 °F (38.3 °C)    Temp:  [97 °F (36.1 °C)-100.9 °F (38.3 °C)] 99.9 °F (37.7 °C)  HR:  [] 103  Resp:  [18] 18  BP: (100-144)/(61-81) 109/64  SpO2:  [84 %-94 %] 92 %  Body mass index is 32.25 kg/m².     Input and Output Summary (last 24 hours):     Intake/Output Summary (Last 24 hours) at 2024 1000  Last data filed at 2024 0956  Gross per 24 hour   Intake 240 ml   Output 380 ml   Net -140 ml       Physical Exam:    Physical Exam  Vitals and nursing note reviewed.   Constitutional:       General: He is not in acute distress.     Appearance: Normal appearance. He is well-developed. He is obese.   HENT:      Head: Normocephalic and atraumatic.   Eyes:      General: No scleral icterus.     Conjunctiva/sclera: Conjunctivae normal.   Cardiovascular:      Rate and Rhythm: Normal rate and regular rhythm.      Heart sounds: No murmur heard.  Pulmonary:      Effort: Pulmonary effort is normal.      Breath sounds: No wheezing, rhonchi or rales.   Abdominal:      General: There is no distension.      Palpations: Abdomen is soft.   Skin:     General: Skin is warm and dry.   Neurological:      Mental Status: He is alert. Mental status is at baseline.   Psychiatric:         Mood and Affect: Mood normal.        Additional Data:     Labs:  Results from last 7 days   Lab Units 05/14/24  0939 05/14/24  0415   WBC Thousand/uL  --  6.13   HEMOGLOBIN g/dL 9.3* 8.7*   HEMATOCRIT % 29.8* 28.0*   PLATELETS Thousands/uL  --  144*   SEGS PCT %  --  61   LYMPHO PCT %  --  26   MONO PCT %  --  10   EOS PCT %  --  2     Results from last 7 days   Lab Units 05/14/24  0415   SODIUM mmol/L 140   POTASSIUM mmol/L 3.6   CHLORIDE mmol/L 109*   CO2 mmol/L 25   BUN mg/dL 13   CREATININE mg/dL 1.13   ANION GAP mmol/L 6   CALCIUM mg/dL 7.8*   ALBUMIN g/dL 3.1*   TOTAL BILIRUBIN mg/dL 0.47   ALK PHOS U/L 52   ALT U/L 11   AST U/L 13   GLUCOSE RANDOM mg/dL 102     Results from last 7 days   Lab Units 05/13/24  0531   INR  1.12     Results from last 7 days   Lab Units 05/10/24  1650   POC GLUCOSE mg/dl 76         Results from last 7 days   Lab Units 05/09/24  2048 05/09/24  1751 05/09/24  0841 05/09/24  0553 05/07/24  1645 05/07/24  1642   LACTIC ACID mmol/L 1.6 2.1* 2.6* 3.5*   < >  --    PROCALCITONIN ng/ml  --   --   --  0.41*  --  0.13    < > = values in this interval not displayed.       Lines/Drains:  Invasive Devices       Peripheral Intravenous Line  Duration              Peripheral IV 05/12/24 Distal;Right;Upper;Ventral (anterior) Arm 1 day                          Imaging: No pertinent imaging reviewed.    Recent Cultures (last 7 days):   Results from last 7 days   Lab Units 05/09/24  0553 05/07/24  1645 05/07/24  1642   BLOOD CULTURE  No Growth After 4 Days.  No Growth After 4 Days. No Growth After 5 Days. No Growth After 5 Days.       Last 24 Hours Medication List:   Current Facility-Administered Medications   Medication Dose Route Frequency Provider Last Rate    atorvastatin  40 mg Oral Daily With Dinner Mari Morales PA-C      escitalopram  20 mg Oral Daily Mari Morales PA-C      famotidine  20 mg Oral BID Mari Morales PA-C      multi-electrolyte  100 mL/hr Intravenous Continuous Mari Morales PA-C 100 mL/hr (05/13/24 1356)    nicotine  21 mg Transdermal Daily Mari Morales PA-C      nicotine polacrilex  2 mg Oral Q3H PRN Mari Morales PA-C      pantoprazole  40 mg Intravenous Q12H SHANNA Mari Morales PA-C      QUEtiapine  300 mg Oral HS Mari Morales PA-C      traZODone  100 mg Oral HS Mari Morales PA-C          Today, Patient Was Seen By: Mari Morales PA-C    **Please Note: This note may have been constructed using a voice recognition system.**

## 2024-05-14 NOTE — PLAN OF CARE
Problem: PHYSICAL THERAPY ADULT  Goal: Performs mobility at highest level of function for planned discharge setting.  See evaluation for individualized goals.  Description: Treatment/Interventions: Functional transfer training, LE strengthening/ROM, Therapeutic exercise, Endurance training, Patient/family training, Equipment eval/education, Bed mobility, Gait training, Spoke to nursing, OT  Equipment Recommended: Walker       See flowsheet documentation for full assessment, interventions and recommendations.  Outcome: Progressing  Note: Prognosis: Guarded  Problem List: Decreased strength, Impaired balance, Decreased mobility  Assessment: Patient was received supine in bed. Currently on 3L O2. Patient Progressed this session. BP stable. Able to complete a household distance and stairs with supervision. Patient with no c/o lightheadedness. Patient mildly SOB which resolved with rest. Will continue to benefit from ongoing inpatient P.T. Recommending level 3 resources and a RW. Low intensity. The patient's AM-PAC Basic Mobility Inpatient Short Form Raw Score is 20. A Raw score of greater than 17 suggests the patient may benefit from discharge to home. Please also refer to the recommendation of the Physical Therapist for safe discharge planning.  Barriers to Discharge: None  Barriers to Discharge Comments: Will need to trial a full flight when medically stable  Rehab Resource Intensity Level, PT: III (Minimum Resource Intensity)    See flowsheet documentation for full assessment.

## 2024-05-14 NOTE — PLAN OF CARE
Problem: PAIN - ADULT  Goal: Verbalizes/displays adequate comfort level or baseline comfort level  Description: Interventions:  - Encourage patient to monitor pain and request assistance  - Assess pain using appropriate pain scale  - Administer analgesics based on type and severity of pain and evaluate response  - Implement non-pharmacological measures as appropriate and evaluate response  - Consider cultural and social influences on pain and pain management  - Notify physician/advanced practitioner if interventions unsuccessful or patient reports new pain  Outcome: Progressing     Problem: INFECTION - ADULT  Goal: Absence or prevention of progression during hospitalization  Description: INTERVENTIONS:  - Assess and monitor for signs and symptoms of infection  - Monitor lab/diagnostic results  - Monitor all insertion sites, i.e. indwelling lines, tubes, and drains  - Monitor endotracheal if appropriate and nasal secretions for changes in amount and color  - [place] appropriate cooling/warming therapies per order  - Administer medications as ordered  - Instruct and encourage patient and family to use good hand hygiene technique  - Identify and instruct in appropriate isolation precautions for identified infection/condition  Outcome: Progressing  Goal: Absence of fever/infection during neutropenic period  Description: INTERVENTIONS:  - Monitor WBC    Outcome: Progressing     Problem: SAFETY ADULT  Goal: Patient will remain free of falls  Description: INTERVENTIONS:  - Educate patient/family on patient safety including physical limitations  - Instruct patient to call for assistance with activity   - Consult OT/PT to assist with strengthening/mobility   - Keep Call bell within reach  - Keep bed low and locked with side rails adjusted as appropriate  - Keep care items and personal belongings within reach  - Initiate and maintain comfort rounds  - Make Fall Risk Sign visible to staff  - Offer Toileting every 2 Hours,  in advance of need  - Initiate/Maintain 2 alarm  - Obtain necessary fall risk management equipment:  - Apply yellow socks and bracelet for high fall risk patients  - Consider moving patient to room near nurses station  Outcome: Progressing  Goal: Maintain or return to baseline ADL function  Description: INTERVENTIONS:  -  Assess patient's ability to carry out ADLs; assess patient's baseline for ADL function and identify physical deficits which impact ability to perform ADLs (bathing, care of mouth/teeth, toileting, grooming, dressing, etc.)  - Assess/evaluate cause of self-care deficits   - Assess range of motion  - Assess patient's mobility; develop plan if impaired  - Assess patient's need for assistive devices and provide as appropriate  - Encourage maximum independence but intervene and supervise when necessary  - Involve family in performance of ADLs  - Assess for home care needs following discharge   - Consider OT consult to assist with ADL evaluation and planning for discharge  - Provide patient education as appropriate  Outcome: Progressing  Goal: Maintains/Returns to pre admission functional level  Description: INTERVENTIONS:  - Perform AM-PAC 6 Click Basic Mobility/ Daily Activity assessment daily.  - Set and communicate daily mobility goal to care team and patient/family/caregiver.   - Collaborate with rehabilitation services on mobility goals if consulted  - Perform Range of Motion 3 times a day.  - Reposition patient every 3 hours.  - Dangle patient 3 times a day  - Stand patient 3 times a day  - Ambulate patient 3 times a day  - Out of bed to chair 3 times a day   - Out of bed for meals 3 times a day  - Out of bed for toileting  - Record patient progress and toleration of activity level   Outcome: Progressing     Problem: DISCHARGE PLANNING  Goal: Discharge to home or other facility with appropriate resources  Description: INTERVENTIONS:  - Identify barriers to discharge w/patient and caregiver  -  Arrange for needed discharge resources and transportation as appropriate  - Identify discharge learning needs (meds, wound care, etc.)  - Arrange for interpretive services to assist at discharge as needed  - Refer to Case Management Department for coordinating discharge planning if the patient needs post-hospital services based on physician/advanced practitioner order or complex needs related to functional status, cognitive ability, or social support system  Outcome: Progressing     Problem: Knowledge Deficit  Goal: Patient/family/caregiver demonstrates understanding of disease process, treatment plan, medications, and discharge instructions  Description: Complete learning assessment and assess knowledge base.  Interventions:  - Provide teaching at level of understanding  - Provide teaching via preferred learning methods  Outcome: Progressing     Problem: GASTROINTESTINAL - ADULT  Goal: Maintains or returns to baseline bowel function  Description: INTERVENTIONS:  - Assess bowel function  - Encourage oral fluids to ensure adequate hydration  - Administer IV fluids if ordered to ensure adequate hydration  - Administer ordered medications as needed  - Encourage mobilization and activity  - Consider nutritional services referral to assist patient with adequate nutrition and appropriate food choices  Outcome: Progressing     Problem: CARDIOVASCULAR - ADULT  Goal: Maintains optimal cardiac output and hemodynamic stability  Description: INTERVENTIONS:  - Monitor I/O, vital signs and rhythm  - Monitor for S/S and trends of decreased cardiac output  - Administer and titrate ordered vasoactive medications to optimize hemodynamic stability  - Assess quality of pulses, skin color and temperature  - Assess for signs of decreased coronary artery perfusion  - Instruct patient to report change in severity of symptoms  Outcome: Progressing     Problem: HEMATOLOGIC - ADULT  Goal: Maintains hematologic stability  Description:  INTERVENTIONS  - Assess for signs and symptoms of bleeding or hemorrhage  - Monitor labs  - Administer supportive blood products/factors as ordered and appropriate  Outcome: Progressing     Problem: Prexisting or High Potential for Compromised Skin Integrity  Goal: Skin integrity is maintained or improved  Description: INTERVENTIONS:  - Identify patients at risk for skin breakdown  - Assess and monitor skin integrity  - Assess and monitor nutrition and hydration status  - Monitor labs   - Assess for incontinence   - Turn and reposition patient  - Assist with mobility/ambulation  - Relieve pressure over bony prominences  - Avoid friction and shearing  - Provide appropriate hygiene as needed including keeping skin clean and dry  - Evaluate need for skin moisturizer/barrier cream  - Collaborate with interdisciplinary team   - Patient/family teaching  - Consider wound care consult   Outcome: Progressing     Problem: Potential for Falls  Goal: Patient will remain free of falls  Description: INTERVENTIONS:  - Educate patient/family on patient safety including physical limitations  - Instruct patient to call for assistance with activity   - Consult OT/PT to assist with strengthening/mobility   - Keep Call bell within reach  - Keep bed low and locked with side rails adjusted as appropriate  - Keep care items and personal belongings within reach  - Initiate and maintain comfort rounds  - Make Fall Risk Sign visible to staff  - Offer Toileting every 2 Hours, in advance of need  - Initiate/Maintain 2 alarm  - Obtain necessary fall risk management equipment:  - Apply yellow socks and bracelet for high fall risk patients  - Consider moving patient to room near nurses station  Outcome: Progressing

## 2024-05-14 NOTE — CASE MANAGEMENT
Case Management Progress Note    Patient name Chuy Norton  Location /-01 MRN [id number]  : 1960 Date 2024       LOS (days): 5  Geometric Mean LOS (GMLOS) (days):   Days to GMLOS:        OBJECTIVE:        Current admission status: Inpatient  Preferred Pharmacy:   [id number] - [place] - [id number] [facility]  [id number] [facility] [address]  Phone: [phone] Fax: [phone]    Primary Care Provider: Savanna Pan DO    Primary Insurance: [employer]  Secondary Insurance: JOEL MUNOZ    PROGRESS NOTE: Cm left  for Ace pts Cm at [facility]. This Cm inquiring about whether pt can return with O2 if needed. Pt is on O2 at this time however it is not known that pt was in need of O2. Pt also recommended a walker. Cm waiting for return call regarding these needs.

## 2024-05-14 NOTE — ASSESSMENT & PLAN NOTE
Cirrhosis, noted on abdominal imaging  Suspected related to alcohol abuse  S/P 5 days Rocephin  EGD negative for varicies  S/P Lactulose enemas as ER suspected mild hepatic encephalopathy which appears improved with non focal CT head   GI following  Received FFP prior to procedure  HCV viral load not detected

## 2024-05-14 NOTE — ASSESSMENT & PLAN NOTE
Pt with hgb 7.6, on admission baseline normallly 10-13  Dropped to 6.6 - received 1u PRBC morning of 5/10  Transfused 1 unit PRBC in ER for symptomatic anemia (pt reported dyspnea), suspected UGI bleed  GI consulted   Continue PPI  Octreotide discontinued as no varices were found on EGD  Tolerating regular low sodium diet  EGD:  Medium type I hiatal hernia with Poli lesions present  Multiple ulcers in the cardia with flat pigmented spot (Eduin IIC); placed 1 clip successfully; hemostasis achieved

## 2024-05-14 NOTE — ASSESSMENT & PLAN NOTE
CT consistent with inflammatory/infectious process of LLL  Incidental nodules and hepatic densities can be followed up with nonemergent outpatient studies  Shortness of breath on admission: Stable on room air  No SIRS criteria met at time of admission  Rocephin and azithromycin initiated in ED, continue

## 2024-05-15 VITALS
WEIGHT: 212.08 LBS | HEART RATE: 74 BPM | SYSTOLIC BLOOD PRESSURE: 115 MMHG | DIASTOLIC BLOOD PRESSURE: 74 MMHG | OXYGEN SATURATION: 92 % | HEIGHT: 68 IN | BODY MASS INDEX: 32.14 KG/M2 | RESPIRATION RATE: 20 BRPM | TEMPERATURE: 98.4 F

## 2024-05-15 PROBLEM — B19.20 HEPATITIS C: Status: ACTIVE | Noted: 2024-05-15

## 2024-05-15 PROCEDURE — C9113 INJ PANTOPRAZOLE SODIUM, VIA: HCPCS | Performed by: PHYSICIAN ASSISTANT

## 2024-05-15 RX ORDER — WARFARIN SODIUM 2 MG/1
2 TABLET ORAL
Status: ON HOLD | COMMUNITY
End: 2024-05-15

## 2024-05-15 RX ORDER — FUROSEMIDE 10 MG/ML
40 INJECTION INTRAMUSCULAR; INTRAVENOUS ONCE
Status: COMPLETED | OUTPATIENT
Start: 2024-05-15 | End: 2024-05-15

## 2024-05-15 RX ORDER — ATORVASTATIN CALCIUM 40 MG/1
40 TABLET, FILM COATED ORAL
Qty: 30 TABLET | Refills: 0 | Status: SHIPPED | OUTPATIENT
Start: 2024-05-15

## 2024-05-15 RX ORDER — FAMOTIDINE 20 MG/1
20 TABLET, FILM COATED ORAL 2 TIMES DAILY
Qty: 30 TABLET | Refills: 0 | Status: SHIPPED | OUTPATIENT
Start: 2024-05-15

## 2024-05-15 RX ORDER — ESCITALOPRAM OXALATE 20 MG/1
20 TABLET ORAL DAILY
Qty: 21 TABLET | Refills: 0 | Status: SHIPPED | OUTPATIENT
Start: 2024-05-15

## 2024-05-15 RX ORDER — WARFARIN SODIUM 2 MG/1
2 TABLET ORAL
Qty: 30 TABLET | Refills: 0 | Status: SHIPPED | OUTPATIENT
Start: 2024-05-15

## 2024-05-15 RX ORDER — QUETIAPINE FUMARATE 300 MG/1
300 TABLET, FILM COATED ORAL
Qty: 30 TABLET | Refills: 0 | Status: SHIPPED | OUTPATIENT
Start: 2024-05-15

## 2024-05-15 RX ORDER — TRAZODONE HYDROCHLORIDE 100 MG/1
100 TABLET ORAL
Qty: 21 TABLET | Refills: 0 | Status: SHIPPED | OUTPATIENT
Start: 2024-05-15

## 2024-05-15 RX ORDER — PANTOPRAZOLE SODIUM 40 MG/1
40 TABLET, DELAYED RELEASE ORAL DAILY
Qty: 30 TABLET | Refills: 0 | Status: SHIPPED | OUTPATIENT
Start: 2024-05-15

## 2024-05-15 RX ORDER — POLYETHYLENE GLYCOL 3350 17 G/17G
17 POWDER, FOR SOLUTION ORAL DAILY PRN
Qty: 30 EACH | Refills: 0 | Status: SHIPPED | OUTPATIENT
Start: 2024-05-15

## 2024-05-15 RX ORDER — GLECAPREVIR AND PIBRENTASVIR 40; 100 MG/1; MG/1
300 TABLET, FILM COATED ORAL DAILY
Qty: 90 TABLET | Refills: 0 | Status: SHIPPED | OUTPATIENT
Start: 2024-05-15 | End: 2024-06-14

## 2024-05-15 RX ADMIN — ESCITALOPRAM OXALATE 20 MG: 20 TABLET ORAL at 09:06

## 2024-05-15 RX ADMIN — FAMOTIDINE 20 MG: 20 TABLET, FILM COATED ORAL at 09:06

## 2024-05-15 RX ADMIN — DOCUSATE SODIUM 100 MG: 100 CAPSULE, LIQUID FILLED ORAL at 09:06

## 2024-05-15 RX ADMIN — PANTOPRAZOLE SODIUM 40 MG: 40 INJECTION, POWDER, FOR SOLUTION INTRAVENOUS at 09:06

## 2024-05-15 RX ADMIN — FUROSEMIDE 40 MG: 10 INJECTION, SOLUTION INTRAMUSCULAR; INTRAVENOUS at 09:06

## 2024-05-15 RX ADMIN — NICOTINE 21 MG: 21 PATCH, EXTENDED RELEASE TRANSDERMAL at 09:11

## 2024-05-15 RX ADMIN — POLYETHYLENE GLYCOL 3350 17 G: 17 POWDER, FOR SOLUTION ORAL at 09:06

## 2024-05-15 NOTE — PLAN OF CARE
Problem: PAIN - ADULT  Goal: Verbalizes/displays adequate comfort level or baseline comfort level  Description: Interventions:  - Encourage patient to monitor pain and request assistance  - Assess pain using appropriate pain scale  - Administer analgesics based on type and severity of pain and evaluate response  - Implement non-pharmacological measures as appropriate and evaluate response  - Consider cultural and social influences on pain and pain management  - Notify physician/advanced practitioner if interventions unsuccessful or patient reports new pain  Outcome: Progressing     Problem: INFECTION - ADULT  Goal: Absence or prevention of progression during hospitalization  Description: INTERVENTIONS:  - Assess and monitor for signs and symptoms of infection  - Monitor lab/diagnostic results  - Monitor all insertion sites, i.e. indwelling lines, tubes, and drains  - Monitor endotracheal if appropriate and nasal secretions for changes in amount and color  - [place] appropriate cooling/warming therapies per order  - Administer medications as ordered  - Instruct and encourage patient and family to use good hand hygiene technique  - Identify and instruct in appropriate isolation precautions for identified infection/condition  Outcome: Progressing  Goal: Absence of fever/infection during neutropenic period  Description: INTERVENTIONS:  - Monitor WBC    Outcome: Progressing     Problem: SAFETY ADULT  Goal: Patient will remain free of falls  Description: INTERVENTIONS:  - Educate patient/family on patient safety including physical limitations  - Instruct patient to call for assistance with activity   - Consult OT/PT to assist with strengthening/mobility   - Keep Call bell within reach  - Keep bed low and locked with side rails adjusted as appropriate  - Keep care items and personal belongings within reach  - Initiate and maintain comfort rounds  - Make Fall Risk Sign visible to staff  - Offer Toileting every 2 Hours,  in advance of need  - Initiate/Maintain 2 alarm  - Obtain necessary fall risk management equipment:  - Apply yellow socks and bracelet for high fall risk patients  - Consider moving patient to room near nurses station  Outcome: Progressing  Goal: Maintain or return to baseline ADL function  Description: INTERVENTIONS:  -  Assess patient's ability to carry out ADLs; assess patient's baseline for ADL function and identify physical deficits which impact ability to perform ADLs (bathing, care of mouth/teeth, toileting, grooming, dressing, etc.)  - Assess/evaluate cause of self-care deficits   - Assess range of motion  - Assess patient's mobility; develop plan if impaired  - Assess patient's need for assistive devices and provide as appropriate  - Encourage maximum independence but intervene and supervise when necessary  - Involve family in performance of ADLs  - Assess for home care needs following discharge   - Consider OT consult to assist with ADL evaluation and planning for discharge  - Provide patient education as appropriate  Outcome: Progressing  Goal: Maintains/Returns to pre admission functional level  Description: INTERVENTIONS:  - Perform AM-PAC 6 Click Basic Mobility/ Daily Activity assessment daily.  - Set and communicate daily mobility goal to care team and patient/family/caregiver.   - Collaborate with rehabilitation services on mobility goals if consulted  - Perform Range of Motion 3 times a day.  - Reposition patient every 3 hours.  - Dangle patient 3 times a day  - Stand patient 3 times a day  - Ambulate patient 3 times a day  - Out of bed to chair 3 times a day   - Out of bed for meals 3 times a day  - Out of bed for toileting  - Record patient progress and toleration of activity level   Outcome: Progressing     Problem: DISCHARGE PLANNING  Goal: Discharge to home or other facility with appropriate resources  Description: INTERVENTIONS:  - Identify barriers to discharge w/patient and caregiver  -  Arrange for needed discharge resources and transportation as appropriate  - Identify discharge learning needs (meds, wound care, etc.)  - Arrange for interpretive services to assist at discharge as needed  - Refer to Case Management Department for coordinating discharge planning if the patient needs post-hospital services based on physician/advanced practitioner order or complex needs related to functional status, cognitive ability, or social support system  Outcome: Progressing     Problem: Knowledge Deficit  Goal: Patient/family/caregiver demonstrates understanding of disease process, treatment plan, medications, and discharge instructions  Description: Complete learning assessment and assess knowledge base.  Interventions:  - Provide teaching at level of understanding  - Provide teaching via preferred learning methods  Outcome: Progressing     Problem: GASTROINTESTINAL - ADULT  Goal: Maintains or returns to baseline bowel function  Description: INTERVENTIONS:  - Assess bowel function  - Encourage oral fluids to ensure adequate hydration  - Administer IV fluids if ordered to ensure adequate hydration  - Administer ordered medications as needed  - Encourage mobilization and activity  - Consider nutritional services referral to assist patient with adequate nutrition and appropriate food choices  Outcome: Progressing     Problem: CARDIOVASCULAR - ADULT  Goal: Maintains optimal cardiac output and hemodynamic stability  Description: INTERVENTIONS:  - Monitor I/O, vital signs and rhythm  - Monitor for S/S and trends of decreased cardiac output  - Administer and titrate ordered vasoactive medications to optimize hemodynamic stability  - Assess quality of pulses, skin color and temperature  - Assess for signs of decreased coronary artery perfusion  - Instruct patient to report change in severity of symptoms  Outcome: Progressing     Problem: HEMATOLOGIC - ADULT  Goal: Maintains hematologic stability  Description:  INTERVENTIONS  - Assess for signs and symptoms of bleeding or hemorrhage  - Monitor labs  - Administer supportive blood products/factors as ordered and appropriate  Outcome: Progressing     Problem: Prexisting or High Potential for Compromised Skin Integrity  Goal: Skin integrity is maintained or improved  Description: INTERVENTIONS:  - Identify patients at risk for skin breakdown  - Assess and monitor skin integrity  - Assess and monitor nutrition and hydration status  - Monitor labs   - Assess for incontinence   - Turn and reposition patient  - Assist with mobility/ambulation  - Relieve pressure over bony prominences  - Avoid friction and shearing  - Provide appropriate hygiene as needed including keeping skin clean and dry  - Evaluate need for skin moisturizer/barrier cream  - Collaborate with interdisciplinary team   - Patient/family teaching  - Consider wound care consult   Outcome: Progressing     Problem: Potential for Falls  Goal: Patient will remain free of falls  Description: INTERVENTIONS:  - Educate patient/family on patient safety including physical limitations  - Instruct patient to call for assistance with activity   - Consult OT/PT to assist with strengthening/mobility   - Keep Call bell within reach  - Keep bed low and locked with side rails adjusted as appropriate  - Keep care items and personal belongings within reach  - Initiate and maintain comfort rounds  - Make Fall Risk Sign visible to staff  - Offer Toileting every 2 Hours, in advance of need  - Initiate/Maintain 2 alarm  - Obtain necessary fall risk management equipment:  - Apply yellow socks and bracelet for high fall risk patients  - Consider moving patient to room near nurses station  Outcome: Progressing

## 2024-05-15 NOTE — PLAN OF CARE
Problem: PAIN - ADULT  Goal: Verbalizes/displays adequate comfort level or baseline comfort level  Description: Interventions:  - Encourage patient to monitor pain and request assistance  - Assess pain using appropriate pain scale  - Administer analgesics based on type and severity of pain and evaluate response  - Implement non-pharmacological measures as appropriate and evaluate response  - Consider cultural and social influences on pain and pain management  - Notify physician/advanced practitioner if interventions unsuccessful or patient reports new pain  Outcome: Progressing     Problem: INFECTION - ADULT  Goal: Absence or prevention of progression during hospitalization  Description: INTERVENTIONS:  - Assess and monitor for signs and symptoms of infection  - Monitor lab/diagnostic results  - Monitor all insertion sites, i.e. indwelling lines, tubes, and drains  - Monitor endotracheal if appropriate and nasal secretions for changes in amount and color  - [place] appropriate cooling/warming therapies per order  - Administer medications as ordered  - Instruct and encourage patient and family to use good hand hygiene technique  - Identify and instruct in appropriate isolation precautions for identified infection/condition  Outcome: Progressing  Goal: Absence of fever/infection during neutropenic period  Description: INTERVENTIONS:  - Monitor WBC    Outcome: Progressing

## 2024-05-15 NOTE — CASE MANAGEMENT
Case Management Progress Note    Patient name Chuy Norton  Location /-01 MRN [id number]  : 1960 Date 5/15/2024       LOS (days): 6  Geometric Mean LOS (GMLOS) (days):   Days to GMLOS:        OBJECTIVE:        Current admission status: Inpatient  Preferred Pharmacy:   [facility] - [id number]  [address] [address]  Phone: [phone] Fax: [phone]    Primary Care Provider: Savanna Pan DO    Primary Insurance: [employer]  Secondary Insurance: [employer]UNO[employer]    PROGRESS NOTE: P is cleared for DC back to [facility] today. Pt to dc with a walker. Cm spoke with Ace from [facility]. AVS faxed to [facility] at [phone]. Pt to be picked up by [facility] staff at 1pm. All scripts sent to pts pharmacy. Nursing and Provider aware.

## 2024-05-15 NOTE — ASSESSMENT & PLAN NOTE
Chronic per prior cardiology notes  Stable S/P MI  Cardiology referral on discharge  Per group home, patient is maintained on Coumadin 2 mg daily  Continue on discharge

## 2024-05-17 LAB
DME PARACHUTE DELIVERY DATE ACTUAL: NORMAL
DME PARACHUTE DELIVERY DATE REQUESTED: NORMAL
DME PARACHUTE ITEM DESCRIPTION: NORMAL
DME PARACHUTE ORDER STATUS: NORMAL
DME PARACHUTE SUPPLIER NAME: NORMAL
DME PARACHUTE SUPPLIER PHONE: NORMAL

## 2024-05-23 ENCOUNTER — APPOINTMENT (EMERGENCY)
Dept: RADIOLOGY | Facility: HOSPITAL | Age: 64
End: 2024-05-23
Payer: COMMERCIAL

## 2024-05-23 ENCOUNTER — HOSPITAL ENCOUNTER (EMERGENCY)
Facility: HOSPITAL | Age: 64
Discharge: HOME | End: 2024-05-23
Attending: EMERGENCY MEDICINE
Payer: COMMERCIAL

## 2024-05-23 VITALS
OXYGEN SATURATION: 94 % | HEART RATE: 90 BPM | SYSTOLIC BLOOD PRESSURE: 158 MMHG | TEMPERATURE: 98.8 F | BODY MASS INDEX: 32.25 KG/M2 | DIASTOLIC BLOOD PRESSURE: 100 MMHG | HEIGHT: 68 IN | RESPIRATION RATE: 20 BRPM

## 2024-05-23 DIAGNOSIS — R07.89 ATYPICAL CHEST PAIN: Primary | ICD-10-CM

## 2024-05-23 DIAGNOSIS — K27.9 PEPTIC ULCER DISEASE: ICD-10-CM

## 2024-05-23 DIAGNOSIS — K70.30 ALCOHOLIC CIRRHOSIS OF LIVER WITHOUT ASCITES (HCC): ICD-10-CM

## 2024-05-23 LAB
2HR DELTA HS TROPONIN: 2 NG/L
ALBUMIN SERPL BCP-MCNC: 3.6 G/DL (ref 3.5–5)
ALP SERPL-CCNC: 79 U/L (ref 34–104)
ALT SERPL W P-5'-P-CCNC: 80 U/L (ref 7–52)
ANION GAP SERPL CALCULATED.3IONS-SCNC: 6 MMOL/L (ref 4–13)
APTT PPP: 32 SECONDS (ref 23–37)
AST SERPL W P-5'-P-CCNC: 48 U/L (ref 13–39)
ATRIAL RATE: 81 BPM
BASOPHILS # BLD AUTO: 0.02 THOUSANDS/ÂΜL (ref 0–0.1)
BASOPHILS NFR BLD AUTO: 0 % (ref 0–1)
BILIRUB SERPL-MCNC: 0.67 MG/DL (ref 0.2–1)
BILIRUB UR QL STRIP: NEGATIVE
BUN SERPL-MCNC: 14 MG/DL (ref 5–25)
CALCIUM SERPL-MCNC: 8.4 MG/DL (ref 8.4–10.2)
CARDIAC TROPONIN I PNL SERPL HS: 10 NG/L
CARDIAC TROPONIN I PNL SERPL HS: 8 NG/L
CHLORIDE SERPL-SCNC: 109 MMOL/L (ref 96–108)
CLARITY UR: CLEAR
CO2 SERPL-SCNC: 23 MMOL/L (ref 21–32)
COLOR UR: YELLOW
CREAT SERPL-MCNC: 1.14 MG/DL (ref 0.6–1.3)
EOSINOPHIL # BLD AUTO: 0.12 THOUSAND/ÂΜL (ref 0–0.61)
EOSINOPHIL NFR BLD AUTO: 3 % (ref 0–6)
ERYTHROCYTE [DISTWIDTH] IN BLOOD BY AUTOMATED COUNT: 17.6 % (ref 11.6–15.1)
GFR SERPL CREATININE-BSD FRML MDRD: 68 ML/MIN/1.73SQ M
GLUCOSE SERPL-MCNC: 111 MG/DL (ref 65–140)
GLUCOSE UR STRIP-MCNC: NEGATIVE MG/DL
HCT VFR BLD AUTO: 29.6 % (ref 36.5–49.3)
HGB BLD-MCNC: 9.1 G/DL (ref 12–17)
HGB UR QL STRIP.AUTO: NEGATIVE
IMM GRANULOCYTES # BLD AUTO: 0.03 THOUSAND/UL (ref 0–0.2)
IMM GRANULOCYTES NFR BLD AUTO: 1 % (ref 0–2)
INR PPP: 1.23 (ref 0.84–1.19)
KETONES UR STRIP-MCNC: NEGATIVE MG/DL
LEUKOCYTE ESTERASE UR QL STRIP: NEGATIVE
LYMPHOCYTES # BLD AUTO: 1.25 THOUSANDS/ÂΜL (ref 0.6–4.47)
LYMPHOCYTES NFR BLD AUTO: 27 % (ref 14–44)
MCH RBC QN AUTO: 25.6 PG (ref 26.8–34.3)
MCHC RBC AUTO-ENTMCNC: 30.7 G/DL (ref 31.4–37.4)
MCV RBC AUTO: 83 FL (ref 82–98)
MONOCYTES # BLD AUTO: 0.49 THOUSAND/ÂΜL (ref 0.17–1.22)
MONOCYTES NFR BLD AUTO: 10 % (ref 4–12)
NEUTROPHILS # BLD AUTO: 2.78 THOUSANDS/ÂΜL (ref 1.85–7.62)
NEUTS SEG NFR BLD AUTO: 59 % (ref 43–75)
NITRITE UR QL STRIP: NEGATIVE
NRBC BLD AUTO-RTO: 0 /100 WBCS
P AXIS: 48 DEGREES
PH UR STRIP.AUTO: 7 [PH]
PLATELET # BLD AUTO: 296 THOUSANDS/UL (ref 149–390)
PMV BLD AUTO: 11.8 FL (ref 8.9–12.7)
POTASSIUM SERPL-SCNC: 4 MMOL/L (ref 3.5–5.3)
PR INTERVAL: 228 MS
PROT SERPL-MCNC: 6.8 G/DL (ref 6.4–8.4)
PROT UR STRIP-MCNC: NEGATIVE MG/DL
PROTHROMBIN TIME: 15.9 SECONDS (ref 11.6–14.5)
QRS AXIS: -18 DEGREES
QRSD INTERVAL: 98 MS
QT INTERVAL: 350 MS
QTC INTERVAL: 406 MS
RBC # BLD AUTO: 3.56 MILLION/UL (ref 3.88–5.62)
SODIUM SERPL-SCNC: 138 MMOL/L (ref 135–147)
SP GR UR STRIP.AUTO: 1.02 (ref 1–1.03)
T WAVE AXIS: 70 DEGREES
UROBILINOGEN UR STRIP-ACNC: 2 MG/DL
VENTRICULAR RATE: 81 BPM
WBC # BLD AUTO: 4.69 THOUSAND/UL (ref 4.31–10.16)

## 2024-05-23 PROCEDURE — 85730 THROMBOPLASTIN TIME PARTIAL: CPT | Performed by: EMERGENCY MEDICINE

## 2024-05-23 PROCEDURE — 85610 PROTHROMBIN TIME: CPT | Performed by: EMERGENCY MEDICINE

## 2024-05-23 PROCEDURE — 84484 ASSAY OF TROPONIN QUANT: CPT | Performed by: EMERGENCY MEDICINE

## 2024-05-23 PROCEDURE — 99285 EMERGENCY DEPT VISIT HI MDM: CPT

## 2024-05-23 PROCEDURE — 93010 ELECTROCARDIOGRAM REPORT: CPT | Performed by: INTERNAL MEDICINE

## 2024-05-23 PROCEDURE — 93005 ELECTROCARDIOGRAM TRACING: CPT

## 2024-05-23 PROCEDURE — 80053 COMPREHEN METABOLIC PANEL: CPT | Performed by: EMERGENCY MEDICINE

## 2024-05-23 PROCEDURE — 36415 COLL VENOUS BLD VENIPUNCTURE: CPT | Performed by: EMERGENCY MEDICINE

## 2024-05-23 PROCEDURE — 99285 EMERGENCY DEPT VISIT HI MDM: CPT | Performed by: EMERGENCY MEDICINE

## 2024-05-23 PROCEDURE — 85025 COMPLETE CBC W/AUTO DIFF WBC: CPT | Performed by: EMERGENCY MEDICINE

## 2024-05-23 PROCEDURE — 81003 URINALYSIS AUTO W/O SCOPE: CPT | Performed by: EMERGENCY MEDICINE

## 2024-05-23 PROCEDURE — 71045 X-RAY EXAM CHEST 1 VIEW: CPT

## 2024-05-23 NOTE — ED NOTES
Provider at bedside.     Michelle Philippe RN  05/23/24 0637     SUBJECTIVE:  Pan Cota 

## 2024-05-23 NOTE — ED PROVIDER NOTES
History  Chief Complaint   Patient presents with    Chest Pain     Arriving via EMS from home with chest pain x1 hour that he describes as a constant stabbing pain. Given 325mg of ASA PTA.      64-year-old male comes from [place] home complaining of retrosternal chest pain that was moderate in intensity.  Began at rest about an hour ago and lasted about 40 to 45 minutes.  Denies associated nausea, diaphoresis, dyspnea, palpitations and syncope.  Has no discomfort currently.  Patient was admitted May 19 to May 15 this year for anemia due to GI bleed.  He was found to have hiatal hernia and multiple ulcers in the cardia.  He had 1 clipped.  He had no esophageal varices.  Also noted to have history of hepatitis C, a stable left ventricular apical thrombus for which she is on Coumadin, cirrhosis suspected due to alcohol use, prior CVA, hyperlipidemia and bipolar 2 disorder.  Patient does also smoke cigarettes.  Patient is a poor historian and has minimal insight into his medical issues.  He does not feel this was due to GERD and has no other complaints.  Again he is a poor historian.          Prior to Admission Medications   Prescriptions Last Dose Informant Patient Reported? Taking?   Glecaprevir-Pibrentasvir (Mavyret) 100-40 MG TABS   No No   Sig: Take 300 mg by mouth in the morning   QUEtiapine (SEROquel) 300 mg tablet   No No   Sig: Take 1 tablet (300 mg total) by mouth daily at bedtime   aspirin (ECOTRIN LOW STRENGTH) 81 mg EC tablet   No No   Sig: Take 1 tablet (81 mg total) by mouth daily   atorvastatin (LIPITOR) 40 mg tablet   No No   Sig: Take 1 tablet (40 mg total) by mouth daily with dinner   diclofenac sodium (Voltaren) 1 %   No No   Sig: Apply 2 g topically 4 (four) times a day   escitalopram (LEXAPRO) 20 mg tablet   No No   Sig: Take 1 tablet (20 mg total) by mouth daily   famotidine (PEPCID) 20 mg tablet   No No   Sig: Take 1 tablet (20 mg total) by mouth 2 (two) times a day   nicotine polacrilex  "(NICORETTE) 2 mg gum   No No   Sig: Chew 1 each (2 mg total) every 3 (three) hours as needed for smoking cessation   pantoprazole (PROTONIX) 40 mg tablet   No No   Sig: Take 1 tablet (40 mg total) by mouth daily   polyethylene glycol (MIRALAX) 17 g packet   No No   Sig: Take 17 g by mouth daily as needed (constipation)   traZODone (DESYREL) 100 mg tablet   No No   Sig: Take 1 tablet (100 mg total) by mouth daily at bedtime   warfarin (COUMADIN) 2 mg tablet   No No   Sig: Take 1 tablet (2 mg total) by mouth daily      Facility-Administered Medications: None       Past Medical History:   Diagnosis Date    Alcohol abuse     Depression     Drug therapy     GERD (gastroesophageal reflux disease)     Hypertension 01/2012    Knee pain, bilateral     Psychiatric disorder     depression, anxiety    Self-injurious behavior     Spinal stenosis of lumbar region     Suicide attempt (HCC)        Past Surgical History:   Procedure Laterality Date    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    HERNIA REPAIR  1997       Family History   Problem Relation Age of Onset    Depression Mother     Depression Father     No Known Problems Sister     No Known Problems Brother      I have reviewed and agree with the history as documented.    E-Cigarette/Vaping     E-Cigarette/Vaping Substances     Social History     Tobacco Use    Smoking status: Every Day     Current packs/day: 1.00     Types: Cigarettes, Cigars    Smokeless tobacco: Never    Tobacco comments:     PT \"3 CIGARS A DAY\" - quit smoking cigars   Substance Use Topics    Alcohol use: Not Currently     Alcohol/week: 0.0 standard drinks of alcohol     Comment: Stopped drinking for several months - recovered alcoholic    Drug use: No     Types: Marijuana     Comment: Last used marijuana 2 months ago.- no longer smoking       Review of Systems   Constitutional:  Negative for fever.   Respiratory:  Positive for shortness of breath (Dyspnea on exertion). Negative for cough.  "   Cardiovascular:  Negative for chest pain, palpitations and leg swelling.   Gastrointestinal:  Negative for abdominal pain, blood in stool, diarrhea and nausea.   Neurological:  Negative for dizziness, syncope and light-headedness.       Physical Exam  Physical Exam  Vitals and nursing note reviewed.   Constitutional:       General: He is not in acute distress.     Appearance: He is well-developed and normal weight. He is not ill-appearing or diaphoretic.   HENT:      Head: Normocephalic and atraumatic.      Right Ear: External ear normal.      Left Ear: External ear normal.   Eyes:      General: No scleral icterus.     Conjunctiva/sclera: Conjunctivae normal.   Neck:      Vascular: No JVD.   Cardiovascular:      Rate and Rhythm: Normal rate and regular rhythm.      Pulses:           Radial pulses are 3+ on the right side and 3+ on the left side.      Heart sounds: Normal heart sounds. No murmur heard.  Pulmonary:      Effort: Pulmonary effort is normal. No respiratory distress.      Breath sounds: Normal breath sounds.   Chest:      Chest wall: No tenderness.   Abdominal:      General: There is no abdominal bruit.      Palpations: Abdomen is soft. There is no fluid wave, hepatomegaly, splenomegaly or mass.      Tenderness: There is no abdominal tenderness.   Musculoskeletal:         General: No tenderness. Normal range of motion.      Cervical back: Normal range of motion and neck supple.      Right lower leg: No tenderness. Edema (Trace) present.      Left lower leg: No tenderness. Edema (Trace) present.   Lymphadenopathy:      Cervical: No cervical adenopathy.   Skin:     General: Skin is warm and dry.      Capillary Refill: Capillary refill takes less than 2 seconds.      Findings: No rash.   Neurological:      General: No focal deficit present.      Mental Status: He is alert. Mental status is at baseline.      Cranial Nerves: No cranial nerve deficit.      Motor: No weakness.      Coordination: Coordination  normal.      Deep Tendon Reflexes: Reflexes are normal and symmetric.   Psychiatric:         Mood and Affect: Mood normal.         Behavior: Behavior normal.         Vital Signs  ED Triage Vitals   Temperature Pulse Respirations Blood Pressure SpO2   05/23/24 1633 05/23/24 1633 05/23/24 1633 05/23/24 1631 05/23/24 1633   99.8 °F (37.7 °C) 84 20 138/93 99 %      Temp Source Heart Rate Source Patient Position - Orthostatic VS BP Location FiO2 (%)   05/23/24 1633 05/23/24 1633 05/23/24 1631 05/23/24 1631 --   Oral Monitor Lying Left arm       Pain Score       05/23/24 1911       No Pain           Vitals:    05/23/24 1730 05/23/24 1800 05/23/24 1900 05/23/24 1932   BP: 155/88 160/86 158/100    Pulse: 84 76 86 90   Patient Position - Orthostatic VS:             Visual Acuity      ED Medications  Medications - No data to display    Diagnostic Studies  Results Reviewed       Procedure Component Value Units Date/Time    HS Troponin I 2hr [[id number]]  (Normal) Collected: 05/23/24 1846    Lab Status: Final result Specimen: Blood from Arm, Right Updated: 05/23/24 1915     hs TnI 2hr 10 ng/L      Delta 2hr hsTnI 2 ng/L     UA (URINE) with reflex to Scope [[id number]]  (Abnormal) Collected: 05/23/24 1846    Lab Status: Final result Specimen: Urine, Clean Catch Updated: 05/23/24 1851     Color, UA Yellow     Clarity, UA Clear     Specific Gravity, UA 1.020     pH, UA 7.0     Leukocytes, UA Negative     Nitrite, UA Negative     Protein, UA Negative mg/dl      Glucose, UA Negative mg/dl      Ketones, UA Negative mg/dl      Urobilinogen, UA 2.0 mg/dl      Bilirubin, UA Negative     Occult Blood, UA Negative    Comprehensive metabolic panel [[id number]]  (Abnormal) Collected: 05/23/24 1658    Lab Status: Final result Specimen: Blood from Arm, Left Updated: 05/23/24 1731     Sodium 138 mmol/L      Potassium 4.0 mmol/L      Chloride 109 mmol/L      CO2 23 mmol/L      ANION GAP 6 mmol/L      BUN 14 mg/dL      Creatinine 1.14 mg/dL       Glucose 111 mg/dL      Calcium 8.4 mg/dL      AST 48 U/L      ALT 80 U/L      Alkaline Phosphatase 79 U/L      Total Protein 6.8 g/dL      Albumin 3.6 g/dL      Total Bilirubin 0.67 mg/dL      eGFR 68 ml/min/1.73sq m     Narrative:      National Kid[employer] Disease Foundation guidelines for Chronic Kidney Disease (CKD):     Stage 1 with normal or high GFR (GFR > 90 mL/min/1.73 square meters)    Stage 2 Mild CKD (GFR = 60-89 mL/min/1.73 square meters)    Stage 3A Moderate CKD (GFR = 45-59 mL/min/1.73 square meters)    Stage 3B Moderate CKD (GFR = 30-44 mL/min/1.73 square meters)    Stage 4 Severe CKD (GFR = 15-29 mL/min/1.73 square meters)    Stage 5 End Stage CKD (GFR <15 mL/min/1.73 square meters)  Note: GFR calculation is accurate only with a steady state creatinine    HS Troponin 0hr (reflex protocol) [[id number]]  (Normal) Collected: 05/23/24 1658    Lab Status: Final result Specimen: Blood from Arm, Left Updated: 05/23/24 1724     hs TnI 0hr 8 ng/L     Protime-INR [[id number]]  (Abnormal) Collected: 05/23/24 1658    Lab Status: Final result Specimen: Blood from Arm, Left Updated: 05/23/24 1723     Protime 15.9 seconds      INR 1.23    Narrative:      Slightly hemolyzed; results may be affected.    APTT [[id number]]  (Normal) Collected: 05/23/24 1658    Lab Status: Final result Specimen: Blood from Arm, Left Updated: 05/23/24 1723     PTT 32 seconds     Narrative:      Slightly hemolyzed; results may be affected.    CBC and differential [[id number]]  (Abnormal) Collected: 05/23/24 1658    Lab Status: Final result Specimen: Blood from Arm, Left Updated: 05/23/24 1718     WBC 4.69 Thousand/uL      RBC 3.56 Million/uL      Hemoglobin 9.1 g/dL      Hematocrit 29.6 %      MCV 83 fL      MCH 25.6 pg      MCHC 30.7 g/dL      RDW 17.6 %      MPV 11.8 fL      Platelets 296 Thousands/uL      nRBC 0 /100 WBCs      Segmented % 59 %      Immature Grans % 1 %      Lymphocytes % 27 %      Monocytes % 10 %      Eosinophils Relative 3 %       Basophils Relative 0 %      Absolute Neutrophils 2.78 Thousands/µL      Absolute Immature Grans 0.03 Thousand/uL      Absolute Lymphocytes 1.25 Thousands/µL      Absolute Monocytes 0.49 Thousand/µL      Eosinophils Absolute 0.12 Thousand/µL      Basophils Absolute 0.02 Thousands/µL                    XR chest 1 view portable   ED Interpretation by Jeremías Walker DO (05/23 1815)   No acute cardiopulmonary disease      Final Result by Aneesh Willard MD (05/24 0851)      Large hiatal hernia with bibasilar atelectasis. No acute cardiopulmonary process.            Workstation performed: [id number]                    Procedures  ECG 12 Lead Documentation Only    Date/Time: 5/23/2024 4:35 PM    Performed by: Jeremías Walker DO  Authorized by: Jeremías Walker DO    ECG reviewed by me, the ED Provider: yes    Patient location:  ED  Previous ECG:     Previous ECG:  Compared to current    Comparison ECG info:  MD interval has increased    Similarity:  Changes noted    Comparison to cardiac monitor: Yes    Rate:     ECG rate assessment: normal    Rhythm:     Rhythm: sinus rhythm and A-V block    Ectopy:     Ectopy: none    QRS:     QRS axis:  Left  Conduction:     Conduction: abnormal      Abnormal conduction: 1st degree    ST segments:     ST segments:  Normal  T waves:     T waves: normal    Q waves:     Q waves:  III, aVF, V3, V4 and V5           ED Course  ED Course as of 05/25/24 0837   Thu May 23, 2024   1834 Remains asymptomatic.  Vital signs are stable.  Eating and watching television.   1930 Ambulating hallway without problems.  Pulse ox 96% and heart rate 73-80 after he returned to the room.  Stable for discharge.             HEART Risk Score      Flowsheet Row Most Recent Value   Heart Score Risk Calculator    History 0 Filed at: 05/23/2024 1931   ECG 0 Filed at: 05/23/2024 1931   Age 1 Filed at: 05/23/2024 1931   Risk Factors 1 Filed at: 05/23/2024 1931   Troponin 0 Filed at: 05/23/2024 1931   HEART  Score 2 Filed at: 05/23/2024 1931                          SBIRT 20yo+      Flowsheet Row Most Recent Value   Initial Alcohol Screen: US AUDIT-C     1. How often do you have a drink containing alcohol? 0 Filed at: 05/23/2024 1631   2. How many drinks containing alcohol do you have on a typical day you are drinking?  0 Filed at: 05/23/2024 1631   3a. Male UNDER 65: How often do you have five or more drinks on one occasion? 0 Filed at: 05/23/2024 1631   3b. FEMALE Any Age, or MALE 65+: How often do you have 4 or more drinks on one occassion? 0 Filed at: 05/23/2024 1631   Audit-C Score 0 Filed at: 05/23/2024 1631   PETRA: How many times in the past year have you...    Used an illegal drug or used a prescription medication for non-medical reasons? Never Filed at: 05/23/2024 1631                      Medical Decision Making  64-year-old male on Coumadin for a stable left ventricular apical thrombus with history of GI bleed, alcoholic cirrhosis, prior CVA and bipolar 2 disorder presents with atypical chest pain.  It has resolved.  EKG shows no acute changes.  Concern for dysrhythmia, recurrent anemia, ACS.  This does not appear to show signs of infection or thrombosis.    Amount and/or Complexity of Data Reviewed  Labs: ordered.  Radiology: ordered and independent interpretation performed.             Disposition  Final diagnoses:   Atypical chest pain   Alcoholic cirrhosis of liver without ascites (HCC)   Peptic ulcer disease     Time reflects when diagnosis was documented in both MDM as applicable and the Disposition within this note       Time User Action Codes Description Comment    5/23/2024  7:16 PM Jeremías Walker [R07.89] Atypical chest pain     5/23/2024  7:30 PM Jeremías Walker [K70.30] Alcoholic cirrhosis of liver without ascites (HCC)     5/23/2024  7:31 PM Jeremías Walker [K27.9] Peptic ulcer disease           ED Disposition       ED Disposition   Discharge    Condition   Stable    Date/Time    Thu May 23, 2024 1916    Comment   Chuy Norton discharge to home/self care.                   Follow-up Information       Follow up With Specialties Details Why Contact Info Additional Information    [facility] Schedule an appointment as soon as possible for a visit   [address]  [facility] [address] [phone] [facility], [address], [place], [place], [address] [phone]            Discharge Medication List as of 5/23/2024  7:34 PM        CONTINUE these medications which have NOT CHANGED    Details   aspirin (ECOTRIN LOW STRENGTH) 81 mg EC tablet Take 1 tablet (81 mg total) by mouth daily, Starting Wed 5/15/2024, Normal      atorvastatin (LIPITOR) 40 mg tablet Take 1 tablet (40 mg total) by mouth daily with dinner, Starting Wed 5/15/2024, Normal      diclofenac sodium (Voltaren) 1 % Apply 2 g topically 4 (four) times a day, Starting Wed 9/23/2020, Normal      escitalopram (LEXAPRO) 20 mg tablet Take 1 tablet (20 mg total) by mouth daily, Starting Wed 5/15/2024, Normal      famotidine (PEPCID) 20 mg tablet Take 1 tablet (20 mg total) by mouth 2 (two) times a day, Starting Wed 5/15/2024, Normal      Glecaprevir-Pibrentasvir (Mavyret) 100-40 MG TABS Take 300 mg by mouth in the morning, Starting Wed 5/15/2024, Until Fri 6/14/2024, Normal      nicotine polacrilex (NICORETTE) 2 mg gum Chew 1 each (2 mg total) every 3 (three) hours as needed for smoking cessation, Starting Wed 5/15/2024, Normal      pantoprazole (PROTONIX) 40 mg tablet Take 1 tablet (40 mg total) by mouth daily, Starting Wed 5/15/2024, Normal      polyethylene glycol (MIRALAX) 17 g packet Take 17 g by mouth daily as needed (constipation), Starting Wed 5/15/2024, Normal      QUEtiapine (SEROquel) 300 mg tablet Take 1 tablet (300 mg total) by mouth daily at bedtime, Starting Wed 5/15/2024, Normal      traZODone (DESYREL) 100 mg  tablet Take 1 tablet (100 mg total) by mouth daily at bedtime, Starting Wed 5/15/2024, Normal      warfarin (COUMADIN) 2 mg tablet Take 1 tablet (2 mg total) by mouth daily, Starting Wed 5/15/2024, Normal             No discharge procedures on file.    PDMP Review         Value Time User    PDMP Reviewed  Yes 6/17/2021 10:15 AM Martha Gracia PA-C            ED Provider  Electronically Signed by             Jeremías Walker DO  05/25/24 0838

## 2024-05-23 NOTE — DISCHARGE INSTRUCTIONS
Today's tests do not show any signs of heart attack.    Continue your present medications.  Please schedule the appointments that were listed for you on your last discharge paperwork.    Return if problem arises.

## 2024-05-24 NOTE — UTILIZATION REVIEW
NOTIFICATION OF ADMISSION DISCHARGE   [facility]. Please be advised that this patient has been discharge from our facility. Below you will find the admission and discharge date and time including the patient’s disposition.   UTILIZATION REVIEW CONTACT:  Landy Green  Utilization   Network Utilization Review Department  Phone: [phone] x carefully listen to the prompts. All voicemails are confidential.  Email: [email or web address]     ADMISSION INFORMATION  PRESENTATION DATE: 5/9/2024  5:52 AM  OBERVATION ADMISSION DATE:   INPATIENT ADMISSION DATE: 5/9/24  9:22 AM   DISCHARGE DATE: 5/15/2024  2:12 PM   DISPOSITION:Home/Self Care    Network Utilization Review Department  ATTENTION: Please call with any questions or concerns to [phone] and carefully listen to the prompts so that you are directed to the right person. All voicemails are confidential.   For Discharge needs, contact Care Management DC Support Team at [phone] opt. 2  Send all requests for admission clinical reviews, approved or denied determinations and any other requests to dedicated fax number below belonging to the campus where the patient is receiving treatment. List of dedicated fax numbers for the Facilities:  FACILITY NAME UR FAX NUMBER   ADMISSION DENIALS (Administrative/Medical Necessity) [phone]   DISCHARGE SUPPORT TEAM ([facility]) [phone]   PARENT CHILD HEALTH (Maternity/NICU/Pediatrics) [phone]   [facility] [phone]   [facility] [phone]   [facility] [phone]   [facility] [phone]   [facility] [phone]   [facility] [phone]   [facility] [phone]   [facility] [phone]    [facility] [phone]   Critical access hospital [phone]   [facility] [phone]   [facility] [phone]        [facility]  Discharge- Chuy Norton 1960, 64 y.o. male MRN: [id number]  Unit/Bed#: -01 Encounter: [id number]  Primary Care Provider: Savanna Pan DO   Date and time admitted to hospital: 5/9/2024  5:52 AM     * Anemia  Assessment & Plan  Pt with hgb 7.6, on admission baseline normallly 10-13  Dropped to 6.6 - received 1u PRBC morning of 5/10  Transfused 1 unit PRBC in ER for symptomatic anemia (pt reported dyspnea), suspected UGI bleed  GI consulted   Continue PPI  Octreotide discontinued as no varices were found on EGD  Tolerating regular low sodium diet  EGD:  Medium type I hiatal hernia with Poli lesions present  Multiple ulcers in the cardia with flat pigmented spot (Eduin IIC); placed 1 clip successfully; hemostasis achieved     Hepatitis C  Assessment & Plan  Maintained on Mavyret outpatient, continue     Left ventricular apical thrombus  Assessment & Plan  Chronic per prior cardiology notes  Stable S/P MI  Cardiology referral on discharge  Per group home, patient is maintained on Coumadin 2 mg daily  Continue on discharge     Cirrhosis (HCC)  Assessment & Plan  Cirrhosis, noted on abdominal imaging  Suspected related to alcohol abuse  S/P 5 days Rocephin  EGD negative for varicies  S/P Lactulose enemas as ER suspected mild hepatic encephalopathy which appears improved with non focal CT head   GI following  Received FFP prior to procedure  HCV viral load not detected     History of CVA (cerebrovascular accident)  Assessment & Plan  Maintained aspirin, statin  Aspirin on hold due to GI bleed     Hyperlipidemia  Assessment & Plan  Continue Lipitor     Bipolar 2 disorder, major depressive episode (HCC)  Assessment & Plan  Mood  stable  Continue trazodone and Seroquel.        Medical Problems         Resolved Problems  Date Reviewed: 5/15/2024   None         Discharging Physician / Practitioner: Mari Morales PA-C  PCP: Savanna Pan DO  Admission Date:   Admission Orders (From admission, onward)          Ordered         05/09/24 0922   INPATIENT ADMISSION  Once                               Discharge Date: 05/15/24     Consultations During Hospital Stay:  Gastroenterology     Procedures Performed:   EGD 5/10:   Medium type I hiatal hernia with Poli lesions present  Multiple ulcers in the cardia with flat pigmented spot (Eduin IIC); placed 1 clip successfully; hemostasis achieved  The duodenum appeared normal.     Significant Findings / Test Results:   CXR 5/9: No acute consolidation or congestion.  Cardiomegaly.  CT head 5/9: Chronic hemic changes without acute intracranial abnormality     Incidental Findings:   None      Test Results Pending at Discharge (will require follow up):   None     Outpatient Tests Requested:  PFT     Complications:  None     Reason for Admission: GI bleed     Hospital Course:   Chuy Norton is a 64 y.o. male patient with a past medical history of hep C, cirrhosis, prior CVA, bipolar 2 disorder who originally presented to the hospital on 5/9/2024 due to shortness of breath and generalized weakness.  Patient reports that this is present for 3 days prior to presentation.              Patient also noted to have melena and was seen by GI.  GI bleed was suspected and given history of cirrhosis patient was placed on octreotide, Protonix and Rocephin.  Ultimately EGD with results as above and patient able to be transitioned back to home medications.  He did temporarily require supplemental oxygen, likely due to fluid administration and history of COPD.  He should have PFTs performed in the outpatient setting and follow-up with GI.  Hemodynamically stable at time of discharge and appropriate for outpatient  "follow-up.     Please see above list of diagnoses and related plan for additional information.      Condition at Discharge: stable     Discharge Day Visit / Exam:   Subjective: Patient eager for discharge home, feels well overall.  Denies any further bleeding.  Vitals: Blood Pressure: 115/74 (05/15/24 0753)  Pulse: 74 (05/15/24 0753)  Temperature: 98.4 °F (36.9 °C) (05/15/24 0753)  Temp Source: Temporal (05/14/24 1944)  Respirations: 20 (05/15/24 0753)  Height: 5' 8\" (172.7 cm) (05/09/24 1705)  Weight - Scale: 96.2 kg (212 lb 1.3 oz) (05/13/24 0533)  SpO2: 92 % (05/15/24 1014)  Exam:   Physical Exam  Vitals and nursing note reviewed.   Constitutional:       General: He is not in acute distress.     Appearance: Normal appearance. He is well-developed.   HENT:      Head: Normocephalic and atraumatic.   Eyes:      General: No scleral icterus.     Conjunctiva/sclera: Conjunctivae normal.   Cardiovascular:      Rate and Rhythm: Normal rate and regular rhythm.      Heart sounds: No murmur heard.  Pulmonary:      Effort: Pulmonary effort is normal.      Breath sounds: No wheezing, rhonchi or rales.   Abdominal:      General: There is no distension.      Palpations: Abdomen is soft.   Skin:     General: Skin is warm and dry.   Neurological:      Mental Status: He is alert. Mental status is at baseline.   Psychiatric:         Mood and Affect: Mood normal.         Discussion with Family: Patient declined call to .      Discharge instructions/Information to patient and family:   See after visit summary for information provided to patient and family.       Provisions for Follow-Up Care:  See after visit summary for information related to follow-up care and any pertinent home health orders.       Mobility at time of Discharge:   Basic Mobility Inpatient Raw Score: 20  JH-HLM Goal: 6: Walk 10 steps or more  JH-HLM Achieved: 7: Walk 25 feet or more  HLM Goal achieved. Continue to encourage appropriate mobility.   "   Disposition:   Home     Planned Readmission: None     Discharge Statement:  I spent 65 minutes discharging the patient. This time was spent on the day of discharge. I had direct contact with the patient on the day of discharge. Greater than 50% of the total time was spent examining patient, answering all patient questions, arranging and discussing plan of care with patient as well as directly providing post-discharge instructions.  Additional time then spent on discharge activities.     Discharge Medications:  See after visit summary for reconciled discharge medications provided to patient and/or family.       **Please Note: This note may have been constructed using a voice recognition system**

## 2024-06-28 ENCOUNTER — HOSPITAL ENCOUNTER (EMERGENCY)
Facility: HOSPITAL | Age: 64
Discharge: HOME | End: 2024-06-28
Attending: EMERGENCY MEDICINE
Payer: COMMERCIAL

## 2024-06-28 ENCOUNTER — APPOINTMENT (EMERGENCY)
Dept: RADIOLOGY | Facility: HOSPITAL | Age: 64
End: 2024-06-28
Payer: COMMERCIAL

## 2024-06-28 VITALS
HEART RATE: 86 BPM | SYSTOLIC BLOOD PRESSURE: 185 MMHG | OXYGEN SATURATION: 97 % | RESPIRATION RATE: 18 BRPM | TEMPERATURE: 99.1 F | DIASTOLIC BLOOD PRESSURE: 87 MMHG

## 2024-06-28 DIAGNOSIS — M25.561 RIGHT KNEE PAIN: Primary | ICD-10-CM

## 2024-06-28 PROCEDURE — 73564 X-RAY EXAM KNEE 4 OR MORE: CPT

## 2024-06-28 PROCEDURE — 99283 EMERGENCY DEPT VISIT LOW MDM: CPT

## 2024-06-28 PROCEDURE — 99284 EMERGENCY DEPT VISIT MOD MDM: CPT | Performed by: PHYSICIAN ASSISTANT

## 2024-06-28 NOTE — ED PROVIDER NOTES
"History  Chief Complaint   Patient presents with    Knee Pain     \"My knee's shot\" - patient reports chronic right knee pain that is worse recently; no new injury     Patient is a 63 y/o M that presents to the ED with staff from [facility] for pain to right knee.  He states the pain is chronic, but worsened this morning. Pain is worse with ambulation, better at rest.  He has not been taking anything for pain.  No redness, swelling or fevers.  He has not seen ortho for this.  No recent trauma.       History provided by:  Patient  Knee Pain  Associated symptoms: no fever        Prior to Admission Medications   Prescriptions Last Dose Informant Patient Reported? Taking?   QUEtiapine (SEROquel) 300 mg tablet   No No   Sig: Take 1 tablet (300 mg total) by mouth daily at bedtime   aspirin (ECOTRIN LOW STRENGTH) 81 mg EC tablet   No No   Sig: Take 1 tablet (81 mg total) by mouth daily   atorvastatin (LIPITOR) 40 mg tablet   No No   Sig: Take 1 tablet (40 mg total) by mouth daily with dinner   diclofenac sodium (Voltaren) 1 %   No No   Sig: Apply 2 g topically 4 (four) times a day   escitalopram (LEXAPRO) 20 mg tablet   No No   Sig: Take 1 tablet (20 mg total) by mouth daily   famotidine (PEPCID) 20 mg tablet   No No   Sig: Take 1 tablet (20 mg total) by mouth 2 (two) times a day   nicotine polacrilex (NICORETTE) 2 mg gum   No No   Sig: Chew 1 each (2 mg total) every 3 (three) hours as needed for smoking cessation   pantoprazole (PROTONIX) 40 mg tablet   No No   Sig: Take 1 tablet (40 mg total) by mouth daily   polyethylene glycol (MIRALAX) 17 g packet   No No   Sig: Take 17 g by mouth daily as needed (constipation)   traZODone (DESYREL) 100 mg tablet   No No   Sig: Take 1 tablet (100 mg total) by mouth daily at bedtime   warfarin (COUMADIN) 2 mg tablet   No No   Sig: Take 1 tablet (2 mg total) by mouth daily      Facility-Administered Medications: None       Past Medical History:   Diagnosis Date    Alcohol abuse     " "Depression     Drug therapy     GERD (gastroesophageal reflux disease)     Hypertension 01/2012    Knee pain, bilateral     Psychiatric disorder     depression, anxiety    Self-injurious behavior     Spinal stenosis of lumbar region     Suicide attempt (HCC)        Past Surgical History:   Procedure Laterality Date    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    HERNIA REPAIR  1997       Family History   Problem Relation Age of Onset    Depression Mother     Depression Father     No Known Problems Sister     No Known Problems Brother      I have reviewed and agree with the history as documented.    E-Cigarette/Vaping     E-Cigarette/Vaping Substances     Social History     Tobacco Use    Smoking status: Every Day     Current packs/day: 1.00     Types: Cigarettes, Cigars    Smokeless tobacco: Never    Tobacco comments:     PT \"3 CIGARS A DAY\" - quit smoking cigars   Substance Use Topics    Alcohol use: Not Currently     Alcohol/week: 0.0 standard drinks of alcohol     Comment: Stopped drinking for several months - recovered alcoholic    Drug use: No     Types: Marijuana     Comment: Last used marijuana 2 months ago.- no longer smoking       Review of Systems   Constitutional:  Negative for chills and fever.   Musculoskeletal:         Right knee pain   Skin:  Negative for color change, rash and wound.   Neurological:  Negative for dizziness, weakness and numbness.   Psychiatric/Behavioral:  Negative for confusion.    All other systems reviewed and are negative.      Physical Exam  Physical Exam  Vitals and nursing note reviewed.   Constitutional:       Appearance: Normal appearance. He is well-developed and well-groomed. He is not ill-appearing.   HENT:      Head: Normocephalic and atraumatic.   Eyes:      Conjunctiva/sclera: Conjunctivae normal.   Cardiovascular:      Rate and Rhythm: Normal rate and regular rhythm.      Pulses:           Dorsalis pedis pulses are 2+ on the right side.      Heart sounds: Normal heart " sounds.   Pulmonary:      Effort: Pulmonary effort is normal.   Musculoskeletal:      Right upper leg: Normal.      Right knee: No swelling, deformity, erythema or bony tenderness. Normal range of motion. No tenderness. No LCL laxity, MCL laxity, ACL laxity or PCL laxity.      Right lower leg: Normal.      Right ankle: Normal.      Right foot: Normal.   Skin:     General: Skin is warm and dry.      Findings: No bruising, erythema or rash.   Neurological:      Mental Status: He is alert and oriented to person, place, and time.      Sensory: Sensation is intact.      Motor: Motor function is intact.   Psychiatric:         Mood and Affect: Mood normal.         Behavior: Behavior is cooperative.         Vital Signs  ED Triage Vitals   Temperature Pulse Respirations Blood Pressure SpO2   06/28/24 1525 06/28/24 1525 06/28/24 1525 06/28/24 1525 06/28/24 1525   99.1 °F (37.3 °C) 88 16 167/97 97 %      Temp src Heart Rate Source Patient Position - Orthostatic VS BP Location FiO2 (%)   -- 06/28/24 1536 06/28/24 1525 06/28/24 1525 --    Monitor Sitting Left arm       Pain Score       06/28/24 1525       8           Vitals:    06/28/24 1525 06/28/24 1536   BP: 167/97 (!) 185/87   Pulse: 88 86   Patient Position - Orthostatic VS: Sitting Lying         Visual Acuity      ED Medications  Medications - No data to display    Diagnostic Studies  Results Reviewed       None                   XR knee 4+ views Right injury   ED Interpretation by Mattie Archuleta PA-C (06/28 1551)   No acute abnormalities.  No changes from prior xray.                  Procedures  Procedures         ED Course                               SBIRT 22yo+      Flowsheet Row Most Recent Value   Initial Alcohol Screen: US AUDIT-C     1. How often do you have a drink containing alcohol? 0 Filed at: 06/28/2024 1526   2. How many drinks containing alcohol do you have on a typical day you are drinking?  0 Filed at: 06/28/2024 1526   3a. Male UNDER 65: How  often do you have five or more drinks on one occasion? 0 Filed at: 06/28/2024 1526   3b. FEMALE Any Age, or MALE 65+: How often do you have 4 or more drinks on one occassion? 0 Filed at: 06/28/2024 1526   Audit-C Score 0 Filed at: 06/28/2024 1526                      Medical Decision Making  Patient with right knee pain, chronic, worsening, no redness, swelling or erythema, no concern for septic arthritis.  Mild degenerative changes on xray, pain most likely due to arthritis, will refer to ortho.     Amount and/or Complexity of Data Reviewed  Radiology: ordered and independent interpretation performed.             Disposition  Final diagnoses:   Right knee pain     Time reflects when diagnosis was documented in both MDM as applicable and the Disposition within this note       Time User Action Codes Description Comment    6/28/2024  3:52 PM Mattie Archuleta Add [M25.561] Right knee pain           ED Disposition       ED Disposition   Discharge    Condition   Stable    Date/Time   Fri Jun 28, 2024 1551    Comment   Chuy Norton discharge to home/self care.                   Follow-up Information       Follow up With Specialties Details Why Contact Info Additional Information    [facility] [place] [facility] Schedule an appointment as soon as possible for a visit  For recheck [address] [facility] [address]  [facility] [address] [phone] [facility] [place], [address], [facility] [address] [place], [place], [address] [phone]            Discharge Medication List as of 6/28/2024  3:53 PM        CONTINUE these medications which have NOT CHANGED    Details   aspirin (ECOTRIN LOW STRENGTH) 81 mg EC tablet Take 1 tablet (81 mg total) by mouth daily, Starting Wed 5/15/2024, Normal      atorvastatin (LIPITOR) 40 mg tablet Take 1 tablet (40 mg total) by mouth daily with dinner, Starting Wed 5/15/2024, Normal      diclofenac sodium (Voltaren) 1  % Apply 2 g topically 4 (four) times a day, Starting Wed 9/23/2020, Normal      escitalopram (LEXAPRO) 20 mg tablet Take 1 tablet (20 mg total) by mouth daily, Starting Wed 5/15/2024, Normal      famotidine (PEPCID) 20 mg tablet Take 1 tablet (20 mg total) by mouth 2 (two) times a day, Starting Wed 5/15/2024, Normal      nicotine polacrilex (NICORETTE) 2 mg gum Chew 1 each (2 mg total) every 3 (three) hours as needed for smoking cessation, Starting Wed 5/15/2024, Normal      pantoprazole (PROTONIX) 40 mg tablet Take 1 tablet (40 mg total) by mouth daily, Starting Wed 5/15/2024, Normal      polyethylene glycol (MIRALAX) 17 g packet Take 17 g by mouth daily as needed (constipation), Starting Wed 5/15/2024, Normal      QUEtiapine (SEROquel) 300 mg tablet Take 1 tablet (300 mg total) by mouth daily at bedtime, Starting Wed 5/15/2024, Normal      traZODone (DESYREL) 100 mg tablet Take 1 tablet (100 mg total) by mouth daily at bedtime, Starting Wed 5/15/2024, Normal      warfarin (COUMADIN) 2 mg tablet Take 1 tablet (2 mg total) by mouth daily, Starting Wed 5/15/2024, Normal             No discharge procedures on file.    PDMP Review         Value Time User    PDMP Reviewed  Yes 6/17/2021 10:15 AM Martha Gracia PA-C            ED Provider  Electronically Signed by             Mattie Archuleta PA-C  06/28/24 1915

## 2024-06-28 NOTE — DISCHARGE INSTRUCTIONS
Rest, ice, elevate leg.  Tylenol for discomfort.  Call ortho tomorrow for a follow up appointment.

## 2024-07-08 ENCOUNTER — CONSULT (OUTPATIENT)
Dept: CARDIOLOGY CLINIC | Facility: CLINIC | Age: 64
End: 2024-07-08
Payer: COMMERCIAL

## 2024-07-08 VITALS
BODY MASS INDEX: 31.52 KG/M2 | DIASTOLIC BLOOD PRESSURE: 76 MMHG | WEIGHT: 208 LBS | HEIGHT: 68 IN | SYSTOLIC BLOOD PRESSURE: 120 MMHG | HEART RATE: 94 BPM

## 2024-07-08 DIAGNOSIS — R94.31 ABNORMAL ECG: ICD-10-CM

## 2024-07-08 DIAGNOSIS — R07.9 CHEST PAIN, UNSPECIFIED TYPE: Primary | ICD-10-CM

## 2024-07-08 DIAGNOSIS — R93.1 ABNORMAL ECHOCARDIOGRAM: ICD-10-CM

## 2024-07-08 DIAGNOSIS — R06.02 SHORTNESS OF BREATH: ICD-10-CM

## 2024-07-08 DIAGNOSIS — I51.3 LEFT VENTRICULAR APICAL THROMBUS: ICD-10-CM

## 2024-07-08 PROCEDURE — 99204 OFFICE O/P NEW MOD 45 MIN: CPT | Performed by: INTERNAL MEDICINE

## 2024-07-08 NOTE — PROGRESS NOTES
Cardiology Consultation     Chuy Norton  [id number]  1960  [facility] [address] [address] [phone]    1. Chest pain, unspecified type  NM myocardial perfusion spect (rx stress and/or rest)      2. Left ventricular apical thrombus  Ambulatory Referral to Cardiology    Echo complete w/ contrast if indicated      3. Shortness of breath        4. Abnormal echocardiogram  Echo complete w/ contrast if indicated    NM myocardial perfusion spect (rx stress and/or rest)      5. Abnormal ECG  Echo complete w/ contrast if indicated    NM myocardial perfusion spect (rx stress and/or rest)          Discussion/summary:    Chest pain with a history of an abnormal ECG and echocardiogram - Chuy's chest pains seem mostly atypical.  He has had them for years, describes them as sharp and occurring randomly.  However given his history of what is presumed to be ischemic cardiomyopathy with a low normal ejection fraction and an abnormal ECG we will do an ischemic evaluation.  He will have a repeat echocardiogram and a stress nuclear in future.  If these are unremarkable or low risk we will see him back in 1 year.    Left ventricular apical thrombus - This was seen by an echocardiogram in 2021.  This was never followed up on but he was placed on warfarin and has been on it since.  We are repeating an echocardiogram.    Shortness of breath - Associated with his long history of tobacco abuse and and likely deconditioning.  However we are doing an ischemic evaluation and repeat echocardiogram.  It is recommended for him to quit smoking.    HPI:  Mr. Norton comes in for a consultation to discuss on and off chronic chest pains that he has been experiencing for years.  He also has a history of an abnormal echocardiogram performed in 2021, in which she was found to have anterior regional wall motion  abnormalities, a low normal ejection fraction, and a left ventricular apical thrombus.  He tells me he does not recall any cardiac history he has never had and has not ever seen a cardiologist.  He has relatively low insight into his overall care, as he was not even sure why he was here today and he does not know his medications.  He is also treated for hyperlipidemia and has a long history of tobacco abuse dating back to the age of 17.  He currently smokes up to half a pack per day.    Chuy went to the ER in late May with sternal sharp chest pains.  Of note he was admitted just about a week prior to this with anemia due to GI bleeding and was found to have hiatal hernia and multiple ulcers, in which 1 had needed clipping.  He has a history of hepatitis C but there is no identified esophageal varices.  Chuy tells me that he has been having these chest pains for years, but they were more intense on the day he went to the ER.  He also felt diaphoretic.  He describes a sharp and random chest pain in the middle of his chest that can occur at rest or with exertion.  He does have chronic shortness of breath with exertion as well that he has attributed to smoking.    Even though he has this history of an abnormal echocardiogram with a left ventricular apical thrombus and his chart states he has a prior history of a CVA, he does not recall any of this.  He did not know why he was on warfarin.  He shows no signs or symptoms of CHF and he denies any other symptoms.      Patient Active Problem List   Diagnosis    Bipolar 2 disorder, major depressive episode (HCC)    Attention deficit hyperactivity disorder (ADHD), combined type    Alcohol dependence in remission (HCC)    Anxiety disorder    Depressive disorder    Benign essential hypertension    Gastroesophageal reflux disease    Hyperlipidemia    Knee pain    Low back pain    Spinal stenosis of lumbar region    Tobacco dependence syndrome    Vitamin D deficiency     "Encounter for medical examination to establish care    Left hip pain    Elevated d-dimer    Patellofemoral pain syndrome of right knee    SIRS (systemic inflammatory response syndrome) (HCC)    History of CVA (cerebrovascular accident)    Acute metabolic encephalopathy    Cirrhosis (HCC)    Anemia    Left ventricular apical thrombus    Acute pain of right knee    Acute left-sided low back pain without sciatica    Hepatitis C     Past Medical History:   Diagnosis Date    Alcohol abuse     Depression     Drug therapy     GERD (gastroesophageal reflux disease)     Hypertension 01/2012    Knee pain, bilateral     Psychiatric disorder     depression, anxiety    Self-injurious behavior     Spinal stenosis of lumbar region     Suicide attempt (HCC)      Social History     Socioeconomic History    Marital status: Single     Spouse name: Not on file    Number of children: 0    Years of education: Not on file    Highest education level: Not on file   Occupational History    Occupation: Unemployed     Comment: Recently lost his job in August at [employer]    Tobacco Use    Smoking status: Every Day     Current packs/day: 1.00     Types: Cigarettes, Cigars    Smokeless tobacco: Never    Tobacco comments:     PT \"3 CIGARS A DAY\" - quit smoking cigars   Substance and Sexual Activity    Alcohol use: Not Currently     Alcohol/week: 0.0 standard drinks of alcohol     Comment: Stopped drinking for several months - recovered alcoholic    Drug use: No     Types: Marijuana     Comment: Last used marijuana 2 months ago.- no longer smoking    Sexual activity: Never   Other Topics Concern    Not on file   Social History Narrative    He was told to leave the [employer] when he tested positive for THC.  Now states that he is unemployed and homeless.  He has 3 sisters , 2 of which live in [place] and the other in [place].       Social Determinants of Health     Financial Resource Strain: Not on file   Food Insecurity: No " Food Insecurity (5/10/2024)    Hunger Vital Sign     Worried About Running Out of Food in the Last Year: Never true     Ran Out of Food in the Last Year: Never true   Transportation Needs: No Transportation Needs (5/10/2024)    PRAPARE - Transportation     Lack of Transportation (Medical): No     Lack of Transportation (Non-Medical): No   Physical Activity: Not on file   Stress: Not on file   Social Connections: Not on file   Intimate Partner Violence: Not on file   Housing Stability: Low Risk  (5/10/2024)    Housing Stability Vital Sign     Unable to Pay for Housing in the Last Year: No     Number of Places Lived in the Last Year: 1     Unstable Housing in the Last Year: No      Family History   Problem Relation Age of Onset    Depression Mother     Depression Father     No Known Problems Sister     No Known Problems Brother      Past Surgical History:   Procedure Laterality Date    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    HERNIA REPAIR  1997       Current Outpatient Medications:     aspirin (ECOTRIN LOW STRENGTH) 81 mg EC tablet, Take 1 tablet (81 mg total) by mouth daily, Disp: 30 tablet, Rfl: 0    atorvastatin (LIPITOR) 40 mg tablet, Take 1 tablet (40 mg total) by mouth daily with dinner, Disp: 30 tablet, Rfl: 0    escitalopram (LEXAPRO) 20 mg tablet, Take 1 tablet (20 mg total) by mouth daily, Disp: 21 tablet, Rfl: 0    famotidine (PEPCID) 20 mg tablet, Take 1 tablet (20 mg total) by mouth 2 (two) times a day, Disp: 30 tablet, Rfl: 0    nicotine polacrilex (NICORETTE) 2 mg gum, Chew 1 each (2 mg total) every 3 (three) hours as needed for smoking cessation, Disp: 100 each, Rfl: 0    pantoprazole (PROTONIX) 40 mg tablet, Take 1 tablet (40 mg total) by mouth daily, Disp: 30 tablet, Rfl: 0    polyethylene glycol (MIRALAX) 17 g packet, Take 17 g by mouth daily as needed (constipation), Disp: 30 each, Rfl: 0    QUEtiapine (SEROquel) 300 mg tablet, Take 1 tablet (300 mg total) by mouth daily at bedtime, Disp: 30  "tablet, Rfl: 0    traZODone (DESYREL) 100 mg tablet, Take 1 tablet (100 mg total) by mouth daily at bedtime, Disp: 21 tablet, Rfl: 0    warfarin (COUMADIN) 2 mg tablet, Take 1 tablet (2 mg total) by mouth daily, Disp: 30 tablet, Rfl: 0    diclofenac sodium (Voltaren) 1 %, Apply 2 g topically 4 (four) times a day, Disp: 1 Tube, Rfl: 0  Allergies   Allergen Reactions    Haloperidol Tremor     Other reaction(s): jittery     Vitals:    07/08/24 0919   BP: 120/76   BP Location: Left arm   Patient Position: Sitting   Cuff Size: Standard   Pulse: 94   Weight: 94.3 kg (208 lb)   Height: 5' 8\" (1.727 m)       Labs:  Lab Results   Component Value Date    K 4.0 05/23/2024    K 4.0 02/28/2023     (H) 05/23/2024     02/28/2023    CO2 23 05/23/2024    CO2 20 (L) 05/07/2024    CO2 23 02/28/2023    BUN 14 05/23/2024    BUN 15 02/28/2023    CREATININE 1.14 05/23/2024    CREATININE 1.28 02/28/2023    GLUCOSE 110 05/07/2024    CALCIUM 8.4 05/23/2024    CALCIUM 8.3 (L) 02/28/2023     Lab Results   Component Value Date    WBC 4.69 05/23/2024    HGB 9.1 (L) 05/23/2024    HCT 29.6 (L) 05/23/2024    MCV 83 05/23/2024     05/23/2024     Lab Results   Component Value Date    TRIG 154 (H) 09/20/2018    HDL 29 (L) 09/20/2018     Imaging: ECGs obtained during his hospitalization showed sinus rhythm with a first-degree AV block, left axis deviation and prior inferior and anterior infarcts.    ECHO (6/24/2021):  Left ventricle is normal in size. There is mild concentric hypertrophy.   Systolic function is low normal with an ejection fraction of 50%. Distal   LAD regional wall motion abnormality. Large LV apical thrombus measuring   3.3x3 cm. Prior hx of LV apical thrombus noted but no prior studies   available for comparison. There is grade I (mild) diastolic dysfunction   and normal left atrial pressure.       Review of Systems:  Review of Systems   Constitutional: Negative.    HENT: Negative.     Eyes: Negative.  " "  Respiratory:  Positive for shortness of breath.    Cardiovascular:  Positive for chest pain.   Gastrointestinal: Negative.    Musculoskeletal: Negative.    Skin: Negative.    Allergic/Immunologic: Negative.    Neurological: Negative.    Hematological: Negative.    Psychiatric/Behavioral: Negative.     All other systems reviewed and are negative.    Vitals:    07/08/24 0919   BP: 120/76   BP Location: Left arm   Patient Position: Sitting   Cuff Size: Standard   Pulse: 94   Weight: 94.3 kg (208 lb)   Height: 5' 8\" (1.727 m)     Physical Exam  Constitutional:       Appearance: He is well-developed.   HENT:      Head: Normocephalic and atraumatic.   Eyes:      General: No scleral icterus.        Right eye: No discharge.         Left eye: No discharge.      Extraocular Movements: EOM normal.      Pupils: Pupils are equal, round, and reactive to light.   Neck:      Thyroid: No thyromegaly.      Vascular: No JVD.   Cardiovascular:      Rate and Rhythm: Normal rate and regular rhythm. No extrasystoles are present.     Pulses: Normal pulses. No decreased pulses.      Heart sounds: Normal heart sounds, S1 normal and S2 normal. No murmur heard.     No friction rub. No gallop.   Pulmonary:      Effort: Pulmonary effort is normal. No respiratory distress.      Breath sounds: No wheezing, rhonchi or rales.   Abdominal:      General: Bowel sounds are normal. There is no distension.      Palpations: Abdomen is soft.      Tenderness: There is no abdominal tenderness.   Musculoskeletal:         General: No tenderness, deformity or edema. Normal range of motion.      Cervical back: Normal range of motion and neck supple.   Skin:     General: Skin is warm and dry.      Findings: No rash.   Neurological:      Mental Status: He is alert and oriented to person, place, and time.      Cranial Nerves: No cranial nerve deficit.   Psychiatric:         Mood and Affect: Mood and affect normal.         Thought Content: Thought content normal.   "       Judgment: Judgment normal.       Counseling / Coordination of Care  Total office time spent today 40 minutes.  Greater than 50% of total time was spent with the patient and / or family counseling and / or coordination of care.

## 2024-08-06 ENCOUNTER — TELEPHONE (OUTPATIENT)
Age: 64
End: 2024-08-06

## 2024-08-06 NOTE — TELEPHONE ENCOUNTER
Patients GI provider:  KWESI     Number to return call: [phone]    Reason for call: Pt  called in regarding a letter she received pt need to be [place] for a EGD recent Hosp 5/9 [facility] and [facility] to [facility]     Scheduled procedure/appointment date if applicable: Apt/procedure  5/9 hosp admin

## 2024-08-26 ENCOUNTER — HOSPITAL ENCOUNTER (OUTPATIENT)
Dept: NUCLEAR MEDICINE | Facility: HOSPITAL | Age: 64
Discharge: HOME | End: 2024-08-26
Attending: INTERNAL MEDICINE
Payer: COMMERCIAL

## 2024-08-26 ENCOUNTER — HOSPITAL ENCOUNTER (OUTPATIENT)
Dept: NON INVASIVE DIAGNOSTICS | Facility: HOSPITAL | Age: 64
Discharge: HOME | End: 2024-08-26
Attending: INTERNAL MEDICINE
Payer: COMMERCIAL

## 2024-08-26 VITALS
DIASTOLIC BLOOD PRESSURE: 76 MMHG | BODY MASS INDEX: 31.52 KG/M2 | SYSTOLIC BLOOD PRESSURE: 120 MMHG | HEART RATE: 70 BPM | WEIGHT: 208 LBS | HEIGHT: 68 IN

## 2024-08-26 DIAGNOSIS — R94.31 ABNORMAL ECG: ICD-10-CM

## 2024-08-26 DIAGNOSIS — R07.9 CHEST PAIN, UNSPECIFIED TYPE: ICD-10-CM

## 2024-08-26 DIAGNOSIS — R93.1 ABNORMAL ECHOCARDIOGRAM: ICD-10-CM

## 2024-08-26 DIAGNOSIS — I51.3 LEFT VENTRICULAR APICAL THROMBUS: ICD-10-CM

## 2024-08-26 LAB
AORTIC ROOT: 3.5 CM
AORTIC VALVE MEAN VELOCITY: 6.8 M/S
APICAL FOUR CHAMBER EJECTION FRACTION: 42 %
AV LVOT MEAN GRADIENT: 1 MMHG
AV MEAN GRADIENT: 2 MMHG
DOP CALC AO VTI: 18.8 CM
DOP CALC LVOT PEAK VEL VTI: 13.4 CM
DOP CALC MV VTI: 17.9 CM
E WAVE DECELERATION TIME: 171 MS
E/A RATIO: 0.66
FRACTIONAL SHORTENING: 26 (ref 28–44)
INTERVENTRICULAR SEPTUM IN DIASTOLE (PARASTERNAL SHORT AXIS VIEW): 1.2 CM
INTERVENTRICULAR SEPTUM: 1.2 CM (ref 0.6–1.1)
LA/AORTA RATIO 2D: 1.09
LAAS-AP2: 18.1 CM2
LAAS-AP4: 17.8 CM2
LEFT ATRIUM SIZE: 3.8 CM
LEFT ATRIUM VOLUME (MOD BIPLANE): 50 ML
LEFT ATRIUM VOLUME INDEX (MOD BIPLANE): 24 ML/M2
LEFT INTERNAL DIMENSION IN SYSTOLE: 3.5 CM (ref 2.1–4)
LEFT VENTRICLE DIASTOLIC VOLUME (MOD BIPLANE): 170 ML
LEFT VENTRICLE SYSTOLIC VOLUME (MOD BIPLANE): 91 ML
LEFT VENTRICULAR INTERNAL DIMENSION IN DIASTOLE: 4.7 CM (ref 3.5–6)
LEFT VENTRICULAR POSTERIOR WALL IN END DIASTOLE: 1 CM
LEFT VENTRICULAR STROKE VOLUME: 52 ML
LV EF: 46 %
LVSV (TEICH): 52 ML
MV E'TISSUE VEL-LAT: 7 CM/S
MV E'TISSUE VEL-SEP: 5 CM/S
MV MEAN GRADIENT: 2 MMHG
MV PEAK A VEL: 0.96 M/S
MV PEAK E VEL: 63 CM/S
MV PEAK GRADIENT: 5 MMHG
MV STENOSIS PRESSURE HALF TIME: 50 MS
MV VALVE AREA P 1/2 METHOD: 4.4
NUC STRESS EJECTION FRACTION: 55 %
RA PRESSURE ESTIMATED: 8 MMHG
RIGHT VENTRICLE ID DIMENSION: 4.4 CM
SL CV LV EF: 46
SL CV PED ECHO LEFT VENTRICLE DIASTOLIC VOLUME (MOD BIPLANE) 2D: 104 ML
SL CV PED ECHO LEFT VENTRICLE SYSTOLIC VOLUME (MOD BIPLANE) 2D: 52 ML
SL CV REST NUCLEAR ISOTOPE DOSE: 10.3 MCI
SL CV STRESS NUCLEAR ISOTOPE DOSE: 32.2 MCI
STRESS ANGINA INDEX: 0
STRESS BASELINE HR: 82 BPM
STRESS POST PEAK BP: 184 MMHG
STRESS/REST PERFUSION RATIO: 1.03
TRICUSPID ANNULAR PLANE SYSTOLIC EXCURSION: 2 CM

## 2024-08-26 PROCEDURE — A9502 TC99M TETROFOSMIN: HCPCS

## 2024-08-26 PROCEDURE — 93018 CV STRESS TEST I&R ONLY: CPT | Performed by: INTERNAL MEDICINE

## 2024-08-26 PROCEDURE — 78452 HT MUSCLE IMAGE SPECT MULT: CPT

## 2024-08-26 PROCEDURE — 78452 HT MUSCLE IMAGE SPECT MULT: CPT | Performed by: INTERNAL MEDICINE

## 2024-08-26 PROCEDURE — 93306 TTE W/DOPPLER COMPLETE: CPT | Performed by: INTERNAL MEDICINE

## 2024-08-26 PROCEDURE — 93306 TTE W/DOPPLER COMPLETE: CPT

## 2024-08-26 PROCEDURE — 93017 CV STRESS TEST TRACING ONLY: CPT

## 2024-08-26 PROCEDURE — 93016 CV STRESS TEST SUPVJ ONLY: CPT | Performed by: INTERNAL MEDICINE

## 2024-08-26 RX ORDER — REGADENOSON 0.08 MG/ML
0.4 INJECTION, SOLUTION INTRAVENOUS ONCE
Status: COMPLETED | OUTPATIENT
Start: 2024-08-26 | End: 2024-08-26

## 2024-08-26 RX ADMIN — REGADENOSON 0.4 MG: 0.08 INJECTION, SOLUTION INTRAVENOUS at 14:27

## 2024-08-26 RX ADMIN — PERFLUTREN 1.6 ML/MIN: 6.52 INJECTION, SUSPENSION INTRAVENOUS at 12:54

## 2024-08-29 LAB
CHEST PAIN STATEMENT: NORMAL
MAX DIASTOLIC BP: 100 MMHG
MAX PREDICTED HEART RATE: 157 BPM
PROTOCOL NAME: NORMAL
REASON FOR TERMINATION: NORMAL
STRESS POST EXERCISE DUR MIN: 3 MIN
STRESS POST EXERCISE DUR SEC: 1 SEC
STRESS POST PEAK HR: 97 BPM
STRESS POST PEAK SYSTOLIC BP: 184 MMHG
TARGET HR FORMULA: NORMAL
TEST INDICATION: NORMAL

## 2024-10-15 ENCOUNTER — HOSPITAL ENCOUNTER (EMERGENCY)
Facility: HOSPITAL | Age: 64
Discharge: HOME | End: 2024-10-15
Attending: EMERGENCY MEDICINE
Payer: COMMERCIAL

## 2024-10-15 ENCOUNTER — APPOINTMENT (EMERGENCY)
Dept: RADIOLOGY | Facility: HOSPITAL | Age: 64
End: 2024-10-15
Payer: COMMERCIAL

## 2024-10-15 VITALS
OXYGEN SATURATION: 96 % | WEIGHT: 204.59 LBS | RESPIRATION RATE: 20 BRPM | SYSTOLIC BLOOD PRESSURE: 166 MMHG | TEMPERATURE: 97.9 F | DIASTOLIC BLOOD PRESSURE: 87 MMHG | BODY MASS INDEX: 31.11 KG/M2 | HEART RATE: 88 BPM

## 2024-10-15 DIAGNOSIS — R07.9 CHEST PAIN: Primary | ICD-10-CM

## 2024-10-15 LAB
2HR DELTA HS TROPONIN: -1 NG/L
ANION GAP SERPL CALCULATED.3IONS-SCNC: 8 MMOL/L (ref 4–13)
BASOPHILS # BLD AUTO: 0.04 THOUSANDS/ΜL (ref 0–0.1)
BASOPHILS NFR BLD AUTO: 1 % (ref 0–1)
BUN SERPL-MCNC: 17 MG/DL (ref 5–25)
CALCIUM SERPL-MCNC: 9.2 MG/DL (ref 8.4–10.2)
CARDIAC TROPONIN I PNL SERPL HS: 16 NG/L
CARDIAC TROPONIN I PNL SERPL HS: 17 NG/L
CHLORIDE SERPL-SCNC: 109 MMOL/L (ref 96–108)
CO2 SERPL-SCNC: 24 MMOL/L (ref 21–32)
CREAT SERPL-MCNC: 1.18 MG/DL (ref 0.6–1.3)
EOSINOPHIL # BLD AUTO: 0.15 THOUSAND/ΜL (ref 0–0.61)
EOSINOPHIL NFR BLD AUTO: 2 % (ref 0–6)
ERYTHROCYTE [DISTWIDTH] IN BLOOD BY AUTOMATED COUNT: 20.9 % (ref 11.6–15.1)
FLUAV AG UPPER RESP QL IA.RAPID: NEGATIVE
FLUBV AG UPPER RESP QL IA.RAPID: NEGATIVE
GFR SERPL CREATININE-BSD FRML MDRD: 65 ML/MIN/1.73SQ M
GLUCOSE SERPL-MCNC: 70 MG/DL (ref 65–140)
HCT VFR BLD AUTO: 31.8 % (ref 36.5–49.3)
HGB BLD-MCNC: 8.6 G/DL (ref 12–17)
IMM GRANULOCYTES # BLD AUTO: 0.03 THOUSAND/UL (ref 0–0.2)
IMM GRANULOCYTES NFR BLD AUTO: 1 % (ref 0–2)
LYMPHOCYTES # BLD AUTO: 1.58 THOUSANDS/ΜL (ref 0.6–4.47)
LYMPHOCYTES NFR BLD AUTO: 25 % (ref 14–44)
MCH RBC QN AUTO: 18.3 PG (ref 26.8–34.3)
MCHC RBC AUTO-ENTMCNC: 27 G/DL (ref 31.4–37.4)
MCV RBC AUTO: 68 FL (ref 82–98)
MONOCYTES # BLD AUTO: 0.73 THOUSAND/ΜL (ref 0.17–1.22)
MONOCYTES NFR BLD AUTO: 11 % (ref 4–12)
NEUTROPHILS # BLD AUTO: 3.85 THOUSANDS/ΜL (ref 1.85–7.62)
NEUTS SEG NFR BLD AUTO: 60 % (ref 43–75)
NRBC BLD AUTO-RTO: 0 /100 WBCS
PLATELET # BLD AUTO: 197 THOUSANDS/UL (ref 149–390)
POTASSIUM SERPL-SCNC: 3.9 MMOL/L (ref 3.5–5.3)
RBC # BLD AUTO: 4.71 MILLION/UL (ref 3.88–5.62)
SARS-COV+SARS-COV-2 AG RESP QL IA.RAPID: NEGATIVE
SODIUM SERPL-SCNC: 141 MMOL/L (ref 135–147)
WBC # BLD AUTO: 6.38 THOUSAND/UL (ref 4.31–10.16)

## 2024-10-15 PROCEDURE — 84484 ASSAY OF TROPONIN QUANT: CPT | Performed by: EMERGENCY MEDICINE

## 2024-10-15 PROCEDURE — 87804 INFLUENZA ASSAY W/OPTIC: CPT | Performed by: EMERGENCY MEDICINE

## 2024-10-15 PROCEDURE — 80048 BASIC METABOLIC PNL TOTAL CA: CPT | Performed by: EMERGENCY MEDICINE

## 2024-10-15 PROCEDURE — 36415 COLL VENOUS BLD VENIPUNCTURE: CPT | Performed by: EMERGENCY MEDICINE

## 2024-10-15 PROCEDURE — 85025 COMPLETE CBC W/AUTO DIFF WBC: CPT | Performed by: EMERGENCY MEDICINE

## 2024-10-15 PROCEDURE — 93005 ELECTROCARDIOGRAM TRACING: CPT

## 2024-10-15 PROCEDURE — 99285 EMERGENCY DEPT VISIT HI MDM: CPT

## 2024-10-15 PROCEDURE — 71045 X-RAY EXAM CHEST 1 VIEW: CPT

## 2024-10-15 PROCEDURE — 99284 EMERGENCY DEPT VISIT MOD MDM: CPT | Performed by: EMERGENCY MEDICINE

## 2024-10-15 PROCEDURE — 87811 SARS-COV-2 COVID19 W/OPTIC: CPT | Performed by: EMERGENCY MEDICINE

## 2024-10-15 NOTE — ED NOTES
Per staff at [facility], they will send transportation for patient.      Terese Gonzalez RN  10/15/24 [id number]

## 2024-10-15 NOTE — ED PROVIDER NOTES
Time reflects when diagnosis was documented in both MDM as applicable and the Disposition within this note       Time User Action Codes Description Comment    10/15/2024 12:35 PM Raciel Mata Add [R07.9] Chest pain           ED Disposition       ED Disposition   Discharge    Condition   Stable    Date/Time   Tue Oct 15, 2024 12:35 PM    Comment   Chuy Norton discharge to home/self care.                   Assessment & Plan       Medical Decision Making  64-year-old male presenting with chest wall pain.  Suspect musculoskeletal etiology caused by coughing.  Low suspicion of ACS.  Obtain cardiopulmonary evaluation with labs, EKG, viral swab.  Symptom control as needed.    Discussed all results with patient.  Follow-up with PCP.  Return precautions discussed    Amount and/or Complexity of Data Reviewed  Labs: ordered.  Radiology: ordered.             Medications - No data to display    ED Risk Strat Scores                                               History of Present Illness       Chief Complaint   Patient presents with    Chest Pain     Pt c/o chest pain that started this morning at 04:30. Pt states he has had a chest cold for the past 2-3 weeks and the pain occurs when he's coughing. Pt denies SOB/n/v/d or fevers       Past Medical History:   Diagnosis Date    Alcohol abuse     Depression     Drug therapy     GERD (gastroesophageal reflux disease)     Hypertension 01/2012    Knee pain, bilateral     Psychiatric disorder     depression, anxiety    Self-injurious behavior     Spinal stenosis of lumbar region     Suicide attempt (HCC)       Past Surgical History:   Procedure Laterality Date    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    HERNIA REPAIR  1997      Family History   Problem Relation Age of Onset    Depression Mother     Depression Father     No Known Problems Sister     No Known Problems Brother       Social History     Tobacco Use    Smoking status: Every Day     Current packs/day: 1.00     Types:  "Cigarettes, Cigars    Smokeless tobacco: Never    Tobacco comments:     PT \"3 CIGARS A DAY\" - quit smoking cigars   Vaping Use    Vaping status: Never Used   Substance Use Topics    Alcohol use: Not Currently     Alcohol/week: 0.0 standard drinks of alcohol     Comment: Stopped drinking for several months - recovered alcoholic    Drug use: No     Types: Marijuana     Comment: Last used marijuana 2 months ago.- no longer smoking      E-Cigarette/Vaping    E-Cigarette Use Never User       E-Cigarette/Vaping Substances      I have reviewed and agree with the history as documented.     64-year-old male presents for evaluation of midline chest pain that started this morning at 4 AM.  Patient states symptoms are now resolved.  Has been coughing for the last few weeks.  Pain is worse with palpation.  Unchanged with inspiration.  No shortness of breath.        Review of Systems   Constitutional:  Negative for fever.   Cardiovascular:  Positive for chest pain.           Objective       ED Triage Vitals [10/15/24 1005]   Temperature Pulse Blood Pressure Respirations SpO2 Patient Position - Orthostatic VS   97.9 °F (36.6 °C) 81 (!) 174/81 17 97 % Lying      Temp Source Heart Rate Source BP Location FiO2 (%) Pain Score    Oral Monitor Left arm -- 7      Vitals      Date and Time Temp Pulse SpO2 Resp BP Pain Score FACES Pain Rating User   10/15/24 1200 -- 88 96 % 20 166/87 -- -- RD   10/15/24 1130 -- 85 97 % 20 156/81 -- -- RD   10/15/24 1005 97.9 °F (36.6 °C) 81 97 % 17 174/81 7 -- [facility]            Physical Exam  Vitals and nursing note reviewed.   Constitutional:       General: He is not in acute distress.     Appearance: He is well-developed.   HENT:      Head: Normocephalic and atraumatic.      Right Ear: External ear normal.      Left Ear: External ear normal.      Nose: Nose normal.   Eyes:      General: No scleral icterus.  Pulmonary:      Effort: Pulmonary effort is normal. No respiratory distress.   Chest:      Chest " wall: Tenderness present.      Comments: Anterior chest wall mildly tender to palpation.  No overlying skin changes  Abdominal:      General: There is no distension.      Palpations: Abdomen is soft.   Musculoskeletal:         General: No deformity. Normal range of motion.      Cervical back: Normal range of motion and neck supple.   Skin:     General: Skin is warm.      Findings: No rash.   Neurological:      General: No focal deficit present.      Mental Status: He is alert.      Gait: Gait normal.   Psychiatric:         Mood and Affect: Mood normal.         Results Reviewed       Procedure Component Value Units Date/Time    HS Troponin I 2hr [[id number]]  (Normal) Collected: 10/15/24 1201    Lab Status: Final result Specimen: Blood from Arm, Left Updated: 10/15/24 1230     hs TnI 2hr 16 ng/L      Delta 2hr hsTnI -1 ng/L     Basic metabolic panel [[id number]]  (Abnormal) Collected: 10/15/24 1011    Lab Status: Final result Specimen: Blood from Arm, Left Updated: 10/15/24 1046     Sodium 141 mmol/L      Potassium 3.9 mmol/L      Chloride 109 mmol/L      CO2 24 mmol/L      ANION GAP 8 mmol/L      BUN 17 mg/dL      Creatinine 1.18 mg/dL      Glucose 70 mg/dL      Calcium 9.2 mg/dL      eGFR 65 ml/min/1.73sq m     Narrative:      National Kidney Disease Foundation guidelines for Chronic Kidney Disease (CKD):     Stage 1 with normal or high GFR (GFR > 90 mL/min/1.73 square meters)    Stage 2 Mild CKD (GFR = 60-89 mL/min/1.73 square meters)    Stage 3A Moderate CKD (GFR = 45-59 mL/min/1.73 square meters)    Stage 3B Moderate CKD (GFR = 30-44 mL/min/1.73 square meters)    Stage 4 Severe CKD (GFR = 15-29 mL/min/1.73 square meters)    Stage 5 End Stage CKD (GFR <15 mL/min/1.73 square meters)  Note: GFR calculation is accurate only with a steady state creatinine    HS Troponin 0hr (reflex protocol) [[id number]]  (Normal) Collected: 10/15/24 1011    Lab Status: Final result Specimen: Blood from Arm, Left Updated: 10/15/24 1044      hs TnI 0hr 17 ng/L     COVID-19/ Infleunza A/B Rapid Anitgen(30 min. TAT) [[id number]]  (Normal) Collected: 10/15/24 1011    Lab Status: Final result Specimen: Nares from Nose Updated: 10/15/24 1034     SARS COV Rapid Antigen Negative     Influenza A Rapid Antigen Negative     Influenza B Rapid Antigen Negative    Narrative:      This test has been performed using the [employer] Genesis 2 FLU+SARS Antigen test under the Emergency Use Authorization (EUA). This test has been validated by the  and verified by the performing laboratory. The Genesis uses lateral flow immunofluorescent sandwich assay to detect SARS-COV, Influenza A and Influenza B Antigen.     The [employer] Genesis 2 SARS Antigen test does not differentiate between SARS-CoV and SARS-CoV-2.     Negative results are presumptive and may be confirmed with a molecular assay, if necessary, for patient management. Negative results do not rule out SARS-CoV-2 or influenza infection and should not be used as the sole basis for treatment or patient management decisions. A negative test result may occur if the level of antigen in a sample is below the limit of detection of this test.     Positive results are indicative of the presence of viral antigens, but do not rule out bacterial infection or co-infection with other viruses.     All test results should be used as an adjunct to clinical observations and other information available to the provider.    FOR PEDIATRIC PATIENTS - copy/paste COVID Guidelines URL to browser: [email or web address]    CBC and differential [[id number]]  (Abnormal) Collected: 10/15/24 1011    Lab Status: Final result Specimen: Blood from Arm, Left Updated: 10/15/24 1019     WBC 6.38 Thousand/uL      RBC 4.71 Million/uL      Hemoglobin 8.6 g/dL      Hematocrit 31.8 %      MCV 68 fL      MCH 18.3 pg      MCHC 27.0 g/dL      RDW 20.9 %      Platelets 197 Thousands/uL      nRBC 0 /100 WBCs       Segmented % 60 %      Immature Grans % 1 %      Lymphocytes % 25 %      Monocytes % 11 %      Eosinophils Relative 2 %      Basophils Relative 1 %      Absolute Neutrophils 3.85 Thousands/µL      Absolute Immature Grans 0.03 Thousand/uL      Absolute Lymphocytes 1.58 Thousands/µL      Absolute Monocytes 0.73 Thousand/µL      Eosinophils Absolute 0.15 Thousand/µL      Basophils Absolute 0.04 Thousands/µL             X-ray chest 1 view portable   Final Interpretation by Myra Marshall MD (10/15 1024)      No acute cardiopulmonary disease.      Large hiatal hernia.      Workstation performed: [id number]             Procedures    ED Medication and Procedure Management   Prior to Admission Medications   Prescriptions Last Dose Informant Patient Reported? Taking?   QUEtiapine (SEROquel) 300 mg tablet  Outside Facility (Specify) No No   Sig: Take 1 tablet (300 mg total) by mouth daily at bedtime   aspirin (ECOTRIN LOW STRENGTH) 81 mg EC tablet  Outside Facility (Specify) No No   Sig: Take 1 tablet (81 mg total) by mouth daily   atorvastatin (LIPITOR) 40 mg tablet  Outside Facility (Specify) No No   Sig: Take 1 tablet (40 mg total) by mouth daily with dinner   diclofenac sodium (Voltaren) 1 %  Outside Facility (Specify) No No   Sig: Apply 2 g topically 4 (four) times a day   escitalopram (LEXAPRO) 20 mg tablet  Outside Facility (Specify) No No   Sig: Take 1 tablet (20 mg total) by mouth daily   famotidine (PEPCID) 20 mg tablet  Outside Facility (Specify) No No   Sig: Take 1 tablet (20 mg total) by mouth 2 (two) times a day   nicotine polacrilex (NICORETTE) 2 mg gum  Outside Facility (Specify) No No   Sig: Chew 1 each (2 mg total) every 3 (three) hours as needed for smoking cessation   pantoprazole (PROTONIX) 40 mg tablet  Outside Facility (Specify) No No   Sig: Take 1 tablet (40 mg total) by mouth daily   polyethylene glycol (MIRALAX) 17 g packet  Outside Facility (Specify) No No   Sig: Take 17 g by mouth daily as  needed (constipation)   traZODone (DESYREL) 100 mg tablet  Outside Facility (Specify) No No   Sig: Take 1 tablet (100 mg total) by mouth daily at bedtime   warfarin (COUMADIN) 2 mg tablet  Outside Facility (Specify) No No   Sig: Take 1 tablet (2 mg total) by mouth daily      Facility-Administered Medications: None     Discharge Medication List as of 10/15/2024 12:36 PM        CONTINUE these medications which have NOT CHANGED    Details   aspirin (ECOTRIN LOW STRENGTH) 81 mg EC tablet Take 1 tablet (81 mg total) by mouth daily, Starting Wed 5/15/2024, Normal      atorvastatin (LIPITOR) 40 mg tablet Take 1 tablet (40 mg total) by mouth daily with dinner, Starting Wed 5/15/2024, Normal      diclofenac sodium (Voltaren) 1 % Apply 2 g topically 4 (four) times a day, Starting Wed 9/23/2020, Normal      escitalopram (LEXAPRO) 20 mg tablet Take 1 tablet (20 mg total) by mouth daily, Starting Wed 5/15/2024, Normal      famotidine (PEPCID) 20 mg tablet Take 1 tablet (20 mg total) by mouth 2 (two) times a day, Starting Wed 5/15/2024, Normal      nicotine polacrilex (NICORETTE) 2 mg gum Chew 1 each (2 mg total) every 3 (three) hours as needed for smoking cessation, Starting Wed 5/15/2024, Normal      pantoprazole (PROTONIX) 40 mg tablet Take 1 tablet (40 mg total) by mouth daily, Starting Wed 5/15/2024, Normal      polyethylene glycol (MIRALAX) 17 g packet Take 17 g by mouth daily as needed (constipation), Starting Wed 5/15/2024, Normal      QUEtiapine (SEROquel) 300 mg tablet Take 1 tablet (300 mg total) by mouth daily at bedtime, Starting Wed 5/15/2024, Normal      traZODone (DESYREL) 100 mg tablet Take 1 tablet (100 mg total) by mouth daily at bedtime, Starting Wed 5/15/2024, Normal      warfarin (COUMADIN) 2 mg tablet Take 1 tablet (2 mg total) by mouth daily, Starting Wed 5/15/2024, Normal           No discharge procedures on file.  ED SEPSIS DOCUMENTATION   Time reflects when diagnosis was documented in both MDM as  applicable and the Disposition within this note       Time User Action Codes Description Comment    10/15/2024 12:35 PM Raceil Mata Add [R07.9] Chest pain                  Raciel Mata DO  10/15/24 1500

## 2024-10-16 LAB
ATRIAL RATE: 77 BPM
ATRIAL RATE: 83 BPM
P AXIS: 53 DEGREES
P AXIS: 58 DEGREES
PR INTERVAL: 222 MS
PR INTERVAL: 232 MS
QRS AXIS: -17 DEGREES
QRS AXIS: -18 DEGREES
QRSD INTERVAL: 96 MS
QRSD INTERVAL: 98 MS
QT INTERVAL: 334 MS
QT INTERVAL: 368 MS
QTC INTERVAL: 377 MS
QTC INTERVAL: 432 MS
T WAVE AXIS: 75 DEGREES
T WAVE AXIS: 79 DEGREES
VENTRICULAR RATE: 77 BPM
VENTRICULAR RATE: 83 BPM

## 2024-10-16 PROCEDURE — 93010 ELECTROCARDIOGRAM REPORT: CPT | Performed by: INTERNAL MEDICINE

## 2024-10-23 ENCOUNTER — OFFICE VISIT (OUTPATIENT)
Dept: URGENT CARE | Facility: CLINIC | Age: 64
End: 2024-10-23
Payer: COMMERCIAL

## 2024-10-23 VITALS
DIASTOLIC BLOOD PRESSURE: 78 MMHG | TEMPERATURE: 98.2 F | HEART RATE: 94 BPM | RESPIRATION RATE: 20 BRPM | OXYGEN SATURATION: 95 % | SYSTOLIC BLOOD PRESSURE: 166 MMHG

## 2024-10-23 DIAGNOSIS — J06.9 VIRAL URI WITH COUGH: Primary | ICD-10-CM

## 2024-10-23 PROCEDURE — 99213 OFFICE O/P EST LOW 20 MIN: CPT

## 2024-10-23 RX ORDER — ACETAMINOPHEN 325 MG/1
650 TABLET ORAL EVERY 8 HOURS PRN
Status: SHIPPED | OUTPATIENT
Start: 2024-10-23

## 2024-10-23 RX ORDER — BENZONATATE 200 MG/1
200 CAPSULE ORAL 3 TIMES DAILY PRN
Qty: 20 CAPSULE | Refills: 0 | Status: SHIPPED | OUTPATIENT
Start: 2024-10-23

## 2024-10-23 RX ORDER — GUAIFENESIN 200 MG/10ML
200 LIQUID ORAL 3 TIMES DAILY PRN
Qty: 120 ML | Refills: 0 | Status: SHIPPED | OUTPATIENT
Start: 2024-10-23

## 2024-10-23 NOTE — PROGRESS NOTES
[facility]        NAME: Chuy Norton is a 64 y.o. male  : 1960    MRN: [id number]  DATE: 2024  TIME: 2:07 PM    Assessment and Plan   Viral URI with cough [J06.9]  1. Viral URI with cough  benzonatate (TESSALON) 200 MG capsule    guaiFENesin (ROBITUSSIN) 100 MG/5ML oral liquid            Patient Instructions     Your symptoms are consistent with a viral illness.    For nasal/sinus congestion you can try steam, warm compresses, saline nasal spray, Neti pot, nasal steroid (Flonase, Nasocort), or nasal decongestant (Afrin - for 3 days only).    You can try a decongestant (Sudafed) if > 6 years of age and no history of high blood pressure.    For cough you can take an over-the-counter expectorant such as plain Robitussion or Mucinex. A spoonful of honey at bedtime may also be helpful.    For cold symptoms with high blood pressure take Coricidin cough/cold.     For sore throat you can use Cepacol lozenges, do warm salt water gargles, drink warm water with lemon or herbal teas, or use an over-the-counter throat spray (Chloraseptic).    You can take ibuprofen/Motrin and acetaminophen/Tylenol as needed for pain, fever, body aches. Do not take ibuprofen/Motrin/Advil if you have a history of heart disease, bleeding ulcers, or if you take blood thinners.     Drink plenty of fluids to stay hydrated. Airborne or Emergen-C for extra vitamin C and zinc.    Follow up with your PCP in 3-5 days for persistent symptoms.    Go to the ER if symptoms worsen.     If tests are performed, our office will contact you with results only if changes need to made to the care plan discussed with you at the visit. You can review your full results on [facility].      Chief Complaint     Chief Complaint   Patient presents with    Cough     Patient started 3-5 days ago with cough, sore throat and chest congestion. Negative covid test           History of Present Illness       64-year-old male presenting with  cough, congestion, and sore throat x 5 days.  Patient believes he got sick from ED visit on 10/15.  States cough is loose but he cannot cough anything up.  No known fevers/chills/body aches.  States he has been able to eat and drink without difficulty.  No vomiting or diarrhea.  Smokes cigarettes 1/2 PPD.  Taking Tylenol, no other cough/cold medications tried.        Review of Systems   Review of Systems   Constitutional:  Negative for chills and fever.   HENT:  Positive for congestion, rhinorrhea and sore throat. Negative for ear pain.    Eyes:  Negative for discharge and redness.   Respiratory:  Positive for cough and chest tightness. Negative for shortness of breath and wheezing.    Cardiovascular:  Negative for chest pain and palpitations.   Gastrointestinal:  Negative for abdominal pain, diarrhea and vomiting.   Skin:  Negative for rash.   Neurological:  Negative for dizziness and headaches.         Current Medications       Current Outpatient Medications:     benzonatate (TESSALON) 200 MG capsule, Take 1 capsule (200 mg total) by mouth 3 (three) times a day as needed for cough, Disp: 20 capsule, Rfl: 0    guaiFENesin (ROBITUSSIN) 100 MG/5ML oral liquid, Take 10 mL (200 mg total) by mouth 3 (three) times a day as needed for cough, Disp: 120 mL, Rfl: 0    aspirin (ECOTRIN LOW STRENGTH) 81 mg EC tablet, Take 1 tablet (81 mg total) by mouth daily, Disp: 30 tablet, Rfl: 0    atorvastatin (LIPITOR) 40 mg tablet, Take 1 tablet (40 mg total) by mouth daily with dinner, Disp: 30 tablet, Rfl: 0    diclofenac sodium (Voltaren) 1 %, Apply 2 g topically 4 (four) times a day, Disp: 1 Tube, Rfl: 0    escitalopram (LEXAPRO) 20 mg tablet, Take 1 tablet (20 mg total) by mouth daily, Disp: 21 tablet, Rfl: 0    famotidine (PEPCID) 20 mg tablet, Take 1 tablet (20 mg total) by mouth 2 (two) times a day, Disp: 30 tablet, Rfl: 0    nicotine polacrilex (NICORETTE) 2 mg gum, Chew 1 each (2 mg total) every 3 (three) hours as needed for  smoking cessation, Disp: 100 each, Rfl: 0    pantoprazole (PROTONIX) 40 mg tablet, Take 1 tablet (40 mg total) by mouth daily, Disp: 30 tablet, Rfl: 0    polyethylene glycol (MIRALAX) 17 g packet, Take 17 g by mouth daily as needed (constipation), Disp: 30 each, Rfl: 0    QUEtiapine (SEROquel) 300 mg tablet, Take 1 tablet (300 mg total) by mouth daily at bedtime, Disp: 30 tablet, Rfl: 0    traZODone (DESYREL) 100 mg tablet, Take 1 tablet (100 mg total) by mouth daily at bedtime, Disp: 21 tablet, Rfl: 0    warfarin (COUMADIN) 2 mg tablet, Take 1 tablet (2 mg total) by mouth daily, Disp: 30 tablet, Rfl: 0    Current Allergies     Allergies as of 10/23/2024 - Reviewed 10/23/2024   Allergen Reaction Noted    Haloperidol Tremor 03/18/2015            The following portions of the patient's history were reviewed and updated as appropriate: allergies, current medications, past family history, past medical history, past social history, past surgical history and problem list.     Past Medical History:   Diagnosis Date    Alcohol abuse     Depression     Drug therapy     GERD (gastroesophageal reflux disease)     Hypertension 01/2012    Knee pain, bilateral     Psychiatric disorder     depression, anxiety    Self-injurious behavior     Spinal stenosis of lumbar region     Suicide attempt (HCC)        Past Surgical History:   Procedure Laterality Date    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    HERNIA REPAIR  1997       Family History   Problem Relation Age of Onset    Depression Mother     Depression Father     No Known Problems Sister     No Known Problems Brother          Medications have been verified.        Objective   /78   Pulse 94   Temp 98.2 °F (36.8 °C)   Resp 20   SpO2 95%        Physical Exam     Physical Exam  Vitals and nursing note reviewed.   Constitutional:       General: He is not in acute distress.     Appearance: He is not ill-appearing or diaphoretic.   HENT:      Head: Normocephalic and  atraumatic.      Right Ear: Tympanic membrane, ear canal and external ear normal.      Left Ear: Tympanic membrane, ear canal and external ear normal.      Nose: Congestion and rhinorrhea present.      Mouth/Throat:      Mouth: Mucous membranes are moist.      Pharynx: Oropharynx is clear.   Eyes:      Conjunctiva/sclera: Conjunctivae normal.      Pupils: Pupils are equal, round, and reactive to light.   Cardiovascular:      Rate and Rhythm: Normal rate and regular rhythm.      Pulses: Normal pulses.      Heart sounds: Normal heart sounds.   Pulmonary:      Effort: Pulmonary effort is normal.      Breath sounds: Normal breath sounds. No wheezing, rhonchi or rales.   Musculoskeletal:         General: Normal range of motion.      Cervical back: Normal range of motion and neck supple.   Lymphadenopathy:      Cervical: No cervical adenopathy.   Skin:     General: Skin is warm and dry.      Capillary Refill: Capillary refill takes less than 2 seconds.   Neurological:      Mental Status: He is alert and oriented to person, place, and time.      Motor: No weakness.      Gait: Gait normal.   Psychiatric:         Mood and Affect: Mood normal.         Behavior: Behavior normal.

## 2024-10-23 NOTE — PATIENT INSTRUCTIONS
Your symptoms are consistent with a viral illness.    For nasal/sinus congestion you can try steam, warm compresses, saline nasal spray, Neti pot, nasal steroid (Flonase, Nasocort), or nasal decongestant (Afrin - for 3 days only).    You can try a decongestant (Sudafed) if > 6 years of age and no history of high blood pressure.    For cough you can take an over-the-counter expectorant such as plain Robitussion or Mucinex. A spoonful of honey at bedtime may also be helpful.    For cold symptoms with high blood pressure take Coricidin cough/cold.     For sore throat you can use Cepacol lozenges, do warm salt water gargles, drink warm water with lemon or herbal teas, or use an over-the-counter throat spray (Chloraseptic).    You can take ibuprofen/Motrin and acetaminophen/Tylenol as needed for pain, fever, body aches. Do not take ibuprofen/Motrin/Advil if you have a history of heart disease, bleeding ulcers, or if you take blood thinners.     Drink plenty of fluids to stay hydrated. Airborne or Emergen-C for extra vitamin C and zinc.    Follow up with your PCP in 3-5 days for persistent symptoms.    Go to the ER if symptoms worsen.

## 2024-11-29 ENCOUNTER — HOSPITAL ENCOUNTER (INPATIENT)
Facility: HOSPITAL | Age: 64
LOS: 7 days | Discharge: NURSING, REHAB OR LONG-TERM CARE | End: 2024-12-06
Attending: EMERGENCY MEDICINE | Admitting: FAMILY MEDICINE
Payer: COMMERCIAL

## 2024-11-29 ENCOUNTER — APPOINTMENT (EMERGENCY)
Dept: RADIOLOGY | Facility: HOSPITAL | Age: 64
End: 2024-11-29
Payer: COMMERCIAL

## 2024-11-29 DIAGNOSIS — K27.9 PUD (PEPTIC ULCER DISEASE): ICD-10-CM

## 2024-11-29 DIAGNOSIS — D64.9 ANEMIA, UNSPECIFIED TYPE: ICD-10-CM

## 2024-11-29 DIAGNOSIS — K74.60 CIRRHOSIS (HCC): ICD-10-CM

## 2024-11-29 DIAGNOSIS — K44.9 HIATAL HERNIA WITH GERD WITHOUT ESOPHAGITIS: ICD-10-CM

## 2024-11-29 DIAGNOSIS — K21.9 HIATAL HERNIA WITH GERD WITHOUT ESOPHAGITIS: ICD-10-CM

## 2024-11-29 DIAGNOSIS — R06.09 EXERTIONAL DYSPNEA: ICD-10-CM

## 2024-11-29 DIAGNOSIS — J06.9 VIRAL URI WITH COUGH: ICD-10-CM

## 2024-11-29 DIAGNOSIS — K92.2 GI BLEED: ICD-10-CM

## 2024-11-29 DIAGNOSIS — I51.3 LEFT VENTRICULAR APICAL THROMBUS: ICD-10-CM

## 2024-11-29 DIAGNOSIS — D50.0 IRON DEFICIENCY ANEMIA DUE TO CHRONIC BLOOD LOSS: ICD-10-CM

## 2024-11-29 DIAGNOSIS — K92.2 GASTROINTESTINAL HEMORRHAGE, UNSPECIFIED GASTROINTESTINAL HEMORRHAGE TYPE: ICD-10-CM

## 2024-11-29 DIAGNOSIS — R07.89 ATYPICAL CHEST PAIN: Primary | ICD-10-CM

## 2024-11-29 DIAGNOSIS — D50.9 IRON DEFICIENCY ANEMIA: ICD-10-CM

## 2024-11-29 DIAGNOSIS — R09.02 HYPOXEMIA: ICD-10-CM

## 2024-11-29 PROBLEM — R05.3 PERSISTENT COUGH FOR 3 WEEKS OR LONGER: Status: ACTIVE | Noted: 2024-11-29

## 2024-11-29 PROBLEM — J18.9 COMMUNITY ACQUIRED PNEUMONIA: Status: ACTIVE | Noted: 2024-11-29

## 2024-11-29 LAB
2HR DELTA HS TROPONIN: 0 NG/L
4HR DELTA HS TROPONIN: 1 NG/L
ABO GROUP BLD: NORMAL
ALBUMIN SERPL BCG-MCNC: 4.1 G/DL (ref 3.5–5)
ALP SERPL-CCNC: 58 U/L (ref 34–104)
ALT SERPL W P-5'-P-CCNC: 12 U/L (ref 7–52)
ANION GAP SERPL CALCULATED.3IONS-SCNC: 5 MMOL/L (ref 4–13)
APTT PPP: 31 SECONDS (ref 23–34)
AST SERPL W P-5'-P-CCNC: 16 U/L (ref 13–39)
ATRIAL RATE: 100 BPM
ATRIAL RATE: 99 BPM
BASOPHILS # BLD AUTO: 0.01 THOUSANDS/ÂΜL (ref 0–0.1)
BASOPHILS NFR BLD AUTO: 0 % (ref 0–1)
BILIRUB SERPL-MCNC: 0.48 MG/DL (ref 0.2–1)
BLD GP AB SCN SERPL QL: NEGATIVE
BNP SERPL-MCNC: 76 PG/ML (ref 0–100)
BUN SERPL-MCNC: 18 MG/DL (ref 5–25)
CALCIUM SERPL-MCNC: 8.5 MG/DL (ref 8.4–10.2)
CARDIAC TROPONIN I PNL SERPL HS: 17 NG/L (ref ?–50)
CARDIAC TROPONIN I PNL SERPL HS: 17 NG/L (ref ?–50)
CARDIAC TROPONIN I PNL SERPL HS: 18 NG/L (ref ?–50)
CHLORIDE SERPL-SCNC: 109 MMOL/L (ref 96–108)
CO2 SERPL-SCNC: 25 MMOL/L (ref 21–32)
CREAT SERPL-MCNC: 1.16 MG/DL (ref 0.6–1.3)
EOSINOPHIL # BLD AUTO: 0.02 THOUSAND/ÂΜL (ref 0–0.61)
EOSINOPHIL NFR BLD AUTO: 0 % (ref 0–6)
ERYTHROCYTE [DISTWIDTH] IN BLOOD BY AUTOMATED COUNT: 21.1 % (ref 11.6–15.1)
FERRITIN SERPL-MCNC: 6 NG/ML (ref 24–336)
FLUAV AG UPPER RESP QL IA.RAPID: NEGATIVE
FLUBV AG UPPER RESP QL IA.RAPID: NEGATIVE
GFR SERPL CREATININE-BSD FRML MDRD: 66 ML/MIN/1.73SQ M
GLUCOSE SERPL-MCNC: 127 MG/DL (ref 65–140)
HCT VFR BLD AUTO: 28.8 % (ref 36.5–49.3)
HGB BLD-MCNC: 7.7 G/DL (ref 12–17)
IMM GRANULOCYTES # BLD AUTO: 0.09 THOUSAND/UL (ref 0–0.2)
IMM GRANULOCYTES NFR BLD AUTO: 1 % (ref 0–2)
INR PPP: 1.21 (ref 0.85–1.19)
IRON SATN MFR SERPL: 2 % (ref 15–50)
IRON SERPL-MCNC: 11 UG/DL (ref 50–212)
LYMPHOCYTES # BLD AUTO: 1.27 THOUSANDS/ÂΜL (ref 0.6–4.47)
LYMPHOCYTES NFR BLD AUTO: 13 % (ref 14–44)
MCH RBC QN AUTO: 17.8 PG (ref 26.8–34.3)
MCHC RBC AUTO-ENTMCNC: 26.7 G/DL (ref 31.4–37.4)
MCV RBC AUTO: 67 FL (ref 82–98)
MONOCYTES # BLD AUTO: 0.62 THOUSAND/ÂΜL (ref 0.17–1.22)
MONOCYTES NFR BLD AUTO: 6 % (ref 4–12)
NEUTROPHILS # BLD AUTO: 8.13 THOUSANDS/ÂΜL (ref 1.85–7.62)
NEUTS SEG NFR BLD AUTO: 80 % (ref 43–75)
NRBC BLD AUTO-RTO: 0 /100 WBCS
P AXIS: 53 DEGREES
P AXIS: 67 DEGREES
PLATELET # BLD AUTO: 165 THOUSANDS/UL (ref 149–390)
POTASSIUM SERPL-SCNC: 3.9 MMOL/L (ref 3.5–5.3)
PR INTERVAL: 198 MS
PR INTERVAL: 228 MS
PROCALCITONIN SERPL-MCNC: <0.05 NG/ML
PROT SERPL-MCNC: 7 G/DL (ref 6.4–8.4)
PROTHROMBIN TIME: 15.8 SECONDS (ref 12.3–15)
QRS AXIS: -11 DEGREES
QRS AXIS: 3 DEGREES
QRSD INTERVAL: 94 MS
QRSD INTERVAL: 98 MS
QT INTERVAL: 326 MS
QT INTERVAL: 428 MS
QTC INTERVAL: 420 MS
QTC INTERVAL: 549 MS
RBC # BLD AUTO: 4.32 MILLION/UL (ref 3.88–5.62)
RH BLD: POSITIVE
SARS-COV+SARS-COV-2 AG RESP QL IA.RAPID: NEGATIVE
SODIUM SERPL-SCNC: 139 MMOL/L (ref 135–147)
SPECIMEN EXPIRATION DATE: NORMAL
T WAVE AXIS: 78 DEGREES
T WAVE AXIS: 91 DEGREES
TIBC SERPL-MCNC: 441 UG/DL (ref 250–450)
UIBC SERPL-MCNC: 430 UG/DL (ref 155–355)
VENTRICULAR RATE: 100 BPM
VENTRICULAR RATE: 99 BPM
WBC # BLD AUTO: 10.14 THOUSAND/UL (ref 4.31–10.16)

## 2024-11-29 PROCEDURE — 85025 COMPLETE CBC W/AUTO DIFF WBC: CPT | Performed by: EMERGENCY MEDICINE

## 2024-11-29 PROCEDURE — 87804 INFLUENZA ASSAY W/OPTIC: CPT | Performed by: EMERGENCY MEDICINE

## 2024-11-29 PROCEDURE — 93005 ELECTROCARDIOGRAM TRACING: CPT

## 2024-11-29 PROCEDURE — 99285 EMERGENCY DEPT VISIT HI MDM: CPT

## 2024-11-29 PROCEDURE — 87081 CULTURE SCREEN ONLY: CPT | Performed by: FAMILY MEDICINE

## 2024-11-29 PROCEDURE — 94760 N-INVAS EAR/PLS OXIMETRY 1: CPT

## 2024-11-29 PROCEDURE — 80053 COMPREHEN METABOLIC PANEL: CPT | Performed by: EMERGENCY MEDICINE

## 2024-11-29 PROCEDURE — 86850 RBC ANTIBODY SCREEN: CPT | Performed by: EMERGENCY MEDICINE

## 2024-11-29 PROCEDURE — 87811 SARS-COV-2 COVID19 W/OPTIC: CPT | Performed by: EMERGENCY MEDICINE

## 2024-11-29 PROCEDURE — 71045 X-RAY EXAM CHEST 1 VIEW: CPT

## 2024-11-29 PROCEDURE — 85730 THROMBOPLASTIN TIME PARTIAL: CPT | Performed by: EMERGENCY MEDICINE

## 2024-11-29 PROCEDURE — 85610 PROTHROMBIN TIME: CPT | Performed by: EMERGENCY MEDICINE

## 2024-11-29 PROCEDURE — 83880 ASSAY OF NATRIURETIC PEPTIDE: CPT | Performed by: EMERGENCY MEDICINE

## 2024-11-29 PROCEDURE — 93010 ELECTROCARDIOGRAM REPORT: CPT | Performed by: INTERNAL MEDICINE

## 2024-11-29 PROCEDURE — 82746 ASSAY OF FOLIC ACID SERUM: CPT | Performed by: STUDENT IN AN ORGANIZED HEALTH CARE EDUCATION/TRAINING PROGRAM

## 2024-11-29 PROCEDURE — 83550 IRON BINDING TEST: CPT

## 2024-11-29 PROCEDURE — 84484 ASSAY OF TROPONIN QUANT: CPT | Performed by: EMERGENCY MEDICINE

## 2024-11-29 PROCEDURE — 99223 1ST HOSP IP/OBS HIGH 75: CPT

## 2024-11-29 PROCEDURE — 82607 VITAMIN B-12: CPT | Performed by: STUDENT IN AN ORGANIZED HEALTH CARE EDUCATION/TRAINING PROGRAM

## 2024-11-29 PROCEDURE — 84145 PROCALCITONIN (PCT): CPT

## 2024-11-29 PROCEDURE — 86923 COMPATIBILITY TEST ELECTRIC: CPT

## 2024-11-29 PROCEDURE — 82728 ASSAY OF FERRITIN: CPT

## 2024-11-29 PROCEDURE — 86901 BLOOD TYPING SEROLOGIC RH(D): CPT | Performed by: EMERGENCY MEDICINE

## 2024-11-29 PROCEDURE — 94664 DEMO&/EVAL PT USE INHALER: CPT

## 2024-11-29 PROCEDURE — 86900 BLOOD TYPING SEROLOGIC ABO: CPT | Performed by: EMERGENCY MEDICINE

## 2024-11-29 PROCEDURE — 83540 ASSAY OF IRON: CPT

## 2024-11-29 PROCEDURE — 36415 COLL VENOUS BLD VENIPUNCTURE: CPT | Performed by: EMERGENCY MEDICINE

## 2024-11-29 PROCEDURE — 99285 EMERGENCY DEPT VISIT HI MDM: CPT | Performed by: EMERGENCY MEDICINE

## 2024-11-29 RX ORDER — ACETAMINOPHEN 325 MG/1
650 TABLET ORAL EVERY 8 HOURS PRN
Status: DISCONTINUED | OUTPATIENT
Start: 2024-11-29 | End: 2024-12-06 | Stop reason: HOSPADM

## 2024-11-29 RX ORDER — CEFTRIAXONE 1 G/50ML
1000 INJECTION, SOLUTION INTRAVENOUS EVERY 24 HOURS
Status: DISCONTINUED | OUTPATIENT
Start: 2024-11-29 | End: 2024-11-30

## 2024-11-29 RX ORDER — PANTOPRAZOLE SODIUM 40 MG/10ML
40 INJECTION, POWDER, LYOPHILIZED, FOR SOLUTION INTRAVENOUS EVERY 12 HOURS SCHEDULED
Status: DISCONTINUED | OUTPATIENT
Start: 2024-11-29 | End: 2024-12-05

## 2024-11-29 RX ORDER — ALBUTEROL SULFATE 0.83 MG/ML
2.5 SOLUTION RESPIRATORY (INHALATION) EVERY 6 HOURS PRN
Status: DISCONTINUED | OUTPATIENT
Start: 2024-11-29 | End: 2024-12-06 | Stop reason: HOSPADM

## 2024-11-29 RX ORDER — FAMOTIDINE 20 MG/1
20 TABLET, FILM COATED ORAL 2 TIMES DAILY
Status: DISCONTINUED | OUTPATIENT
Start: 2024-11-30 | End: 2024-12-01

## 2024-11-29 RX ORDER — BENZONATATE 100 MG/1
200 CAPSULE ORAL 3 TIMES DAILY PRN
Status: DISCONTINUED | OUTPATIENT
Start: 2024-11-29 | End: 2024-12-06 | Stop reason: HOSPADM

## 2024-11-29 RX ORDER — GUAIFENESIN 600 MG/1
600 TABLET, EXTENDED RELEASE ORAL 2 TIMES DAILY
Status: DISCONTINUED | OUTPATIENT
Start: 2024-11-29 | End: 2024-12-06 | Stop reason: HOSPADM

## 2024-11-29 RX ORDER — POLYETHYLENE GLYCOL 3350 17 G/17G
17 POWDER, FOR SOLUTION ORAL DAILY PRN
Status: DISCONTINUED | OUTPATIENT
Start: 2024-11-29 | End: 2024-12-06 | Stop reason: HOSPADM

## 2024-11-29 RX ORDER — ATORVASTATIN CALCIUM 40 MG/1
40 TABLET, FILM COATED ORAL
Status: DISCONTINUED | OUTPATIENT
Start: 2024-11-29 | End: 2024-12-06 | Stop reason: HOSPADM

## 2024-11-29 RX ORDER — MAGNESIUM HYDROXIDE/ALUMINUM HYDROXICE/SIMETHICONE 120; 1200; 1200 MG/30ML; MG/30ML; MG/30ML
30 SUSPENSION ORAL EVERY 6 HOURS PRN
Status: DISCONTINUED | OUTPATIENT
Start: 2024-11-29 | End: 2024-12-06 | Stop reason: HOSPADM

## 2024-11-29 RX ORDER — ESCITALOPRAM OXALATE 20 MG/1
20 TABLET ORAL DAILY
Status: DISCONTINUED | OUTPATIENT
Start: 2024-11-30 | End: 2024-12-06 | Stop reason: HOSPADM

## 2024-11-29 RX ORDER — ONDANSETRON 2 MG/ML
4 INJECTION INTRAMUSCULAR; INTRAVENOUS EVERY 6 HOURS PRN
Status: DISCONTINUED | OUTPATIENT
Start: 2024-11-29 | End: 2024-12-06 | Stop reason: HOSPADM

## 2024-11-29 RX ORDER — TRAZODONE HYDROCHLORIDE 50 MG/1
100 TABLET, FILM COATED ORAL
Status: DISCONTINUED | OUTPATIENT
Start: 2024-11-29 | End: 2024-12-06 | Stop reason: HOSPADM

## 2024-11-29 RX ADMIN — CEFTRIAXONE 1000 MG: 1 INJECTION, SOLUTION INTRAVENOUS at 18:53

## 2024-11-29 RX ADMIN — PANTOPRAZOLE SODIUM 40 MG: 40 INJECTION, POWDER, FOR SOLUTION INTRAVENOUS at 21:38

## 2024-11-29 RX ADMIN — TRAZODONE HYDROCHLORIDE 100 MG: 50 TABLET ORAL at 21:38

## 2024-11-29 RX ADMIN — QUETIAPINE 300 MG: 200 TABLET ORAL at 21:38

## 2024-11-29 RX ADMIN — ATORVASTATIN CALCIUM 40 MG: 40 TABLET, FILM COATED ORAL at 18:28

## 2024-11-29 RX ADMIN — GUAIFENESIN 600 MG: 600 TABLET ORAL at 18:28

## 2024-11-29 NOTE — ASSESSMENT & PLAN NOTE
Diagnosed on echocardiogram in 2021  Has been maintained on Coumadin 2mg daily   INR 1.21   Holding due to acute anemia - resume when able

## 2024-11-29 NOTE — ASSESSMENT & PLAN NOTE
Hx of alcoholic cirrhosis, hep C  Patient has sustained from alcohol use for 1 1/2 years, living in sober house.   On physical exam: Abdomen is distended, no fluid wave. No tenderness or guarding

## 2024-11-29 NOTE — ED PROVIDER NOTES
Time reflects when diagnosis was documented in both MDM as applicable and the Disposition within this note       Time User Action Codes Description Comment    11/29/2024  3:51 PM Jeremías Walker [R07.89] Atypical chest pain     11/29/2024  3:51 PM Jeremías Walker Add [D50.9] Iron deficiency anemia     11/29/2024  3:52 PM Jeremías Walker Add [R09.02] Hypoxemia     11/29/2024  3:52 PM Jeremías Walker Add [K74.60] Cirrhosis (HCC)     11/29/2024  3:56 PM Mary Hwang Add [J06.9] Viral URI with cough     11/30/2024  7:20 AM Chante Parker Add [D64.9] Anemia, unspecified type     11/30/2024  7:21 AM Chante Parker Add [K92.2] Gastrointestinal hemorrhage, unspecified gastrointestinal hemorrhage type     11/30/2024 10:05 AM Omari Mix Add [D50.0] Iron deficiency anemia due to chronic blood loss     11/30/2024 10:06 AM Izabela Zuniga [K27.9] PUD (peptic ulcer disease)     11/30/2024 10:06 AM Izabela Zuniga [R06.09] Exertional dyspnea     11/30/2024 10:06 AM Izabela Zuniga Add [K44.9,  K21.9] Hiatal hernia with GERD without esophagitis           ED Disposition       ED Disposition   Admit    Condition   Stable    Date/Time   Fri Nov 29, 2024  3:51 PM    Comment   Case was discussed with Dr. Dueñas and the patient's admission status was agreed to be Admission Status: inpatient status to the service of Dr. Dueñas .               Assessment & Plan       Medical Decision Making  64-year-old male with longstanding cardiac disease including paroxysmal atrial fibrillation, left ventricular thrombus, hepatitis with cirrhosis and prior GI bleed is on warfarin and aspirin.  Has recurrence of atypical chest pain which has resolved.  Noted to have iron deficiency anemia with a 1 g/dL drop of hemoglobin in the last month.  Rectal exam is negative.  Has nonproductive cough and desaturates to 82% from 98% SpO2 with brief ambulation.  Heart rate climbed to 104 bpm after short walk.      Amount and/or  Complexity of Data Reviewed  Labs: ordered.  Radiology: ordered.    Risk  Decision regarding hospitalization.        ED Course as of 12/01/24 1007   Fri Nov 29, 2024   1520 On repeat examination patient is laying back in bed comfortable.  Mask is on.  Pulse ox 100%.  Lungs clear.  No further cough.  Rectal heme-negative.  Ambulate and check pulse ox/mL.   1527 Repeat EKG at 11:03 AM is similar to prior part with resolution of ST segment artifacts.       Medications       ED Risk Strat Scores   HEART Risk Score      Flowsheet Row Most Recent Value   Heart Score Risk Calculator    History 0 Filed at: 11/29/2024 1225   ECG 1 Filed at: 11/29/2024 1225   Age 1 Filed at: 11/29/2024 1225   Risk Factors 1 Filed at: 11/29/2024 1225   Troponin 1 Filed at: 11/29/2024 1225   HEART Score 4 Filed at: 11/29/2024 1225                                                   History of Present Illness       Chief Complaint   Patient presents with    Chest Pain     EMS from [facility]; pt c/o chest pain that woke him at 0400 but has since resolved, also SOB, Ems gave albuterol tx and 324mg aspirin       Past Medical History:   Diagnosis Date    Alcohol abuse     Depression     Drug therapy     GERD (gastroesophageal reflux disease)     Hepatitis C     Hypertension 01/2012    Knee pain, bilateral     Left ventricular apical thrombus     Psychiatric disorder     depression, anxiety    Renal disorder     Self-injurious behavior     Spinal stenosis of lumbar region     Suicide attempt (HCC)       Past Surgical History:   Procedure Laterality Date    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    HERNIA REPAIR  1997      Family History   Problem Relation Age of Onset    Depression Mother     Depression Father     No Known Problems Sister     No Known Problems Brother       Social History     Tobacco Use    Smoking status: Every Day     Current packs/day: 1.00     Types: Cigarettes, Cigars    Smokeless tobacco: Never    Tobacco comments:      "PT \"3 CIGARS A DAY\" - quit smoking cigars   Vaping Use    Vaping status: Never Used   Substance Use Topics    Alcohol use: Not Currently     Alcohol/week: 0.0 standard drinks of alcohol     Comment: Stopped drinking for several months - recovered alcoholic    Drug use: Not Currently     Types: Marijuana     Comment: Last used marijuana 2 months ago.- no longer smoking      E-Cigarette/Vaping    E-Cigarette Use Never User       E-Cigarette/Vaping Substances      I have reviewed and agree with the history as documented.     63 yo M from group home had c/o episode of sharp midsternal chest pain earlier this morning.  That has resolved.  Told the medics that he just feels a little tired out.  He is very poor historian and has had workups in the past for recurrent atypical sharp chest pains like this.  Has history of paroxysmal atrial fibrillation, hypertension, left ventricular apical thrombus, hepatitis C with cirrhosis but no esophageal varices.  He did have GI bleed in the past.  Is on both aspirin and warfarin.  Denies recent rectal bleeding but is a very poor and unreliable historian.  Does endorse some cough recently.        Review of Systems   Constitutional:  Positive for fatigue. Negative for fever.   Respiratory:  Positive for cough. Negative for shortness of breath.    Cardiovascular:  Positive for chest pain (recurrent). Negative for leg swelling.   Gastrointestinal:  Negative for abdominal pain, blood in stool and vomiting.   Genitourinary:  Negative for dysuria and flank pain.           Objective       ED Triage Vitals [11/29/24 1055]   Temperature Pulse Blood Pressure Respirations SpO2 Patient Position - Orthostatic VS   98.8 °F (37.1 °C) 100 (!) 171/79 18 97 % Lying      Temp Source Heart Rate Source BP Location FiO2 (%) Pain Score    Temporal Monitor Left arm -- No Pain      Vitals      Date and Time Temp Pulse SpO2 Resp BP Pain Score FACES Pain Rating User   12/01/24 0821 97.6 °F (36.4 °C) -- 90 % -- " -- -- -- DG   12/01/24 0821 -- -- -- -- 133/79 -- -- DII   12/01/24 0100 -- -- -- -- -- No Pain -- FC   11/30/24 2056 -- -- -- -- 129/79 -- -- DII   11/30/24 1432 -- -- -- 20 -- -- -- EL   11/30/24 1432 98.6 °F (37 °C) 89 95 % -- 144/86 -- -- DII   11/30/24 0900 -- -- -- -- -- No Pain -- GB   11/30/24 0711 97.6 °F (36.4 °C) -- -- -- -- -- -- DG   11/30/24 0711 -- 85 96 % 20 133/80 -- -- DII   11/30/24 0035 -- -- -- -- -- No Pain -- FC   11/29/24 1700 -- -- -- -- -- No Pain -- MNN   11/29/24 1651 -- 81 93 % 20 -- -- -- DH   11/29/24 1647 98.6 °F (37 °C) 84 93 % -- 134/79 -- -- MARIJA   11/29/24 1646 98.6 °F (37 °C) 84 93 % -- 134/79 -- -- DII   11/29/24 1643 -- 105 92 % -- -- -- -- MARIJA   11/29/24 1500 -- 99 96 % 22 156/80 -- -- SS   11/29/24 1400 -- 86 98 % 21 165/85 -- -- AL   11/29/24 1330 -- 87 95 % 21 166/75 -- -- SS   11/29/24 1200 -- 94 98 % 20 150/78 -- -- SS   11/29/24 1130 -- 98 97 % 22 149/72 -- -- SS   11/29/24 1055 98.8 °F (37.1 °C) 100 97 % 18 171/79 No Pain -- MS            Physical Exam  Vitals and nursing note reviewed.   Constitutional:       General: He is not in acute distress.     Appearance: He is well-developed. He is obese. He is not ill-appearing or diaphoretic.   HENT:      Head: Normocephalic and atraumatic.      Right Ear: External ear normal.      Left Ear: External ear normal.   Eyes:      General: No scleral icterus.     Conjunctiva/sclera: Conjunctivae normal.   Neck:      Vascular: No JVD.   Cardiovascular:      Rate and Rhythm: Normal rate and regular rhythm.      Pulses: Normal pulses.      Heart sounds: Normal heart sounds.   Pulmonary:      Effort: Pulmonary effort is normal. No respiratory distress.      Breath sounds: Rhonchi present.   Chest:      Chest wall: No tenderness or crepitus.   Abdominal:      General: Bowel sounds are normal. There is no abdominal bruit.      Palpations: Abdomen is soft. There is no fluid wave or mass.      Tenderness: There is no abdominal tenderness.    Genitourinary:     Prostate: Normal.      Rectum: Guaiac result negative.   Musculoskeletal:         General: No tenderness. Normal range of motion.      Cervical back: Neck supple.      Right lower leg: No tenderness. No edema.      Left lower leg: No tenderness. No edema.   Skin:     General: Skin is warm and dry.      Capillary Refill: Capillary refill takes less than 2 seconds.      Findings: No rash.   Neurological:      General: No focal deficit present.      Mental Status: He is alert. Mental status is at baseline. He is disoriented.      Cranial Nerves: No cranial nerve deficit.      Motor: No weakness.      Coordination: Coordination normal.      Deep Tendon Reflexes: Reflexes are normal and symmetric.   Psychiatric:         Attention and Perception: Attention and perception normal.         Mood and Affect: Mood normal. Affect is flat.         Speech: Speech is delayed.         Behavior: Behavior is slowed and withdrawn. Behavior is cooperative.         Results Reviewed       Procedure Component Value Units Date/Time    Folate [[id number]]  (Normal) Collected: 11/29/24 1522    Lab Status: Final result Specimen: Blood Updated: 11/30/24 1212     Folate 21.5 ng/mL     Vitamin B12 [[id number]]  (Normal) Collected: 11/29/24 1522    Lab Status: Final result Specimen: Blood Updated: 11/30/24 1212     Vitamin B-12 350 pg/mL     TIBC Panel (incl. Iron, TIBC, % Iron Saturation) [[id number]]  (Abnormal) Collected: 11/29/24 1348    Lab Status: Final result Specimen: Blood from Arm, Right Updated: 11/29/24 1933     Iron Saturation 2 %      TIBC 441 ug/dL      Iron 11 ug/dL      UIBC 430 ug/dL     Ferritin [[id number]]  (Abnormal) Collected: 11/29/24 1348    Lab Status: Final result Specimen: Blood from Arm, Right Updated: 11/29/24 1933     Ferritin 6 ng/mL     Procalcitonin [[id number]]  (Normal) Collected: 11/29/24 1348    Lab Status: Final result Specimen: Blood from Arm, Right Updated: 11/29/24 1657     Procalcitonin  <0.05 ng/ml     Protime-INR [[id number]]  (Abnormal) Collected: 11/29/24 1539    Lab Status: Final result Specimen: Blood from Arm, Right Updated: 11/29/24 1552     Protime 15.8 seconds      INR 1.21    Narrative:      INR Therapeutic Range    Indication                                             INR Range      Atrial Fibrillation                                               2.0-3.0  Hypercoagulable State                                    2.0.2.3  Left Ventricular Asist Device                            2.0-3.0  Mechanical Heart Valve                                  -    Aortic(with afib, MI, embolism, HF, LA enlargement,    and/or coagulopathy)                                     2.0-3.0 (2.5-3.5)     Mitral                                                             2.5-3.5  Prosthetic/Bioprosthetic Heart Valve               2.0-3.0  Venous thromboembolism (VTE: VT, PE        2.0-3.0    APTT [[id number]]  (Normal) Collected: 11/29/24 1539    Lab Status: Final result Specimen: Blood from Arm, Right Updated: 11/29/24 1552     PTT 31 seconds     HS Troponin I 4hr [[id number]]  (Normal) Collected: 11/29/24 1522    Lab Status: Final result Specimen: Blood from Arm, Right Updated: 11/29/24 1551     hs TnI 4hr 18 ng/L      Delta 4hr hsTnI 1 ng/L     HS Troponin I 2hr [[id number]]  (Normal) Collected: 11/29/24 1348    Lab Status: Final result Specimen: Blood from Arm, Right Updated: 11/29/24 1414     hs TnI 2hr 17 ng/L      Delta 2hr hsTnI 0 ng/L     B-Type Natriuretic Peptide(BNP) [[id number]]  (Normal) Collected: 11/29/24 1119    Lab Status: Final result Specimen: Blood from Arm, Left Updated: 11/29/24 1149     BNP 76 pg/mL     HS Troponin 0hr (reflex protocol) [[id number]]  (Normal) Collected: 11/29/24 1119    Lab Status: Final result Specimen: Blood from Arm, Left Updated: 11/29/24 1147     hs TnI 0hr 17 ng/L     FLU/COVID Rapid Antigen (30 min. TAT) - Preferred screening test in ED [[id number]]  (Normal) Collected:  11/29/24 1119    Lab Status: Final result Specimen: Nares from Nose Updated: 11/29/24 1141     SARS COV Rapid Antigen Negative     Influenza A Rapid Antigen Negative     Influenza B Rapid Antigen Negative    Narrative:      This test has been performed using the [employer] Genesis 2 FLU+SARS Antigen test under the Emergency Use Authorization (EUA). This test has been validated by the  and verified by the performing laboratory. The Genesis uses lateral flow immunofluorescent sandwich assay to detect SARS-COV, Influenza A and Influenza B Antigen.     The [employer] Genesis 2 SARS Antigen test does not differentiate between SARS-CoV and SARS-CoV-2.     Negative results are presumptive and may be confirmed with a molecular assay, if necessary, for patient management. Negative results do not rule out SARS-CoV-2 or influenza infection and should not be used as the sole basis for treatment or patient management decisions. A negative test result may occur if the level of antigen in a sample is below the limit of detection of this test.     Positive results are indicative of the presence of viral antigens, but do not rule out bacterial infection or co-infection with other viruses.     All test results should be used as an adjunct to clinical observations and other information available to the provider.    FOR PEDIATRIC PATIENTS - copy/paste COVID Guidelines URL to browser: [email or web address]    Comprehensive metabolic panel [[id number]]  (Abnormal) Collected: 11/29/24 1119    Lab Status: Final result Specimen: Blood from Arm, Left Updated: 11/29/24 1140     Sodium 139 mmol/L      Potassium 3.9 mmol/L      Chloride 109 mmol/L      CO2 25 mmol/L      ANION GAP 5 mmol/L      BUN 18 mg/dL      Creatinine 1.16 mg/dL      Glucose 127 mg/dL      Calcium 8.5 mg/dL      AST 16 U/L      ALT 12 U/L      Alkaline Phosphatase 58 U/L      Total Protein 7.0 g/dL      Albumin 4.1 g/dL       Total Bilirubin 0.48 mg/dL      eGFR 66 ml/min/1.73sq m     Narrative:      National Kidney Disease Foundation guidelines for Chronic Kidney Disease (CKD):     Stage 1 with normal or high GFR (GFR > 90 mL/min/1.73 square meters)    Stage 2 Mild CKD (GFR = 60-89 mL/min/1.73 square meters)    Stage 3A Moderate CKD (GFR = 45-59 mL/min/1.73 square meters)    Stage 3B Moderate CKD (GFR = 30-44 mL/min/1.73 square meters)    Stage 4 Severe CKD (GFR = 15-29 mL/min/1.73 square meters)    Stage 5 End Stage CKD (GFR <15 mL/min/1.73 square meters)  Note: GFR calculation is accurate only with a steady state creatinine    CBC and differential [[id number]]  (Abnormal) Collected: 11/29/24 1119    Lab Status: Final result Specimen: Blood from Arm, Left Updated: 11/29/24 1125     WBC 10.14 Thousand/uL      RBC 4.32 Million/uL      Hemoglobin 7.7 g/dL      Hematocrit 28.8 %      MCV 67 fL      MCH 17.8 pg      MCHC 26.7 g/dL      RDW 21.1 %      Platelets 165 Thousands/uL      nRBC 0 /100 WBCs      Segmented % 80 %      Immature Grans % 1 %      Lymphocytes % 13 %      Monocytes % 6 %      Eosinophils Relative 0 %      Basophils Relative 0 %      Absolute Neutrophils 8.13 Thousands/µL      Absolute Immature Grans 0.09 Thousand/uL      Absolute Lymphocytes 1.27 Thousands/µL      Absolute Monocytes 0.62 Thousand/µL      Eosinophils Absolute 0.02 Thousand/µL      Basophils Absolute 0.01 Thousands/µL             XR chest 1 view portable   Final Interpretation by Yaw Meza DO (11/29 1153)      Mild hazy opacity within the left lung base which is favored to be attributable to atelectasis. Developing pneumonia may demonstrate a similar appearance in appropriate clinical setting.               Workstation performed: [id number]         CT chest wo contrast    (Results Pending)       ECG 12 Lead Documentation Only    Date/Time: 11/29/2024 11:00 AM    Performed by: Jeremías Walker DO  Authorized by: Jeremías Walker DO    ECG  reviewed by me, the ED Provider: yes    Patient location:  ED  Previous ECG:     Previous ECG:  Compared to current    Comparison ECG info:  Q wave in V2 is new    Similarity:  Changes noted    Comparison to cardiac monitor: Yes    Quality:     Tracing quality:  Limited by artifact  Rate:     ECG rate assessment: normal    Rhythm:     Rhythm: sinus rhythm    Ectopy:     Ectopy: none    QRS:     QRS axis:  Left    QRS intervals:  Normal  Conduction:     Conduction: abnormal      Abnormal conduction: 1st degree    ST segments:     ST segments:  Non-specific    ST segment elevation noted on lead: Nonspecific ST segments with pseudo elevation of ST in anterior T waves.  Q waves:     Q waves:  III, aVF, V2, V3 and V4  Other findings:     Other findings: prolonged qTc interval        ED Medication and Procedure Management   Prior to Admission Medications   Prescriptions Last Dose Informant Patient Reported? Taking?   QUEtiapine (SEROquel) 300 mg tablet  Outside Facility (Specify) No No   Sig: Take 1 tablet (300 mg total) by mouth daily at bedtime   aspirin (ECOTRIN LOW STRENGTH) 81 mg EC tablet  Outside Facility (Specify) No No   Sig: Take 1 tablet (81 mg total) by mouth daily   atorvastatin (LIPITOR) 40 mg tablet  Outside Facility (Specify) No No   Sig: Take 1 tablet (40 mg total) by mouth daily with dinner   benzonatate (TESSALON) 200 MG capsule   No No   Sig: Take 1 capsule (200 mg total) by mouth 3 (three) times a day as needed for cough   diclofenac sodium (Voltaren) 1 %  Outside Facility (Specify) No No   Sig: Apply 2 g topically 4 (four) times a day   escitalopram (LEXAPRO) 20 mg tablet  Outside Facility (Specify) No No   Sig: Take 1 tablet (20 mg total) by mouth daily   famotidine (PEPCID) 20 mg tablet  Outside Facility (Specify) No No   Sig: Take 1 tablet (20 mg total) by mouth 2 (two) times a day   guaiFENesin (ROBITUSSIN) 100 MG/5ML oral liquid   No No   Sig: Take 10 mL (200 mg total) by mouth 3 (three) times a  day as needed for cough   nicotine polacrilex (NICORETTE) 2 mg gum  Outside Facility (Specify) No No   Sig: Chew 1 each (2 mg total) every 3 (three) hours as needed for smoking cessation   pantoprazole (PROTONIX) 40 mg tablet  Outside Facility (Specify) No No   Sig: Take 1 tablet (40 mg total) by mouth daily   polyethylene glycol (MIRALAX) 17 g packet  Outside Facility (Specify) No No   Sig: Take 17 g by mouth daily as needed (constipation)   traZODone (DESYREL) 100 mg tablet  Outside Facility (Specify) No No   Sig: Take 1 tablet (100 mg total) by mouth daily at bedtime   warfarin (COUMADIN) 2 mg tablet  Outside Facility (Specify) No No   Sig: Take 1 tablet (2 mg total) by mouth daily      Facility-Administered Medications Last Administration Doses Remaining   acetaminophen (TYLENOL) tablet 650 mg None recorded         Current Discharge Medication List        CONTINUE these medications which have NOT CHANGED    Details   aspirin (ECOTRIN LOW STRENGTH) 81 mg EC tablet Take 1 tablet (81 mg total) by mouth daily  Qty: 30 tablet, Refills: 0    Associated Diagnoses: History of CVA (cerebrovascular accident)      atorvastatin (LIPITOR) 40 mg tablet Take 1 tablet (40 mg total) by mouth daily with dinner  Qty: 30 tablet, Refills: 0    Associated Diagnoses: History of CVA (cerebrovascular accident)      benzonatate (TESSALON) 200 MG capsule Take 1 capsule (200 mg total) by mouth 3 (three) times a day as needed for cough  Qty: 20 capsule, Refills: 0    Associated Diagnoses: Viral URI with cough      diclofenac sodium (Voltaren) 1 % Apply 2 g topically 4 (four) times a day  Qty: 1 Tube, Refills: 0    Associated Diagnoses: Acute pain of right knee; Patellofemoral pain syndrome of right knee      escitalopram (LEXAPRO) 20 mg tablet Take 1 tablet (20 mg total) by mouth daily  Qty: 21 tablet, Refills: 0    Associated Diagnoses: Bipolar 2 disorder, major depressive episode (HCC)      famotidine (PEPCID) 20 mg tablet Take 1 tablet  (20 mg total) by mouth 2 (two) times a day  Qty: 30 tablet, Refills: 0    Associated Diagnoses: Abdominal pain      guaiFENesin (ROBITUSSIN) 100 MG/5ML oral liquid Take 10 mL (200 mg total) by mouth 3 (three) times a day as needed for cough  Qty: 120 mL, Refills: 0    Associated Diagnoses: Viral URI with cough      nicotine polacrilex (NICORETTE) 2 mg gum Chew 1 each (2 mg total) every 3 (three) hours as needed for smoking cessation  Qty: 100 each, Refills: 0    Associated Diagnoses: GI bleed      pantoprazole (PROTONIX) 40 mg tablet Take 1 tablet (40 mg total) by mouth daily  Qty: 30 tablet, Refills: 0    Associated Diagnoses: GI bleed      polyethylene glycol (MIRALAX) 17 g packet Take 17 g by mouth daily as needed (constipation)  Qty: 30 each, Refills: 0    Associated Diagnoses: GI bleed      QUEtiapine (SEROquel) 300 mg tablet Take 1 tablet (300 mg total) by mouth daily at bedtime  Qty: 30 tablet, Refills: 0    Associated Diagnoses: Bipolar 2 disorder, major depressive episode (HCC)      traZODone (DESYREL) 100 mg tablet Take 1 tablet (100 mg total) by mouth daily at bedtime  Qty: 21 tablet, Refills: 0    Associated Diagnoses: Insomnia      warfarin (COUMADIN) 2 mg tablet Take 1 tablet (2 mg total) by mouth daily  Qty: 30 tablet, Refills: 0    Associated Diagnoses: Left ventricular apical thrombus           No discharge procedures on file.  ED SEPSIS DOCUMENTATION   Time reflects when diagnosis was documented in both MDM as applicable and the Disposition within this note       Time User Action Codes Description Comment    11/29/2024  3:51 PM Jeremías Walker [R07.89] Atypical chest pain     11/29/2024  3:51 PM Jeremías Walker [D50.9] Iron deficiency anemia     11/29/2024  3:52 PM Jeremías Walker [R09.02] Hypoxemia     11/29/2024  3:52 PM Jeremías Walker [K74.60] Cirrhosis (HCC)     11/29/2024  3:56 PM Mary Hwang Add [J06.9] Viral URI with cough     11/30/2024  7:20 AM Chante Parker  Add [D64.9] Anemia, unspecified type     11/30/2024  7:21 AM Chante Parker Add [K92.2] Gastrointestinal hemorrhage, unspecified gastrointestinal hemorrhage type     11/30/2024 10:05 AM Omari Mix Add [D50.0] Iron deficiency anemia due to chronic blood loss     11/30/2024 10:06 AM Izabela Zuniga [K27.9] PUD (peptic ulcer disease)     11/30/2024 10:06 AM Izabela Zuniga [R06.09] Exertional dyspnea     11/30/2024 10:06 AM Izabela Zuniga [K44.9,  K21.9] Hiatal hernia with GERD without esophagitis                  Jeremías Walker DO  12/01/24 1007

## 2024-11-29 NOTE — ASSESSMENT & PLAN NOTE
Patient is noted to desaturate with ambulation, endorsing lightheadedness. States that this has been worsening over the last two weeks   Oxygen saturation dropped to 82% with HR at 104 bpm  Previous echocardiogram from 8/26/24 reviewed: Left Ventricle: Left ventricular cavity size is normal. Wall thickness is mildly increased. The left ventricular ejection fraction is 46% by biplane measurement. Systolic function is mildly reduced. Diastolic function is abnormal. 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 The thrombus is 1.5 x 2.9 cm in its largest visualized dimension and does not appear mobile  Telemetry monitoring    Will repeat echo   PT/OT eval

## 2024-11-29 NOTE — ED NOTES
Pt ambulated with complaints of lightheadedness. Oxygen saturation dropped to 82% with HR at 104 bpm.      Brit Perkins  11/29/24 1526

## 2024-11-29 NOTE — ASSESSMENT & PLAN NOTE
Patient presented to the hospital with complaint of chest pain. Has been seen last month multiple times for upper respiratory infection, endorses continued cough.Denies any fever or chills. Reports worsening dyspnea with exertion over the last week.    No recent antibiotic use,does not reside in a long term facility   CXR: Mild hazy opacity within the left lung base which is favored to be attributable to atelectasis. Developing pneumonia may demonstrate a similar appearance in appropriate clinical setting.   Patient is afebrile, no leukocytosis  Procalcitonin pending   Will initiate of IV Ceftriaxone   Mucinex ordered   Aspiration precautions   Respiratory protocol

## 2024-11-29 NOTE — ASSESSMENT & PLAN NOTE
History of GI bleed on ASA and Coumadin for hx of  ventricular thrombus   Hemoglobin 7.7  upon admission down from 8.6 a month prior  Denies any hematemesis or abnormal stools, denies any NSAID use   ALE negative in the ED   Type and Screen obtained   Obtain iron panel   Serial H&H  IV protonix   Will hold anticoagulation in the setting of new onset anemia- resume when able   If worsening anemia will consult GI for potential repeat EGD   Transfuse if  < 7

## 2024-11-29 NOTE — RESPIRATORY THERAPY NOTE
"RT Protocol Note  Chuy Norton 64 y.o. male MRN: [id number]  Unit/Bed#: -01 Encounter: [id number]    Assessment    Principal Problem:    Community acquired pneumonia  Active Problems:    Gastroesophageal reflux disease    Cirrhosis (HCC)    Anemia    Left ventricular apical thrombus    Exertional dyspnea      Home Pulmonary Medications:  None       Past Medical History:   Diagnosis Date    Alcohol abuse     Depression     Drug therapy     GERD (gastroesophageal reflux disease)     Hepatitis C     Hypertension 01/2012    Knee pain, bilateral     Left ventricular apical thrombus     Psychiatric disorder     depression, anxiety    Renal disorder     Self-injurious behavior     Spinal stenosis of lumbar region     Suicide attempt (HCC)      Social History     Socioeconomic History    Marital status: Single     Spouse name: None    Number of children: 0    Years of education: None    Highest education level: None   Occupational History    Occupation: Unemployed     Comment: Recently lost his job in August at [employer]    Tobacco Use    Smoking status: Every Day     Current packs/day: 1.00     Types: Cigarettes, Cigars    Smokeless tobacco: Never    Tobacco comments:     PT \"3 CIGARS A DAY\" - quit smoking cigars   Vaping Use    Vaping status: Never Used   Substance and Sexual Activity    Alcohol use: Not Currently     Alcohol/week: 0.0 standard drinks of alcohol     Comment: Stopped drinking for several months - recovered alcoholic    Drug use: No     Types: Marijuana     Comment: Last used marijuana 2 months ago.- no longer smoking    Sexual activity: Never   Other Topics Concern    None   Social History Narrative    He was told to leave the [email or web address] when he tested positive for THC.  Now states that he is unemployed and homeless.  He has 3 sisters , 2 of which live in [place] and the other in [place].       Social Drivers of Health     Financial Resource Strain: Not on file   Food Insecurity: " [facility] [place] [address] [place], [address] ~ [phone]                Infant Custody Release   8/22/2017    Casimiro George           Admission Information     Date & Time  8/22/2017 Provider  Maria Ines Stanley DO Department "No Food Insecurity (5/10/2024)    Nursing - Inadequate Food Risk Classification     Worried About Running Out of Food in the Last Year: Never true     Ran Out of Food in the Last Year: Never true     Ran Out of Food in the Last Year: Not on file   Transportation Needs: No Transportation Needs (5/10/2024)    PRAPARE - Transportation     Lack of Transportation (Medical): No     Lack of Transportation (Non-Medical): No   Physical Activity: Not on file   Stress: Not on file   Social Connections: Not on file   Intimate Partner Violence: Not on file   Housing Stability: Low Risk  (5/10/2024)    Housing Stability Vital Sign     Unable to Pay for Housing in the Last Year: No     Number of Times Moved in the Last Year: 1     Homeless in the Last Year: No       Subjective         Objective    Physical Exam:   Assessment Type: Assess only  General Appearance: Alert, Awake  Respiratory Pattern: Normal  Chest Assessment: Chest expansion symmetrical  Bilateral Breath Sounds: Diminished, Clear  Cough: None  O2 Device: None    Vitals:  Blood pressure 134/79, pulse 81, temperature 98.6 °F (37 °C), resp. rate 20, height 5' 8\" (1.727 m), weight 94 kg (207 lb 3.2 oz), SpO2 93%.          Imaging and other studies: Results Review Statement: I reviewed radiology reports from this admission including: chest xray.    O2 Device: None     Plan    Respiratory Plan: No distress/Pulmonary history        Resp Comments: No distress noted at this time. Order Q6PRN 2.5mg Albuterol for SOB and wheezing.   "

## 2024-11-29 NOTE — PLAN OF CARE
Problem: Potential for Falls  Goal: Patient will remain free of falls  Description: INTERVENTIONS:  - Educate patient/family on patient safety including physical limitations  - Instruct patient to call for assistance with activity   - Consult OT/PT to assist with strengthening/mobility   - Keep Call bell within reach  - Keep bed low and locked with side rails adjusted as appropriate  - Keep care items and personal belongings within reach  - Initiate and maintain comfort rounds  - Make Fall Risk Sign visible to staff  - Offer Toileting every 3 Hours, in advance of need  - Initiate/Maintain bedalarm  - Obtain necessary fall risk management equipment:   - Apply yellow socks and bracelet for high fall risk patients  - Consider moving patient to room near nurses station  Outcome: Progressing     Problem: PAIN - ADULT  Goal: Verbalizes/displays adequate comfort level or baseline comfort level  Description: Interventions:  - Encourage patient to monitor pain and request assistance  - Assess pain using appropriate pain scale  - Administer analgesics based on type and severity of pain and evaluate response  - Implement non-pharmacological measures as appropriate and evaluate response  - Consider cultural and social influences on pain and pain management  - Notify physician/advanced practitioner if interventions unsuccessful or patient reports new pain  Outcome: Progressing     Problem: INFECTION - ADULT  Goal: Absence or prevention of progression during hospitalization  Description: INTERVENTIONS:  - Assess and monitor for signs and symptoms of infection  - Monitor lab/diagnostic results  - Monitor all insertion sites, i.e. indwelling lines, tubes, and drains  - Monitor endotracheal if appropriate and nasal secretions for changes in amount and color  - Morgan appropriate cooling/warming therapies per order  - Administer medications as ordered  - Instruct and encourage patient and family to use good hand hygiene  technique  - Identify and instruct in appropriate isolation precautions for identified infection/condition  Outcome: Progressing     Problem: SAFETY ADULT  Goal: Patient will remain free of falls  Description: INTERVENTIONS:  - Educate patient/family on patient safety including physical limitations  - Instruct patient to call for assistance with activity   - Consult OT/PT to assist with strengthening/mobility   - Keep Call bell within reach  - Keep bed low and locked with side rails adjusted as appropriate  - Keep care items and personal belongings within reach  - Initiate and maintain comfort rounds  - Make Fall Risk Sign visible to staff  - Offer Toileting every 3 Hours, in advance of need  - Initiate/Maintain bedalarm  - Obtain necessary fall risk management equipment:   - Apply yellow socks and bracelet for high fall risk patients  - Consider moving patient to room near nurses station  Outcome: Progressing     Problem: DISCHARGE PLANNING  Goal: Discharge to home or other facility with appropriate resources  Description: INTERVENTIONS:  - Identify barriers to discharge w/patient and caregiver  - Arrange for needed discharge resources and transportation as appropriate  - Identify discharge learning needs (meds, wound care, etc.)  - Arrange for interpretive services to assist at discharge as needed  - Refer to Case Management Department for coordinating discharge planning if the patient needs post-hospital services based on physician/advanced practitioner order or complex needs related to functional status, cognitive ability, or social support system  Outcome: Progressing     Problem: Knowledge Deficit  Goal: Patient/family/caregiver demonstrates understanding of disease process, treatment plan, medications, and discharge instructions  Description: Complete learning assessment and assess knowledge base.  Interventions:  - Provide teaching at level of understanding  - Provide teaching via preferred learning  methods  Outcome: Progressing

## 2024-11-29 NOTE — H&P
H&P - Hospitalist   Name: Chuy Norton 64 y.o. male I MRN: [id number]  Unit/Bed#: -01 I Date of Admission: 11/29/2024   Date of Service: 11/29/2024 I Hospital Day: 0     Assessment & Plan  Anemia  History of GI bleed on ASA and Coumadin for hx of  ventricular thrombus   Hemoglobin 7.7  upon admission down from 8.6 a month prior  Denies any hematemesis or abnormal stools, denies any NSAID use   ALE negative in the ED   Type and Screen obtained   Obtain iron panel   Serial H&H  IV protonix   Will hold anticoagulation in the setting of new onset anemia- resume when able   If worsening anemia will consult GI for potential repeat EGD   Transfuse if  < 7     Exertional dyspnea  Patient is noted to desaturate with ambulation, endorsing lightheadedness. States that this has been worsening over the last two weeks   Oxygen saturation dropped to 82% with HR at 104 bpm  Previous echocardiogram from 8/26/24 reviewed: Left Ventricle: Left ventricular cavity size is normal. Wall thickness is mildly increased. The left ventricular ejection fraction is 46% by biplane measurement. Systolic function is mildly reduced. Diastolic function is abnormal. 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 The thrombus is 1.5 x 2.9 cm in its largest visualized dimension and does not appear mobile  Telemetry monitoring    Will repeat echo   PT/OT eval   Persistent cough for 3 weeks or longer  Patient presented to the hospital with complaint of chest pain. Has been seen last month multiple times for upper respiratory infection, endorses continued cough.Denies any fever or chills. Reports worsening dyspnea with exertion over the last week.    No recent antibiotic use,does not reside in a long term facility   CXR: Mild hazy opacity within the left lung base which is favored to be attributable to atelectasis. Developing pneumonia may demonstrate a similar appearance in appropriate clinical setting.   Patient is  afebrile, no leukocytosis  Procalcitonin pending   Will initiate of IV Ceftriaxone   Mucinex ordered   Aspiration precautions   Respiratory protocol   Gastroesophageal reflux disease  Continue IV protonix   Cirrhosis (HCC)  Hx of alcoholic cirrhosis, hep C  Patient has sustained from alcohol use for 1 1/2 years, living in sober house.   On physical exam: Abdomen is distended, no fluid wave. No tenderness or guarding   Left ventricular apical thrombus  Diagnosed on echocardiogram in 2021  Has been maintained on Coumadin 2mg daily   INR 1.21   Holding due to acute anemia - resume when able       VTE Pharmacologic Prophylaxis: VTE Score: 3 Moderate Risk (Score 3-4) - Pharmacological DVT Prophylaxis Ordered: contraindicated due to acute anemia.  Code Status: Level 1 - Full Code   Discussion with family: Patient declined call to .     Anticipated Length of Stay: Patient will be admitted on an inpatient basis with an anticipated length of stay of greater than 2 midnights secondary to anemia work up, IV antibiotics .    History of Present Illness   Chief Complaint: chest pain, cough     Chuy Norton is a 64 y.o. male with a PMH of CAD status post MI, CVA, ventricular thrombus on coumadin, a history of paroxysmal atrial fibrillation, hepatitis C with cirrhosis and prior GI bleed who presents to the ED with initial complaint of chest pain. Patient stated that the chest pain and cough. Patient has had prior work up for chest pain in the past. He endorses to have a persistent cough for the last month following a URI 1 month prior. He also is endorsing to feeling lightheaded and shortness of breath with ambulating short distances which he reports has only been occurring for the past two weeks. He was noted to have desaturated with ambulation to 82%. In the ED CXR was obtained which could no rule out developing pneumonia. Patient also was found to have acute anemia. He had a prior hospitalization in May 2024 for  "acute UGI bleed, which he was found to have multiple ulcerations that required clipping.Patient denies any hematemesis or melena. ALE was negative. Denies any recent NSAID use. Patient will be admitted for further observation and monitoring. Discussed code status and patient is LEVEL 1 FULL CODE.     Review of Systems   Constitutional:  Negative for chills, fatigue and fever.   Respiratory:  Positive for chest tightness and shortness of breath.    Cardiovascular:  Negative for chest pain, palpitations and leg swelling.   Gastrointestinal:  Negative for abdominal pain, blood in stool and diarrhea.   Neurological:  Positive for light-headedness (with ambulation).   Psychiatric/Behavioral: Negative.         Historical Information   Past Medical History:   Diagnosis Date    Alcohol abuse     Depression     Drug therapy     GERD (gastroesophageal reflux disease)     Hepatitis C     Hypertension 01/2012    Knee pain, bilateral     Left ventricular apical thrombus     Psychiatric disorder     depression, anxiety    Renal disorder     Self-injurious behavior     Spinal stenosis of lumbar region     Suicide attempt (HCC)      Past Surgical History:   Procedure Laterality Date    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    HERNIA REPAIR  1997     Social History     Tobacco Use    Smoking status: Every Day     Current packs/day: 1.00     Types: Cigarettes, Cigars    Smokeless tobacco: Never    Tobacco comments:     PT \"3 CIGARS A DAY\" - quit smoking cigars   Vaping Use    Vaping status: Never Used   Substance and Sexual Activity    Alcohol use: Not Currently     Alcohol/week: 0.0 standard drinks of alcohol     Comment: Stopped drinking for several months - recovered alcoholic    Drug use: No     Types: Marijuana     Comment: Last used marijuana 2 months ago.- no longer smoking    Sexual activity: Never     E-Cigarette/Vaping    E-Cigarette Use Never User      E-Cigarette/Vaping Substances       Social History:  Marital Status: " Single   Occupation: retired   Patient Pre-hospital Living Situation: Apartment  Patient Pre-hospital Level of Mobility: walks  Patient Pre-hospital Diet Restrictions: none     Meds/Allergies   I have reviewed home medications with patient personally.  Prior to Admission medications    Medication Sig Start Date End Date Taking? Authorizing Provider   aspirin (ECOTRIN LOW STRENGTH) 81 mg EC tablet Take 1 tablet (81 mg total) by mouth daily 5/15/24   Mair Morales PA-C   atorvastatin (LIPITOR) 40 mg tablet Take 1 tablet (40 mg total) by mouth daily with dinner 5/15/24   Mari Morales PA-C   benzonatate (TESSALON) 200 MG capsule Take 1 capsule (200 mg total) by mouth 3 (three) times a day as needed for cough 10/23/24   NEHEMIAH Andersen   diclofenac sodium (Voltaren) 1 % Apply 2 g topically 4 (four) times a day 9/23/20   Dante Grady DO   escitalopram (LEXAPRO) 20 mg tablet Take 1 tablet (20 mg total) by mouth daily 5/15/24   Mari Morales PA-C   famotidine (PEPCID) 20 mg tablet Take 1 tablet (20 mg total) by mouth 2 (two) times a day 5/15/24   Mari Morales PA-C   guaiFENesin (ROBITUSSIN) 100 MG/5ML oral liquid Take 10 mL (200 mg total) by mouth 3 (three) times a day as needed for cough 10/23/24   NEHEMIAH Andersen   nicotine polacrilex (NICORETTE) 2 mg gum Chew 1 each (2 mg total) every 3 (three) hours as needed for smoking cessation 5/15/24   Mari Morales PA-C   pantoprazole (PROTONIX) 40 mg tablet Take 1 tablet (40 mg total) by mouth daily 5/15/24   Mari Morales PA-C   polyethylene glycol (MIRALAX) 17 g packet Take 17 g by mouth daily as needed (constipation) 5/15/24   Mari Morales PA-C   QUEtiapine (SEROquel) 300 mg tablet Take 1 tablet (300 mg total) by mouth daily at bedtime 5/15/24   Mari Morales PA-C   traZODone (DESYREL) 100 mg tablet Take 1 tablet (100 mg total) by mouth daily at bedtime 5/15/24   Mari Morales PA-C   warfarin (COUMADIN) 2 mg tablet Take 1 tablet (2 mg total) by mouth daily 5/15/24   Mari  MANUEL Morales     Allergies   Allergen Reactions    Haloperidol Tremor     Other reaction(s): jittery       Objective :  Temp:  [98.6 °F (37 °C)-98.8 °F (37.1 °C)] 98.6 °F (37 °C)  HR:  [] 81  BP: (134-171)/(72-85) 134/79  Resp:  [18-22] 20  SpO2:  [92 %-98 %] 93 %  O2 Device: None (Room air)    Physical Exam  Vitals reviewed.   Constitutional:       General: He is not in acute distress.     Appearance: He is not diaphoretic.   Cardiovascular:      Rate and Rhythm: Normal rate and regular rhythm.      Heart sounds: No murmur heard.  Pulmonary:      Effort: No respiratory distress.      Breath sounds: Decreased breath sounds present.   Abdominal:      General: Bowel sounds are normal. There is distension.      Palpations: There is no fluid wave.      Tenderness: There is no abdominal tenderness.   Neurological:      Mental Status: He is alert and oriented to person, place, and time.   Psychiatric:         Mood and Affect: Mood is not anxious.          Lines/Drains:            Lab Results: I have reviewed the following results:  Results from last 7 days   Lab Units 11/29/24  1119   WBC Thousand/uL 10.14   HEMOGLOBIN g/dL 7.7*   HEMATOCRIT % 28.8*   PLATELETS Thousands/uL 165   SEGS PCT % 80*   LYMPHO PCT % 13*   MONO PCT % 6   EOS PCT % 0     Results from last 7 days   Lab Units 11/29/24  1119   SODIUM mmol/L 139   POTASSIUM mmol/L 3.9   CHLORIDE mmol/L 109*   CO2 mmol/L 25   BUN mg/dL 18   CREATININE mg/dL 1.16   ANION GAP mmol/L 5   CALCIUM mg/dL 8.5   ALBUMIN g/dL 4.1   TOTAL BILIRUBIN mg/dL 0.48   ALK PHOS U/L 58   ALT U/L 12   AST U/L 16   GLUCOSE RANDOM mg/dL 127     Results from last 7 days   Lab Units 11/29/24  1539   INR  1.21*         Lab Results   Component Value Date    HGBA1C 6.0 (H) 02/28/2023     Results from last 7 days   Lab Units 11/29/24  1348   PROCALCITONIN ng/ml <0.05       Imaging Results Review: I reviewed radiology reports from this admission including: chest xray.  Other Study Results  Review: EKG was reviewed.     Administrative Statements       ** Please Note: This note has been constructed using a voice recognition system. **

## 2024-11-30 ENCOUNTER — APPOINTMENT (INPATIENT)
Dept: CT IMAGING | Facility: HOSPITAL | Age: 64
End: 2024-11-30
Payer: COMMERCIAL

## 2024-11-30 PROBLEM — K27.9 PUD (PEPTIC ULCER DISEASE): Status: ACTIVE | Noted: 2024-11-30

## 2024-11-30 PROBLEM — D50.0 IRON DEFICIENCY ANEMIA DUE TO CHRONIC BLOOD LOSS: Status: ACTIVE | Noted: 2024-05-09

## 2024-11-30 LAB
ANION GAP SERPL CALCULATED.3IONS-SCNC: 7 MMOL/L (ref 4–13)
BUN SERPL-MCNC: 16 MG/DL (ref 5–25)
CALCIUM SERPL-MCNC: 8.3 MG/DL (ref 8.4–10.2)
CHLORIDE SERPL-SCNC: 109 MMOL/L (ref 96–108)
CO2 SERPL-SCNC: 23 MMOL/L (ref 21–32)
CREAT SERPL-MCNC: 1.1 MG/DL (ref 0.6–1.3)
ERYTHROCYTE [DISTWIDTH] IN BLOOD BY AUTOMATED COUNT: 20.9 % (ref 11.6–15.1)
ERYTHROCYTE [DISTWIDTH] IN BLOOD BY AUTOMATED COUNT: 20.9 % (ref 11.6–15.1)
ERYTHROCYTE [DISTWIDTH] IN BLOOD BY AUTOMATED COUNT: 21.2 % (ref 11.6–15.1)
FOLATE SERPL-MCNC: 21.5 NG/ML
GFR SERPL CREATININE-BSD FRML MDRD: 71 ML/MIN/1.73SQ M
GLUCOSE SERPL-MCNC: 96 MG/DL (ref 65–140)
HCT VFR BLD AUTO: 25.4 % (ref 36.5–49.3)
HCT VFR BLD AUTO: 27.2 % (ref 36.5–49.3)
HCT VFR BLD AUTO: 30.8 % (ref 36.5–49.3)
HGB BLD-MCNC: 6.9 G/DL (ref 12–17)
HGB BLD-MCNC: 7.4 G/DL (ref 12–17)
HGB BLD-MCNC: 8.1 G/DL (ref 12–17)
MCH RBC QN AUTO: 17.8 PG (ref 26.8–34.3)
MCH RBC QN AUTO: 17.9 PG (ref 26.8–34.3)
MCH RBC QN AUTO: 18 PG (ref 26.8–34.3)
MCHC RBC AUTO-ENTMCNC: 26.3 G/DL (ref 31.4–37.4)
MCHC RBC AUTO-ENTMCNC: 27.2 G/DL (ref 31.4–37.4)
MCHC RBC AUTO-ENTMCNC: 27.2 G/DL (ref 31.4–37.4)
MCV RBC AUTO: 66 FL (ref 82–98)
MCV RBC AUTO: 66 FL (ref 82–98)
MCV RBC AUTO: 68 FL (ref 82–98)
PLATELET # BLD AUTO: 118 THOUSANDS/UL (ref 149–390)
PLATELET # BLD AUTO: 161 THOUSANDS/UL (ref 149–390)
PLATELET # BLD AUTO: 170 THOUSANDS/UL (ref 149–390)
POTASSIUM SERPL-SCNC: 3.9 MMOL/L (ref 3.5–5.3)
PROCALCITONIN SERPL-MCNC: <0.05 NG/ML
RBC # BLD AUTO: 3.84 MILLION/UL (ref 3.88–5.62)
RBC # BLD AUTO: 4.13 MILLION/UL (ref 3.88–5.62)
RBC # BLD AUTO: 4.56 MILLION/UL (ref 3.88–5.62)
SODIUM SERPL-SCNC: 139 MMOL/L (ref 135–147)
VIT B12 SERPL-MCNC: 350 PG/ML (ref 180–914)
WBC # BLD AUTO: 5.75 THOUSAND/UL (ref 4.31–10.16)
WBC # BLD AUTO: 7.15 THOUSAND/UL (ref 4.31–10.16)
WBC # BLD AUTO: 7.16 THOUSAND/UL (ref 4.31–10.16)

## 2024-11-30 PROCEDURE — 85027 COMPLETE CBC AUTOMATED: CPT

## 2024-11-30 PROCEDURE — 80048 BASIC METABOLIC PNL TOTAL CA: CPT | Performed by: FAMILY MEDICINE

## 2024-11-30 PROCEDURE — 84145 PROCALCITONIN (PCT): CPT | Performed by: FAMILY MEDICINE

## 2024-11-30 PROCEDURE — 99233 SBSQ HOSP IP/OBS HIGH 50: CPT | Performed by: STUDENT IN AN ORGANIZED HEALTH CARE EDUCATION/TRAINING PROGRAM

## 2024-11-30 PROCEDURE — 99222 1ST HOSP IP/OBS MODERATE 55: CPT | Performed by: INTERNAL MEDICINE

## 2024-11-30 PROCEDURE — 71250 CT THORAX DX C-: CPT

## 2024-11-30 RX ADMIN — GUAIFENESIN 600 MG: 600 TABLET ORAL at 17:20

## 2024-11-30 RX ADMIN — ATORVASTATIN CALCIUM 40 MG: 40 TABLET, FILM COATED ORAL at 17:20

## 2024-11-30 RX ADMIN — QUETIAPINE 300 MG: 200 TABLET ORAL at 20:52

## 2024-11-30 RX ADMIN — PANTOPRAZOLE SODIUM 40 MG: 40 INJECTION, POWDER, FOR SOLUTION INTRAVENOUS at 08:37

## 2024-11-30 RX ADMIN — PANTOPRAZOLE SODIUM 40 MG: 40 INJECTION, POWDER, FOR SOLUTION INTRAVENOUS at 20:52

## 2024-11-30 RX ADMIN — FAMOTIDINE 20 MG: 20 TABLET, FILM COATED ORAL at 08:37

## 2024-11-30 RX ADMIN — TRAZODONE HYDROCHLORIDE 100 MG: 50 TABLET ORAL at 20:52

## 2024-11-30 RX ADMIN — GUAIFENESIN 600 MG: 600 TABLET ORAL at 08:37

## 2024-11-30 RX ADMIN — ESCITALOPRAM OXALATE 20 MG: 20 TABLET ORAL at 08:37

## 2024-11-30 RX ADMIN — POLYETHYLENE GLYCOL 3350, SODIUM SULFATE ANHYDROUS, SODIUM BICARBONATE, SODIUM CHLORIDE, POTASSIUM CHLORIDE 4000 ML: 236; 22.74; 6.74; 5.86; 2.97 POWDER, FOR SOLUTION ORAL at 11:09

## 2024-11-30 RX ADMIN — IRON SUCROSE 300 MG: 20 INJECTION, SOLUTION INTRAVENOUS at 09:55

## 2024-11-30 RX ADMIN — FAMOTIDINE 20 MG: 20 TABLET, FILM COATED ORAL at 17:20

## 2024-11-30 NOTE — H&P (VIEW-ONLY)
Consultation - Gastroenterology   Name: Chuy Norton 64 y.o. male I MRN: [id number]  Unit/Bed#: -01 I Date of Admission: 11/29/2024   Date of Service: 11/30/2024 I Hospital Day: 1   Inpatient consult to gastroenterology  Consult performed by: Izabela Zuniga PA-C  Consult ordered by: Chante Parker MD        Physician Requesting Evaluation: Chante Parker MD   Reason for Evaluation / Principal Problem: symptomatic iron deficiency anemia    Assessment & Plan  Iron deficiency anemia due to chronic blood loss  64M with hx/o erosive esophagitis on EGD 2/2021 @ [facility], recent gastric ulcers and HH with Poli lesions on EGD 5/10/2024 s/p clip, ventricular thrombus on warfarin (last dose 11/28 PM), EtOH/hep C cirrhosis who presented to the ED from home on 11/29/2024 for SOB, generalized weakness x3 weeks. Admission labs notable for Hgb 7.4 [recent baseline 8-9), low MCV, iron panel consistent with RACHEL, INR 1.21.    Plan for EGD + colonoscopy tomorrow 12/1. No s/sx of GI bleeding to date but known gastric ulcers, HH with Poli lesions on last EGD 5/10, lost to follow up  Clear liquid diet ? NPO at midnight pre-procedure  Continue pantoprazole 40 mg IV Q12, HOLD warfarin  Venofer #1/3  Abx: ceftriaxone started 11/29 for pneumonia  Monitor CBC, transfuse for Hgb < 7 but do not over-transfuse  Monitor for s/sx of GI bleeding  Will need outpatient follow up with GI at discharge  PUD (peptic ulcer disease)  EGD 5/10/2024: Multiple ulcers in gastric cardia, 1 clip placed. Medium sized hiatal hernia with multiple Poli lesions. EGD recall in 3 mos, not completed. Likely cause of ongoing RACHEL  Continue PPI as noted above  Hiatal hernia with GERD without esophagitis  Noted on EGD 5/10/2024, continue PPI as noted above. Likely cause of ongoing RACHEL  Left ventricular apical thrombus  Dx on echocardiogram 2021, on warfarin since then. Admission INR 1.21  Continue to HOLD warfarin pre-EGD  Cirrhosis (HCC)  Admission  "MELD 3.0 = 9  Secondary to EtOH abuse (last drink 1.5 yr ago), hx/o hep C s/p successful treatment with Mavyret. Last viral load 5/2024 negative. Currently resides in sober living house  Check MELD labs daily (CMP, INR)  Will need outpatient follow up with hepatology at discharge    » Ascites  None to date; not on home diuretic regimen    » Esophageal varices  None noted on most recent EGD 5/10/2024    » Hepatic encephalopathy  None to date; not on lactulose/rifaximin    » Liver lesion  None to date on CTA A/P 5/9/2024  Will need outpatient U/S, AFP level for HCC screening    History of Present Illness   HPI:  Chuy Norton is a 64 y.o. male with hx/o erosive esophagitis on EGD 2/2021 @ [facility], recent gastric ulcers and HH with Poli lesions on EGD 5/10/2024 s/p clip, ventricular thrombus on warfarin (last dose 11/28 PM), EtOH/hep C cirrhosis who presented to the ED from home on 11/29/2024 for SOB, generalized weakness x3 weeks. Admission labs notable for Hgb 7.4 [recent baseline 8-9), low MCV, iron panel consistent with RACHEL.    Patient states that he first noticed exertional dyspnea about 3 weeks ago but it continued to get worse and associated with generalized weakness. He was recently admitted to [facility] 5/9-15, found to have anemia attributed to multiple gastric ulcers in the cardia with 1 clip placed and medium sized hiatal hernia with Poli lesions. He states that he has been compliant with pantoprazole 40 mg BID since discharge. However, he did not follow up with GI for repeat EGD due August 2024.    Hx/o hep C treated with Mavyret. Last viral load 5/2024 was negative.    GI History:  Blood thinners: ASA: yes antiplatelet: no anticoagulation: yes - warfarin  NSAID use: none  Tobacco use: current 1/2 PPD since age 14  EtOH use: none in past 1.5 year    Abdominal Surgical Hx: None  Family Hx: Denies first degree relatives with GI malignancies.  GI procedure Hx:  EGD: 5/10/2024, 3 mo recall: \"IMPRESSION:  Medium " "type I hiatal hernia with Poli lesions present  Multiple ulcers in the cardia with flat pigmented spot (Eduin IIC); placed 1 clip successfully; hemostasis achieved  The duodenum appeared normal.\"  Colonoscopy: never    Review of Systems   Constitutional:  Positive for fatigue. Negative for appetite change, chills, fever and unexpected weight change.   HENT:  Negative for sore throat, trouble swallowing and voice change.    Respiratory:  Positive for cough and shortness of breath (with exertion). Negative for choking.    Cardiovascular:  Negative for chest pain and leg swelling.   Gastrointestinal:  Positive for abdominal pain (left-sided). Negative for abdominal distention, anal bleeding, blood in stool, constipation, diarrhea, nausea, rectal pain and vomiting.        Denies melena   Skin:  Negative for pallor and rash.   Neurological:  Positive for weakness. Negative for light-headedness and headaches.   Psychiatric/Behavioral:  Negative for confusion and sleep disturbance. The patient is not nervous/anxious.      I have reviewed the patient's PMH, PSH, Social History, Family History, Meds, and Allergies    Objective :  Temp:  [97.6 °F (36.4 °C)-98.8 °F (37.1 °C)] 97.6 °F (36.4 °C)  HR:  [] 85  BP: (133-171)/(72-85) 133/80  Resp:  [18-22] 20  SpO2:  [92 %-98 %] 96 %  O2 Device: None (Room air)    Physical Exam  Vitals and nursing note reviewed.   Constitutional:       General: He is not in acute distress.     Appearance: He is not toxic-appearing.   HENT:      Head: Normocephalic and atraumatic.      Mouth/Throat:      Mouth: Mucous membranes are moist.      Dentition: Abnormal dentition.      Pharynx: Oropharynx is clear.   Eyes:      General: No scleral icterus.     Extraocular Movements: Extraocular movements intact.      Comments: Pale conjunctiva   Neck:      Trachea: Phonation normal.   Cardiovascular:      Rate and Rhythm: Normal rate and regular rhythm.      Heart sounds: No murmur heard.     No " friction rub. No gallop.   Pulmonary:      Effort: Pulmonary effort is normal. No respiratory distress.      Breath sounds: No wheezing, rhonchi or rales.   Abdominal:      General: Abdomen is protuberant. Bowel sounds are normal. There is no distension or abdominal bruit.      Palpations: Abdomen is soft. There is no hepatomegaly or splenomegaly.      Tenderness: There is no abdominal tenderness. There is no guarding or rebound.   Musculoskeletal:      Cervical back: Neck supple.      Comments: Moving all 4 extremities spontaneously   Skin:     General: Skin is warm and dry.      Capillary Refill: Capillary refill takes less than 2 seconds.      Coloration: Skin is pale. Skin is not jaundiced.   Neurological:      General: No focal deficit present.      Mental Status: He is alert and oriented to person, place, and time.   Psychiatric:         Behavior: Behavior normal. Behavior is cooperative.         Thought Content: Thought content normal.           Lab Results: I have reviewed the following results:CBC/BMP:   .     11/30/24  0817   WBC 5.75   HGB 7.4*   HCT 27.2*      SODIUM 139   K 3.9   *   CO2 23   BUN 16   CREATININE 1.10   GLUC 96    , Creatinine Clearance: Estimated Creatinine Clearance: 76.4 mL/min (by C-G formula based on SCr of 1.1 mg/dL)., LFTs:   .     11/29/24  1119   AST 16   ALT 12   ALB 4.1   TBILI 0.48   ALKPHOS 58    , Iron:   Lab Results   Component Value Date    IRON 11 (L) 11/29/2024       Imaging Results Review: No pertinent imaging studies reviewed.  Other Study Results Review: No additional pertinent studies reviewed.

## 2024-11-30 NOTE — ASSESSMENT & PLAN NOTE
Admission MELD 3.0 = 9  Secondary to EtOH abuse (last drink 1.5 yr ago), hx/o hep C s/p successful treatment with Mavyret. Last viral load 5/2024 negative. Currently resides in sober living house  Check MELD labs daily (CMP, INR)  Will need outpatient follow up with hepatology at discharge    » Ascites  None to date; not on home diuretic regimen    » Esophageal varices  None noted on most recent EGD 5/10/2024    » Hepatic encephalopathy  None to date; not on lactulose/rifaximin    » Liver lesion  None to date on CTA A/P 5/9/2024  Will need outpatient U/S, AFP level for HCC screening

## 2024-11-30 NOTE — PROGRESS NOTES
Progress Note - Hospitalist   Name: Chuy Norton 64 y.o. male I MRN: [id number]  Unit/Bed#: -01 I Date of Admission: 11/29/2024   Date of Service: 11/30/2024 I Hospital Day: 1    Assessment & Plan  Anemia  History of GI bleed on ASA and Coumadin for hx of  ventricular thrombus   Hemoglobin 7.7  upon admission down from 8.6 a month prior  Denies any hematemesis or abnormal stools, denies any NSAID use   ALE negative in the ED   Type and Screen obtained   iron panel showing iron deficiency-> initiated venofer, will need iron supplementation on discharge  Serial H&H  IV protonix bid   Hold coumadin  GI consulted and discussed with-> pt initiated on CLD, started on bowel prep pt to undergo EGD and colonoscopy tomorrow 12/1 npo at midnight  If worsening anemia will consult GI for potential repeat EGD   Transfuse if  < 7     Exertional dyspnea  Patient is noted to desaturate with ambulation, endorsing lightheadedness. States that this has been worsening over the last two weeks   Oxygen saturation dropped to 82% with HR at 104 bpm  Previous echocardiogram from 8/26/24 reviewed: Left Ventricle: Left ventricular cavity size is normal. Wall thickness is mildly increased. The left ventricular ejection fraction is 46% by biplane measurement. Systolic function is mildly reduced. Diastolic function is abnormal. 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 The thrombus is 1.5 x 2.9 cm in its largest visualized dimension and does not appear mobile  Telemetry monitoring    echo pending  PT/OT eval   Persistent cough for 3 weeks or longer  Patient presented to the hospital with complaint of chest pain. Has been seen last month multiple times for upper respiratory infection, endorses continued cough.Denies any fever or chills. Reports worsening dyspnea with exertion over the last week.    No recent antibiotic use,does not reside in a long term facility   CXR: Mild hazy opacity within the left lung  base which is favored to be attributable to atelectasis. Developing pneumonia may demonstrate a similar appearance in appropriate clinical setting.   Patient is afebrile, no leukocytosis  Procalcitonin negative   Dc'd IV Ceftriaxone   Mucinex ordered   Aspiration precautions   Respiratory protocol   Gastroesophageal reflux disease  Continue IV protonix   Cirrhosis (HCC)  Hx of alcoholic cirrhosis, hep C  Patient has sustained from alcohol use for 1 1/2 years, living in sober house.   On physical exam: Abdomen is distended, no fluid wave. No tenderness or guarding   Left ventricular apical thrombus  Diagnosed on echocardiogram in 2021  Has been maintained on Coumadin 2mg daily   INR 1.21   Holding due to acute anemia - resume when able     VTE Pharmacologic Prophylaxis: VTE Score: 3 Moderate Risk (Score 3-4) - Pharmacological DVT Prophylaxis Contraindicated. Sequential Compression Devices Ordered.    Mobility:   Basic Mobility Inpatient Raw Score: 19  JH-HLM Goal: 6: Walk 10 steps or more  JH-HLM Achieved: 4: Move to chair/commode  JH-HLM Goal NOT achieved. Continue with multidisciplinary rounding and encourage appropriate mobility to improve upon JH-HLM goals.    Patient Centered Rounds: I performed bedside rounds with nursing staff today.   Discussions with Specialists or Other Care Team Provider: GI    Education and Discussions with Family / Patient: Patient declined call to .     Current Length of Stay: 1 day(s)  Current Patient Status: Inpatient   Certification Statement: The patient will continue to require additional inpatient hospital stay due to EGD and c scope  Discharge Plan: Anticipate discharge in 24-48 hrs to discharge location to be determined pending rehab evaluations.    Code Status: Level 1 - Full Code    Samantha Vera was seen and examined at bedside.  No acute events overnight.  Discussed plan of care.  All questions and concerns were answered and addressed.  Has no acute  complaints at this time.    Objective :  Temp:  [98.6 °F (37 °C)-98.8 °F (37.1 °C)] 98.6 °F (37 °C)  HR:  [] 85  BP: (133-171)/(72-85) 133/80  Resp:  [18-22] 20  SpO2:  [92 %-98 %] 96 %  O2 Device: None (Room air)    Body mass index is 31.5 kg/m².     Input and Output Summary (last 24 hours):     Intake/Output Summary (Last 24 hours) at 11/30/2024 0723  Last data filed at 11/30/2024 0401  Gross per 24 hour   Intake 222 ml   Output 0 ml   Net 222 ml       Physical Exam  Vitals and nursing note reviewed.   Constitutional:       General: He is not in acute distress.     Appearance: He is not ill-appearing.   HENT:      Head: Normocephalic and atraumatic.   Eyes:      Comments: Left eye twitch   Cardiovascular:      Rate and Rhythm: Normal rate and regular rhythm.      Pulses: Normal pulses.      Heart sounds: Normal heart sounds.   Pulmonary:      Effort: Pulmonary effort is normal.      Breath sounds: Normal breath sounds.   Abdominal:      General: Abdomen is flat. Bowel sounds are normal.      Palpations: Abdomen is soft.   Musculoskeletal:      Right lower leg: No edema.      Left lower leg: No edema.   Skin:     General: Skin is warm.   Neurological:      General: No focal deficit present.      Mental Status: He is alert and oriented to person, place, and time.           Lines/Drains:        Telemetry:  Telemetry Orders (From admission, onward)               24 Hour Telemetry Monitoring  Continuous x 24 Hours (Telem)        Question:  Reason for 24 Hour Telemetry  Answer:  PCI/EP study (including pacer and ICD implementation), Cardiac surgery, MI, abnormal cardiac cath, and chest pain- rule out MI                                    Lab Results: I have reviewed the following results:   Results from last 7 days   Lab Units 11/30/24  0022 11/29/24  1119   WBC Thousand/uL 7.15 10.14   HEMOGLOBIN g/dL 6.9* 7.7*   HEMATOCRIT % 25.4* 28.8*   PLATELETS Thousands/uL 161 165   SEGS PCT %  --  80*   LYMPHO PCT %  --   13*   MONO PCT %  --  6   EOS PCT %  --  0     Results from last 7 days   Lab Units 11/29/24  1119   SODIUM mmol/L 139   POTASSIUM mmol/L 3.9   CHLORIDE mmol/L 109*   CO2 mmol/L 25   BUN mg/dL 18   CREATININE mg/dL 1.16   ANION GAP mmol/L 5   CALCIUM mg/dL 8.5   ALBUMIN g/dL 4.1   TOTAL BILIRUBIN mg/dL 0.48   ALK PHOS U/L 58   ALT U/L 12   AST U/L 16   GLUCOSE RANDOM mg/dL 127     Results from last 7 days   Lab Units 11/29/24  1539   INR  1.21*             Results from last 7 days   Lab Units 11/29/24  1348   PROCALCITONIN ng/ml <0.05       Recent Cultures (last 7 days):         Imaging Results Review: No pertinent imaging studies reviewed.  Other Study Results Review: No additional pertinent studies reviewed.    Last 24 Hours Medication List:     Current Facility-Administered Medications:     acetaminophen (TYLENOL) tablet 650 mg, Q8H PRN    albuterol inhalation solution 2.5 mg, Q6H PRN    aluminum-magnesium hydroxide-simethicone (MAALOX) oral suspension 30 mL, Q6H PRN    atorvastatin (LIPITOR) tablet 40 mg, Daily With Dinner    benzonatate (TESSALON PERLES) capsule 200 mg, TID PRN    cefTRIAXone (ROCEPHIN) IVPB (premix in dextrose) 1,000 mg 50 mL, Q24H, Last Rate: 1,000 mg (11/29/24 1853)    escitalopram (LEXAPRO) tablet 20 mg, Daily    famotidine (PEPCID) tablet 20 mg, BID    guaiFENesin (MUCINEX) 12 hr tablet 600 mg, BID    iron sucrose (VENOFER) 300 mg in sodium chloride 0.9 % 250 mL IVPB, Daily    ondansetron (ZOFRAN) injection 4 mg, Q6H PRN    pantoprazole (PROTONIX) injection 40 mg, Q12H SHANNA    polyethylene glycol (MIRALAX) packet 17 g, Daily PRN    QUEtiapine (SEROquel) tablet 300 mg, HS    traZODone (DESYREL) tablet 100 mg, HS    Administrative Statements   Today, Patient Was Seen By: Chante Parker MD  I have spent a total time of 56 minutes in caring for this patient on the day of the visit/encounter including Diagnostic results, Prognosis, Risks and benefits of tx options, Instructions for management,  Patient and family education, Importance of tx compliance, Risk factor reductions, Impressions, Counseling / Coordination of care, Documenting in the medical record, Reviewing / ordering tests, medicine, procedures  , Obtaining or reviewing history  , and Communicating with other healthcare professionals .    **Please Note: This note may have been constructed using a voice recognition system.**

## 2024-11-30 NOTE — ASSESSMENT & PLAN NOTE
EGD 5/10/2024: Multiple ulcers in gastric cardia, 1 clip placed. Medium sized hiatal hernia with multiple Poli lesions. EGD recall in 3 mos, not completed. Likely cause of ongoing RACHEL  Continue PPI as noted above

## 2024-11-30 NOTE — UTILIZATION REVIEW
Initial Clinical Review    Admission: Date/Time/Statement:   Admission Orders (From admission, onward)       Ordered        11/29/24 1553  INPATIENT ADMISSION  Once                          Orders Placed This Encounter   Procedures    INPATIENT ADMISSION     Standing Status:   Standing     Number of Occurrences:   1     Level of Care:   Med Surg [16]     Estimated length of stay:   More than 2 Midnights     Certification:   I certify that inpatient services are medically necessary for this patient for a duration of greater than two midnights. See H&P and MD Progress Notes for additional information about the patient's course of treatment.     ED Arrival Information       Expected   -    Arrival   11/29/2024 10:53    Acuity   Urgent              Means of arrival   Ambulance    Escorted by   [facility] ([place]    Service   Hospitalist    Admission type   Emergency              Arrival complaint   Chest pain & Shortness of breath             Chief Complaint   Patient presents with    Chest Pain     EMS from [facility]; pt c/o chest pain that woke him at 0400 but has since resolved, also SOB, Ems gave albuterol tx and 324mg aspirin       Initial Presentation: 64 y.o. male to the ED from nursing home with complaints of chest pain.  EMS gave albuterol, asa.   Admitted to inpatient for anemia, exertional dyspnea, cough. H/O CAD status post MI, CVA, ventricular thrombus on coumadin, a history of paroxysmal atrial fibrillation, hepatitis C with cirrhosis and prior GI bleed.  ON arrival, he has decreased breath sounds, abdominal distension. CXR shows:  Mild hazy opacity within the left lung base which is favored to be attributable to atelectasis. Developing pneumonia may demonstrate a similar appearance in appropriate clinical setting. ON arrival, hgb 7.7.  Started on IV protonix.  Monitor h/h.  Monitor tele.  Pulse ox on arrival 84% with tachycardia.  Check ECho, PT/OT.  Started on IV abx. IV protonix.     Anticipated  Length of Stay/Certification Statement: Patient will be admitted on an inpatient basis with an anticipated length of stay of greater than 2 midnights secondary to anemia work up, IV antibiotics .     Date: 11/30   Day 2: Continue IV protonix, holding coumadin.  Iron panel showing iron deficiency.  Start venofer.     GI consult: Continue with IV iron.  Continue with IV ppi, Plan for EGD, colonoscopy tomorrow. Hold Coumadin. He has pale conjunctiva.       Scheduled Medications:  atorvastatin, 40 mg, Oral, Daily With Dinner  cefTRIAXone, 1,000 mg, Intravenous, Q24H  escitalopram, 20 mg, Oral, Daily  famotidine, 20 mg, Oral, BID  guaiFENesin, 600 mg, Oral, BID  iron sucrose, 300 mg, Intravenous, Daily  pantoprazole, 40 mg, Intravenous, Q12H SHANNA  polyethylene glycol, 4,000 mL, Oral, Once  QUEtiapine, 300 mg, Oral, HS  traZODone, 100 mg, Oral, HS      Continuous IV Infusions:     PRN Meds:  acetaminophen, 650 mg, Oral, Q8H PRN  albuterol, 2.5 mg, Nebulization, Q6H PRN  aluminum-magnesium hydroxide-simethicone, 30 mL, Oral, Q6H PRN  benzonatate, 200 mg, Oral, TID PRN  ondansetron, 4 mg, Intravenous, Q6H PRN  polyethylene glycol, 17 g, Oral, Daily PRN      ED Triage Vitals [11/29/24 1055]   Temperature Pulse Respirations Blood Pressure SpO2 Pain Score   98.8 °F (37.1 °C) 100 18 (!) 171/79 97 % No Pain     Weight (last 2 days)       Date/Time Weight    11/29/24 1643 94 (207.2)    11/29/24 1055 92.5 (204)            Vital Signs (last 3 days)       Date/Time Temp Pulse Resp BP MAP (mmHg) SpO2 O2 Device Patient Position - Orthostatic VS Maxwell Coma Scale Score Pain    11/30/24 0900 -- -- -- -- -- -- None (Room air) -- -- No Pain    11/30/24 07:11:13 97.6 °F (36.4 °C) 85 20 133/80 98 96 % None (Room air) Lying -- --    11/30/24 0035 -- -- -- -- -- -- -- -- 15 No Pain    11/29/24 1700 -- -- -- -- -- -- -- -- 15 No Pain    11/29/24 1651 -- 81 20 -- -- 93 % None (Room air) -- -- --    11/29/24 1647 98.6 °F (37 °C) 84 -- 134/79 97  93 % -- -- -- --    11/29/24 16:46:34 98.6 °F (37 °C) 84 -- 134/79 97 93 % -- -- -- --    11/29/24 1643 -- 105 -- -- -- 92 % -- -- -- --    11/29/24 1500 -- 99 22 156/80 110 96 % None (Room air) Sitting -- --    11/29/24 1400 -- 86 21 165/85 119 98 % -- -- -- --    11/29/24 1330 -- 87 21 166/75 108 95 % None (Room air) Sitting -- --    11/29/24 1200 -- 94 20 150/78 106 98 % None (Room air) Sitting -- --    11/29/24 1130 -- 98 22 149/72 103 97 % None (Room air) Sitting -- --    11/29/24 1108 -- -- -- -- -- -- -- -- 15 --    11/29/24 1055 98.8 °F (37.1 °C) 100 18 171/79 113 97 % None (Room air) Lying -- No Pain              Pertinent Labs/Diagnostic Test Results:   Radiology:  XR chest 1 view portable   Final Interpretation by Yaw Meza DO (11/29 1153)      Mild hazy opacity within the left lung base which is favored to be attributable to atelectasis. Developing pneumonia may demonstrate a similar appearance in appropriate clinical setting.               Workstation performed: [id number]         CT chest wo contrast    (Results Pending)     Cardiology:  11/29 EKG:   Narrative & Impression    Sinus rhythm with 1st degree A-V block  Inferior infarct (cited on or before 19-Sep-2018)  Anteroseptal infarct (cited on or before 19-Sep-2018)  Abnormal ECG  When compared with ECG of 29-Nov-2024 11:00, (unconfirmed)  NE interval has increased         Results from last 7 days   Lab Units 11/30/24  0817 11/30/24  0022 11/29/24  1119   WBC Thousand/uL 5.75 7.15 10.14   HEMOGLOBIN g/dL 7.4* 6.9* 7.7*   HEMATOCRIT % 27.2* 25.4* 28.8*   PLATELETS Thousands/uL 170 161 165   TOTAL NEUT ABS Thousands/µL  --   --  8.13*         Results from last 7 days   Lab Units 11/30/24  0817 11/29/24  1119   SODIUM mmol/L 139 139   POTASSIUM mmol/L 3.9 3.9   CHLORIDE mmol/L 109* 109*   CO2 mmol/L 23 25   ANION GAP mmol/L 7 5   BUN mg/dL 16 18   CREATININE mg/dL 1.10 1.16   EGFR ml/min/1.73sq m 71 66   CALCIUM mg/dL 8.3* 8.5     Results  from last 7 days   Lab Units 11/29/24  1119   AST U/L 16   ALT U/L 12   ALK PHOS U/L 58   TOTAL PROTEIN g/dL 7.0   ALBUMIN g/dL 4.1   TOTAL BILIRUBIN mg/dL 0.48         Results from last 7 days   Lab Units 11/30/24  0817 11/29/24  1119   GLUCOSE RANDOM mg/dL 96 127           Results from last 7 days   Lab Units 11/29/24  1522 11/29/24  1348 11/29/24  1119   HS TNI 0HR ng/L  --   --  17   HS TNI 2HR ng/L  --  17  --    HSTNI D2 ng/L  --  0  --    HS TNI 4HR ng/L 18  --   --    HSTNI D4 ng/L 1  --   --          Results from last 7 days   Lab Units 11/29/24  1539   PROTIME seconds 15.8*   INR  1.21*   PTT seconds 31         Results from last 7 days   Lab Units 11/30/24  0817 11/29/24  1348   PROCALCITONIN ng/ml <0.05 <0.05                 Results from last 7 days   Lab Units 11/29/24  1119   BNP pg/mL 76     Results from last 7 days   Lab Units 11/29/24  1348   FERRITIN ng/mL 6*   IRON SATURATION % 2*   IRON ug/dL 11*   TIBC ug/dL 441         Past Medical History:   Diagnosis Date    Alcohol abuse     Depression     Drug therapy     GERD (gastroesophageal reflux disease)     Hepatitis C     Hypertension 01/2012    Knee pain, bilateral     Left ventricular apical thrombus     Psychiatric disorder     depression, anxiety    Renal disorder     Self-injurious behavior     Spinal stenosis of lumbar region     Suicide attempt (HCC)      Present on Admission:   Hiatal hernia with GERD without esophagitis   Cirrhosis (HCC)   Iron deficiency anemia due to chronic blood loss   Left ventricular apical thrombus      Admitting Diagnosis: Hypoxemia [R09.02]  Cirrhosis (HCC) [K74.60]  Iron deficiency anemia [D50.9]  Chest pain [R07.9]  Atypical chest pain [R07.89]  Viral URI with cough [J06.9]  Age/Sex: 64 y.o. male    Network Utilization Review Department  ATTENTION: Please call with any questions or concerns to [phone] and carefully listen to the prompts so that you are directed to the right person. All voicemails are  confidential.   For Discharge needs, contact Care Management DC Support Team at [phone] opt. [phone]  Send all requests for admission clinical reviews, approved or denied determinations and any other requests to dedicated fax number below belonging to the campus where the patient is receiving treatment. List of dedicated fax numbers for the Facilities:  FACILITY NAME UR FAX NUMBER   ADMISSION DENIALS (Administrative/Medical Necessity) [phone]   DISCHARGE SUPPORT TEAM (NETWORK) [phone]   PARENT CHILD HEALTH (Maternity/NICU/Pediatrics) [phone]   [facility] [phone]   [facility] [phone]   [facility] [phone]   [facility] [phone]   [facility] [phone]   [facility] [phone]   [facility] [phone]   [facility] [phone]   [facility] [phone]   [facility] [phone]   [facility] [phone]   [facility] [phone]

## 2024-11-30 NOTE — ASSESSMENT & PLAN NOTE
Dx on echocardiogram 2021, on warfarin since then. Admission INR 1.21  Continue to HOLD warfarin pre-EGD

## 2024-11-30 NOTE — ASSESSMENT & PLAN NOTE
64M with hx/o erosive esophagitis on EGD 2/2021 @ [facility], recent gastric ulcers and HH with Poli lesions on EGD 5/10/2024 s/p clip, ventricular thrombus on warfarin (last dose 11/28 PM), EtOH/hep C cirrhosis who presented to the ED from home on 11/29/2024 for SOB, generalized weakness x3 weeks. Admission labs notable for Hgb 7.4 [recent baseline 8-9), low MCV, iron panel consistent with RACHEL, INR 1.21.    Plan for EGD + colonoscopy tomorrow 12/1. No s/sx of GI bleeding to date but known gastric ulcers, HH with Poli lesions on last EGD 5/10, lost to follow up  Clear liquid diet ? NPO at midnight pre-procedure  Continue pantoprazole 40 mg IV Q12, HOLD warfarin  Venofer #1/3  Abx: ceftriaxone started 11/29 for pneumonia  Monitor CBC, transfuse for Hgb < 7 but do not over-transfuse  Monitor for s/sx of GI bleeding  Will need outpatient follow up with GI at discharge

## 2024-11-30 NOTE — ASSESSMENT & PLAN NOTE
History of GI bleed on ASA and Coumadin for hx of  ventricular thrombus   Hemoglobin 7.7  upon admission down from 8.6 a month prior  Denies any hematemesis or abnormal stools, denies any NSAID use   ALE negative in the ED   Type and Screen obtained   iron panel showing iron deficiency-> initiated venofer, will need iron supplementation on discharge  Serial H&H  IV protonix bid   Hold coumadin  GI consulted and discussed with-> pt initiated on CLD, started on bowel prep pt to undergo EGD and colonoscopy tomorrow 12/1 npo at midnight  If worsening anemia will consult GI for potential repeat EGD   Transfuse if  < 7

## 2024-11-30 NOTE — ASSESSMENT & PLAN NOTE
Patient presented to the hospital with complaint of chest pain. Has been seen last month multiple times for upper respiratory infection, endorses continued cough.Denies any fever or chills. Reports worsening dyspnea with exertion over the last week.    No recent antibiotic use,does not reside in a long term facility   CXR: Mild hazy opacity within the left lung base which is favored to be attributable to atelectasis. Developing pneumonia may demonstrate a similar appearance in appropriate clinical setting.   Patient is afebrile, no leukocytosis  Procalcitonin negative   Dc'd IV Ceftriaxone   Mucinex ordered   Aspiration precautions   Respiratory protocol

## 2024-11-30 NOTE — PLAN OF CARE
Problem: Potential for Falls  Goal: Patient will remain free of falls  Description: INTERVENTIONS:  - Educate patient/family on patient safety including physical limitations  - Instruct patient to call for assistance with activity   - Consult OT/PT to assist with strengthening/mobility   - Keep Call bell within reach  - Keep bed low and locked with side rails adjusted as appropriate  - Keep care items and personal belongings within reach  - Initiate and maintain comfort rounds  - Make Fall Risk Sign visible to staff  - Offer Toileting every 3 Hours, in advance of need  - Initiate/Maintain bedalarm  - Obtain necessary fall risk management equipment:   - Apply yellow socks and bracelet for high fall risk patients  - Consider moving patient to room near nurses station  Outcome: Progressing     Problem: PAIN - ADULT  Goal: Verbalizes/displays adequate comfort level or baseline comfort level  Description: Interventions:  - Encourage patient to monitor pain and request assistance  - Assess pain using appropriate pain scale  - Administer analgesics based on type and severity of pain and evaluate response  - Implement non-pharmacological measures as appropriate and evaluate response  - Consider cultural and social influences on pain and pain management  - Notify physician/advanced practitioner if interventions unsuccessful or patient reports new pain  Outcome: Progressing     Problem: DISCHARGE PLANNING  Goal: Discharge to home or other facility with appropriate resources  Description: INTERVENTIONS:  - Identify barriers to discharge w/patient and caregiver  - Arrange for needed discharge resources and transportation as appropriate  - Identify discharge learning needs (meds, wound care, etc.)  - Arrange for interpretive services to assist at discharge as needed  - Refer to Case Management Department for coordinating discharge planning if the patient needs post-hospital services based on physician/advanced practitioner  order or complex needs related to functional status, cognitive ability, or social support system  Outcome: Progressing     Problem: Knowledge Deficit  Goal: Patient/family/caregiver demonstrates understanding of disease process, treatment plan, medications, and discharge instructions  Description: Complete learning assessment and assess knowledge base.  Interventions:  - Provide teaching at level of understanding  - Provide teaching via preferred learning methods  Outcome: Progressing

## 2024-11-30 NOTE — CONSULTS
Consultation - Gastroenterology   Name: Chuy Norton 64 y.o. male I MRN: [id number]  Unit/Bed#: -01 I Date of Admission: 11/29/2024   Date of Service: 11/30/2024 I Hospital Day: 1   Inpatient consult to gastroenterology  Consult performed by: Izabela Zuniga PA-C  Consult ordered by: Chante Parker MD        Physician Requesting Evaluation: Chante Parker MD   Reason for Evaluation / Principal Problem: symptomatic iron deficiency anemia    Assessment & Plan  Iron deficiency anemia due to chronic blood loss  64M with hx/o erosive esophagitis on EGD 2/2021 @ [facility], recent gastric ulcers and HH with Poli lesions on EGD 5/10/2024 s/p clip, ventricular thrombus on warfarin (last dose 11/28 PM), EtOH/hep C cirrhosis who presented to the ED from home on 11/29/2024 for SOB, generalized weakness x3 weeks. Admission labs notable for Hgb 7.4 [recent baseline 8-9), low MCV, iron panel consistent with RACHEL, INR 1.21.    Plan for EGD + colonoscopy tomorrow 12/1. No s/sx of GI bleeding to date but known gastric ulcers, HH with Poli lesions on last EGD 5/10, lost to follow up  Clear liquid diet ? NPO at midnight pre-procedure  Continue pantoprazole 40 mg IV Q12, HOLD warfarin  Venofer #1/3  Abx: ceftriaxone started 11/29 for pneumonia  Monitor CBC, transfuse for Hgb < 7 but do not over-transfuse  Monitor for s/sx of GI bleeding  Will need outpatient follow up with GI at discharge  PUD (peptic ulcer disease)  EGD 5/10/2024: Multiple ulcers in gastric cardia, 1 clip placed. Medium sized hiatal hernia with multiple Poli lesions. EGD recall in 3 mos, not completed. Likely cause of ongoing RACHEL  Continue PPI as noted above  Hiatal hernia with GERD without esophagitis  Noted on EGD 5/10/2024, continue PPI as noted above. Likely cause of ongoing RACHEL  Left ventricular apical thrombus  Dx on echocardiogram 2021, on warfarin since then. Admission INR 1.21  Continue to HOLD warfarin pre-EGD  Cirrhosis (HCC)  Admission  "MELD 3.0 = 9  Secondary to EtOH abuse (last drink 1.5 yr ago), hx/o hep C s/p successful treatment with Mavyret. Last viral load 5/2024 negative. Currently resides in sober living house  Check MELD labs daily (CMP, INR)  Will need outpatient follow up with hepatology at discharge    » Ascites  None to date; not on home diuretic regimen    » Esophageal varices  None noted on most recent EGD 5/10/2024    » Hepatic encephalopathy  None to date; not on lactulose/rifaximin    » Liver lesion  None to date on CTA A/P 5/9/2024  Will need outpatient U/S, AFP level for HCC screening    History of Present Illness   HPI:  Chuy Norton is a 64 y.o. male with hx/o erosive esophagitis on EGD 2/2021 @ [facility], recent gastric ulcers and HH with Poli lesions on EGD 5/10/2024 s/p clip, ventricular thrombus on warfarin (last dose 11/28 PM), EtOH/hep C cirrhosis who presented to the ED from home on 11/29/2024 for SOB, generalized weakness x3 weeks. Admission labs notable for Hgb 7.4 [recent baseline 8-9), low MCV, iron panel consistent with RACHEL.    Patient states that he first noticed exertional dyspnea about 3 weeks ago but it continued to get worse and associated with generalized weakness. He was recently admitted to [facility] 5/9-15, found to have anemia attributed to multiple gastric ulcers in the cardia with 1 clip placed and medium sized hiatal hernia with Poli lesions. He states that he has been compliant with pantoprazole 40 mg BID since discharge. However, he did not follow up with GI for repeat EGD due August 2024.    Hx/o hep C treated with Mavyret. Last viral load 5/2024 was negative.    GI History:  Blood thinners: ASA: yes antiplatelet: no anticoagulation: yes - warfarin  NSAID use: none  Tobacco use: current 1/2 PPD since age 14  EtOH use: none in past 1.5 year    Abdominal Surgical Hx: None  Family Hx: Denies first degree relatives with GI malignancies.  GI procedure Hx:  EGD: 5/10/2024, 3 mo recall: \"IMPRESSION:  Medium " "type I hiatal hernia with Poli lesions present  Multiple ulcers in the cardia with flat pigmented spot (Eduin IIC); placed 1 clip successfully; hemostasis achieved  The duodenum appeared normal.\"  Colonoscopy: never    Review of Systems   Constitutional:  Positive for fatigue. Negative for appetite change, chills, fever and unexpected weight change.   HENT:  Negative for sore throat, trouble swallowing and voice change.    Respiratory:  Positive for cough and shortness of breath (with exertion). Negative for choking.    Cardiovascular:  Negative for chest pain and leg swelling.   Gastrointestinal:  Positive for abdominal pain (left-sided). Negative for abdominal distention, anal bleeding, blood in stool, constipation, diarrhea, nausea, rectal pain and vomiting.        Denies melena   Skin:  Negative for pallor and rash.   Neurological:  Positive for weakness. Negative for light-headedness and headaches.   Psychiatric/Behavioral:  Negative for confusion and sleep disturbance. The patient is not nervous/anxious.      I have reviewed the patient's PMH, PSH, Social History, Family History, Meds, and Allergies    Objective :  Temp:  [97.6 °F (36.4 °C)-98.8 °F (37.1 °C)] 97.6 °F (36.4 °C)  HR:  [] 85  BP: (133-171)/(72-85) 133/80  Resp:  [18-22] 20  SpO2:  [92 %-98 %] 96 %  O2 Device: None (Room air)    Physical Exam  Vitals and nursing note reviewed.   Constitutional:       General: He is not in acute distress.     Appearance: He is not toxic-appearing.   HENT:      Head: Normocephalic and atraumatic.      Mouth/Throat:      Mouth: Mucous membranes are moist.      Dentition: Abnormal dentition.      Pharynx: Oropharynx is clear.   Eyes:      General: No scleral icterus.     Extraocular Movements: Extraocular movements intact.      Comments: Pale conjunctiva   Neck:      Trachea: Phonation normal.   Cardiovascular:      Rate and Rhythm: Normal rate and regular rhythm.      Heart sounds: No murmur heard.     No " friction rub. No gallop.   Pulmonary:      Effort: Pulmonary effort is normal. No respiratory distress.      Breath sounds: No wheezing, rhonchi or rales.   Abdominal:      General: Abdomen is protuberant. Bowel sounds are normal. There is no distension or abdominal bruit.      Palpations: Abdomen is soft. There is no hepatomegaly or splenomegaly.      Tenderness: There is no abdominal tenderness. There is no guarding or rebound.   Musculoskeletal:      Cervical back: Neck supple.      Comments: Moving all 4 extremities spontaneously   Skin:     General: Skin is warm and dry.      Capillary Refill: Capillary refill takes less than 2 seconds.      Coloration: Skin is pale. Skin is not jaundiced.   Neurological:      General: No focal deficit present.      Mental Status: He is alert and oriented to person, place, and time.   Psychiatric:         Behavior: Behavior normal. Behavior is cooperative.         Thought Content: Thought content normal.           Lab Results: I have reviewed the following results:CBC/BMP:   .     11/30/24  0817   WBC 5.75   HGB 7.4*   HCT 27.2*      SODIUM 139   K 3.9   *   CO2 23   BUN 16   CREATININE 1.10   GLUC 96    , Creatinine Clearance: Estimated Creatinine Clearance: 76.4 mL/min (by C-G formula based on SCr of 1.1 mg/dL)., LFTs:   .     11/29/24  1119   AST 16   ALT 12   ALB 4.1   TBILI 0.48   ALKPHOS 58    , Iron:   Lab Results   Component Value Date    IRON 11 (L) 11/29/2024       Imaging Results Review: No pertinent imaging studies reviewed.  Other Study Results Review: No additional pertinent studies reviewed.

## 2024-11-30 NOTE — PLAN OF CARE
Problem: Potential for Falls  Goal: Patient will remain free of falls  Description: INTERVENTIONS:  - Educate patient/family on patient safety including physical limitations  - Instruct patient to call for assistance with activity   - Consult OT/PT to assist with strengthening/mobility   - Keep Call bell within reach  - Keep bed low and locked with side rails adjusted as appropriate  - Keep care items and personal belongings within reach  - Initiate and maintain comfort rounds  - Make Fall Risk Sign visible to staff  - Offer Toileting every 3 Hours, in advance of need  - Initiate/Maintain bedalarm  - Obtain necessary fall risk management equipment:   - Apply yellow socks and bracelet for high fall risk patients  - Consider moving patient to room near nurses station  Outcome: Progressing     Problem: PAIN - ADULT  Goal: Verbalizes/displays adequate comfort level or baseline comfort level  Description: Interventions:  - Encourage patient to monitor pain and request assistance  - Assess pain using appropriate pain scale  - Administer analgesics based on type and severity of pain and evaluate response  - Implement non-pharmacological measures as appropriate and evaluate response  - Consider cultural and social influences on pain and pain management  - Notify physician/advanced practitioner if interventions unsuccessful or patient reports new pain  Outcome: Progressing     Problem: INFECTION - ADULT  Goal: Absence or prevention of progression during hospitalization  Description: INTERVENTIONS:  - Assess and monitor for signs and symptoms of infection  - Monitor lab/diagnostic results  - Monitor all insertion sites, i.e. indwelling lines, tubes, and drains  - Monitor endotracheal if appropriate and nasal secretions for changes in amount and color  - [place] appropriate cooling/warming therapies per order  - Administer medications as ordered  - Instruct and encourage patient and family to use good hand hygiene  technique  - Identify and instruct in appropriate isolation precautions for identified infection/condition  Outcome: Progressing     Problem: SAFETY ADULT  Goal: Patient will remain free of falls  Description: INTERVENTIONS:  - Educate patient/family on patient safety including physical limitations  - Instruct patient to call for assistance with activity   - Consult OT/PT to assist with strengthening/mobility   - Keep Call bell within reach  - Keep bed low and locked with side rails adjusted as appropriate  - Keep care items and personal belongings within reach  - Initiate and maintain comfort rounds  - Make Fall Risk Sign visible to staff  - Offer Toileting every 3 Hours, in advance of need  - Initiate/Maintain bedalarm  - Obtain necessary fall risk management equipment:   - Apply yellow socks and bracelet for high fall risk patients  - Consider moving patient to room near nurses station  Outcome: Progressing     Problem: DISCHARGE PLANNING  Goal: Discharge to home or other facility with appropriate resources  Description: INTERVENTIONS:  - Identify barriers to discharge w/patient and caregiver  - Arrange for needed discharge resources and transportation as appropriate  - Identify discharge learning needs (meds, wound care, etc.)  - Arrange for interpretive services to assist at discharge as needed  - Refer to Case Management Department for coordinating discharge planning if the patient needs post-hospital services based on physician/advanced practitioner order or complex needs related to functional status, cognitive ability, or social support system  Outcome: Progressing     Problem: Knowledge Deficit  Goal: Patient/family/caregiver demonstrates understanding of disease process, treatment plan, medications, and discharge instructions  Description: Complete learning assessment and assess knowledge base.  Interventions:  - Provide teaching at level of understanding  - Provide teaching via preferred learning  methods  Outcome: Progressing

## 2024-12-01 ENCOUNTER — ANESTHESIA (INPATIENT)
Dept: GASTROENTEROLOGY | Facility: HOSPITAL | Age: 64
End: 2024-12-01
Payer: COMMERCIAL

## 2024-12-01 ENCOUNTER — APPOINTMENT (INPATIENT)
Dept: GASTROENTEROLOGY | Facility: HOSPITAL | Age: 64
End: 2024-12-01
Attending: INTERNAL MEDICINE
Payer: COMMERCIAL

## 2024-12-01 ENCOUNTER — ANESTHESIA EVENT (INPATIENT)
Dept: GASTROENTEROLOGY | Facility: HOSPITAL | Age: 64
End: 2024-12-01
Payer: COMMERCIAL

## 2024-12-01 LAB
ALBUMIN SERPL BCG-MCNC: 3.6 G/DL (ref 3.5–5)
ALP SERPL-CCNC: 49 U/L (ref 34–104)
ALT SERPL W P-5'-P-CCNC: 11 U/L (ref 7–52)
ANION GAP SERPL CALCULATED.3IONS-SCNC: 7 MMOL/L (ref 4–13)
AST SERPL W P-5'-P-CCNC: 14 U/L (ref 13–39)
BILIRUB SERPL-MCNC: 0.61 MG/DL (ref 0.2–1)
BUN SERPL-MCNC: 11 MG/DL (ref 5–25)
CALCIUM SERPL-MCNC: 8.1 MG/DL (ref 8.4–10.2)
CHLORIDE SERPL-SCNC: 109 MMOL/L (ref 96–108)
CO2 SERPL-SCNC: 24 MMOL/L (ref 21–32)
CREAT SERPL-MCNC: 0.99 MG/DL (ref 0.6–1.3)
ERYTHROCYTE [DISTWIDTH] IN BLOOD BY AUTOMATED COUNT: 20.9 % (ref 11.6–15.1)
ERYTHROCYTE [DISTWIDTH] IN BLOOD BY AUTOMATED COUNT: 20.9 % (ref 11.6–15.1)
GFR SERPL CREATININE-BSD FRML MDRD: 80 ML/MIN/1.73SQ M
GLUCOSE SERPL-MCNC: 97 MG/DL (ref 65–140)
HCT VFR BLD AUTO: 25 % (ref 36.5–49.3)
HCT VFR BLD AUTO: 26 % (ref 36.5–49.3)
HCT VFR BLD AUTO: 29.2 % (ref 36.5–49.3)
HGB BLD-MCNC: 6.8 G/DL (ref 12–17)
HGB BLD-MCNC: 6.9 G/DL (ref 12–17)
HGB BLD-MCNC: 8.1 G/DL (ref 12–17)
INR PPP: 1.25 (ref 0.85–1.19)
MCH RBC QN AUTO: 17.6 PG (ref 26.8–34.3)
MCH RBC QN AUTO: 17.8 PG (ref 26.8–34.3)
MCHC RBC AUTO-ENTMCNC: 26.5 G/DL (ref 31.4–37.4)
MCHC RBC AUTO-ENTMCNC: 27.2 G/DL (ref 31.4–37.4)
MCV RBC AUTO: 65 FL (ref 82–98)
MCV RBC AUTO: 66 FL (ref 82–98)
MRSA NOSE QL CULT: NORMAL
PLATELET # BLD AUTO: 161 THOUSANDS/UL (ref 149–390)
PLATELET # BLD AUTO: 163 THOUSANDS/UL (ref 149–390)
POTASSIUM SERPL-SCNC: 3.6 MMOL/L (ref 3.5–5.3)
PROT SERPL-MCNC: 6.1 G/DL (ref 6.4–8.4)
PROTHROMBIN TIME: 16.3 SECONDS (ref 12.3–15)
RBC # BLD AUTO: 3.82 MILLION/UL (ref 3.88–5.62)
RBC # BLD AUTO: 3.93 MILLION/UL (ref 3.88–5.62)
SODIUM SERPL-SCNC: 140 MMOL/L (ref 135–147)
WBC # BLD AUTO: 10.72 THOUSAND/UL (ref 4.31–10.16)
WBC # BLD AUTO: 13.13 THOUSAND/UL (ref 4.31–10.16)

## 2024-12-01 PROCEDURE — 80053 COMPREHEN METABOLIC PANEL: CPT | Performed by: STUDENT IN AN ORGANIZED HEALTH CARE EDUCATION/TRAINING PROGRAM

## 2024-12-01 PROCEDURE — 0DJ08ZZ INSPECTION OF UPPER INTESTINAL TRACT, VIA NATURAL OR ARTIFICIAL OPENING ENDOSCOPIC: ICD-10-PCS | Performed by: INTERNAL MEDICINE

## 2024-12-01 PROCEDURE — 30233N1 TRANSFUSION OF NONAUTOLOGOUS RED BLOOD CELLS INTO PERIPHERAL VEIN, PERCUTANEOUS APPROACH: ICD-10-PCS | Performed by: INTERNAL MEDICINE

## 2024-12-01 PROCEDURE — 85027 COMPLETE CBC AUTOMATED: CPT

## 2024-12-01 PROCEDURE — 88305 TISSUE EXAM BY PATHOLOGIST: CPT | Performed by: PATHOLOGY

## 2024-12-01 PROCEDURE — P9016 RBC LEUKOCYTES REDUCED: HCPCS

## 2024-12-01 PROCEDURE — 0DBK8ZZ EXCISION OF ASCENDING COLON, VIA NATURAL OR ARTIFICIAL OPENING ENDOSCOPIC: ICD-10-PCS | Performed by: INTERNAL MEDICINE

## 2024-12-01 PROCEDURE — 43235 EGD DIAGNOSTIC BRUSH WASH: CPT | Performed by: INTERNAL MEDICINE

## 2024-12-01 PROCEDURE — 45380 COLONOSCOPY AND BIOPSY: CPT | Performed by: INTERNAL MEDICINE

## 2024-12-01 PROCEDURE — 85014 HEMATOCRIT: CPT | Performed by: INTERNAL MEDICINE

## 2024-12-01 PROCEDURE — 85610 PROTHROMBIN TIME: CPT | Performed by: STUDENT IN AN ORGANIZED HEALTH CARE EDUCATION/TRAINING PROGRAM

## 2024-12-01 PROCEDURE — 85018 HEMOGLOBIN: CPT | Performed by: INTERNAL MEDICINE

## 2024-12-01 PROCEDURE — 99233 SBSQ HOSP IP/OBS HIGH 50: CPT | Performed by: STUDENT IN AN ORGANIZED HEALTH CARE EDUCATION/TRAINING PROGRAM

## 2024-12-01 RX ORDER — FERROUS SULFATE 325(65) MG
325 TABLET ORAL
Status: DISCONTINUED | OUTPATIENT
Start: 2024-12-02 | End: 2024-12-06 | Stop reason: HOSPADM

## 2024-12-01 RX ORDER — SODIUM CHLORIDE 9 MG/ML
INJECTION, SOLUTION INTRAVENOUS CONTINUOUS PRN
Status: DISCONTINUED | OUTPATIENT
Start: 2024-12-01 | End: 2024-12-01

## 2024-12-01 RX ORDER — PROPOFOL 10 MG/ML
INJECTION, EMULSION INTRAVENOUS AS NEEDED
Status: DISCONTINUED | OUTPATIENT
Start: 2024-12-01 | End: 2024-12-01

## 2024-12-01 RX ORDER — LIDOCAINE HCL/PF 100 MG/5ML
SYRINGE (ML) INJECTION AS NEEDED
Status: DISCONTINUED | OUTPATIENT
Start: 2024-12-01 | End: 2024-12-01

## 2024-12-01 RX ADMIN — TRAZODONE HYDROCHLORIDE 100 MG: 50 TABLET ORAL at 22:33

## 2024-12-01 RX ADMIN — IRON SUCROSE 300 MG: 20 INJECTION, SOLUTION INTRAVENOUS at 14:45

## 2024-12-01 RX ADMIN — LIDOCAINE HYDROCHLORIDE 100 MG: 20 INJECTION INTRAVENOUS at 12:35

## 2024-12-01 RX ADMIN — PROPOFOL 200 MG: 10 INJECTION, EMULSION INTRAVENOUS at 12:35

## 2024-12-01 RX ADMIN — PROPOFOL 50 MG: 10 INJECTION, EMULSION INTRAVENOUS at 12:46

## 2024-12-01 RX ADMIN — PROPOFOL 50 MG: 10 INJECTION, EMULSION INTRAVENOUS at 12:41

## 2024-12-01 RX ADMIN — QUETIAPINE 300 MG: 200 TABLET ORAL at 22:33

## 2024-12-01 RX ADMIN — GUAIFENESIN 600 MG: 600 TABLET ORAL at 15:00

## 2024-12-01 RX ADMIN — ATORVASTATIN CALCIUM 40 MG: 40 TABLET, FILM COATED ORAL at 17:14

## 2024-12-01 RX ADMIN — PANTOPRAZOLE SODIUM 40 MG: 40 INJECTION, POWDER, FOR SOLUTION INTRAVENOUS at 22:36

## 2024-12-01 RX ADMIN — SODIUM CHLORIDE: 0.9 INJECTION, SOLUTION INTRAVENOUS at 11:45

## 2024-12-01 RX ADMIN — ESCITALOPRAM OXALATE 20 MG: 20 TABLET ORAL at 15:00

## 2024-12-01 RX ADMIN — PROPOFOL 50 MG: 10 INJECTION, EMULSION INTRAVENOUS at 12:50

## 2024-12-01 RX ADMIN — PANTOPRAZOLE SODIUM 40 MG: 40 INJECTION, POWDER, FOR SOLUTION INTRAVENOUS at 09:07

## 2024-12-01 RX ADMIN — PROPOFOL 50 MG: 10 INJECTION, EMULSION INTRAVENOUS at 12:38

## 2024-12-01 NOTE — QUICK NOTE
Brief EGD report    IMPRESSION:  Medium size hiatal hernia with Poli's erosions.  Previously seen ulcers have healed  Normal esophagus  Normal Duodenum      RECOMMENDATION:  Colonoscopy now       Omari Mix MD

## 2024-12-01 NOTE — ANESTHESIA PREPROCEDURE EVALUATION
Procedure:  EGD  COLONOSCOPY    Relevant Problems   ANESTHESIA (within normal limits)      CARDIO   (+) Benign essential hypertension   (+) Exertional dyspnea   (+) Hyperlipidemia      GI/HEPATIC   (+) Cirrhosis (HCC)   (+) Hepatitis C   (+) Hiatal hernia with GERD without esophagitis   (+) PUD (peptic ulcer disease)      HEMATOLOGY   (+) Iron deficiency anemia due to chronic blood loss      MUSCULOSKELETAL   (+) Hiatal hernia with GERD without esophagitis   (+) Low back pain      NEURO/PSYCH   (+) Anxiety disorder   (+) Depressive disorder      PULMONARY   (+) Exertional dyspnea      Behavioral Health   (+) Tobacco dependence syndrome      Neurology/Sleep   (+) History of CVA (cerebrovascular accident)        Physical Exam    Airway    Mallampati score: II  TM Distance: >3 FB  Neck ROM: full     Dental   Comment: endentulous     Cardiovascular  Cardiovascular exam normal    Pulmonary  Pulmonary exam normal     Other Findings        Anesthesia Plan  ASA Score- 3     Anesthesia Type- IV sedation with anesthesia with ASA Monitors.         Additional Monitors:     Airway Plan:     Comment: I discussed risks (reviewed with patient on the anesthesia consent form), benefits and alternatives of monitored sedation including the possibility under sedation to have recall or mild discomfort.  .       Plan Factors-    Chart reviewed.    Patient summary reviewed.                  Induction- intravenous.    Postoperative Plan-     Perioperative Resuscitation Plan - Level 1 - Full Code.       Informed Consent- Anesthetic plan and risks discussed with patient.  I personally reviewed this patient with the CRNA. Discussed and agreed on the Anesthesia Plan with the CRNA..

## 2024-12-01 NOTE — PLAN OF CARE
Problem: Potential for Falls  Goal: Patient will remain free of falls  Description: INTERVENTIONS:  - Educate patient/family on patient safety including physical limitations  - Instruct patient to call for assistance with activity   - Consult OT/PT to assist with strengthening/mobility   - Keep Call bell within reach  - Keep bed low and locked with side rails adjusted as appropriate  - Keep care items and personal belongings within reach  - Initiate and maintain comfort rounds  - Make Fall Risk Sign visible to staff  - Offer Toileting every 3 Hours, in advance of need  - Initiate/Maintain bedalarm  - Obtain necessary fall risk management equipment:   - Apply yellow socks and bracelet for high fall risk patients  - Consider moving patient to room near nurses station  Outcome: Progressing     Problem: PAIN - ADULT  Goal: Verbalizes/displays adequate comfort level or baseline comfort level  Description: Interventions:  - Encourage patient to monitor pain and request assistance  - Assess pain using appropriate pain scale  - Administer analgesics based on type and severity of pain and evaluate response  - Implement non-pharmacological measures as appropriate and evaluate response  - Consider cultural and social influences on pain and pain management  - Notify physician/advanced practitioner if interventions unsuccessful or patient reports new pain  Outcome: Progressing     Problem: INFECTION - ADULT  Goal: Absence or prevention of progression during hospitalization  Description: INTERVENTIONS:  - Assess and monitor for signs and symptoms of infection  - Monitor lab/diagnostic results  - Monitor all insertion sites, i.e. indwelling lines, tubes, and drains  - Monitor endotracheal if appropriate and nasal secretions for changes in amount and color  - [place] appropriate cooling/warming therapies per order  - Administer medications as ordered  - Instruct and encourage patient and family to use good hand hygiene  technique  - Identify and instruct in appropriate isolation precautions for identified infection/condition  Outcome: Progressing     Problem: SAFETY ADULT  Goal: Patient will remain free of falls  Description: INTERVENTIONS:  - Educate patient/family on patient safety including physical limitations  - Instruct patient to call for assistance with activity   - Consult OT/PT to assist with strengthening/mobility   - Keep Call bell within reach  - Keep bed low and locked with side rails adjusted as appropriate  - Keep care items and personal belongings within reach  - Initiate and maintain comfort rounds  - Make Fall Risk Sign visible to staff  - Offer Toileting every 3 Hours, in advance of need  - Initiate/Maintain bedalarm  - Obtain necessary fall risk management equipment:   - Apply yellow socks and bracelet for high fall risk patients  - Consider moving patient to room near nurses station  Outcome: Progressing     Problem: DISCHARGE PLANNING  Goal: Discharge to home or other facility with appropriate resources  Description: INTERVENTIONS:  - Identify barriers to discharge w/patient and caregiver  - Arrange for needed discharge resources and transportation as appropriate  - Identify discharge learning needs (meds, wound care, etc.)  - Arrange for interpretive services to assist at discharge as needed  - Refer to Case Management Department for coordinating discharge planning if the patient needs post-hospital services based on physician/advanced practitioner order or complex needs related to functional status, cognitive ability, or social support system  Outcome: Progressing     Problem: Knowledge Deficit  Goal: Patient/family/caregiver demonstrates understanding of disease process, treatment plan, medications, and discharge instructions  Description: Complete learning assessment and assess knowledge base.  Interventions:  - Provide teaching at level of understanding  - Provide teaching via preferred learning  methods  Outcome: Progressing

## 2024-12-01 NOTE — ASSESSMENT & PLAN NOTE
History of GI bleed on ASA and Coumadin for hx of  ventricular thrombus   Hemoglobin 7.7  upon admission down from 8.6 a month prior  Denies any hematemesis or abnormal stools, denies any NSAID use   ALE negative in the ED   Type and Screen obtained   iron panel showing iron deficiency-> initiated venofer, will need iron supplementation on discharge  Serial H&H  IV protonix bid   Hold coumadin  Hb less than 7 this morning-> ordered 1U PRBC's  Discussed with GI-> continue npo status  EGD and colonoscopy today 12/1    F/u with GI outpt

## 2024-12-01 NOTE — ASSESSMENT & PLAN NOTE
Patient is noted to desaturate with ambulation, endorsing lightheadedness. States that this has been worsening over the last two weeks   Oxygen saturation dropped to 82% with HR at 104 bpm  Previous echocardiogram from 8/26/24 reviewed: Left Ventricle: Left ventricular cavity size is normal. Wall thickness is mildly increased. The left ventricular ejection fraction is 46% by biplane measurement. Systolic function is mildly reduced. Diastolic function is abnormal. 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 The thrombus is 1.5 x 2.9 cm in its largest visualized dimension and does not appear mobile  Telemetry monitoring    echo pending  PT/OT eval     F/u with cardio outpt

## 2024-12-01 NOTE — PROGRESS NOTES
Progress Note - Hospitalist   Name: Chuy Norton 64 y.o. male I MRN: [id number]  Unit/Bed#: -01 I Date of Admission: 11/29/2024   Date of Service: 12/1/2024 I Hospital Day: 2    Assessment & Plan  Iron deficiency anemia due to chronic blood loss  History of GI bleed on ASA and Coumadin for hx of  ventricular thrombus   Hemoglobin 7.7  upon admission down from 8.6 a month prior  Denies any hematemesis or abnormal stools, denies any NSAID use   ALE negative in the ED   Type and Screen obtained   iron panel showing iron deficiency-> initiated venofer, will need iron supplementation on discharge  Serial H&H  IV protonix bid   Hold coumadin  Hb less than 7 this morning-> ordered 1U PRBC's  Discussed with GI-> continue npo status  EGD and colonoscopy today 12/1    F/u with GI outpt    Exertional dyspnea  Patient is noted to desaturate with ambulation, endorsing lightheadedness. States that this has been worsening over the last two weeks   Oxygen saturation dropped to 82% with HR at 104 bpm  Previous echocardiogram from 8/26/24 reviewed: Left Ventricle: Left ventricular cavity size is normal. Wall thickness is mildly increased. The left ventricular ejection fraction is 46% by biplane measurement. Systolic function is mildly reduced. Diastolic function is abnormal. 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 The thrombus is 1.5 x 2.9 cm in its largest visualized dimension and does not appear mobile  Telemetry monitoring    echo pending  PT/OT eval     F/u with cardio outpt  Persistent cough for 3 weeks or longer  Patient presented to the hospital with complaint of chest pain. Has been seen last month multiple times for upper respiratory infection, endorses continued cough.Denies any fever or chills. Reports worsening dyspnea with exertion over the last week.    No recent antibiotic use,does not reside in a long term facility   CXR: Mild hazy opacity within the left lung base which is  favored to be attributable to atelectasis. Developing pneumonia may demonstrate a similar appearance in appropriate clinical setting.   Patient is afebrile, no leukocytosis  Procalcitonin negative   Dc'd IV Ceftriaxone   Mucinex ordered   Aspiration precautions   Respiratory protocol   Hiatal hernia with GERD without esophagitis  Continue IV protonix   Cirrhosis (HCC)  Hx of alcoholic cirrhosis, hep C  Patient has sustained from alcohol use for 1 1/2 years, living in sober house.   On physical exam: Abdomen is distended, no fluid wave. No tenderness or guarding   Left ventricular apical thrombus  Diagnosed on echocardiogram in 2021  Has been maintained on Coumadin 2mg daily   INR 1.21   Holding due to acute anemia - resume when able   PUD (peptic ulcer disease)      VTE Pharmacologic Prophylaxis: VTE Score: 3 Moderate Risk (Score 3-4) - Pharmacological DVT Prophylaxis Contraindicated. Sequential Compression Devices Ordered.    Mobility:   Basic Mobility Inpatient Raw Score: 21  JH-HLM Goal: 6: Walk 10 steps or more  JH-HLM Achieved: 7: Walk 25 feet or more  JH-HLM Goal achieved. Continue to encourage appropriate mobility.    Patient Centered Rounds: I performed bedside rounds with nursing staff today.   Discussions with Specialists or Other Care Team Provider: GI    Education and Discussions with Family / Patient: Patient declined call to .     Current Length of Stay: 2 day(s)  Current Patient Status: Inpatient   Certification Statement: The patient will continue to require additional inpatient hospital stay due to EGD and c scope  Discharge Plan: Anticipate discharge in 24-48 hrs to discharge location to be determined pending rehab evaluations.    Code Status: Level 1 - Full Code    Samantha Vera was seen and examined at bedside.  No acute events overnight.  Discussed plan of care.  All questions and concerns were answered and addressed.  Has no acute complaints at this time.    Objective  :  Temp:  [97.6 °F (36.4 °C)-98.6 °F (37 °C)] 98.6 °F (37 °C)  HR:  [85-89] 89  BP: (129-144)/(79-86) 129/79  Resp:  [20] 20  SpO2:  [95 %-96 %] 95 %  O2 Device: None (Room air)    Body mass index is 31.5 kg/m².     Input and Output Summary (last 24 hours):     Intake/Output Summary (Last 24 hours) at 12/1/2024 0651  Last data filed at 12/1/2024 0401  Gross per 24 hour   Intake 1940 ml   Output 600 ml   Net 1340 ml       Physical Exam  Vitals and nursing note reviewed.   Constitutional:       General: He is not in acute distress.     Appearance: He is not ill-appearing.   HENT:      Head: Normocephalic and atraumatic.   Eyes:      Comments: Left eye twitch   Cardiovascular:      Rate and Rhythm: Normal rate and regular rhythm.      Pulses: Normal pulses.      Heart sounds: Normal heart sounds.   Pulmonary:      Effort: Pulmonary effort is normal.      Breath sounds: Normal breath sounds.   Abdominal:      General: Abdomen is flat. Bowel sounds are normal.      Palpations: Abdomen is soft.   Musculoskeletal:      Right lower leg: No edema.      Left lower leg: No edema.   Skin:     General: Skin is warm.   Neurological:      General: No focal deficit present.      Mental Status: He is alert and oriented to person, place, and time.           Lines/Drains:        Telemetry:  Telemetry Orders (From admission, onward)               24 Hour Telemetry Monitoring  Continuous x 24 Hours (Telem)        Question:  Reason for 24 Hour Telemetry  Answer:  PCI/EP study (including pacer and ICD implementation), Cardiac surgery, MI, abnormal cardiac cath, and chest pain- rule out MI                                    Lab Results: I have reviewed the following results:   Results from last 7 days   Lab Units 12/01/24  0026 11/30/24  0022 11/29/24  1119   WBC Thousand/uL 13.13*   < > 10.14   HEMOGLOBIN g/dL 6.9*   < > 7.7*   HEMATOCRIT % 26.0*   < > 28.8*   PLATELETS Thousands/uL 161   < > 165   SEGS PCT %  --   --  80*   LYMPHO PCT %   --   --  13*   MONO PCT %  --   --  6   EOS PCT %  --   --  0    < > = values in this interval not displayed.     Results from last 7 days   Lab Units 11/30/24  0817 11/29/24  1119   SODIUM mmol/L 139 139   POTASSIUM mmol/L 3.9 3.9   CHLORIDE mmol/L 109* 109*   CO2 mmol/L 23 25   BUN mg/dL 16 18   CREATININE mg/dL 1.10 1.16   ANION GAP mmol/L 7 5   CALCIUM mg/dL 8.3* 8.5   ALBUMIN g/dL  --  4.1   TOTAL BILIRUBIN mg/dL  --  0.48   ALK PHOS U/L  --  58   ALT U/L  --  12   AST U/L  --  16   GLUCOSE RANDOM mg/dL 96 127     Results from last 7 days   Lab Units 11/29/24  1539   INR  1.21*             Results from last 7 days   Lab Units 11/30/24  0817 11/29/24  1348   PROCALCITONIN ng/ml <0.05 <0.05       Recent Cultures (last 7 days):         Imaging Results Review: No pertinent imaging studies reviewed.  Other Study Results Review: No additional pertinent studies reviewed.    Last 24 Hours Medication List:     Current Facility-Administered Medications:     acetaminophen (TYLENOL) tablet 650 mg, Q8H PRN    albuterol inhalation solution 2.5 mg, Q6H PRN    aluminum-magnesium hydroxide-simethicone (MAALOX) oral suspension 30 mL, Q6H PRN    atorvastatin (LIPITOR) tablet 40 mg, Daily With Dinner    benzonatate (TESSALON PERLES) capsule 200 mg, TID PRN    escitalopram (LEXAPRO) tablet 20 mg, Daily    famotidine (PEPCID) tablet 20 mg, BID    guaiFENesin (MUCINEX) 12 hr tablet 600 mg, BID    iron sucrose (VENOFER) 300 mg in sodium chloride 0.9 % 250 mL IVPB, Daily, Last Rate: 300 mg (11/30/24 0955)    ondansetron (ZOFRAN) injection 4 mg, Q6H PRN    pantoprazole (PROTONIX) injection 40 mg, Q12H SHANNA    polyethylene glycol (MIRALAX) packet 17 g, Daily PRN    QUEtiapine (SEROquel) tablet 300 mg, HS    traZODone (DESYREL) tablet 100 mg, HS    Administrative Statements   Today, Patient Was Seen By: Chante Parker MD  I have spent a total time of 56 minutes in caring for this patient on the day of the visit/encounter including  Diagnostic results, Prognosis, Risks and benefits of tx options, Instructions for management, Patient and family education, Importance of tx compliance, Risk factor reductions, Impressions, Counseling / Coordination of care, Documenting in the medical record, Reviewing / ordering tests, medicine, procedures  , Obtaining or reviewing history  , and Communicating with other healthcare professionals .    **Please Note: This note may have been constructed using a voice recognition system.**

## 2024-12-01 NOTE — INTERVAL H&P NOTE
H&P reviewed. After examining the patient I find no changes in the patients condition since the H&P had been written.    Vitals:    12/01/24 1019   BP: 151/82   Pulse: 83   Resp: 20   Temp: 98.7 °F (37.1 °C)   SpO2: 97%

## 2024-12-01 NOTE — ANESTHESIA POSTPROCEDURE EVALUATION
Post-Op Assessment Note    CV Status:  Stable    Pain management: adequate       Mental Status:  Alert and awake   Hydration Status:  Euvolemic   PONV Controlled:  Controlled   Airway Patency:  Patent     Post Op Vitals Reviewed: Yes    No anethesia notable event occurred.    Staff: Anesthesiologist           Last Filed PACU Vitals:  Vitals Value Taken Time   Temp 97 °F (36.1 °C) 12/01/24 1310   Pulse 85 12/01/24 1326   /72 12/01/24 1326   Resp 22 12/01/24 1326   SpO2 93 % 12/01/24 1326       Modified [place]:  Activity: 2 (12/1/2024  1:26 PM)  Respiration: 2 (12/1/2024  1:26 PM)  Circulation: 2 (12/1/2024  1:26 PM)  Consciousness: 2 (12/1/2024  1:26 PM)  Oxygen Saturation: 2 (12/1/2024  1:26 PM)  Modified [place] Score: 10 (12/1/2024  1:26 PM)

## 2024-12-01 NOTE — ANESTHESIA POSTPROCEDURE EVALUATION
Post-Op Assessment Note    CV Status:  Stable  Pain Score: 0    Pain management: adequate       Mental Status:  Alert and awake   Hydration Status:  Euvolemic and stable   PONV Controlled:  None   Airway Patency:  Patent     Post Op Vitals Reviewed: Yes    No anethesia notable event occurred.    Staff: CRNA       Last Filed PACU Vitals:  Vitals Value Taken Time   Temp 97 °F (36.1 °C) 12/01/24 1310   Pulse 74 12/01/24 1310   /76 12/01/24 1310   Resp 19 12/01/24 1310   SpO2 94 % 12/01/24 1310       Modified [place]:  Activity: 2 (12/1/2024  1:10 PM)  Respiration: 2 (12/1/2024  1:10 PM)  Circulation: 1 (12/1/2024  1:10 PM)  Consciousness: 1 (12/1/2024  1:10 PM)  Oxygen Saturation: 2 (12/1/2024  1:10 PM)  Modified [place] Score: 8 (12/1/2024  1:10 PM)

## 2024-12-01 NOTE — QUICK NOTE
Brief colonoscopy report      IMPRESSION:  Moderate left and right-sided diverticulosis  3 mm polyp in ascending colon removed with forcep biopsy  Small internal hemorrhoid  Fair prep    RECOMMENDATION:  Repeat colonoscopy in 5 years, due: 11/30/2029  Inadequate bowel preparation     Will call with polyp pathology in 1 to 2 weeks  Resume regular diet and medications  Repeat colonoscopy in 5 years to suboptimal prep and 1 small polyp  Suspect anemia related to hiatal hernia and Poli's erosions  Would continue PPI and iron supplement chronically                 Omari Mix MD

## 2024-12-01 NOTE — PLAN OF CARE
Problem: Potential for Falls  Goal: Patient will remain free of falls  Description: INTERVENTIONS:  - Educate patient/family on patient safety including physical limitations  - Instruct patient to call for assistance with activity   - Consult OT/PT to assist with strengthening/mobility   - Keep Call bell within reach  - Keep bed low and locked with side rails adjusted as appropriate  - Keep care items and personal belongings within reach  - Initiate and maintain comfort rounds  - Make Fall Risk Sign visible to staff  - Offer Toileting every 3 Hours, in advance of need  - Initiate/Maintain bedalarm  - Obtain necessary fall risk management equipment:   - Apply yellow socks and bracelet for high fall risk patients  - Consider moving patient to room near nurses station  Outcome: Progressing     Problem: DISCHARGE PLANNING  Goal: Discharge to home or other facility with appropriate resources  Description: INTERVENTIONS:  - Identify barriers to discharge w/patient and caregiver  - Arrange for needed discharge resources and transportation as appropriate  - Identify discharge learning needs (meds, wound care, etc.)  - Arrange for interpretive services to assist at discharge as needed  - Refer to Case Management Department for coordinating discharge planning if the patient needs post-hospital services based on physician/advanced practitioner order or complex needs related to functional status, cognitive ability, or social support system  Outcome: Progressing

## 2024-12-02 ENCOUNTER — APPOINTMENT (INPATIENT)
Dept: NON INVASIVE DIAGNOSTICS | Facility: HOSPITAL | Age: 64
End: 2024-12-02
Payer: COMMERCIAL

## 2024-12-02 LAB
ABO GROUP BLD BPU: NORMAL
ANION GAP SERPL CALCULATED.3IONS-SCNC: 6 MMOL/L (ref 4–13)
AORTIC VALVE MEAN VELOCITY: 7 M/S
AV LVOT MEAN GRADIENT: 1 MMHG
AV MEAN GRADIENT: 2 MMHG
BPU ID: NORMAL
BSA FOR ECHO PROCEDURE: 2.07 M2
BUN SERPL-MCNC: 11 MG/DL (ref 5–25)
CALCIUM SERPL-MCNC: 7.6 MG/DL (ref 8.4–10.2)
CHLORIDE SERPL-SCNC: 112 MMOL/L (ref 96–108)
CO2 SERPL-SCNC: 21 MMOL/L (ref 21–32)
CREAT SERPL-MCNC: 0.94 MG/DL (ref 0.6–1.3)
CROSSMATCH: NORMAL
DOP CALC AO VTI: 18.6 CM
DOP CALC LVOT PEAK VEL VTI: 13.3 CM
ERYTHROCYTE [DISTWIDTH] IN BLOOD BY AUTOMATED COUNT: 22.1 % (ref 11.6–15.1)
GFR SERPL CREATININE-BSD FRML MDRD: 85 ML/MIN/1.73SQ M
GLUCOSE SERPL-MCNC: 100 MG/DL (ref 65–140)
HCT VFR BLD AUTO: 29.2 % (ref 36.5–49.3)
HGB BLD-MCNC: 8 G/DL (ref 12–17)
INR PPP: 1.15 (ref 0.85–1.19)
LV THROMBUS SIZE 1: 1.2 CM
LV THROMBUS SIZE 2: 2.6 CM
MCH RBC QN AUTO: 19 PG (ref 26.8–34.3)
MCHC RBC AUTO-ENTMCNC: 27.4 G/DL (ref 31.4–37.4)
MCV RBC AUTO: 70 FL (ref 82–98)
PLATELET # BLD AUTO: 160 THOUSANDS/UL (ref 149–390)
POTASSIUM SERPL-SCNC: 3.6 MMOL/L (ref 3.5–5.3)
PROTHROMBIN TIME: 15.2 SECONDS (ref 12.3–15)
RBC # BLD AUTO: 4.2 MILLION/UL (ref 3.88–5.62)
SL CV LV EF: 40
SODIUM SERPL-SCNC: 139 MMOL/L (ref 135–147)
UNIT DISPENSE STATUS: NORMAL
UNIT PRODUCT CODE: NORMAL
UNIT PRODUCT VOLUME: 350 ML
UNIT RH: NORMAL
WBC # BLD AUTO: 7.07 THOUSAND/UL (ref 4.31–10.16)

## 2024-12-02 PROCEDURE — 93325 DOPPLER ECHO COLOR FLOW MAPG: CPT

## 2024-12-02 PROCEDURE — 85610 PROTHROMBIN TIME: CPT | Performed by: STUDENT IN AN ORGANIZED HEALTH CARE EDUCATION/TRAINING PROGRAM

## 2024-12-02 PROCEDURE — 80048 BASIC METABOLIC PNL TOTAL CA: CPT | Performed by: INTERNAL MEDICINE

## 2024-12-02 PROCEDURE — 93325 DOPPLER ECHO COLOR FLOW MAPG: CPT | Performed by: INTERNAL MEDICINE

## 2024-12-02 PROCEDURE — 93321 DOPPLER ECHO F-UP/LMTD STD: CPT

## 2024-12-02 PROCEDURE — 93321 DOPPLER ECHO F-UP/LMTD STD: CPT | Performed by: INTERNAL MEDICINE

## 2024-12-02 PROCEDURE — 92610 EVALUATE SWALLOWING FUNCTION: CPT

## 2024-12-02 PROCEDURE — 99232 SBSQ HOSP IP/OBS MODERATE 35: CPT | Performed by: INTERNAL MEDICINE

## 2024-12-02 PROCEDURE — 93308 TTE F-UP OR LMTD: CPT

## 2024-12-02 PROCEDURE — 99233 SBSQ HOSP IP/OBS HIGH 50: CPT | Performed by: PHYSICIAN ASSISTANT

## 2024-12-02 PROCEDURE — 85027 COMPLETE CBC AUTOMATED: CPT | Performed by: INTERNAL MEDICINE

## 2024-12-02 PROCEDURE — 93308 TTE F-UP OR LMTD: CPT | Performed by: INTERNAL MEDICINE

## 2024-12-02 RX ORDER — WARFARIN SODIUM 2 MG/1
2 TABLET ORAL
Status: COMPLETED | OUTPATIENT
Start: 2024-12-02 | End: 2024-12-02

## 2024-12-02 RX ADMIN — ATORVASTATIN CALCIUM 40 MG: 40 TABLET, FILM COATED ORAL at 17:29

## 2024-12-02 RX ADMIN — WARFARIN SODIUM 2 MG: 2 TABLET ORAL at 18:21

## 2024-12-02 RX ADMIN — QUETIAPINE 300 MG: 200 TABLET ORAL at 20:41

## 2024-12-02 RX ADMIN — PERFLUTREN 1 ML/MIN: 6.52 INJECTION, SUSPENSION INTRAVENOUS at 12:45

## 2024-12-02 RX ADMIN — PANTOPRAZOLE SODIUM 40 MG: 40 INJECTION, POWDER, FOR SOLUTION INTRAVENOUS at 20:41

## 2024-12-02 RX ADMIN — IRON SUCROSE 300 MG: 20 INJECTION, SOLUTION INTRAVENOUS at 10:11

## 2024-12-02 RX ADMIN — FERROUS SULFATE TAB 325 MG (65 MG ELEMENTAL FE) 325 MG: 325 (65 FE) TAB at 09:24

## 2024-12-02 RX ADMIN — GUAIFENESIN 600 MG: 600 TABLET ORAL at 09:24

## 2024-12-02 RX ADMIN — ESCITALOPRAM OXALATE 20 MG: 20 TABLET ORAL at 09:24

## 2024-12-02 RX ADMIN — PANTOPRAZOLE SODIUM 40 MG: 40 INJECTION, POWDER, FOR SOLUTION INTRAVENOUS at 09:24

## 2024-12-02 RX ADMIN — GUAIFENESIN 600 MG: 600 TABLET ORAL at 17:29

## 2024-12-02 RX ADMIN — TRAZODONE HYDROCHLORIDE 100 MG: 50 TABLET ORAL at 20:41

## 2024-12-02 NOTE — ASSESSMENT & PLAN NOTE
Patient is noted to desaturate with ambulation, endorsing lightheadedness. States that this has been worsening over the last two weeks   Oxygen saturation dropped to 82% with HR at 104 bpm  Previous echocardiogram from 8/26/24 reviewed: Left Ventricle: Left ventricular cavity size is normal. Wall thickness is mildly increased. The left ventricular ejection fraction is 46% by biplane measurement. Systolic function is mildly reduced. Diastolic function is abnormal. 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 The thrombus is 1.5 x 2.9 cm in its largest visualized dimension and does not appear mobile  Telemetry monitoring    Echo largely unchanged from prior, with known apical thrombus  Obtain ambulatory O2 prior to discharge     F/u with cardio outpt

## 2024-12-02 NOTE — DISCHARGE SUMMARY
Discharge Summary - Hospitalist   Name: Chuy Norton 64 y.o. male I MRN: [id number]  Unit/Bed#: -01 I Date of Admission: 11/29/2024   Date of Service: 12/2/2024 I Hospital Day: 3   { ?Quick Links I Problem List I PORCH I Billing Tip:97473}  Assessment & Plan  Iron deficiency anemia due to chronic blood loss  History of GI bleed on ASA and Coumadin for hx of  ventricular thrombus   Hemoglobin 7.7  upon admission down from 8.6 a month prior  Denies any hematemesis or abnormal stools, denies any NSAID use   ALE negative in the ED   iron panel showing iron deficiency-> initiated venofer  Hgb has remained stable s/p 1U PRBC shortly following admission  GI consulted and pt is s/p EGD and Colonoscopy on 12/1:  EGD 12/1 with healed gastric ulcer, hiatal hernia and Poli lesions but no active bleeding  Colonoscopy with diverticulosis and small polyp that was excised  Hemoglobin remained stable  GI advised iron once daily and daily PPI  Outpatient follow-up to review polyp pathology from colonoscopy  Will resume Coumadin this evening and ensure AM hgb stable  Exertional dyspnea  Patient is noted to desaturate with ambulation, endorsing lightheadedness. States that this has been worsening over the last two weeks   Oxygen saturation dropped to 82% with HR at 104 bpm  Previous echocardiogram from 8/26/24 reviewed: Left Ventricle: Left ventricular cavity size is normal. Wall thickness is mildly increased. The left ventricular ejection fraction is 46% by biplane measurement. Systolic function is mildly reduced. Diastolic function is abnormal. 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 The thrombus is 1.5 x 2.9 cm in its largest visualized dimension and does not appear mobile  Telemetry monitoring    Echo largely unchanged from prior, with known apical thrombus  Obtain ambulatory O2 prior to discharge     F/u with cardio outpt  Persistent cough for 3 weeks or longer  Patient presented to the  hospital with complaint of chest pain. Has been seen last month multiple times for upper respiratory infection, endorses continued cough.Denies any fever or chills. Reports worsening dyspnea with exertion over the last week.    No recent antibiotic use,does not reside in a long term facility   Suspect SALAZAR may be 2/2 acute anemia with cough in the setting of GERD  CXR: Mild hazy opacity within the left lung base which is favored to be attributable to atelectasis. Developing pneumonia may demonstrate a similar appearance in appropriate clinical setting.   Patient is afebrile, no leukocytosis, Procalcitonin negative   Dc'd IV Ceftriaxone   Mucinex ordered   Aspiration precautions   Respiratory protocol   Hiatal hernia with GERD without esophagitis  EGD 12/1 with healed gastric ulcer, hiatal hernia and Poli lesions but no active bleeding  Colonoscopy with diverticulosis and small polyp that was excised  Hemoglobin remained stable  GI advised iron once daily and daily PPI  Outpatient follow-up to review polyp pathology from colonoscopy  Cirrhosis (HCC)  Hx of alcoholic cirrhosis, hep C  Patient has sustained from alcohol use for 1 1/2 years, living in sober house.   On physical exam: Abdomen is distended, no fluid wave. No tenderness or guarding   Left ventricular apical thrombus  Diagnosed on echocardiogram in 2021, repeat Echo on this admission still demonstrates thrombus  Has been maintained on Coumadin 2mg daily   INR 1.21   Resume Coumadin this evening   PUD (peptic ulcer disease)  See plan under hiatal hernia     Medical Problems       Resolved Problems  Date Reviewed: 12/2/2024   None       Discharging Physician / Practitioner: Camryn Rdz PA-C  PCP: Savanna Pan DO  Admission Date:   Admission Orders (From admission, onward)       Ordered        11/29/24 1553  INPATIENT ADMISSION  Once                          Discharge Date: 12/02/24    Consultations During Hospital Stay:  GI  SLP    Procedures  Performed:   EGD/Colonoscopy: Moderate left and right-sided diverticulosis   3 mm polyp in ascending colon removed with forcep biopsy   Small internal hemorrhoid   Fair prep.Medium size hiatal hernia with Poli's erosions.   Previously seen ulcers have healed   Normal esophagus   Normal Duodenum     RECOMMENDATION: Repeat colonoscopy in 5 years, due: 11/30/2029   Inadequate bowel preparation     Significant Findings / Test Results:   Anemia with Hgb 7.7 on admission  Iron panel: serum iron 11, TIBC 441, UIBC 430, ferritin 6  XR chest 1 view portable  Status: Final result     PACS Images     Show images for XR chest 1 view portable  Study Result    Narrative & Impression   XR CHEST PORTABLE     INDICATION: Rhonchi, cough, hypoxemic.     COMPARISON: Chest x-ray 10/15/2024. CT chest 5/9/2024.     FINDINGS:     Mild haziness within the left lung base likely attributed to atelectasis. No appreciable pneumothorax or pleural effusion.     Cardiomediastinal silhouette cardiomediastinal silhouette is stable. Moderate to large hiatal hernia is again noted.     Bones are unremarkable for age.     Normal upper abdomen.     IMPRESSION:     Mild hazy opacity within the left lung base which is favored to be attributable to atelectasis. Developing pneumonia may demonstrate a similar appearance in appropriate clinical setting.   CT chest wo contrast  Status: Final result     PACS Images     Show images for CT chest wo contrast  Study Result    Narrative & Impression   CT CHEST WITHOUT IV CONTRAST     INDICATION:   SOB. Per my review of the medical record, history of GI bleed on ASA and Coumadin for ventricular thrombus. Exertional dyspnea with desaturation with ambulation, worsening over the past 2 weeks. Presented with chest pain. Has been seen   multiple times in the last month for URI. Continued cough. Afebrile, no leukocytosis, negative calcitonin. History of GERD.     COMPARISON: CXR 11/29/2024, 10/15/2024, chest CT  5/9/2024.     TECHNIQUE: Chest CT without intravenous contrast.  Axial, sagittal, coronal 2D reformats and coronal MIPS from source data.     Radiation dose length product (DLP):  478.28 mGy-cm . Radiation dose exposure minimized using iterative reconstruction and automated exposure control.     FINDINGS:     LUNGS: Moderately compromised by motion. No acute disease.     AIRWAYS: No significant filling defects but moderately compromised by motion.     PLEURA:  Unremarkable.     HEART/GREAT VESSELS: Mild cardiomegaly. Stable calcified left ventricular apical thrombus. Mild coronary artery calcification indicating atherosclerotic heart disease.     MEDIASTINUM AND NATHANAEL: Moderate hiatal hernia with persistent mildly dilated fluid-filled esophagus.     CHEST WALL AND LOWER NECK: Unremarkable.     UPPER ABDOMEN: Mildly distended fluid-filled stomach. Colonic diverticuli.     OSSEOUS STRUCTURES: Mild degenerative disease in the spine.     IMPRESSION:     Moderately compromised by motion with no acute disease.     Large hiatal hernia with redemonstration of dilated fluid-filled esophagus and mildly dilated fluid-filled stomach placing the patient at risk for aspiration.             Incidental Findings:   none     Test Results Pending at Discharge (will require follow up):   Polyp pathology     Outpatient Tests Requested:  none    Complications:  none    Reason for Admission: anemia, SALAZAR    Hospital Course:   Chuy Norton is a 64 y.o. male patient with PMHx of cirrhosis, left ventricular apical thrombus who originally presented to the hospital on 11/29/2024 due to chest pain and cough.  In the ED, he was noted to desat to 82% with ambulation.  Chest x-ray could not rule out a developing pneumonia; he was also noted to have acute anemia.  Digital rectal exam was negative for occult blood.  He was admitted and GI was consulted.  He underwent EGD and colonoscopy and his Coumadin was held.  EGD and colonoscopy findings above.   GI cleared him to resume anticoagulation and they will follow-up polyp pathology results as an outpatient.  He did require 1 unit of packed red blood cells after which his hemoglobin remained stable above 7.  GI advised daily iron supplementation and once daily PPI.  In regards to his dyspnea on exertion, repeat echo was obtained and compared to his echo 3 years ago was grossly unchanged.  Much of his symptoms in regards to dyspnea did improve after transfusion.  He was initially started on ceftriaxone however a CT chest did not reveal an opacity, his procalcitonin was negative, and he remained afebrile without leukocytosis; antibiotics were subsequently discontinued. Incentive spirometry was encouraged as this was favored to be due to atelectasis with underlying anemia. An ambulatory O2 was obtained prior to discharge*** and he was medically stable for discharge on ***    Please see above list of diagnoses and related plan for additional information.     Condition at Discharge: {Condition:74663}    Discharge Day Visit / Exam:   {SL IP SLIM DISCHARGE EXAM OPTIONS:04948}    Discussion with Family: {Family Communication:82541}    Discharge instructions/Information to patient and family:   See after visit summary for information provided to patient and family.      Provisions for Follow-Up Care:  See after visit summary for information related to follow-up care and any pertinent home health orders.      Mobility at time of Discharge:   Basic Mobility Inpatient Raw Score: 21  -HLM Goal: 6: Walk 10 steps or more  JH-HLM Achieved: 6: Walk 10 steps or more  {Mobility:05793}     Disposition:   Home    Planned Readmission: no    Discharge Medications:  See after visit summary for reconciled discharge medications provided to patient and/or family.      Administrative Statements   Discharge Statement:  I have spent a total time of *** minutes in caring for this patient on the day of the visit/encounter. {>30 minutes of time  was spent on:72730}.    **Please Note: This note may have been constructed using a voice recognition system**

## 2024-12-02 NOTE — OCCUPATIONAL THERAPY NOTE
Occupational Therapy Screen Note        Patient Name: Chuy Norton  Today's Date: 12/2/2024 12/02/24 1532   OT Last Visit   OT Visit Date 12/02/24   Note Type   Note type Screen   Additional Comments OT orders received and chart review completed. Appears OT was ordered as patient is SALAZAR, noted he required 1unit PRBCs. Pt with AMPAC of 21. Per RN Carol, patient is abulating around the room. Pt w/ no acute OT needs at this time. Will d/c OT orders. Please reconsult if change in status.     Magaly Johnson, OTR/L                            Glasses Rx given. Update from current glasses for best acuity.

## 2024-12-02 NOTE — ASSESSMENT & PLAN NOTE
History of GI bleed on ASA and Coumadin for hx of  ventricular thrombus   Hemoglobin 7.7  upon admission down from 8.6 a month prior  Denies any hematemesis or abnormal stools, denies any NSAID use   ALE negative in the ED   iron panel showing iron deficiency-> initiated venofer  Hgb has remained stable s/p 1U PRBC shortly following admission  GI consulted and pt is s/p EGD and Colonoscopy on 12/1:  EGD 12/1 with healed gastric ulcer, hiatal hernia and Poli lesions but no active bleeding  Colonoscopy with diverticulosis and small polyp that was excised  Hemoglobin remained stable  GI advised iron once daily and daily PPI  Outpatient follow-up to review polyp pathology from colonoscopy  Will resume Coumadin this evening and ensure AM hgb stable

## 2024-12-02 NOTE — PLAN OF CARE
Problem: Potential for Falls  Goal: Patient will remain free of falls  Description: INTERVENTIONS:  - Educate patient/family on patient safety including physical limitations  - Instruct patient to call for assistance with activity   - Consult OT/PT to assist with strengthening/mobility   - Keep Call bell within reach  - Keep bed low and locked with side rails adjusted as appropriate  - Keep care items and personal belongings within reach  - Initiate and maintain comfort rounds  - Make Fall Risk Sign visible to staff  - Offer Toileting every 3 Hours, in advance of need  - Initiate/Maintain bedalarm  - Obtain necessary fall risk management equipment:   - Apply yellow socks and bracelet for high fall risk patients  - Consider moving patient to room near nurses station  Outcome: Progressing     Problem: PAIN - ADULT  Goal: Verbalizes/displays adequate comfort level or baseline comfort level  Description: Interventions:  - Encourage patient to monitor pain and request assistance  - Assess pain using appropriate pain scale  - Administer analgesics based on type and severity of pain and evaluate response  - Implement non-pharmacological measures as appropriate and evaluate response  - Consider cultural and social influences on pain and pain management  - Notify physician/advanced practitioner if interventions unsuccessful or patient reports new pain  Outcome: Progressing     Problem: INFECTION - ADULT  Goal: Absence or prevention of progression during hospitalization  Description: INTERVENTIONS:  - Assess and monitor for signs and symptoms of infection  - Monitor lab/diagnostic results  - Monitor all insertion sites, i.e. indwelling lines, tubes, and drains  - Monitor endotracheal if appropriate and nasal secretions for changes in amount and color  - [place] appropriate cooling/warming therapies per order  - Administer medications as ordered  - Instruct and encourage patient and family to use good hand hygiene  technique  - Identify and instruct in appropriate isolation precautions for identified infection/condition  Outcome: Progressing     Problem: SAFETY ADULT  Goal: Patient will remain free of falls  Description: INTERVENTIONS:  - Educate patient/family on patient safety including physical limitations  - Instruct patient to call for assistance with activity   - Consult OT/PT to assist with strengthening/mobility   - Keep Call bell within reach  - Keep bed low and locked with side rails adjusted as appropriate  - Keep care items and personal belongings within reach  - Initiate and maintain comfort rounds  - Make Fall Risk Sign visible to staff  - Offer Toileting every 3 Hours, in advance of need  - Initiate/Maintain bedalarm  - Obtain necessary fall risk management equipment:   - Apply yellow socks and bracelet for high fall risk patients  - Consider moving patient to room near nurses station  Outcome: Progressing     Problem: DISCHARGE PLANNING  Goal: Discharge to home or other facility with appropriate resources  Description: INTERVENTIONS:  - Identify barriers to discharge w/patient and caregiver  - Arrange for needed discharge resources and transportation as appropriate  - Identify discharge learning needs (meds, wound care, etc.)  - Arrange for interpretive services to assist at discharge as needed  - Refer to Case Management Department for coordinating discharge planning if the patient needs post-hospital services based on physician/advanced practitioner order or complex needs related to functional status, cognitive ability, or social support system  Outcome: Progressing     Problem: Knowledge Deficit  Goal: Patient/family/caregiver demonstrates understanding of disease process, treatment plan, medications, and discharge instructions  Description: Complete learning assessment and assess knowledge base.  Interventions:  - Provide teaching at level of understanding  - Provide teaching via preferred learning  methods  Outcome: Progressing     Problem: Nutrition/Hydration-ADULT  Goal: Nutrient/Hydration intake appropriate for improving, restoring or maintaining nutritional needs  Description: Monitor and assess patient's nutrition/hydration status for malnutrition. Collaborate with interdisciplinary team and initiate plan and interventions as ordered.  Monitor patient's weight and dietary intake as ordered or per policy. Utilize nutrition screening tool and intervene as necessary. Determine patient's food preferences and provide high-protein, high-caloric foods as appropriate.     INTERVENTIONS:  - Monitor oral intake, urinary output, labs, and treatment plans  - Assess nutrition and hydration status and recommend course of action  - Evaluate amount of meals eaten  - Assist patient with eating if necessary   - Allow adequate time for meals  - Recommend/ encourage appropriate diets, oral nutritional supplements, and vitamin/mineral supplements  - Order, calculate, and assess calorie counts as needed  - Recommend, monitor, and adjust tube feedings and TPN/PPN based on assessed needs  - Assess need for intravenous fluids  - Provide specific nutrition/hydration education as appropriate  - Include patient/family/caregiver in decisions related to nutrition  Outcome: Progressing

## 2024-12-02 NOTE — PROGRESS NOTES
Progress Note - Hospitalist   Name: Chuy Norton 64 y.o. male I MRN: [id number]  Unit/Bed#: -01 I Date of Admission: 11/29/2024   Date of Service: 12/2/2024 I Hospital Day: 3    Assessment & Plan  Iron deficiency anemia due to chronic blood loss  History of GI bleed on ASA and Coumadin for hx of  ventricular thrombus   Hemoglobin 7.7  upon admission down from 8.6 a month prior  Denies any hematemesis or abnormal stools, denies any NSAID use   ALE negative in the ED   iron panel showing iron deficiency-> initiated venofer  Hgb has remained stable s/p 1U PRBC shortly following admission  GI consulted and pt is s/p EGD and Colonoscopy on 12/1:  EGD 12/1 with healed gastric ulcer, hiatal hernia and Poli lesions but no active bleeding  Colonoscopy with diverticulosis and small polyp that was excised  Hemoglobin remained stable  GI advised iron once daily and daily PPI  Outpatient follow-up to review polyp pathology from colonoscopy  Will resume Coumadin this evening and ensure AM hgb stable  Exertional dyspnea  Patient is noted to desaturate with ambulation, endorsing lightheadedness. States that this has been worsening over the last two weeks   Oxygen saturation dropped to 82% with HR at 104 bpm  Previous echocardiogram from 8/26/24 reviewed: Left Ventricle: Left ventricular cavity size is normal. Wall thickness is mildly increased. The left ventricular ejection fraction is 46% by biplane measurement. Systolic function is mildly reduced. Diastolic function is abnormal. 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 The thrombus is 1.5 x 2.9 cm in its largest visualized dimension and does not appear mobile  Telemetry monitoring    Echo largely unchanged from prior, with known apical thrombus  Obtain ambulatory O2 prior to discharge     F/u with cardio outpt  Persistent cough for 3 weeks or longer  Patient presented to the hospital with complaint of chest pain. Has been seen last month  multiple times for upper respiratory infection, endorses continued cough.Denies any fever or chills. Reports worsening dyspnea with exertion over the last week.    No recent antibiotic use,does not reside in a long term facility   Suspect SALAZAR may be 2/2 acute anemia with cough in the setting of GERD  CXR: Mild hazy opacity within the left lung base which is favored to be attributable to atelectasis. Developing pneumonia may demonstrate a similar appearance in appropriate clinical setting.   Patient is afebrile, no leukocytosis, Procalcitonin negative   Dc'd IV Ceftriaxone   Mucinex ordered   Aspiration precautions   Respiratory protocol   Hiatal hernia with GERD without esophagitis  EGD 12/1 with healed gastric ulcer, hiatal hernia and Poli lesions but no active bleeding  Colonoscopy with diverticulosis and small polyp that was excised  Hemoglobin remained stable  GI advised iron once daily and daily PPI  Outpatient follow-up to review polyp pathology from colonoscopy  Cirrhosis (HCC)  Hx of alcoholic cirrhosis, hep C  Patient has sustained from alcohol use for 1 1/2 years, living in sober house.   On physical exam: Abdomen is distended, no fluid wave. No tenderness or guarding   Left ventricular apical thrombus  Diagnosed on echocardiogram in 2021, repeat Echo on this admission still demonstrates thrombus  Has been maintained on Coumadin 2mg daily   INR 1.21   Resume Coumadin this evening   PUD (peptic ulcer disease)  See plan under hiatal hernia    VTE Pharmacologic Prophylaxis: VTE Score: 3 Moderate Risk (Score 3-4) - Pharmacological DVT Prophylaxis Ordered: warfarin (Coumadin).    Mobility:   Basic Mobility Inpatient Raw Score: 21  JH-HLM Goal: 6: Walk 10 steps or more  JH-HLM Achieved: 6: Walk 10 steps or more  JH-HLM Goal achieved. Continue to encourage appropriate mobility.    Patient Centered Rounds: I performed bedside rounds with nursing staff today.   Discussions with Specialists or Other Care Team  Provider: nursing, case management    Education and Discussions with Family / Patient: Patient declined call to .     Current Length of Stay: 3 day(s)  Current Patient Status: Inpatient   Certification Statement: The patient will continue to require additional inpatient hospital stay due to monitoring for GIB and hgb stability  Discharge Plan: Anticipate discharge tomorrow to home.    Code Status: Level 1 - Full Code    Subjective   Patient states his cough is improved and he feels much improved from a dyspnea standpoint as well. Denies any CP, HA or lightheadedness.  He feels up for going home when medically cleared.     Objective :  Temp:  [97.2 °F (36.2 °C)-97.5 °F (36.4 °C)] 97.5 °F (36.4 °C)  HR:  [80-85] 85  BP: (119-123)/(75-78) 123/76  Resp:  [18-20] 18  SpO2:  [91 %-96 %] 95 %  O2 Device: Nasal cannula  Nasal Cannula O2 Flow Rate (L/min):  [2 L/min] 2 L/min    Body mass index is 31.47 kg/m².     Input and Output Summary (last 24 hours):     Intake/Output Summary (Last 24 hours) at 12/2/2024 1749  Last data filed at 12/2/2024 1312  Gross per 24 hour   Intake 870 ml   Output 400 ml   Net 470 ml       Physical Exam  Vitals and nursing note reviewed.   Constitutional:       General: He is not in acute distress.     Appearance: He is obese. He is not ill-appearing or toxic-appearing.   HENT:      Head: Normocephalic and atraumatic.      Right Ear: External ear normal.      Left Ear: External ear normal.      Nose: Nose normal.      Mouth/Throat:      Mouth: Mucous membranes are moist.      Pharynx: Oropharynx is clear.   Eyes:      Conjunctiva/sclera: Conjunctivae normal.   Cardiovascular:      Rate and Rhythm: Normal rate and regular rhythm.      Pulses: Normal pulses.      Heart sounds: Normal heart sounds. No murmur heard.     No friction rub.   Pulmonary:      Effort: Pulmonary effort is normal. No respiratory distress.      Breath sounds: Normal breath sounds. No stridor. No wheezing, rhonchi  or rales.   Abdominal:      General: Abdomen is flat. Bowel sounds are normal. There is no distension.      Palpations: Abdomen is soft. There is no mass.      Tenderness: There is no abdominal tenderness. There is no guarding or rebound.      Hernia: No hernia is present.   Musculoskeletal:      Cervical back: Normal range of motion.      Right lower leg: No edema.      Left lower leg: No edema.   Skin:     General: Skin is warm and dry.   Neurological:      Mental Status: He is alert. Mental status is at baseline.   Psychiatric:         Mood and Affect: Mood normal.           Lines/Drains:              Lab Results: I have reviewed the following results:   Results from last 7 days   Lab Units 12/02/24  0618 11/30/24  0022 11/29/24  1119   WBC Thousand/uL 7.07   < > 10.14   HEMOGLOBIN g/dL 8.0*   < > 7.7*   HEMATOCRIT % 29.2*   < > 28.8*   PLATELETS Thousands/uL 160   < > 165   SEGS PCT %  --   --  80*   LYMPHO PCT %  --   --  13*   MONO PCT %  --   --  6   EOS PCT %  --   --  0    < > = values in this interval not displayed.     Results from last 7 days   Lab Units 12/02/24  0618 12/01/24  0731   SODIUM mmol/L 139 140   POTASSIUM mmol/L 3.6 3.6   CHLORIDE mmol/L 112* 109*   CO2 mmol/L 21 24   BUN mg/dL 11 11   CREATININE mg/dL 0.94 0.99   ANION GAP mmol/L 6 7   CALCIUM mg/dL 7.6* 8.1*   ALBUMIN g/dL  --  3.6   TOTAL BILIRUBIN mg/dL  --  0.61   ALK PHOS U/L  --  49   ALT U/L  --  11   AST U/L  --  14   GLUCOSE RANDOM mg/dL 100 97     Results from last 7 days   Lab Units 12/02/24  0618   INR  1.15             Results from last 7 days   Lab Units 11/30/24  0817 11/29/24  1348   PROCALCITONIN ng/ml <0.05 <0.05       Recent Cultures (last 7 days):         Imaging Results Review: I reviewed radiology reports from this admission including: Echocardiogram.  Other Study Results Review: No additional pertinent studies reviewed.    Last 24 Hours Medication List:     Current Facility-Administered Medications:     acetaminophen  (TYLENOL) tablet 650 mg, Q8H PRN    albuterol inhalation solution 2.5 mg, Q6H PRN    aluminum-magnesium hydroxide-simethicone (MAALOX) oral suspension 30 mL, Q6H PRN    atorvastatin (LIPITOR) tablet 40 mg, Daily With Dinner    benzonatate (TESSALON PERLES) capsule 200 mg, TID PRN    escitalopram (LEXAPRO) tablet 20 mg, Daily    ferrous sulfate tablet 325 mg, Daily With Breakfast    guaiFENesin (MUCINEX) 12 hr tablet 600 mg, BID    ondansetron (ZOFRAN) injection 4 mg, Q6H PRN    pantoprazole (PROTONIX) injection 40 mg, Q12H SHANNA    polyethylene glycol (MIRALAX) packet 17 g, Daily PRN    QUEtiapine (SEROquel) tablet 300 mg, HS    traZODone (DESYREL) tablet 100 mg, HS    warfarin (COUMADIN) tablet 2 mg, Once (warfarin)    Administrative Statements   Today, Patient Was Seen By: Camryn Rdz PA-C  I have spent a total time of 45 minutes in caring for this patient on the day of the visit/encounter including Diagnostic results, Prognosis, Risks and benefits of tx options, Instructions for management, Impressions, Counseling / Coordination of care, Documenting in the medical record, Reviewing / ordering tests, medicine, procedures  , Obtaining or reviewing history  , and Communicating with other healthcare professionals .    **Please Note: This note may have been constructed using a voice recognition system.**

## 2024-12-02 NOTE — ASSESSMENT & PLAN NOTE
Patient presented to the hospital with complaint of chest pain. Has been seen last month multiple times for upper respiratory infection, endorses continued cough.Denies any fever or chills. Reports worsening dyspnea with exertion over the last week.    No recent antibiotic use,does not reside in a long term facility   Suspect SALAZAR may be 2/2 acute anemia with cough in the setting of GERD  CXR: Mild hazy opacity within the left lung base which is favored to be attributable to atelectasis. Developing pneumonia may demonstrate a similar appearance in appropriate clinical setting.   Patient is afebrile, no leukocytosis, Procalcitonin negative   Dc'd IV Ceftriaxone   Mucinex ordered   Aspiration precautions   Respiratory protocol

## 2024-12-02 NOTE — ASSESSMENT & PLAN NOTE
EGD 12/1 with healed gastric ulcer, hiatal hernia and Poli lesions but no active bleeding  Colonoscopy with diverticulosis and small polyp that was excised  Hemoglobin remained stable  GI advised iron once daily and daily PPI  Outpatient follow-up to review polyp pathology from colonoscopy

## 2024-12-02 NOTE — PHYSICAL THERAPY NOTE
PHYSICAL THERAPY SCREEN NOTE    Patient Name: Chuy Norton  Today's Date: 12/2/2024 12/02/24 5706   Note Type   Note type Screen   Additional Comments PT orders received, chart review performed. Appears PT was ordered as patient is SALAZAR, noted he required 1unit PRBCs. Per RN Carol, patient is independently ambulating around room, [facility] of 21. Pt w/ no acute PT needs, will DC PT. Please reconsult if needs arise       Yumiko Romano, PT, DPT

## 2024-12-02 NOTE — PROGRESS NOTES
"Progress note - Gastroenterology   Chuy Norton 64 y.o. male MRN: [id number]  Unit/Bed#: -01 Encounter: [id number]    ASSESSMENT and PLAN  64-year-old male with PMH erosive esophagitis 2021, EtOH cirrhosis and hepatitis C.  Hospitalized in spring 2024 with GI bleed, EGD at that time showed gastric ulcer, large hiatal hernia with Poli erosions.  Treated with PPI but lost to follow-up  Admitted with chest pain, noted to have microcytic anemia with hemoglobin 7.7, iron panel consistent with iron deficiency  EGD 12/1 showed medium size hiatal hernia with Poli erosions, healed gastric ulcer.  Colonoscopy 12/1/2024 showed moderate left and right sided diverticulosis, small polyp excised, pathology pending    1.  Iron deficiency anemia  2.  GERD  3.  Recent peptic ulcer   EGD yesterday 12/1 showed healed gastric ulcer, hiatal hernia with Poli lesions but no active bleeding  No further melena  Hemoglobin stable at 7.7 today  Stable from GI standpoint for discharge today  Will arrange outpatient follow-up in our office  Continue pantoprazole 40 mg daily  Continue ferrous sulfate 325 mg daily    4.  EtOH cirrhosis  5.  Hepatitis C  Recommend outpatient follow-up with GI    Chief Complaint   Patient presents with    Chest Pain     EMS from [facility]; pt c/o chest pain that woke him at 0400 but has since resolved, also SOB, Ems gave albuterol tx and 324mg aspirin       SUBJECTIVE/HPI   No further melena or rectal bleeding  Hemoglobin stable at 7.7 this a.m.  Anxious for discharge-patient fully clothed    /78   Pulse 84   Temp (!) 97.2 °F (36.2 °C) (Oral)   Resp 20   Ht 5' 8\" (1.727 m)   Wt 93.9 kg (207 lb)   SpO2 96%   BMI 31.47 kg/m²     PHYSICALEXAM  General appearance: alert, appears stated age and cooperative  Eyes:  no icterus   Head: Normocephalic, without obvious abnormality, atraumatic  Lungs: clear to auscultation bilaterally  Heart: regular rate and rhythm, S1, S2 normal, no murmur, " click, rub or gallop  Abdomen: soft, non-tender; bowel sounds normal; no masses,  no organomegaly  Extremities: extremities normal, atraumatic, no cyanosis or edema  Neurologic: Grossly normal    Lab Results   Component Value Date    GLUCOSE 110 05/07/2024    CALCIUM 7.6 (L) 12/02/2024    K 3.6 12/02/2024    CO2 21 12/02/2024     (H) 12/02/2024    BUN 11 12/02/2024    CREATININE 0.94 12/02/2024     Lab Results   Component Value Date    WBC 7.07 12/02/2024    HGB 8.0 (L) 12/02/2024    HCT 29.2 (L) 12/02/2024    MCV 70 (L) 12/02/2024     12/02/2024     Lab Results   Component Value Date    ALT 11 12/01/2024    AST 14 12/01/2024    ALKPHOS 49 12/01/2024       Lab Results   Component Value Date    LIPASE 14 05/07/2024     Lab Results   Component Value Date    IRON 11 (L) 11/29/2024    TIBC 441 11/29/2024    FERRITIN 6 (L) 11/29/2024     Lab Results   Component Value Date    INR 1.15 12/02/2024

## 2024-12-02 NOTE — PLAN OF CARE
Problem: Potential for Falls  Goal: Patient will remain free of falls  Description: INTERVENTIONS:  - Educate patient/family on patient safety including physical limitations  - Instruct patient to call for assistance with activity   - Consult OT/PT to assist with strengthening/mobility   - Keep Call bell within reach  - Keep bed low and locked with side rails adjusted as appropriate  - Keep care items and personal belongings within reach  - Initiate and maintain comfort rounds  - Make Fall Risk Sign visible to staff  - Offer Toileting every 3 Hours, in advance of need  - Initiate/Maintain bedalarm  - Obtain necessary fall risk management equipment:   - Apply yellow socks and bracelet for high fall risk patients  - Consider moving patient to room near nurses station  Outcome: Progressing     Problem: PAIN - ADULT  Goal: Verbalizes/displays adequate comfort level or baseline comfort level  Description: Interventions:  - Encourage patient to monitor pain and request assistance  - Assess pain using appropriate pain scale  - Administer analgesics based on type and severity of pain and evaluate response  - Implement non-pharmacological measures as appropriate and evaluate response  - Consider cultural and social influences on pain and pain management  - Notify physician/advanced practitioner if interventions unsuccessful or patient reports new pain  Outcome: Progressing     Problem: INFECTION - ADULT  Goal: Absence or prevention of progression during hospitalization  Description: INTERVENTIONS:  - Assess and monitor for signs and symptoms of infection  - Monitor lab/diagnostic results  - Monitor all insertion sites, i.e. indwelling lines, tubes, and drains  - Monitor endotracheal if appropriate and nasal secretions for changes in amount and color  - [place] appropriate cooling/warming therapies per order  - Administer medications as ordered  - Instruct and encourage patient and family to use good hand hygiene  technique  - Identify and instruct in appropriate isolation precautions for identified infection/condition  Outcome: Progressing     Problem: SAFETY ADULT  Goal: Patient will remain free of falls  Description: INTERVENTIONS:  - Educate patient/family on patient safety including physical limitations  - Instruct patient to call for assistance with activity   - Consult OT/PT to assist with strengthening/mobility   - Keep Call bell within reach  - Keep bed low and locked with side rails adjusted as appropriate  - Keep care items and personal belongings within reach  - Initiate and maintain comfort rounds  - Make Fall Risk Sign visible to staff  - Offer Toileting every 3 Hours, in advance of need  - Initiate/Maintain bedalarm  - Obtain necessary fall risk management equipment:   - Apply yellow socks and bracelet for high fall risk patients  - Consider moving patient to room near nurses station  Outcome: Progressing     Problem: DISCHARGE PLANNING  Goal: Discharge to home or other facility with appropriate resources  Description: INTERVENTIONS:  - Identify barriers to discharge w/patient and caregiver  - Arrange for needed discharge resources and transportation as appropriate  - Identify discharge learning needs (meds, wound care, etc.)  - Arrange for interpretive services to assist at discharge as needed  - Refer to Case Management Department for coordinating discharge planning if the patient needs post-hospital services based on physician/advanced practitioner order or complex needs related to functional status, cognitive ability, or social support system  Outcome: Progressing     Problem: Knowledge Deficit  Goal: Patient/family/caregiver demonstrates understanding of disease process, treatment plan, medications, and discharge instructions  Description: Complete learning assessment and assess knowledge base.  Interventions:  - Provide teaching at level of understanding  - Provide teaching via preferred learning  methods  Outcome: Progressing     Problem: Nutrition/Hydration-ADULT  Goal: Nutrient/Hydration intake appropriate for improving, restoring or maintaining nutritional needs  Description: Monitor and assess patient's nutrition/hydration status for malnutrition. Collaborate with interdisciplinary team and initiate plan and interventions as ordered.  Monitor patient's weight and dietary intake as ordered or per policy. Utilize nutrition screening tool and intervene as necessary. Determine patient's food preferences and provide high-protein, high-caloric foods as appropriate.     INTERVENTIONS:  - Monitor oral intake, urinary output, labs, and treatment plans  - Assess nutrition and hydration status and recommend course of action  - Evaluate amount of meals eaten  - Assist patient with eating if necessary   - Allow adequate time for meals  - Recommend/ encourage appropriate diets, oral nutritional supplements, and vitamin/mineral supplements  - Order, calculate, and assess calorie counts as needed  - Recommend, monitor, and adjust tube feedings and TPN/PPN based on assessed needs  - Assess need for intravenous fluids  - Provide specific nutrition/hydration education as appropriate  - Include patient/family/caregiver in decisions related to nutrition  Outcome: Progressing

## 2024-12-02 NOTE — ASSESSMENT & PLAN NOTE
Diagnosed on echocardiogram in 2021, repeat Echo on this admission still demonstrates thrombus  Has been maintained on Coumadin 2mg daily   INR 1.21   Resume Coumadin this evening

## 2024-12-02 NOTE — SPEECH THERAPY NOTE
Speech Language/Pathology  Speech-Language Pathology Bedside Swallow Evaluation      Patient Name: Chuy Norton    Today's Date: 12/2/2024     Problem List  Principal Problem:    Iron deficiency anemia due to chronic blood loss  Active Problems:    Hiatal hernia with GERD without esophagitis    Cirrhosis (HCC)    Left ventricular apical thrombus    Persistent cough for 3 weeks or longer    Exertional dyspnea    PUD (peptic ulcer disease)      Past Medical History  Past Medical History:   Diagnosis Date    Alcohol abuse     Depression     Drug therapy     GERD (gastroesophageal reflux disease)     Hepatitis C     Hypertension 01/2012    Knee pain, bilateral     Left ventricular apical thrombus     Psychiatric disorder     depression, anxiety    Renal disorder     Self-injurious behavior     Spinal stenosis of lumbar region     Suicide attempt (HCC)        Past Surgical History  Past Surgical History:   Procedure Laterality Date    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    HERNIA REPAIR  1997       Summary   Pt presents w/ s/s suggestive of min oral dysphagia suspected functional pharyngeal swallow skills.     Complete labial seal for retrieval and containment of all materials, no anterior bolus loss present. Min prolonged palatal mashing of regular textures, ultimately functional. Pt w/ large bolus intake, SLP provided verbal cue for reduced bolus size though did not aid in eliciting response. Complete bolus breakdown, pt w/ independent addition of thin liquids to aid in cohesive bolus formation and adequate bolus transfer. Suspected fairly prompt swallow initiation and fair hyolaryngeal elevation to palpation. No overt s/s of aspiration at this time.     Education initiated w/ pt regarding strategies to optimize swallow safety including frequent/thorough oral care and to notify medical staff if s/s of aspiration arise. Pt verbalized understanding and agreement, denies questions or concerns at this time.     Pt w/  increased risk of aspiration 2/2 large bolus intake and h/o dysphagia. Pt w/ increased risk for reverse aspiration 2/2 large HH w/ fluid-filled esophagus and mildly dilated fluid-filled stomach. SLP to f/u for diet tolerance x1 as able and appropriate.       Recommended Diet: regular diet and thin liquids   Recommended Form of Meds: as tolerated    Aspiration precautions and swallowing strategies: upright posture, only feed when fully alert, slow rate of feeding, small bites/sips, and alternating bites and sips  Other Recommendations: Continue frequent oral care        Current Medical Status  Pt is a 64 y.o. male with a PMH of CAD status post MI, CVA, ventricular thrombus on coumadin, a history of paroxysmal atrial fibrillation, hepatitis C with cirrhosis and prior GI bleed who presents to the ED with initial complaint of chest pain. Patient stated that the chest pain and cough. Patient has had prior work up for chest pain in the past. He endorses to have a persistent cough for the last month following a URI 1 month prior. He also is endorsing to feeling lightheaded and shortness of breath with ambulating short distances which he reports has only been occurring for the past two weeks. He was noted to have desaturated with ambulation to 82%. In the ED CXR was obtained which could no rule out developing pneumonia. Patient also was found to have acute anemia. He had a prior hospitalization in May 2024 for acute UGI bleed, which he was found to have multiple ulcerations that required clipping.Patient denies any hematemesis or melena. ALE was negative. Denies any recent NSAID use. Patient will be admitted for further observation and monitoring. Discussed code status and patient is LEVEL 1 FULL CODE.     Current Precautions:  Fall      Allergies:  No known food allergies    Past medical history:  Please see H&P for details    Special Studies:  11/30/24 CT chest wo contrast:  Moderately compromised by motion with no acute  disease.     Large hiatal hernia with redemonstration of dilated fluid-filled esophagus and mildly dilated fluid-filled stomach placing the patient at risk for aspiration.    11/29/24 XR chest 1 view portable:  Mild hazy opacity within the left lung base which is favored to be attributable to atelectasis. Developing pneumonia may demonstrate a similar appearance in appropriate clinical setting.     History of VFSS:  N/A     Social/Education/Vocational Hx:  Pt lives at [facility]     Swallow Information   Current Diet: regular diet and thin liquids   Baseline Diet: regular diet and thin liquids per pt report       Baseline Assessment   Behavior/Cognition: alert  Speech/Language Status: able to participate in conversation and able to follow commands  Patient Positioning: upright in bed  Pain Status/Interventions/Response to Interventions: No report of or nonverbal indications of pain.       Oral Mechanism Exam  Facial: symmetrical  Labial: WFL  Lingual: WFL  Velum: symmetrical  Mandible: adequate ROM  Dentition: edentulous  Vocal quality:clear/adequate   Volitional Cough: strong/productive   Respiratory Status: on RA       Consistencies Assessed and Performance   Consistencies Administered: thin liquids and hard solids    Oral Stage:   Complete labial seal for retrieval and containment of all materials, no anterior bolus loss present. Min prolonged palatal mashing of regular textures, ultimately functional. Pt w/ large bolus intake, SLP provided verbal cue for reduced bolus size though did not aid in eliciting response. Complete bolus breakdown, pt w/ independent addition of thin liquids to aid in cohesive bolus formation and adequate bolus transfer.     Pharyngeal Stage:   Suspected fairly prompt swallow initiation and fair hyolaryngeal elevation to palpation. No overt s/s of aspiration at this time.     Esophageal Concerns: dilated fluid-filled esophagus and mildly dilated fluid-filled stomach    Strategies  and Efficacy: reduced bite size, alternate solids/liquids     Summary and Recommendations (see above)    Results Reviewed with: patient, RN, and MD     Treatment Recommended: as able and appropriate for diet tolerance x1    Dysphagia LTG  -Patient will demonstrate safe and effective oral intake (without overt s/s significant oral/pharyngeal dysphagia including s/s penetration or aspiration) for the highest appropriate diet level.     Short Term Goals:  -Pt will tolerate regular textures and thin liquid with no significant s/s oral or pharyngeal dysphagia across 1-3 diagnostic session/s    Speech Therapy Prognosis   Prognosis: good    Prognosis Considerations: age, medical status, and prior medical history

## 2024-12-03 PROBLEM — D50.9 IRON DEFICIENCY ANEMIA: Status: ACTIVE | Noted: 2024-05-09

## 2024-12-03 PROBLEM — R93.89 ABNORMAL CXR: Status: ACTIVE | Noted: 2024-11-29

## 2024-12-03 LAB
APTT PPP: 166 SECONDS (ref 23–34)
APTT PPP: 34 SECONDS (ref 23–34)
BASOPHILS # BLD AUTO: 0.05 THOUSANDS/ÂΜL (ref 0–0.1)
BASOPHILS NFR BLD AUTO: 1 % (ref 0–1)
EOSINOPHIL # BLD AUTO: 0.18 THOUSAND/ÂΜL (ref 0–0.61)
EOSINOPHIL NFR BLD AUTO: 3 % (ref 0–6)
ERYTHROCYTE [DISTWIDTH] IN BLOOD BY AUTOMATED COUNT: 22.6 % (ref 11.6–15.1)
HCT VFR BLD AUTO: 31.1 % (ref 36.5–49.3)
HGB BLD-MCNC: 8.6 G/DL (ref 12–17)
IMM GRANULOCYTES # BLD AUTO: 0.12 THOUSAND/UL (ref 0–0.2)
IMM GRANULOCYTES NFR BLD AUTO: 2 % (ref 0–2)
INR PPP: 1.11 (ref 0.85–1.19)
INR PPP: 1.14 (ref 0.85–1.19)
LYMPHOCYTES # BLD AUTO: 1.31 THOUSANDS/ÂΜL (ref 0.6–4.47)
LYMPHOCYTES NFR BLD AUTO: 20 % (ref 14–44)
MCH RBC QN AUTO: 19.6 PG (ref 26.8–34.3)
MCHC RBC AUTO-ENTMCNC: 27.7 G/DL (ref 31.4–37.4)
MCV RBC AUTO: 71 FL (ref 82–98)
MONOCYTES # BLD AUTO: 0.59 THOUSAND/ÂΜL (ref 0.17–1.22)
MONOCYTES NFR BLD AUTO: 9 % (ref 4–12)
NEUTROPHILS # BLD AUTO: 4.43 THOUSANDS/ÂΜL (ref 1.85–7.62)
NEUTS SEG NFR BLD AUTO: 65 % (ref 43–75)
NRBC BLD AUTO-RTO: 1 /100 WBCS
PLATELET # BLD AUTO: 137 THOUSANDS/UL (ref 149–390)
PROTHROMBIN TIME: 14.9 SECONDS (ref 12.3–15)
PROTHROMBIN TIME: 15.1 SECONDS (ref 12.3–15)
RBC # BLD AUTO: 4.38 MILLION/UL (ref 3.88–5.62)
WBC # BLD AUTO: 6.68 THOUSAND/UL (ref 4.31–10.16)

## 2024-12-03 PROCEDURE — 85025 COMPLETE CBC W/AUTO DIFF WBC: CPT | Performed by: HOSPITALIST

## 2024-12-03 PROCEDURE — 88305 TISSUE EXAM BY PATHOLOGIST: CPT | Performed by: PATHOLOGY

## 2024-12-03 PROCEDURE — 85610 PROTHROMBIN TIME: CPT | Performed by: STUDENT IN AN ORGANIZED HEALTH CARE EDUCATION/TRAINING PROGRAM

## 2024-12-03 PROCEDURE — 99233 SBSQ HOSP IP/OBS HIGH 50: CPT | Performed by: PHYSICIAN ASSISTANT

## 2024-12-03 PROCEDURE — 85730 THROMBOPLASTIN TIME PARTIAL: CPT | Performed by: PHYSICIAN ASSISTANT

## 2024-12-03 PROCEDURE — 85610 PROTHROMBIN TIME: CPT | Performed by: HOSPITALIST

## 2024-12-03 PROCEDURE — 85730 THROMBOPLASTIN TIME PARTIAL: CPT | Performed by: HOSPITALIST

## 2024-12-03 RX ORDER — HEPARIN SODIUM 10000 [USP'U]/100ML
3-30 INJECTION, SOLUTION INTRAVENOUS
Status: DISCONTINUED | OUTPATIENT
Start: 2024-12-03 | End: 2024-12-04

## 2024-12-03 RX ORDER — WARFARIN SODIUM 5 MG/1
5 TABLET ORAL
Status: DISCONTINUED | OUTPATIENT
Start: 2024-12-03 | End: 2024-12-06

## 2024-12-03 RX ADMIN — ATORVASTATIN CALCIUM 40 MG: 40 TABLET, FILM COATED ORAL at 17:39

## 2024-12-03 RX ADMIN — WARFARIN SODIUM 5 MG: 5 TABLET ORAL at 17:39

## 2024-12-03 RX ADMIN — PANTOPRAZOLE SODIUM 40 MG: 40 INJECTION, POWDER, FOR SOLUTION INTRAVENOUS at 08:39

## 2024-12-03 RX ADMIN — FERROUS SULFATE TAB 325 MG (65 MG ELEMENTAL FE) 325 MG: 325 (65 FE) TAB at 08:39

## 2024-12-03 RX ADMIN — TRAZODONE HYDROCHLORIDE 100 MG: 50 TABLET ORAL at 20:24

## 2024-12-03 RX ADMIN — GUAIFENESIN 600 MG: 600 TABLET ORAL at 08:39

## 2024-12-03 RX ADMIN — HEPARIN SODIUM 18 UNITS/KG/HR: 10000 INJECTION, SOLUTION INTRAVENOUS at 11:55

## 2024-12-03 RX ADMIN — QUETIAPINE 300 MG: 200 TABLET ORAL at 20:24

## 2024-12-03 RX ADMIN — GUAIFENESIN 600 MG: 600 TABLET ORAL at 17:39

## 2024-12-03 RX ADMIN — PANTOPRAZOLE SODIUM 40 MG: 40 INJECTION, POWDER, FOR SOLUTION INTRAVENOUS at 20:24

## 2024-12-03 RX ADMIN — ESCITALOPRAM OXALATE 20 MG: 20 TABLET ORAL at 08:39

## 2024-12-03 NOTE — ASSESSMENT & PLAN NOTE
No recent antibiotic use  CXR: Mild hazy opacity within the left lung base which is favored to be attributable to atelectasis. Developing pneumonia may demonstrate a similar appearance in appropriate clinical setting.   Patient is afebrile, no leukocytosis, Procalcitonin negative   S/P 1 dose of abx and then this was discontinued

## 2024-12-03 NOTE — ASSESSMENT & PLAN NOTE
Hx of alcoholic cirrhosis, hep C  Patient has sustained from alcohol use for 1 1/2 years, living in sober house.

## 2024-12-03 NOTE — ASSESSMENT & PLAN NOTE
Initially, was noted to desaturate with ambulation and have dizziness.  Oxygen saturation dropped to 82% on admission, now ambulating at 91-94% on RA  Suspect related to anemia which has now stabilized

## 2024-12-03 NOTE — ASSESSMENT & PLAN NOTE
Diagnosed on echocardiogram in 2021 at [facility]. Prescribed coumadin 2 mg and outpatient follow up. Has been on 2 mg daily since that time per chart review but it appears may not have been compliant with it.  Med Rec from [facility] has 2 mg coumadin daily.  INR on admission 1.21  Reports no one tells him dose adjustments. Only time INR has been therapeutic per our records was 1 time in 5/2024 when he was admitted  Repeat Echo on this admission still demonstrates thrombus  Felt to be chronic even at the time of admission but still felt to warrant anticoagulation and not clear that he was ever treated for any duration with therapeutic anticoagulation dosing  Bridge heparin to coumadin. Called [facility] to see if could do lovenox bridge - no reponse.   Increase coumadin dosing to 5 mg - first dose tonight.   Attempting to coordinate with cardiology for him to be managed by the coumadin clinic

## 2024-12-03 NOTE — CASE MANAGEMENT
Case Management Assessment & Discharge Planning Note    Patient name Chuy Norton  Location /-01 MRN [id number]  : 1960 Date 12/3/2024       Current Admission Date: 2024  Current Admission Diagnosis:Iron deficiency anemia due to chronic blood loss   Patient Active Problem List    Diagnosis Date Noted Date Diagnosed    PUD (peptic ulcer disease) 2024     Persistent cough for 3 weeks or longer 2024     Exertional dyspnea 2024     Hepatitis C 05/15/2024     Acute pain of right knee 2024     Acute left-sided low back pain without sciatica 2024     Left ventricular apical thrombus 05/10/2024     Cirrhosis (HCC) 2024     Iron deficiency anemia due to chronic blood loss 2024     History of CVA (cerebrovascular accident) 2021     Acute metabolic encephalopathy 2021     SIRS (systemic inflammatory response syndrome) (HCC) 06/15/2021     Patellofemoral pain syndrome of right knee 2020     Elevated d-dimer 2019     Left hip pain 2019     Encounter for medical examination to establish care 10/10/2018     Bipolar 2 disorder, major depressive episode (HCC) 2018     Attention deficit hyperactivity disorder (ADHD), combined type 2018     Hiatal hernia with GERD without esophagitis 2015     Hyperlipidemia 2015     Knee pain 2015     Spinal stenosis of lumbar region 2015     Tobacco dependence syndrome 2015     Vitamin D deficiency 2015     Anxiety disorder 2015     Depressive disorder 2013     Low back pain 2013     Benign essential hypertension 10/18/2012     Alcohol dependence in remission (HCC) 2011       LOS (days): 4  Geometric Mean LOS (GMLOS) (days):   Days to GMLOS:     OBJECTIVE:    Risk of Unplanned Readmission Score: 20.01         Current admission status: Inpatient       Preferred Pharmacy:   [facility] - [address]  [address]  [place] [address]  Phone: [phone] Fax: [phone]    Primary Care Provider: Savanna Pan DO    Primary Insurance: [facility]  Secondary Insurance: JOEL MUNOZ    ASSESSMENT:  Active Health Care Proxies    There are no active Health Care Proxies on file.       Advance Directives  Primary Contact: Rose Salgado: friend: [phone]    Readmission Root Cause  30 Day Readmission: No    Patient Information  Admitted from:: Home ([facility])  Mental Status: Alert  During Assessment patient was accompanied by: Not accompanied during assessment  Assessment information provided by:: Patient  Primary Caregiver: Other (Comment) ([facility])  Support Systems: Organized support group (Comment), Self  County of Residence: [place]  What city do you live in?: [place]  Home entry access options. Select all that apply.: No steps to enter home  Type of Current Residence: Group home  Upon entering residence, is there a bedroom on the main floor (no further steps)?: No  A bedroom is located on the following floor levels of residence (select all that apply):: 2nd Floor  Upon entering residence, is there a bathroom on the main floor (no further steps)?: No  Indicate which floors of current residence have a bathroom (select all the apply):: 2nd Floor  Number of steps to 2nd floor from main floor: One Flight  Living Arrangements: Other (Comment) (Resides at [facility])    Activities of Daily Living Prior to Admission  Functional Status: Independent  Completes ADLs independently?: Yes  Ambulates independently?: Yes  Does patient use assisted devices?: No  Does patient currently own DME?: No  Does patient have a history of Outpatient Therapy (PT/OT)?: No  Does the patient have a history of Short-Term Rehab?: No  Does patient have a history of HHC?: No  Does patient currently have HHC?: No    Patient Information Continued  Income Source:  Unknown  Does patient have prescription coverage?: Yes  Does patient receive dialysis treatments?: No  Does patient have a history of substance abuse?: Yes  Historical substance use preference: Alcohol/ETOH  Does patient have a history of Mental Health Diagnosis?: Yes (bipolar disorder, anxiety disorder)  Has patient received inpatient treatment related to mental health in the last 2 years?: No    Means of Transportation  Means of Transport to Appts:: Other (Comment) ([facility])    DISCHARGE DETAILS:    Discharge planning discussed with:: patient  Freedom of Choice: Yes  Comments - Freedom of Choice: Return to [facility]    Additional Comments: Met with Pt. Discussed role of case management. Pt resides at [facility] on 2nd floor. Flight of steps to 2nd floor. Independent PTA. Denies hx of VNA/SNF. Baljinder from [facility] will see Pt this morning to eval if Pt can return to facility. CM to follow.

## 2024-12-03 NOTE — PLAN OF CARE
Problem: Potential for Falls  Goal: Patient will remain free of falls  Description: INTERVENTIONS:  - Educate patient/family on patient safety including physical limitations  - Instruct patient to call for assistance with activity   - Consult OT/PT to assist with strengthening/mobility   - Keep Call bell within reach  - Keep bed low and locked with side rails adjusted as appropriate  - Keep care items and personal belongings within reach  - Initiate and maintain comfort rounds  - Make Fall Risk Sign visible to staff  - Offer Toileting every 3 Hours, in advance of need  - Initiate/Maintain bedalarm  - Obtain necessary fall risk management equipment:   - Apply yellow socks and bracelet for high fall risk patients  - Consider moving patient to room near nurses station  Outcome: Progressing     Problem: PAIN - ADULT  Goal: Verbalizes/displays adequate comfort level or baseline comfort level  Description: Interventions:  - Encourage patient to monitor pain and request assistance  - Assess pain using appropriate pain scale  - Administer analgesics based on type and severity of pain and evaluate response  - Implement non-pharmacological measures as appropriate and evaluate response  - Consider cultural and social influences on pain and pain management  - Notify physician/advanced practitioner if interventions unsuccessful or patient reports new pain  Outcome: Progressing     Problem: INFECTION - ADULT  Goal: Absence or prevention of progression during hospitalization  Description: INTERVENTIONS:  - Assess and monitor for signs and symptoms of infection  - Monitor lab/diagnostic results  - Monitor all insertion sites, i.e. indwelling lines, tubes, and drains  - Monitor endotracheal if appropriate and nasal secretions for changes in amount and color  - [place] appropriate cooling/warming therapies per order  - Administer medications as ordered  - Instruct and encourage patient and family to use good hand hygiene  technique  - Identify and instruct in appropriate isolation precautions for identified infection/condition  Outcome: Progressing     Problem: SAFETY ADULT  Goal: Patient will remain free of falls  Description: INTERVENTIONS:  - Educate patient/family on patient safety including physical limitations  - Instruct patient to call for assistance with activity   - Consult OT/PT to assist with strengthening/mobility   - Keep Call bell within reach  - Keep bed low and locked with side rails adjusted as appropriate  - Keep care items and personal belongings within reach  - Initiate and maintain comfort rounds  - Make Fall Risk Sign visible to staff  - Offer Toileting every 3 Hours, in advance of need  - Initiate/Maintain bedalarm  - Obtain necessary fall risk management equipment:   - Apply yellow socks and bracelet for high fall risk patients  - Consider moving patient to room near nurses station  Outcome: Progressing     Problem: DISCHARGE PLANNING  Goal: Discharge to home or other facility with appropriate resources  Description: INTERVENTIONS:  - Identify barriers to discharge w/patient and caregiver  - Arrange for needed discharge resources and transportation as appropriate  - Identify discharge learning needs (meds, wound care, etc.)  - Arrange for interpretive services to assist at discharge as needed  - Refer to Case Management Department for coordinating discharge planning if the patient needs post-hospital services based on physician/advanced practitioner order or complex needs related to functional status, cognitive ability, or social support system  Outcome: Progressing     Problem: Knowledge Deficit  Goal: Patient/family/caregiver demonstrates understanding of disease process, treatment plan, medications, and discharge instructions  Description: Complete learning assessment and assess knowledge base.  Interventions:  - Provide teaching at level of understanding  - Provide teaching via preferred learning  methods  Outcome: Progressing     Problem: Nutrition/Hydration-ADULT  Goal: Nutrient/Hydration intake appropriate for improving, restoring or maintaining nutritional needs  Description: Monitor and assess patient's nutrition/hydration status for malnutrition. Collaborate with interdisciplinary team and initiate plan and interventions as ordered.  Monitor patient's weight and dietary intake as ordered or per policy. Utilize nutrition screening tool and intervene as necessary. Determine patient's food preferences and provide high-protein, high-caloric foods as appropriate.     INTERVENTIONS:  - Monitor oral intake, urinary output, labs, and treatment plans  - Assess nutrition and hydration status and recommend course of action  - Evaluate amount of meals eaten  - Assist patient with eating if necessary   - Allow adequate time for meals  - Recommend/ encourage appropriate diets, oral nutritional supplements, and vitamin/mineral supplements  - Order, calculate, and assess calorie counts as needed  - Recommend, monitor, and adjust tube feedings and TPN/PPN based on assessed needs  - Assess need for intravenous fluids  - Provide specific nutrition/hydration education as appropriate  - Include patient/family/caregiver in decisions related to nutrition  Outcome: Progressing

## 2024-12-03 NOTE — NURSING NOTE
Ambulated with pt in the hallway. Pt's oxygen saturation stayed between 91% - 95% on room air while ambulating.

## 2024-12-03 NOTE — ASSESSMENT & PLAN NOTE
History of GI bleed on ASA and Coumadin since 2021 (not therapeutic) for hx of  ventricular thrombus   Hemoglobin 7.7  upon admission down from 8.6 a month prior and decreased to 6.9 during admission s/p 1 unit PRBCs 10/1/24  S/P 3 doses of IV venofer  Hgb has remained stable after 1 unit of PRBCs on 12/1/24  GI consulted and pt is s/p EGD and Colonoscopy on 12/1:  EGD 12/1 with healed gastric ulcer, hiatal hernia and Poli lesions but no active bleeding  Colonoscopy with diverticulosis and small polyp that was excised  GI advised iron once daily and daily PPI  Outpatient follow-up to review polyp pathology from colonoscopy  Resumed coumadin on 12/2 PM (2 mg which was chronic home dose). Starting on bridge from heparin to coumadin 12/3

## 2024-12-03 NOTE — PROGRESS NOTES
Progress Note - Hospitalist   Name: Chuy Norton 64 y.o. male I MRN: [id number]  Unit/Bed#: -01 I Date of Admission: 11/29/2024   Date of Service: 12/3/2024 I Hospital Day: 4    Assessment & Plan  Iron deficiency anemia  History of GI bleed on ASA and Coumadin since 2021 (not therapeutic) for hx of  ventricular thrombus   Hemoglobin 7.7  upon admission down from 8.6 a month prior and decreased to 6.9 during admission s/p 1 unit PRBCs 10/1/24  S/P 3 doses of IV venofer  Hgb has remained stable after 1 unit of PRBCs on 12/1/24  GI consulted and pt is s/p EGD and Colonoscopy on 12/1:  EGD 12/1 with healed gastric ulcer, hiatal hernia and Poli lesions but no active bleeding  Colonoscopy with diverticulosis and small polyp that was excised  GI advised iron once daily and daily PPI  Outpatient follow-up to review polyp pathology from colonoscopy  Resumed coumadin on 12/2 PM (2 mg which was chronic home dose). Starting on bridge from heparin to coumadin 12/3  Left ventricular apical thrombus  Diagnosed on echocardiogram in 2021 at [facility]. Prescribed coumadin 2 mg and outpatient follow up. Has been on 2 mg daily since that time per chart review but it appears may not have been compliant with it.  Med Rec from [facility] has 2 mg coumadin daily.  INR on admission 1.21  Reports no one tells him dose adjustments. Only time INR has been therapeutic per our records was 1 time in 5/2024 when he was admitted  Repeat Echo on this admission still demonstrates thrombus  Felt to be chronic even at the time of admission but still felt to warrant anticoagulation and not clear that he was ever treated for any duration with therapeutic anticoagulation dosing  Bridge heparin to coumadin. Called [facility] to see if could do lovenox bridge - no reponse.   Increase coumadin dosing to 5 mg - first dose tonight.   Attempting to coordinate with cardiology for him to be managed by the coumadin clinic  Hiatal hernia with GERD  without esophagitis  EGD 12/1 with healed gastric ulcer, hiatal hernia and Poli lesions but no active bleeding  GI advised iron once daily and daily PPI  Exertional dyspnea  Initially, was noted to desaturate with ambulation and have dizziness.  Oxygen saturation dropped to 82% on admission, now ambulating at 91-94% on RA  Suspect related to anemia which has now stabilized  Abnormal CXR  No recent antibiotic use  CXR: Mild hazy opacity within the left lung base which is favored to be attributable to atelectasis. Developing pneumonia may demonstrate a similar appearance in appropriate clinical setting.   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     VTE Pharmacologic Prophylaxis: VTE Score: 3 Moderate Risk (Score 3-4) - Pharmacological DVT Prophylaxis Ordered: heparin drip.    Mobility:   Basic Mobility Inpatient Raw Score: 23  JH-HLM Goal: 7: Walk 25 feet or more  JH-HLM Achieved: 7: Walk 25 feet or more  JH-HLM Goal achieved. Continue to encourage appropriate mobility.    Patient Centered Rounds: I performed bedside rounds with nursing staff today.   Discussions with Specialists or Other Care Team Provider: nursing    Education and Discussions with Family / Patient: Patient declined call to .     Current Length of Stay: 4 day(s)  Current Patient Status: Inpatient   Certification Statement: The patient will continue to require additional inpatient hospital stay due to LV thrombus with subtherapeutic INR  Discharge Plan: Anticipate discharge in 48-72 hrs to group home.    Code Status: Level 1 - Full Code    Subjective   Chuy Norton is a 64 y.o. male patient who originally presented to the hospital on 11/29/2024 due to chest pain and cough.  Patient has past medical history of coronary disease status  post MI, CVA, ventricular thrombus on Coumadin, history of paroxysmal atrial fibrillation, hepatitis C with cirrhosis and prior GI bleeding.  The patient presented to the emergency department with chest pain.  He also noted lightheadedness and shortness of breath with ambulating short distances or 2 weeks prior.  He was noted to be hypoxic to 82% with ambulation in the emergency department.  The patient had been admitted in May 2024 for upper GI bleeding and he was found to have multiple ulcerations that required clipping.     Hemoglobin on admission was 7.7 and a month prior was 8.6.  Was started on IV Protonix and withheld from anticoagulation.  The patient was started on IV Venofer and seen in consultation by GI.  Patient was recommended for PPI twice daily and was noted to undergo EGD and colonoscopy.  Patient was noted be given 1 unit of packed red blood cells on 12/1/2024 for a hemoglobin of 6.9.  EGD and colonoscopy were performed on 12/1/2024.  EGD was noted to show medium sized hiatal hernia with Poli's erosions.  Previously seen ulcers had healed.  Colonoscopy showed moderate left and right-sided diverticulosis.  Polyp was removed for biopsy.  Was recommended for repeat colonoscopy in 5 years given suboptimal bowel prep 1 small polyp.  GI felt that the patient's anemia was related to hiatal hernia and Poli's erosions and was recommended to continue PPI and iron supplementation chronically.     The patient was noted to have a cough and had no fever or leukocytosis.  Patient was started on IV antibiotics initially but then discontinued with negative procalcitonin.     In regards to patient's left ventricular thrombus this was noted to be diagnosed on echocardiogram in 2021 and had been maintained on Coumadin since that time.    The patient reports that he wants to leave the hospital.  He knows that he was taking Coumadin but tells me that no one ever told him what dose to take and that they just give  him the medicine.  He reports that he has been taking this for some time.  He has no chest pain.  His shortness of breath has improved.  Does not want to stay in the hospital any longer but understands the risks of not having his ventricular thrombus treated appropriately.    Objective :  Temp:  [97.3 °F (36.3 °C)-97.6 °F (36.4 °C)] 97.6 °F (36.4 °C)  HR:  [83-90] 83  BP: (123-139)/(76-81) 139/81  Resp:  [18-20] 20  SpO2:  [90 %-95 %] 90 %  O2 Device: None (Room air)  Nasal Cannula O2 Flow Rate (L/min):  [2 L/min] 2 L/min    Body mass index is 31.47 kg/m².     Input and Output Summary (last 24 hours):     Intake/Output Summary (Last 24 hours) at 12/3/2024 1402  Last data filed at 12/3/2024 1249  Gross per 24 hour   Intake 1557 ml   Output 700 ml   Net 857 ml       Physical Exam  Vitals and nursing note reviewed.   Constitutional:       General: He is not in acute distress.     Appearance: Normal appearance. He is obese. He is not diaphoretic.   HENT:      Head: Normocephalic and atraumatic.   Cardiovascular:      Rate and Rhythm: Normal rate and regular rhythm.   Pulmonary:      Effort: Pulmonary effort is normal.      Breath sounds: Normal breath sounds. No wheezing or rales.   Abdominal:      General: Bowel sounds are normal.      Palpations: Abdomen is soft.      Tenderness: There is no abdominal tenderness. There is no guarding.   Musculoskeletal:      Right lower leg: No edema.      Left lower leg: No edema.   Skin:     General: Skin is warm and dry.   Neurological:      Mental Status: He is alert.      Cranial Nerves: No cranial nerve deficit.      Comments: Awake, alert, oriented.  Initially tells me that the president is Rene.  Is able to tell me that he is at the hospital.  Is able to inform me some information about his Coumadin.  After I discussed the risks of leaving the hospital without a therapeutic INR, the patient is able to discuss these back to me and explained them.            Lines/Drains:              Lab Results: I have reviewed the following results:   Results from last 7 days   Lab Units 12/03/24  0345   WBC Thousand/uL 6.68   HEMOGLOBIN g/dL 8.6*   HEMATOCRIT % 31.1*   PLATELETS Thousands/uL 137*   SEGS PCT % 65   LYMPHO PCT % 20   MONO PCT % 9   EOS PCT % 3     Results from last 7 days   Lab Units 12/02/24  0618 12/01/24  0731   SODIUM mmol/L 139 140   POTASSIUM mmol/L 3.6 3.6   CHLORIDE mmol/L 112* 109*   CO2 mmol/L 21 24   BUN mg/dL 11 11   CREATININE mg/dL 0.94 0.99   ANION GAP mmol/L 6 7   CALCIUM mg/dL 7.6* 8.1*   ALBUMIN g/dL  --  3.6   TOTAL BILIRUBIN mg/dL  --  0.61   ALK PHOS U/L  --  49   ALT U/L  --  11   AST U/L  --  14   GLUCOSE RANDOM mg/dL 100 97     Results from last 7 days   Lab Units 12/03/24  1121   INR  1.11             Results from last 7 days   Lab Units 11/30/24  0817 11/29/24  1348   PROCALCITONIN ng/ml <0.05 <0.05       Recent Cultures (last 7 days):         Imaging Results Review: I reviewed radiology reports from this admission including: Echocardiogram.  Other Study Results Review: No additional pertinent studies reviewed.    Last 24 Hours Medication List:     Current Facility-Administered Medications:     acetaminophen (TYLENOL) tablet 650 mg, Q8H PRN    albuterol inhalation solution 2.5 mg, Q6H PRN    aluminum-magnesium hydroxide-simethicone (MAALOX) oral suspension 30 mL, Q6H PRN    atorvastatin (LIPITOR) tablet 40 mg, Daily With Dinner    benzonatate (TESSALON PERLES) capsule 200 mg, TID PRN    escitalopram (LEXAPRO) tablet 20 mg, Daily    ferrous sulfate tablet 325 mg, Daily With Breakfast    guaiFENesin (MUCINEX) 12 hr tablet 600 mg, BID    heparin (porcine) 25,000 units in 0.45% NaCl 250 mL infusion (premix), Titrated, Last Rate: 18 Units/kg/hr (12/03/24 1155)    ondansetron (ZOFRAN) injection 4 mg, Q6H PRN    pantoprazole (PROTONIX) injection 40 mg, Q12H SHANNA    polyethylene glycol (MIRALAX) packet 17 g, Daily PRN    QUEtiapine  (SEROquel) tablet 300 mg, HS    traZODone (DESYREL) tablet 100 mg, HS    warfarin (COUMADIN) tablet 5 mg, Daily (warfarin)    Administrative Statements   Today, Patient Was Seen By: Magaly Hernández PA-C  I have spent a total time of 90 minutes in caring for this patient on the day of the visit/encounter including Risks and benefits of tx options, Instructions for management, Patient and family education, Importance of tx compliance, Counseling / Coordination of care, Documenting in the medical record, Obtaining or reviewing history  , and Communicating with other healthcare professionals .    **Please Note: This note may have been constructed using a voice recognition system.**

## 2024-12-03 NOTE — ASSESSMENT & PLAN NOTE
EGD 12/1 with healed gastric ulcer, hiatal hernia and Poli lesions but no active bleeding  GI advised iron once daily and daily PPI

## 2024-12-04 ENCOUNTER — RESULTS FOLLOW-UP (OUTPATIENT)
Dept: GASTROENTEROLOGY | Facility: CLINIC | Age: 64
End: 2024-12-04

## 2024-12-04 LAB
ALBUMIN SERPL BCG-MCNC: 3.8 G/DL (ref 3.5–5)
ALP SERPL-CCNC: 57 U/L (ref 34–104)
ALT SERPL W P-5'-P-CCNC: 11 U/L (ref 7–52)
ANION GAP SERPL CALCULATED.3IONS-SCNC: 7 MMOL/L (ref 4–13)
APTT PPP: 141 SECONDS (ref 23–34)
APTT PPP: 175 SECONDS (ref 23–34)
AST SERPL W P-5'-P-CCNC: 17 U/L (ref 13–39)
BILIRUB SERPL-MCNC: 0.56 MG/DL (ref 0.2–1)
BUN SERPL-MCNC: 12 MG/DL (ref 5–25)
CALCIUM SERPL-MCNC: 8.5 MG/DL (ref 8.4–10.2)
CHLORIDE SERPL-SCNC: 109 MMOL/L (ref 96–108)
CO2 SERPL-SCNC: 23 MMOL/L (ref 21–32)
CREAT SERPL-MCNC: 0.92 MG/DL (ref 0.6–1.3)
ERYTHROCYTE [DISTWIDTH] IN BLOOD BY AUTOMATED COUNT: 24.1 % (ref 11.6–15.1)
GFR SERPL CREATININE-BSD FRML MDRD: 88 ML/MIN/1.73SQ M
GLUCOSE SERPL-MCNC: 102 MG/DL (ref 65–140)
HCT VFR BLD AUTO: 33.9 % (ref 36.5–49.3)
HGB BLD-MCNC: 9.3 G/DL (ref 12–17)
INR PPP: 1.13 (ref 0.85–1.19)
MAGNESIUM SERPL-MCNC: 1.8 MG/DL (ref 1.9–2.7)
MCH RBC QN AUTO: 19.6 PG (ref 26.8–34.3)
MCHC RBC AUTO-ENTMCNC: 27.4 G/DL (ref 31.4–37.4)
MCV RBC AUTO: 72 FL (ref 82–98)
PLATELET # BLD AUTO: 168 THOUSANDS/UL (ref 149–390)
POTASSIUM SERPL-SCNC: 3.8 MMOL/L (ref 3.5–5.3)
PROT SERPL-MCNC: 6.4 G/DL (ref 6.4–8.4)
PROTHROMBIN TIME: 15 SECONDS (ref 12.3–15)
RBC # BLD AUTO: 4.74 MILLION/UL (ref 3.88–5.62)
SODIUM SERPL-SCNC: 139 MMOL/L (ref 135–147)
WBC # BLD AUTO: 8.18 THOUSAND/UL (ref 4.31–10.16)

## 2024-12-04 PROCEDURE — 99232 SBSQ HOSP IP/OBS MODERATE 35: CPT | Performed by: PHYSICIAN ASSISTANT

## 2024-12-04 PROCEDURE — 80053 COMPREHEN METABOLIC PANEL: CPT | Performed by: HOSPITALIST

## 2024-12-04 PROCEDURE — 85027 COMPLETE CBC AUTOMATED: CPT | Performed by: HOSPITALIST

## 2024-12-04 PROCEDURE — 83735 ASSAY OF MAGNESIUM: CPT | Performed by: HOSPITALIST

## 2024-12-04 PROCEDURE — 85610 PROTHROMBIN TIME: CPT | Performed by: PHYSICIAN ASSISTANT

## 2024-12-04 PROCEDURE — 85730 THROMBOPLASTIN TIME PARTIAL: CPT | Performed by: HOSPITALIST

## 2024-12-04 RX ORDER — ENOXAPARIN SODIUM 150 MG/ML
1.5 INJECTION SUBCUTANEOUS
Status: DISCONTINUED | OUTPATIENT
Start: 2024-12-04 | End: 2024-12-06

## 2024-12-04 RX ORDER — ENOXAPARIN SODIUM 150 MG/ML
1.5 INJECTION SUBCUTANEOUS
Status: DISCONTINUED | OUTPATIENT
Start: 2024-12-04 | End: 2024-12-04

## 2024-12-04 RX ORDER — WARFARIN SODIUM 5 MG/1
5 TABLET ORAL
Qty: 7 TABLET | Refills: 0 | Status: ON HOLD | OUTPATIENT
Start: 2024-12-04 | End: 2024-12-20

## 2024-12-04 RX ORDER — ENOXAPARIN SODIUM 300 MG/3ML
1.5 INJECTION INTRAVENOUS; SUBCUTANEOUS EVERY 24 HOURS
Status: DISCONTINUED | OUTPATIENT
Start: 2024-12-07 | End: 2024-12-05

## 2024-12-04 RX ORDER — NICOTINE 21 MG/24HR
14 PATCH, TRANSDERMAL 24 HOURS TRANSDERMAL DAILY
Status: DISCONTINUED | OUTPATIENT
Start: 2024-12-04 | End: 2024-12-06 | Stop reason: HOSPADM

## 2024-12-04 RX ADMIN — GUAIFENESIN 600 MG: 600 TABLET ORAL at 17:19

## 2024-12-04 RX ADMIN — ATORVASTATIN CALCIUM 40 MG: 40 TABLET, FILM COATED ORAL at 17:19

## 2024-12-04 RX ADMIN — WARFARIN SODIUM 5 MG: 5 TABLET ORAL at 17:19

## 2024-12-04 RX ADMIN — QUETIAPINE 300 MG: 200 TABLET ORAL at 20:18

## 2024-12-04 RX ADMIN — FERROUS SULFATE TAB 325 MG (65 MG ELEMENTAL FE) 325 MG: 325 (65 FE) TAB at 09:09

## 2024-12-04 RX ADMIN — PANTOPRAZOLE SODIUM 40 MG: 40 INJECTION, POWDER, FOR SOLUTION INTRAVENOUS at 20:18

## 2024-12-04 RX ADMIN — PANTOPRAZOLE SODIUM 40 MG: 40 INJECTION, POWDER, FOR SOLUTION INTRAVENOUS at 09:08

## 2024-12-04 RX ADMIN — HEPARIN SODIUM 12 UNITS/KG/HR: 10000 INJECTION, SOLUTION INTRAVENOUS at 07:52

## 2024-12-04 RX ADMIN — GUAIFENESIN 600 MG: 600 TABLET ORAL at 09:09

## 2024-12-04 RX ADMIN — ESCITALOPRAM OXALATE 20 MG: 20 TABLET ORAL at 09:09

## 2024-12-04 RX ADMIN — TRAZODONE HYDROCHLORIDE 100 MG: 50 TABLET ORAL at 20:18

## 2024-12-04 RX ADMIN — ENOXAPARIN SODIUM 135 MG: 150 INJECTION SUBCUTANEOUS at 14:13

## 2024-12-04 RX ADMIN — NICOTINE 14 MG: 14 PATCH, EXTENDED RELEASE TRANSDERMAL at 12:19

## 2024-12-04 NOTE — ASSESSMENT & PLAN NOTE
Diagn[facility] in [facility]. Prescribed coumadin 2 mg and outpatient follow up. Has been on 2 mg daily since that time per chart review but it appears may not have been compliant with it.  Med Rec from [facility] has 2 mg coumadin daily.  INR on admission 1.21  Reports no one tells him dose adjustments. Only time INR has been therapeutic per our records was 1 time in 5/2024 when he was admitted  Repeat Echo on this admission still demonstrates thrombus  Felt to be chronic even at the time of admission but still felt to warrant anticoagulation and not clear that he was ever treated for any duration with therapeutic anticoagulation dosing  Increased Coumadin to 5 mg daily, trend INR with goal 2.0-3.0  Stop heparin and start therapeutic (1.5 mg/kg) subcu Lovenox every 24 hours  Discussed with patient's home, [facility], they can take him back on Friday and continue Lovenox shots throughout the weekend with INR next week  Attempting to coordinate with cardiology for him to be managed by the coumadin clinic

## 2024-12-04 NOTE — UTILIZATION REVIEW
Continued Stay Review    Date: 12/4/24                          Current Patient Class: Inpatient  Current Level of Care: MS    HPI:64 y.o. male initially admitted on 11/29/24 - DX:  Iron deficiency anemia / Left ventricular apical thrombus     Assessment/Plan:   12/4/24: Require additional inpatient hospital stay due to LV thrombus with subtherapeutic INR awaiting return to group home; Heme 9.3; INR 1.13  Plan: cont heparin gtt; cont iv protonix; monitor labs; Increased Coumadin to 5 mg daily, trend INR with goal 2.0-3.0       Medications:   Scheduled Medications:  atorvastatin, 40 mg, Oral, Daily With Dinner  enoxaparin, 1.5 mg/kg, Subcutaneous, Q24H SHANNA  escitalopram, 20 mg, Oral, Daily  ferrous sulfate, 325 mg, Oral, Daily With Breakfast  guaiFENesin, 600 mg, Oral, BID  nicotine, 14 mg, Transdermal, Daily  pantoprazole, 40 mg, Intravenous, Q12H SHANNA  QUEtiapine, 300 mg, Oral, HS  traZODone, 100 mg, Oral, HS  warfarin, 5 mg, Oral, Daily (warfarin)      Continuous IV Infusions:   heparin (porcine) 25,000 units in 0.45% NaCl 250 mL infusion (premix)         PRN Meds:  acetaminophen, 650 mg, Oral, Q8H PRN  albuterol, 2.5 mg, Nebulization, Q6H PRN  aluminum-magnesium hydroxide-simethicone, 30 mL, Oral, Q6H PRN  benzonatate, 200 mg, Oral, TID PRN  ondansetron, 4 mg, Intravenous, Q6H PRN  polyethylene glycol, 17 g, Oral, Daily PRN      Discharge Plan: TBD    Vital Signs (last 3 days)       Date/Time Temp Pulse Resp BP MAP (mmHg) SpO2 Calculated FIO2 (%) - Nasal Cannula Nasal Cannula O2 Flow Rate (L/min) O2 Device Patient Position - Orthostatic VS Vevay Coma Scale Score Pain    12/04/24 0800 -- -- -- -- -- -- 28 2 L/min None (Room air) -- 15 No Pain    12/04/24 07:39:34 96.5 °F (35.8 °C) 76 20 135/84 101 93 % -- -- None (Room air) Lying -- --    12/03/24 2259 98.2 °F (36.8 °C) -- 16 -- -- -- -- -- None (Room air) Lying -- --    12/03/24 22:57:41 -- 86 16 132/80 97 92 % -- -- -- -- -- --    12/03/24 1924 -- -- -- -- --  -- -- -- -- -- 15 No Pain    12/03/24 14:19:06 97.6 °F (36.4 °C) -- -- 140/83 102 -- -- -- None (Room air) Lying -- --    12/03/24 0845 -- -- -- -- -- -- -- -- None (Room air) -- 15 No Pain    12/03/24 07:52:52 97.6 °F (36.4 °C) 83 20 139/81 100 90 % -- -- None (Room air) Sitting -- --    12/02/24 21:22:54 97.3 °F (36.3 °C) 90 -- 134/78 97 90 % -- -- -- -- -- --    12/02/24 1930 -- -- -- -- -- -- -- -- -- -- 15 No Pain    12/02/24 14:26:22 97.5 °F (36.4 °C) 85 18 123/76 92 95 % 28 2 L/min Nasal cannula Lying -- --    12/02/24 1146 -- 84 -- 119/78 -- -- -- -- -- -- -- --    12/02/24 0930 -- -- -- -- -- -- 28 2 L/min Nasal cannula -- 15 No Pain    12/02/24 07:44:02 97.2 °F (36.2 °C) 84 20 123/75 91 96 % 28 2 L/min Nasal cannula Lying -- --    12/02/24 06:20:49 -- 80 -- 119/78 92 91 % -- -- -- -- -- --    12/01/24 1922 -- -- -- -- -- 94 % -- -- None (Room air) -- 15 No Pain    12/01/24 1728 97.9 °F (36.6 °C) 100 17 158/87 111 -- -- -- -- -- -- --    12/01/24 17:27:50 97.9 °F (36.6 °C) -- 17 158/87 111 -- -- -- -- -- -- --    12/01/24 14:58:42 97.7 °F (36.5 °C) 100 17 151/84 106 90 % -- -- -- Lying -- --    12/01/24 1437 97.8 °F (36.6 °C) 98 17 -- -- -- -- -- -- -- -- --    12/01/24 14:35:54 -- -- -- 159/89 112 -- -- -- -- -- -- --    12/01/24 1326 -- 85 22 142/72 99 93 % -- -- None (Room air) -- -- --    12/01/24 1315 -- -- -- -- -- -- -- -- -- -- -- No Pain    12/01/24 1310 97 °F (36.1 °C) 74 19 115/76 -- 94 % -- -- None (Room air) -- -- --    12/01/24 1308 97 °F (36.1 °C) 94 21 115/76 -- 92 % -- -- None (Room air) -- -- --          Weight (last 2 days)       Date/Time Weight    12/02/24 1146 93.9 (207)            Pertinent Labs/Diagnostic Results:   Radiology:  CT chest wo contrast   Final Interpretation by Myra Marshall MD (12/01 1246)      Moderately compromised by motion with no acute disease.      Large hiatal hernia with redemonstration of dilated fluid-filled esophagus and mildly dilated fluid-filled  stomach placing the patient at risk for aspiration.         Workstation performed: [id number]           Cardiology:  Echo follow up/limited w/ contrast if indicated   Final Result by Kale Estevez MD (12/02 1611)   Images from the original result were not included.        Left Ventricle: Left ventricular cavity size is normal. Wall thickness    is normal. The left ventricular ejection fraction is 40-45%. Systolic    function is mildly reduced. There is a 1.2 cm x 2.6 cm moderately-sized,    mural, solid, fixed thrombus at the apex.     The following segments are aneurysmal: apical septal and apex.     The following segments are akinetic: apical lateral.     All other segments are normal.     Mitral Valve: There is mild regurgitation.     Prior TTE study available for comparison. Prior study date: 8/26/2024.    No significant changes noted compared to the prior study.      Persistent LV apical thrombus.              GI:  EGD   Final Result by Omari Mix MD (12/01 1314)   Medium size hiatal hernia with Poli's erosions.   Previously seen ulcers have healed   Normal esophagus   Normal Duodenum         RECOMMENDATION:   Colonoscopy now                        Omari Mix MD       Colonoscopy   Final Result by Omari Mix MD (12/01 1313)   Moderate left and right-sided diverticulosis   3 mm polyp in ascending colon removed with forcep biopsy   Small internal hemorrhoid   Fair prep      RECOMMENDATION:   Repeat colonoscopy in 5 years, due: 11/30/2029   Inadequate bowel preparation       Will call with polyp pathology in 1 to 2 weeks   Resume regular diet and medications   Repeat colonoscopy in 5 years to suboptimal prep and 1 small polyp   Suspect anemia related to hiatal hernia and Poli's erosions   Would continue PPI and iron supplement chronically                        Omari Mix MD                 Results from last 7 days   Lab Units 12/04/24  0226 12/03/24  0345  12/02/24 0618 12/01/24 2014 12/01/24  0731   WBC Thousand/uL 8.18 6.68 7.07  --  10.72*   HEMOGLOBIN g/dL 9.3* 8.6* 8.0* 8.1* 6.8*   HEMATOCRIT % 33.9* 31.1* 29.2* 29.2* 25.0*   PLATELETS Thousands/uL 168 137* 160  --  163   TOTAL NEUT ABS Thousands/µL  --  4.43  --   --   --     < > = values in this interval not displayed.        Results from last 7 days   Lab Units 12/04/24 0226 12/02/24 0618 12/01/24  0731   SODIUM mmol/L 139 139 140   POTASSIUM mmol/L 3.8 3.6 3.6   CHLORIDE mmol/L 109* 112* 109*   CO2 mmol/L 23 21 24   ANION GAP mmol/L 7 6 7   BUN mg/dL 12 11 11   CREATININE mg/dL 0.92 0.94 0.99   EGFR ml/min/1.73sq m 88 85 80   CALCIUM mg/dL 8.5 7.6* 8.1*   MAGNESIUM mg/dL 1.8*  --   --      Results from last 7 days   Lab Units 12/04/24 0226 12/01/24  0731   AST U/L 17 14   ALT U/L 11 11   ALK PHOS U/L 57 49   TOTAL PROTEIN g/dL 6.4 6.1*   ALBUMIN g/dL 3.8 3.6   TOTAL BILIRUBIN mg/dL 0.56 0.61        Results from last 7 days   Lab Units 12/04/24 0226 12/02/24 0618 12/01/24  0731   GLUCOSE RANDOM mg/dL 102 100 97       Results from last 7 days   Lab Units 12/04/24  0901 12/04/24  0226 12/03/24  1831 12/03/24  1121 12/03/24  0345   PROTIME seconds 15.0  --   --  14.9 15.1*   INR  1.13  --   --  1.11 1.14   PTT seconds 141* 175* 166* 34  --        Results from last 7 days   Lab Units 12/02/24  0549   UNIT PRODUCT CODE  [id number]   UNIT NUMBER  [id number]   UNITABO  A   UNITRH  POS   CROSSMATCH  Compatible   UNIT DISPENSE STATUS  Presumed Trans   UNIT PRODUCT VOL ml 350       Network Utilization Review Department  ATTENTION: Please call with any questions or concerns to [phone] and carefully listen to the prompts so that you are directed to the right person. All voicemails are confidential.   For Discharge needs, contact Care Management DC Support Team at [phone] opt. 2  Send all requests for admission clinical reviews, approved or denied determinations and any other requests to dedicated fax  number below belonging to the campus where the patient is receiving treatment. List of dedicated fax numbers for the Facilities:  FACILITY NAME UR FAX NUMBER   ADMISSION DENIALS (Administrative/Medical Necessity) [phone]   DISCHARGE SUPPORT TEAM (NETWORK) [phone]   PARENT CHILD HEALTH (Maternity/NICU/Pediatrics) [phone]   [facility] [phone]   [facility] [phone]   [facility] [phone]   [facility] [phone]   [facility] [phone]   [facility] [phone]   [facility] [phone]   [facility] [phone]   [facility] [phone]   [facility] [phone]   [facility] [phone]   [facility] [phone]

## 2024-12-04 NOTE — PLAN OF CARE
Problem: Potential for Falls  Goal: Patient will remain free of falls  Description: INTERVENTIONS:  - Educate patient/family on patient safety including physical limitations  - Instruct patient to call for assistance with activity   - Consult OT/PT to assist with strengthening/mobility   - Keep Call bell within reach  - Keep bed low and locked with side rails adjusted as appropriate  - Keep care items and personal belongings within reach  - Initiate and maintain comfort rounds  - Make Fall Risk Sign visible to staff  - Offer Toileting every 3 Hours, in advance of need  - Initiate/Maintain bedalarm  - Obtain necessary fall risk management equipment:   - Apply yellow socks and bracelet for high fall risk patients  - Consider moving patient to room near nurses station  Outcome: Progressing     Problem: PAIN - ADULT  Goal: Verbalizes/displays adequate comfort level or baseline comfort level  Description: Interventions:  - Encourage patient to monitor pain and request assistance  - Assess pain using appropriate pain scale  - Administer analgesics based on type and severity of pain and evaluate response  - Implement non-pharmacological measures as appropriate and evaluate response  - Consider cultural and social influences on pain and pain management  - Notify physician/advanced practitioner if interventions unsuccessful or patient reports new pain  Outcome: Progressing     Problem: INFECTION - ADULT  Goal: Absence or prevention of progression during hospitalization  Description: INTERVENTIONS:  - Assess and monitor for signs and symptoms of infection  - Monitor lab/diagnostic results  - Monitor all insertion sites, i.e. indwelling lines, tubes, and drains  - Monitor endotracheal if appropriate and nasal secretions for changes in amount and color  - Carmen appropriate cooling/warming therapies per order  - Administer medications as ordered  - Instruct and encourage patient and family to use good hand hygiene  technique  - Identify and instruct in appropriate isolation precautions for identified infection/condition  Outcome: Progressing     Problem: SAFETY ADULT  Goal: Patient will remain free of falls  Description: INTERVENTIONS:  - Educate patient/family on patient safety including physical limitations  - Instruct patient to call for assistance with activity   - Consult OT/PT to assist with strengthening/mobility   - Keep Call bell within reach  - Keep bed low and locked with side rails adjusted as appropriate  - Keep care items and personal belongings within reach  - Initiate and maintain comfort rounds  - Make Fall Risk Sign visible to staff  - Offer Toileting every 3 Hours, in advance of need  - Initiate/Maintain bedalarm  - Obtain necessary fall risk management equipment:   - Apply yellow socks and bracelet for high fall risk patients  - Consider moving patient to room near nurses station  Outcome: Progressing     Problem: DISCHARGE PLANNING  Goal: Discharge to home or other facility with appropriate resources  Description: INTERVENTIONS:  - Identify barriers to discharge w/patient and caregiver  - Arrange for needed discharge resources and transportation as appropriate  - Identify discharge learning needs (meds, wound care, etc.)  - Arrange for interpretive services to assist at discharge as needed  - Refer to Case Management Department for coordinating discharge planning if the patient needs post-hospital services based on physician/advanced practitioner order or complex needs related to functional status, cognitive ability, or social support system  Outcome: Progressing     Problem: Knowledge Deficit  Goal: Patient/family/caregiver demonstrates understanding of disease process, treatment plan, medications, and discharge instructions  Description: Complete learning assessment and assess knowledge base.  Interventions:  - Provide teaching at level of understanding  - Provide teaching via preferred learning  methods  Outcome: Progressing     Problem: Nutrition/Hydration-ADULT  Goal: Nutrient/Hydration intake appropriate for improving, restoring or maintaining nutritional needs  Description: Monitor and assess patient's nutrition/hydration status for malnutrition. Collaborate with interdisciplinary team and initiate plan and interventions as ordered.  Monitor patient's weight and dietary intake as ordered or per policy. Utilize nutrition screening tool and intervene as necessary. Determine patient's food preferences and provide high-protein, high-caloric foods as appropriate.     INTERVENTIONS:  - Monitor oral intake, urinary output, labs, and treatment plans  - Assess nutrition and hydration status and recommend course of action  - Evaluate amount of meals eaten  - Assist patient with eating if necessary   - Allow adequate time for meals  - Recommend/ encourage appropriate diets, oral nutritional supplements, and vitamin/mineral supplements  - Order, calculate, and assess calorie counts as needed  - Recommend, monitor, and adjust tube feedings and TPN/PPN based on assessed needs  - Assess need for intravenous fluids  - Provide specific nutrition/hydration education as appropriate  - Include patient/family/caregiver in decisions related to nutrition  Outcome: Progressing      No significant past surgical history

## 2024-12-04 NOTE — PROGRESS NOTES
Progress Note - Hospitalist   Name: Chuy Norton 64 y.o. male I MRN: [id number]  Unit/Bed#: -01 I Date of Admission: 11/29/2024   Date of Service: 12/4/2024 I Hospital Day: 5    Assessment & Plan  Iron deficiency anemia  History of GI bleed on ASA and Coumadin since 2021 (not therapeutic) for hx of  ventricular thrombus   Hemoglobin 7.7  upon admission down from 8.6 a month prior and decreased to 6.9 during admission s/p 1 unit PRBCs 10/1/24  S/P 3 doses of IV venofer  Hgb has remained stable after 1 unit of PRBCs on 12/1/24  GI consulted and pt is s/p EGD and Colonoscopy on 12/1:  EGD 12/1 with healed gastric ulcer, hiatal hernia and Poli lesions but no active bleeding  Colonoscopy with diverticulosis and small polyp that was excised  GI advised iron once daily and daily PPI  Outpatient follow-up to review polyp pathology from colonoscopy  Resumed coumadin on 12/2 PM (2 mg which was chronic home dose). Starting on bridge from heparin to coumadin 12/3  Left ventricular apical thrombus  Diagnosed on echocardiogram in 2021 at [facility]. Prescribed coumadin 2 mg and outpatient follow up. Has been on 2 mg daily since that time per chart review but it appears may not have been compliant with it.  Med Rec from [facility] has 2 mg coumadin daily.  INR on admission 1.21  Reports no one tells him dose adjustments. Only time INR has been therapeutic per our records was 1 time in 5/2024 when he was admitted  Repeat Echo on this admission still demonstrates thrombus  Felt to be chronic even at the time of admission but still felt to warrant anticoagulation and not clear that he was ever treated for any duration with therapeutic anticoagulation dosing  Increased Coumadin to 5 mg daily, trend INR with goal 2.0-3.0  Stop heparin and start therapeutic (1.5 mg/kg) subcu Lovenox every 24 hours  Discussed with patient's home, [facility], they can take him back on Friday and continue Lovenox shots throughout  the weekend with INR next week  Attempting to coordinate with cardiology for him to be managed by the coumadin clinic  Hiatal hernia with GERD without esophagitis  EGD 12/1 with healed gastric ulcer, hiatal hernia and Poli lesions but no active bleeding  GI advised iron once daily and daily PPI  Exertional dyspnea  Initially, was noted to desaturate with ambulation and have dizziness.  Oxygen saturation dropped to 82% on admission, now ambulating at 91-94% on RA  Suspect related to anemia which has now stabilized  Abnormal CXR  No recent antibiotic use  CXR: Mild hazy opacity within the left lung base which is favored to be attributable to atelectasis. Developing pneumonia may demonstrate a similar appearance in appropriate clinical setting.   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   PUD (peptic ulcer disease)  See plan under hiatal hernia    Mobility:   Basic Mobility Inpatient Raw Score: 23  JH-HLM Goal: 7: Walk 25 feet or more  JH-HLM Achieved: 7: Walk 25 feet or more  JH-HLM Goal achieved. Continue to encourage appropriate mobility.    VTE Pharmacologic Prophylaxis: VTE Score: 3 Moderate Risk (Score 3-4) - Pharmacological DVT Prophylaxis Ordered: enoxaparin (Lovenox).    Education and Discussions with Family / Patient: Updated  () via phone.    Time Spent for Care: 35 minutes. More than 50% of total time spent on counseling and coordination of care as described above.    Current Length of Stay: 5 day(s)  Current Patient Status: Inpatient   Certification Statement: The patient will continue to require additional inpatient hospital stay due to LV thrombus with subtherapeutic INR awaiting return to group home  Discharge Plan: Anticipate discharge in 48 hrs to group home.    Code Status:  Level 1 - Full Code    Subjective:   No overnight events reported.  Patient remained stable with no bleeding presently.    Objective:     Vitals:   Temp (24hrs), Av.4 °F (36.3 °C), Min:96.5 °F (35.8 °C), Max:98.2 °F (36.8 °C)    Temp:  [96.5 °F (35.8 °C)-98.2 °F (36.8 °C)] 96.5 °F (35.8 °C)  HR:  [76-86] 76  Resp:  [16-20] 20  BP: (132-140)/(80-84) 135/84  SpO2:  [92 %-93 %] 93 %  Body mass index is 31.47 kg/m².     Input and Output Summary (last 24 hours):     Intake/Output Summary (Last 24 hours) at 2024 1135  Last data filed at 2024 0900  Gross per 24 hour   Intake 1279 ml   Output 400 ml   Net 879 ml       Physical Exam:   Physical Exam  Vitals and nursing note reviewed.   Constitutional:       General: He is not in acute distress.     Appearance: Normal appearance. He is not ill-appearing.   HENT:      Head: Normocephalic and atraumatic.   Eyes:      General: No scleral icterus.        Right eye: No discharge.         Left eye: No discharge.   Cardiovascular:      Rate and Rhythm: Normal rate and regular rhythm.      Pulses: Normal pulses.      Heart sounds: No murmur heard.     No friction rub. No gallop.   Pulmonary:      Effort: Pulmonary effort is normal. No respiratory distress.      Breath sounds: Normal breath sounds. No wheezing, rhonchi or rales.   Abdominal:      General: Bowel sounds are normal. There is no distension.      Palpations: Abdomen is soft.      Tenderness: There is no abdominal tenderness. There is no guarding or rebound.   Musculoskeletal:         General: No swelling or deformity.      Cervical back: Neck supple.      Right lower leg: No edema.      Left lower leg: No edema.   Skin:     General: Skin is warm and dry.      Capillary Refill: Capillary refill takes less than 2 seconds.      Coloration: Skin is not jaundiced or pale.      Findings: No erythema or rash.   Neurological:      General: No focal deficit present.      Mental Status: He is alert and oriented to  person, place, and time. Mental status is at baseline.   Psychiatric:         Mood and Affect: Mood normal.         Behavior: Behavior normal.          Additional Data:     Labs:  Results from last 7 days   Lab Units 12/04/24  0226 12/03/24  0345   WBC Thousand/uL 8.18 6.68   HEMOGLOBIN g/dL 9.3* 8.6*   HEMATOCRIT % 33.9* 31.1*   PLATELETS Thousands/uL 168 137*   SEGS PCT %  --  65   LYMPHO PCT %  --  20   MONO PCT %  --  9   EOS PCT %  --  3     Results from last 7 days   Lab Units 12/04/24  0226   SODIUM mmol/L 139   POTASSIUM mmol/L 3.8   CHLORIDE mmol/L 109*   CO2 mmol/L 23   BUN mg/dL 12   CREATININE mg/dL 0.92   ANION GAP mmol/L 7   CALCIUM mg/dL 8.5   ALBUMIN g/dL 3.8   TOTAL BILIRUBIN mg/dL 0.56   ALK PHOS U/L 57   ALT U/L 11   AST U/L 17   GLUCOSE RANDOM mg/dL 102     Results from last 7 days   Lab Units 12/04/24  0901   INR  1.13             Results from last 7 days   Lab Units 11/30/24  0817 11/29/24  1348   PROCALCITONIN ng/ml <0.05 <0.05       Imaging: Radiology Review: No additional pertinent studies reviewed.    Recent Cultures (last 7 days):         Last 24 Hours Medication List:   Current Facility-Administered Medications   Medication Dose Route Frequency Provider Last Rate    acetaminophen  650 mg Oral Q8H PRN Omari Mix MD      albuterol  2.5 mg Nebulization Q6H PRN Omari Mix MD      aluminum-magnesium hydroxide-simethicone  30 mL Oral Q6H PRN Omari Mix MD      atorvastatin  40 mg Oral Daily With Dinner Omari Mix MD      benzonatate  200 mg Oral TID PRN Omari Mix MD      enoxaparin  1.5 mg/kg Subcutaneous Q24H [facility] Ovidio Kaur PA-C      escitalopram  20 mg Oral Daily Omari Mix MD      ferrous sulfate  325 mg Oral Daily With Breakfast Omari Mix MD      guaiFENesin  600 mg Oral BID Omari Mix MD      nicotine  14 mg Transdermal Daily Ovidio Kaur PA-C      ondansetron  4 mg Intravenous Q6H PRN Omari Mix MD       pantoprazole  40 mg Intravenous Q12H SHANNA Omari Mix MD      polyethylene glycol  17 g Oral Daily PRN Omari Mix MD      QUEtiapine  300 mg Oral HS Omari Mix MD      traZODone  100 mg Oral HS Omari Mix MD      warfarin  5 mg Oral Daily (warfarin) Magaly Hernández PA-C          Today, Patient Was Seen By: Ovidio Kaur PA-C    **Please Note: This note may have been constructed using a voice recognition system.**

## 2024-12-05 ENCOUNTER — TELEPHONE (OUTPATIENT)
Dept: CARDIOLOGY CLINIC | Facility: CLINIC | Age: 64
End: 2024-12-05

## 2024-12-05 ENCOUNTER — ANTICOAG VISIT (OUTPATIENT)
Dept: CARDIOLOGY CLINIC | Facility: CLINIC | Age: 64
End: 2024-12-05

## 2024-12-05 DIAGNOSIS — I51.3 LEFT VENTRICULAR APICAL THROMBUS: Primary | ICD-10-CM

## 2024-12-05 LAB
INR PPP: 1.16 (ref 0.85–1.19)
PROTHROMBIN TIME: 15.3 SECONDS (ref 12.3–15)

## 2024-12-05 PROCEDURE — 85610 PROTHROMBIN TIME: CPT | Performed by: PHYSICIAN ASSISTANT

## 2024-12-05 PROCEDURE — 99232 SBSQ HOSP IP/OBS MODERATE 35: CPT | Performed by: PHYSICIAN ASSISTANT

## 2024-12-05 RX ORDER — PANTOPRAZOLE SODIUM 40 MG/1
40 TABLET, DELAYED RELEASE ORAL
Qty: 30 TABLET | Refills: 0 | Status: SHIPPED | OUTPATIENT
Start: 2024-12-07

## 2024-12-05 RX ORDER — PANTOPRAZOLE SODIUM 40 MG/1
40 TABLET, DELAYED RELEASE ORAL
Qty: 30 TABLET | Refills: 0 | Status: SHIPPED | OUTPATIENT
Start: 2024-12-05 | End: 2024-12-05

## 2024-12-05 RX ORDER — FERROUS SULFATE 325(65) MG
325 TABLET ORAL
Qty: 30 TABLET | Refills: 0 | Status: SHIPPED | OUTPATIENT
Start: 2024-12-06 | End: 2024-12-05

## 2024-12-05 RX ORDER — ENOXAPARIN SODIUM 150 MG/ML
1.5 INJECTION SUBCUTANEOUS
Qty: 7 ML | Refills: 0 | Status: SHIPPED | OUTPATIENT
Start: 2024-12-07 | End: 2024-12-08

## 2024-12-05 RX ORDER — FERROUS SULFATE 325(65) MG
325 TABLET ORAL
Qty: 30 TABLET | Refills: 0 | Status: SHIPPED | OUTPATIENT
Start: 2024-12-07

## 2024-12-05 RX ORDER — PANTOPRAZOLE SODIUM 40 MG/1
40 TABLET, DELAYED RELEASE ORAL
Status: DISCONTINUED | OUTPATIENT
Start: 2024-12-05 | End: 2024-12-06 | Stop reason: HOSPADM

## 2024-12-05 RX ADMIN — TRAZODONE HYDROCHLORIDE 100 MG: 50 TABLET ORAL at 19:26

## 2024-12-05 RX ADMIN — QUETIAPINE 300 MG: 200 TABLET ORAL at 19:26

## 2024-12-05 RX ADMIN — GUAIFENESIN 600 MG: 600 TABLET ORAL at 17:32

## 2024-12-05 RX ADMIN — ATORVASTATIN CALCIUM 40 MG: 40 TABLET, FILM COATED ORAL at 17:32

## 2024-12-05 RX ADMIN — PANTOPRAZOLE SODIUM 40 MG: 40 TABLET, DELAYED RELEASE ORAL at 17:32

## 2024-12-05 RX ADMIN — GUAIFENESIN 600 MG: 600 TABLET ORAL at 09:14

## 2024-12-05 RX ADMIN — FERROUS SULFATE TAB 325 MG (65 MG ELEMENTAL FE) 325 MG: 325 (65 FE) TAB at 09:14

## 2024-12-05 RX ADMIN — PANTOPRAZOLE SODIUM 40 MG: 40 TABLET, DELAYED RELEASE ORAL at 09:25

## 2024-12-05 RX ADMIN — ENOXAPARIN SODIUM 135 MG: 150 INJECTION SUBCUTANEOUS at 13:14

## 2024-12-05 RX ADMIN — ESCITALOPRAM OXALATE 20 MG: 20 TABLET ORAL at 09:14

## 2024-12-05 RX ADMIN — NICOTINE 14 MG: 14 PATCH, EXTENDED RELEASE TRANSDERMAL at 09:15

## 2024-12-05 RX ADMIN — WARFARIN SODIUM 5 MG: 5 TABLET ORAL at 17:32

## 2024-12-05 NOTE — DISCHARGE INSTR - AVS FIRST PAGE
Please give Chuy's Coumadin every night with dinner.  He will be following with the Coumadin clinic so they will be ordering the INR checks as well as directing you on how much Coumadin to give him.  Every morning around 9 AM please give Chuy 135 mg of Lovenox injection.  This is 0.9 mL worth of volume, please note that this is not the entire syringe that you will be given so you may either waste 0.1 mL before or after injecting into the skin.  Once Chuy's INR is greater than 2.0 you should discontinue the Lovenox injections and continue only giving him the Coumadin.

## 2024-12-05 NOTE — PROGRESS NOTES
Progress Note - Hospitalist   Name: Chuy Norton 64 y.o. male I MRN: [id number]  Unit/Bed#: -01 I Date of Admission: 11/29/2024   Date of Service: 12/5/2024 I Hospital Day: 6    Assessment & Plan  Iron deficiency anemia  History of GI bleed on ASA and Coumadin since 2021 (not therapeutic) for hx of  ventricular thrombus   Hemoglobin 7.7  upon admission down from 8.6 a month prior and decreased to 6.9 during admission s/p 1 unit PRBCs 10/1/24  S/P 3 doses of IV venofer  Hgb has remained stable after 1 unit of PRBCs on 12/1/24  GI consulted and pt is s/p EGD and Colonoscopy on 12/1:  EGD 12/1 with healed gastric ulcer, hiatal hernia and Poli lesions but no active bleeding  Colonoscopy with diverticulosis and small polyp that was excised  GI advised iron once daily and daily PPI  Outpatient follow-up to review polyp pathology from colonoscopy  Resumed coumadin on 12/2 PM (2 mg which was chronic home dose). Starting on bridge from heparin to coumadin 12/3  Left ventricular apical thrombus  Diagnosed on echocardiogram in 2021 at [facility]. Prescribed coumadin 2 mg and outpatient follow up. Has been on 2 mg daily since that time per chart review but it appears may not have been compliant with it.  Med Rec from [facility] has 2 mg coumadin daily.  INR on admission 1.21  Reports no one tells him dose adjustments. Only time INR has been therapeutic per our records was 1 time in 5/2024 when he was admitted  Repeat Echo on this admission still demonstrates thrombus  Felt to be chronic even at the time of admission but still felt to warrant anticoagulation and not clear that he was ever treated for any duration with therapeutic anticoagulation dosing  Increased Coumadin to 5 mg daily, trend INR with goal 2.0-3.0  Stop heparin and start therapeutic (1.5 mg/kg) subcu Lovenox every 24 hours  Discussed with patient's home, [facility], they can take him back on Friday and continue Lovenox shots throughout  the weekend with INR next week  Attempting to coordinate with cardiology for him to be managed by the coumadin clinic  Hiatal hernia with GERD without esophagitis  EGD 12/1 with healed gastric ulcer, hiatal hernia and Poli lesions but no active bleeding  GI advised iron once daily and daily PPI  Exertional dyspnea  Initially, was noted to desaturate with ambulation and have dizziness.  Oxygen saturation dropped to 82% on admission, now ambulating at 91-94% on RA  Suspect related to anemia which has now stabilized  Abnormal CXR  No recent antibiotic use  CXR: Mild hazy opacity within the left lung base which is favored to be attributable to atelectasis. Developing pneumonia may demonstrate a similar appearance in appropriate clinical setting.   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   PUD (peptic ulcer disease)  See plan under hiatal hernia      Mobility:   Basic Mobility Inpatient Raw Score: 24  JH-HLM Goal: 8: Walk 250 feet or more  JH-HLM Achieved: 7: Walk 25 feet or more  JH-HLM Goal NOT achieved. Continue with multidisciplinary rounding and encourage appropriate mobility to improve upon JH-HLM goals.    VTE Pharmacologic Prophylaxis: VTE Score: 3 Moderate Risk (Score 3-4) - Pharmacological DVT Prophylaxis Ordered: enoxaparin (Lovenox).    Education and Discussions with Family / Patient: Patient declined call to .     Time Spent for Care: 35 minutes. More than 50% of total time spent on counseling and coordination of care as described above.    Current Length of Stay: 6 day(s)  Current Patient Status: Inpatient   Certification Statement: The patient will continue to require additional inpatient hospital stay due to left apical thrombus with subtherapeutic awaiting discharge back to group home on  Friday  Discharge Plan: Anticipate discharge tomorrow to [facility].    Code Status: Level 1 - Full Code    Subjective:   No overnight events reported, patient resting comfortably in bed.  No chest pain presently.    Objective:     Vitals:   Temp (24hrs), Av.4 °F (36.3 °C), Min:96.1 °F (35.6 °C), Max:98.7 °F (37.1 °C)    Temp:  [96.1 °F (35.6 °C)-98.7 °F (37.1 °C)] 96.4 °F (35.8 °C)  HR:  [71-85] 79  Resp:  [18-20] 20  BP: (145-157)/() 154/94  SpO2:  [90 %-98 %] 91 %  Body mass index is 31.47 kg/m².     Input and Output Summary (last 24 hours):     Intake/Output Summary (Last 24 hours) at 2024 1113  Last data filed at 2024 0820  Gross per 24 hour   Intake 800 ml   Output 600 ml   Net 200 ml       Physical Exam:   Physical Exam  Vitals and nursing note reviewed.   Constitutional:       General: He is not in acute distress.     Appearance: Normal appearance. He is not ill-appearing.   HENT:      Head: Normocephalic and atraumatic.   Eyes:      General: No scleral icterus.        Right eye: No discharge.         Left eye: No discharge.   Cardiovascular:      Rate and Rhythm: Normal rate and regular rhythm.      Pulses: Normal pulses.      Heart sounds: No murmur heard.     No friction rub. No gallop.   Pulmonary:      Effort: Pulmonary effort is normal. No respiratory distress.      Breath sounds: Normal breath sounds. No wheezing, rhonchi or rales.   Abdominal:      General: Bowel sounds are normal. There is no distension.      Palpations: Abdomen is soft.      Tenderness: There is no abdominal tenderness. There is no guarding or rebound.   Musculoskeletal:         General: No swelling or deformity.      Cervical back: Neck supple.      Right lower leg: No edema.      Left lower leg: No edema.   Skin:     General: Skin is warm and dry.      Capillary Refill: Capillary refill takes less than 2 seconds.      Coloration: Skin is not jaundiced or pale.      Findings: No erythema or rash.    Neurological:      General: No focal deficit present.      Mental Status: He is alert and oriented to person, place, and time. Mental status is at baseline.   Psychiatric:         Mood and Affect: Mood normal.         Behavior: Behavior normal.          Additional Data:     Labs:  Results from last 7 days   Lab Units 12/04/24  0226 12/03/24  0345   WBC Thousand/uL 8.18 6.68   HEMOGLOBIN g/dL 9.3* 8.6*   HEMATOCRIT % 33.9* 31.1*   PLATELETS Thousands/uL 168 137*   SEGS PCT %  --  65   LYMPHO PCT %  --  20   MONO PCT %  --  9   EOS PCT %  --  3     Results from last 7 days   Lab Units 12/04/24  0226   SODIUM mmol/L 139   POTASSIUM mmol/L 3.8   CHLORIDE mmol/L 109*   CO2 mmol/L 23   BUN mg/dL 12   CREATININE mg/dL 0.92   ANION GAP mmol/L 7   CALCIUM mg/dL 8.5   ALBUMIN g/dL 3.8   TOTAL BILIRUBIN mg/dL 0.56   ALK PHOS U/L 57   ALT U/L 11   AST U/L 17   GLUCOSE RANDOM mg/dL 102     Results from last 7 days   Lab Units 12/05/24  0836   INR  1.16             Results from last 7 days   Lab Units 11/30/24  0817 11/29/24  1348   PROCALCITONIN ng/ml <0.05 <0.05       Imaging: Radiology Review: No additional pertinent studies reviewed.    Recent Cultures (last 7 days):         Last 24 Hours Medication List:   Current Facility-Administered Medications   Medication Dose Route Frequency Provider Last Rate    acetaminophen  650 mg Oral Q8H PRN Omari Mix MD      albuterol  2.5 mg Nebulization Q6H PRN Omari Mix MD      aluminum-magnesium hydroxide-simethicone  30 mL Oral Q6H PRN Omari Mix MD      atorvastatin  40 mg Oral Daily With Dinner Omari Mix MD      benzonatate  200 mg Oral TID PRN Omari Mix MD      enoxaparin  1.5 mg/kg Subcutaneous Q24H SHANNA Kaur PA-C      escitalopram  20 mg Oral Daily Omari Mix MD      ferrous sulfate  325 mg Oral Daily With Breakfast Omari Mix MD      guaiFENesin  600 mg Oral BID Omari Mix MD      nicotine  14 mg  Transdermal Daily Ovidio Kaur PA-C      ondansetron  4 mg Intravenous Q6H PRN Omari Mix MD      pantoprazole  40 mg Oral BID AC Ovidio Kaur PA-C      polyethylene glycol  17 g Oral Daily PRN Omari Mix MD      QUEtiapine  300 mg Oral HS Omari Mix MD      traZODone  100 mg Oral HS Omari Mix MD      warfarin  5 mg Oral Daily (warfarin) Magaly Hernández PA-C          Today, Patient Was Seen By: Ovidio Kaur PA-C    **Please Note: This note may have been constructed using a voice recognition system.**

## 2024-12-05 NOTE — ASSESSMENT & PLAN NOTE
[facility] Prescribed coumadin 2 mg and outpatient follow up. Has been on 2 mg daily since that time per chart review but it appears may not have been compliant with it.  Med Rec from [facility] has 2 mg coumadin daily.  INR on admission 1.21  Reports no one tells him dose adjustments. Only time INR has been therapeutic per our records was 1 time in 5/2024 when he was admitted  Repeat Echo on this admission still demonstrates thrombus  Felt to be chronic even at the time of admission but still felt to warrant anticoagulation and not clear that he was ever treated for any duration with therapeutic anticoagulation dosing  Increased Coumadin to 5 mg daily, trend INR with goal 2.0-3.0  Continue therapeutic (1.5 mg/kg) subcu Lovenox every 24 hours  Discussed with patient's home, [facility], they can take him back on Friday and continue Lovenox shots throughout the weekend with INR next week  I confirmed that the patient will be set up with the Coumadin clinic who will monitor his INR and order his Coumadin and Lovenox after discharge

## 2024-12-05 NOTE — ASSESSMENT & PLAN NOTE
[facility]. Prescribed coumadin 2 mg and outpatient follow up. Has been on 2 mg daily since that time per chart review but it appears may not have been compliant with it.  Med Rec from [facility] has 2 mg coumadin daily.  INR on admission 1.21  Reports no one tells him dose adjustments. Only time INR has been therapeutic per our records was 1 time in 5/2024 when he was admitted  Repeat Echo on this admission still demonstrates thrombus  Felt to be chronic even at the time of admission but still felt to warrant anticoagulation and not clear that he was ever treated for any duration with therapeutic anticoagulation dosing  Increased Coumadin to 5 mg daily, trend INR with goal 2.0-3.0  Stop heparin and start therapeutic (1.5 mg/kg) subcu Lovenox every 24 hours  Discussed with patient's home, [facility], they can take him back on Friday and continue Lovenox shots throughout the weekend with INR next week  Attempting to coordinate with cardiology for him to be managed by the coumadin clinic

## 2024-12-05 NOTE — TELEPHONE ENCOUNTER
----- Message from Omari Mix MD sent at 12/4/2024 12:09 PM EST -----  Tried to leave message at group home.  Biopsy of polyp was an adenoma.  Recall colonoscopy 5 years.  Patient needs follow-up office visit

## 2024-12-05 NOTE — PROGRESS NOTES
Received message from Ovidio Kaur PA-C:  Hi, this patient has an LV thrombus and is on Coumadin with Lovenox bridge. I am going to discharge him Friday. He will need an INR check on Monday. Are you able to coordinate?    DM  He lives at [facility] so best point of contact is the . contact is Vera Donovan    [phone]     Spoke with states Vera Chuy Side is patients . I spoke with Chuy, discussed that patient hospitalized, will be discharged tomorrow, is on Coumadin and will need to discuss how to get INR and if they handle patients meds.  States that they handle all patients medications, will need order faxed to them and/or pharmacy, [facility]. He was asking about Coumadin, I did educate on how we dose patient and need for frequent blood work. States patient has been with them since March 2023. Asking if that is something [facility], because [facility].   He asked that I speak with Bri CULELO), she handles all labs and medications.  He advised she will be able to tell me if the [facility] has been handling. He transferred me to Bri, had to leave a message to call office. Phone # provided.

## 2024-12-05 NOTE — DISCHARGE SUMMARY
Discharge Summary - Hospitalist   Name: Chuy Norton 64 y.o. male I MRN: [id number]  Unit/Bed#: -01 I Date of Admission: 11/29/2024   Date of Service: 12/6/2024 I Hospital Day: 7     Assessment & Plan  Iron deficiency anemia  History of GI bleed on ASA and Coumadin since 2021 (not therapeutic) for hx of  ventricular thrombus   Hemoglobin 7.7  upon admission down from 8.6 a month prior and decreased to 6.9 during admission s/p 1 unit PRBCs 10/1/24  S/P 3 doses of IV venofer  Hgb has remained stable after 1 unit of PRBCs on 12/1/24  GI consulted and pt is s/p EGD and Colonoscopy on 12/1:  EGD 12/1 with healed gastric ulcer, hiatal hernia and Poli lesions but no active bleeding  Colonoscopy with diverticulosis and small polyp that was excised  GI advised iron once daily and daily PPI  Outpatient follow-up to review polyp pathology from colonoscopy  Resumed coumadin/Lovenox bridge on dc  Left ventricular apical thrombus  Diagnosed on echocardiogram in 2021 at [facility]. Prescribed coumadin 2 mg and outpatient follow up. Has been on 2 mg daily since that time per chart review but it appears may not have been compliant with it.  Med Rec from [facility] has 2 mg coumadin daily.  INR on admission 1.21  Reports no one tells him dose adjustments. Only time INR has been therapeutic per our records was 1 time in 5/2024 when he was admitted  Repeat Echo on this admission still demonstrates thrombus  Felt to be chronic even at the time of admission but still felt to warrant anticoagulation and not clear that he was ever treated for any duration with therapeutic anticoagulation dosing  Increased Coumadin to 5 mg daily, trend INR with goal 2.0-3.0  Continue therapeutic (1.5 mg/kg) subcu Lovenox every 24 hours  Discussed with patient's home, [facility], they can take him back on Friday and continue Lovenox shots throughout the weekend with INR next week  I confirmed that the patient will be set up with the  Coumadin clinic who will monitor his INR and order his Coumadin and Lovenox after discharge  Hiatal hernia with GERD without esophagitis  EGD 12/1 with healed gastric ulcer, hiatal hernia and Poli lesions but no active bleeding  GI advised iron once daily and daily PPI  Exertional dyspnea  Initially, was noted to desaturate with ambulation and have dizziness.  Oxygen saturation dropped to 82% on admission, now ambulating at 91-94% on RA  Suspect related to anemia which has now stabilized  Abnormal CXR  No recent antibiotic use  CXR: Mild hazy opacity within the left lung base which is favored to be attributable to atelectasis. Developing pneumonia may demonstrate a similar appearance in appropriate clinical setting.   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   PUD (peptic ulcer disease)  See plan under hiatal hernia     Medical Problems       Resolved Problems  Date Reviewed: 12/2/2024   None       Discharging Physician / Practitioner: Ovidio Kaur PA-C  PCP: Savanna Pan DO  Admission Date:   Admission Orders (From admission, onward)       Ordered        11/29/24 1553  INPATIENT ADMISSION  Once                          Discharge Date: 12/06/24     Consultations During Hospital Stay:  GI    Procedures Performed:   12/1/2024 EGD/colonoscopy  EGD 12/1 with healed gastric ulcer, hiatal hernia and Poli lesions but no active bleeding  Colonoscopy with diverticulosis and small polyp that was excised    Significant Findings / Test Results:   Colonic polyp with adenoma recommending repeat colonoscopy in 5 years  TTE with persistent LV thrombus    Incidental Findings:   None      Test Results Pending at Discharge (will require follow up):   None      Outpatient Tests Requested:  INR check  Monday  Colonoscopy 5 years    Complications:  none     Reason for Admission: short of breath    Hospital Course:   Chuy Norton is a 64 y.o. male patient who originally presented to the hospital on 11/29/2024 due to shortness of breath, chest pain, cough. In the ED, he was noted to desat to 82% with ambulation.  Chest x-ray could not rule out a developing pneumonia; he was also noted to have acute anemia.  Digital rectal exam was negative for occult blood.  He was admitted and GI was consulted.  He underwent EGD and colonoscopy and his Coumadin was held.  EGD and colonoscopy findings above.  GI cleared him to resume anticoagulation and they will follow-up polyp pathology results as an outpatient.  He did require 1 unit of packed red blood cells after which his hemoglobin remained stable above 7.  GI advised daily iron supplementation and once daily PPI.  In regards to his dyspnea on exertion, repeat echo was obtained and compared to his echo 3 years ago was grossly unchanged.  Much of his symptoms in regards to dyspnea did improve after transfusion.  He was initially started on ceftriaxone however a CT chest did not reveal an opacity, his procalcitonin was negative, and he remained afebrile without leukocytosis; antibiotics were subsequently discontinued. Incentive spirometry was encouraged as this was favored to be due to atelectasis with underlying anemia.     Patient found to have persistent LV thrombus on echocardiogram.  For this patient was started on heparin drip with Coumadin bridge as he was found to be subtherapeutic with his INR.  This was then changed to subcutaneous Lovenox, therapeutic dosing so that he could be discharged.  Coumadin clinic is already set up for the patient.  He will go with Coumadin 5 mg daily with Lovenox bridge until INR is greater than 2.0.  Outpatient ambulatory referral to cardiology has also been placed.    Please see above list of diagnoses and related plan for additional  "information.     Condition at Discharge: stable    Discharge Day Visit / Exam:   Subjective: Patient reports he feels well today.  No complaints or events overnight.  Vitals: Blood Pressure: 137/75 (12/06/24 0755)  Pulse: 75 (12/06/24 0755)  Temperature: 99 °F (37.2 °C) (12/06/24 0755)  Temp Source: Oral (12/05/24 1928)  Respirations: 20 (12/06/24 0755)  Height: 5' 8\" (172.7 cm) (12/02/24 1146)  Weight - Scale: 93.9 kg (207 lb) (12/02/24 1146)  SpO2: 93 % (12/06/24 0755)  Exam:   Physical Exam  Vitals and nursing note reviewed.   Constitutional:       General: He is not in acute distress.     Appearance: Normal appearance. He is not ill-appearing.   HENT:      Head: Normocephalic and atraumatic.   Eyes:      General: No scleral icterus.        Right eye: No discharge.         Left eye: No discharge.   Cardiovascular:      Rate and Rhythm: Normal rate and regular rhythm.      Pulses: Normal pulses.      Heart sounds: No murmur heard.     No friction rub. No gallop.   Pulmonary:      Effort: Pulmonary effort is normal. No respiratory distress.      Breath sounds: Normal breath sounds. No wheezing, rhonchi or rales.   Abdominal:      General: Bowel sounds are normal. There is no distension.      Palpations: Abdomen is soft.      Tenderness: There is no abdominal tenderness. There is no guarding or rebound.   Musculoskeletal:         General: No swelling or deformity.      Cervical back: Neck supple.      Right lower leg: No edema.      Left lower leg: No edema.   Skin:     General: Skin is warm and dry.      Capillary Refill: Capillary refill takes less than 2 seconds.      Coloration: Skin is not jaundiced or pale.      Findings: No erythema or rash.   Neurological:      General: No focal deficit present.      Mental Status: He is alert and oriented to person, place, and time. Mental status is at baseline.   Psychiatric:         Mood and Affect: Mood normal.         Behavior: Behavior normal.            Discharge " instructions/Information to patient and family:   See after visit summary for information provided to patient and family.      Provisions for Follow-Up Care:  See after visit summary for information related to follow-up care and any pertinent home health orders.       Disposition:   Group Home / Personal Care Home at [facility]    Planned Readmission: no     Discharge Statement:  I spent 45 minutes discharging the patient. This time was spent on the day of discharge. I had direct contact with the patient on the day of discharge. Greater than 50% of the total time was spent examining patient, answering all patient questions, arranging and discussing plan of care with patient as well as directly providing post-discharge instructions.  Additional time then spent on discharge activities.    Discharge Medications:  See after visit summary for reconciled discharge medications provided to patient and/or family.      **Please Note: This note may have been constructed using a voice recognition system**

## 2024-12-05 NOTE — PLAN OF CARE
Problem: PAIN - ADULT  Goal: Verbalizes/displays adequate comfort level or baseline comfort level  Description: Interventions:  - Encourage patient to monitor pain and request assistance  - Assess pain using appropriate pain scale  - Administer analgesics based on type and severity of pain and evaluate response  - Implement non-pharmacological measures as appropriate and evaluate response  - Consider cultural and social influences on pain and pain management  - Notify physician/advanced practitioner if interventions unsuccessful or patient reports new pain  Outcome: Progressing     Problem: INFECTION - ADULT  Goal: Absence or prevention of progression during hospitalization  Description: INTERVENTIONS:  - Assess and monitor for signs and symptoms of infection  - Monitor lab/diagnostic results  - Monitor all insertion sites, i.e. indwelling lines, tubes, and drains  - Monitor endotracheal if appropriate and nasal secretions for changes in amount and color  - [place] appropriate cooling/warming therapies per order  - Administer medications as ordered  - Instruct and encourage patient and family to use good hand hygiene technique  - Identify and instruct in appropriate isolation precautions for identified infection/condition  Outcome: Progressing

## 2024-12-05 NOTE — PLAN OF CARE
Problem: Potential for Falls  Goal: Patient will remain free of falls  Description: INTERVENTIONS:  - Educate patient/family on patient safety including physical limitations  - Instruct patient to call for assistance with activity   - Consult OT/PT to assist with strengthening/mobility   - Keep Call bell within reach  - Keep bed low and locked with side rails adjusted as appropriate  - Keep care items and personal belongings within reach  - Initiate and maintain comfort rounds  - Make Fall Risk Sign visible to staff  - Offer Toileting every 3 Hours, in advance of need  - Initiate/Maintain bedalarm  - Obtain necessary fall risk management equipment:   - Apply yellow socks and bracelet for high fall risk patients  - Consider moving patient to room near nurses station  Outcome: Progressing     Problem: PAIN - ADULT  Goal: Verbalizes/displays adequate comfort level or baseline comfort level  Description: Interventions:  - Encourage patient to monitor pain and request assistance  - Assess pain using appropriate pain scale  - Administer analgesics based on type and severity of pain and evaluate response  - Implement non-pharmacological measures as appropriate and evaluate response  - Consider cultural and social influences on pain and pain management  - Notify physician/advanced practitioner if interventions unsuccessful or patient reports new pain  Outcome: Progressing     Problem: INFECTION - ADULT  Goal: Absence or prevention of progression during hospitalization  Description: INTERVENTIONS:  - Assess and monitor for signs and symptoms of infection  - Monitor lab/diagnostic results  - Monitor all insertion sites, i.e. indwelling lines, tubes, and drains  - Monitor endotracheal if appropriate and nasal secretions for changes in amount and color  - [place] appropriate cooling/warming therapies per order  - Administer medications as ordered  - Instruct and encourage patient and family to use good hand hygiene  technique  - Identify and instruct in appropriate isolation precautions for identified infection/condition  Outcome: Progressing     Problem: SAFETY ADULT  Goal: Patient will remain free of falls  Description: INTERVENTIONS:  - Educate patient/family on patient safety including physical limitations  - Instruct patient to call for assistance with activity   - Consult OT/PT to assist with strengthening/mobility   - Keep Call bell within reach  - Keep bed low and locked with side rails adjusted as appropriate  - Keep care items and personal belongings within reach  - Initiate and maintain comfort rounds  - Make Fall Risk Sign visible to staff  - Offer Toileting every 3 Hours, in advance of need  - Initiate/Maintain bedalarm  - Obtain necessary fall risk management equipment:   - Apply yellow socks and bracelet for high fall risk patients  - Consider moving patient to room near nurses station  Outcome: Progressing     Problem: DISCHARGE PLANNING  Goal: Discharge to home or other facility with appropriate resources  Description: INTERVENTIONS:  - Identify barriers to discharge w/patient and caregiver  - Arrange for needed discharge resources and transportation as appropriate  - Identify discharge learning needs (meds, wound care, etc.)  - Arrange for interpretive services to assist at discharge as needed  - Refer to Case Management Department for coordinating discharge planning if the patient needs post-hospital services based on physician/advanced practitioner order or complex needs related to functional status, cognitive ability, or social support system  Outcome: Progressing     Problem: Knowledge Deficit  Goal: Patient/family/caregiver demonstrates understanding of disease process, treatment plan, medications, and discharge instructions  Description: Complete learning assessment and assess knowledge base.  Interventions:  - Provide teaching at level of understanding  - Provide teaching via preferred learning  methods  Outcome: Progressing     Problem: Nutrition/Hydration-ADULT  Goal: Nutrient/Hydration intake appropriate for improving, restoring or maintaining nutritional needs  Description: Monitor and assess patient's nutrition/hydration status for malnutrition. Collaborate with interdisciplinary team and initiate plan and interventions as ordered.  Monitor patient's weight and dietary intake as ordered or per policy. Utilize nutrition screening tool and intervene as necessary. Determine patient's food preferences and provide high-protein, high-caloric foods as appropriate.     INTERVENTIONS:  - Monitor oral intake, urinary output, labs, and treatment plans  - Assess nutrition and hydration status and recommend course of action  - Evaluate amount of meals eaten  - Assist patient with eating if necessary   - Allow adequate time for meals  - Recommend/ encourage appropriate diets, oral nutritional supplements, and vitamin/mineral supplements  - Order, calculate, and assess calorie counts as needed  - Recommend, monitor, and adjust tube feedings and TPN/PPN based on assessed needs  - Assess need for intravenous fluids  - Provide specific nutrition/hydration education as appropriate  - Include patient/family/caregiver in decisions related to nutrition  Outcome: Progressing

## 2024-12-05 NOTE — ASSESSMENT & PLAN NOTE
History of GI bleed on ASA and Coumadin since 2021 (not therapeutic) for hx of  ventricular thrombus   Hemoglobin 7.7  upon admission down from 8.6 a month prior and decreased to 6.9 during admission s/p 1 unit PRBCs 10/1/24  S/P 3 doses of IV venofer  Hgb has remained stable after 1 unit of PRBCs on 12/1/24  GI consulted and pt is s/p EGD and Colonoscopy on 12/1:  EGD 12/1 with healed gastric ulcer, hiatal hernia and Poli lesions but no active bleeding  Colonoscopy with diverticulosis and small polyp that was excised  GI advised iron once daily and daily PPI  Outpatient follow-up to review polyp pathology from colonoscopy  Resumed coumadin/Lovenox bridge on dc

## 2024-12-06 VITALS
TEMPERATURE: 99 F | HEART RATE: 75 BPM | BODY MASS INDEX: 31.37 KG/M2 | RESPIRATION RATE: 20 BRPM | DIASTOLIC BLOOD PRESSURE: 75 MMHG | OXYGEN SATURATION: 93 % | SYSTOLIC BLOOD PRESSURE: 137 MMHG | HEIGHT: 68 IN | WEIGHT: 207 LBS

## 2024-12-06 LAB
INR PPP: 1.41 (ref 0.85–1.19)
PROTHROMBIN TIME: 17.8 SECONDS (ref 12.3–15)

## 2024-12-06 PROCEDURE — 85610 PROTHROMBIN TIME: CPT | Performed by: PHYSICIAN ASSISTANT

## 2024-12-06 PROCEDURE — 99239 HOSP IP/OBS DSCHRG MGMT >30: CPT | Performed by: PHYSICIAN ASSISTANT

## 2024-12-06 RX ORDER — WARFARIN SODIUM 5 MG/1
5 TABLET ORAL
Status: COMPLETED | OUTPATIENT
Start: 2024-12-06 | End: 2024-12-06

## 2024-12-06 RX ORDER — ENOXAPARIN SODIUM 150 MG/ML
1.5 INJECTION SUBCUTANEOUS
Status: DISCONTINUED | OUTPATIENT
Start: 2024-12-06 | End: 2024-12-06 | Stop reason: HOSPADM

## 2024-12-06 RX ORDER — WARFARIN SODIUM 5 MG/1
5 TABLET ORAL
Status: DISCONTINUED | OUTPATIENT
Start: 2024-12-06 | End: 2024-12-06

## 2024-12-06 RX ORDER — ENOXAPARIN SODIUM 150 MG/ML
1.5 INJECTION SUBCUTANEOUS
Status: DISCONTINUED | OUTPATIENT
Start: 2024-12-06 | End: 2024-12-06

## 2024-12-06 RX ADMIN — ESCITALOPRAM OXALATE 20 MG: 20 TABLET ORAL at 08:50

## 2024-12-06 RX ADMIN — WARFARIN SODIUM 5 MG: 5 TABLET ORAL at 10:21

## 2024-12-06 RX ADMIN — GUAIFENESIN 600 MG: 600 TABLET ORAL at 08:50

## 2024-12-06 RX ADMIN — PANTOPRAZOLE SODIUM 40 MG: 40 TABLET, DELAYED RELEASE ORAL at 05:47

## 2024-12-06 RX ADMIN — ENOXAPARIN SODIUM 135 MG: 150 INJECTION SUBCUTANEOUS at 10:21

## 2024-12-06 RX ADMIN — FERROUS SULFATE TAB 325 MG (65 MG ELEMENTAL FE) 325 MG: 325 (65 FE) TAB at 08:50

## 2024-12-06 RX ADMIN — NICOTINE 14 MG: 14 PATCH, EXTENDED RELEASE TRANSDERMAL at 08:52

## 2024-12-06 NOTE — PROGRESS NOTES
Bri returned my call, states they [facility] to [facility], they are ok with patient following with our office to monitor coumadin. They have not been regulating Coumadin.     Bri is aware patient will need INR Mon 12/9/24, will take patient to [facility]'s lab.  Will need to call [facility] ([phone]) and fax orders [phone] and fax to [facility] pharmacy [phone]    Will discuss when patient needs follow up at that time.     Standing INR order faxed to [facility] as requested

## 2024-12-06 NOTE — CASE MANAGEMENT
Case Management Progress Note    Patient name Chuy Norton  Location /-01 MRN [id number]  : 1960 Date 2024       LOS (days): 7  Geometric Mean LOS (GMLOS) (days):   Days to GMLOS:        OBJECTIVE:        Current admission status: Inpatient  Preferred Pharmacy:   [facility] - [id number]  [address] [address]  Phone: [phone] Fax: [phone]    Primary Care Provider: Savanna Pan DO    Primary Insurance: [facility] [employer] [facility]  Secondary Insurance: JOEL CRAWFORD [facility]    PROGRESS NOTE:    Call placed to [facility], spoke with Mari, informed Mari of discharge today.  Mari informed  that she will have staff  Pt within 45 mins. Informed Pt's nurse(Miladys) and Provider of transport time.

## 2024-12-08 ENCOUNTER — APPOINTMENT (EMERGENCY)
Dept: CT IMAGING | Facility: HOSPITAL | Age: 64
DRG: 871 | End: 2024-12-08
Payer: COMMERCIAL

## 2024-12-08 ENCOUNTER — HOSPITAL ENCOUNTER (INPATIENT)
Facility: HOSPITAL | Age: 64
LOS: 2 days | Discharge: NURSING, REHAB OR LONG-TERM CARE | DRG: 871 | End: 2024-12-10
Attending: EMERGENCY MEDICINE | Admitting: INTERNAL MEDICINE
Payer: COMMERCIAL

## 2024-12-08 ENCOUNTER — APPOINTMENT (EMERGENCY)
Dept: RADIOLOGY | Facility: HOSPITAL | Age: 64
DRG: 871 | End: 2024-12-08
Payer: COMMERCIAL

## 2024-12-08 DIAGNOSIS — R07.9 CHEST PAIN: ICD-10-CM

## 2024-12-08 DIAGNOSIS — J18.9 PNEUMONIA: Primary | ICD-10-CM

## 2024-12-08 DIAGNOSIS — J06.9 VIRAL URI WITH COUGH: ICD-10-CM

## 2024-12-08 PROBLEM — J96.01 ACUTE RESPIRATORY FAILURE WITH HYPOXIA (HCC): Status: ACTIVE | Noted: 2024-12-08

## 2024-12-08 PROBLEM — D69.6 THROMBOCYTOPENIA (HCC): Status: ACTIVE | Noted: 2024-12-08

## 2024-12-08 PROBLEM — A41.9 SEPSIS (HCC): Status: ACTIVE | Noted: 2024-12-08

## 2024-12-08 LAB
2HR DELTA HS TROPONIN: 1 NG/L
4HR DELTA HS TROPONIN: 1 NG/L
ALBUMIN SERPL BCG-MCNC: 4 G/DL (ref 3.5–5)
ALP SERPL-CCNC: 52 U/L (ref 34–104)
ALT SERPL W P-5'-P-CCNC: 17 U/L (ref 7–52)
ANION GAP SERPL CALCULATED.3IONS-SCNC: 8 MMOL/L (ref 4–13)
APTT PPP: 63 SECONDS (ref 23–34)
AST SERPL W P-5'-P-CCNC: 22 U/L (ref 13–39)
ATRIAL RATE: 99 BPM
BASOPHILS # BLD AUTO: 0.02 THOUSANDS/ÂΜL (ref 0–0.1)
BASOPHILS NFR BLD AUTO: 0 % (ref 0–1)
BILIRUB SERPL-MCNC: 0.56 MG/DL (ref 0.2–1)
BNP SERPL-MCNC: 181 PG/ML (ref 0–100)
BUN SERPL-MCNC: 22 MG/DL (ref 5–25)
CALCIUM SERPL-MCNC: 8.5 MG/DL (ref 8.4–10.2)
CARDIAC TROPONIN I PNL SERPL HS: 24 NG/L (ref ?–50)
CARDIAC TROPONIN I PNL SERPL HS: 25 NG/L (ref ?–50)
CARDIAC TROPONIN I PNL SERPL HS: 25 NG/L (ref ?–50)
CHLORIDE SERPL-SCNC: 108 MMOL/L (ref 96–108)
CO2 SERPL-SCNC: 22 MMOL/L (ref 21–32)
CREAT SERPL-MCNC: 1.15 MG/DL (ref 0.6–1.3)
D DIMER PPP FEU-MCNC: 1.07 UG/ML FEU
EOSINOPHIL # BLD AUTO: 0.01 THOUSAND/ÂΜL (ref 0–0.61)
EOSINOPHIL NFR BLD AUTO: 0 % (ref 0–6)
ERYTHROCYTE [DISTWIDTH] IN BLOOD BY AUTOMATED COUNT: 29.4 % (ref 11.6–15.1)
FLUAV AG UPPER RESP QL IA.RAPID: NEGATIVE
FLUBV AG UPPER RESP QL IA.RAPID: NEGATIVE
GFR SERPL CREATININE-BSD FRML MDRD: 67 ML/MIN/1.73SQ M
GLUCOSE SERPL-MCNC: 109 MG/DL (ref 65–140)
HCT VFR BLD AUTO: 35.6 % (ref 36.5–49.3)
HGB BLD-MCNC: 10 G/DL (ref 12–17)
IMM GRANULOCYTES # BLD AUTO: 0.08 THOUSAND/UL (ref 0–0.2)
IMM GRANULOCYTES NFR BLD AUTO: 1 % (ref 0–2)
INR PPP: 2.36 (ref 0.85–1.19)
LACTATE SERPL-SCNC: 1.8 MMOL/L (ref 0.5–2)
LYMPHOCYTES # BLD AUTO: 1.4 THOUSANDS/ÂΜL (ref 0.6–4.47)
LYMPHOCYTES NFR BLD AUTO: 9 % (ref 14–44)
MCH RBC QN AUTO: 20.9 PG (ref 26.8–34.3)
MCHC RBC AUTO-ENTMCNC: 28.1 G/DL (ref 31.4–37.4)
MCV RBC AUTO: 74 FL (ref 82–98)
MONOCYTES # BLD AUTO: 0.9 THOUSAND/ÂΜL (ref 0.17–1.22)
MONOCYTES NFR BLD AUTO: 6 % (ref 4–12)
NEUTROPHILS # BLD AUTO: 13.22 THOUSANDS/ÂΜL (ref 1.85–7.62)
NEUTS SEG NFR BLD AUTO: 84 % (ref 43–75)
NRBC BLD AUTO-RTO: 0 /100 WBCS
P AXIS: 57 DEGREES
PLATELET # BLD AUTO: 106 THOUSANDS/UL (ref 149–390)
POTASSIUM SERPL-SCNC: 3.8 MMOL/L (ref 3.5–5.3)
PR INTERVAL: 226 MS
PROCALCITONIN SERPL-MCNC: 3.08 NG/ML
PROT SERPL-MCNC: 6.8 G/DL (ref 6.4–8.4)
PROTHROMBIN TIME: 26.1 SECONDS (ref 12.3–15)
QRS AXIS: -26 DEGREES
QRSD INTERVAL: 102 MS
QT INTERVAL: 320 MS
QTC INTERVAL: 410 MS
RBC # BLD AUTO: 4.79 MILLION/UL (ref 3.88–5.62)
SARS-COV+SARS-COV-2 AG RESP QL IA.RAPID: NEGATIVE
SODIUM SERPL-SCNC: 138 MMOL/L (ref 135–147)
T WAVE AXIS: 84 DEGREES
VENTRICULAR RATE: 99 BPM
WBC # BLD AUTO: 15.63 THOUSAND/UL (ref 4.31–10.16)

## 2024-12-08 PROCEDURE — 96374 THER/PROPH/DIAG INJ IV PUSH: CPT

## 2024-12-08 PROCEDURE — 84145 PROCALCITONIN (PCT): CPT | Performed by: EMERGENCY MEDICINE

## 2024-12-08 PROCEDURE — 99285 EMERGENCY DEPT VISIT HI MDM: CPT

## 2024-12-08 PROCEDURE — 87040 BLOOD CULTURE FOR BACTERIA: CPT | Performed by: EMERGENCY MEDICINE

## 2024-12-08 PROCEDURE — 87449 NOS EACH ORGANISM AG IA: CPT | Performed by: PHYSICIAN ASSISTANT

## 2024-12-08 PROCEDURE — 71045 X-RAY EXAM CHEST 1 VIEW: CPT

## 2024-12-08 PROCEDURE — 80053 COMPREHEN METABOLIC PANEL: CPT | Performed by: EMERGENCY MEDICINE

## 2024-12-08 PROCEDURE — 94664 DEMO&/EVAL PT USE INHALER: CPT

## 2024-12-08 PROCEDURE — 85730 THROMBOPLASTIN TIME PARTIAL: CPT | Performed by: EMERGENCY MEDICINE

## 2024-12-08 PROCEDURE — 84484 ASSAY OF TROPONIN QUANT: CPT | Performed by: EMERGENCY MEDICINE

## 2024-12-08 PROCEDURE — 93005 ELECTROCARDIOGRAM TRACING: CPT

## 2024-12-08 PROCEDURE — 87804 INFLUENZA ASSAY W/OPTIC: CPT | Performed by: EMERGENCY MEDICINE

## 2024-12-08 PROCEDURE — 85025 COMPLETE CBC W/AUTO DIFF WBC: CPT | Performed by: EMERGENCY MEDICINE

## 2024-12-08 PROCEDURE — 83605 ASSAY OF LACTIC ACID: CPT | Performed by: EMERGENCY MEDICINE

## 2024-12-08 PROCEDURE — 71275 CT ANGIOGRAPHY CHEST: CPT

## 2024-12-08 PROCEDURE — 84484 ASSAY OF TROPONIN QUANT: CPT | Performed by: PHYSICIAN ASSISTANT

## 2024-12-08 PROCEDURE — 85379 FIBRIN DEGRADATION QUANT: CPT | Performed by: EMERGENCY MEDICINE

## 2024-12-08 PROCEDURE — 85610 PROTHROMBIN TIME: CPT | Performed by: EMERGENCY MEDICINE

## 2024-12-08 PROCEDURE — 99223 1ST HOSP IP/OBS HIGH 75: CPT | Performed by: PHYSICIAN ASSISTANT

## 2024-12-08 PROCEDURE — 94640 AIRWAY INHALATION TREATMENT: CPT

## 2024-12-08 PROCEDURE — 87811 SARS-COV-2 COVID19 W/OPTIC: CPT | Performed by: EMERGENCY MEDICINE

## 2024-12-08 PROCEDURE — 36415 COLL VENOUS BLD VENIPUNCTURE: CPT | Performed by: EMERGENCY MEDICINE

## 2024-12-08 PROCEDURE — 96375 TX/PRO/DX INJ NEW DRUG ADDON: CPT

## 2024-12-08 PROCEDURE — 93010 ELECTROCARDIOGRAM REPORT: CPT | Performed by: INTERNAL MEDICINE

## 2024-12-08 PROCEDURE — 83880 ASSAY OF NATRIURETIC PEPTIDE: CPT | Performed by: EMERGENCY MEDICINE

## 2024-12-08 PROCEDURE — 99285 EMERGENCY DEPT VISIT HI MDM: CPT | Performed by: EMERGENCY MEDICINE

## 2024-12-08 RX ORDER — PANTOPRAZOLE SODIUM 40 MG/1
40 TABLET, DELAYED RELEASE ORAL
Status: DISCONTINUED | OUTPATIENT
Start: 2024-12-09 | End: 2024-12-10 | Stop reason: HOSPADM

## 2024-12-08 RX ORDER — WARFARIN SODIUM 5 MG/1
5 TABLET ORAL
Status: DISCONTINUED | OUTPATIENT
Start: 2024-12-08 | End: 2024-12-10 | Stop reason: HOSPADM

## 2024-12-08 RX ORDER — CEFTRIAXONE 1 G/50ML
1000 INJECTION, SOLUTION INTRAVENOUS EVERY 24 HOURS
Status: DISCONTINUED | OUTPATIENT
Start: 2024-12-09 | End: 2024-12-10 | Stop reason: HOSPADM

## 2024-12-08 RX ORDER — BENZONATATE 100 MG/1
200 CAPSULE ORAL 3 TIMES DAILY PRN
Status: DISCONTINUED | OUTPATIENT
Start: 2024-12-08 | End: 2024-12-10 | Stop reason: HOSPADM

## 2024-12-08 RX ORDER — TRAZODONE HYDROCHLORIDE 100 MG/1
100 TABLET ORAL
Status: DISCONTINUED | OUTPATIENT
Start: 2024-12-08 | End: 2024-12-10 | Stop reason: HOSPADM

## 2024-12-08 RX ORDER — ASPIRIN 81 MG/1
324 TABLET, CHEWABLE ORAL ONCE
Status: COMPLETED | OUTPATIENT
Start: 2024-12-08 | End: 2024-12-08

## 2024-12-08 RX ORDER — FERROUS SULFATE 325(65) MG
325 TABLET ORAL
Status: DISCONTINUED | OUTPATIENT
Start: 2024-12-09 | End: 2024-12-10 | Stop reason: HOSPADM

## 2024-12-08 RX ORDER — PANTOPRAZOLE SODIUM 40 MG/10ML
40 INJECTION, POWDER, LYOPHILIZED, FOR SOLUTION INTRAVENOUS ONCE
Status: COMPLETED | OUTPATIENT
Start: 2024-12-08 | End: 2024-12-08

## 2024-12-08 RX ORDER — QUETIAPINE FUMARATE 300 MG/1
300 TABLET, FILM COATED ORAL
Status: DISCONTINUED | OUTPATIENT
Start: 2024-12-08 | End: 2024-12-10 | Stop reason: HOSPADM

## 2024-12-08 RX ORDER — ATORVASTATIN CALCIUM 40 MG/1
40 TABLET, FILM COATED ORAL
Status: DISCONTINUED | OUTPATIENT
Start: 2024-12-09 | End: 2024-12-10 | Stop reason: HOSPADM

## 2024-12-08 RX ORDER — POLYETHYLENE GLYCOL 3350 17 G/17G
17 POWDER, FOR SOLUTION ORAL DAILY PRN
Status: DISCONTINUED | OUTPATIENT
Start: 2024-12-08 | End: 2024-12-10 | Stop reason: HOSPADM

## 2024-12-08 RX ORDER — GUAIFENESIN 600 MG/1
600 TABLET, EXTENDED RELEASE ORAL EVERY 12 HOURS SCHEDULED
Status: DISCONTINUED | OUTPATIENT
Start: 2024-12-08 | End: 2024-12-10 | Stop reason: HOSPADM

## 2024-12-08 RX ORDER — AZITHROMYCIN 500 MG/1
500 TABLET, FILM COATED ORAL EVERY 24 HOURS
Status: DISCONTINUED | OUTPATIENT
Start: 2024-12-09 | End: 2024-12-10 | Stop reason: HOSPADM

## 2024-12-08 RX ORDER — CEFTRIAXONE 1 G/50ML
1000 INJECTION, SOLUTION INTRAVENOUS ONCE
Status: COMPLETED | OUTPATIENT
Start: 2024-12-08 | End: 2024-12-08

## 2024-12-08 RX ORDER — IPRATROPIUM BROMIDE AND ALBUTEROL SULFATE .5; 3 MG/3ML; MG/3ML
1 SOLUTION RESPIRATORY (INHALATION) ONCE
Status: COMPLETED | OUTPATIENT
Start: 2024-12-08 | End: 2024-12-08

## 2024-12-08 RX ORDER — ALBUTEROL SULFATE 0.83 MG/ML
2.5 SOLUTION RESPIRATORY (INHALATION) EVERY 6 HOURS PRN
Status: DISCONTINUED | OUTPATIENT
Start: 2024-12-08 | End: 2024-12-10 | Stop reason: HOSPADM

## 2024-12-08 RX ORDER — SODIUM CHLORIDE 9 MG/ML
3 INJECTION INTRAVENOUS EVERY 8 HOURS SCHEDULED
Status: DISCONTINUED | OUTPATIENT
Start: 2024-12-08 | End: 2024-12-09

## 2024-12-08 RX ORDER — ONDANSETRON 2 MG/ML
4 INJECTION INTRAMUSCULAR; INTRAVENOUS EVERY 6 HOURS PRN
Status: DISCONTINUED | OUTPATIENT
Start: 2024-12-08 | End: 2024-12-10 | Stop reason: HOSPADM

## 2024-12-08 RX ORDER — ESCITALOPRAM OXALATE 20 MG/1
20 TABLET ORAL DAILY
Status: DISCONTINUED | OUTPATIENT
Start: 2024-12-09 | End: 2024-12-10 | Stop reason: HOSPADM

## 2024-12-08 RX ADMIN — AZITHROMYCIN MONOHYDRATE 500 MG: 500 INJECTION, POWDER, LYOPHILIZED, FOR SOLUTION INTRAVENOUS at 18:09

## 2024-12-08 RX ADMIN — CEFTRIAXONE 1000 MG: 1 INJECTION, SOLUTION INTRAVENOUS at 17:57

## 2024-12-08 RX ADMIN — QUETIAPINE FUMARATE 300 MG: 300 TABLET ORAL at 21:50

## 2024-12-08 RX ADMIN — ASPIRIN 81 MG CHEWABLE TABLET 324 MG: 81 TABLET CHEWABLE at 16:33

## 2024-12-08 RX ADMIN — PANTOPRAZOLE SODIUM 40 MG: 40 INJECTION, POWDER, FOR SOLUTION INTRAVENOUS at 17:57

## 2024-12-08 RX ADMIN — TRAZODONE HYDROCHLORIDE 100 MG: 100 TABLET ORAL at 22:10

## 2024-12-08 RX ADMIN — GUAIFENESIN 600 MG: 600 TABLET ORAL at 20:19

## 2024-12-08 RX ADMIN — SODIUM CHLORIDE, PRESERVATIVE FREE 3 ML: 5 INJECTION INTRAVENOUS at 21:50

## 2024-12-08 RX ADMIN — BENZONATATE 200 MG: 100 CAPSULE ORAL at 20:20

## 2024-12-08 RX ADMIN — SODIUM CHLORIDE 500 ML: 0.9 INJECTION, SOLUTION INTRAVENOUS at 17:56

## 2024-12-08 RX ADMIN — IOHEXOL 85 ML: 350 INJECTION, SOLUTION INTRAVENOUS at 17:49

## 2024-12-08 NOTE — ASSESSMENT & PLAN NOTE
Appearing compensated at this time  His MELD is 17 on admit however patient is on Coumadin and has therapeutic INR

## 2024-12-08 NOTE — ED PROVIDER NOTES
Time reflects when diagnosis was documented in both MDM as applicable and the Disposition within this note       Time User Action Codes Description Comment    12/8/2024  6:00 PM Rogelio Vargas Add [J18.9] Pneumonia     12/8/2024  6:00 PM Rogelio Vargas [R07.9] Chest pain     12/8/2024  6:00 PM Rogelio Vargas Modify [J18.9] Pneumonia acute with hypoxia requiring oxygen          ED Disposition       ED Disposition   Admit    Condition   Stable    Date/Time   Sun Dec 8, 2024  5:59 PM    Comment   Case was discussed with JOSEPH Kaur and the patient's admission status was agreed to be Admission Status: inpatient status to the service of Dr. Pate .               Assessment & Plan       Medical Decision Making  Diff includes chf, copd, pe, pneumonia, aspiration, coronary episode    Amount and/or Complexity of Data Reviewed  Labs: ordered.  Radiology: ordered and independent interpretation performed.    Risk  OTC drugs.  Prescription drug management.  Decision regarding hospitalization.        ED Course as of 12/08/24 2037   Sun Dec 08, 2024   1738 Still having pain - will try some protonix for heartburn       Medications   sodium chloride (PF) 0.9 % injection 3 mL (3 mL Intravenous Not Given 12/8/24 1630)   ipratropium-albuterol (FOR EMS ONLY) (DUO-NEB) 0.5-2.5 mg/3 mL inhalation solution 3 mL (0 mL Does not apply Given to EMS 12/8/24 1630)   aspirin chewable tablet 324 mg (324 mg Oral Given 12/8/24 1633)   sodium chloride 0.9 % bolus 500 mL (500 mL Intravenous New Bag 12/8/24 1756)   cefTRIAXone (ROCEPHIN) IVPB (premix in dextrose) 1,000 mg 50 mL (0 mg Intravenous Stopped 12/8/24 1808)   azithromycin (ZITHROMAX) 500 mg in sodium chloride 0.9% 250mL IVPB 500 mg (500 mg Intravenous New Bag 12/8/24 1809)   pantoprazole (PROTONIX) injection 40 mg (40 mg Intravenous Given 12/8/24 1757)   iohexol (OMNIPAQUE) 350 MG/ML injection (MULTI-DOSE) 100 mL (85 mL Intravenous Given 12/8/24 1749)       ED Risk Strat Scores                        PERC Rule for PE      Flowsheet Row Most Recent Value   PERC Rule for PE    Age >=50 1 Filed at: 12/08/2024 1651   HR >=100 --   O2 Sat on room air < 95% --   History of PE or DVT --   Recent trauma or surgery --   Hemoptysis --   Exogenous estrogen --   Unilateral leg swelling --   PERC Rule for PE Results 1 Filed at: 12/08/2024 1651            SBIRT 20yo+      Flowsheet Row Most Recent Value   Initial Alcohol Screen: US AUDIT-C     1. How often do you have a drink containing alcohol? 0 Filed at: 12/08/2024 1636   2. How many drinks containing alcohol do you have on a typical day you are drinking?  0 Filed at: 12/08/2024 1636   3a. Male UNDER 65: How often do you have five or more drinks on one occasion? 0 Filed at: 12/08/2024 1636   3b. FEMALE Any Age, or MALE 65+: How often do you have 4 or more drinks on one occassion? 0 Filed at: 12/08/2024 1636   Audit-C Score 0 Filed at: 12/08/2024 1636   PETRA: How many times in the past year have you...    Used an illegal drug or used a prescription medication for non-medical reasons? Never Filed at: 12/08/2024 1636            Wells' Criteria for PE      Flowsheet Row Most Recent Value   Wells' Criteria for PE    Clinical signs and symptoms of DVT 0 Filed at: 12/08/2024 1651   PE is primary diagnosis or equally likely 3 Filed at: 12/08/2024 1651   HR >100 0 Filed at: 12/08/2024 1651   Immobilization at least 3 days or Surgery in the previous 4 weeks 1.5 Filed at: 12/08/2024 1651   Previous, objectively diagnosed PE or DVT 0 Filed at: 12/08/2024 1651   Hemoptysis 0 Filed at: 12/08/2024 1651   Malignancy with treatment within 6 months or palliative 0 Filed at: 12/08/2024 1651   Scar' Criteria Total 4.5 Filed at: 12/08/2024 1651                        History of Present Illness       Chief Complaint   Patient presents with    Shortness of Breath     From [facility]. Woke at 10am with chest pain and SOB, EMS gave duoneb X 1 with positive results per  "patient.        Past Medical History:   Diagnosis Date    Alcohol abuse     Depression     Drug therapy     GERD (gastroesophageal reflux disease)     Hepatitis C     Hypertension 01/2012    Knee pain, bilateral     Left ventricular apical thrombus     Psychiatric disorder     depression, anxiety    Renal disorder     Self-injurious behavior     Spinal stenosis of lumbar region     Suicide attempt (HCC)       Past Surgical History:   Procedure Laterality Date    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    HERNIA REPAIR  1997      Family History   Problem Relation Age of Onset    Depression Mother     Depression Father     No Known Problems Sister     No Known Problems Brother       Social History     Tobacco Use    Smoking status: Every Day     Current packs/day: 0.50     Types: Cigarettes, Cigars    Smokeless tobacco: Never    Tobacco comments:     PT \"3 CIGARS A DAY\" - quit smoking cigars   Vaping Use    Vaping status: Never Used   Substance Use Topics    Alcohol use: Not Currently     Alcohol/week: 0.0 standard drinks of alcohol     Comment: Stopped drinking for several months - recovered alcoholic    Drug use: Not Currently     Types: Marijuana     Comment: Last used marijuana 2 months ago.- no longer smoking      E-Cigarette/Vaping    E-Cigarette Use Never User       E-Cigarette/Vaping Substances    Nicotine No     THC No     CBD No     Flavoring No     Other No     Unknown No       I have reviewed and agree with the history as documented.     Hx from patient, paperwork with patient, medical records- pmh anemia, LV apical thrombus, hiatal hernia with GERD, exertional dyspnea attributed to anemia, and recent hospitalization with abnl cxr thought to be atelectasis.  Patient developed gradual worsening sternal cp and sob today at 10 am at [facility].  Paramedics gave duoneb with some relief but still having chest discomfort.        Review of Systems   Constitutional:  Negative for chills and fever.   HENT:  " Negative for rhinorrhea and sore throat.    Respiratory:  Positive for cough and shortness of breath.    Cardiovascular:  Positive for chest pain.   Gastrointestinal:  Negative for constipation, diarrhea, nausea and vomiting.   Genitourinary:  Negative for dysuria and frequency.   Skin:  Negative for rash.   All other systems reviewed and are negative.          Objective       ED Triage Vitals   Temperature Pulse Blood Pressure Respirations SpO2 Patient Position - Orthostatic VS   12/08/24 1559 12/08/24 1559 12/08/24 1559 12/08/24 1559 12/08/24 1559 12/08/24 1559   98.7 °F (37.1 °C) 104 157/74 18 91 % Sitting      Temp Source Heart Rate Source BP Location FiO2 (%) Pain Score    12/08/24 1800 12/08/24 1630 12/08/24 1559 -- 12/08/24 1559    Oral Monitor Left arm  3      Vitals      Date and Time Temp Pulse SpO2 Resp BP Pain Score FACES Pain Rating User   12/08/24 1913 -- -- 96 % -- -- No Pain --    12/08/24 1856 -- -- -- 24 -- -- --    12/08/24 1856 97.7 °F (36.5 °C) 92 96 % -- 144/87 -- -- DII   12/08/24 1815 -- 91 95 % 24 155/78 -- -- AM   12/08/24 1800 98.7 °F (37.1 °C) 94 96 % 28 151/80 No Pain -- AM   12/08/24 1715 -- 92 94 % 23 133/77 -- -- AM   12/08/24 1700 -- 94 94 % 31 127/71 -- -- AM   12/08/24 1645 -- 96 94 % 27 135/75 -- -- AM   12/08/24 1633 -- 98 94 % 32 147/73 -- -- AM   12/08/24 1630 -- 96 93 % 27 -- -- -- AM   12/08/24 1604 -- -- 95 % -- -- -- -- SV   12/08/24 1559 98.7 °F (37.1 °C) 104 91 % 18 157/74 3 -- SV            Physical Exam  Vitals and nursing note reviewed.   Constitutional:       Appearance: He is well-developed.   HENT:      Head: Normocephalic and atraumatic.      Right Ear: External ear normal.      Left Ear: External ear normal.      Nose: Nose normal.   Eyes:      Conjunctiva/sclera: Conjunctivae normal.      Pupils: Pupils are equal, round, and reactive to light.   Cardiovascular:      Rate and Rhythm: Normal rate and regular rhythm.      Heart sounds: Normal heart sounds.    Pulmonary:      Effort: Pulmonary effort is normal. No respiratory distress.      Breath sounds: Examination of the right-lower field reveals rhonchi. Examination of the left-lower field reveals rhonchi. Rhonchi present. No wheezing.   Abdominal:      General: Bowel sounds are normal. There is no distension.      Palpations: Abdomen is soft.      Tenderness: There is no abdominal tenderness.   Musculoskeletal:         General: No deformity. Normal range of motion.      Cervical back: Normal range of motion and neck supple. No spinous process tenderness.   Skin:     General: Skin is warm and dry.      Findings: No rash.   Neurological:      General: No focal deficit present.      Mental Status: He is alert.      GCS: GCS eye subscore is 4. GCS verbal subscore is 5. GCS motor subscore is 6.      Sensory: No sensory deficit.   Psychiatric:         Mood and Affect: Mood normal.         Results Reviewed       Procedure Component Value Units Date/Time    HS Troponin I 4hr [[id number]] Collected: 12/08/24 2009    Lab Status: In process Specimen: Blood from Hand, Left Updated: 12/08/24 2012    HS Troponin I 2hr [[id number]]  (Normal) Collected: 12/08/24 1833    Lab Status: Final result Specimen: Blood from Arm, Left Updated: 12/08/24 1859     hs TnI 2hr 25 ng/L      Delta 2hr hsTnI 1 ng/L     B-Type Natriuretic Peptide(BNP) [[id number]]  (Abnormal) Collected: 12/08/24 1623    Lab Status: Final result Specimen: Blood from Arm, Right Updated: 12/08/24 1757      pg/mL     CBC and differential [[id number]]  (Abnormal) Collected: 12/08/24 1623    Lab Status: Final result Specimen: Blood from Arm, Right Updated: 12/08/24 1710     WBC 15.63 Thousand/uL      RBC 4.79 Million/uL      Hemoglobin 10.0 g/dL      Hematocrit 35.6 %      MCV 74 fL      MCH 20.9 pg      MCHC 28.1 g/dL      RDW 29.4 %      Platelets 106 Thousands/uL      nRBC 0 /100 WBCs      Segmented % 84 %      Immature Grans % 1 %      Lymphocytes % 9 %       Monocytes % 6 %      Eosinophils Relative 0 %      Basophils Relative 0 %      Absolute Neutrophils 13.22 Thousands/µL      Absolute Immature Grans 0.08 Thousand/uL      Absolute Lymphocytes 1.40 Thousands/µL      Absolute Monocytes 0.90 Thousand/µL      Eosinophils Absolute 0.01 Thousand/µL      Basophils Absolute 0.02 Thousands/µL     Procalcitonin [[id number]]  (Abnormal) Collected: 12/08/24 1623    Lab Status: Final result Specimen: Blood from Arm, Right Updated: 12/08/24 1656     Procalcitonin 3.08 ng/ml     HS Troponin 0hr (reflex protocol) [[id number]]  (Normal) Collected: 12/08/24 1623    Lab Status: Final result Specimen: Blood from Arm, Right Updated: 12/08/24 1653     hs TnI 0hr 24 ng/L     Protime-INR [[id number]]  (Abnormal) Collected: 12/08/24 1623    Lab Status: Final result Specimen: Blood from Arm, Right Updated: 12/08/24 1652     Protime 26.1 seconds      INR 2.36    Narrative:      INR Therapeutic Range    Indication                                             INR Range      Atrial Fibrillation                                               2.0-3.0  Hypercoagulable State                                    2.0.2.3  Left Ventricular Asist Device                            2.0-3.0  Mechanical Heart Valve                                  -    Aortic(with afib, MI, embolism, HF, LA enlargement,    and/or coagulopathy)                                     2.0-3.0 (2.5-3.5)     Mitral                                                             2.5-3.5  Prosthetic/Bioprosthetic Heart Valve               2.0-3.0  Venous thromboembolism (VTE: VT, PE        2.0-3.0    APTT [[id number]]  (Abnormal) Collected: 12/08/24 1623    Lab Status: Final result Specimen: Blood from Arm, Right Updated: 12/08/24 1652     PTT 63 seconds     D-dimer, quantitative [[id number]]  (Abnormal) Collected: 12/08/24 1623    Lab Status: Final result Specimen: Blood from Arm, Right Updated: 12/08/24 1652     D-Dimer, Quant 1.07 ug/ml FEU      Narrative:      In the evaluation for possible pulmonary embolism, in the appropriate (Well's Score of 4 or less) patient, the age adjusted d-dimer cutoff for this patient can be calculated as:    Age x 0.01 (in ug/mL) for Age-adjusted D-dimer exclusion threshold for a patient over 50 years.    Comprehensive metabolic panel [[id number]] Collected: 12/08/24 1623    Lab Status: Final result Specimen: Blood from Arm, Right Updated: 12/08/24 1649     Sodium 138 mmol/L      Potassium 3.8 mmol/L      Chloride 108 mmol/L      CO2 22 mmol/L      ANION GAP 8 mmol/L      BUN 22 mg/dL      Creatinine 1.15 mg/dL      Glucose 109 mg/dL      Calcium 8.5 mg/dL      AST 22 U/L      ALT 17 U/L      Alkaline Phosphatase 52 U/L      Total Protein 6.8 g/dL      Albumin 4.0 g/dL      Total Bilirubin 0.56 mg/dL      eGFR 67 ml/min/1.73sq m     Narrative:      National Kidney Disease Foundation guidelines for Chronic Kidney Disease (CKD):     Stage 1 with normal or high GFR (GFR > 90 mL/min/1.73 square meters)    Stage 2 Mild CKD (GFR = 60-89 mL/min/1.73 square meters)    Stage 3A Moderate CKD (GFR = 45-59 mL/min/1.73 square meters)    Stage 3B Moderate CKD (GFR = 30-44 mL/min/1.73 square meters)    Stage 4 Severe CKD (GFR = 15-29 mL/min/1.73 square meters)    Stage 5 End Stage CKD (GFR <15 mL/min/1.73 square meters)  Note: GFR calculation is accurate only with a steady state creatinine    FLU/COVID Rapid Antigen (30 min. TAT) - Preferred screening test in ED [[id number]]  (Normal) Collected: 12/08/24 1623    Lab Status: Final result Specimen: Nares from Nose Updated: 12/08/24 1648     SARS COV Rapid Antigen Negative     Influenza A Rapid Antigen Negative     Influenza B Rapid Antigen Negative    Narrative:      This test has been performed using the [employer] Genesis 2 FLU+SARS Antigen test under the Emergency Use Authorization (EUA). This test has been validated by the  and verified by the performing laboratory. The Genesis uses  lateral flow immunofluorescent sandwich assay to detect SARS-COV, Influenza A and Influenza B Antigen.     The [employer] 2 SARS Antigen test does not differentiate between SARS-CoV and SARS-CoV-2.     Negative results are presumptive and may be confirmed with a molecular assay, if necessary, for patient management. Negative results do not rule out SARS-CoV-2 or influenza infection and should not be used as the sole basis for treatment or patient management decisions. A negative test result may occur if the level of antigen in a sample is below the limit of detection of this test.     Positive results are indicative of the presence of viral antigens, but do not rule out bacterial infection or co-infection with other viruses.     All test results should be used as an adjunct to clinical observations and other information available to the provider.    FOR PEDIATRIC PATIENTS - copy/paste COVID Guidelines URL to browser: [email or web address]    Lactic acid [[id number]]  (Normal) Collected: 12/08/24 1623    Lab Status: Final result Specimen: Blood from Arm, Right Updated: 12/08/24 1648     LACTIC ACID 1.8 mmol/L     Narrative:      Result may be elevated if tourniquet was used during collection.    Blood culture #2 [[id number]] Collected: 12/08/24 1623    Lab Status: In process Specimen: Blood from Arm, Left Updated: 12/08/24 1629    Blood culture #1 [[id number]] Collected: 12/08/24 1623    Lab Status: In process Specimen: Blood from Arm, Right Updated: 12/08/24 1629            CTA chest pe study   Final Interpretation by Tanna Franklin MD (12/08 1945)         1. No central pulmonary embolism on study limited by motion.   2. Multifocal interstitial infiltrates in the right lung most severe at the right lung base.   Follow-up to complete resolution.   3. Hiatal hernia.                  Workstation performed: [id number]         XR chest portable   ED Interpretation  by Rogelio Vargas DO (12/08 1649)   Right lower lung new opacity noted, unable to visualize left lower lung could be effusion or infiltrate, compared to previous CXR, interpreted by me      Final Interpretation by Donya Chester MD (12/08 1921)      New patchy right lower lobe opacity, suspicious for pneumonia.      Findings concur with the preliminary report by the referring clinician already in PACS and/or our electronic record EPIC.      Workstation performed: [id number]             ECG 12 Lead Documentation Only    Date/Time: 12/8/2024 4:37 PM    Performed by: Rogelio Vargas DO  Authorized by: Rogelio Vargas DO    Indications / Diagnosis:  Chest pain sob  ECG reviewed by me, the ED Provider: yes    Patient location:  ED  Previous ECG:     Previous ECG:  Compared to current    Comparison ECG info:  11-24    Similarity:  No change  Interpretation:     Interpretation: non-specific    Rate:     ECG rate:  99    ECG rate assessment: normal    Rhythm:     Rhythm: sinus rhythm    Ectopy:     Ectopy: none    QRS:     QRS axis:  Normal    QRS intervals:  Normal  Conduction:     Conduction: abnormal      Abnormal conduction: 1st degree    ST segments:     ST segments:  Normal  T waves:     T waves: normal    Q waves:     Q waves:  III  Comments:      This EKG was interpreted by me.      ED Medication and Procedure Management   Prior to Admission Medications   Prescriptions Last Dose Informant Patient Reported? Taking?   QUEtiapine (SEROquel) 300 mg tablet  Outside Facility (Specify) No No   Sig: Take 1 tablet (300 mg total) by mouth daily at bedtime   atorvastatin (LIPITOR) 40 mg tablet  Outside Facility (Specify) No No   Sig: Take 1 tablet (40 mg total) by mouth daily with dinner   benzonatate (TESSALON) 200 MG capsule   No No   Sig: Take 1 capsule (200 mg total) by mouth 3 (three) times a day as needed for cough   diclofenac sodium (Voltaren) 1 %  Outside Facility (Specify) No No   Sig: Apply 2 g topically 4 (four)  times a day   escitalopram (LEXAPRO) 20 mg tablet  Outside Facility (Specify) No No   Sig: Take 1 tablet (20 mg total) by mouth daily   ferrous sulfate 325 (65 Fe) mg tablet   No No   Sig: Take 1 tablet (325 mg total) by mouth daily with breakfast Do not start before December 7, 2024.   guaiFENesin (ROBITUSSIN) 100 MG/5ML oral liquid   No No   Sig: Take 10 mL (200 mg total) by mouth 3 (three) times a day as needed for cough   nicotine polacrilex (NICORETTE) 2 mg gum  Outside Facility (Specify) No No   Sig: Chew 1 each (2 mg total) every 3 (three) hours as needed for smoking cessation   pantoprazole (PROTONIX) 40 mg tablet   No No   Sig: Take 1 tablet (40 mg total) by mouth 2 (two) times a day before lunch and dinner Do not start before December 7, 2024.   polyethylene glycol (MIRALAX) 17 g packet  Outside Facility (Specify) No No   Sig: Take 17 g by mouth daily as needed (constipation)   traZODone (DESYREL) 100 mg tablet  Outside Facility (Specify) No No   Sig: Take 1 tablet (100 mg total) by mouth daily at bedtime   warfarin (COUMADIN) 5 mg tablet   No No   Sig: Take 1 tablet (5 mg total) by mouth daily      Facility-Administered Medications Last Administration Doses Remaining   acetaminophen (TYLENOL) tablet 650 mg None recorded         Current Discharge Medication List        CONTINUE these medications which have NOT CHANGED    Details   atorvastatin (LIPITOR) 40 mg tablet Take 1 tablet (40 mg total) by mouth daily with dinner  Qty: 30 tablet, Refills: 0    Associated Diagnoses: History of CVA (cerebrovascular accident)      benzonatate (TESSALON) 200 MG capsule Take 1 capsule (200 mg total) by mouth 3 (three) times a day as needed for cough  Qty: 20 capsule, Refills: 0    Associated Diagnoses: Viral URI with cough      diclofenac sodium (Voltaren) 1 % Apply 2 g topically 4 (four) times a day  Qty: 1 Tube, Refills: 0    Associated Diagnoses: Acute pain of right knee; Patellofemoral pain syndrome of right knee       escitalopram (LEXAPRO) 20 mg tablet Take 1 tablet (20 mg total) by mouth daily  Qty: 21 tablet, Refills: 0    Associated Diagnoses: Bipolar 2 disorder, major depressive episode (HCC)      ferrous sulfate 325 (65 Fe) mg tablet Take 1 tablet (325 mg total) by mouth daily with breakfast Do not start before December 7, 2024.  Qty: 30 tablet, Refills: 0    Associated Diagnoses: Iron deficiency anemia      guaiFENesin (ROBITUSSIN) 100 MG/5ML oral liquid Take 10 mL (200 mg total) by mouth 3 (three) times a day as needed for cough  Qty: 120 mL, Refills: 0    Associated Diagnoses: Viral URI with cough      nicotine polacrilex (NICORETTE) 2 mg gum Chew 1 each (2 mg total) every 3 (three) hours as needed for smoking cessation  Qty: 100 each, Refills: 0    Associated Diagnoses: GI bleed      pantoprazole (PROTONIX) 40 mg tablet Take 1 tablet (40 mg total) by mouth 2 (two) times a day before lunch and dinner Do not start before December 7, 2024.  Qty: 30 tablet, Refills: 0    Associated Diagnoses: GI bleed      polyethylene glycol (MIRALAX) 17 g packet Take 17 g by mouth daily as needed (constipation)  Qty: 30 each, Refills: 0    Associated Diagnoses: GI bleed      QUEtiapine (SEROquel) 300 mg tablet Take 1 tablet (300 mg total) by mouth daily at bedtime  Qty: 30 tablet, Refills: 0    Associated Diagnoses: Bipolar 2 disorder, major depressive episode (HCC)      traZODone (DESYREL) 100 mg tablet Take 1 tablet (100 mg total) by mouth daily at bedtime  Qty: 21 tablet, Refills: 0    Associated Diagnoses: Insomnia      warfarin (COUMADIN) 5 mg tablet Take 1 tablet (5 mg total) by mouth daily  Qty: 7 tablet, Refills: 0    Associated Diagnoses: Left ventricular apical thrombus           No discharge procedures on file.  ED SEPSIS DOCUMENTATION   Time reflects when diagnosis was documented in both MDM as applicable and the Disposition within this note       Time User Action Codes Description Comment    12/8/2024  6:00 PM Rogelio Vargas  Add [J18.9] Pneumonia     12/8/2024  6:00 PM Rogelio Vargas Add [R07.9] Chest pain     12/8/2024  6:00 PM Rogelio Vargas Modify [J18.9] Pneumonia acute with hypoxia requiring oxygen           Initial Sepsis Screening       Row Name 12/08/24 1813                Is the patient's history suggestive of a new or worsening infection? Yes (Proceed)  -CELESTE        Suspected source of infection pneumonia  -CELESTE        Indicate SIRS criteria Tachypnea > 20 resp per min;Leukocytosis (WBC > 47408 IJL) OR Leukopenia (WBC <4000 IJL) OR Bandemia (WBC >10% bands)  -CELESTE        Are two or more of the above signs & symptoms of infection both present and new to the patient? Yes (Proceed)  -CELESTE        Assess for evidence of organ dysfunction: Are any of the below criteria present within 6 hours of suspected infection and SIRS criteria that are NOT considered to be chronic conditions? --  no sign of end-organ damage  -CELESTE                  User Key  (r) = Recorded By, (t) = Taken By, (c) = Cosigned By      Initials Name Provider Type    CELESTE Vargas DO Physician                       Rogelio Vargas DO  12/08/24 2037

## 2024-12-08 NOTE — ASSESSMENT & PLAN NOTE
Presents hypoxic, requiring 4 L/min acutely for stabilization  Patient is on room air at baseline  In the setting of pneumonia  See plan above  Wean as tolerated

## 2024-12-08 NOTE — H&P
H&P - Hospitalist   Name: Chuy Norton 64 y.o. male I MRN: [id number]  Unit/Bed#: ED 11 I Date of Admission: 12/8/2024   Date of Service: 12/8/2024 I Hospital Day: 0     Assessment & Plan  Sepsis due to pneumonia (HCC)  Presents with shortness of breath, cough, chest pain.  Chest x-ray suspicious for pneumonia.  Will follow-up on CTA chest.  SIRS criteria: Leukocytosis, tachypnea  Suspected source: Pneumonia  Lactic acid: Normal  Pro-James elevated to 3.0  End organ damage: none   IV antibiotics: Start IV ceftriaxone and p.o. azithromycin  Obtain sputum culture, strep/Legionella urinary antigens.  Follow-up on blood cultures  Monitor vital signs, laboratory studies.  Left ventricular apical thrombus  Recently admitted with LV apical thrombus which was previously untreated for almost 3 years with subtherapeutic INR.  Echocardiogram at that time showed stable LV apical thrombus.  Patient was bridged with Lovenox/Coumadin and discharged on 12/6.  His INR today is 2.36 and therapeutic.  Can discontinue Lovenox and continue Coumadin 5 mg daily.  Monitor INR.  He has been planned to follow with outpatient Coumadin clinic.  Acute respiratory failure with hypoxia (HCC)  Presents hypoxic, requiring 4 L/min acutely for stabilization  Patient is on room air at baseline  In the setting of pneumonia  See plan above  Wean as tolerated  PUD (peptic ulcer disease)  Resume PPI  Hiatal hernia with GERD without esophagitis  Complained of chest pain prior to hospitalization however none in the ER  Resume PPI twice daily and outpatient follow-up with GI  Nonulcerogenic diet ordered  Cirrhosis (HCC)  Appearing compensated at this time  His MELD is 17 on admit however patient is on Coumadin and has therapeutic INR  Iron deficiency anemia  Resume iron supplementation  Bipolar 2 disorder, major depressive episode (HCC)  Mood is stable presently  Resume trazodone, Seroquel, Lexapro  Thrombocytopenia (HCC)  Platelet count upon admission 106,  around baseline and in the setting of cirrhosis,  Repeat tomorrow  No active bleeding      VTE Pharmacologic Prophylaxis: VTE Score: 7 High Risk (Score >/= 5) - Pharmacological DVT Prophylaxis Ordered: warfarin (Coumadin). Sequential Compression Devices Ordered.  Code Status: full code     Anticipated Length of Stay: Patient will be admitted on an inpatient basis with an anticipated length of stay of greater than 2 midnights secondary to sepsis d/t pneumonia on IV abx.    Total Time for Visit, including Counseling / Coordination of Care: 75 Minutes. Greater than 50% of this total time spent on direct patient counseling and coordination of care.    Chief Complaint: chest pain, short of breath     History of Present Illness:  Chuy Norton is a 64 y.o. male with a PMH of LV thrombus on Coumadin, bipolar disorder, GERD, PUD who presents with chief complaint of chest pain this morning, shortness of breath.  Was recently hospitalized for 1 week and discharged due to atelectasis as well as untreated LV thrombus.  Patient was bridged from Lovenox to Coumadin, INR now therapeutic.  During his last hospitalization he was monitored off antibiotics and afebrile and clinically well for 4 days prior to discharge.  He Marquita presents today as he is feeling sudden chest pain with shortness of breath starting this morning around 10 AM.    Review of Systems:  Review of Systems   Constitutional:  Negative for activity change, appetite change, chills, fatigue and fever.   HENT:  Negative for congestion, rhinorrhea, sinus pressure and sore throat.    Eyes:  Negative for photophobia, pain and visual disturbance.   Respiratory:  Positive for cough and shortness of breath. Negative for wheezing.    Cardiovascular:  Positive for chest pain. Negative for palpitations and leg swelling.   Gastrointestinal:  Negative for abdominal distention, abdominal pain, constipation, diarrhea, nausea and vomiting.   Endocrine: Negative for cold intolerance,  heat intolerance, polydipsia and polyuria.   Genitourinary:  Negative for difficulty urinating, dysuria, flank pain, frequency and hematuria.   Musculoskeletal:  Negative for arthralgias, back pain and joint swelling.   Skin:  Negative for color change, pallor and rash.   Allergic/Immunologic: Negative.    Neurological:  Negative for dizziness, syncope, weakness, light-headedness and headaches.   Hematological: Negative.    Psychiatric/Behavioral: Negative.         Past Medical and Surgical History:   Past Medical History:   Diagnosis Date    Alcohol abuse     Depression     Drug therapy     GERD (gastroesophageal reflux disease)     Hepatitis C     Hypertension 01/2012    Knee pain, bilateral     Left ventricular apical thrombus     Psychiatric disorder     depression, anxiety    Renal disorder     Self-injurious behavior     Spinal stenosis of lumbar region     Suicide attempt (HCC)        Past Surgical History:   Procedure Laterality Date    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    HERNIA REPAIR  1997       Meds/Allergies:  Prior to Admission medications    Medication Sig Start Date End Date Taking? Authorizing Provider   atorvastatin (LIPITOR) 40 mg tablet Take 1 tablet (40 mg total) by mouth daily with dinner 5/15/24   Mari Morales PA-C   benzonatate (TESSALON) 200 MG capsule Take 1 capsule (200 mg total) by mouth 3 (three) times a day as needed for cough 10/23/24   NEHEMIAH Andersen   diclofenac sodium (Voltaren) 1 % Apply 2 g topically 4 (four) times a day 9/23/20   Dante Grady DO   escitalopram (LEXAPRO) 20 mg tablet Take 1 tablet (20 mg total) by mouth daily 5/15/24   Mari Morales PA-C   ferrous sulfate 325 (65 Fe) mg tablet Take 1 tablet (325 mg total) by mouth daily with breakfast Do not start before December 7, 2024. 12/7/24   Ovidio Kaur PA-C   guaiFENesin (ROBITUSSIN) 100 MG/5ML oral liquid Take 10 mL (200 mg total) by mouth 3 (three) times a day as needed for cough 10/23/24   Chelsea  "R NEHEMIAH Suazo   nicotine polacrilex (NICORETTE) 2 mg gum Chew 1 each (2 mg total) every 3 (three) hours as needed for smoking cessation 5/15/24   Mari Morales PA-C   pantoprazole (PROTONIX) 40 mg tablet Take 1 tablet (40 mg total) by mouth 2 (two) times a day before lunch and dinner Do not start before December 7, 2024. 12/7/24   Ovidio Kaur PA-C   polyethylene glycol (MIRALAX) 17 g packet Take 17 g by mouth daily as needed (constipation) 5/15/24   Mari Morales PA-C   QUEtiapine (SEROquel) 300 mg tablet Take 1 tablet (300 mg total) by mouth daily at bedtime 5/15/24   Mari Morales PA-C   traZODone (DESYREL) 100 mg tablet Take 1 tablet (100 mg total) by mouth daily at bedtime 5/15/24   Mari Morales PA-C   warfarin (COUMADIN) 5 mg tablet Take 1 tablet (5 mg total) by mouth daily 12/4/24   Ovidio Kaur PA-C   enoxaparin (LOVENOX) 150 mg/mL injection Inject 0.9 mL (135 mg total) under the skin every 24 hours for 7 days Do not start before December 7, 2024. 12/7/24 12/8/24  Ovidio Kaur PA-C       All medications reviewed.    Allergies:   Allergies   Allergen Reactions    Haloperidol Tremor     Other reaction(s): jittery       Social History:  Marital Status: Single     Substance Use History:   Social History     Substance and Sexual Activity   Alcohol Use Not Currently    Alcohol/week: 0.0 standard drinks of alcohol    Comment: Stopped drinking for several months - recovered alcoholic     Social History     Tobacco Use   Smoking Status Every Day    Current packs/day: 0.50    Types: Cigarettes, Cigars   Smokeless Tobacco Never   Tobacco Comments    PT \"3 CIGARS A DAY\" - quit smoking cigars     Social History     Substance and Sexual Activity   Drug Use Not Currently    Types: Marijuana    Comment: Last used marijuana 2 months ago.- no longer smoking       Family History:  Non-contributory    Physical Exam:     Vitals:   Blood Pressure: 155/78 (12/08/24 1815)  Pulse: 91 (12/08/24 1815)  Temperature: 98.7 °F (37.1 " "°C) (12/08/24 1800)  Temp Source: Oral (12/08/24 1800)  Respirations: (!) 24 (12/08/24 1815)  Height: 5' 8\" (172.7 cm) (12/08/24 1800)  Weight - Scale: 96 kg (211 lb 10.3 oz) (12/08/24 1800)  SpO2: 95 % (12/08/24 1815)    Physical Exam  Vitals and nursing note reviewed.   Constitutional:       General: He is not in acute distress.     Appearance: Normal appearance. He is not ill-appearing.      Interventions: Nasal cannula in place.   HENT:      Head: Normocephalic and atraumatic.   Eyes:      General: No scleral icterus.        Right eye: No discharge.         Left eye: No discharge.      Pupils: Pupils are equal, round, and reactive to light.   Cardiovascular:      Rate and Rhythm: Normal rate and regular rhythm.      Pulses: Normal pulses.      Heart sounds: No murmur heard.     No friction rub. No gallop.   Pulmonary:      Effort: Pulmonary effort is normal. No respiratory distress.      Breath sounds: Rhonchi present. No wheezing or rales.   Abdominal:      General: Bowel sounds are normal. There is no distension.      Palpations: Abdomen is soft.      Tenderness: There is no abdominal tenderness. There is no guarding or rebound.   Musculoskeletal:         General: No swelling or deformity.      Cervical back: Neck supple.      Right lower leg: No edema.      Left lower leg: No edema.   Skin:     General: Skin is warm and dry.      Capillary Refill: Capillary refill takes less than 2 seconds.      Coloration: Skin is not jaundiced or pale.      Findings: No erythema or rash.   Neurological:      General: No focal deficit present.      Mental Status: He is alert and oriented to person, place, and time. Mental status is at baseline.      Cranial Nerves: No cranial nerve deficit.      Sensory: No sensory deficit.      Motor: No weakness.   Psychiatric:         Mood and Affect: Mood normal.         Behavior: Behavior normal.          Additional Data:     Lab Results:  Results from last 7 days   Lab Units " 12/08/24  1623   WBC Thousand/uL 15.63*   HEMOGLOBIN g/dL 10.0*   HEMATOCRIT % 35.6*   PLATELETS Thousands/uL 106*   SEGS PCT % 84*   LYMPHO PCT % 9*   MONO PCT % 6   EOS PCT % 0     Results from last 7 days   Lab Units 12/08/24  1623   SODIUM mmol/L 138   POTASSIUM mmol/L 3.8   CHLORIDE mmol/L 108   CO2 mmol/L 22   BUN mg/dL 22   CREATININE mg/dL 1.15   ANION GAP mmol/L 8   CALCIUM mg/dL 8.5   ALBUMIN g/dL 4.0   TOTAL BILIRUBIN mg/dL 0.56   ALK PHOS U/L 52   ALT U/L 17   AST U/L 22   GLUCOSE RANDOM mg/dL 109     Results from last 7 days   Lab Units 12/08/24  1623   INR  2.36*             Results from last 7 days   Lab Units 12/08/24  1623   LACTIC ACID mmol/L 1.8   PROCALCITONIN ng/ml 3.08*       Imaging: All pertinent imaging reviewed.  XR chest portable   ED Interpretation by Rogelio Vargas DO (12/08 1649)   Right lower lung new opacity noted, unable to visualize left lower lung could be effusion or infiltrate, compared to previous CXR, interpreted by me      CTA chest pe study    (Results Pending)       EKG and Other Studies Reviewed on Admission:   Prior pertinent studies and records reviewed in [facility]Care Everywhere.    ** Please Note: This note has been constructed using a voice recognition system. **

## 2024-12-08 NOTE — ASSESSMENT & PLAN NOTE
Presents with shortness of breath, cough, chest pain.  Chest x-ray suspicious for pneumonia.  Will follow-up on CTA chest.  SIRS criteria: Leukocytosis, tachypnea  Suspected source: Pneumonia  Lactic acid: Normal  Pro-James elevated to 3.0  End organ damage: none   IV antibiotics: Start IV ceftriaxone and p.o. azithromycin  Obtain sputum culture, strep/Legionella urinary antigens.  Follow-up on blood cultures  Monitor vital signs, laboratory studies.

## 2024-12-08 NOTE — ASSESSMENT & PLAN NOTE
Complained of chest pain prior to hospitalization however none in the ER  Resume PPI twice daily and outpatient follow-up with GI  Nonulcerogenic diet ordered

## 2024-12-08 NOTE — ASSESSMENT & PLAN NOTE
Recently admitted with LV apical thrombus which was previously untreated for almost 3 years with subtherapeutic INR.  Echocardiogram at that time showed stable LV apical thrombus.  Patient was bridged with Lovenox/Coumadin and discharged on 12/6.  His INR today is 2.36 and therapeutic.  Can discontinue Lovenox and continue Coumadin 5 mg daily.  Monitor INR.  He has been planned to follow with outpatient Coumadin clinic.

## 2024-12-08 NOTE — SEPSIS NOTE
Sepsis Note   Chuy Norton 64 y.o. male MRN: [id number]  Unit/Bed#: ED 11 Encounter: [id number]       Initial Sepsis Screening       Row Name 12/08/24 1813                Is the patient's history suggestive of a new or worsening infection? Yes (Proceed)  -CELESTE        Suspected source of infection pneumonia  -CELESTE        Indicate SIRS criteria Tachypnea > 20 resp per min;Leukocytosis (WBC > 33994 IJL) OR Leukopenia (WBC <4000 IJL) OR Bandemia (WBC >10% bands)  -CELESTE        Are two or more of the above signs & symptoms of infection both present and new to the patient? Yes (Proceed)  -CELESTE        Assess for evidence of organ dysfunction: Are any of the below criteria present within 6 hours of suspected infection and SIRS criteria that are NOT considered to be chronic conditions? --  no sign of end-organ damage  -CELESTE                  User Key  (r) = Recorded By, (t) = Taken By, (c) = Cosigned By      Initials Name Provider Type    CELESTE Rogelio Vargas DO Physician                        Body mass index is 32.18 kg/m².  Wt Readings from Last 1 Encounters:   12/08/24 96 kg (211 lb 10.3 oz)     IBW (Ideal Body Weight): 68.4 kg    Ideal body weight: 68.4 kg (150 lb 12.7 oz)  Adjusted ideal body weight: 79.4 kg (175 lb 2.1 oz)

## 2024-12-08 NOTE — ASSESSMENT & PLAN NOTE
Platelet count upon admission 106, around baseline and in the setting of cirrhosis,  Repeat tomorrow  No active bleeding

## 2024-12-09 LAB
ANION GAP SERPL CALCULATED.3IONS-SCNC: 7 MMOL/L (ref 4–13)
ANISOCYTOSIS BLD QL SMEAR: PRESENT
BASOPHILS # BLD AUTO: 0.01 THOUSANDS/ÂΜL (ref 0–0.1)
BASOPHILS NFR BLD AUTO: 0 % (ref 0–1)
BUN SERPL-MCNC: 21 MG/DL (ref 5–25)
BURR CELLS BLD QL SMEAR: PRESENT
CALCIUM SERPL-MCNC: 7.9 MG/DL (ref 8.4–10.2)
CHLORIDE SERPL-SCNC: 111 MMOL/L (ref 96–108)
CO2 SERPL-SCNC: 22 MMOL/L (ref 21–32)
CREAT SERPL-MCNC: 1.13 MG/DL (ref 0.6–1.3)
EOSINOPHIL # BLD AUTO: 0.12 THOUSAND/ÂΜL (ref 0–0.61)
EOSINOPHIL NFR BLD AUTO: 1 % (ref 0–6)
ERYTHROCYTE [DISTWIDTH] IN BLOOD BY AUTOMATED COUNT: 30 % (ref 11.6–15.1)
GFR SERPL CREATININE-BSD FRML MDRD: 68 ML/MIN/1.73SQ M
GLUCOSE SERPL-MCNC: 90 MG/DL (ref 65–140)
HCT VFR BLD AUTO: 31.9 % (ref 36.5–49.3)
HGB BLD-MCNC: 9.1 G/DL (ref 12–17)
HYPERCHROMIA BLD QL SMEAR: PRESENT
IMM GRANULOCYTES # BLD AUTO: 0.09 THOUSAND/UL (ref 0–0.2)
IMM GRANULOCYTES NFR BLD AUTO: 1 % (ref 0–2)
INR PPP: 2.34 (ref 0.85–1.19)
L PNEUMO1 AG UR QL IA.RAPID: NEGATIVE
LYMPHOCYTES # BLD AUTO: 1.78 THOUSANDS/ÂΜL (ref 0.6–4.47)
LYMPHOCYTES NFR BLD AUTO: 16 % (ref 14–44)
MCH RBC QN AUTO: 21.7 PG (ref 26.8–34.3)
MCHC RBC AUTO-ENTMCNC: 28.5 G/DL (ref 31.4–37.4)
MCV RBC AUTO: 76 FL (ref 82–98)
MONOCYTES # BLD AUTO: 0.54 THOUSAND/ÂΜL (ref 0.17–1.22)
MONOCYTES NFR BLD AUTO: 5 % (ref 4–12)
NEUTROPHILS # BLD AUTO: 8.66 THOUSANDS/ÂΜL (ref 1.85–7.62)
NEUTS SEG NFR BLD AUTO: 77 % (ref 43–75)
NRBC BLD AUTO-RTO: 0 /100 WBCS
OVALOCYTES BLD QL SMEAR: PRESENT
PLATELET # BLD AUTO: 95 THOUSANDS/UL (ref 149–390)
PLATELET BLD QL SMEAR: ABNORMAL
POIKILOCYTOSIS BLD QL SMEAR: PRESENT
POTASSIUM SERPL-SCNC: 3.7 MMOL/L (ref 3.5–5.3)
PROCALCITONIN SERPL-MCNC: 2 NG/ML
PROTHROMBIN TIME: 26 SECONDS (ref 12.3–15)
RBC # BLD AUTO: 4.19 MILLION/UL (ref 3.88–5.62)
RBC MORPH BLD: PRESENT
S PNEUM AG UR QL: NEGATIVE
SODIUM SERPL-SCNC: 140 MMOL/L (ref 135–147)
WBC # BLD AUTO: 11.2 THOUSAND/UL (ref 4.31–10.16)

## 2024-12-09 PROCEDURE — 85610 PROTHROMBIN TIME: CPT | Performed by: INTERNAL MEDICINE

## 2024-12-09 PROCEDURE — 99232 SBSQ HOSP IP/OBS MODERATE 35: CPT | Performed by: INTERNAL MEDICINE

## 2024-12-09 PROCEDURE — 85025 COMPLETE CBC W/AUTO DIFF WBC: CPT | Performed by: INTERNAL MEDICINE

## 2024-12-09 PROCEDURE — 87081 CULTURE SCREEN ONLY: CPT | Performed by: INTERNAL MEDICINE

## 2024-12-09 PROCEDURE — 84145 PROCALCITONIN (PCT): CPT | Performed by: INTERNAL MEDICINE

## 2024-12-09 PROCEDURE — 80048 BASIC METABOLIC PNL TOTAL CA: CPT | Performed by: INTERNAL MEDICINE

## 2024-12-09 RX ORDER — ACETAMINOPHEN 325 MG/1
500 TABLET ORAL EVERY 6 HOURS PRN
Status: DISCONTINUED | OUTPATIENT
Start: 2024-12-09 | End: 2024-12-10 | Stop reason: HOSPADM

## 2024-12-09 RX ADMIN — QUETIAPINE FUMARATE 300 MG: 300 TABLET ORAL at 21:16

## 2024-12-09 RX ADMIN — GUAIFENESIN 600 MG: 600 TABLET ORAL at 21:16

## 2024-12-09 RX ADMIN — WARFARIN SODIUM 5 MG: 5 TABLET ORAL at 16:54

## 2024-12-09 RX ADMIN — BENZONATATE 200 MG: 100 CAPSULE ORAL at 21:16

## 2024-12-09 RX ADMIN — TRAZODONE HYDROCHLORIDE 100 MG: 100 TABLET ORAL at 21:16

## 2024-12-09 RX ADMIN — AZITHROMYCIN DIHYDRATE 500 MG: 500 TABLET ORAL at 16:43

## 2024-12-09 RX ADMIN — ATORVASTATIN CALCIUM 40 MG: 40 TABLET, FILM COATED ORAL at 15:49

## 2024-12-09 RX ADMIN — GUAIFENESIN 600 MG: 600 TABLET ORAL at 08:23

## 2024-12-09 RX ADMIN — PANTOPRAZOLE SODIUM 40 MG: 40 TABLET, DELAYED RELEASE ORAL at 15:49

## 2024-12-09 RX ADMIN — FERROUS SULFATE TAB 325 MG (65 MG ELEMENTAL FE) 325 MG: 325 (65 FE) TAB at 08:23

## 2024-12-09 RX ADMIN — ESCITALOPRAM OXALATE 20 MG: 20 TABLET ORAL at 08:23

## 2024-12-09 RX ADMIN — CEFTRIAXONE 1000 MG: 1 INJECTION, SOLUTION INTRAVENOUS at 16:48

## 2024-12-09 RX ADMIN — PANTOPRAZOLE SODIUM 40 MG: 40 TABLET, DELAYED RELEASE ORAL at 10:50

## 2024-12-09 NOTE — PLAN OF CARE
Problem: Potential for Falls  Goal: Patient will remain free of falls  Description: INTERVENTIONS:  - Educate patient/family on patient safety including physical limitations  - Instruct patient to call for assistance with activity   - Consult OT/PT to assist with strengthening/mobility   - Keep Call bell within reach  - Keep bed low and locked with side rails adjusted as appropriate  - Keep care items and personal belongings within reach  - Initiate and maintain comfort rounds  - Make Fall Risk Sign visible to staff  - Offer Toileting every  Hours, in advance of need  - Initiate/Maintain alarm  - Obtain necessary fall risk management equipment:   - Apply yellow socks and bracelet for high fall risk patients  - Consider moving patient to room near nurses station  Outcome: Progressing     Problem: Prexisting or High Potential for Compromised Skin Integrity  Goal: Skin integrity is maintained or improved  Description: INTERVENTIONS:  - Identify patients at risk for skin breakdown  - Assess and monitor skin integrity  - Assess and monitor nutrition and hydration status  - Monitor labs   - Assess for incontinence   - Turn and reposition patient  - Assist with mobility/ambulation  - Relieve pressure over bony prominences  - Avoid friction and shearing  - Provide appropriate hygiene as needed including keeping skin clean and dry  - Evaluate need for skin moisturizer/barrier cream  - Collaborate with interdisciplinary team   - Patient/family teaching  - Consider wound care consult   Outcome: Progressing     Problem: PAIN - ADULT  Goal: Verbalizes/displays adequate comfort level or baseline comfort level  Description: Interventions:  - Encourage patient to monitor pain and request assistance  - Assess pain using appropriate pain scale  - Administer analgesics based on type and severity of pain and evaluate response  - Implement non-pharmacological measures as appropriate and evaluate response  - Consider cultural and  social influences on pain and pain management  - Notify physician/advanced practitioner if interventions unsuccessful or patient reports new pain  Outcome: Progressing     Problem: INFECTION - ADULT  Goal: Absence or prevention of progression during hospitalization  Description: INTERVENTIONS:  - Assess and monitor for signs and symptoms of infection  - Monitor lab/diagnostic results  - Monitor all insertion sites, i.e. indwelling lines, tubes, and drains  - Monitor endotracheal if appropriate and nasal secretions for changes in amount and color  - Pollock appropriate cooling/warming therapies per order  - Administer medications as ordered  - Instruct and encourage patient and family to use good hand hygiene technique  - Identify and instruct in appropriate isolation precautions for identified infection/condition  Outcome: Progressing  Goal: Absence of fever/infection during neutropenic period  Description: INTERVENTIONS:  - Monitor WBC    Outcome: Progressing

## 2024-12-09 NOTE — RESPIRATORY THERAPY NOTE
"RT Protocol Note  Chuy Norton 64 y.o. male MRN: [id number]  Unit/Bed#: -01 Encounter: [id number]    Assessment    Principal Problem:    Sepsis due to pneumonia (HCC)  Active Problems:    Bipolar 2 disorder, major depressive episode (HCC)    Hiatal hernia with GERD without esophagitis    Cirrhosis (HCC)    Iron deficiency anemia    Left ventricular apical thrombus    PUD (peptic ulcer disease)    Thrombocytopenia (HCC)    Acute respiratory failure with hypoxia (HCC)      Home Pulmonary Medications:  NA       Past Medical History:   Diagnosis Date    Alcohol abuse     Depression     Drug therapy     GERD (gastroesophageal reflux disease)     Hepatitis C     Hypertension 01/2012    Knee pain, bilateral     Left ventricular apical thrombus     Psychiatric disorder     depression, anxiety    Renal disorder     Self-injurious behavior     Spinal stenosis of lumbar region     Suicide attempt (HCC)      Social History     Socioeconomic History    Marital status: Single     Spouse name: None    Number of children: 0    Years of education: None    Highest education level: None   Occupational History    Occupation: Unemployed     Comment: Recently lost his job in August at [employer]    Tobacco Use    Smoking status: Every Day     Current packs/day: 0.50     Types: Cigarettes, Cigars    Smokeless tobacco: Never    Tobacco comments:     PT \"3 CIGARS A DAY\" - quit smoking cigars   Vaping Use    Vaping status: Never Used   Substance and Sexual Activity    Alcohol use: Not Currently     Alcohol/week: 0.0 standard drinks of alcohol     Comment: Stopped drinking for several months - recovered alcoholic    Drug use: Not Currently     Types: Marijuana     Comment: Last used marijuana 2 months ago.- no longer smoking    Sexual activity: Never   Other Topics Concern    None   Social History Narrative    He was told to leave the [employer] [place] when he tested positive for THC.  Now states that he is unemployed and " "homeless.  He has 3 sisters , 2 of which live in [place] and the other in [place].       Social Drivers of Health     Financial Resource Strain: Not on file   Food Insecurity: No Food Insecurity (2024)    Nursing - Inadequate Food Risk Classification     Worried About Running Out of Food in the Last Year: Never true     Ran Out of Food in the Last Year: Never true     Ran Out of Food in the Last Year: 1   Transportation Needs: No Transportation Needs (2024)    Nursing - Transportation Risk Classification     Lack of Transportation: Not on file     Lack of Transportation: 2   Physical Activity: Not on file   Stress: Not on file   Social Connections: Not on file   Intimate Partner Violence: Unknown (2024)    Nursing IPS     Feels Physically and Emotionally Safe: Not on file     Physically Hurt by Someone: Not on file     Humiliated or Emotionally Abused by Someone: Not on file     Physically Hurt by Someone: 2     Hurt or Threatened by Someone: 2   Housing Stability: Unknown (2024)    Nursing: Inadequate Housing Risk Classification     Has Housing: Not on file     Worried About Losing Housing: Not on file     Unable to Get Utilities: Not on file     Unable to Pay for Housing in the Last Year: 2     Has Housin       Subjective         Objective    Physical Exam:   Assessment Type: Assess only  General Appearance: Alert, Awake  Respiratory Pattern: Tachypneic, Dyspnea at rest, Dyspnea with exertion  Chest Assessment: Chest expansion symmetrical  Bilateral Breath Sounds: Diminished, Crackles  Cough: None  O2 Device: NC 4L    Vitals:  Blood pressure 144/87, pulse 92, temperature 97.7 °F (36.5 °C), temperature source Oral, resp. rate (!) 24, height 5' 8\" (1.727 m), weight 96 kg (211 lb 10.3 oz), SpO2 96%.          Imaging and other studies: Results Review Statement: No pertinent imaging studies reviewed.    O2 Device: NC 4L     Plan    Respiratory Plan: Mild Distress pathway        Resp Comments: " PT has HX of exertional dyspnea, tobacco dependency disorder. PT takes no home meds/requires no home O2. PT is admitted with sepsis due to PNA. Albuterol PRN will be ordered in case of need.

## 2024-12-09 NOTE — PROGRESS NOTES
Progress Note - Hospitalist   Name: Chuy Norton 64 y.o. male I MRN: [id number]  Unit/Bed#: -01 I Date of Admission: 12/8/2024   Date of Service: 12/9/2024 I Hospital Day: 1    Assessment & Plan  Sepsis due to pneumonia (HCC)  Presents with shortness of breath, cough, chest pain.  Chest x-ray suspicious for pneumonia.    SIRS criteria: Leukocytosis, tachypnea  Suspected source: Pneumonia  Clinically improving on IV antibiotics with ceftriaxone azithromycin  WBC count improving  continue IV antibiotics for another 24 hours potential transition to oral antibiotics tomorrow continues to improve  Left ventricular apical thrombus  Recently admitted with LV apical thrombus which was previously untreated for almost 3 years with subtherapeutic INR.  Echocardiogram at that time showed stable LV apical thrombus.  Patient was bridged with Lovenox/Coumadin and discharged on 12/6.  Continue Coumadin  INR currently therapeutic  Acute respiratory failure with hypoxia (HCC)  Presents hypoxic, requiring 4 L/min acutely for stabilization  Patient is on room air at baseline  Secondary to pneumonia  Is since resolved with IV antibiotics  PUD (peptic ulcer disease)  Continue PPI  Hiatal hernia with GERD without esophagitis  Complained of chest pain prior to hospitalization however none in the ER  Resume PPI twice daily and outpatient follow-up with GI  Nonulcerogenic diet ordered  Cirrhosis (HCC)  Appearing compensated at this time  Continue to monitor  Outpatient follow-up  Iron deficiency anemia  Resume iron supplementation  Bipolar 2 disorder, major depressive episode (HCC)  Mood is stable presently  Continue trazodone, Seroquel, Lexapro  Thrombocytopenia (HCC)  Platelet count upon admission 106, around baseline and in the setting of cirrhosis,  Remained stable    VTE Pharmacologic Prophylaxis: VTE Score: 7 High Risk (Score >/= 5) - Pharmacological DVT Prophylaxis Ordered: warfarin (Coumadin). Sequential Compression Devices  Ordered.    Mobility:   Basic Mobility Inpatient Raw Score: 16  JH-HLM Goal: 5: Stand one or more mins  JH-HLM Achieved: 3: Sit at edge of bed  JH-HLM Goal achieved. Continue to encourage appropriate mobility.    Patient Centered Rounds: I performed bedside rounds with nursing staff today.   Discussions with Specialists or Other Care Team Provider: cm, nursing    Education and Discussions with Family / Patient: Patient declined call to .     Current Length of Stay: 1 day(s)  Current Patient Status: Inpatient   Certification Statement: The patient will continue to require additional inpatient hospital stay due to see below  Discharge Plan:  Still requiring IV antibiotics.  Anticipate discharge tomorrow if continues to improve    Code Status: Level 1 - Full Code    Subjective   Reports breathing improved.  Denies fevers, chills, cough    Objective :  Temp:  [97.7 °F (36.5 °C)-98.7 °F (37.1 °C)] 97.7 °F (36.5 °C)  HR:  [] 92  BP: (112-157)/(71-87) 112/72  Resp:  [18-32] 24  SpO2:  [91 %-96 %] 96 %  O2 Device: Nasal cannula  Nasal Cannula O2 Flow Rate (L/min):  [2 L/min-4 L/min] 4 L/min    Body mass index is 32.18 kg/m².     Input and Output Summary (last 24 hours):     Intake/Output Summary (Last 24 hours) at 12/9/2024 1010  Last data filed at 12/9/2024 0601  Gross per 24 hour   Intake 290 ml   Output 300 ml   Net -10 ml       Physical Exam  Constitutional:       General: He is not in acute distress.     Appearance: He is well-developed. He is not diaphoretic.   HENT:      Head: Normocephalic and atraumatic.      Nose: Nose normal.      Mouth/Throat:      Pharynx: No oropharyngeal exudate.   Eyes:      General: No scleral icterus.     Conjunctiva/sclera: Conjunctivae normal.   Cardiovascular:      Rate and Rhythm: Normal rate and regular rhythm.      Heart sounds: Normal heart sounds. No murmur heard.     No friction rub. No gallop.   Pulmonary:      Effort: Pulmonary effort is normal. No  respiratory distress.      Breath sounds: Normal breath sounds. No wheezing or rales.   Chest:      Chest wall: No tenderness.   Abdominal:      General: Bowel sounds are normal. There is no distension.      Palpations: Abdomen is soft.      Tenderness: There is no abdominal tenderness. There is no guarding.   Musculoskeletal:         General: No tenderness or deformity. Normal range of motion.      Cervical back: Normal range of motion and neck supple.   Skin:     General: Skin is warm and dry.      Findings: No erythema.   Neurological:      Mental Status: He is alert. Mental status is at baseline.           Lines/Drains:              Lab Results: I have reviewed the following results:   Results from last 7 days   Lab Units 12/09/24  0350   WBC Thousand/uL 11.20*   HEMOGLOBIN g/dL 9.1*   HEMATOCRIT % 31.9*   PLATELETS Thousands/uL 95*   SEGS PCT % 77*   LYMPHO PCT % 16   MONO PCT % 5   EOS PCT % 1     Results from last 7 days   Lab Units 12/09/24  0350 12/08/24  1623   SODIUM mmol/L 140 138   POTASSIUM mmol/L 3.7 3.8   CHLORIDE mmol/L 111* 108   CO2 mmol/L 22 22   BUN mg/dL 21 22   CREATININE mg/dL 1.13 1.15   ANION GAP mmol/L 7 8   CALCIUM mg/dL 7.9* 8.5   ALBUMIN g/dL  --  4.0   TOTAL BILIRUBIN mg/dL  --  0.56   ALK PHOS U/L  --  52   ALT U/L  --  17   AST U/L  --  22   GLUCOSE RANDOM mg/dL 90 109     Results from last 7 days   Lab Units 12/09/24  0357   INR  2.34*             Results from last 7 days   Lab Units 12/09/24  0350 12/08/24  1623   LACTIC ACID mmol/L  --  1.8   PROCALCITONIN ng/ml 2.00* 3.08*       Recent Cultures (last 7 days):   Results from last 7 days   Lab Units 12/08/24  1927 12/08/24  1623   BLOOD CULTURE   --  Received in Microbiology Lab. Culture in Progress.  Received in Microbiology Lab. Culture in Progress.   LEGIONELLA URINARY ANTIGEN  Negative  --        Imaging Results Review: I personally reviewed the following image studies/reports in PACS and discussed pertinent findings with  Radiology: chest xray. My interpretation of the radiology images/reports is:  .  Other Study Results Review: EKG was reviewed.     Last 24 Hours Medication List:     Current Facility-Administered Medications:     albuterol inhalation solution 2.5 mg, Q6H PRN    atorvastatin (LIPITOR) tablet 40 mg, Daily With Dinner    cefTRIAXone (ROCEPHIN) IVPB (premix in dextrose) 1,000 mg 50 mL, Q24H **AND** azithromycin (ZITHROMAX) tablet 500 mg, Q24H    benzonatate (TESSALON PERLES) capsule 200 mg, TID PRN    escitalopram (LEXAPRO) tablet 20 mg, Daily    ferrous sulfate tablet 325 mg, Daily With Breakfast    guaiFENesin (MUCINEX) 12 hr tablet 600 mg, Q12H SHANNA    nicotine polacrilex (NICORETTE) gum 2 mg, Q2H PRN    ondansetron (ZOFRAN) injection 4 mg, Q6H PRN    pantoprazole (PROTONIX) EC tablet 40 mg, BID before lunch/dinner    polyethylene glycol (MIRALAX) packet 17 g, Daily PRN    QUEtiapine (SEROquel) tablet 300 mg, HS    traZODone (DESYREL) tablet 100 mg, HS    warfarin (COUMADIN) tablet 5 mg, Daily (warfarin)    Administrative Statements   Today, Patient Was Seen By: Benigno Singer MD  I have spent a total time of 30 minutes in caring for this patient on the day of the visit/encounter including Diagnostic results.    **Please Note: This note may have been constructed using a voice recognition system.**

## 2024-12-09 NOTE — ASSESSMENT & PLAN NOTE
Presents with shortness of breath, cough, chest pain.  Chest x-ray suspicious for pneumonia.    SIRS criteria: Leukocytosis, tachypnea  Suspected source: Pneumonia  Clinically improving on IV antibiotics with ceftriaxone azithromycin  WBC count improving  continue IV antibiotics for another 24 hours potential transition to oral antibiotics tomorrow continues to improve

## 2024-12-09 NOTE — UTILIZATION REVIEW
NOTIFICATION OF ADMISSION DISCHARGE   This is a Notification of Discharge from [facility]. Please be advised that this patient has been discharge from our facility. Below you will find the admission and discharge date and time including the patient’s disposition.   UTILIZATION REVIEW CONTACT:  Sara Ulrich  Utilization   Network Utilization Review Department  Phone: [phone] x carefully listen to the prompts. All voicemails are confidential.  Email: [email or web address]     ADMISSION INFORMATION  PRESENTATION DATE: 11/29/2024 10:54 AM  OBERVATION ADMISSION DATE: N/A  INPATIENT ADMISSION DATE: 11/29/24  3:53 PM   DISCHARGE DATE: 12/6/2024 10:30 AM   DISPOSITION:Discharged/Transferred to Long Term Care/Personal Care Home/Assisted Living    Network Utilization Review Department  ATTENTION: Please call with any questions or concerns to [phone] and carefully listen to the prompts so that you are directed to the right person. All voicemails are confidential.   For Discharge needs, contact Care Management DC Support Team at [phone] opt. 2  Send all requests for admission clinical reviews, approved or denied determinations and any other requests to dedicated fax number below belonging to the campus where the patient is receiving treatment. List of dedicated fax numbers for the Facilities:  FACILITY NAME UR FAX NUMBER   ADMISSION DENIALS (Administrative/Medical Necessity) [phone]   DISCHARGE SUPPORT TEAM ([facility]) [phone]   PARENT CHILD HEALTH (Maternity/NICU/Pediatrics) [phone]   [facility] [phone]   [facility] [phone]   [facility] [phone]   [facility] [phone]   [facility] [phone]   [facility] [phone]   [facility] [phone]    [facility] [phone]   [facility] [phone]   [facility] [phone]   [facility] [phone]   [facility] [phone]

## 2024-12-09 NOTE — ASSESSMENT & PLAN NOTE
Recently admitted with LV apical thrombus which was previously untreated for almost 3 years with subtherapeutic INR.  Echocardiogram at that time showed stable LV apical thrombus.  Patient was bridged with Lovenox/Coumadin and discharged on 12/6.  Continue Coumadin  INR currently therapeutic

## 2024-12-09 NOTE — UTILIZATION REVIEW
Initial Clinical Review    Admission: Date/Time/Statement:   Admission Orders (From admission, onward)       Ordered        12/08/24 1813  INPATIENT ADMISSION  Once                       Orders Placed This Encounter   Procedures    INPATIENT ADMISSION     Standing Status:   Standing     Number of Occurrences:   1     Level of Care:   Med Surg [16]     Estimated length of stay:   More than 2 Midnights     Certification:   I certify that inpatient services are medically necessary for this patient for a duration of greater than two midnights. See H&P and MD Progress Notes for additional information about the patient's course of treatment.     ED Arrival Information       Expected   -    Arrival   12/8/2024 15:57    Acuity   Emergent              Means of arrival   Ambulance    Escorted by   [employer]    Service   Hospitalist    Admission type   Emergency              Arrival complaint   Shortness of Breath             Chief Complaint   Patient presents with    Shortness of Breath     From [facility]. Woke at 10am with chest pain and SOB, EMS gave duoneb X 1 with positive results per patient.        Initial Presentation: 64 y.o. male presents to ed from nursing  home via ems on 12- 8-24 for evaluation and treatment of shortness of breath, chest pain.  Received duo neb x 1 from ems prior to arrival with improvement.   PMHX: alcohol abuse, depression, self injury, L ventricular apical thrombus on coumadin, Hep C, current smoker.     Clinical assessment significant for tachypnea 23= 32 sustained, ht rt 91- 104 sustained, rhonchi.   Wbc 15.63, Bnp 181, Hb 10, Plt 106, Procal 3.08, D-dimer 1.07.  Imaging shows multifocal infiltrates in right lung suspicious for pneumonia.  EKG: L ventricular hypertrophy, repolarization abnormality, 1st degree AV block.  Initially treated with aspirin, iv .9% ns 500 ml bolus, iv ceftriaxon, iv azithromycin, iv protonix. 2>4L O2nc.    Anticipated Length of Stay/Certification  Statement:  Patient will be admitted on an inpatient basis with an anticipated length of stay of greater than 2 midnights secondary to sepsis d/t pneumonia on IV abx.     Date: 12-9-24    Day 2: inpatient med surg   97.7 temp.  4L O2nc.  Continue po azithromycin, iv ceftriaxone q24 hr.       ED Treatment-Medication Administration from 12/08/2024 1557 to 12/08/2024 1853         Date/Time Order Dose Route Action     12/08/2024 1630 ipratropium-albuterol (FOR EMS ONLY) (DUO-NEB) 0.5-2.5 mg/3 mL inhalation solution 3 mL   Given to EMS     12/08/2024 1633 aspirin chewable tablet 324 mg 324 mg Oral Given     12/08/2024 1756 sodium chloride 0.9 % bolus 500 mL 500 mL Intravenous New Bag     12/08/2024 1757 cefTRIAXone (ROCEPHIN) IVPB (premix in dextrose) 1,000 mg 50 mL 1,000 mg Intravenous New Bag     12/08/2024 1809 azithromycin (ZITHROMAX) 500 mg in sodium chloride 0.9% 250mL IVPB 500 mg 500 mg Intravenous New Bag     12/08/2024 1757 pantoprazole (PROTONIX) injection 40 mg 40 mg Intravenous Given       Scheduled Medications:    atorvastatin, 40 mg, Oral, Daily With Dinner    cefTRIAXone, 1,000 mg, Intravenous, Q24H   And  azithromycin, 500 mg, Oral, Q24H    escitalopram, 20 mg, Oral, Daily  ferrous sulfate, 325 mg, Oral, Daily With Breakfast  guaiFENesin, 600 mg, Oral, Q12H SHANNA  pantoprazole, 40 mg, Oral, BID before lunch/dinner  QUEtiapine, 300 mg, Oral, HS  traZODone, 100 mg, Oral, HS  warfarin, 5 mg, Oral, Daily (warfarin)      Continuous IV Infusions:     PRN Meds:  albuterol, 2.5 mg, Nebulization, Q6H PRN  benzonatate, 200 mg, Oral, TID PRN  nicotine polacrilex, 2 mg, Oral, Q2H PRN  ondansetron, 4 mg, Intravenous, Q6H PRN  polyethylene glycol, 17 g, Oral, Daily PRN      ED Triage Vitals [12/08/24 1559]   Temperature Pulse Respirations Blood Pressure SpO2 Pain Score   98.7 °F (37.1 °C) 104 18 157/74 91 % 3     Weight (last 2 days)       Date/Time Weight    12/08/24 1800 96 (211.64)    12/08/24 1559 96 (211.64)             Vital Signs (last 3 days)       Date/Time Temp Pulse Resp BP MAP  SpO2 Nasal Cannula O2 Flow Rate (L/min) [place] Coma Scale Score Pain    12/09/24 0824 -- -- -- -- -- -- -- -- No Pain    12/09/24 07:02:26 97.7 °F (36.5 °C) -- -- 112/72 85 -- -- -- --    12/09/24 0018 -- -- -- -- -- -- -- -- No Pain    12/08/24 1913 -- -- -- -- -- 96 % 4 L/min 15 No Pain    12/08/24 18:56:14 97.7 °F (36.5 °C) 92 24 144/87 106 96 % 4 L/min -- --    12/08/24 1815 -- 91 24 155/78 106 95 % 4 L/min -- --    12/08/24 1800 98.7 °F (37.1 °C) 94 28 151/80 110 96 % 4 L/min -- No Pain    12/08/24 1715 -- 92 23 133/77 100 94 % 4 L/min -- --    12/08/24 1700 -- 94 31 127/71 93 94 % 4 L/min -- --    12/08/24 1645 -- 96 27 135/75 99 94 % 4 L/min -- --    12/08/24 1636 -- -- -- -- -- -- -- -- --    12/08/24 1635 -- -- -- -- -- -- -- 15 --    12/08/24 1633 -- 98 32 147/73 101 94 % 4 L/min -- --    12/08/24 1630 -- 96 27 -- -- 93 % 4 L/min  -- --    12/08/24 1604 -- -- -- -- -- 95 % 2 L/min -- --    12/08/24 1559 98.7 °F (37.1 °C) 104 18 157/74 -- 91 % -- -- 3         Pertinent Labs/Diagnostic Test Results:     Radiology:  CTA chest pe study   Final  (12/08 1945)         1. No central pulmonary embolism on study limited by motion.   2. Multifocal interstitial infiltrates in the right lung most severe at the right lung base.   3. Hiatal hernia.         XR chest portable   Final  (12/08 1921)      New patchy right lower lobe opacity, suspicious for pneumonia.        Cardiology:  No orders to display     GI:  No orders to display           Results from last 7 days   Lab Units 12/09/24  0350 12/08/24  1623 12/04/24  0226 12/03/24  0345   WBC Thousand/uL 11.20* 15.63* 8.18 6.68   HEMOGLOBIN g/dL 9.1* 10.0* 9.3* 8.6*   HEMATOCRIT % 31.9* 35.6* 33.9* 31.1*   PLATELETS Thousands/uL 95* 106* 168 137*   TOTAL NEUT ABS Thousands/µL 8.66* 13.22*  --  4.43         Results from last 7 days   Lab Units 12/09/24  0350 12/08/24  1623 12/04/24  0226   SODIUM  mmol/L 140 138 139   POTASSIUM mmol/L 3.7 3.8 3.8   CHLORIDE mmol/L 111* 108 109*   CO2 mmol/L 22 22 23   ANION GAP mmol/L 7 8 7   BUN mg/dL 21 22 12   CREATININE mg/dL 1.13 1.15 0.92   EGFR ml/min/1.73sq m 68 67 88   CALCIUM mg/dL 7.9* 8.5 8.5   MAGNESIUM mg/dL  --   --  1.8*     Results from last 7 days   Lab Units 12/08/24  1623 12/04/24  0226   AST U/L 22 17   ALT U/L 17 11   ALK PHOS U/L 52 57   TOTAL PROTEIN g/dL 6.8 6.4   ALBUMIN g/dL 4.0 3.8   TOTAL BILIRUBIN mg/dL 0.56 0.56         Results from last 7 days   Lab Units 12/09/24  0350 12/08/24  1623 12/04/24  0226   GLUCOSE RANDOM mg/dL 90 109 102     Results from last 7 days   Lab Units 12/08/24  2009 12/08/24  1833 12/08/24  1623   HS TNI 0HR ng/L  --   --  24   HS TNI 2HR ng/L  --  25  --    HSTNI D2 ng/L  --  1  --    HS TNI 4HR ng/L 25  --   --    HSTNI D4 ng/L 1  --   --      Results from last 7 days   Lab Units 12/08/24  1623   D-DIMER QUANTITATIVE ug/ml FEU 1.07*     Results from last 7 days   Lab Units 12/09/24  0357 12/08/24  1623 12/06/24  0547 12/05/24  0836 12/04/24  0901 12/04/24  0226   PROTIME seconds 26.0* 26.1* 17.8*   < > 15.0  --    INR  2.34* 2.36* 1.41*   < > 1.13  --    PTT seconds  --  63*  --   --  141* 175*    < > = values in this interval not displayed.         Results from last 7 days   Lab Units 12/09/24  0350 12/08/24  1623   PROCALCITONIN ng/ml 2.00* 3.08*     Results from last 7 days   Lab Units 12/08/24  1623   LACTIC ACID mmol/L 1.8     Results from last 7 days   Lab Units 12/08/24  1623   BNP pg/mL 181*     Results from last 7 days   Lab Units 12/08/24  1927   STREP PNEUMONIAE ANTIGEN, URINE  Negative   LEGIONELLA URINARY ANTIGEN  Negative     Results from last 7 days   Lab Units 12/08/24  1623   BLOOD CULTURE  Received in Microbiology Lab. Culture in Progress.  Received in Microbiology Lab. Culture in Progress.     Past Medical History:   Diagnosis Date    Alcohol abuse     Depression     Drug therapy     GERD  (gastroesophageal reflux disease)     Hepatitis C     Hypertension 01/2012    Knee pain, bilateral     Left ventricular apical thrombus     Psychiatric disorder     depression, anxiety    Renal disorder     Self-injurious behavior     Spinal stenosis of lumbar region     Suicide attempt (HCC)      Present on Admission:   Left ventricular apical thrombus   PUD (peptic ulcer disease)   Hiatal hernia with GERD without esophagitis   Cirrhosis (HCC)   Iron deficiency anemia   Bipolar 2 disorder, major depressive episode (HCC)      Admitting Diagnosis:     Chest pain [R07.9]  Pneumonia [J18.9]    Age/Sex: 64 y.o. male    Network Utilization Review Department  ATTENTION: Please call with any questions or concerns to [phone] and carefully listen to the prompts so that you are directed to the right person. All voicemails are confidential.   For Discharge needs, contact Care Management DC Support Team at [phone] opt. 2  Send all requests for admission clinical reviews, approved or denied determinations and any other requests to dedicated fax number below belonging to the campus where the patient is receiving treatment. List of dedicated fax numbers for the Facilities:  FACILITY NAME UR FAX NUMBER   ADMISSION DENIALS (Administrative/Medical Necessity) [phone]   DISCHARGE SUPPORT TEAM (NETWORK) [phone]   PARENT CHILD HEALTH (Maternity/NICU/Pediatrics) [phone]   [facility] [phone]   [facility] [phone]   [facility] [phone]   [facility] [phone]   [facility] [phone]   [facility] [phone]   General acute hospital [phone]   [facility] [phone]   [facility] [phone]   [facility] [phone]   [facility]  [facility] [phone]   [facility] [phone]

## 2024-12-09 NOTE — PLAN OF CARE
Problem: Potential for Falls  Goal: Patient will remain free of falls  Description: INTERVENTIONS:  - Educate patient/family on patient safety including physical limitations  - Instruct patient to call for assistance with activity   - Consult OT/PT to assist with strengthening/mobility   - Keep Call bell within reach  - Keep bed low and locked with side rails adjusted as appropriate  - Keep care items and personal belongings within reach  - Initiate and maintain comfort rounds  - Make Fall Risk Sign visible to staff  - Offer Toileting every 2 Hours, in advance of need  - Apply yellow socks and bracelet for high fall risk patients  - Consider moving patient to room near nurses station  12/9/2024 0024 by Maddison Castle RN  Outcome: Progressing  12/9/2024 0023 by Maddison Castle RN  Outcome: Progressing     Problem: Prexisting or High Potential for Compromised Skin Integrity  Goal: Skin integrity is maintained or improved  Description: INTERVENTIONS:  - Identify patients at risk for skin breakdown  - Assess and monitor skin integrity  - Assess and monitor nutrition and hydration status  - Monitor labs   - Assess for incontinence   - Turn and reposition patient  - Assist with mobility/ambulation  - Relieve pressure over bony prominences  - Avoid friction and shearing  - Provide appropriate hygiene as needed including keeping skin clean and dry  - Evaluate need for skin moisturizer/barrier cream  - Collaborate with interdisciplinary team   - Patient/family teaching  - Consider wound care consult   12/9/2024 0024 by Maddison Castle RN  Outcome: Progressing  12/9/2024 0023 by Maddison Castle RN  Outcome: Progressing     Problem: PAIN - ADULT  Goal: Verbalizes/displays adequate comfort level or baseline comfort level  Description: Interventions:  - Encourage patient to monitor pain and request assistance  - Assess pain using appropriate pain scale  - Administer analgesics based on type and severity of pain and evaluate  response  - Implement non-pharmacological measures as appropriate and evaluate response  - Consider cultural and social influences on pain and pain management  - Notify physician/advanced practitioner if interventions unsuccessful or patient reports new pain  Outcome: Progressing     Problem: INFECTION - ADULT  Goal: Absence or prevention of progression during hospitalization  Description: INTERVENTIONS:  - Assess and monitor for signs and symptoms of infection  - Monitor lab/diagnostic results  - Monitor all insertion sites, i.e. indwelling lines, tubes, and drains  - Monitor endotracheal if appropriate and nasal secretions for changes in amount and color  - [place] appropriate cooling/warming therapies per order  - Administer medications as ordered  - Instruct and encourage patient and family to use good hand hygiene technique  - Identify and instruct in appropriate isolation precautions for identified infection/condition  Outcome: Progressing  Goal: Absence of fever/infection during neutropenic period  Description: INTERVENTIONS:  - Monitor WBC    Outcome: Progressing     Problem: SAFETY ADULT  Goal: Patient will remain free of falls  Description: INTERVENTIONS:  - Educate patient/family on patient safety including physical limitations  - Instruct patient to call for assistance with activity   - Consult OT/PT to assist with strengthening/mobility   - Keep Call bell within reach  - Keep bed low and locked with side rails adjusted as appropriate  - Keep care items and personal belongings within reach  - Initiate and maintain comfort rounds  - Make Fall Risk Sign visible to staff  - Offer Toileting every 2 Hours, in advance of need  - Apply yellow socks and bracelet for high fall risk patients  - Consider moving patient to room near nurses station  12/9/2024 0024 by Maddison Castle, RN  Outcome: Progressing  12/9/2024 0023 by Maddison Castle, RN  Outcome: Progressing  Goal: Maintain or return to baseline ADL  function  Description: INTERVENTIONS:  -  Assess patient's ability to carry out ADLs; assess patient's baseline for ADL function and identify physical deficits which impact ability to perform ADLs (bathing, care of mouth/teeth, toileting, grooming, dressing, etc.)  - Assess/evaluate cause of self-care deficits   - Assess range of motion  - Assess patient's mobility; develop plan if impaired  - Assess patient's need for assistive devices and provide as appropriate  - Encourage maximum independence but intervene and supervise when necessary  - Involve family in performance of ADLs  - Assess for home care needs following discharge   - Consider OT consult to assist with ADL evaluation and planning for discharge  - Provide patient education as appropriate  Outcome: Progressing    Problem: DISCHARGE PLANNING  Goal: Discharge to home or other facility with appropriate resources  Description: INTERVENTIONS:  - Identify barriers to discharge w/patient and caregiver  - Arrange for needed discharge resources and transportation as appropriate  - Identify discharge learning needs (meds, wound care, etc.)  - Arrange for interpretive services to assist at discharge as needed  - Refer to Case Management Department for coordinating discharge planning if the patient needs post-hospital services based on physician/advanced practitioner order or complex needs related to functional status, cognitive ability, or social support system  Outcome: Progressing     Problem: Knowledge Deficit  Goal: Patient/family/caregiver demonstrates understanding of disease process, treatment plan, medications, and discharge instructions  Description: Complete learning assessment and assess knowledge base.  Interventions:  - Provide teaching at level of understanding  - Provide teaching via preferred learning methods  Outcome: Progressing

## 2024-12-09 NOTE — CASE MANAGEMENT
Case Management Assessment & Discharge Planning Note    Patient name Chuy Norton  Location /-01 MRN [id number]  : 1960 Date 2024       Current Admission Date: 2024  Current Admission Diagnosis:Sepsis due to pneumonia (HCC)   Patient Active Problem List    Diagnosis Date Noted Date Diagnosed    Sepsis due to pneumonia (HCC) 2024     Thrombocytopenia (HCC) 2024     Acute respiratory failure with hypoxia (HCC) 2024     PUD (peptic ulcer disease) 2024     Abnormal CXR 2024     Exertional dyspnea 2024     Hepatitis C 05/15/2024     Acute pain of right knee 2024     Acute left-sided low back pain without sciatica 2024     Left ventricular apical thrombus 05/10/2024     Cirrhosis (HCC) 2024     Iron deficiency anemia 2024     History of CVA (cerebrovascular accident) 2021     Acute metabolic encephalopathy 2021     SIRS (systemic inflammatory response syndrome) (HCC) 06/15/2021     Patellofemoral pain syndrome of right knee 2020     Elevated d-dimer 2019     Left hip pain 2019     Encounter for medical examination to establish care 10/10/2018     Bipolar 2 disorder, major depressive episode (HCC) 2018     Attention deficit hyperactivity disorder (ADHD), combined type 2018     Hiatal hernia with GERD without esophagitis 2015     Hyperlipidemia 2015     Knee pain 2015     Spinal stenosis of lumbar region 2015     Tobacco dependence syndrome 2015     Vitamin D deficiency 2015     Anxiety disorder 2015     Depressive disorder 2013     Low back pain 2013     Benign essential hypertension 10/18/2012     Alcohol dependence in remission (HCC) 2011       LOS (days): 1  Geometric Mean LOS (GMLOS) (days):   Days to GMLOS:     OBJECTIVE:  PATIENT READMITTED TO HOSPITAL  Risk of Unplanned Readmission Score: 25.8         Current admission  status: Inpatient       Preferred Pharmacy:   [facility] [address]  [address] [address]  Phone: [phone] Fax: [phone]    Primary Care Provider: Savanna aPn DO    Primary Insurance: [employer]  Secondary Insurance: JOEL MUNOZ    ASSESSMENT:  Active Health Care Proxies    There are no active Health Care Proxies on file.       Advance Directives  Does patient have a Health Care POA?: No  Was patient offered paperwork?: Yes (declined)  Does patient currently have a Health Care decision maker?: Yes, please see Health Care Proxy section  Does patient have Advance Directives?: No  Was patient offered paperwork?: Yes (declined)  Primary Contact: Rose Salgado, friend         Readmission Root Cause  30 Day Readmission: Yes  During your hospital stay, did someone (provider, nurse, ) explain your care to you in a way you could understand?: Yes  Did you feel medically stable to leave the hospital?: Yes  Were you able to pay for your medication at the pharmacy?: Yes  Did you have reliable transportation to take you to your appointments?: Yes  During previous admission, was a post-acute recommendation made?: No  Patient was readmitted due to: Sepsis d/t pneumonia    Patient Information  Admitted from:: Facility ([facility])  Mental Status: Alert  During Assessment patient was accompanied by: Not accompanied during assessment  Assessment information provided by:: Patient  Primary Caregiver: Other (Comment)  Caregiver's Name:: [facility]  Caregiver's Relationship to Patient:: Other (Specify) ([facility])  Caregiver's Telephone Number:: Veraelaner, [phone]  Support Systems: Other (Comment) ([facility])  County of Residence: [place]  What city do you live in?: [place]  Home entry access options. Select all that apply.: No steps to enter home  Type of Current Residence: Group home  Upon  entering residence, is there a bedroom on the main floor (no further steps)?: No  A bedroom is located on the following floor levels of residence (select all that apply):: 2nd Floor  Upon entering residence, is there a bathroom on the main floor (no further steps)?: No  Indicate which floors of current residence have a bathroom (select all the apply):: 2nd Floor  Number of steps to 2nd floor from main floor: One Flight  Living Arrangements: Lives in Facility ([facility])    Activities of Daily Living Prior to Admission  Functional Status: Independent  Completes ADLs independently?: Yes  Ambulates independently?: Yes  Does patient use assisted devices?: No  Does patient currently own DME?: No  Does patient have a history of Outpatient Therapy (PT/OT)?: No  Does the patient have a history of Short-Term Rehab?: No  Does patient have a history of HHC?: No  Does patient currently have HHC?: No         Patient Information Continued  Income Source: SSI/SSD  Does patient have prescription coverage?: Yes  Does patient receive dialysis treatments?: No  Does patient have a history of substance abuse?: Yes  Historical substance use preference: Alcohol/ETOH  Does patient have a history of Mental Health Diagnosis?: Yes (bipolar, anxiety)  Has patient received inpatient treatment related to mental health in the last 2 years?: No         Means of [facility]          DISCHARGE DETAILS:    Discharge planning discussed with:: Patient  Freedom of Choice: Yes     CM contacted family/caregiver?: No- see comments (Pt is alert and oriented)  Were Treatment Team discharge recommendations reviewed with patient/caregiver?: Yes  Did patient/caregiver verbalize understanding of patient care needs?: Yes  Were patient/caregiver advised of the risks associated with not following Treatment Team discharge recommendations?: Yes         Requested Home Health Care         Is  the patient interested in HHC at discharge?: No    DME Referral Provided  Referral made for DME?: No    Other Referral/Resources/Interventions Provided:  Interventions: Group Home         Treatment Team Recommendation: Group Home  Discharge Destination Plan:: Group Home  Transport at Discharge : Other (Comment) ([facility])                                      Additional Comments: CM met with pt to discuss role of CM and any needs prior to discharge. Pt resides at [facility] on 2nd flr. Indp PTA. No DME. No hx STR/HH/OP PT. Hx alcohol abuse and bipolar and anxiety. No IP psych stays. [facility] will transport at discharge.

## 2024-12-09 NOTE — CASE MANAGEMENT
Case Management Discharge Planning Note    Patient name Chuy Norton  Location /-01 MRN [id number]  : 1960 Date 2024       Current Admission Date: 2024  Current Admission Diagnosis:Sepsis due to pneumonia (HCC)   Patient Active Problem List    Diagnosis Date Noted Date Diagnosed    Sepsis due to pneumonia (HCC) 2024     Thrombocytopenia (HCC) 2024     Acute respiratory failure with hypoxia (HCC) 2024     PUD (peptic ulcer disease) 2024     Abnormal CXR 2024     Exertional dyspnea 2024     Hepatitis C 05/15/2024     Acute pain of right knee 2024     Acute left-sided low back pain without sciatica 2024     Left ventricular apical thrombus 05/10/2024     Cirrhosis (HCC) 2024     Iron deficiency anemia 2024     History of CVA (cerebrovascular accident) 2021     Acute metabolic encephalopathy 2021     SIRS (systemic inflammatory response syndrome) (HCC) 06/15/2021     Patellofemoral pain syndrome of right knee 2020     Elevated d-dimer 2019     Left hip pain 2019     Encounter for medical examination to establish care 10/10/2018     Bipolar 2 disorder, major depressive episode (HCC) 2018     Attention deficit hyperactivity disorder (ADHD), combined type 2018     Hiatal hernia with GERD without esophagitis 2015     Hyperlipidemia 2015     Knee pain 2015     Spinal stenosis of lumbar region 2015     Tobacco dependence syndrome 2015     Vitamin D deficiency 2015     Anxiety disorder 2015     Depressive disorder 2013     Low back pain 2013     Benign essential hypertension 10/18/2012     Alcohol dependence in remission (HCC) 2011       LOS (days): 1  Geometric Mean LOS (GMLOS) (days):   Days to GMLOS:     OBJECTIVE:  Risk of Unplanned Readmission Score: 25.86         Current admission status: Inpatient   Preferred Pharmacy:   [place]  [facility] - [address]  [address] [facility] [address]  Phone: [phone] Fax: [phone]    Primary Care Provider: Savanna Pan DO    Primary Insurance: [employer]  Secondary Insurance: JOEL CRAWFORD O    DISCHARGE DETAILS:        CM spoke with Chuy Sky at [facility] and they will have someone come out to assess him tomorrow morning. They asked that prn Tylenol be ordered for fever, pain. CM relayed same to MD via secure chat. CM provided direct contact information to Chuy.

## 2024-12-09 NOTE — ASSESSMENT & PLAN NOTE
Presents hypoxic, requiring 4 L/min acutely for stabilization  Patient is on room air at baseline  Secondary to pneumonia  Is since resolved with IV antibiotics

## 2024-12-09 NOTE — ASSESSMENT & PLAN NOTE
Platelet count upon admission 106, around baseline and in the setting of cirrhosis,  Remained stable

## 2024-12-10 VITALS
OXYGEN SATURATION: 94 % | HEIGHT: 68 IN | SYSTOLIC BLOOD PRESSURE: 168 MMHG | DIASTOLIC BLOOD PRESSURE: 98 MMHG | HEART RATE: 86 BPM | TEMPERATURE: 98.1 F | RESPIRATION RATE: 18 BRPM | WEIGHT: 211.64 LBS | BODY MASS INDEX: 32.08 KG/M2

## 2024-12-10 PROBLEM — R06.02 SHORTNESS OF BREATH: Status: ACTIVE | Noted: 2024-11-29

## 2024-12-10 PROBLEM — A41.9 SEPSIS (HCC): Status: RESOLVED | Noted: 2024-12-08 | Resolved: 2024-12-10

## 2024-12-10 PROBLEM — J96.01 ACUTE RESPIRATORY FAILURE WITH HYPOXIA (HCC): Status: RESOLVED | Noted: 2024-12-08 | Resolved: 2024-12-10

## 2024-12-10 LAB
ANION GAP SERPL CALCULATED.3IONS-SCNC: 6 MMOL/L (ref 4–13)
BASOPHILS # BLD AUTO: 0.03 THOUSANDS/ÂΜL (ref 0–0.1)
BASOPHILS NFR BLD AUTO: 0 % (ref 0–1)
BUN SERPL-MCNC: 16 MG/DL (ref 5–25)
CALCIUM SERPL-MCNC: 7.7 MG/DL (ref 8.4–10.2)
CHLORIDE SERPL-SCNC: 110 MMOL/L (ref 96–108)
CO2 SERPL-SCNC: 21 MMOL/L (ref 21–32)
CREAT SERPL-MCNC: 0.88 MG/DL (ref 0.6–1.3)
EOSINOPHIL # BLD AUTO: 0.27 THOUSAND/ÂΜL (ref 0–0.61)
EOSINOPHIL NFR BLD AUTO: 4 % (ref 0–6)
ERYTHROCYTE [DISTWIDTH] IN BLOOD BY AUTOMATED COUNT: 31.2 % (ref 11.6–15.1)
GFR SERPL CREATININE-BSD FRML MDRD: 91 ML/MIN/1.73SQ M
GLUCOSE SERPL-MCNC: 99 MG/DL (ref 65–140)
HCT VFR BLD AUTO: 33.7 % (ref 36.5–49.3)
HGB BLD-MCNC: 9.5 G/DL (ref 12–17)
IMM GRANULOCYTES # BLD AUTO: 0.03 THOUSAND/UL (ref 0–0.2)
IMM GRANULOCYTES NFR BLD AUTO: 0 % (ref 0–2)
LYMPHOCYTES # BLD AUTO: 1.31 THOUSANDS/ÂΜL (ref 0.6–4.47)
LYMPHOCYTES NFR BLD AUTO: 17 % (ref 14–44)
MCH RBC QN AUTO: 21.4 PG (ref 26.8–34.3)
MCHC RBC AUTO-ENTMCNC: 28.2 G/DL (ref 31.4–37.4)
MCV RBC AUTO: 76 FL (ref 82–98)
MONOCYTES # BLD AUTO: 0.54 THOUSAND/ÂΜL (ref 0.17–1.22)
MONOCYTES NFR BLD AUTO: 7 % (ref 4–12)
MRSA NOSE QL CULT: NORMAL
NEUTROPHILS # BLD AUTO: 5.6 THOUSANDS/ÂΜL (ref 1.85–7.62)
NEUTS SEG NFR BLD AUTO: 72 % (ref 43–75)
NRBC BLD AUTO-RTO: 0 /100 WBCS
PLATELET # BLD AUTO: 101 THOUSANDS/UL (ref 149–390)
POTASSIUM SERPL-SCNC: 4 MMOL/L (ref 3.5–5.3)
RBC # BLD AUTO: 4.43 MILLION/UL (ref 3.88–5.62)
SODIUM SERPL-SCNC: 137 MMOL/L (ref 135–147)
WBC # BLD AUTO: 7.78 THOUSAND/UL (ref 4.31–10.16)

## 2024-12-10 PROCEDURE — 99239 HOSP IP/OBS DSCHRG MGMT >30: CPT | Performed by: INTERNAL MEDICINE

## 2024-12-10 PROCEDURE — 80048 BASIC METABOLIC PNL TOTAL CA: CPT | Performed by: INTERNAL MEDICINE

## 2024-12-10 PROCEDURE — 85025 COMPLETE CBC W/AUTO DIFF WBC: CPT | Performed by: INTERNAL MEDICINE

## 2024-12-10 RX ORDER — FUROSEMIDE 10 MG/ML
40 INJECTION INTRAMUSCULAR; INTRAVENOUS ONCE
Status: DISCONTINUED | OUTPATIENT
Start: 2024-12-10 | End: 2024-12-10

## 2024-12-10 RX ORDER — CEFDINIR 300 MG/1
300 CAPSULE ORAL EVERY 12 HOURS SCHEDULED
Qty: 10 CAPSULE | Refills: 0 | Status: SHIPPED | OUTPATIENT
Start: 2024-12-10 | End: 2024-12-15

## 2024-12-10 RX ORDER — FUROSEMIDE 40 MG/1
40 TABLET ORAL ONCE
Status: COMPLETED | OUTPATIENT
Start: 2024-12-10 | End: 2024-12-10

## 2024-12-10 RX ORDER — SENNOSIDES 8.6 MG
650 CAPSULE ORAL EVERY 8 HOURS PRN
Qty: 30 TABLET | Refills: 0 | Status: SHIPPED | OUTPATIENT
Start: 2024-12-10

## 2024-12-10 RX ORDER — PREDNISONE 20 MG/1
40 TABLET ORAL DAILY
Qty: 10 TABLET | Refills: 0 | Status: SHIPPED | OUTPATIENT
Start: 2024-12-10 | End: 2024-12-15

## 2024-12-10 RX ORDER — AZITHROMYCIN 500 MG/1
500 TABLET, FILM COATED ORAL EVERY 24 HOURS
Qty: 1 TABLET | Refills: 0 | Status: SHIPPED | OUTPATIENT
Start: 2024-12-10 | End: 2024-12-11

## 2024-12-10 RX ADMIN — FUROSEMIDE 40 MG: 40 TABLET ORAL at 12:55

## 2024-12-10 RX ADMIN — ESCITALOPRAM OXALATE 20 MG: 20 TABLET ORAL at 08:00

## 2024-12-10 RX ADMIN — FERROUS SULFATE TAB 325 MG (65 MG ELEMENTAL FE) 325 MG: 325 (65 FE) TAB at 08:00

## 2024-12-10 RX ADMIN — PANTOPRAZOLE SODIUM 40 MG: 40 TABLET, DELAYED RELEASE ORAL at 10:50

## 2024-12-10 RX ADMIN — GUAIFENESIN 600 MG: 600 TABLET ORAL at 08:00

## 2024-12-10 NOTE — ASSESSMENT & PLAN NOTE
Presents hypoxic, requiring 4 L/min acutely for stabilization  Patient is on room air at baseline  Secondary to pneumonia  At room air, oxygen remained stable with ambulation

## 2024-12-10 NOTE — PLAN OF CARE
Problem: Potential for Falls  Goal: Patient will remain free of falls  Description: INTERVENTIONS:  - Educate patient/family on patient safety including physical limitations  - Instruct patient to call for assistance with activity   - Consult OT/PT to assist with strengthening/mobility   - Keep Call bell within reach  - Keep bed low and locked with side rails adjusted as appropriate  - Keep care items and personal belongings within reach  - Initiate and maintain comfort rounds  - Make Fall Risk Sign visible to staff  - Offer Toileting every 2 Hours, in advance of need  - Initiate/Maintain bed/chair alarm  - Obtain necessary fall risk management equipment: yellow bracelet and yellow socks   - Apply yellow socks and bracelet for high fall risk patients  - Consider moving patient to room near nurses station  Outcome: Progressing     Problem: Prexisting or High Potential for Compromised Skin Integrity  Goal: Skin integrity is maintained or improved  Description: INTERVENTIONS:  - Identify patients at risk for skin breakdown  - Assess and monitor skin integrity  - Assess and monitor nutrition and hydration status  - Monitor labs   - Assess for incontinence   - Turn and reposition patient  - Assist with mobility/ambulation  - Relieve pressure over bony prominences  - Avoid friction and shearing  - Provide appropriate hygiene as needed including keeping skin clean and dry  - Evaluate need for skin moisturizer/barrier cream  - Collaborate with interdisciplinary team   - Patient/family teaching  - Consider wound care consult   Outcome: Progressing     Problem: PAIN - ADULT  Goal: Verbalizes/displays adequate comfort level or baseline comfort level  Description: Interventions:  - Encourage patient to monitor pain and request assistance  - Assess pain using appropriate pain scale  - Administer analgesics based on type and severity of pain and evaluate response  - Implement non-pharmacological measures as appropriate and  evaluate response  - Consider cultural and social influences on pain and pain management  - Notify physician/advanced practitioner if interventions unsuccessful or patient reports new pain  Outcome: Progressing     Problem: INFECTION - ADULT  Goal: Absence or prevention of progression during hospitalization  Description: INTERVENTIONS:  - Assess and monitor for signs and symptoms of infection  - Monitor lab/diagnostic results  - Monitor all insertion sites, i.e. indwelling lines, tubes, and drains  - Monitor endotracheal if appropriate and nasal secretions for changes in amount and color  - [place] appropriate cooling/warming therapies per order  - Administer medications as ordered  - Instruct and encourage patient and family to use good hand hygiene technique  - Identify and instruct in appropriate isolation precautions for identified infection/condition  Outcome: Progressing  Goal: Absence of fever/infection during neutropenic period  Description: INTERVENTIONS:  - Monitor WBC    Outcome: Progressing     Problem: SAFETY ADULT  Goal: Patient will remain free of falls  Description: INTERVENTIONS:  - Educate patient/family on patient safety including physical limitations  - Instruct patient to call for assistance with activity   - Consult OT/PT to assist with strengthening/mobility   - Keep Call bell within reach  - Keep bed low and locked with side rails adjusted as appropriate  - Keep care items and personal belongings within reach  - Initiate and maintain comfort rounds  - Make Fall Risk Sign visible to staff  - Offer Toileting every 2 Hours, in advance of need  - Initiate/Maintain bed/chair alarm  - Obtain necessary fall risk management equipment: yellow bracelet and yellow socks   - Apply yellow socks and bracelet for high fall risk patients  - Consider moving patient to room near nurses station  Outcome: Progressing  Goal: Maintain or return to baseline ADL function  Description: INTERVENTIONS:  -  Assess  patient's ability to carry out ADLs; assess patient's baseline for ADL function and identify physical deficits which impact ability to perform ADLs (bathing, care of mouth/teeth, toileting, grooming, dressing, etc.)  - Assess/evaluate cause of self-care deficits   - Assess range of motion  - Assess patient's mobility; develop plan if impaired  - Assess patient's need for assistive devices and provide as appropriate  - Encourage maximum independence but intervene and supervise when necessary  - Involve family in performance of ADLs  - Assess for home care needs following discharge   - Consider OT consult to assist with ADL evaluation and planning for discharge  - Provide patient education as appropriate  Outcome: Progressing  Goal: Maintains/Returns to pre admission functional level  Description: INTERVENTIONS:  - Perform AM-PAC 6 Click Basic Mobility/ Daily Activity assessment daily.  - Set and communicate daily mobility goal to care team and patient/family/caregiver.   - Collaborate with rehabilitation services on mobility goals if consulted  - Perform Range of Motion 3 times a day.  - Reposition patient every 2 hours.  - Dangle patient 3 times a day  - Stand patient 3 times a day  - Ambulate patient 3 times a day  - Out of bed to chair 3 times a day   - Out of bed for meals 3 times a day  - Out of bed for toileting  - Record patient progress and toleration of activity level   Outcome: Progressing     Problem: DISCHARGE PLANNING  Goal: Discharge to home or other facility with appropriate resources  Description: INTERVENTIONS:  - Identify barriers to discharge w/patient and caregiver  - Arrange for needed discharge resources and transportation as appropriate  - Identify discharge learning needs (meds, wound care, etc.)  - Arrange for interpretive services to assist at discharge as needed  - Refer to Case Management Department for coordinating discharge planning if the patient needs post-hospital services based on  physician/advanced practitioner order or complex needs related to functional status, cognitive ability, or social support system  Outcome: Progressing     Problem: Knowledge Deficit  Goal: Patient/family/caregiver demonstrates understanding of disease process, treatment plan, medications, and discharge instructions  Description: Complete learning assessment and assess knowledge base.  Interventions:  - Provide teaching at level of understanding  - Provide teaching via preferred learning methods  Outcome: Progressing

## 2024-12-10 NOTE — DISCHARGE SUMMARY
Discharge Summary - Hospitalist   Name: Chuy Norton 64 y.o. male I MRN: [id number]  Unit/Bed#: -01 I Date of Admission: 12/8/2024   Date of Service: 12/10/2024 I Hospital Day: 2     Assessment & Plan  Sepsis due to pneumonia (HCC)  Presents with shortness of breath, cough, chest pain.  Chest x-ray suspicious for pneumonia.    SIRS criteria: Leukocytosis, tachypnea  Suspected source: Pneumonia  Sepsis resolved.  Patient received ceftriaxone azithromycin during hospitalization and his symptoms improved.    Stated his cough and shortness of breath is much improved.  Oxygen saturation remained stable at room air.  Discharged on cefdinir for 5 days to complete total of 7 days.  Also discharged on prednisone 40 mg daily for 5 days as he had intermittent very mild wheezing.  Follow-up with family doctor in 1 week.  Left ventricular apical thrombus  Recently admitted with LV apical thrombus which was previously untreated for almost 3 years with subtherapeutic INR.  Echocardiogram at that time showed stable LV apical thrombus.  Patient was bridged with Lovenox/Coumadin and discharged on 12/6.  Continue Coumadin  INR currently therapeutic  Monitor INR q3 days until stable   Acute respiratory failure with hypoxia (HCC) (Resolved: 12/10/2024)  Presents hypoxic, requiring 4 L/min acutely for stabilization  Patient is on room air at baseline  Secondary to pneumonia  At room air, oxygen remained stable with ambulation   PUD (peptic ulcer disease)  Continue PPI  Hiatal hernia with GERD without esophagitis (Resolved: 12/10/2024)  Complained of chest pain prior to hospitalization however none in the ER  Resume PPI twice daily and outpatient follow-up with GI    Cirrhosis (HCC)  Appearing compensated at this time  Outpatient follow-up  Iron deficiency anemia  Resume iron supplementation  Bipolar 2 disorder, major depressive episode (HCC)  Mood is stable presently  Continue trazodone, Seroquel, Lexapro  Thrombocytopenia  (HCC)  Platelet count upon admission 106, around baseline and in the setting of cirrhosis,  Remained stable  Shortness of breath  Likely in the setting of pna and ? Mild fluid congestion   Bnp slightly elevated   Patient does not want to stay longer. He agreed with one time dose of lasix  40 mg today ( oral as he took his ivb out)   Continue antibiotics   Stated is improved      Medical Problems       Resolved Problems  Date Reviewed: 12/9/2024          Resolved    Hiatal hernia with GERD without esophagitis 12/10/2024     Resolved by  Zainab Ribeiro MD    Acute respiratory failure with hypoxia (HCC) 12/10/2024     Resolved by  Zainab Ribeiro MD        Discharging Physician / Practitioner: Zainab Ribeiro MD  PCP: Savanna Pan DO  Admission Date:   Admission Orders (From admission, onward)       Ordered        12/08/24 1813  INPATIENT ADMISSION  Once            12/08/24 1800  INPATIENT ADMISSION  Once,   Status:  Canceled                          Discharge Date: 12/10/24    Consultations During Hospital Stay:  None    Procedures Performed:   None    Significant Findings / Test Results:   CTA chest pe study  Result Date: 12/8/2024  Impression: 1. No central pulmonary embolism on study limited by motion. 2. Multifocal interstitial infiltrates in the right lung most severe at the right lung base. Follow-up to complete resolution. 3. Hiatal hernia.      XR chest portable  Result Date: 12/8/2024  Impression: New patchy right lower lobe opacity, suspicious for pneumonia. Findings concur with the preliminary report by the referring clinician already in PACS and/or our electronic record EPIC.    XR chest portable  Result Date: 12/8/2024  Impression New patchy right lower lobe opacity, suspicious for pneumonia. Findings concur with the preliminary report by the referring clinician already in PACS and/or our electronic record EPIC.        Incidental Findings:   none     Test Results Pending at  "Discharge (will require follow up):   none     Outpatient Tests Requested:  none    Complications:  none    Reason for Admission: Shortness of breath    Hospital Course:   Chuy Norton is a 64 y.o. male patient with past medical history of LV thrombus on Coumadin, bipolar disorder, GERD, PUD who originally presented to the hospital on 12/8/2024 due to chest pain along with shortness of breath.    Troponin was negative on presentation, patient with mildly elevated BNP, chest x-ray showed new patchy right lower lobe opacity suspicious for pneumonia.  Patient was started on antibiotics with improvement.    Also on presentation patient required 4 L nasal cannula oxygen currently on room air.  His oxygen remained stable with ambulation.    Please see above list of diagnoses and related plan for additional information.     Condition at Discharge: good    Discharge Day Visit / Exam:   Subjective:  feeling better. Wants to leave , was already changed in his clothes this am   Vitals: Blood Pressure: 142/87 (12/10/24 0920)  Pulse: 81 (12/09/24 2114)  Temperature: 98.1 °F (36.7 °C) (12/09/24 2114)  Temp Source: Temporal (12/09/24 1459)  Respirations: 18 (12/09/24 1500)  Height: 5' 8\" (172.7 cm) (12/08/24 1800)  Weight - Scale: 96 kg (211 lb 10.3 oz) (12/08/24 1800)  SpO2: 96 % (12/10/24 0925)  Physical Exam  Vitals and nursing note reviewed.   Constitutional:       General: He is not in acute distress.     Appearance: He is not diaphoretic.   HENT:      Head: Normocephalic.   Eyes:      General:         Right eye: No discharge.         Left eye: No discharge.   Cardiovascular:      Rate and Rhythm: Normal rate and regular rhythm.   Pulmonary:      Effort: Pulmonary effort is normal. No respiratory distress.      Breath sounds: Rhonchi and rales present. No wheezing.   Abdominal:      General: There is no distension.      Palpations: Abdomen is soft.      Tenderness: There is no abdominal tenderness. There is no guarding or " rebound.   Musculoskeletal:      Cervical back: Normal range of motion.      Right lower leg: No edema.      Left lower leg: No edema.   Skin:     General: Skin is warm.   Neurological:      Mental Status: He is alert.   Psychiatric:         Mood and Affect: Mood normal.         Behavior: Behavior normal.         Thought Content: Thought content normal.         Judgment: Judgment normal.          Discussion with Family: Patient declined call to .     Discharge instructions/Information to patient and family:   See after visit summary for information provided to patient and family.      Provisions for Follow-Up Care:  See after visit summary for information related to follow-up care and any pertinent home health orders.      Mobility at time of Discharge:   Basic Mobility Inpatient Raw Score: 16  JH-HLM Goal: 5: Stand one or more mins  JH-HLM Achieved: 5: Stand (1 or more minutes)  HLM Goal achieved. Continue to encourage appropriate mobility.     Disposition:   Home    Planned Readmission: no    Discharge Medications:  See after visit summary for reconciled discharge medications provided to patient and/or family.      Administrative Statements   Discharge Statement:  I have spent a total time of 40 minutes in caring for this patient on the day of the visit/encounter. .    **Please Note: This note may have been constructed using a voice recognition system**

## 2024-12-10 NOTE — CASE MANAGEMENT
Case Management Discharge Planning Note    Patient name Chuy Norton  Location /-01 MRN [id number]  : 1960 Date 12/10/2024       Current Admission Date: 2024  Current Admission Diagnosis:Sepsis due to pneumonia (HCC)   Patient Active Problem List    Diagnosis Date Noted Date Diagnosed    Sepsis due to pneumonia (HCC) 2024     Thrombocytopenia (HCC) 2024     Acute respiratory failure with hypoxia (HCC) 2024     PUD (peptic ulcer disease) 2024     Abnormal CXR 2024     Exertional dyspnea 2024     Hepatitis C 05/15/2024     Acute pain of right knee 2024     Acute left-sided low back pain without sciatica 2024     Left ventricular apical thrombus 05/10/2024     Cirrhosis (HCC) 2024     Iron deficiency anemia 2024     History of CVA (cerebrovascular accident) 2021     Acute metabolic encephalopathy 2021     SIRS (systemic inflammatory response syndrome) (HCC) 06/15/2021     Patellofemoral pain syndrome of right knee 2020     Elevated d-dimer 2019     Left hip pain 2019     Encounter for medical examination to establish care 10/10/2018     Bipolar 2 disorder, major depressive episode (HCC) 2018     Attention deficit hyperactivity disorder (ADHD), combined type 2018     Hiatal hernia with GERD without esophagitis 2015     Hyperlipidemia 2015     Knee pain 2015     Spinal stenosis of lumbar region 2015     Tobacco dependence syndrome 2015     Vitamin D deficiency 2015     Anxiety disorder 2015     Depressive disorder 2013     Low back pain 2013     Benign essential hypertension 10/18/2012     Alcohol dependence in remission (HCC) 2011       LOS (days): 2  Geometric Mean LOS (GMLOS) (days):   Days to GMLOS:     OBJECTIVE:  Risk of Unplanned Readmission Score: 25.18         Current admission status: Inpatient   Preferred Pharmacy:   [place]  [facility] - [address]  [address]  [place] [address]  Phone: [phone] Fax: [phone]    Primary Care Provider: Savanna Pan DO    Primary Insurance: [employer]  Secondary Insurance: JOEL CRAWFORD [facility] [place]    DISCHARGE DETAILS:     CM spoke with Mari at [facility] and she will see if nurse can come out to assess pt today and call CM back.

## 2024-12-10 NOTE — ASSESSMENT & PLAN NOTE
Recently admitted with LV apical thrombus which was previously untreated for almost 3 years with subtherapeutic INR.  Echocardiogram at that time showed stable LV apical thrombus.  Patient was bridged with Lovenox/Coumadin and discharged on 12/6.  Continue Coumadin  INR currently therapeutic  Monitor INR q3 days until stable

## 2024-12-10 NOTE — ASSESSMENT & PLAN NOTE
Likely in the setting of pna and ? Mild fluid congestion   Bnp slightly elevated   Patient does not want to stay longer. He agreed with one time dose of lasix  40 mg today ( oral as he took his ivb out)   Continue antibiotics   Stated is improved

## 2024-12-10 NOTE — ASSESSMENT & PLAN NOTE
Complained of chest pain prior to hospitalization however none in the ER  Resume PPI twice daily and outpatient follow-up with GI

## 2024-12-10 NOTE — CASE MANAGEMENT
Case Management Discharge Planning Note    Patient name Chuy Norton  Location /-01 MRN [id number]  : 1960 Date 12/10/2024       Current Admission Date: 2024  Current Admission Diagnosis:Left ventricular apical thrombus   Patient Active Problem List    Diagnosis Date Noted Date Diagnosed    Thrombocytopenia (HCC) 2024     Acute respiratory failure with hypoxia (HCC) 2024     PUD (peptic ulcer disease) 2024     Abnormal CXR 2024     Exertional dyspnea 2024     Hepatitis C 05/15/2024     Acute pain of right knee 2024     Acute left-sided low back pain without sciatica 2024     Left ventricular apical thrombus 05/10/2024     Cirrhosis (HCC) 2024     Iron deficiency anemia 2024     History of CVA (cerebrovascular accident) 2021     Acute metabolic encephalopathy 2021     SIRS (systemic inflammatory response syndrome) (HCC) 06/15/2021     Patellofemoral pain syndrome of right knee 2020     Elevated d-dimer 2019     Left hip pain 2019     Encounter for medical examination to establish care 10/10/2018     Bipolar 2 disorder, major depressive episode (HCC) 2018     Attention deficit hyperactivity disorder (ADHD), combined type 2018     Hiatal hernia with GERD without esophagitis 2015     Hyperlipidemia 2015     Knee pain 2015     Spinal stenosis of lumbar region 2015     Tobacco dependence syndrome 2015     Vitamin D deficiency 2015     Anxiety disorder 2015     Depressive disorder 2013     Low back pain 2013     Benign essential hypertension 10/18/2012     Alcohol dependence in remission (HCC) 2011       LOS (days): 2  Geometric Mean LOS (GMLOS) (days):   Days to GMLOS:     OBJECTIVE:  Risk of Unplanned Readmission Score: 25.23         Current admission status: Inpatient   [facility]   [place] [facility] - [address]  [address]  [place] [address]  Phone: [phone] Fax: [phone]    Primary Care Provider: Savanna Pan DO    Primary Insurance: [employer]  Secondary Insurance: JOEL CRAWFORD [facility] [place]    DISCHARGE DETAILS:     CM received call from Mari at [facility] and their , Gely, can pick him up in 15-20 minutes. CM relayed same to MD and RN via secure chat.

## 2024-12-10 NOTE — ASSESSMENT & PLAN NOTE
Presents with shortness of breath, cough, chest pain.  Chest x-ray suspicious for pneumonia.    SIRS criteria: Leukocytosis, tachypnea  Suspected source: Pneumonia  Sepsis resolved.  Patient received ceftriaxone azithromycin during hospitalization and his symptoms improved.    Stated his cough and shortness of breath is much improved.  Oxygen saturation remained stable at room air.  Discharged on cefdinir for 5 days to complete total of 7 days.  Also discharged on prednisone 40 mg daily for 5 days as he had intermittent very mild wheezing.  Follow-up with family doctor in 1 week.

## 2024-12-10 NOTE — PLAN OF CARE
Problem: Prexisting or High Potential for Compromised Skin Integrity  Goal: Skin integrity is maintained or improved  Description: INTERVENTIONS:  - Identify patients at risk for skin breakdown  - Assess and monitor skin integrity  - Assess and monitor nutrition and hydration status  - Monitor labs   - Assess for incontinence   - Turn and reposition patient  - Assist with mobility/ambulation  - Relieve pressure over bony prominences  - Avoid friction and shearing  - Provide appropriate hygiene as needed including keeping skin clean and dry  - Evaluate need for skin moisturizer/barrier cream  - Collaborate with interdisciplinary team   - Patient/family teaching  - Consider wound care consult   Outcome: Progressing     Problem: PAIN - ADULT  Goal: Verbalizes/displays adequate comfort level or baseline comfort level  Description: Interventions:  - Encourage patient to monitor pain and request assistance  - Assess pain using appropriate pain scale  - Administer analgesics based on type and severity of pain and evaluate response  - Implement non-pharmacological measures as appropriate and evaluate response  - Consider cultural and social influences on pain and pain management  - Notify physician/advanced practitioner if interventions unsuccessful or patient reports new pain  Outcome: Progressing     Problem: INFECTION - ADULT  Goal: Absence or prevention of progression during hospitalization  Description: INTERVENTIONS:  - Assess and monitor for signs and symptoms of infection  - Monitor lab/diagnostic results  - Monitor all insertion sites, i.e. indwelling lines, tubes, and drains  - Monitor endotracheal if appropriate and nasal secretions for changes in amount and color  - [place] appropriate cooling/warming therapies per order  - Administer medications as ordered  - Instruct and encourage patient and family to use good hand hygiene technique  - Identify and instruct in appropriate isolation precautions for  identified infection/condition  Outcome: Progressing  Goal: Absence of fever/infection during neutropenic period  Description: INTERVENTIONS:  - Monitor WBC    Outcome: Progressing     Problem: SAFETY ADULT  Goal: Maintain or return to baseline ADL function  Description: INTERVENTIONS:  -  Assess patient's ability to carry out ADLs; assess patient's baseline for ADL function and identify physical deficits which impact ability to perform ADLs (bathing, care of mouth/teeth, toileting, grooming, dressing, etc.)  - Assess/evaluate cause of self-care deficits   - Assess range of motion  - Assess patient's mobility; develop plan if impaired  - Assess patient's need for assistive devices and provide as appropriate  - Encourage maximum independence but intervene and supervise when necessary  - Involve family in performance of ADLs  - Assess for home care needs following discharge   - Consider OT consult to assist with ADL evaluation and planning for discharge  - Provide patient education as appropriate  Outcome: Progressing     Problem: DISCHARGE PLANNING  Goal: Discharge to home or other facility with appropriate resources  Description: INTERVENTIONS:  - Identify barriers to discharge w/patient and caregiver  - Arrange for needed discharge resources and transportation as appropriate  - Identify discharge learning needs (meds, wound care, etc.)  - Arrange for interpretive services to assist at discharge as needed  - Refer to Case Management Department for coordinating discharge planning if the patient needs post-hospital services based on physician/advanced practitioner order or complex needs related to functional status, cognitive ability, or social support system  Outcome: Progressing     Problem: Knowledge Deficit  Goal: Patient/family/caregiver demonstrates understanding of disease process, treatment plan, medications, and discharge instructions  Description: Complete learning assessment and assess knowledge  base.  Interventions:  - Provide teaching at level of understanding  - Provide teaching via preferred learning methods  Outcome: Progressing

## 2024-12-10 NOTE — CASE MANAGEMENT
Case Management Discharge Planning Note    Patient name Chuy Norton  Location /-01 MRN [id number]  : 1960 Date 12/10/2024       Current Admission Date: 2024  Current Admission Diagnosis:Sepsis due to pneumonia (HCC)   Patient Active Problem List    Diagnosis Date Noted Date Diagnosed    Sepsis due to pneumonia (HCC) 2024     Thrombocytopenia (HCC) 2024     Acute respiratory failure with hypoxia (HCC) 2024     PUD (peptic ulcer disease) 2024     Abnormal CXR 2024     Exertional dyspnea 2024     Hepatitis C 05/15/2024     Acute pain of right knee 2024     Acute left-sided low back pain without sciatica 2024     Left ventricular apical thrombus 05/10/2024     Cirrhosis (HCC) 2024     Iron deficiency anemia 2024     History of CVA (cerebrovascular accident) 2021     Acute metabolic encephalopathy 2021     SIRS (systemic inflammatory response syndrome) (HCC) 06/15/2021     Patellofemoral pain syndrome of right knee 2020     Elevated d-dimer 2019     Left hip pain 2019     Encounter for medical examination to establish care 10/10/2018     Bipolar 2 disorder, major depressive episode (HCC) 2018     Attention deficit hyperactivity disorder (ADHD), combined type 2018     Hiatal hernia with GERD without esophagitis 2015     Hyperlipidemia 2015     Knee pain 2015     Spinal stenosis of lumbar region 2015     Tobacco dependence syndrome 2015     Vitamin D deficiency 2015     Anxiety disorder 2015     Depressive disorder 2013     Low back pain 2013     Benign essential hypertension 10/18/2012     Alcohol dependence in remission (HCC) 2011       LOS (days): 2  Geometric Mean LOS (GMLOS) (days):   Days to GMLOS:     OBJECTIVE:  Risk of Unplanned Readmission Score: 25.18         Current admission status: Inpatient   Preferred Pharmacy:   [place]  [facility] - [address]  [address]  [place] [address]  Phone: [phone] Fax: [phone]    Primary Care Provider: Savanna Pan DO    Primary Insurance: [employer]  Secondary Insurance: JOEL CRAWFORD [facility] [place]    DISCHARGE DETAILS:     CM left message for Vera,  at [facility] and informed her that  had spoken with Chuy Sky yesterday and was under the impression someone would be out to assess him for discharge this am. CM requested that Vera call CM back so transport can be arranged. CM left contact information.

## 2024-12-10 NOTE — PROGRESS NOTES
Attempted to give report to [facility] for report. Left voicemail for angela walker .

## 2024-12-11 ENCOUNTER — ANTICOAG VISIT (OUTPATIENT)
Dept: CARDIOLOGY CLINIC | Facility: CLINIC | Age: 64
End: 2024-12-11

## 2024-12-11 DIAGNOSIS — I51.3 LEFT VENTRICULAR APICAL THROMBUS: Primary | ICD-10-CM

## 2024-12-11 NOTE — PROGRESS NOTES
Patient admitted to hospital 12/8/24 for pneumonia, was given 5 mg daily, last INR 12/9, was discharged 12/10/24. They recommend patient have INR every 3 days until stable.  Was discharged on zithromax last dose today, cefdinir and prednisone till 12/15/24    Left message for Bri, advised I see that patient was discharged from hospital again on 5 mg daily. Advised will continue 5 mg daily and recheck INR 12/13/24  Advised physician order was faxed.  Advised to call with questions or concerns.

## 2024-12-12 NOTE — UTILIZATION REVIEW
NOTIFICATION OF ADMISSION DISCHARGE   This is a Notification of Discharge from [facility]. Please be advised that this patient has been discharge from our facility. Below you will find the admission and discharge date and time including the patient’s disposition.   UTILIZATION REVIEW CONTACT:  Sara Ulrich  Utilization   Network Utilization Review Department  Phone: [phone] x carefully listen to the prompts. All voicemails are confidential.  Email: [email or web address]     ADMISSION INFORMATION  PRESENTATION DATE: 12/8/2024  3:57 PM  OBERVATION ADMISSION DATE: N/A  INPATIENT ADMISSION DATE: 12/8/24  6:00 PM   DISCHARGE DATE: 12/10/2024  1:22 PM   DISPOSITION:Home/Self Care    Network Utilization Review Department  ATTENTION: Please call with any questions or concerns to [phone] and carefully listen to the prompts so that you are directed to the right person. All voicemails are confidential.   For Discharge needs, contact Care Management DC Support Team at [phone] opt. 2  Send all requests for admission clinical reviews, approved or denied determinations and any other requests to dedicated fax number below belonging to the campus where the patient is receiving treatment. List of dedicated fax numbers for the Facilities:  FACILITY NAME UR FAX NUMBER   ADMISSION DENIALS (Administrative/Medical Necessity) [phone]   DISCHARGE SUPPORT TEAM ([facility]) [phone]   PARENT CHILD HEALTH (Maternity/NICU/Pediatrics) [phone]   [facility] [phone]   General acute hospital [phone]   [facility] [phone]   [facility] [phone]   [facility] [phone]   [facility] [phone]   [facility] [phone]   [facility]  [phone]   [facility] [phone]   [facility] [phone]   [facility] [phone]   [facility] [phone]

## 2024-12-13 ENCOUNTER — DOCUMENTATION (OUTPATIENT)
Dept: CARDIOLOGY CLINIC | Facility: CLINIC | Age: 64
End: 2024-12-13

## 2024-12-13 ENCOUNTER — APPOINTMENT (OUTPATIENT)
Dept: LAB | Facility: HOSPITAL | Age: 64
End: 2024-12-13
Payer: COMMERCIAL

## 2024-12-13 ENCOUNTER — ANTICOAG VISIT (OUTPATIENT)
Dept: CARDIOLOGY CLINIC | Facility: CLINIC | Age: 64
End: 2024-12-13

## 2024-12-13 DIAGNOSIS — I51.3 LEFT VENTRICULAR APICAL THROMBUS: Primary | ICD-10-CM

## 2024-12-13 DIAGNOSIS — I51.3 LEFT VENTRICULAR APICAL THROMBUS: ICD-10-CM

## 2024-12-13 LAB
INR PPP: 3.29 (ref 0.85–1.19)
PROTHROMBIN TIME: 33 SECONDS (ref 12.3–15)

## 2024-12-13 PROCEDURE — 85610 PROTHROMBIN TIME: CPT

## 2024-12-13 PROCEDURE — 36415 COLL VENOUS BLD VENIPUNCTURE: CPT

## 2024-12-13 NOTE — PROGRESS NOTES
Called [facility] spoke to Bri,  hold tonight 5 mg tomorrow and 2.5 mg on Sun.  Recheck INR on mon 12/16.   Faxed same to them and [place] [facility].

## 2024-12-14 LAB
BACTERIA BLD CULT: NORMAL
BACTERIA BLD CULT: NORMAL

## 2024-12-16 ENCOUNTER — APPOINTMENT (OUTPATIENT)
Dept: LAB | Facility: HOSPITAL | Age: 64
DRG: 199 | End: 2024-12-16
Payer: COMMERCIAL

## 2024-12-16 ENCOUNTER — ANTICOAG VISIT (OUTPATIENT)
Dept: CARDIOLOGY CLINIC | Facility: CLINIC | Age: 64
End: 2024-12-16

## 2024-12-16 DIAGNOSIS — I51.3 LEFT VENTRICULAR APICAL THROMBUS: Primary | ICD-10-CM

## 2024-12-16 DIAGNOSIS — I51.3 LEFT VENTRICULAR APICAL THROMBUS: ICD-10-CM

## 2024-12-16 LAB
INR PPP: 2.65 (ref 0.85–1.19)
PROTHROMBIN TIME: 28.1 SECONDS (ref 12.3–15)

## 2024-12-16 PROCEDURE — 85610 PROTHROMBIN TIME: CPT

## 2024-12-16 PROCEDURE — 36415 COLL VENOUS BLD VENIPUNCTURE: CPT

## 2024-12-16 NOTE — PROGRESS NOTES
Left message for Bri, advised INR good, will have patient take 2.5 mg Wed, 5 mg all other days, will recheck INR 12/20/24  Advised to call with questions or concerns.     Physician order faxed to [facility] and [facility].

## 2024-12-17 ENCOUNTER — APPOINTMENT (EMERGENCY)
Dept: CT IMAGING | Facility: HOSPITAL | Age: 64
DRG: 199 | End: 2024-12-17
Payer: COMMERCIAL

## 2024-12-17 ENCOUNTER — HOSPITAL ENCOUNTER (INPATIENT)
Facility: HOSPITAL | Age: 64
LOS: 1 days | Discharge: HOME | DRG: 199 | End: 2024-12-20
Attending: EMERGENCY MEDICINE | Admitting: HOSPITALIST
Payer: COMMERCIAL

## 2024-12-17 DIAGNOSIS — G45.9 TIA (TRANSIENT ISCHEMIC ATTACK): ICD-10-CM

## 2024-12-17 DIAGNOSIS — I51.3 LEFT VENTRICULAR APICAL THROMBUS: ICD-10-CM

## 2024-12-17 DIAGNOSIS — I10 HYPERTENSION: Primary | ICD-10-CM

## 2024-12-17 PROBLEM — R29.90 STROKE-LIKE SYMPTOMS: Status: ACTIVE | Noted: 2024-12-17

## 2024-12-17 LAB
2HR DELTA HS TROPONIN: 4 NG/L
4HR DELTA HS TROPONIN: 6 NG/L
ALBUMIN SERPL BCG-MCNC: 3.9 G/DL (ref 3.5–5)
ALP SERPL-CCNC: 62 U/L (ref 34–104)
ALT SERPL W P-5'-P-CCNC: 30 U/L (ref 7–52)
ANION GAP SERPL CALCULATED.3IONS-SCNC: 8 MMOL/L (ref 4–13)
APTT PPP: 38 SECONDS (ref 23–34)
AST SERPL W P-5'-P-CCNC: 34 U/L (ref 13–39)
BASOPHILS # BLD AUTO: 0.02 THOUSANDS/ΜL (ref 0–0.1)
BASOPHILS NFR BLD AUTO: 0 % (ref 0–1)
BILIRUB SERPL-MCNC: 0.4 MG/DL (ref 0.2–1)
BUN SERPL-MCNC: 23 MG/DL (ref 5–25)
CALCIUM SERPL-MCNC: 8.4 MG/DL (ref 8.4–10.2)
CARDIAC TROPONIN I PNL SERPL HS: 17 NG/L (ref ?–50)
CARDIAC TROPONIN I PNL SERPL HS: 21 NG/L (ref ?–50)
CARDIAC TROPONIN I PNL SERPL HS: 23 NG/L (ref ?–50)
CHLORIDE SERPL-SCNC: 107 MMOL/L (ref 96–108)
CO2 SERPL-SCNC: 23 MMOL/L (ref 21–32)
CREAT SERPL-MCNC: 1.15 MG/DL (ref 0.6–1.3)
EOSINOPHIL # BLD AUTO: 0.18 THOUSAND/ΜL (ref 0–0.61)
EOSINOPHIL NFR BLD AUTO: 2 % (ref 0–6)
ERYTHROCYTE [DISTWIDTH] IN BLOOD BY AUTOMATED COUNT: 33 % (ref 11.6–15.1)
GFR SERPL CREATININE-BSD FRML MDRD: 67 ML/MIN/1.73SQ M
GLUCOSE SERPL-MCNC: 110 MG/DL (ref 65–140)
HCT VFR BLD AUTO: 41 % (ref 36.5–49.3)
HGB BLD-MCNC: 11.9 G/DL (ref 12–17)
IMM GRANULOCYTES # BLD AUTO: 0.06 THOUSAND/UL (ref 0–0.2)
IMM GRANULOCYTES NFR BLD AUTO: 1 % (ref 0–2)
INR PPP: 2.91 (ref 0.85–1.19)
LYMPHOCYTES # BLD AUTO: 1.56 THOUSANDS/ΜL (ref 0.6–4.47)
LYMPHOCYTES NFR BLD AUTO: 19 % (ref 14–44)
MCH RBC QN AUTO: 22.5 PG (ref 26.8–34.3)
MCHC RBC AUTO-ENTMCNC: 29 G/DL (ref 31.4–37.4)
MCV RBC AUTO: 78 FL (ref 82–98)
MONOCYTES # BLD AUTO: 0.52 THOUSAND/ΜL (ref 0.17–1.22)
MONOCYTES NFR BLD AUTO: 6 % (ref 4–12)
NEUTROPHILS # BLD AUTO: 6.1 THOUSANDS/ΜL (ref 1.85–7.62)
NEUTS SEG NFR BLD AUTO: 72 % (ref 43–75)
NRBC BLD AUTO-RTO: 0 /100 WBCS
PLATELET # BLD AUTO: 290 THOUSANDS/UL (ref 149–390)
POTASSIUM SERPL-SCNC: 4.8 MMOL/L (ref 3.5–5.3)
PROT SERPL-MCNC: 6.6 G/DL (ref 6.4–8.4)
PROTHROMBIN TIME: 30.6 SECONDS (ref 12.3–15)
RBC # BLD AUTO: 5.28 MILLION/UL (ref 3.88–5.62)
SODIUM SERPL-SCNC: 138 MMOL/L (ref 135–147)
WBC # BLD AUTO: 8.44 THOUSAND/UL (ref 4.31–10.16)

## 2024-12-17 PROCEDURE — 99285 EMERGENCY DEPT VISIT HI MDM: CPT | Performed by: EMERGENCY MEDICINE

## 2024-12-17 PROCEDURE — 84484 ASSAY OF TROPONIN QUANT: CPT | Performed by: INTERNAL MEDICINE

## 2024-12-17 PROCEDURE — 99222 1ST HOSP IP/OBS MODERATE 55: CPT | Performed by: INTERNAL MEDICINE

## 2024-12-17 PROCEDURE — 70496 CT ANGIOGRAPHY HEAD: CPT

## 2024-12-17 PROCEDURE — 84484 ASSAY OF TROPONIN QUANT: CPT | Performed by: EMERGENCY MEDICINE

## 2024-12-17 PROCEDURE — 87081 CULTURE SCREEN ONLY: CPT | Performed by: INTERNAL MEDICINE

## 2024-12-17 PROCEDURE — 70498 CT ANGIOGRAPHY NECK: CPT

## 2024-12-17 PROCEDURE — 36415 COLL VENOUS BLD VENIPUNCTURE: CPT | Performed by: EMERGENCY MEDICINE

## 2024-12-17 PROCEDURE — 83036 HEMOGLOBIN GLYCOSYLATED A1C: CPT | Performed by: HOSPITALIST

## 2024-12-17 PROCEDURE — 85610 PROTHROMBIN TIME: CPT | Performed by: EMERGENCY MEDICINE

## 2024-12-17 PROCEDURE — 99285 EMERGENCY DEPT VISIT HI MDM: CPT

## 2024-12-17 PROCEDURE — 85730 THROMBOPLASTIN TIME PARTIAL: CPT | Performed by: EMERGENCY MEDICINE

## 2024-12-17 PROCEDURE — 80053 COMPREHEN METABOLIC PANEL: CPT | Performed by: EMERGENCY MEDICINE

## 2024-12-17 PROCEDURE — 85025 COMPLETE CBC W/AUTO DIFF WBC: CPT | Performed by: EMERGENCY MEDICINE

## 2024-12-17 RX ORDER — FERROUS SULFATE 325(65) MG
325 TABLET ORAL
Status: DISCONTINUED | OUTPATIENT
Start: 2024-12-18 | End: 2024-12-20 | Stop reason: HOSPADM

## 2024-12-17 RX ORDER — POLYETHYLENE GLYCOL 3350 17 G/17G
17 POWDER, FOR SOLUTION ORAL DAILY PRN
Status: DISCONTINUED | OUTPATIENT
Start: 2024-12-17 | End: 2024-12-20 | Stop reason: HOSPADM

## 2024-12-17 RX ORDER — ATORVASTATIN CALCIUM 40 MG/1
40 TABLET, FILM COATED ORAL
Status: DISCONTINUED | OUTPATIENT
Start: 2024-12-18 | End: 2024-12-20 | Stop reason: HOSPADM

## 2024-12-17 RX ORDER — PANTOPRAZOLE SODIUM 40 MG/1
40 TABLET, DELAYED RELEASE ORAL
Status: DISCONTINUED | OUTPATIENT
Start: 2024-12-18 | End: 2024-12-20 | Stop reason: HOSPADM

## 2024-12-17 RX ORDER — TRAZODONE HYDROCHLORIDE 100 MG/1
100 TABLET ORAL
Status: DISCONTINUED | OUTPATIENT
Start: 2024-12-18 | End: 2024-12-20 | Stop reason: HOSPADM

## 2024-12-17 RX ORDER — QUETIAPINE FUMARATE 300 MG/1
300 TABLET, FILM COATED ORAL
Status: DISCONTINUED | OUTPATIENT
Start: 2024-12-18 | End: 2024-12-20 | Stop reason: HOSPADM

## 2024-12-17 RX ORDER — HYDRALAZINE HYDROCHLORIDE 20 MG/ML
5 INJECTION INTRAMUSCULAR; INTRAVENOUS EVERY 6 HOURS PRN
Status: DISCONTINUED | OUTPATIENT
Start: 2024-12-17 | End: 2024-12-20 | Stop reason: HOSPADM

## 2024-12-17 RX ORDER — WARFARIN SODIUM 5 MG/1
5 TABLET ORAL
Status: DISCONTINUED | OUTPATIENT
Start: 2024-12-18 | End: 2024-12-20 | Stop reason: HOSPADM

## 2024-12-17 RX ORDER — ACETAMINOPHEN 325 MG/1
650 TABLET ORAL EVERY 6 HOURS PRN
Status: DISCONTINUED | OUTPATIENT
Start: 2024-12-17 | End: 2024-12-20 | Stop reason: HOSPADM

## 2024-12-17 RX ORDER — ONDANSETRON 2 MG/ML
4 INJECTION INTRAMUSCULAR; INTRAVENOUS EVERY 6 HOURS PRN
Status: DISCONTINUED | OUTPATIENT
Start: 2024-12-17 | End: 2024-12-20 | Stop reason: HOSPADM

## 2024-12-17 RX ORDER — ESCITALOPRAM OXALATE 20 MG/1
20 TABLET ORAL DAILY
Status: DISCONTINUED | OUTPATIENT
Start: 2024-12-18 | End: 2024-12-20 | Stop reason: HOSPADM

## 2024-12-17 RX ADMIN — IOHEXOL 85 ML: 350 INJECTION, SOLUTION INTRAVENOUS at 19:18

## 2024-12-17 NOTE — ED PROVIDER NOTES
Time reflects when diagnosis was documented in both MDM as applicable and the Disposition within this note       Time User Action Codes Description Comment    12/17/2024  8:31 PM Alicia Zarate Add [I10] Hypertension     12/17/2024  8:32 PM Alicia Zarate Add [G45.9] TIA (transient ischemic attack)           ED Disposition       ED Disposition   Admit    Condition   Stable    Date/Time   Tue Dec 17, 2024  8:32 PM    Comment   Case was discussed with ANKIT and the patient's admission status was agreed to be Admission Status: observation status to the service of Dr. Kamara .               Assessment & Plan       Medical Decision Making  64 year old male presents for evaluation of asymptomatic hypertension.  According to staff, patient had reported left sided weakness and left facial numbness; however, none of this had been observed by staff.  NIHSS 0 on arrival.  Possible TIA.  CTA head/neck w/out LVO.  Discussed with neurology.  Patient admitted under stroke pathway.    Amount and/or Complexity of Data Reviewed  Labs: ordered. Decision-making details documented in ED Course.  Radiology: ordered.    Risk  Prescription drug management.  Decision regarding hospitalization.        ED Course as of 12/17/24 2034   Tue Dec 17, 2024   1727 Hemoglobin(!): 11.9  9.5 seven days ago   1744 POCT INR(!): 2.91  therapeutic       Medications   iohexol (OMNIPAQUE) 350 MG/ML injection (MULTI-DOSE) 85 mL (85 mL Intravenous Given 12/17/24 1918)       ED Risk Strat Scores                          SBIRT 20yo+      Flowsheet Row Most Recent Value   Initial Alcohol Screen: US AUDIT-C     1. How often do you have a drink containing alcohol? 0 Filed at: 12/17/2024 1558   2. How many drinks containing alcohol do you have on a typical day you are drinking?  0 Filed at: 12/17/2024 1558   3a. Male UNDER 65: How often do you have five or more drinks on one occasion? 0 Filed at: 12/17/2024 1558   3b. FEMALE Any Age, or MALE 65+: How often do  "you have 4 or more drinks on one occassion? 0 Filed at: 12/17/2024 1983   Audit-C Score 0 Filed at: 12/17/2024 2016   PETRA: How many times in the past year have you...    Used an illegal drug or used a prescription medication for non-medical reasons? Never Filed at: 12/17/2024 2953                            History of Present Illness       Chief Complaint   Patient presents with    Hypertension     Patient reports to ED via EMS from [facility] for high blood pressure reading. Patient has no complaints at this time.        Past Medical History:   Diagnosis Date    Alcohol abuse     Depression     Drug therapy     GERD (gastroesophageal reflux disease)     Hepatitis C     Hypertension 01/2012    Knee pain, bilateral     Left ventricular apical thrombus     Psychiatric disorder     depression, anxiety    Renal disorder     Self-injurious behavior     Spinal stenosis of lumbar region     Suicide attempt (HCC)       Past Surgical History:   Procedure Laterality Date    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    HERNIA REPAIR  1997      Family History   Problem Relation Age of Onset    Depression Mother     Depression Father     No Known Problems Sister     No Known Problems Brother       Social History     Tobacco Use    Smoking status: Every Day     Current packs/day: 0.50     Types: Cigarettes, Cigars    Smokeless tobacco: Never    Tobacco comments:     PT \"3 CIGARS A DAY\" - quit smoking cigars   Vaping Use    Vaping status: Never Used   Substance Use Topics    Alcohol use: Not Currently     Alcohol/week: 0.0 standard drinks of alcohol     Comment: Stopped drinking for several months - recovered alcoholic    Drug use: Not Currently     Types: Marijuana     Comment: Last used marijuana 2 months ago.- no longer smoking      E-Cigarette/Vaping    E-Cigarette Use Never User       E-Cigarette/Vaping Substances    Nicotine No     THC No     CBD No     Flavoring No     Other No     Unknown No       I have reviewed and " agree with the history as documented.     64 year old male presents for evaluation of hypertension.  Patient has history of hypertension and states he has been compliant with medications.  Patient has no complaints.  He denies headache, dizziness, chest pain or shortness of breath.  Patient is anticoagulated with coumadin due to history of ventricular thrombus.  He does not recall when his coumadin level was last checked.  He denies any recent changes to his dosing.  According to staff at [facility], he had reported that he felt weak and dizzy today and they found his blood pressure to be 169/110 mmHg.  [facility] to [facility] an appointment and he had told them that he had experienced left sided weakness and numbness of his left face.  This was not observed by staff.  According to staff, his coumadin had been high and was supposed to be lowered 2.5 tomorrow.  They state he tends to underreport his symptoms as he does not like being in the hospital.  When confronted with the information provided by his facility, patient states he has had weakness on the left for about a day which resolved prior to coming to the ED.  He states he didn't really have facial numbness.      Hypertension      Review of Systems        Objective       ED Triage Vitals [12/17/24 1559]   Temperature Pulse Blood Pressure Respirations SpO2 Patient Position - Orthostatic VS   97.8 °F (36.6 °C) 83 169/97 16 97 % Sitting      Temp Source Heart Rate Source BP Location FiO2 (%) Pain Score    Temporal Monitor Left arm -- --      Vitals      Date and Time Temp Pulse SpO2 Resp BP Pain Score FACES Pain Rating User   12/17/24 1956 -- 82 95 % 18 181/108 -- -- AD   12/17/24 1644 -- 80 97 % 17 170/100 -- -- LK   12/17/24 1559 97.8 °F (36.6 °C) 83 97 % 16 169/97 -- -- RD            Physical Exam  Vitals and nursing note reviewed.   HENT:      Head: Normocephalic and atraumatic.   Cardiovascular:      Rate and Rhythm: Normal rate and  regular rhythm.      Pulses: Normal pulses.   Pulmonary:      Effort: Pulmonary effort is normal. No respiratory distress.   Abdominal:      General: There is no distension.      Palpations: Abdomen is soft.      Tenderness: There is no abdominal tenderness.   Musculoskeletal:      Right lower leg: No edema.      Left lower leg: No edema.   Skin:     General: Skin is warm and dry.   Neurological:      Mental Status: He is alert.      Cranial Nerves: Cranial nerves 2-12 are intact.      Sensory: Sensation is intact.      Motor: Motor function is intact.      Coordination: Coordination is intact. Finger-Nose-Finger Test and Heel to Shin Test normal.         Results Reviewed       Procedure Component Value Units Date/Time    HS Troponin I 2hr [[id number]]  (Normal) Collected: 12/17/24 1954    Lab Status: Final result Specimen: Blood from Arm, Right Updated: 12/17/24 2020     hs TnI 2hr 21 ng/L      Delta 2hr hsTnI 4 ng/L     HS Troponin I 4hr [[id number]]     Lab Status: No result Specimen: Blood     HS Troponin 0hr (reflex protocol) [[id number]]  (Normal) Collected: 12/17/24 1717    Lab Status: Final result Specimen: Blood from Arm, Left Updated: 12/17/24 1746     hs TnI 0hr 17 ng/L     Comprehensive metabolic panel [[id number]] Collected: 12/17/24 1717    Lab Status: Final result Specimen: Blood from Arm, Left Updated: 12/17/24 1738     Sodium 138 mmol/L      Potassium 4.8 mmol/L      Chloride 107 mmol/L      CO2 23 mmol/L      ANION GAP 8 mmol/L      BUN 23 mg/dL      Creatinine 1.15 mg/dL      Glucose 110 mg/dL      Calcium 8.4 mg/dL      AST 34 U/L      ALT 30 U/L      Alkaline Phosphatase 62 U/L      Total Protein 6.6 g/dL      Albumin 3.9 g/dL      Total Bilirubin 0.40 mg/dL      eGFR 67 ml/min/1.73sq m     Narrative:      National Kidney Disease Foundation guidelines for Chronic Kidney Disease (CKD):     Stage 1 with normal or high GFR (GFR > 90 mL/min/1.73 square meters)    Stage 2 Mild CKD (GFR = 60-89  mL/min/1.73 square meters)    Stage 3A Moderate CKD (GFR = 45-59 mL/min/1.73 square meters)    Stage 3B Moderate CKD (GFR = 30-44 mL/min/1.73 square meters)    Stage 4 Severe CKD (GFR = 15-29 mL/min/1.73 square meters)    Stage 5 End Stage CKD (GFR <15 mL/min/1.73 square meters)  Note: GFR calculation is accurate only with a steady state creatinine    Protime-INR [[id number]]  (Abnormal) Collected: 12/17/24 1717    Lab Status: Final result Specimen: Blood from Arm, Left Updated: 12/17/24 1737     Protime 30.6 seconds      INR 2.91    Narrative:      INR Therapeutic Range    Indication                                             INR Range      Atrial Fibrillation                                               2.0-3.0  Hypercoagulable State                                    2.0.2.3  Left Ventricular Asist Device                            2.0-3.0  Mechanical Heart Valve                                  -    Aortic(with afib, MI, embolism, HF, LA enlargement,    and/or coagulopathy)                                     2.0-3.0 (2.5-3.5)     Mitral                                                             2.5-3.5  Prosthetic/Bioprosthetic Heart Valve               2.0-3.0  Venous thromboembolism (VTE: VT, PE        2.0-3.0    APTT [[id number]]  (Abnormal) Collected: 12/17/24 1717    Lab Status: Final result Specimen: Blood from Arm, Left Updated: 12/17/24 1737     PTT 38 seconds     CBC and differential [[id number]]  (Abnormal) Collected: 12/17/24 1717    Lab Status: Final result Specimen: Blood from Arm, Left Updated: 12/17/24 1724     WBC 8.44 Thousand/uL      RBC 5.28 Million/uL      Hemoglobin 11.9 g/dL      Hematocrit 41.0 %      MCV 78 fL      MCH 22.5 pg      MCHC 29.0 g/dL      RDW 33.0 %      Platelets 290 Thousands/uL      nRBC 0 /100 WBCs      Segmented % 72 %      Immature Grans % 1 %      Lymphocytes % 19 %      Monocytes % 6 %      Eosinophils Relative 2 %      Basophils Relative 0 %      Absolute  Neutrophils 6.10 Thousands/µL      Absolute Immature Grans 0.06 Thousand/uL      Absolute Lymphocytes 1.56 Thousands/µL      Absolute Monocytes 0.52 Thousand/µL      Eosinophils Absolute 0.18 Thousand/µL      Basophils Absolute 0.02 Thousands/µL             CTA head and neck with and without contrast   Final Interpretation by E. Alec Schoenberger, MD (12/17 2007)      CT Brain: No acute intracranial pathology. Stable chronic ischemic changes.      CT Angiography: Ulcerated plaque in the proximal internal carotid arteries, left greater than right without significant stenosis.   No significant intracranial stenosis, large vessel occlusion or aneurysm.               Workstation performed: [id number]             ECG 12 Lead Documentation Only    Date/Time: 12/17/2024 5:28 PM    Performed by: Alicia Zarate MD  Authorized by: Alicia Zarate MD    Indications / Diagnosis:  Hypertension  ECG reviewed by me, the ED Provider: yes    Patient location:  ED  Previous ECG:     Previous ECG:  Unavailable  Interpretation:     Interpretation: abnormal    Rate:     ECG rate:  77    ECG rate assessment: normal    Rhythm:     Rhythm: sinus rhythm    Ectopy:     Ectopy: none    QRS:     QRS axis:  Left    QRS intervals:  Normal  Conduction:     Conduction: abnormal      Abnormal conduction: 1st degree    ST segments:     ST segments:  Normal  T waves:     T waves: inverted      Inverted:  AVL      ED Medication and Procedure Management   Prior to Admission Medications   Prescriptions Last Dose Informant Patient Reported? Taking?   QUEtiapine (SEROquel) 300 mg tablet  Outside Facility (Specify) No No   Sig: Take 1 tablet (300 mg total) by mouth daily at bedtime   acetaminophen (TYLENOL) 650 mg CR tablet   No No   Sig: Take 1 tablet (650 mg total) by mouth every 8 (eight) hours as needed for mild pain   atorvastatin (LIPITOR) 40 mg tablet  Outside Facility (Specify) No No   Sig: Take 1 tablet (40 mg total) by mouth  daily with dinner   benzonatate (TESSALON) 200 MG capsule   No No   Sig: Take 1 capsule (200 mg total) by mouth 3 (three) times a day as needed for cough   diclofenac sodium (Voltaren) 1 %  Outside Facility (Specify) No No   Sig: Apply 2 g topically 4 (four) times a day   escitalopram (LEXAPRO) 20 mg tablet  Outside Facility (Specify) No No   Sig: Take 1 tablet (20 mg total) by mouth daily   ferrous sulfate 325 (65 Fe) mg tablet   No No   Sig: Take 1 tablet (325 mg total) by mouth daily with breakfast Do not start before December 7, 2024.   guaiFENesin (ROBITUSSIN) 100 MG/5ML oral liquid   No No   Sig: Take 10 mL (200 mg total) by mouth 3 (three) times a day as needed for cough   nicotine polacrilex (NICORETTE) 2 mg gum  Outside Facility (Specify) No No   Sig: Chew 1 each (2 mg total) every 3 (three) hours as needed for smoking cessation   pantoprazole (PROTONIX) 40 mg tablet   No No   Sig: Take 1 tablet (40 mg total) by mouth 2 (two) times a day before lunch and dinner Do not start before December 7, 2024.   polyethylene glycol (MIRALAX) 17 g packet  Outside Facility (Specify) No No   Sig: Take 17 g by mouth daily as needed (constipation)   traZODone (DESYREL) 100 mg tablet  Outside Facility (Specify) No No   Sig: Take 1 tablet (100 mg total) by mouth daily at bedtime   warfarin (COUMADIN) 5 mg tablet   No No   Sig: Take 1 tablet (5 mg total) by mouth daily      Facility-Administered Medications: None     Patient's Medications   Discharge Prescriptions    No medications on file     No discharge procedures on file.  ED SEPSIS DOCUMENTATION   Time reflects when diagnosis was documented in both MDM as applicable and the Disposition within this note       Time User Action Codes Description Comment    12/17/2024  8:31 PM Alicia Zarate [I10] Hypertension     12/17/2024  8:32 PM Alicia Zarate [G45.9] TIA (transient ischemic attack)                  Alicia Zarate MD  12/17/24 2034

## 2024-12-18 ENCOUNTER — TELEPHONE (OUTPATIENT)
Age: 64
End: 2024-12-18

## 2024-12-18 ENCOUNTER — APPOINTMENT (OUTPATIENT)
Dept: NON INVASIVE DIAGNOSTICS | Facility: HOSPITAL | Age: 64
DRG: 199 | End: 2024-12-18
Payer: COMMERCIAL

## 2024-12-18 ENCOUNTER — APPOINTMENT (OUTPATIENT)
Dept: MRI IMAGING | Facility: HOSPITAL | Age: 64
DRG: 199 | End: 2024-12-18
Payer: COMMERCIAL

## 2024-12-18 LAB
ANION GAP SERPL CALCULATED.3IONS-SCNC: 8 MMOL/L (ref 4–13)
BASOPHILS # BLD AUTO: 0.02 THOUSANDS/ΜL (ref 0–0.1)
BASOPHILS NFR BLD AUTO: 0 % (ref 0–1)
BUN SERPL-MCNC: 20 MG/DL (ref 5–25)
CALCIUM SERPL-MCNC: 8.4 MG/DL (ref 8.4–10.2)
CHLORIDE SERPL-SCNC: 109 MMOL/L (ref 96–108)
CHOLEST SERPL-MCNC: 107 MG/DL (ref ?–200)
CO2 SERPL-SCNC: 23 MMOL/L (ref 21–32)
CREAT SERPL-MCNC: 1.18 MG/DL (ref 0.6–1.3)
EOSINOPHIL # BLD AUTO: 0.19 THOUSAND/ΜL (ref 0–0.61)
EOSINOPHIL NFR BLD AUTO: 2 % (ref 0–6)
ERYTHROCYTE [DISTWIDTH] IN BLOOD BY AUTOMATED COUNT: 33.2 % (ref 11.6–15.1)
EST. AVERAGE GLUCOSE BLD GHB EST-MCNC: 108 MG/DL
GFR SERPL CREATININE-BSD FRML MDRD: 65 ML/MIN/1.73SQ M
GLUCOSE P FAST SERPL-MCNC: 116 MG/DL (ref 65–99)
GLUCOSE SERPL-MCNC: 116 MG/DL (ref 65–140)
GLUCOSE SERPL-MCNC: 128 MG/DL (ref 65–140)
HBA1C MFR BLD: 5.4 %
HCT VFR BLD AUTO: 41.8 % (ref 36.5–49.3)
HDLC SERPL-MCNC: 32 MG/DL
HGB BLD-MCNC: 11.9 G/DL (ref 12–17)
IMM GRANULOCYTES # BLD AUTO: 0.05 THOUSAND/UL (ref 0–0.2)
IMM GRANULOCYTES NFR BLD AUTO: 1 % (ref 0–2)
INR PPP: 2.62 (ref 0.85–1.19)
LDLC SERPL CALC-MCNC: 54 MG/DL (ref 0–100)
LYMPHOCYTES # BLD AUTO: 1.69 THOUSANDS/ΜL (ref 0.6–4.47)
LYMPHOCYTES NFR BLD AUTO: 19 % (ref 14–44)
MCH RBC QN AUTO: 22.2 PG (ref 26.8–34.3)
MCHC RBC AUTO-ENTMCNC: 28.5 G/DL (ref 31.4–37.4)
MCV RBC AUTO: 78 FL (ref 82–98)
MONOCYTES # BLD AUTO: 0.62 THOUSAND/ΜL (ref 0.17–1.22)
MONOCYTES NFR BLD AUTO: 7 % (ref 4–12)
NEUTROPHILS # BLD AUTO: 6.13 THOUSANDS/ΜL (ref 1.85–7.62)
NEUTS SEG NFR BLD AUTO: 71 % (ref 43–75)
NRBC BLD AUTO-RTO: 0 /100 WBCS
PLATELET # BLD AUTO: 304 THOUSANDS/UL (ref 149–390)
POTASSIUM SERPL-SCNC: 3.8 MMOL/L (ref 3.5–5.3)
PROTHROMBIN TIME: 28.3 SECONDS (ref 12.3–15)
RBC # BLD AUTO: 5.36 MILLION/UL (ref 3.88–5.62)
SODIUM SERPL-SCNC: 140 MMOL/L (ref 135–147)
TRIGL SERPL-MCNC: 105 MG/DL (ref ?–150)
WBC # BLD AUTO: 8.7 THOUSAND/UL (ref 4.31–10.16)

## 2024-12-18 PROCEDURE — 97162 PT EVAL MOD COMPLEX 30 MIN: CPT

## 2024-12-18 PROCEDURE — 80048 BASIC METABOLIC PNL TOTAL CA: CPT | Performed by: HOSPITALIST

## 2024-12-18 PROCEDURE — 93308 TTE F-UP OR LMTD: CPT

## 2024-12-18 PROCEDURE — 93325 DOPPLER ECHO COLOR FLOW MAPG: CPT | Performed by: INTERNAL MEDICINE

## 2024-12-18 PROCEDURE — 97166 OT EVAL MOD COMPLEX 45 MIN: CPT

## 2024-12-18 PROCEDURE — 93308 TTE F-UP OR LMTD: CPT | Performed by: INTERNAL MEDICINE

## 2024-12-18 PROCEDURE — 99223 1ST HOSP IP/OBS HIGH 75: CPT | Performed by: PSYCHIATRY & NEUROLOGY

## 2024-12-18 PROCEDURE — 99232 SBSQ HOSP IP/OBS MODERATE 35: CPT | Performed by: HOSPITALIST

## 2024-12-18 PROCEDURE — 93321 DOPPLER ECHO F-UP/LMTD STD: CPT | Performed by: INTERNAL MEDICINE

## 2024-12-18 PROCEDURE — 70551 MRI BRAIN STEM W/O DYE: CPT

## 2024-12-18 PROCEDURE — 85610 PROTHROMBIN TIME: CPT | Performed by: HOSPITALIST

## 2024-12-18 PROCEDURE — 80061 LIPID PANEL: CPT | Performed by: HOSPITALIST

## 2024-12-18 PROCEDURE — 85025 COMPLETE CBC W/AUTO DIFF WBC: CPT | Performed by: HOSPITALIST

## 2024-12-18 PROCEDURE — 82948 REAGENT STRIP/BLOOD GLUCOSE: CPT

## 2024-12-18 RX ORDER — LISINOPRIL 20 MG/1
20 TABLET ORAL DAILY
Status: DISCONTINUED | OUTPATIENT
Start: 2024-12-18 | End: 2024-12-20

## 2024-12-18 RX ADMIN — TRAZODONE HYDROCHLORIDE 100 MG: 100 TABLET ORAL at 21:04

## 2024-12-18 RX ADMIN — FERROUS SULFATE TAB 325 MG (65 MG ELEMENTAL FE) 325 MG: 325 (65 FE) TAB at 07:55

## 2024-12-18 RX ADMIN — ESCITALOPRAM OXALATE 20 MG: 20 TABLET ORAL at 07:55

## 2024-12-18 RX ADMIN — ATORVASTATIN CALCIUM 40 MG: 40 TABLET, FILM COATED ORAL at 17:23

## 2024-12-18 RX ADMIN — WARFARIN SODIUM 5 MG: 5 TABLET ORAL at 17:23

## 2024-12-18 RX ADMIN — QUETIAPINE FUMARATE 300 MG: 300 TABLET ORAL at 21:04

## 2024-12-18 RX ADMIN — PANTOPRAZOLE SODIUM 40 MG: 40 TABLET, DELAYED RELEASE ORAL at 17:23

## 2024-12-18 RX ADMIN — LISINOPRIL 20 MG: 20 TABLET ORAL at 12:13

## 2024-12-18 RX ADMIN — PERFLUTREN 2 ML/MIN: 6.52 INJECTION, SUSPENSION INTRAVENOUS at 16:22

## 2024-12-18 RX ADMIN — PANTOPRAZOLE SODIUM 40 MG: 40 TABLET, DELAYED RELEASE ORAL at 11:31

## 2024-12-18 NOTE — ASSESSMENT & PLAN NOTE
//108   Home regimen: none   Plan   Completed permissive HTN. Blood pressure control remains suboptimal  Start lisinopril and observe.  IV hydralazine 5 mg prn for systolic > 200

## 2024-12-18 NOTE — TELEPHONE ENCOUNTER
12/17/2024 - present (1 day)  [facility]    HFU/ [facility]/ Stroke-like symptoms     ----- Message from Lisa Walker PA-C sent at 12/18/2024  1:44 PM EST -----  Regarding: HFU  Chuy Norton will need follow-up in in 3 months with neurovascular team for Other in 60 minute appointment. They will not require outpatient neurological testing.

## 2024-12-18 NOTE — PLAN OF CARE
Problem: Potential for Falls  Goal: Patient will remain free of falls  Description: INTERVENTIONS:  - Educate patient/family on patient safety including physical limitations  - Instruct patient to call for assistance with activity   - Consult OT/PT to assist with strengthening/mobility   - Keep Call bell within reach  - Keep bed low and locked with side rails adjusted as appropriate  - Keep care items and personal belongings within reach  - Initiate and maintain comfort rounds  - Make Fall Risk Sign visible to staff  - Offer Toileting every 2 Hours, in advance of need  - Initiate/Maintain bed/chair alarm  - Obtain necessary fall risk management equipment  - Apply yellow socks and bracelet for high fall risk patients  - Consider moving patient to room near nurses station  Outcome: Progressing     Problem: Prexisting or High Potential for Compromised Skin Integrity  Goal: Skin integrity is maintained or improved  Description: INTERVENTIONS:  - Identify patients at risk for skin breakdown  - Assess and monitor skin integrity  - Assess and monitor nutrition and hydration status  - Monitor labs   - Assess for incontinence   - Turn and reposition patient  - Assist with mobility/ambulation  - Relieve pressure over bony prominences  - Avoid friction and shearing  - Provide appropriate hygiene as needed including keeping skin clean and dry  - Evaluate need for skin moisturizer/barrier cream  - Collaborate with interdisciplinary team   - Patient/family teaching  - Consider wound care consult   Outcome: Progressing     Problem: PAIN - ADULT  Goal: Verbalizes/displays adequate comfort level or baseline comfort level  Description: Interventions:  - Encourage patient to monitor pain and request assistance  - Assess pain using appropriate pain scale  - Administer analgesics based on type and severity of pain and evaluate response  - Implement non-pharmacological measures as appropriate and evaluate response  - Consider cultural  and social influences on pain and pain management  - Notify physician/advanced practitioner if interventions unsuccessful or patient reports new pain  Outcome: Progressing     Problem: INFECTION - ADULT  Goal: Absence or prevention of progression during hospitalization  Description: INTERVENTIONS:  - Assess and monitor for signs and symptoms of infection  - Monitor lab/diagnostic results  - Monitor all insertion sites, i.e. indwelling lines, tubes, and drains  - Monitor endotracheal if appropriate and nasal secretions for changes in amount and color  - [place] appropriate cooling/warming therapies per order  - Administer medications as ordered  - Instruct and encourage patient and family to use good hand hygiene technique  - Identify and instruct in appropriate isolation precautions for identified infection/condition  Outcome: Progressing  Goal: Absence of fever/infection during neutropenic period  Description: INTERVENTIONS:  - Monitor WBC    Outcome: Progressing     Problem: SAFETY ADULT  Goal: Patient will remain free of falls  Description: INTERVENTIONS:  - Educate patient/family on patient safety including physical limitations  - Instruct patient to call for assistance with activity   - Consult OT/PT to assist with strengthening/mobility   - Keep Call bell within reach  - Keep bed low and locked with side rails adjusted as appropriate  - Keep care items and personal belongings within reach  - Initiate and maintain comfort rounds  - Make Fall Risk Sign visible to staff  - Offer Toileting every 2 Hours, in advance of need  - Initiate/Maintain bed/chair alarm  - Obtain necessary fall risk management equipment  - Apply yellow socks and bracelet for high fall risk patients  - Consider moving patient to room near nurses station  Outcome: Progressing  Goal: Maintain or return to baseline ADL function  Description: INTERVENTIONS:  -  Assess patient's ability to carry out ADLs; assess patient's baseline for ADL  function and identify physical deficits which impact ability to perform ADLs (bathing, care of mouth/teeth, toileting, grooming, dressing, etc.)  - Assess/evaluate cause of self-care deficits   - Assess range of motion  - Assess patient's mobility; develop plan if impaired  - Assess patient's need for assistive devices and provide as appropriate  - Encourage maximum independence but intervene and supervise when necessary  - Involve family in performance of ADLs  - Assess for home care needs following discharge   - Consider OT consult to assist with ADL evaluation and planning for discharge  - Provide patient education as appropriate  Outcome: Progressing  Goal: Maintains/Returns to pre admission functional level  Description: INTERVENTIONS:  - Perform AM-PAC 6 Click Basic Mobility/ Daily Activity assessment daily.  - Set and communicate daily mobility goal to care team and patient/family/caregiver.   - Collaborate with rehabilitation services on mobility goals if consulted  - Perform Range of Motion 3 times a day.  - Reposition patient every 2 hours.  - Dangle patient 3 times a day  - Stand patient 3 times a day  - Ambulate patient 3 times a day  - Out of bed to chair 3 times a day   - Out of bed for meals 3 times a day  - Out of bed for toileting  - Record patient progress and toleration of activity level   Outcome: Progressing     Problem: DISCHARGE PLANNING  Goal: Discharge to home or other facility with appropriate resources  Description: INTERVENTIONS:  - Identify barriers to discharge w/patient and caregiver  - Arrange for needed discharge resources and transportation as appropriate  - Identify discharge learning needs (meds, wound care, etc.)  - Arrange for interpretive services to assist at discharge as needed  - Refer to Case Management Department for coordinating discharge planning if the patient needs post-hospital services based on physician/advanced practitioner order or complex needs related to  functional status, cognitive ability, or social support system  Outcome: Progressing     Problem: Knowledge Deficit  Goal: Patient/family/caregiver demonstrates understanding of disease process, treatment plan, medications, and discharge instructions  Description: Complete learning assessment and assess knowledge base.  Interventions:  - Provide teaching at level of understanding  - Provide teaching via preferred learning methods  Outcome: Progressing

## 2024-12-18 NOTE — OCCUPATIONAL THERAPY NOTE
Occupational Therapy Evaluation     Patient Name: Chuy Norton  Today's Date: 12/18/2024  Problem List  Principal Problem:    Stroke-like symptoms  Active Problems:    Bipolar 2 disorder, major depressive episode (HCC)    Benign essential hypertension    Cirrhosis (HCC)    Iron deficiency anemia    Left ventricular apical thrombus    PUD (peptic ulcer disease)    Past Medical History  Past Medical History:   Diagnosis Date    Alcohol abuse     Depression     Drug therapy     GERD (gastroesophageal reflux disease)     Hepatitis C     Hypertension 01/2012    Knee pain, bilateral     Left ventricular apical thrombus     Psychiatric disorder     depression, anxiety    Renal disorder     Self-injurious behavior     Spinal stenosis of lumbar region     Suicide attempt (HCC)      Past Surgical History  Past Surgical History:   Procedure Laterality Date    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    HERNIA REPAIR  1997 12/18/24 0915   OT Last Visit   OT Visit Date 12/18/24   Note Type   Note type Evaluation   Pain Assessment   Pain Assessment Tool 0-10   Pain Score No Pain   Restrictions/Precautions   Weight Bearing Precautions Per Order No   Other Precautions Cognitive;Chair Alarm;Bed Alarm;Fall Risk;Pain   Home Living   Type of Home Group Home  ([facility])   Home Layout Multi-level;Laundry in basement  (Per previosu OT note 5/11/2024 - 2 full flight to bed, full flight to laundry)   Bathroom Shower/Tub Walk-in shower   Bathroom Equipment Shower chair;Grab bars in shower   Home Equipment   (no DME)   Prior Function   Level of [place] Independent with ADLs;Independent with functional mobility;Needs assistance with IADLS   Lives With Facility staff;Other (Comment)  (Housemates, pt reports 30 others)   Receives Help From Personal care attendant   IADLs Family/Friend/Other provides transportation;Family/Friend/Other provides meals;Family/Friend/Other provides medication management   General  "  Family/Caregiver Present No   Subjective   Subjective \"Im okay.\"   ADL   Eating Assistance 7  Independent   Grooming Assistance 7  Independent   UB Bathing Assistance 6  Modified Independent   LB Bathing Assistance 5  Supervision/Setup   UB Dressing Assistance 6  Modified independent   LB Dressing Assistance 5  Supervision/Setup   LB Dressing Deficit   (Pt able to manage clothing with supervision standing at ST)   Toileting Assistance  5  Supervision/Setup   Toileting Deficit   (Pt incontinent of large stool requiring cleanup)   Bed Mobility   Additional Comments Pt seated EOB with PCA upon arrival   Transfers   Sit to Stand 6  Modified independent   Additional items Armrests   Stand to Sit 6  Modified independent   Additional items Armrests   Additional Comments Pt stood for approx 5-6 minutes for hazel-care- patient incontinent of a large amount of stool. Pt able to weighshift to one leg to doff pants   Functional Mobility   Functional Mobility 7  Independent   Additional items   (no DME)   Balance   Static Sitting Normal   Dynamic Sitting Normal   Static Standing Normal   Dynamic Standing Normal   Activity Tolerance   Activity Tolerance Patient tolerated treatment well   Medical Staff Made Aware PT Deidra   Nurse Made Aware MARY JO Medina   RUE Assessment   RUE Assessment WFL  (h/o R 2nd digit amputation at PIP joint)   LUE Assessment   LUE Assessment WFL   Hand Function   Gross Motor Coordination Functional   Fine Motor Coordination Functional   Vision-Basic Assessment   Current Vision Wears glasses all the time   Psychosocial   Psychosocial (WDL) WDL   Cognition   Overall Cognitive Status Impaired   Arousal/Participation Alert   Attention Within functional limits   Orientation Level Oriented to person;Oriented to place;Oriented to time;Disoriented to situation   Following Commands Follows one step commands without difficulty   Assessment   Limitation Decreased cognition   Prognosis Good   Assessment Pt is a 64 y.o. " male seen for OT evaluation at [facility], admitted 12/17/2024 w/ Stroke-like symptoms. Extensive chart review completed by OT. Comorbidities affecting the pt's functional performance include a significant PMH of: Bipolar 2 disorder, major depressive episode, cirrhosis, PUD, anxiety disorder, ADHD, hyperlipidemia, Knee pain, low back pain, h/o CVA, patellofemoral pain of R knee, spinal stenosis of lumbar region, L hip pain, hepatitis C, SOB, thrombocytopenia. Personal factors affecting pt at time of IE include: steps to enter environment, behavioral pattern, flat affect, health management , and environment. PTA pt was living in a Multi-level was  I w/ ADLs and A w/ IADLS, and I with functional mobility with use of no DME/AD. Upon OT IE pt demonstrated Janine for functional transfers, IND for functional mobility w/o device, Janine for UB ADLs and supervision for LB ADLS 2* the following deficits impacting occupational performance: impaired sequencing, impaired problem solving, and impaired interpersonal skills. Pt with no acute OT needs 2* to performing close to his functional baseline and is demonstrating limited ADL deficits. OT to D/C at this time. The patient's raw score on the AM-PAC Daily Activity inpatient short form is 21, standardized score is 44.27, greater than 39.4. Patients at this level are likely to benefit from DC to home. However, please refer to therapist recommendation for discharge planning given other factors that may influence destination. At this time, OT recommendations at time of discharge are DC with No rehabilitation needs resources.   Goals   Patient Goals None stated   Plan   OT Frequency Eval only   Discharge Recommendation   Rehab Resource Intensity Level, OT No post-acute rehabilitation needs   AM-PAC Daily Activity Inpatient   Lower Body Dressing 3   Bathing 3   Toileting 3   Upper Body Dressing 4   Grooming 4   Eating 4   Daily Activity Raw Score 21   Daily Activity Standardized Score (Calc  for Raw Score >=11) 44.27   AM-PAC Applied Cognition Inpatient   Following a Speech/Presentation 3   Understanding Ordinary Conversation 3   Taking Medications 3   Remembering Where Things Are Placed or Put Away 3   Remembering List of 4-5 Errands 3   Taking Care of Complicated Tasks 3   Applied Cognition Raw Score 18   Applied Cognition Standardized Score 38.07   End of Consult   Education Provided Yes   Patient Position at End of Consult Other (comment)  (transport chair with PCA to head to MRI)   Nurse Communication Nurse aware of consult     Magaly Johnson OTR/L

## 2024-12-18 NOTE — ASSESSMENT & PLAN NOTE
65 yo female with CAD s/p MI, CVA, PAF, Hepatitis C, alcoholic cirrhosis, depression, ventricular thrombus maintained on Coumadin who presents with elevated BP and concern for LUE weakness and L facial numbness.    Neuroimaging  12/17/2024 CTA head and neck with and without contrast:   No acute intracranial pathology. Stable chronic ischemic changes.  Ulcerated plaque in the proximal internal carotid arteries, left greater then right without significant stenosis.   No significant intracranial stenosis, large vessel occlusion or aneurysm.  12/18/2024 MRI brain without contrast:   No acute intracranial pathology.   Chronic right frontal parietal cortical infarcts with encephalomalacia demonstrating a watershed type distribution. Chronic B/L basal ganglia lacunar infarcts. Chronic microangiopathic ischemic changes, progressed since 2017.    Etiology of symptoms unclear at this time as it is difficult to get a reliable history of patient's symptoms. Given multiple chronic right sided infarcts, possible that patient had recrudescence of prior stroke symptoms in setting of hypertension but unable to truly clarify this with patient. Cannot entirely exclude TIA, but feel this is less likely given patient on Coumadin with therapeutic INR.     Plan:   - Continue on stroke pathway.   - Echocardiogram pending. Appears echocardiogram was started, but from neurology standpoint this could be deferred given patient had echocardiogram completed 12/2/24 and repeating study would not .  - Lipid panel reviewed, total cholesterol 107, triglycerides 105, HDL 32, LDL 54.  - Hemoglobin A1c reviewed, 5.4.  - Continue on Coumadin. INR therapeutic at 2.62 today.   - Atorvastatin 40 mg QPM.   - Goal normotension, normothermia and euglycemia.   - PT/OT   - Stroke education.   - Monitor exam and notify with changes.

## 2024-12-18 NOTE — PROGRESS NOTES
Progress Note - Hospitalist   Name: Chuy Norton 64 y.o. male I MRN: [id number]  Unit/Bed#: -01 I Date of Admission: 12/17/2024   Date of Service: 12/18/2024 I Hospital Day: 0     Assessment & Plan  Stroke-like symptoms  Patient had reported to staff that he had left-sided weakness (left mid forearm to fingertips) and left facial numbness.  Not observed by staff but was found to have an elevated blood pressure thus sent to the ER.  Per patient symptoms resolved prior to arrival. NIHSS 0  CT head:   No acute intracranial pathology. Stable chronic ischemic changes.   CTA head and neck:  Ulcerated plaque in the proximal internal carotid arteries, left greater than right without significant stenosis.  No significant intracranial stenosis, large vessel occlusion or aneurysm.  On Coumadin due to history of left ventricular apical thrombus.  INR therapeutic at 2.91.   ER reached out to neurology who recommended stroke pathway  MRI 1. No acute intracranial pathology.  2. Chronic right frontal parietal cortical infarcts with encephalomalacia demonstrating a watershed type distribution. Chronic bilateral basal ganglia lacunar infarcts. Chronic microangiopathic ischemic changes, progressed since 2017.   Per neuro no aspirin and/or Plavix at this time.  Will reassess after MRI completion.    Lipid panel/A1c  Neurochecks  Telemetry  Consult neuro, PT/OT and case management  Benign essential hypertension  //108   Home regimen: none   Plan   Completed permissive HTN. Blood pressure control remains suboptimal  Start lisinopril and observe.  IV hydralazine 5 mg prn for systolic > 200  Bipolar 2 disorder, major depressive episode (HCC)  Continue home trazodone, Seroquel and Lexapro  Cirrhosis (HCC)  Appears compensated at this time  Iron deficiency anemia  Hemoglobin 11.9  Continue iron supplementation  Left ventricular apical thrombus  Continue Coumadin  INR 2.91  Within goal.  Continue to monitor  PUD (peptic  ulcer disease)  PPI twice daily   VTE  Prophylaxis:   Pharmacologic: in place  Mechanical VTE Prophylaxis in Place: Yes    Patient Centered Rounds: I have performed bedside rounds with nursing staff today.    Discussions with Specialists or Other Care Team Provider: case management    Education and Discussions with Family / Patient: yes       Mobility:   Basic Mobility Inpatient Raw Score: 24  JH-HLM Goal: 8: Walk 250 feet or more  JH-HLM Achieved: 7: Walk 25 feet or more      Current Length of Stay: 0 day(s)    Current Patient Status: Observation        Code Status: Level 1 - Full Code    Discharge Plan: Pt will require continued inpatient hospitalization.    Subjective:   Pt states his neurological state has returned to normal  No headache, no focal weakness, no numbness, tingling or speech issues    Patient is seen and examined at bedside.  All other ROS are negative.    Objective:     Vitals:   Temp (24hrs), Av.6 °F (36.4 °C), Min:97.5 °F (36.4 °C), Max:97.8 °F (36.6 °C)    Temp:  [97.5 °F (36.4 °C)-97.8 °F (36.6 °C)] 97.5 °F (36.4 °C)  HR:  [70-87] 85  Resp:  [16-20] 20  BP: (128-181)/() 178/109  SpO2:  [92 %-97 %] 92 %  Body mass index is 31.24 kg/m².     Input and Output Summary (last 24 hours):       Intake/Output Summary (Last 24 hours) at 2024 1205  Last data filed at 2024 0852  Gross per 24 hour   Intake 1362 ml   Output 1125 ml   Net 237 ml       Physical Exam:       GEN: No acute distress, comfortable  HEEENT: No JVD, PERRLA, no scleral icterus  RESP: Lungs clear to auscultation bilaterally  CV: RRR, +s1/s2   ABD: SOFT NON TENDER, POSITIVE BOWEL SOUNDS, NO DISTENTION  PSYCH: CALM  NEURO: Mentation baseline, NO FOCAL DEFICITS  SKIN: NO RASH  EXTREM: NO EDEMA    Additional Data:     Labs:    Results from last 7 days   Lab Units 24  0259   WBC Thousand/uL 8.70   HEMOGLOBIN g/dL 11.9*   HEMATOCRIT % 41.8   PLATELETS Thousands/uL 304   SEGS PCT % 71   LYMPHO PCT % 19   MONO PCT %  7   EOS PCT % 2     Results from last 7 days   Lab Units 12/18/24  0259 12/17/24  1717   SODIUM mmol/L 140 138   POTASSIUM mmol/L 3.8 4.8   CHLORIDE mmol/L 109* 107   CO2 mmol/L 23 23   BUN mg/dL 20 23   CREATININE mg/dL 1.18 1.15   ANION GAP mmol/L 8 8   CALCIUM mg/dL 8.4 8.4   ALBUMIN g/dL  --  3.9   TOTAL BILIRUBIN mg/dL  --  0.40   ALK PHOS U/L  --  62   ALT U/L  --  30   AST U/L  --  34   GLUCOSE RANDOM mg/dL 116 110     Results from last 7 days   Lab Units 12/18/24  0259   INR  2.62*     Results from last 7 days   Lab Units 12/18/24  1112   POC GLUCOSE mg/dl 128     Results from last 7 days   Lab Units 12/17/24  1717   HEMOGLOBIN A1C % 5.4           Lines/Drains:  Invasive Devices       Peripheral Intravenous Line  Duration             Peripheral IV 12/17/24 Left Antecubital <1 day                    Telemetry:   Telemetry Orders (From admission, onward)               24 Hour Telemetry Monitoring  (ED Bridging Orders Panel)  Continuous x 24 Hours (Telem)        Question:  Reason for 24 Hour Telemetry  Answer:  TIA/Suspected CVA/ Confirmed CVA                        * I Have Reviewed All Lab Data Listed Above.           Imaging:     Results for orders placed during the hospital encounter of 12/08/24    XR chest portable    Narrative  XR CHEST PORTABLE    INDICATION: sob.    COMPARISON: November 29, 2024    FINDINGS:    New patchy right lower lobe opacity, suspicious for pneumonia. No pneumothorax or pleural effusion.    Normal cardiomediastinal silhouette.    Bones are unremarkable for age.    Normal upper abdomen.    Impression  New patchy right lower lobe opacity, suspicious for pneumonia.    Findings concur with the preliminary report by the referring clinician already in PACS and/or our electronic record EPIC.    Workstation performed: [id number]    Results for orders placed during the hospital encounter of 01/24/20    XR chest 2 views    Narrative  CHEST    INDICATION:  Chest pain.    COMPARISON:  Chest  film and CT chest 3/20/2019    EXAM PERFORMED/VIEWS:  XR CHEST PA & LATERAL  The frontal view was performed utilizing dual energy radiographic technique.    FINDINGS:    Cardiomediastinal silhouette appears unremarkable.    The lungs are clear.  No pneumothorax or pleural effusion.    Osseous structures appear within normal limits for patient age.    Impression  No acute cardiopulmonary disease.        Workstation performed: [id number]      *I have reviewed all imaging reports listed above      Recent Cultures (last 7 days):           Last 24 Hours Medication List:   Current Facility-Administered Medications   Medication Dose Route Frequency Provider Last Rate    acetaminophen  650 mg Oral Q6H PRN Mariella Sands PA-C      atorvastatin  40 mg Oral Daily With Dinner Mariella Sands PA-C      escitalopram  20 mg Oral Daily Mariella Sands PA-C      ferrous sulfate  325 mg Oral Daily With Breakfast Mariella Sands PA-C      hydrALAZINE  5 mg Intravenous Q6H PRN Mariella Sands PA-C      lisinopril  20 mg Oral Daily Adalberto Kamara MD      ondansetron  4 mg Intravenous Q6H PRN Mariella Sands PA-C      pantoprazole  40 mg Oral BID before lunch/dinner Mariella Sands PA-C      polyethylene glycol  17 g Oral Daily PRN Mariella Sands PA-C      QUEtiapine  300 mg Oral HS Mariella Sands PA-C      traZODone  100 mg Oral HS Mariella Sands PA-C      warfarin  5 mg Oral Daily (warfarin) Mariella Sands PA-C          Today, Patient Was Seen By: Adalberto Kamara MD    ** Please Note: Dictation voice to text software may have been used in the creation of this document. **

## 2024-12-18 NOTE — ASSESSMENT & PLAN NOTE
//108   Home regimen: none   Plan   On stroke pathway. Allow for permissive HTN.   IV hydralazine 5 mg prn for systolic > 200

## 2024-12-18 NOTE — SPEECH THERAPY NOTE
Speech Language/Pathology  Order received, chart reviewed. Pt passed nursing dysphagia screen and has been tolerating regular texture diet, thin liquids, and PO meds. Spoke w/ RN, RN denies concerns for dysphagia, aspiration, and/or speech/language deficits. Spoke w/ pt. Pt denies dysphagia, odynophagia, globus sensation, s/s aspiration, and/or speech/language deficits at this time. No gross speech/language deficits observed during conversation. Formal ST evaluation not indicated at this time. SLP to monitor MRI results, if MRI neg (-) SLP to s/o as no skilled intervention is required, if MRI pos (+) SLP to f/u for completion of clinical swallow evaluation as able and appropriate.     ADDENDUM 1433: MRI brain negative for acute infarct. Given MRI negative, SLP to s/o as no skilled intervention is required, no further IP ST necessary. Please re-consult if medically indicated.

## 2024-12-18 NOTE — PLAN OF CARE
Problem: Potential for Falls  Goal: Patient will remain free of falls  Description: INTERVENTIONS:  - Educate patient/family on patient safety including physical limitations  - Instruct patient to call for assistance with activity   - Consult OT/PT to assist with strengthening/mobility   - Keep Call bell within reach  - Keep bed low and locked with side rails adjusted as appropriate  - Keep care items and personal belongings within reach  - Initiate and maintain comfort rounds  - Make Fall Risk Sign visible to staff  - Offer Toileting every 2 Hours, in advance of need  - Initiate/Maintain bed alarm  - Obtain necessary fall risk management equipment: socks  - Apply yellow socks and bracelet for high fall risk patients  - Consider moving patient to room near nurses station  Outcome: Progressing     Problem: Prexisting or High Potential for Compromised Skin Integrity  Goal: Skin integrity is maintained or improved  Description: INTERVENTIONS:  - Identify patients at risk for skin breakdown  - Assess and monitor skin integrity  - Assess and monitor nutrition and hydration status  - Monitor labs   - Assess for incontinence   - Turn and reposition patient  - Assist with mobility/ambulation  - Relieve pressure over bony prominences  - Avoid friction and shearing  - Provide appropriate hygiene as needed including keeping skin clean and dry  - Evaluate need for skin moisturizer/barrier cream  - Collaborate with interdisciplinary team   - Patient/family teaching  - Consider wound care consult   Outcome: Progressing

## 2024-12-18 NOTE — ASSESSMENT & PLAN NOTE
Patient had reported to staff that he had left-sided weakness (left mid forearm to fingertips) and left facial numbness.  Not observed by staff but was found to have an elevated blood pressure thus sent to the ER.  Per patient symptoms resolved prior to arrival. NIHSS 0  CT head:   No acute intracranial pathology. Stable chronic ischemic changes.   CTA head and neck:  Ulcerated plaque in the proximal internal carotid arteries, left greater than right without significant stenosis.  No significant intracranial stenosis, large vessel occlusion or aneurysm.  On Coumadin due to history of left ventricular apical thrombus.  INR therapeutic at 2.91.   ER reached out to neurology who recommended stroke pathway  Obtain MRI brain/echo  Per neuro no aspirin and/or Plavix at this time.  Will reassess after MRI completion.   Allow for permissive hypertension. Treat for systolic BP > 200   Lipid panel/A1c  Neurochecks  Telemetry  Consult neuro, PT/OT and case management

## 2024-12-18 NOTE — ASSESSMENT & PLAN NOTE
Patient had reported to staff that he had left-sided weakness (left mid forearm to fingertips) and left facial numbness.  Not observed by staff but was found to have an elevated blood pressure thus sent to the ER.  Per patient symptoms resolved prior to arrival. NIHSS 0  CT head:   No acute intracranial pathology. Stable chronic ischemic changes.   CTA head and neck:  Ulcerated plaque in the proximal internal carotid arteries, left greater than right without significant stenosis.  No significant intracranial stenosis, large vessel occlusion or aneurysm.  On Coumadin due to history of left ventricular apical thrombus.  INR therapeutic at 2.91.   ER reached out to neurology who recommended stroke pathway  MRI 1. No acute intracranial pathology.  2. Chronic right frontal parietal cortical infarcts with encephalomalacia demonstrating a watershed type distribution. Chronic bilateral basal ganglia lacunar infarcts. Chronic microangiopathic ischemic changes, progressed since 2017.   Per neuro no aspirin and/or Plavix at this time.  Will reassess after MRI completion.    Lipid panel/A1c  Neurochecks  Telemetry  Consult neuro, PT/OT and case management

## 2024-12-18 NOTE — PHYSICAL THERAPY NOTE
PHYSICAL THERAPY EVAL  Physical Therapy Evaluation    Performed at least 2 patient identifiers during session:  Patient Active Problem List   Diagnosis    Bipolar 2 disorder, major depressive episode (HCC)    Attention deficit hyperactivity disorder (ADHD), combined type    Alcohol dependence in remission (HCC)    Anxiety disorder    Depressive disorder    Benign essential hypertension    Hyperlipidemia    Knee pain    Low back pain    Spinal stenosis of lumbar region    Tobacco dependence syndrome    Vitamin D deficiency    Encounter for medical examination to establish care    Left hip pain    Elevated d-dimer    Patellofemoral pain syndrome of right knee    SIRS (systemic inflammatory response syndrome) (HCC)    History of CVA (cerebrovascular accident)    Acute metabolic encephalopathy    Cirrhosis (HCC)    Iron deficiency anemia    Left ventricular apical thrombus    Acute pain of right knee    Acute left-sided low back pain without sciatica    Hepatitis C    Abnormal CXR    Shortness of breath    PUD (peptic ulcer disease)    Sepsis due to pneumonia (HCC)    Thrombocytopenia (HCC)    Stroke-like symptoms       Past Medical History:   Diagnosis Date    Alcohol abuse     Depression     Drug therapy     GERD (gastroesophageal reflux disease)     Hepatitis C     Hypertension 01/2012    Knee pain, bilateral     Left ventricular apical thrombus     Psychiatric disorder     depression, anxiety    Renal disorder     Self-injurious behavior     Spinal stenosis of lumbar region     Suicide attempt (HCC)        Past Surgical History:   Procedure Laterality Date    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    HERNIA REPAIR  1997 12/18/24 0926   PT Last Visit   PT Visit Date 12/18/24   Note Type   Note type Evaluation   Pain Assessment   Pain Assessment Tool 0-10   Pain Score No Pain   Restrictions/Precautions   Weight Bearing  "Precautions Per Order No   Other Precautions Cognitive;Chair Alarm;Bed Alarm;Telemetry;Fall Risk   Home Living   Type of Home Group Home  ([facility])   Home Layout Multi-level   Home Equipment   (no DME)   Prior Function   Level of [place] Independent with ADLs;Independent with functional mobility;Needs assistance with IADLS   Lives With Facility staff   Receives Help From Personal care attendant   General   Additional Pertinent History MRI: negative   Family/Caregiver Present No   Cognition   Overall Cognitive Status Impaired   Arousal/Participation Alert   Orientation Level Oriented to person;Oriented to place;Oriented to time   Following Commands Follows one step commands without difficulty   Subjective   Subjective \"I feel fine.\"   RLE Assessment   RLE Assessment WFL   LLE Assessment   LLE Assessment WFL   Transfers   Sit to Stand 6  Modified independent   Additional items Armrests   Stand to Sit 6  Modified independent   Additional items Armrests   Additional Comments Stood for approx 5-6 minutes for hazel-care- patient incontinent of a large amount of stool.   Ambulation/Elevation   Gait pattern WNL   Gait Assistance 7  Independent   Assistive Device None   Distance 30ft, 20ft   Balance   Static Sitting Normal   Dynamic Sitting Normal   Static Standing Normal   Dynamic Standing Normal   Ambulatory Normal   Endurance Deficit   Endurance Deficit No   Activity Tolerance   Activity Tolerance Patient tolerated treatment well   Medical Staff Made Aware Magaly GLOVER   Assessment   Prognosis Good   Problem List Decreased cognition;Impaired judgement   Assessment Patient is a 63y/o M who presents with left UE weakness and L facial numbness. Also with elevated BP. Patient has a history of bipolar and cirrhosis. Patient resides in a group home with steps. He is normally independent without an AD. Current medical status includes telemetry, decreased cognition, bowel incontinence. Patient was received sitting EOB. " He completed all mobility at an independent level and demonstrated good standing balance while getting cleaned after bowel incontinence. Patient appeared to be at his functional baseline. No further inpatient P.T. needs. D/C P.T. level 4 resources. The patient's AM-PAC Basic Mobility Inpatient Short Form Raw Score is 24. A Raw score of greater than 17 suggests the patient may benefit from discharge to home. Please also refer to the recommendation of the Physical Therapist for safe discharge planning.   Barriers to Discharge None   Goals   Patient Goals None stated   Plan   Treatment/Interventions   (D/C P.T.)   Discharge Recommendation   Rehab Resource Intensity Level, PT No post-acute rehabilitation needs   AM-PAC Basic Mobility Inpatient   Turning in Flat Bed Without Bedrails 4   Lying on Back to Sitting on Edge of Flat Bed Without Bedrails 4   Moving Bed to Chair 4   Standing Up From Chair Using Arms 4   Walk in Room 4   Climb 3-5 Stairs With Railing 4   Basic Mobility Inpatient Raw Score 24   Basic Mobility Standardized Score 57.68   [facility] Highest Level Of Mobility   -HL Goal 8: Walk 250 feet or more   -HLM Achieved 7: Walk 25 feet or more   End of Consult   Patient Position at End of Consult Other (comment)  (w/c with staff present- to do down for MRI)       Waleska Khan, PT             Patient Name: Chuy Norton  Today's Date: 12/18/2024

## 2024-12-18 NOTE — CASE MANAGEMENT
Case Management Assessment & Discharge Planning Note    Patient name Chuy Norton  Location /-01 MRN [id number]  : 1960 Date 2024       Current Admission Date: 2024  Current Admission Diagnosis:Stroke-like symptoms   Patient Active Problem List    Diagnosis Date Noted Date Diagnosed    Stroke-like symptoms 2024     Sepsis due to pneumonia (HCC) 2024     Thrombocytopenia (HCC) 2024     PUD (peptic ulcer disease) 2024     Abnormal CXR 2024     Shortness of breath 2024     Hepatitis C 05/15/2024     Acute pain of right knee 2024     Acute left-sided low back pain without sciatica 2024     Left ventricular apical thrombus 05/10/2024     Cirrhosis (HCC) 2024     Iron deficiency anemia 2024     History of CVA (cerebrovascular accident) 2021     Acute metabolic encephalopathy 2021     SIRS (systemic inflammatory response syndrome) ([facility]) 06/15/2021     Patellofemoral pain syndrome of right knee 2020     Elevated d-dimer 2019     Left hip pain 2019     Encounter for medical examination to establish care 10/10/2018     Bipolar 2 disorder, major depressive episode (HCC) 2018     Attention deficit hyperactivity disorder (ADHD), combined type 2018     Hyperlipidemia 2015     Knee pain 2015     Spinal stenosis of lumbar region 2015     Tobacco dependence syndrome 2015     Vitamin D deficiency 2015     Anxiety disorder 2015     Depressive disorder 2013     Low back pain 2013     Benign essential hypertension 10/18/2012     Alcohol dependence in remission (HCC) 2011       LOS (days): 0  Geometric Mean LOS (GMLOS) (days):   Days to GMLOS:     OBJECTIVE:              Current admission status: Observation       Preferred Pharmacy:   [facility] - [place], [place] - [address]  [address]  [place]  [id number]  Phone: [phone] Fax: [phone]    Primary Care Provider: Savanna Pan DO    Primary Insurance: JOEL MUNOZ  Secondary Insurance:     ASSESSMENT:  Active Health Care Proxies    There are no active Health Care Proxies on file.       Advance Directives  Does patient have a Health Care POA?: No  Was patient offered paperwork?: Yes  Does patient currently have a Health Care decision maker?: No  Does patient have Advance Directives?: No  Was patient offered paperwork?: Yes    Obs Notice Signed: 12/17/24    Readmission Root Cause  30 Day Readmission: Yes  During your hospital stay, did someone (provider, nurse, ) explain your care to you in a way you could understand?: Yes  Did you feel medically stable to leave the hospital?: Yes  Were you able to pay for your medication at the pharmacy?: Yes  Did you have reliable transportation to take you to your appointments?: No  During previous admission, was a post-acute recommendation made?: No  Patient was readmitted due to: Stroke-like symptoms  Action Plan: Stroke-path way. MRI.    Patient Information  Admitted from:: Facility  Mental Status: Alert, Confused  During Assessment patient was accompanied by: Not accompanied during assessment  Assessment information provided by:: Patient, Other - please comment (volodymyr Vera's CM at [facility].)  Primary Caregiver: Self  Support Systems: Self, Other (Comment), / (Staff at [facility].)  County of Residence: [place]  What [facility] do you live in?: [place].  Home entry access options. Select all that apply.: No steps to enter home  Type of Current Residence: Group home  Upon entering residence, is there a bedroom on the main floor (no further steps)?: No  A bedroom is located on the following floor levels of residence (select all that apply):: 2nd Floor  Upon entering residence, is there a bathroom on the main floor (no further steps)?: No  Number of steps to 2nd  floor from main floor: One Flight  Living Arrangements: Lives in Facility  Is patient a ?: Yes    Activities of Daily Living Prior to Admission  Functional Status: Independent  Completes ADLs independently?: Yes  Ambulates independently?: Yes  Does patient use assisted devices?: No  Does patient currently own DME?: No  Does patient have a history of Outpatient Therapy (PT/OT)?: No  Does the patient have a history of Short-Term Rehab?: No  Does patient have a history of HHC?: No  Does patient currently have HHC?: No         Patient Information Continued  Income Source: SSI/SSD  Does patient have prescription coverage?: Yes  Does patient receive dialysis treatments?: No  Does patient have a history of substance abuse?: Yes  Historical substance use preference: Alcohol/ETOH  Is patient currently in treatment for substance abuse?: Not appropriate at this time to discuss., N/A - sober  Does patient have a history of Mental Health Diagnosis?: Yes  Has patient received inpatient treatment related to mental health in the last 2 years?: No         Means of Transportation  Means of Transport to Appts:: Other (Comment) ([facility] staff.)          DISCHARGE DETAILS:    Discharge planning discussed with:: Pt and Pt's DERIC Vera.  [place] of Choice: Yes     CM contacted family/caregiver?: Yes  Were Treatment Team discharge recommendations reviewed with patient/caregiver?: Yes  Did patient/caregiver verbalize understanding of patient care needs?: Yes  Were patient/caregiver advised of the risks associated with not following Treatment Team discharge recommendations?: Yes    Contacts  Patient Contacts: Peter Vera/ [facility] CM.  Relationship to Patient:: Treatment Provider  Contact Method: Phone  Phone Number: [phone] ext. [phone]  Reason/Outcome: Discharge Planning, Continuity of Care    Requested Home Health Care         Is the patient interested in HHC at discharge?: No    DME Referral Provided  Referral  made for DME?: No    Other Referral/Resources/Interventions Provided:  Interventions: Facility Return  Referral Comments: Spoke with Chuy pt's CM through [employer]. Per Chuy, new meds would need to be sent to [facility] in [place] before discharge and the AVS would need to be faxed to [phone] prior to discharge for review. Per Chuy, [facility] may not meet with pt for assessment, as pt is at his baseline/needs to no 02. Chuy's phone number is [phone] ext [phone]. Chuy also informed CM to call [phone], opt 1 if he does not answer.         Treatment Team Recommendation: Facility Return  Discharge Destination Plan:: Facility Return                                         Additional Comments: CM met with pt to discern discharge plan. Pt lives at [facility], a group home, and has been for a year. Pt reports that his bed and bathroom are upstairs, 1 flight. Pt does not use DME. No reported hx of HHC, STR, or OPPT. Pt reports being a . Reports no inpatient psych or D&A history. Does not have a medical POA. [facility] Staff will transport at discharge.

## 2024-12-18 NOTE — UTILIZATION REVIEW
OBSERVATION 12-17-23 CHANGED TO INPATIENT 12-19-23 TO MONITOR BLOOD PRESSURE ON LISINOPRIL AND SAFE DISCHARGE PLAN TO [facility].       Initial Clinical Review    Admission: Date/Time/Statement:     Admission Orders (From admission, onward)       Ordered        12/19/24 1833  INPATIENT ADMISSION  Once            12/17/24 2032  Place in Observation  Once                             ED Arrival Information       Expected   -    Arrival   12/17/2024 15:53    Acuity   Urgent              Means of arrival   Ambulance    Escorted by   [facility]    Service   Hospitalist    Admission type   Emergency              Arrival complaint   High Blood Pressure             Chief Complaint   Patient presents with    Hypertension     Patient reports to ED via EMS from [facility] for high blood pressure reading. Patient has no complaints at this time.        Initial Presentation: 64 y.o. male presents to ed from nursing home on 12-17 via ems for evaluation and treatment stroke like symptoms.  Staff report left sided weakness and left facial numbness.  Clinical assessment NIHSS=0, hypertensive.  Imaging shows plaque in internal carotid arteries.  Currently on coumadin. INR 2.91.  Admit to observation for stroke like symptoms.  Plan includes: MRI, ECHO, permissive hypertension, telemetry, neuro checks, neuro consult, PT/OT/ST evaluations, lipid panel, cbc/diff, bmp, inr.     Anticipated Length of Stay/Certification Statement: Patient will be admitted on an observation basis with an anticipated length of stay of less than 2 midnights secondary to stroke like symptoms.     Date: 12-18-24    Day 2: observation   MRI and ECHO results pending.  PT/OT evaluations pending. Neuro checks continued. GCS=14.  Remains hypertensive.     Date: 2-19-24 observation changed to inpatient  MRI brain chronic right frontal parietal infarcts.  No new finding.  Echo: EF 45%, systolic function mildly reduced . There is a moderately sized mural,  solid , fixed thrombus at apex.  Start lisinopril 20 mg daily with potential for additional dose  for elevated blood pressure .  Patient symptoms have returned to baseline.  INR 2.27.  On coumadin.  Received iv ativan x 1 for MRI / EchoPlan for patient to return to group home.     Date: 12-20-24 inpatient  Since starting lisinopril 12-19, there is blood pressure improvement with some hypotension.  Consider reducing dose of lisinopril.  Today returned to baseline.  Plan to keep lisinopril at 20 mg daily with 20 mg prn if SBP>140.        ED Treatment-Medication Administration from 12/17/2024 1552 to 12/17/2024 2112        Scheduled Medications:  atorvastatin, 40 mg, Oral, Daily With Dinner  escitalopram, 20 mg, Oral, Daily  ferrous sulfate, 325 mg, Oral, Daily With Breakfast  pantoprazole, 40 mg, Oral, BID before lunch/dinner  QUEtiapine, 300 mg, Oral, HS  traZODone, 100 mg, Oral, HS  warfarin, 5 mg, Oral, Daily (warfarin)      lisinopril (ZESTRIL) tablet 20 mg  Dose: 20 mg  Freq: Daily Route: PO  Start: 12/18/24 1215 End: 12/20/24 0814       Continuous IV Infusions:     PRN Meds:  acetaminophen, 650 mg, Oral, Q6H PRN  hydrALAZINE, 5 mg, Intravenous, Q6H PRN  ondansetron, 4 mg, Intravenous, Q6H PRN  polyethylene glycol, 17 g, Oral, Daily PRN      ED Triage Vitals   Temperature Pulse Respirations Blood Pressure SpO2 Pain Score   12/17/24 1559 12/17/24 1559 12/17/24 1559 12/17/24 1559 12/17/24 1559 12/17/24 2116   97.8 °F (36.6 °C) 83 16 169/97 97 % No Pain     Weight (last 2 days)       Date/Time Weight    12/17/24 2114 93.2 (205.47)            Vital Signs (last 3 days)       Date/Time Temp Pulse Resp BP MAP (mmHg) SpO2 O2 Device [place] Coma Scale Score Pain    12/18/24 07:56:37 97.5 °F (36.4 °C) 85 -- 178/109 132 92 % -- -- --    12/18/24 07:55:23 97.5 °F (36.4 °C) 85 -- 181/113 136 95 % -- -- --    12/18/24 07:34:39 -- 87 -- 152/90 111 96 % -- -- --    12/18/24 0733 -- -- -- -- -- -- -- 14 --    12/18/24 05:17:02 --  77 -- 160/104 123 93 % -- 14 --    12/18/24 0323 -- -- -- -- -- -- -- 14 --    12/18/24 03:18:08 -- 82 -- 154/101 119 94 % -- -- --    12/18/24 01:44:41 -- 70 -- 132/82 99 93 % -- 14 --    12/18/24 00:49:32 -- -- -- 151/75 100 -- -- -- --    12/18/24 0030 -- -- -- -- -- -- -- 14 --    12/17/24 23:15:14 -- 75 -- 128/78 95 92 % -- 14 --    12/17/24 22:19:18 -- 84 18 167/98 121 92 % -- 14 --    12/17/24 21:33:52 -- 84 -- 160/107 125 92 % -- -- --    12/17/24 2124 -- -- -- -- -- -- -- 14 No Pain    12/17/24 2116 -- -- -- -- -- -- -- -- No Pain    12/17/24 2114 97.6 °F (36.4 °C) -- -- 180/106 -- -- -- -- --    12/17/24 21:13:43 97.6 °F (36.4 °C) -- 16 180/106 131 -- -- -- --    12/17/24 1956 -- 82 18 181/108 -- 95 % None (Room air) -- --    12/17/24 1730 -- 80 17 170/95 -- 94 % None (Room air) -- --    12/17/24 1700 -- -- -- -- -- -- None (Room air) 15 --    12/17/24 1644 -- 80 17 170/100 -- 97 % None (Room air) -- --    12/17/24 1559 97.8 °F (36.6 °C) 83 16 169/97 -- 97 % None (Room air) -- --       Pertinent Labs/Diagnostic Test Results:     Radiology:  CTA head and neck with and without contrast   Final (12/17 2007)      CT Brain: No acute intracranial pathology. Stable chronic ischemic changes.      CT Angiography: Ulcerated plaque in the proximal internal carotid arteries, left greater than right without significant stenosis.   No significant intracranial stenosis, large vessel occlusion or aneurysm.         MRI Inpatient Order    (Results Pending)     Cardiology:  No orders to display     GI:  No orders to display           Results from last 7 days   Lab Units 12/18/24  0259 12/17/24  1717   WBC Thousand/uL 8.70 8.44   HEMOGLOBIN g/dL 11.9* 11.9*   HEMATOCRIT % 41.8 41.0   PLATELETS Thousands/uL 304 290   TOTAL NEUT ABS Thousands/µL 6.13 6.10         Results from last 7 days   Lab Units 12/18/24  0259 12/17/24  1717   SODIUM mmol/L 140 138   POTASSIUM mmol/L 3.8 4.8   CHLORIDE mmol/L 109* 107   CO2 mmol/L 23 23    ANION GAP mmol/L 8 8   BUN mg/dL 20 23   CREATININE mg/dL 1.18 1.15   EGFR ml/min/1.73sq m 65 67   CALCIUM mg/dL 8.4 8.4     Results from last 7 days   Lab Units 12/17/24  1717   AST U/L 34   ALT U/L 30   ALK PHOS U/L 62   TOTAL PROTEIN g/dL 6.6   ALBUMIN g/dL 3.9   TOTAL BILIRUBIN mg/dL 0.40         Results from last 7 days   Lab Units 12/18/24  0259 12/17/24  1717   GLUCOSE RANDOM mg/dL 116 110         Results from last 7 days   Lab Units 12/17/24  1717   HEMOGLOBIN A1C % 5.4   EAG mg/dl 108       Results from last 7 days   Lab Units 12/17/24  2216 12/17/24 1954 12/17/24 1717   HS TNI 0HR ng/L  --   --  17   HS TNI 2HR ng/L  --  21  --    HSTNI D2 ng/L  --  4  --    HS TNI 4HR ng/L 23  --   --    HSTNI D4 ng/L 6  --   --          Results from last 7 days   Lab Units 12/18/24  0259 12/17/24 1717 12/16/24  0922   PROTIME seconds 28.3* 30.6* 28.1*   INR  2.62* 2.91* 2.65*   PTT seconds  --  38*  --          Past Medical History:   Diagnosis Date    Alcohol abuse     Depression     Drug therapy     GERD (gastroesophageal reflux disease)     Hepatitis C     Hypertension 01/2012    Knee pain, bilateral     Left ventricular apical thrombus     Psychiatric disorder     depression, anxiety    Renal disorder     Self-injurious behavior     Spinal stenosis of lumbar region     Suicide attempt (HCC)      Present on Admission:   Bipolar 2 disorder, major depressive episode (HCC)   Left ventricular apical thrombus   PUD (peptic ulcer disease)   Cirrhosis (HCC)   Iron deficiency anemia   Benign essential hypertension      Admitting Diagnosis:     TIA (transient ischemic attack) [G45.9]  High blood pressure [I10]  Hypertension [I10]    Age/Sex: 64 y.o. male    Network Utilization Review Department  ATTENTION: Please call with any questions or concerns to [phone] and carefully listen to the prompts so that you are directed to the right person. All voicemails are confidential.   For Discharge needs, contact Care  Management DC Support Team at [phone] opt. [phone]  Send all requests for admission clinical reviews, approved or denied determinations and any other requests to dedicated fax number below belonging to the campus where the patient is receiving treatment. List of dedicated fax numbers for the Facilities:  FACILITY NAME UR FAX NUMBER   ADMISSION DENIALS (Administrative/Medical Necessity) [phone]   DISCHARGE SUPPORT TEAM (NETWORK) [phone]   PARENT CHILD HEALTH (Maternity/NICU/Pediatrics) [phone]   [facility] [phone]   [facility] [phone]   [facility] [phone]   [facility] [phone]   [facility] [phone]   [facility] [phone]   [facility] [phone]   [facility] [phone]   [facility] [phone]   [facility] [phone]   [facility] [phone]   [facility] [phone]

## 2024-12-18 NOTE — H&P
H&P - Hospitalist   Name: Chuy Norton 64 y.o. male I MRN: [id number]  Unit/Bed#: -01 I Date of Admission: 12/17/2024   Date of Service: 12/17/2024 I Hospital Day: 0     Assessment & Plan  Stroke-like symptoms  Patient had reported to staff that he had left-sided weakness (left mid forearm to fingertips) and left facial numbness.  Not observed by staff but was found to have an elevated blood pressure thus sent to the ER.  Per patient symptoms resolved prior to arrival. NIHSS 0  CT head:   No acute intracranial pathology. Stable chronic ischemic changes.   CTA head and neck:  Ulcerated plaque in the proximal internal carotid arteries, left greater than right without significant stenosis.  No significant intracranial stenosis, large vessel occlusion or aneurysm.  On Coumadin due to history of left ventricular apical thrombus.  INR therapeutic at 2.91.   ER reached out to neurology who recommended stroke pathway  Obtain MRI brain/echo  Per neuro no aspirin and/or Plavix at this time.  Will reassess after MRI completion.   Allow for permissive hypertension. Treat for systolic BP > 200   Lipid panel/A1c  Neurochecks  Telemetry  Consult neuro, PT/OT and case management  Benign essential hypertension  //108   Home regimen: none   Plan   On stroke pathway. Allow for permissive HTN.   IV hydralazine 5 mg prn for systolic > 200  Bipolar 2 disorder, major depressive episode (HCC)  Continue home trazodone, Seroquel and Lexapro  Cirrhosis (HCC)  Appears compensated at this time  Iron deficiency anemia  Hemoglobin 11.9  Continue iron supplementation  Left ventricular apical thrombus  Continue Coumadin  INR 2.91  Within goal.  Continue to monitor  PUD (peptic ulcer disease)  PPI twice daily      VTE Pharmacologic Prophylaxis: VTE Score: 3 Moderate Risk (Score 3-4) - Pharmacological DVT Prophylaxis Ordered: warfarin (Coumadin).  Code Status: Level 1 - Full Code   Discussion with family: Patient declined call to  .     Anticipated Length of Stay: Patient will be admitted on an observation basis with an anticipated length of stay of less than 2 midnights secondary to stroke like symptoms.    History of Present Illness   Chief Complaint: benji Norton is a 64 y.o. male with a PMH of bipolar disorder, cirrhosis, hypertension, iron deficiency anemia, left ventricular apical thrombus, PUD who presents from facility due to left-sided weakness and facial numbness.  Per patient at approximately 12 in the afternoon had some discomfort in his left arm that then developed into weakness from his mid forearm down to his fingertips and facial numbness on the left side.  He did not report this to staff and decided to take a nap.  To sleep and when he woke up symptoms persisted thus went and told staff.  Per staff no weakness noted on exam however his blood pressure was elevated thus sent him to the ER.  On his way to the ER per patient his symptoms resolved.  Blood pressure continued to be elevated.  Imaging negative.  ER contacted neurology who recommended stroke pathway.    Patient denies recent illnesses, change in vision, dysarthria or dysphagia.  Resting comfortably.  No weakness noted on exam.     Review of Systems   Constitutional:  Negative for fatigue and fever.   HENT:  Negative for sore throat.    Respiratory:  Negative for cough, chest tightness and shortness of breath.    Cardiovascular:  Negative for chest pain.   Gastrointestinal:  Negative for abdominal distention, abdominal pain, diarrhea, nausea and vomiting.   Genitourinary:  Negative for difficulty urinating.   Musculoskeletal:  Negative for arthralgias.   Neurological:  Positive for weakness and numbness. Negative for headaches.   Psychiatric/Behavioral:  Negative for agitation and behavioral problems.    All other systems reviewed and are negative.      Historical Information   Past Medical History:   Diagnosis Date    Alcohol abuse      "Depression     Drug therapy     GERD (gastroesophageal reflux disease)     Hepatitis C     Hypertension 01/2012    Knee pain, bilateral     Left ventricular apical thrombus     Psychiatric disorder     depression, anxiety    Renal disorder     Self-injurious behavior     Spinal stenosis of lumbar region     Suicide attempt (HCC)      Past Surgical History:   Procedure Laterality Date    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    HERNIA REPAIR  1997     Social History     Tobacco Use    Smoking status: Every Day     Current packs/day: 0.50     Types: Cigarettes, Cigars    Smokeless tobacco: Never    Tobacco comments:     PT \"3 CIGARS A DAY\" - quit smoking cigars   Vaping Use    Vaping status: Never Used   Substance and Sexual Activity    Alcohol use: Not Currently     Alcohol/week: 0.0 standard drinks of alcohol     Comment: Stopped drinking for several months - recovered alcoholic    Drug use: Not Currently     Types: Marijuana     Comment: Last used marijuana 2 months ago.- no longer smoking    Sexual activity: Never     E-Cigarette/Vaping    E-Cigarette Use Never User      E-Cigarette/Vaping Substances    Nicotine No     THC No     CBD No     Flavoring No     Other No     Unknown No      Family History   Problem Relation Age of Onset    Depression Mother     Depression Father     No Known Problems Sister     No Known Problems Brother      Social History:  Marital Status: Single   Occupation: Known  Patient Pre-hospital Living Situation: Long Term Care Facility  Patient Pre-hospital Level of Mobility: walks  Patient Pre-hospital Diet Restrictions: Regular    Meds/Allergies   I have reveiwed home medications using records provided by SNF.  Prior to Admission medications    Medication Sig Start Date End Date Taking? Authorizing Provider   acetaminophen (TYLENOL) 650 mg CR tablet Take 1 tablet (650 mg total) by mouth every 8 (eight) hours as needed for mild pain 12/10/24   Zainab Ribeiro MD   atorvastatin " (LIPITOR) 40 mg tablet Take 1 tablet (40 mg total) by mouth daily with dinner 5/15/24   Mari Morales PA-C   benzonatate (TESSALON) 200 MG capsule Take 1 capsule (200 mg total) by mouth 3 (three) times a day as needed for cough 10/23/24   NEHEMIAH Andersen   diclofenac sodium (Voltaren) 1 % Apply 2 g topically 4 (four) times a day 9/23/20   Dante Grady DO   escitalopram (LEXAPRO) 20 mg tablet Take 1 tablet (20 mg total) by mouth daily 5/15/24   Mari Morales PA-C   ferrous sulfate 325 (65 Fe) mg tablet Take 1 tablet (325 mg total) by mouth daily with breakfast Do not start before December 7, 2024. 12/7/24   Ovidio Kaur PA-C   guaiFENesin (ROBITUSSIN) 100 MG/5ML oral liquid Take 10 mL (200 mg total) by mouth 3 (three) times a day as needed for cough 10/23/24   NEHEMIAH Andersen   nicotine polacrilex (NICORETTE) 2 mg gum Chew 1 each (2 mg total) every 3 (three) hours as needed for smoking cessation 5/15/24   Mari Morales PA-C   pantoprazole (PROTONIX) 40 mg tablet Take 1 tablet (40 mg total) by mouth 2 (two) times a day before lunch and dinner Do not start before December 7, 2024. 12/7/24   Ovidio Kaur PA-C   polyethylene glycol (MIRALAX) 17 g packet Take 17 g by mouth daily as needed (constipation) 5/15/24   Mari Morales PA-C   QUEtiapine (SEROquel) 300 mg tablet Take 1 tablet (300 mg total) by mouth daily at bedtime 5/15/24   Mari Morales PA-C   traZODone (DESYREL) 100 mg tablet Take 1 tablet (100 mg total) by mouth daily at bedtime 5/15/24   Mari Morales PA-C   warfarin (COUMADIN) 5 mg tablet Take 1 tablet (5 mg total) by mouth daily 12/4/24   Ovidio Kaur PA-C     Allergies   Allergen Reactions    Haloperidol Tremor     Other reaction(s): jittery       Objective :  Temp:  [97.6 °F (36.4 °C)-97.8 °F (36.6 °C)] 97.6 °F (36.4 °C)  HR:  [80-84] 84  BP: (160-181)/() 167/98  Resp:  [16-18] 18  SpO2:  [92 %-97 %] 92 %  O2 Device: None (Room air)    Physical Exam  Vitals and nursing note  reviewed.   Constitutional:       Appearance: Normal appearance. He is obese.   HENT:      Head: Normocephalic.      Mouth/Throat:      Comments: Underbite  Eyes:      Extraocular Movements: Extraocular movements intact.      Pupils: Pupils are equal, round, and reactive to light.   Cardiovascular:      Rate and Rhythm: Normal rate and regular rhythm.      Heart sounds: No murmur heard.     No gallop.   Pulmonary:      Effort: No respiratory distress.      Breath sounds: Normal breath sounds. No wheezing.   Abdominal:      General: Bowel sounds are normal. There is no distension.      Tenderness: There is no abdominal tenderness.   Musculoskeletal:         General: Normal range of motion.      Cervical back: Normal range of motion.      Right lower leg: No edema.      Left lower leg: No edema.   Skin:     General: Skin is warm.   Neurological:      General: No focal deficit present.      Mental Status: He is alert and oriented to person, place, and time. Mental status is at baseline.   Psychiatric:         Mood and Affect: Mood normal.         Behavior: Behavior normal.         Thought Content: Thought content normal.          Lines/Drains:            Lab Results: I have reviewed the following results:  Results from last 7 days   Lab Units 12/17/24  1717   WBC Thousand/uL 8.44   HEMOGLOBIN g/dL 11.9*   HEMATOCRIT % 41.0   PLATELETS Thousands/uL 290   SEGS PCT % 72   LYMPHO PCT % 19   MONO PCT % 6   EOS PCT % 2     Results from last 7 days   Lab Units 12/17/24  1717   SODIUM mmol/L 138   POTASSIUM mmol/L 4.8   CHLORIDE mmol/L 107   CO2 mmol/L 23   BUN mg/dL 23   CREATININE mg/dL 1.15   ANION GAP mmol/L 8   CALCIUM mg/dL 8.4   ALBUMIN g/dL 3.9   TOTAL BILIRUBIN mg/dL 0.40   ALK PHOS U/L 62   ALT U/L 30   AST U/L 34   GLUCOSE RANDOM mg/dL 110     Results from last 7 days   Lab Units 12/17/24  1717   INR  2.91*         Lab Results   Component Value Date    HGBA1C 6.0 (H) 02/28/2023           Imaging Results Review: I  reviewed radiology reports from this admission including: CT head.  Other Study Results Review: EKG was reviewed.     Administrative Statements   I have spent a total time of 50 minutes in caring for this patient on the day of the visit/encounter including Risks and benefits of tx options, Instructions for management, Counseling / Coordination of care, Documenting in the medical record, and Reviewing / ordering tests, medicine, procedures  .    ** Please Note: This note has been constructed using a voice recognition system. **

## 2024-12-18 NOTE — CONSULTS
Consultation - Neurology   Name: Chuy Norton 64 y.o. male I MRN: [id number]  Unit/Bed#: -01 I Date of Admission: 12/17/2024   Date of Service: 12/18/2024 I Hospital Day: 0   Inpatient consult to Neurology  Consult performed by: Lisa Walker PA-C  Consult ordered by: Mariella Sands PA-C      Physician Requesting Evaluation: Adalberto Kamara MD   Reason for Evaluation / Principal Problem: TIA    Assessment & Plan  Stroke-like symptoms  65 yo female with CAD s/p MI, CVA, PAF, Hepatitis C, alcoholic cirrhosis, depression, ventricular thrombus maintained on Coumadin who presents with elevated BP and concern for LUE weakness and L facial numbness.    Neuroimaging  12/17/2024 CTA head and neck with and without contrast:   No acute intracranial pathology. Stable chronic ischemic changes.  Ulcerated plaque in the proximal internal carotid arteries, left greater then right without significant stenosis.   No significant intracranial stenosis, large vessel occlusion or aneurysm.  12/18/2024 MRI brain without contrast:   No acute intracranial pathology.   Chronic right frontal parietal cortical infarcts with encephalomalacia demonstrating a watershed type distribution. Chronic B/L basal ganglia lacunar infarcts. Chronic microangiopathic ischemic changes, progressed since 2017.    Etiology of symptoms unclear at this time as it is difficult to get a reliable history of patient's symptoms. Given multiple chronic right sided infarcts, possible that patient had recrudescence of prior stroke symptoms in setting of hypertension but unable to truly clarify this with patient. Cannot entirely exclude TIA, but feel this is less likely given patient on Coumadin with therapeutic INR.     Plan:   - Continue on stroke pathway.   - Echocardiogram pending. Appears echocardiogram was started, but from neurology standpoint this could be deferred given patient had echocardiogram completed 12/2/24 and repeating study would not change  management.  - Lipid panel reviewed, total cholesterol 107, triglycerides 105, HDL 32, LDL 54.  - Hemoglobin A1c reviewed, 5.4.  - Continue on Coumadin. INR therapeutic at 2.62 today.   - Atorvastatin 40 mg QPM.   - Goal normotension, normothermia and euglycemia.   - PT/OT   - Stroke education.   - Monitor exam and notify with changes.  Bipolar 2 disorder, major depressive episode (HCC)  - Outpatient management as per primary team.  Benign essential hypertension  - Goal normotension.   - Management as per primary team.  Cirrhosis (HCC)  - Management as per primary team.  Iron deficiency anemia  - Continue iron supplementation as per primary team.  Left ventricular apical thrombus  - Known diagnosis.   - INR therapeutic.   - Continue with Coumadin as per primary team.  I have discussed the above management plan in detail with the primary service.     Chuy Norton will need follow-up in in 3 months with neurovascular team for Other in 60 minute appointment. They will not require outpatient neurological testing.      History of Present Illness   Chuy Norton is a 64 y.o. right handed male with CAD s/p MI, CVA, PAF, Hepatitis C, alcoholic cirrhosis, depression, ventricular thrombus maintained on Coumadin who presents with elevated BP and concern for LUE weakness and L facial numbness.    To review, patient was recently admitted to the hospital with complaint of chest pain and cough as well as lightheadedness and SOB. He was found to have anemia and was seen by GI team and underwent EGD which demonstrated medium size hiatal hernia with Poli erosions, healed gastric ulcer as well as colonoscopy which showed moderate left and right sided diverticulosis. He was cleared to restart anticoagulation.    Of note, patient was diagnosed with left ventricular apical thrombus in 2021 at [facility] and was prescribed Coumadin. Per notes, there was concern that patient was not on therapeutic dosing of Coumadin as it appears patient  "never had consistent therapeutic INR. Repeat echocardiogram was completed 12/2/24 and demonstrated moderately-sized, mural, solid, fixed thrombus at the apex. He was ultimately continued on anticoagulation at discharge and patient was arranged to be followed with anticoagulation clinic at [facility].    Patient presented to the hospital on 12/17/24 for evaluation of hypertension and noted that he had L sided weakness and L facial numbness. He was noted to have therapeutic INR 2.91 and was admitted for stroke work-up.    Patient seen and examined this morning at bedside. He notes he is feeling well and denies complaints. When asked what brought him to the hospital, he notes that his \"wires weren't connected.\" He was not able to reliably describe what this means. When asked directly if he had LUE weakness and L facial numbness, he responded that he thinks he did but cannot reliably describe how long this lasted and what symptoms he had. He notes that he is currently feeling well and at baseline.    Of note, limited historian and unable to reliably assess history at this time.    Review of Systems   Constitutional:  Negative for chills, fatigue and fever.   HENT:  Negative for trouble swallowing.    Eyes:  Negative for visual disturbance.   Respiratory:  Negative for shortness of breath.    Cardiovascular:  Negative for chest pain.   Gastrointestinal:  Negative for abdominal pain, nausea and vomiting.   Genitourinary:  Negative for difficulty urinating and dysuria.   Musculoskeletal:  Negative for back pain, gait problem and neck pain.   Skin:  Negative for rash.   Neurological:  Negative for dizziness, facial asymmetry, speech difficulty, weakness, light-headedness, numbness and headaches.   Psychiatric/Behavioral:  Negative for confusion.      Medications  Scheduled Meds:  Current Facility-Administered Medications   Medication Dose Route Frequency Provider Last Rate    acetaminophen  650 mg Oral Q6H PRN Mariella Sands PA-C   " "   atorvastatin  40 mg Oral Daily With Dinner Mariella Sands PA-C      escitalopram  20 mg Oral Daily Mariella Sands PA-C      ferrous sulfate  325 mg Oral Daily With Breakfast Mariella Sands PA-C      hydrALAZINE  5 mg Intravenous Q6H PRN Mariella Sands PA-C      ondansetron  4 mg Intravenous Q6H PRN Mariella Sands PA-C      pantoprazole  40 mg Oral BID before lunch/dinner Mariella Sands PA-C      polyethylene glycol  17 g Oral Daily PRN Mariella Sands PA-C      QUEtiapine  300 mg Oral HS Mariella Sands PA-C      traZODone  100 mg Oral HS Mariella Sands PA-C      warfarin  5 mg Oral Daily (warfarin) Mariella Sands PA-C       Continuous Infusions:   PRN Meds:.  acetaminophen    hydrALAZINE    ondansetron    polyethylene glycol    I have reviewed the patient's PMH, PSH, Social History, Family History, Meds, and Allergies  Historical Information   Past Medical History:   Diagnosis Date    Alcohol abuse     Depression     Drug therapy     GERD (gastroesophageal reflux disease)     Hepatitis C     Hypertension 01/2012    Knee pain, bilateral     Left ventricular apical thrombus     Psychiatric disorder     depression, anxiety    Renal disorder     Self-injurious behavior     Spinal stenosis of lumbar region     Suicide attempt (HCC)      Past Surgical History:   Procedure Laterality Date    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    HERNIA REPAIR  1997     Social History     Tobacco Use    Smoking status: Every Day     Current packs/day: 0.50     Types: Cigarettes, Cigars    Smokeless tobacco: Never    Tobacco comments:     PT \"3 CIGARS A DAY\" - quit smoking cigars   Vaping Use    Vaping status: Never Used   Substance and Sexual Activity    Alcohol use: Not Currently     Alcohol/week: 0.0 standard drinks of alcohol     Comment: Stopped drinking for several months - recovered alcoholic    Drug use: Not Currently     Types: Marijuana     Comment: Last used marijuana 2 months ago.- no longer smoking    Sexual activity: Never     E-Cigarette/Vaping    " E-Cigarette Use Never User      E-Cigarette/Vaping Substances    Nicotine No     THC No     CBD No     Flavoring No     Other No     Unknown No      Family History   Problem Relation Age of Onset    Depression Mother     Depression Father     No Known Problems Sister     No Known Problems Brother        Objective :  Temp:  [97.5 °F (36.4 °C)-97.8 °F (36.6 °C)] 97.5 °F (36.4 °C)  HR:  [70-87] 85  BP: (128-181)/() 178/109  Resp:  [16-20] 20  SpO2:  [92 %-97 %] 92 %  O2 Device: None (Room air)    Physical Exam  Constitutional:       General: He is not in acute distress.     Appearance: Normal appearance. He is not ill-appearing, toxic-appearing or diaphoretic.   HENT:      Head: Normocephalic and atraumatic.      Right Ear: Hearing normal.      Left Ear: Hearing normal.      Mouth/Throat:      Mouth: Mucous membranes are moist.      Pharynx: Oropharynx is clear. No oropharyngeal exudate or posterior oropharyngeal erythema.   Eyes:      General: No scleral icterus.        Right eye: No discharge.         Left eye: No discharge.      Extraocular Movements: EOM normal. No nystagmus.      Conjunctiva/sclera: Conjunctivae normal.   Cardiovascular:      Rate and Rhythm: Normal rate and regular rhythm.   Pulmonary:      Effort: Pulmonary effort is normal.      Breath sounds: Normal breath sounds.   Abdominal:      General: There is no distension.      Palpations: Abdomen is soft.      Tenderness: There is no abdominal tenderness.   Musculoskeletal:         General: Normal range of motion.      Cervical back: Normal range of motion and neck supple.      Right lower leg: No edema.      Left lower leg: No edema.   Skin:     General: Skin is warm and dry.      Findings: No erythema or rash.   Neurological:      Mental Status: He is alert and oriented to person, place, and time.      Deep Tendon Reflexes:      Reflex Scores:       Bicep reflexes are 2+ on the right side and 2+ on the left side.       Brachioradialis reflexes  are 2+ on the right side and 2+ on the left side.       Patellar reflexes are 1+ on the right side and 1+ on the left side.       Achilles reflexes are 0 on the right side and 0 on the left side.  Psychiatric:         Mood and Affect: Mood normal.         Speech: Speech normal.         Behavior: Behavior normal.     Neurological Exam  Mental Status  Alert. Oriented to person, place, time and situation. Oriented to person, place, and time. Speech is normal. Language is fluent with no aphasia. Attention and concentration are normal.    Cranial Nerves  CN II: Right normal visual field. Left normal visual field.  CN III, IV, VI: Extraocular movements intact bilaterally. No nystagmus. Normal saccades.   Right pupil: 4 mm. Round. Reactive to light.   Left pupil: 4 mm. Round. Reactive to light.  CN V:  Right: Facial sensation is normal.  Left: Facial sensation is normal on the left.  CN VII:  Right: There is no facial weakness.  Left: There is no facial weakness.  CN VIII:  Right: Hearing is normal.  Left: Hearing is normal.  CN IX, X: Palate elevates symmetrically  CN XII: Tongue midline without atrophy or fasciculations.    Motor  Normal muscle bulk throughout. No fasciculations present. Normal muscle tone. No abnormal involuntary movements. No pronator drift.  Motor strength 5/5 B/L UE and LE.    Sensory  Light touch is normal in upper and lower extremities. Temperature is normal in upper and lower extremities.     Reflexes                                            Right                      Left  Brachioradialis                    2+                         2+  Biceps                                 2+                         2+  Patellar                                1+                         1+  Achilles                                0                         0    Coordination  Right: Finger-to-nose normal. Heel-to-shin normal.Left: Finger-to-nose normal. Heel-to-shin normal.    Gait    Deferred for  safety..        Lab Results: I have reviewed the following results:  Recent Results (from the past 24 hours)   CBC and differential    Collection Time: 12/17/24  5:17 PM   Result Value Ref Range    WBC 8.44 4.31 - 10.16 Thousand/uL    RBC 5.28 3.88 - 5.62 Million/uL    Hemoglobin 11.9 (L) 12.0 - 17.0 g/dL    Hematocrit 41.0 36.5 - 49.3 %    MCV 78 (L) 82 - 98 fL    MCH 22.5 (L) 26.8 - 34.3 pg    MCHC 29.0 (L) 31.4 - 37.4 g/dL    RDW 33.0 (H) 11.6 - 15.1 %    Platelets 290 149 - 390 Thousands/uL    nRBC 0 /100 WBCs    Segmented % 72 43 - 75 %    Immature Grans % 1 0 - 2 %    Lymphocytes % 19 14 - 44 %    Monocytes % 6 4 - 12 %    Eosinophils Relative 2 0 - 6 %    Basophils Relative 0 0 - 1 %    Absolute Neutrophils 6.10 1.85 - 7.62 Thousands/µL    Absolute Immature Grans 0.06 0.00 - 0.20 Thousand/uL    Absolute Lymphocytes 1.56 0.60 - 4.47 Thousands/µL    Absolute Monocytes 0.52 0.17 - 1.22 Thousand/µL    Eosinophils Absolute 0.18 0.00 - 0.61 Thousand/µL    Basophils Absolute 0.02 0.00 - 0.10 Thousands/µL   Comprehensive metabolic panel    Collection Time: 12/17/24  5:17 PM   Result Value Ref Range    Sodium 138 135 - 147 mmol/L    Potassium 4.8 3.5 - 5.3 mmol/L    Chloride 107 96 - 108 mmol/L    CO2 23 21 - 32 mmol/L    ANION GAP 8 4 - 13 mmol/L    BUN 23 5 - 25 mg/dL    Creatinine 1.15 0.60 - 1.30 mg/dL    Glucose 110 65 - 140 mg/dL    Calcium 8.4 8.4 - 10.2 mg/dL    AST 34 13 - 39 U/L    ALT 30 7 - 52 U/L    Alkaline Phosphatase 62 34 - 104 U/L    Total Protein 6.6 6.4 - 8.4 g/dL    Albumin 3.9 3.5 - 5.0 g/dL    Total Bilirubin 0.40 0.20 - 1.00 mg/dL    eGFR 67 ml/min/1.73sq m   Protime-INR    Collection Time: 12/17/24  5:17 PM   Result Value Ref Range    Protime 30.6 (H) 12.3 - 15.0 seconds    INR 2.91 (H) 0.85 - 1.19   APTT    Collection Time: 12/17/24  5:17 PM   Result Value Ref Range    PTT 38 (H) 23 - 34 seconds   HS Troponin 0hr (reflex protocol)    Collection Time: 12/17/24  5:17 PM   Result Value Ref Range  "   hs TnI 0hr 17 \"Refer to ACS Flowchart\"- see link ng/L   Hemoglobin A1c w/EAG Estimation    Collection Time: 12/17/24  5:17 PM   Result Value Ref Range    Hemoglobin A1C 5.4 Normal 4.0-5.6%; PreDiabetic 5.7-6.4%; Diabetic >=6.5%; Glycemic control for adults with diabetes <7.0% %     mg/dl   HS Troponin I 2hr    Collection Time: 12/17/24  7:54 PM   Result Value Ref Range    hs TnI 2hr 21 \"Refer to ACS Flowchart\"- see link ng/L    Delta 2hr hsTnI 4 <20 ng/L   HS Troponin I 4hr    Collection Time: 12/17/24 10:16 PM   Result Value Ref Range    hs TnI 4hr 23 \"Refer to ACS Flowchart\"- see link ng/L    Delta 4hr hsTnI 6 <20 ng/L   Basic metabolic panel    Collection Time: 12/18/24  2:59 AM   Result Value Ref Range    Sodium 140 135 - 147 mmol/L    Potassium 3.8 3.5 - 5.3 mmol/L    Chloride 109 (H) 96 - 108 mmol/L    CO2 23 21 - 32 mmol/L    ANION GAP 8 4 - 13 mmol/L    BUN 20 5 - 25 mg/dL    Creatinine 1.18 0.60 - 1.30 mg/dL    Glucose 116 65 - 140 mg/dL    Glucose, Fasting 116 (H) 65 - 99 mg/dL    Calcium 8.4 8.4 - 10.2 mg/dL    eGFR 65 ml/min/1.73sq m   CBC and differential    Collection Time: 12/18/24  2:59 AM   Result Value Ref Range    WBC 8.70 4.31 - 10.16 Thousand/uL    RBC 5.36 3.88 - 5.62 Million/uL    Hemoglobin 11.9 (L) 12.0 - 17.0 g/dL    Hematocrit 41.8 36.5 - 49.3 %    MCV 78 (L) 82 - 98 fL    MCH 22.2 (L) 26.8 - 34.3 pg    MCHC 28.5 (L) 31.4 - 37.4 g/dL    RDW 33.2 (H) 11.6 - 15.1 %    Platelets 304 149 - 390 Thousands/uL    nRBC 0 /100 WBCs    Segmented % 71 43 - 75 %    Immature Grans % 1 0 - 2 %    Lymphocytes % 19 14 - 44 %    Monocytes % 7 4 - 12 %    Eosinophils Relative 2 0 - 6 %    Basophils Relative 0 0 - 1 %    Absolute Neutrophils 6.13 1.85 - 7.62 Thousands/µL    Absolute Immature Grans 0.05 0.00 - 0.20 Thousand/uL    Absolute Lymphocytes 1.69 0.60 - 4.47 Thousands/µL    Absolute Monocytes 0.62 0.17 - 1.22 Thousand/µL    Eosinophils Absolute 0.19 0.00 - 0.61 Thousand/µL    Basophils " Absolute 0.02 0.00 - 0.10 Thousands/µL   Lipid Panel with Direct LDL reflex    Collection Time: 12/18/24  2:59 AM   Result Value Ref Range    Cholesterol 107 See Comment mg/dL    Triglycerides 105 See Comment mg/dL    HDL, Direct 32 (L) >=40 mg/dL    LDL Calculated 54 0 - 100 mg/dL   Protime-INR    Collection Time: 12/18/24  2:59 AM   Result Value Ref Range    Protime 28.3 (H) 12.3 - 15.0 seconds    INR 2.62 (H) 0.85 - 1.19     Imaging  Imaging Results Review: I personally reviewed the following image studies in PACS and associated radiology reports: CTA head and neck with and without contrast- No acute intracranial pathology. Stable chronic ischemic changes. Ulcerated plaque in the proximal internal carotid arteries, left greater than right without significant stenosis. No significant intracranial stenosis, large vessel occlusion or aneurysm.     VTE Prophylaxis: Warfarin (Coumadin)

## 2024-12-19 LAB
ALBUMIN SERPL BCG-MCNC: 3.6 G/DL (ref 3.5–5)
ALP SERPL-CCNC: 56 U/L (ref 34–104)
ALT SERPL W P-5'-P-CCNC: 21 U/L (ref 7–52)
ANION GAP SERPL CALCULATED.3IONS-SCNC: 7 MMOL/L (ref 4–13)
AORTIC ROOT: 3.7 CM
AST SERPL W P-5'-P-CCNC: 17 U/L (ref 13–39)
BASOPHILS # BLD AUTO: 0.02 THOUSANDS/ΜL (ref 0–0.1)
BASOPHILS NFR BLD AUTO: 0 % (ref 0–1)
BILIRUB SERPL-MCNC: 0.47 MG/DL (ref 0.2–1)
BSA FOR ECHO PROCEDURE: 2.07 M2
BUN SERPL-MCNC: 25 MG/DL (ref 5–25)
CALCIUM SERPL-MCNC: 8.3 MG/DL (ref 8.4–10.2)
CHLORIDE SERPL-SCNC: 109 MMOL/L (ref 96–108)
CO2 SERPL-SCNC: 23 MMOL/L (ref 21–32)
CREAT SERPL-MCNC: 1.93 MG/DL (ref 0.6–1.3)
EOSINOPHIL # BLD AUTO: 0.21 THOUSAND/ΜL (ref 0–0.61)
EOSINOPHIL NFR BLD AUTO: 2 % (ref 0–6)
ERYTHROCYTE [DISTWIDTH] IN BLOOD BY AUTOMATED COUNT: 33.1 % (ref 11.6–15.1)
FRACTIONAL SHORTENING: 33 (ref 28–44)
GFR SERPL CREATININE-BSD FRML MDRD: 36 ML/MIN/1.73SQ M
GLUCOSE P FAST SERPL-MCNC: 111 MG/DL (ref 65–99)
GLUCOSE SERPL-MCNC: 111 MG/DL (ref 65–140)
HCT VFR BLD AUTO: 39.1 % (ref 36.5–49.3)
HGB BLD-MCNC: 11.3 G/DL (ref 12–17)
IMM GRANULOCYTES # BLD AUTO: 0.05 THOUSAND/UL (ref 0–0.2)
IMM GRANULOCYTES NFR BLD AUTO: 1 % (ref 0–2)
INR PPP: 2.27 (ref 0.85–1.19)
INTERVENTRICULAR SEPTUM IN DIASTOLE (PARASTERNAL SHORT AXIS VIEW): 1.6 CM
INTERVENTRICULAR SEPTUM: 1.6 CM (ref 0.6–1.1)
LA/AORTA RATIO 2D: 0.89
LEFT ATRIUM SIZE: 3.3 CM
LEFT INTERNAL DIMENSION IN SYSTOLE: 3.2 CM (ref 2.1–4)
LEFT VENTRICULAR INTERNAL DIMENSION IN DIASTOLE: 4.8 CM (ref 3.5–6)
LEFT VENTRICULAR POSTERIOR WALL IN END DIASTOLE: 1.6 CM
LEFT VENTRICULAR STROKE VOLUME: 70 ML
LVSV (TEICH): 70 ML
LYMPHOCYTES # BLD AUTO: 1.63 THOUSANDS/ΜL (ref 0.6–4.47)
LYMPHOCYTES NFR BLD AUTO: 17 % (ref 14–44)
MCH RBC QN AUTO: 22.5 PG (ref 26.8–34.3)
MCHC RBC AUTO-ENTMCNC: 28.9 G/DL (ref 31.4–37.4)
MCV RBC AUTO: 78 FL (ref 82–98)
MONOCYTES # BLD AUTO: 0.8 THOUSAND/ΜL (ref 0.17–1.22)
MONOCYTES NFR BLD AUTO: 8 % (ref 4–12)
MRSA NOSE QL CULT: NORMAL
NEUTROPHILS # BLD AUTO: 6.81 THOUSANDS/ΜL (ref 1.85–7.62)
NEUTS SEG NFR BLD AUTO: 72 % (ref 43–75)
NRBC BLD AUTO-RTO: 0 /100 WBCS
PLATELET # BLD AUTO: 290 THOUSANDS/UL (ref 149–390)
POTASSIUM SERPL-SCNC: 3.5 MMOL/L (ref 3.5–5.3)
PROT SERPL-MCNC: 6.2 G/DL (ref 6.4–8.4)
PROTHROMBIN TIME: 25.4 SECONDS (ref 12.3–15)
RBC # BLD AUTO: 5.03 MILLION/UL (ref 3.88–5.62)
SL CV LV EF: 45
SL CV PED ECHO LEFT VENTRICLE DIASTOLIC VOLUME (MOD BIPLANE) 2D: 110 ML
SL CV PED ECHO LEFT VENTRICLE SYSTOLIC VOLUME (MOD BIPLANE) 2D: 40 ML
SODIUM SERPL-SCNC: 139 MMOL/L (ref 135–147)
WBC # BLD AUTO: 9.52 THOUSAND/UL (ref 4.31–10.16)

## 2024-12-19 PROCEDURE — 99232 SBSQ HOSP IP/OBS MODERATE 35: CPT | Performed by: HOSPITALIST

## 2024-12-19 PROCEDURE — 85025 COMPLETE CBC W/AUTO DIFF WBC: CPT | Performed by: HOSPITALIST

## 2024-12-19 PROCEDURE — 85610 PROTHROMBIN TIME: CPT | Performed by: HOSPITALIST

## 2024-12-19 PROCEDURE — 80053 COMPREHEN METABOLIC PANEL: CPT | Performed by: HOSPITALIST

## 2024-12-19 RX ORDER — LORAZEPAM 2 MG/ML
0.5 INJECTION INTRAMUSCULAR EVERY 6 HOURS PRN
Status: DISCONTINUED | OUTPATIENT
Start: 2024-12-19 | End: 2024-12-20 | Stop reason: HOSPADM

## 2024-12-19 RX ADMIN — WARFARIN SODIUM 5 MG: 5 TABLET ORAL at 17:13

## 2024-12-19 RX ADMIN — TRAZODONE HYDROCHLORIDE 100 MG: 100 TABLET ORAL at 21:13

## 2024-12-19 RX ADMIN — LISINOPRIL 20 MG: 20 TABLET ORAL at 07:54

## 2024-12-19 RX ADMIN — LORAZEPAM 0.5 MG: 2 INJECTION INTRAMUSCULAR; INTRAVENOUS at 12:44

## 2024-12-19 RX ADMIN — QUETIAPINE FUMARATE 300 MG: 300 TABLET ORAL at 21:13

## 2024-12-19 RX ADMIN — FERROUS SULFATE TAB 325 MG (65 MG ELEMENTAL FE) 325 MG: 325 (65 FE) TAB at 07:54

## 2024-12-19 RX ADMIN — PANTOPRAZOLE SODIUM 40 MG: 40 TABLET, DELAYED RELEASE ORAL at 17:13

## 2024-12-19 RX ADMIN — ESCITALOPRAM OXALATE 20 MG: 20 TABLET ORAL at 07:54

## 2024-12-19 RX ADMIN — ATORVASTATIN CALCIUM 40 MG: 40 TABLET, FILM COATED ORAL at 17:14

## 2024-12-19 RX ADMIN — PANTOPRAZOLE SODIUM 40 MG: 40 TABLET, DELAYED RELEASE ORAL at 11:15

## 2024-12-19 NOTE — ASSESSMENT & PLAN NOTE
BP elevated on presentation 169//108   Home regimen: none   Plan   Completed permissive HTN. Blood pressure control remains suboptimal  Start lisinopril with improvement however did have hypotension 12/19 am. May need to reduce lisinopril.   IV hydralazine 5 mg prn for systolic > 200

## 2024-12-19 NOTE — PLAN OF CARE
Problem: Potential for Falls  Goal: Patient will remain free of falls  Description: INTERVENTIONS:  - Educate patient/family on patient safety including physical limitations  - Instruct patient to call for assistance with activity   - Consult OT/PT to assist with strengthening/mobility   - Keep Call bell within reach  - Keep bed low and locked with side rails adjusted as appropriate  - Keep care items and personal belongings within reach  - Initiate and maintain comfort rounds  - Make Fall Risk Sign visible to staff  - Offer Toileting every 2 Hours, in advance of need  - Initiate/Maintain bed alarm  - Obtain necessary fall risk management equipment: socks  - Apply yellow socks and bracelet for high fall risk patients  - Consider moving patient to room near nurses station  Outcome: Progressing     Problem: Prexisting or High Potential for Compromised Skin Integrity  Goal: Skin integrity is maintained or improved  Description: INTERVENTIONS:  - Identify patients at risk for skin breakdown  - Assess and monitor skin integrity  - Assess and monitor nutrition and hydration status  - Monitor labs   - Assess for incontinence   - Turn and reposition patient  - Assist with mobility/ambulation  - Relieve pressure over bony prominences  - Avoid friction and shearing  - Provide appropriate hygiene as needed including keeping skin clean and dry  - Evaluate need for skin moisturizer/barrier cream  - Collaborate with interdisciplinary team   - Patient/family teaching  - Consider wound care consult   Outcome: Progressing     Problem: PAIN - ADULT  Goal: Verbalizes/displays adequate comfort level or baseline comfort level  Description: Interventions:  - Encourage patient to monitor pain and request assistance  - Assess pain using appropriate pain scale  - Administer analgesics based on type and severity of pain and evaluate response  - Implement non-pharmacological measures as appropriate and evaluate response  - Consider cultural  and social influences on pain and pain management  - Notify physician/advanced practitioner if interventions unsuccessful or patient reports new pain  Outcome: Progressing     Problem: INFECTION - ADULT  Goal: Absence or prevention of progression during hospitalization  Description: INTERVENTIONS:  - Assess and monitor for signs and symptoms of infection  - Monitor lab/diagnostic results  - Monitor all insertion sites, i.e. indwelling lines, tubes, and drains  - Monitor endotracheal if appropriate and nasal secretions for changes in amount and color  - [place] appropriate cooling/warming therapies per order  - Administer medications as ordered  - Instruct and encourage patient and family to use good hand hygiene technique  - Identify and instruct in appropriate isolation precautions for identified infection/condition  Outcome: Progressing  Goal: Absence of fever/infection during neutropenic period  Description: INTERVENTIONS:  - Monitor WBC    Outcome: Progressing     Problem: SAFETY ADULT  Goal: Patient will remain free of falls  Description: INTERVENTIONS:  - Educate patient/family on patient safety including physical limitations  - Instruct patient to call for assistance with activity   - Consult OT/PT to assist with strengthening/mobility   - Keep Call bell within reach  - Keep bed low and locked with side rails adjusted as appropriate  - Keep care items and personal belongings within reach  - Initiate and maintain comfort rounds  - Make Fall Risk Sign visible to staff  - Offer Toileting every 2 Hours, in advance of need  - Initiate/Maintain bed alarm  - Obtain necessary fall risk management equipment: socks  - Apply yellow socks and bracelet for high fall risk patients  - Consider moving patient to room near nurses station  Outcome: Progressing  Goal: Maintain or return to baseline ADL function  Description: INTERVENTIONS:  -  Assess patient's ability to carry out ADLs; assess patient's baseline for ADL  function and identify physical deficits which impact ability to perform ADLs (bathing, care of mouth/teeth, toileting, grooming, dressing, etc.)  - Assess/evaluate cause of self-care deficits   - Assess range of motion  - Assess patient's mobility; develop plan if impaired  - Assess patient's need for assistive devices and provide as appropriate  - Encourage maximum independence but intervene and supervise when necessary  - Involve family in performance of ADLs  - Assess for home care needs following discharge   - Consider OT consult to assist with ADL evaluation and planning for discharge  - Provide patient education as appropriate  Outcome: Progressing  Goal: Maintains/Returns to pre admission functional level  Description: INTERVENTIONS:  - Perform AM-PAC 6 Click Basic Mobility/ Daily Activity assessment daily.  - Set and communicate daily mobility goal to care team and patient/family/caregiver.   - Collaborate with rehabilitation services on mobility goals if consulted  - Perform Range of Motion 3 times a day.  - Reposition patient every 2 hours.  - Dangle patient 3 times a day  - Stand patient 3 times a day  - Ambulate patient 3 times a day  - Out of bed to chair 3 times a day   - Out of bed for meals 3 times a day  - Out of bed for toileting  - Record patient progress and toleration of activity level   Outcome: Progressing     Problem: DISCHARGE PLANNING  Goal: Discharge to home or other facility with appropriate resources  Description: INTERVENTIONS:  - Identify barriers to discharge w/patient and caregiver  - Arrange for needed discharge resources and transportation as appropriate  - Identify discharge learning needs (meds, wound care, etc.)  - Arrange for interpretive services to assist at discharge as needed  - Refer to Case Management Department for coordinating discharge planning if the patient needs post-hospital services based on physician/advanced practitioner order or complex needs related to  functional status, cognitive ability, or social support system  Outcome: Progressing     Problem: Knowledge Deficit  Goal: Patient/family/caregiver demonstrates understanding of disease process, treatment plan, medications, and discharge instructions  Description: Complete learning assessment and assess knowledge base.  Interventions:  - Provide teaching at level of understanding  - Provide teaching via preferred learning methods  Outcome: Progressing

## 2024-12-19 NOTE — PROGRESS NOTES
Progress Note - Hospitalist   Name: hCuy Norton 64 y.o. male I MRN: [id number]  Unit/Bed#: -01 I Date of Admission: 12/17/2024   Date of Service: 12/19/2024 I Hospital Day: 0     Assessment & Plan  Stroke-like symptoms  Patient had reported to staff that he had left-sided weakness (left mid forearm to fingertips) and left facial numbness.  Not observed by staff but was found to have an elevated blood pressure thus sent to the ER.  Per patient symptoms resolved prior to arrival. NIHSS 0  CT head:   No acute intracranial pathology. Stable chronic ischemic changes.   CTA head and neck:  Ulcerated plaque in the proximal internal carotid arteries, left greater than right without significant stenosis.  No significant intracranial stenosis, large vessel occlusion or aneurysm.  On Coumadin due to history of left ventricular apical thrombus.  INR therapeutic at 2.91.   ER reached out to neurology who recommended stroke pathway  MRI 1. No acute intracranial pathology.  2. Chronic right frontal parietal cortical infarcts with encephalomalacia demonstrating a watershed type distribution. Chronic bilateral basal ganglia lacunar infarcts. Chronic microangiopathic ischemic changes, progressed since 2017.   Per neuro no aspirin and/or Plavix    Lipid panel/A1c  Neurochecks  Telemetry  Apprec neuro, PT/OT and case management  Benign essential hypertension  BP elevated on presentation 169//108   Home regimen: none   Plan   Completed permissive HTN. Blood pressure control remains suboptimal  Start lisinopril with improvement however did have hypotension 12/19 am. May need to reduce lisinopril.   IV hydralazine 5 mg prn for systolic > 200  Bipolar 2 disorder, major depressive episode (HCC)  Continue home trazodone, Seroquel and Lexapro  Cirrhosis (HCC)  Appears compensated at this time  Iron deficiency anemia  Hemoglobin 11.9  Continue iron supplementation  Left ventricular apical thrombus  Continue Coumadin  INR Within  goal.  Continue to monitor  PUD (peptic ulcer disease)  PPI twice daily   VTE  Prophylaxis:   Pharmacologic: in place  Mechanical VTE Prophylaxis in Place: Yes    Patient Centered Rounds: I have performed bedside rounds with nursing staff today.    Discussions with Specialists or Other Care Team Provider: case management    Education and Discussions with Family / Patient: yes    Mobility:   Basic Mobility Inpatient Raw Score: 20  JH-HLM Goal: 6: Walk 10 steps or more  JH-HLM Achieved: 7: Walk 25 feet or more      Current Length of Stay: 0 day(s)    Current Patient Status: Observation        Code Status: Level 1 - Full Code    Discharge Plan: Pt will require continued inpatient hospitalization.    Subjective:   Pt states he has returned to baseline     Patient is seen and examined at bedside.  All other ROS are negative.    Objective:     Vitals:   Temp (24hrs), Av °F (36.7 °C), Min:97.6 °F (36.4 °C), Max:98.4 °F (36.9 °C)    Temp:  [97.6 °F (36.4 °C)-98.4 °F (36.9 °C)] 97.6 °F (36.4 °C)  HR:  [] 85  Resp:  [16-20] 19  BP: ()/(65-99) 121/75  SpO2:  [90 %-95 %] 95 %  Body mass index is 31.24 kg/m².     Input and Output Summary (last 24 hours):       Intake/Output Summary (Last 24 hours) at 2024 1258  Last data filed at 2024 1242  Gross per 24 hour   Intake 1462 ml   Output 450 ml   Net 1012 ml       Physical Exam:       GEN: No acute distress, comfortable  HEEENT: No JVD, PERRLA, no scleral icterus  RESP: Lungs clear to auscultation bilaterally  CV: RRR, +s1/s2   ABD: SOFT NON TENDER, POSITIVE BOWEL SOUNDS, NO DISTENTION  PSYCH: CALM  NEURO: [place] baseline, NO FOCAL DEFICITS  SKIN: NO RASH  EXTREM: NO EDEMA    Additional Data:     Labs:    Results from last 7 days   Lab Units 24  0316   WBC Thousand/uL 9.52   HEMOGLOBIN g/dL 11.3*   HEMATOCRIT % 39.1   PLATELETS Thousands/uL 290   SEGS PCT % 72   LYMPHO PCT % 17   MONO PCT % 8   EOS PCT % 2     Results from last 7 days   Lab Units  12/19/24  0316   SODIUM mmol/L 139   POTASSIUM mmol/L 3.5   CHLORIDE mmol/L 109*   CO2 mmol/L 23   BUN mg/dL 25   CREATININE mg/dL 1.93*   ANION GAP mmol/L 7   CALCIUM mg/dL 8.3*   ALBUMIN g/dL 3.6   TOTAL BILIRUBIN mg/dL 0.47   ALK PHOS U/L 56   ALT U/L 21   AST U/L 17   GLUCOSE RANDOM mg/dL 111     Results from last 7 days   Lab Units 12/19/24  0316   INR  2.27*     Results from last 7 days   Lab Units 12/18/24  1112   POC GLUCOSE mg/dl 128     Results from last 7 days   Lab Units 12/17/24  1717   HEMOGLOBIN A1C % 5.4           Lines/Drains:  Invasive Devices       Peripheral Intravenous Line  Duration             Peripheral IV 12/17/24 Left Antecubital 1 day                    Telemetry:        * I Have Reviewed All Lab Data Listed Above.           Imaging:     Results for orders placed during the hospital encounter of 12/08/24    XR chest portable    Narrative  XR CHEST PORTABLE    INDICATION: sob.    COMPARISON: November 29, 2024    FINDINGS:    New patchy right lower lobe opacity, suspicious for pneumonia. No pneumothorax or pleural effusion.    Normal cardiomediastinal silhouette.    Bones are unremarkable for age.    Normal upper abdomen.    Impression  New patchy right lower lobe opacity, suspicious for pneumonia.    Findings concur with the preliminary report by the referring clinician already in PACS and/or our electronic record EPIC.    Workstation performed: [id number]    Results for orders placed during the hospital encounter of 01/24/20    XR chest 2 views    Narrative  CHEST    INDICATION:  Chest pain.    COMPARISON:  Chest film and CT chest 3/20/2019    EXAM PERFORMED/VIEWS:  XR CHEST PA & LATERAL  The frontal view was performed utilizing dual energy radiographic technique.    FINDINGS:    Cardiomediastinal silhouette appears unremarkable.    The lungs are clear.  No pneumothorax or pleural effusion.    Osseous structures appear within normal limits for patient age.    Impression  No acute  cardiopulmonary disease.        Workstation performed: [id number]      *I have reviewed all imaging reports listed above      Recent Cultures (last 7 days):           Last 24 Hours Medication List:   Current Facility-Administered Medications   Medication Dose Route Frequency Provider Last Rate    acetaminophen  650 mg Oral Q6H PRN Mariella Sands PA-C      atorvastatin  40 mg Oral Daily With Dinner Mariella Sands PA-C      escitalopram  20 mg Oral Daily Mariella Sands PA-C      ferrous sulfate  325 mg Oral Daily With Breakfast Mariella Sands PA-C      hydrALAZINE  5 mg Intravenous Q6H PRN Mariella Sands PA-C      lisinopril  20 mg Oral Daily Adalberto Kamara MD      LORazepam  0.5 mg Intravenous Q6H PRN Adalberto Kamara MD      ondansetron  4 mg Intravenous Q6H PRN Mariella Sands PA-C      pantoprazole  40 mg Oral BID before lunch/dinner Mariella Sands PA-C      polyethylene glycol  17 g Oral Daily PRN Mariella Sands PA-C      QUEtiapine  300 mg Oral HS Mariella Sands PA-C      traZODone  100 mg Oral HS Mariella Sands PA-C      warfarin  5 mg Oral Daily (warfarin) Mariella Sands PA-C          Today, Patient Was Seen By: Adalberto Kamara MD    ** Please Note: Dictation voice to text software may have been used in the creation of this document. **

## 2024-12-19 NOTE — PLAN OF CARE
Problem: Potential for Falls  Goal: Patient will remain free of falls  Description: INTERVENTIONS:  - Educate patient/family on patient safety including physical limitations  - Instruct patient to call for assistance with activity   - Consult OT/PT to assist with strengthening/mobility   - Keep Call bell within reach  - Keep bed low and locked with side rails adjusted as appropriate  - Keep care items and personal belongings within reach  - Initiate and maintain comfort rounds  - Make Fall Risk Sign visible to staff  - Offer Toileting every 2 Hours, in advance of need  - Initiate/Maintain bed/chair alarm  - Obtain necessary fall risk management equipment  - Apply yellow socks and bracelet for high fall risk patients  - Consider moving patient to room near nurses station  Outcome: Progressing     Problem: Prexisting or High Potential for Compromised Skin Integrity  Goal: Skin integrity is maintained or improved  Description: INTERVENTIONS:  - Identify patients at risk for skin breakdown  - Assess and monitor skin integrity  - Assess and monitor nutrition and hydration status  - Monitor labs   - Assess for incontinence   - Turn and reposition patient  - Assist with mobility/ambulation  - Relieve pressure over bony prominences  - Avoid friction and shearing  - Provide appropriate hygiene as needed including keeping skin clean and dry  - Evaluate need for skin moisturizer/barrier cream  - Collaborate with interdisciplinary team   - Patient/family teaching  - Consider wound care consult   Outcome: Progressing     Problem: PAIN - ADULT  Goal: Verbalizes/displays adequate comfort level or baseline comfort level  Description: Interventions:  - Encourage patient to monitor pain and request assistance  - Assess pain using appropriate pain scale  - Administer analgesics based on type and severity of pain and evaluate response  - Implement non-pharmacological measures as appropriate and evaluate response  - Consider cultural  and social influences on pain and pain management  - Notify physician/advanced practitioner if interventions unsuccessful or patient reports new pain  Outcome: Progressing     Problem: INFECTION - ADULT  Goal: Absence or prevention of progression during hospitalization  Description: INTERVENTIONS:  - Assess and monitor for signs and symptoms of infection  - Monitor lab/diagnostic results  - Monitor all insertion sites, i.e. indwelling lines, tubes, and drains  - Monitor endotracheal if appropriate and nasal secretions for changes in amount and color  - [place] appropriate cooling/warming therapies per order  - Administer medications as ordered  - Instruct and encourage patient and family to use good hand hygiene technique  - Identify and instruct in appropriate isolation precautions for identified infection/condition  Outcome: Progressing  Goal: Absence of fever/infection during neutropenic period  Description: INTERVENTIONS:  - Monitor WBC    Outcome: Progressing     Problem: SAFETY ADULT  Goal: Patient will remain free of falls  Description: INTERVENTIONS:  - Educate patient/family on patient safety including physical limitations  - Instruct patient to call for assistance with activity   - Consult OT/PT to assist with strengthening/mobility   - Keep Call bell within reach  - Keep bed low and locked with side rails adjusted as appropriate  - Keep care items and personal belongings within reach  - Initiate and maintain comfort rounds  - Make Fall Risk Sign visible to staff  - Offer Toileting every 2 Hours, in advance of need  - Initiate/Maintain bed/chair alarm  - Obtain necessary fall risk management equipment  - Apply yellow socks and bracelet for high fall risk patients  - Consider moving patient to room near nurses station  Outcome: Progressing  Goal: Maintain or return to baseline ADL function  Description: INTERVENTIONS:  -  Assess patient's ability to carry out ADLs; assess patient's baseline for ADL  function and identify physical deficits which impact ability to perform ADLs (bathing, care of mouth/teeth, toileting, grooming, dressing, etc.)  - Assess/evaluate cause of self-care deficits   - Assess range of motion  - Assess patient's mobility; develop plan if impaired  - Assess patient's need for assistive devices and provide as appropriate  - Encourage maximum independence but intervene and supervise when necessary  - Involve family in performance of ADLs  - Assess for home care needs following discharge   - Consider OT consult to assist with ADL evaluation and planning for discharge  - Provide patient education as appropriate  Outcome: Progressing  Goal: Maintains/Returns to pre admission functional level  Description: INTERVENTIONS:  - Perform AM-PAC 6 Click Basic Mobility/ Daily Activity assessment daily.  - Set and communicate daily mobility goal to care team and patient/family/caregiver.   - Collaborate with rehabilitation services on mobility goals if consulted  - Perform Range of Motion 3 times a day.  - Reposition patient every 2 hours.  - Dangle patient 3 times a day  - Stand patient 3 times a day  - Ambulate patient 3 times a day  - Out of bed to chair 3 times a day   - Out of bed for meals 3 times a day  - Out of bed for toileting  - Record patient progress and toleration of activity level   Outcome: Progressing     Problem: DISCHARGE PLANNING  Goal: Discharge to home or other facility with appropriate resources  Description: INTERVENTIONS:  - Identify barriers to discharge w/patient and caregiver  - Arrange for needed discharge resources and transportation as appropriate  - Identify discharge learning needs (meds, wound care, etc.)  - Arrange for interpretive services to assist at discharge as needed  - Refer to Case Management Department for coordinating discharge planning if the patient needs post-hospital services based on physician/advanced practitioner order or complex needs related to  functional status, cognitive ability, or social support system  Outcome: Progressing     Problem: Knowledge Deficit  Goal: Patient/family/caregiver demonstrates understanding of disease process, treatment plan, medications, and discharge instructions  Description: Complete learning assessment and assess knowledge base.  Interventions:  - Provide teaching at level of understanding  - Provide teaching via preferred learning methods  Outcome: Progressing

## 2024-12-19 NOTE — ASSESSMENT & PLAN NOTE
Patient had reported to staff that he had left-sided weakness (left mid forearm to fingertips) and left facial numbness.  Not observed by staff but was found to have an elevated blood pressure thus sent to the ER.  Per patient symptoms resolved prior to arrival. NIHSS 0  CT head:   No acute intracranial pathology. Stable chronic ischemic changes.   CTA head and neck:  Ulcerated plaque in the proximal internal carotid arteries, left greater than right without significant stenosis.  No significant intracranial stenosis, large vessel occlusion or aneurysm.  On Coumadin due to history of left ventricular apical thrombus.  INR therapeutic at 2.91.   ER reached out to neurology who recommended stroke pathway  MRI 1. No acute intracranial pathology.  2. Chronic right frontal parietal cortical infarcts with encephalomalacia demonstrating a watershed type distribution. Chronic bilateral basal ganglia lacunar infarcts. Chronic microangiopathic ischemic changes, progressed since 2017.   Per neuro no aspirin and/or Plavix    Lipid panel/A1c  Neurochecks  Telemetry  Apprec neuro, PT/OT and case management

## 2024-12-19 NOTE — CASE MANAGEMENT
Case Management Discharge Planning Note    Patient name Chuy Norton  Location /-01 MRN [id number]  : 1960 Date 2024       Current Admission Date: 2024  Current Admission Diagnosis:Stroke-like symptoms   Patient Active Problem List    Diagnosis Date Noted Date Diagnosed    Stroke-like symptoms 2024     Sepsis due to pneumonia (HCC) 2024     Thrombocytopenia (HCC) 2024     PUD (peptic ulcer disease) 2024     Abnormal CXR 2024     Shortness of breath 2024     Hepatitis C 05/15/2024     Acute pain of right knee 2024     Acute left-sided low back pain without sciatica 2024     Left ventricular apical thrombus 05/10/2024     Cirrhosis (HCC) 2024     Iron deficiency anemia 2024     History of CVA (cerebrovascular accident) 2021     Acute metabolic encephalopathy 2021     SIRS (systemic inflammatory response syndrome) ([facility]) 06/15/2021     Patellofemoral pain syndrome of right knee 2020     Elevated d-dimer 2019     Left hip pain 2019     Encounter for medical examination to establish care 10/10/2018     Bipolar 2 disorder, major depressive episode (HCC) 2018     Attention deficit hyperactivity disorder (ADHD), combined type 2018     Hyperlipidemia 2015     Knee pain 2015     Spinal stenosis of lumbar region 2015     Tobacco dependence syndrome 2015     Vitamin D deficiency 2015     Anxiety disorder 2015     Depressive disorder 2013     Low back pain 2013     Benign essential hypertension 10/18/2012     Alcohol dependence in remission (HCC) 2011       LOS (days): 0  Geometric Mean LOS (GMLOS) (days):   Days to GMLOS:     OBJECTIVE:            Current admission status: Observation   Preferred Pharmacy:   [place] [facility] - [address]  [address] [place] [address]  Phone: [phone] Fax:  [phone]    Primary Care Provider: Savanna Pan DO    Primary Insurance: JOEL MUNOZ  Secondary Insurance:     DISCHARGE DETAILS:                                          Other Referral/Resources/Interventions Provided:  Interventions: Facility Return  Referral Comments: Spoke with pt's Chuy LEOS. Chuy stated that he will stop out to meet with pt today to evaluate, given length of stay at hospital. CM informed Chuy that discharge is planned in 24hrs.         Treatment Team Recommendation: Facility Return  Discharge Destination Plan:: Facility Return

## 2024-12-20 VITALS
TEMPERATURE: 97.7 F | HEIGHT: 68 IN | DIASTOLIC BLOOD PRESSURE: 60 MMHG | RESPIRATION RATE: 16 BRPM | OXYGEN SATURATION: 91 % | BODY MASS INDEX: 31.14 KG/M2 | HEART RATE: 78 BPM | SYSTOLIC BLOOD PRESSURE: 98 MMHG | WEIGHT: 205.47 LBS

## 2024-12-20 LAB
ALBUMIN SERPL BCG-MCNC: 3.6 G/DL (ref 3.5–5)
ALP SERPL-CCNC: 61 U/L (ref 34–104)
ALT SERPL W P-5'-P-CCNC: 19 U/L (ref 7–52)
ANION GAP SERPL CALCULATED.3IONS-SCNC: 6 MMOL/L (ref 4–13)
AST SERPL W P-5'-P-CCNC: 15 U/L (ref 13–39)
BASOPHILS # BLD AUTO: 0.02 THOUSANDS/ΜL (ref 0–0.1)
BASOPHILS NFR BLD AUTO: 0 % (ref 0–1)
BILIRUB SERPL-MCNC: 0.53 MG/DL (ref 0.2–1)
BUN SERPL-MCNC: 30 MG/DL (ref 5–25)
CALCIUM SERPL-MCNC: 8.2 MG/DL (ref 8.4–10.2)
CHLORIDE SERPL-SCNC: 107 MMOL/L (ref 96–108)
CO2 SERPL-SCNC: 23 MMOL/L (ref 21–32)
CREAT SERPL-MCNC: 1.39 MG/DL (ref 0.6–1.3)
EOSINOPHIL # BLD AUTO: 0.21 THOUSAND/ΜL (ref 0–0.61)
EOSINOPHIL NFR BLD AUTO: 2 % (ref 0–6)
ERYTHROCYTE [DISTWIDTH] IN BLOOD BY AUTOMATED COUNT: 33.3 % (ref 11.6–15.1)
GFR SERPL CREATININE-BSD FRML MDRD: 53 ML/MIN/1.73SQ M
GLUCOSE SERPL-MCNC: 102 MG/DL (ref 65–140)
HCT VFR BLD AUTO: 38.6 % (ref 36.5–49.3)
HGB BLD-MCNC: 11.2 G/DL (ref 12–17)
IMM GRANULOCYTES # BLD AUTO: 0.06 THOUSAND/UL (ref 0–0.2)
IMM GRANULOCYTES NFR BLD AUTO: 1 % (ref 0–2)
INR PPP: 3.56 (ref 0.85–1.19)
LYMPHOCYTES # BLD AUTO: 1.31 THOUSANDS/ΜL (ref 0.6–4.47)
LYMPHOCYTES NFR BLD AUTO: 15 % (ref 14–44)
MCH RBC QN AUTO: 22.7 PG (ref 26.8–34.3)
MCHC RBC AUTO-ENTMCNC: 29 G/DL (ref 31.4–37.4)
MCV RBC AUTO: 78 FL (ref 82–98)
MONOCYTES # BLD AUTO: 0.63 THOUSAND/ΜL (ref 0.17–1.22)
MONOCYTES NFR BLD AUTO: 7 % (ref 4–12)
NEUTROPHILS # BLD AUTO: 6.8 THOUSANDS/ΜL (ref 1.85–7.62)
NEUTS SEG NFR BLD AUTO: 75 % (ref 43–75)
NRBC BLD AUTO-RTO: 0 /100 WBCS
PLATELET # BLD AUTO: 253 THOUSANDS/UL (ref 149–390)
POTASSIUM SERPL-SCNC: 3.6 MMOL/L (ref 3.5–5.3)
PROT SERPL-MCNC: 6.3 G/DL (ref 6.4–8.4)
PROTHROMBIN TIME: 35.6 SECONDS (ref 12.3–15)
RBC # BLD AUTO: 4.93 MILLION/UL (ref 3.88–5.62)
SODIUM SERPL-SCNC: 136 MMOL/L (ref 135–147)
WBC # BLD AUTO: 9.03 THOUSAND/UL (ref 4.31–10.16)

## 2024-12-20 PROCEDURE — 99239 HOSP IP/OBS DSCHRG MGMT >30: CPT | Performed by: HOSPITALIST

## 2024-12-20 PROCEDURE — 80053 COMPREHEN METABOLIC PANEL: CPT | Performed by: HOSPITALIST

## 2024-12-20 PROCEDURE — 85025 COMPLETE CBC W/AUTO DIFF WBC: CPT | Performed by: HOSPITALIST

## 2024-12-20 PROCEDURE — 85610 PROTHROMBIN TIME: CPT | Performed by: HOSPITALIST

## 2024-12-20 RX ORDER — WARFARIN SODIUM 5 MG/1
5 TABLET ORAL
Start: 2024-12-21

## 2024-12-20 RX ORDER — LISINOPRIL 20 MG/1
20 TABLET ORAL DAILY PRN
Status: DISCONTINUED | OUTPATIENT
Start: 2024-12-20 | End: 2024-12-20 | Stop reason: HOSPADM

## 2024-12-20 RX ORDER — LISINOPRIL 20 MG/1
20 TABLET ORAL DAILY PRN
Qty: 30 TABLET | Refills: 0 | Status: SHIPPED | OUTPATIENT
Start: 2024-12-20

## 2024-12-20 RX ADMIN — FERROUS SULFATE TAB 325 MG (65 MG ELEMENTAL FE) 325 MG: 325 (65 FE) TAB at 08:22

## 2024-12-20 RX ADMIN — ESCITALOPRAM OXALATE 20 MG: 20 TABLET ORAL at 08:22

## 2024-12-20 NOTE — ASSESSMENT & PLAN NOTE
BP elevated on presentation 169//108   Home regimen: none   Plan   Completed permissive HTN. Blood pressure control remains suboptimal  Start lisinopril with improvement however did have hypotension 12/19 am. Will discharge with PRN lisinopril  IV hydralazine 5 mg prn for systolic > 200

## 2024-12-20 NOTE — TELEPHONE ENCOUNTER
Hello,     I have CALLED FACILITY, SPOKE TO FIOR Scheduled with , U LONG, 5/25/2025, 12:30 PM.     Placed patient on waiting list.     Thank you,     Trista      AK- HOME- 12/20/2024

## 2024-12-20 NOTE — PLAN OF CARE
Problem: Potential for Falls  Goal: Patient will remain free of falls  Description: INTERVENTIONS:  - Educate patient/family on patient safety including physical limitations  - Instruct patient to call for assistance with activity   - Consult OT/PT to assist with strengthening/mobility   - Keep Call bell within reach  - Keep bed low and locked with side rails adjusted as appropriate  - Keep care items and personal belongings within reach  - Initiate and maintain comfort rounds  - Make Fall Risk Sign visible to staff  - Offer Toileting every 2 Hours, in advance of need  - Initiate/Maintain 2 alarms  - Obtain necessary fall risk management equipment: yellow bracelet and socks. Bed and chair alarm on.  - Apply yellow socks and bracelet for high fall risk patients  - Consider moving patient to room near nurses station  Outcome: Progressing     Problem: Prexisting or High Potential for Compromised Skin Integrity  Goal: Skin integrity is maintained or improved  Description: INTERVENTIONS:  - Identify patients at risk for skin breakdown  - Assess and monitor skin integrity  - Assess and monitor nutrition and hydration status  - Monitor labs   - Assess for incontinence   - Turn and reposition patient  - Assist with mobility/ambulation  - Relieve pressure over bony prominences  - Avoid friction and shearing  - Provide appropriate hygiene as needed including keeping skin clean and dry  - Evaluate need for skin moisturizer/barrier cream  - Collaborate with interdisciplinary team   - Patient/family teaching  - Consider wound care consult   Outcome: Progressing     Problem: SAFETY ADULT  Goal: Patient will remain free of falls  Description: INTERVENTIONS:  - Educate patient/family on patient safety including physical limitations  - Instruct patient to call for assistance with activity   - Consult OT/PT to assist with strengthening/mobility   - Keep Call bell within reach  - Keep bed low and locked with side rails adjusted as  appropriate  - Keep care items and personal belongings within reach  - Initiate and maintain comfort rounds  - Make Fall Risk Sign visible to staff  - Offer Toileting every 2 Hours, in advance of need  - Initiate/Maintain 2 alarms  - Obtain necessary fall risk management equipment: yellow bracelet and socks. Bed and chair alarm on.  - Apply yellow socks and bracelet for high fall risk patients  - Consider moving patient to room near nurses station  Outcome: Progressing  Goal: Maintain or return to baseline ADL function  Description: INTERVENTIONS:  -  Assess patient's ability to carry out ADLs; assess patient's baseline for ADL function and identify physical deficits which impact ability to perform ADLs (bathing, care of mouth/teeth, toileting, grooming, dressing, etc.)  - Assess/evaluate cause of self-care deficits   - Assess range of motion  - Assess patient's mobility; develop plan if impaired  - Assess patient's need for assistive devices and provide as appropriate  - Encourage maximum independence but intervene and supervise when necessary  - Involve family in performance of ADLs  - Assess for home care needs following discharge   - Consider OT consult to assist with ADL evaluation and planning for discharge  - Provide patient education as appropriate  Outcome: Progressing  Goal: Maintains/Returns to pre admission functional level  Description: INTERVENTIONS:  - Perform AM-PAC 6 Click Basic Mobility/ Daily Activity assessment daily.  - Set and communicate daily mobility goal to care team and patient/family/caregiver.   - Collaborate with rehabilitation services on mobility goals if consulted  - Perform Range of Motion 2 times a day.  - Reposition patient every 2 hours.  - Dangle patient 2 times a day  - Stand patient 2 times a day  - Ambulate patient 2 times a day  - Out of bed to chair 2 times a day   - Out of bed for meals 2 times a day  - Out of bed for toileting  - Record patient progress and toleration of  activity level   Outcome: Progressing

## 2024-12-20 NOTE — CASE MANAGEMENT
Case Management Discharge Planning Note    Patient name Chuy Norton  Location /-01 MRN [id number]  : 1960 Date 2024       Current Admission Date: 2024  Current Admission Diagnosis:Stroke-like symptoms   Patient Active Problem List    Diagnosis Date Noted Date Diagnosed    Stroke-like symptoms 2024     Sepsis due to pneumonia (HCC) 2024     Thrombocytopenia (HCC) 2024     PUD (peptic ulcer disease) 2024     Abnormal CXR 2024     Shortness of breath 2024     Hepatitis C 05/15/2024     Acute pain of right knee 2024     Acute left-sided low back pain without sciatica 2024     Left ventricular apical thrombus 05/10/2024     Cirrhosis (HCC) 2024     Iron deficiency anemia 2024     History of CVA (cerebrovascular accident) 2021     Acute metabolic encephalopathy 2021     SIRS (systemic inflammatory response syndrome) ([facility]) 06/15/2021     Patellofemoral pain syndrome of right knee 2020     Elevated d-dimer 2019     Left hip pain 2019     Encounter for medical examination to establish care 10/10/2018     Bipolar 2 disorder, major depressive episode (HCC) 2018     Attention deficit hyperactivity disorder (ADHD), combined type 2018     Hyperlipidemia 2015     Knee pain 2015     Spinal stenosis of lumbar region 2015     Tobacco dependence syndrome 2015     Vitamin D deficiency 2015     Anxiety disorder 2015     Depressive disorder 2013     Low back pain 2013     Benign essential hypertension 10/18/2012     Alcohol dependence in remission (HCC) 2011       LOS (days): 1  Geometric Mean LOS (GMLOS) (days):   Days to GMLOS:     OBJECTIVE:  Risk of Unplanned Readmission Score: 32.61         Current admission status: Inpatient   Preferred Pharmacy:   [facility] - [place], [place] - [address]  [address]  [place] [address]  Phone: [phone] Fax: [phone]    Primary Care Provider: Savanna Pan DO    Primary Insurance: JOEL MUNOZ  Secondary Insurance:     DISCHARGE DETAILS:                                                                                        IMM Given (Date):: 12/20/24  IMM Given to:: Patient   IMM reviewed with patient, patient agrees with discharge determination.

## 2024-12-20 NOTE — DISCHARGE SUMMARY
Discharge Summary - Hospitalist   Name: Chuy Norton 64 y.o. male I MRN: [id number]  Unit/Bed#: -01 I Date of Admission: 12/17/2024   Date of Service: 12/20/2024 I Hospital Day: 1     Assessment & Plan  Stroke-like symptoms  Patient had reported to staff that he had left-sided weakness (left mid forearm to fingertips) and left facial numbness.  Not observed by staff but was found to have an elevated blood pressure thus sent to the ER.  Per patient symptoms resolved prior to arrival. NIHSS 0  CT head:   No acute intracranial pathology. Stable chronic ischemic changes.   CTA head and neck:  Ulcerated plaque in the proximal internal carotid arteries, left greater than right without significant stenosis.  No significant intracranial stenosis, large vessel occlusion or aneurysm.  On Coumadin due to history of left ventricular apical thrombus.  INR therapeutic at 2.91.   ER reached out to neurology who recommended stroke pathway  MRI 1. No acute intracranial pathology.  2. Chronic right frontal parietal cortical infarcts with encephalomalacia demonstrating a watershed type distribution. Chronic bilateral basal ganglia lacunar infarcts. Chronic microangiopathic ischemic changes, progressed since 2017.   Per neuro no aspirin and/or Plavix    Lipid panel/A1c  Neurochecks  Telemetry  Apprec neuro, PT/OT and case management  Benign essential hypertension  BP elevated on presentation 169//108   Home regimen: none   Plan   Completed permissive HTN. Blood pressure control remains suboptimal  Start lisinopril with improvement however did have hypotension 12/19 am. Will discharge with PRN lisinopril  IV hydralazine 5 mg prn for systolic > 200  Bipolar 2 disorder, major depressive episode (HCC)  Continue home trazodone, Seroquel and Lexapro  Cirrhosis (HCC)  Appears compensated at this time  Iron deficiency anemia  Hemoglobin 11.9  Continue iron supplementation  Left ventricular apical thrombus  Continue Coumadin  INR  Within goal.  Continue to monitor  PUD (peptic ulcer disease)  PPI twice daily      Hospital Course:     Chuy Norton is a 64 y.o. male patient who originally presented to the hospital on   Admission Orders (From admission, onward)       Ordered        12/19/24 1833  INPATIENT ADMISSION  Once            12/17/24 2032  Place in Observation  Once                         due to strokelike sx. Pt noted to have elevated blood pressures.  With control of the blood pressure, symptoms resolved.  Neurology following the patient and does not have further inpatient recommendations.  Patient will be discharged with as needed lisinopril.      Please see above list of diagnoses and related plan for additional information.     Physical Exam:    GEN: No acute distress, comfortable  HEEENT: No JVD, PERRLA, no scleral icterus  RESP: Lungs clear to auscultation bilaterally  CV: RRR, +s1/s2   ABD: SOFT NON TENDER, POSITIVE BOWEL SOUNDS, NO DISTENTION  PSYCH: CALM  NEURO: Mentation baseline, NO FOCAL DEFICITS  SKIN: NO RASH  EXTREM: NO EDEMA    CONSULTING PROVIDERS   IP CONSULT TO NEUROLOGY  IP CONSULT TO CASE MANAGEMENT  IP CONSULT TO NUTRITION SERVICES    PROCEDURES PERFORMED  * No surgery found *    RADIOLOGY RESULTS  Echo follow up/limited w/ contrast if indicated  Result Date: 12/19/2024  Narrative:   Left Ventricle: Left ventricular cavity size is normal. Wall thickness is increased. The left ventricular ejection fraction is 45%. Systolic function is mildly reduced. There is a moderately-sized, mural, solid, fixed thrombus at the apex.   The following segments are akinetic: apical septal, apical lateral and apex.   All other segments are normal.   Mitral Valve: There is mild annular calcification.   Prior TTE study available for comparison. Prior study date: 12/2/2024. No significant changes noted compared to the prior study. Limited study for LV function and known history of LV apical thrombus. No significant changes in these  compared to the last 2 studies.     MRI brain wo contrast  Result Date: 12/18/2024  Narrative: MRI BRAIN WITHOUT CONTRAST INDICATION: Stroke. COMPARISON:   MRI brain with and without contrast 2/1/2017 and CTA head/neck 12/17/2024 TECHNIQUE:  Multiplanar, multisequence imaging of the brain was performed. Imaging performed on 1.5T MRI IMAGE QUALITY:  Diagnostic. FINDINGS: BRAIN PARENCHYMA: Mild generalized cortical atrophy. There is no discrete mass, mass effect or midline shift. There is no intracranial hemorrhage.  There is no evidence of acute infarction and diffusion imaging is unremarkable.  Small scattered hyperintensities on T2/FLAIR imaging are noted in the periventricular and subcortical white matter demonstrating an appearance that is statistically most likely to represent advanced microangiopathic change. These have significantly progressed since the previous MR dated 2/1/2017. Redemonstrated chronic basal ganglia lacunar infarcts. Chronic right frontal parietal cortical infarcts with encephalomalacia demonstrating a watershed type distribution. VENTRICLES:  Ventricles and extra-axial CSF spaces are prominent commensurate with the degree of volume loss.  No hydrocephalus.  No acute intraventricular hemorrhage. SELLA AND PITUITARY GLAND:  Normal. ORBITS:  Normal. PARANASAL SINUSES:  Normal. VASCULATURE:  Evaluation of the major intracranial vasculature demonstrates appropriate flow voids. CALVARIUM AND SKULL BASE:  Normal. EXTRACRANIAL SOFT TISSUES:  Normal.     Impression: 1. No acute intracranial pathology. 2. Chronic right frontal parietal cortical infarcts with encephalomalacia demonstrating a watershed type distribution. Chronic bilateral basal ganglia lacunar infarcts. Chronic microangiopathic ischemic changes, progressed since 2017. Resident: LISSET DESAI I, the attending radiologist, have reviewed the images and agree with the final report above. Workstation performed: [id number]     CTA head  and neck with and without contrast  Result Date: 12/17/2024  Narrative: CTA NECK AND BRAIN WITH AND WITHOUT CONTRAST INDICATION: transient left sided weakness COMPARISON:   CT dated 5/9/2024. TECHNIQUE:  Routine CT imaging of the Brain without contrast.Post contrast imaging was performed after administration of iodinated contrast through the neck and brain. Post contrast axial 0.625 mm images timed to opacify the arterial system.  3D rendering was performed on an independent workstation.   MIP reconstructions performed. Coronal and sagittal reconstructions were performed of the non contrast portion of the brain. Radiation dose length product (DLP) for this visit:  1288.86 mGy-cm .  This examination, like all CT scans performed in the [facility], was performed utilizing techniques to minimize radiation dose exposure, including the use of iterative reconstruction and automated exposure control. IV Contrast:  85 mL of iohexol (OMNIPAQUE) IMAGE QUALITY:   Diagnostic FINDINGS: NONCONTRAST BRAIN PARENCHYMA: No acute infarct, hemorrhage, mass, shift or herniation. Stable small chronic infarcts in the right frontal and parietal lobes. Stable chronic lacunar infarcts in the left basal ganglia. Stable chronic microangiopathy. VENTRICLES AND EXTRA-AXIAL SPACES:Normal for the patient's age. VISUALIZED ORBITS: Normal. PARANASAL SINUSES: Normal. CTA NECK ARCH AND GREAT VESSELS: Mild ulcerative plaque in the aortic arch. Common origin of the brachycephalic and left common carotid arteries. No significant stenosis VERTEBRAL ARTERIES: Patent extracranial segments. RIGHT CAROTID: Ulcerated plaque at the bifurcation. No significant stenosis.   No dissection. LEFT CAROTID: Ulcerated plaque at the bifurcation and proximal ICA. There is also partially calcified plaque in the more distal cervical ICA. Less than 50% stenosis. No dissection. NASCET criteria was used to determine the degree of internal carotid artery  diameter stenosis. CTA BRAIN: INTERNAL CAROTID ARTERIES: Calcified plaque in the cavernous and supraclinoid internal carotid arteries with mild narrowing. No high-grade stenosis. No stenosis or occlusion. ANTERIOR CEREBRAL ARTERY CIRCULATION:  No stenosis or occlusion. Left A1 segment is dominant. MIDDLE CEREBRAL ARTERY CIRCULATION:  No stenosis or occlusion. DISTAL VERTEBRAL ARTERIES:  No stenosis or occlusion. BASILAR ARTERY:  No stenosis or occlusion. Fusiform dilatation of the proximal basilar artery measuring up to 6 mm. Junctional dilatation at the basilar tip. No saccular aneurysm. POSTERIOR CEREBRAL ARTERIES: No stenosis or occlusion. VENOUS STRUCTURES:  Normal. NON VASCULAR ANATOMY BONY STRUCTURES:  No acute osseous abnormality. SOFT TISSUES OF THE NECK:  Normal. THORACIC INLET:  Unremarkable.     Impression: CT Brain: No acute intracranial pathology. Stable chronic ischemic changes. CT Angiography: Ulcerated plaque in the proximal internal carotid arteries, left greater than right without significant stenosis. No significant intracranial stenosis, large vessel occlusion or aneurysm. Workstation performed: [id number]     CTA chest pe study  Result Date: 12/8/2024  Narrative: CTA - CHEST WITH IV CONTRAST - PULMONARY ANGIOGRAM INDICATION: Shortness of breath, chest pain COMPARISON: Chest x-ray the same day, CT chest November 30, 2024, CTA of the chest May 9, 2024 TECHNIQUE: CTA examination of the chest was performed using angiographic technique according to a protocol specifically tailored to evaluate for pulmonary embolism. Multiplanar 2D reformatted images were created from the source data. In addition, coronal  3D MIP postprocessing was performed on the acquisition scanner. Radiation dose length product (DLP) for this visit: 506.93 mGy-cm . This examination, like all CT scans performed in the [facility], was performed utilizing techniques to minimize radiation dose exposure, including the  use of iterative reconstruction and automated exposure control. IV Contrast: 85 mL of iohexol (OMNIPAQUE) FINDINGS: PULMONARY ARTERIAL TREE: There is moderate to severe image degradation due to motion, markedly limiting the ability to assess for emboli in the small segmental and subsegmental branches specially at the level of the upper lobes. No central pulmonary embolus. LUNGS: Images were obtained during expiration and are limited by motion. This severely limits the ability to detect lung nodules. Some interstitial infiltrates are suspected at the level of the right upper lobe. Interstitial infiltrates are seen in the right middle lobe and right lower lobe. There is no tracheal or endobronchial lesion. PLEURA: Unremarkable. HEART/GREAT VESSELS: Cardiomegaly. Left ventricular apex aneurysm with associated thrombus, partially calcified, series 604 image 71. Coronary artery calcifications. No thoracic aortic aneurysm. MEDIASTINUM AND NATHANAEL: Hiatal hernia and mild esophageal wall thickening. Patient has had recent endoscopy. CHEST WALL AND LOWER NECK: Unremarkable. VISUALIZED STRUCTURES IN THE UPPER ABDOMEN: Nodular contours of the liver. Correlate for underlying cirrhosis. OSSEOUS STRUCTURES: No acute fracture or destructive osseous lesion. Stable well-circumscribed lucent bony lesion at T10 since 2020, likely hemangioma.     Impression: 1. No central pulmonary embolism on study limited by motion. 2. Multifocal interstitial infiltrates in the right lung most severe at the right lung base. Follow-up to complete resolution. 3. Hiatal hernia. Workstation performed: [id number]     XR chest portable  Result Date: 12/8/2024  Narrative: XR CHEST PORTABLE INDICATION: sob. COMPARISON: November 29, 2024 FINDINGS: New patchy right lower lobe opacity, suspicious for pneumonia. No pneumothorax or pleural effusion. Normal cardiomediastinal silhouette. Bones are unremarkable for age. Normal upper abdomen.     Impression: New patchy  right lower lobe opacity, suspicious for pneumonia. Findings concur with the preliminary report by the referring clinician already in PACS and/or our electronic record EPIC. Workstation performed: [id number]     Echo follow up/limited w/ contrast if indicated  Result Date: 12/2/2024  Narrative: Images from the original result were not included.   Left Ventricle: Left ventricular cavity size is normal. Wall thickness is normal. The left ventricular ejection fraction is 40-45%. Systolic function is mildly reduced. There is a 1.2 cm x 2.6 cm moderately-sized, mural, solid, fixed thrombus at the apex.   The following segments are aneurysmal: apical septal and apex.   The following segments are akinetic: apical lateral.   All other segments are normal.   Mitral Valve: There is mild regurgitation.   Prior TTE study available for comparison. Prior study date: 8/26/2024. No significant changes noted compared to the prior study. Persistent LV apical thrombus.    EGD  Result Date: 12/1/2024  Narrative: Table formatting from the original result was not included. [facility] [address] [facility] [id number] [phone] DATE OF SERVICE: 12/01/24 PHYSICIAN(S): Attending: Omari Mix MD Fellow: No Staff Documented INDICATION: Iron deficiency anemia due to chronic blood loss POST-OP DIAGNOSIS: See the impression below. PREPROCEDURE: Informed consent was obtained for the procedure, including sedation.  Risks of perforation, hemorrhage, adverse drug reaction and aspiration were discussed. The patient was placed in the left lateral decubitus position. Patient was explained about the risks and benefits of the procedure. Risks including but not limited to bleeding, infection, and perforation were explained in detail. Also explained about less than 100% sensitivity with the exam and other alternatives. PROCEDURE: EGD DETAILS OF PROCEDURE: Patient was taken to the procedure room where a  time out was performed to confirm correct patient and correct procedure. The patient underwent monitored anesthesia care, which was administered by an anesthesia professional. The patient's blood pressure, heart rate, level of consciousness, respirations, oxygen, ECG and ETCO2 were monitored throughout the procedure. The scope was introduced through the mouth and advanced to the second part of the duodenum. Retroflexion was performed in the fundus. The patient experienced no blood loss. The procedure was not difficult. The patient tolerated the procedure well. There were no apparent adverse events. ANESTHESIA INFORMATION: ASA: III Anesthesia Type: IV Sedation with Anesthesia MEDICATIONS: No administrations occurring from 1229 to 1241 on 12/01/24 FINDINGS: Medium hiatal hernia with Poli lesions present The duodenum appeared normal. The esophagus appeared normal. SPECIMENS: * No specimens in log *     Impression: Medium size hiatal hernia with Poli's erosions. Previously seen ulcers have healed Normal esophagus Normal Duodenum RECOMMENDATION: Colonoscopy now    Omari Mix MD     Colonoscopy  Result Date: 12/1/2024  Narrative: Table formatting from the original result was not included. [facility] [address] [facility] [id number] [phone] DATE OF SERVICE: 12/01/24 PHYSICIAN(S): Attending: Omari Mix MD Fellow: No Staff Documented INDICATION: Iron deficiency anemia due to chronic blood loss POST-OP DIAGNOSIS: See the impression below. HISTORY: Prior colonoscopy: No prior colonoscopy. BOWEL PREPARATION: Golytely/Colyte/Trilyte PREPROCEDURE: Informed consent was obtained for the procedure, including sedation. Risks including but not limited to bleeding, infection, perforation, adverse drug reaction and aspiration were explained in detail. Also explained about less than 100% sensitivity with the exam and other alternatives. The patient was placed in the  left lateral decubitus position. Procedure: Colonoscopy DETAILS OF PROCEDURE: Patient was taken to the procedure room where a time out was performed to confirm correct patient and correct procedure. The patient underwent monitored anesthesia care, which was administered by an anesthesia professional. The patient's blood pressure, ECG, ETCO2, heart rate, level of consciousness, oxygen and respirations were monitored throughout the procedure. A digital rectal exam was performed. The scope was introduced through the anus and advanced to the cecum. Retroflexion was performed in the rectum. The quality of bowel preparation was evaluated using the [place] Bowel Preparation Scale with scores of: right colon = 1, transverse colon = 1, left colon = 1. The total BBPS score was 3. Bowel prep was not adequate. The patient experienced no blood loss. The procedure was not difficult. The patient tolerated the procedure well. There were no apparent adverse events. ANESTHESIA INFORMATION: ASA: III Anesthesia Type: IV Sedation with Anesthesia MEDICATIONS: No administrations occurring from 1229 to 1306 on 12/01/24 FINDINGS: Polyp measuring smaller than 5 mm in the ascending colon; performed cold forceps biopsy Pancolonic diverticula of moderate severity Internal small hemorrhoids EVENTS: Procedure Events Event Event Time ENDO SCOPE OUT TIME 12/1/2024 12:39 PM ENDO CECUM REACHED 12/1/2024 12:49 PM ENDO SCOPE OUT TIME 12/1/2024  1:02 PM SPECIMENS: ID Type Source Tests Collected by Time Destination 1 : polyp Tissue Large Intestine, Right/Ascending Colon TISSUE EXAM Marsha Quezada RN 12/1/2024 12:57 PM  EQUIPMENT: Colonoscope -PCF [id number]     Impression: Moderate left and right-sided diverticulosis 3 mm polyp in ascending colon removed with forcep biopsy Small internal hemorrhoid Fair prep RECOMMENDATION: Repeat colonoscopy in 5 years, due: 11/30/2029 Inadequate bowel preparation  Will call with polyp pathology in 1 to 2 weeks Resume  regular diet and medications Repeat colonoscopy in 5 years to suboptimal prep and 1 small polyp Suspect anemia related to hiatal hernia and Poli's erosions Would continue PPI and iron supplement chronically    Omari Mix MD     CT chest wo contrast  Result Date: 12/1/2024  Narrative: CT CHEST WITHOUT IV CONTRAST INDICATION:   SOB. Per my review of the medical record, history of GI bleed on ASA and Coumadin for ventricular thrombus. Exertional dyspnea with desaturation with ambulation, worsening over the past 2 weeks. Presented with chest pain. Has been seen multiple times in the last month for URI. Continued cough. Afebrile, no leukocytosis, negative calcitonin. History of GERD. COMPARISON: CXR 11/29/2024, 10/15/2024, chest CT 5/9/2024. TECHNIQUE: Chest CT without intravenous contrast.  Axial, sagittal, coronal 2D reformats and coronal MIPS from source data. Radiation dose length product (DLP):  478.28 mGy-cm . Radiation dose exposure minimized using iterative reconstruction and automated exposure control. FINDINGS: LUNGS: Moderately compromised by motion. No acute disease. AIRWAYS: No significant filling defects but moderately compromised by motion. PLEURA:  Unremarkable. HEART/GREAT VESSELS: Mild cardiomegaly. Stable calcified left ventricular apical thrombus. Mild coronary artery calcification indicating atherosclerotic heart disease. MEDIASTINUM AND NATHANAEL: Moderate hiatal hernia with persistent mildly dilated fluid-filled esophagus. CHEST WALL AND LOWER NECK: Unremarkable. UPPER ABDOMEN: Mildly distended fluid-filled stomach. Colonic diverticuli. OSSEOUS STRUCTURES: Mild degenerative disease in the spine.     Impression: Moderately compromised by motion with no acute disease. Large hiatal hernia with redemonstration of dilated fluid-filled esophagus and mildly dilated fluid-filled stomach placing the patient at risk for aspiration. Workstation performed: [id number]     XR chest 1 view portable  Result  Date: 11/29/2024  Narrative: XR CHEST PORTABLE INDICATION: Rhonchi, cough, hypoxemic. COMPARISON: Chest x-ray 10/15/2024. CT chest 5/9/2024. FINDINGS: Mild haziness within the left lung base likely attributed to atelectasis. No appreciable pneumothorax or pleural effusion. Cardiomediastinal silhouette cardiomediastinal silhouette is stable. Moderate to large hiatal hernia is again noted. Bones are unremarkable for age. Normal upper abdomen.     Impression: Mild hazy opacity within the left lung base which is favored to be attributable to atelectasis. Developing pneumonia may demonstrate a similar appearance in appropriate clinical setting. Workstation performed: [id number]       LABS  Results from last 7 days   Lab Units 12/20/24 0621 12/19/24 0316 12/18/24 0259 12/17/24 1717 12/16/24  0922   WBC Thousand/uL 9.03 9.52 8.70 8.44  --    HEMOGLOBIN g/dL 11.2* 11.3* 11.9* 11.9*  --    HEMATOCRIT % 38.6 39.1 41.8 41.0  --    MCV fL 78* 78* 78* 78*  --    PLATELETS Thousands/uL 253 290 304 290  --    INR  3.56* 2.27* 2.62* 2.91* 2.65*     Results from last 7 days   Lab Units 12/20/24 0621 12/19/24 0316 12/18/24 0259 12/17/24  1717   SODIUM mmol/L 136 139 140 138   POTASSIUM mmol/L 3.6 3.5 3.8 4.8   CHLORIDE mmol/L 107 109* 109* 107   CO2 mmol/L 23 23 23 23   BUN mg/dL 30* 25 20 23   CREATININE mg/dL 1.39* 1.93* 1.18 1.15   CALCIUM mg/dL 8.2* 8.3* 8.4 8.4   ALBUMIN g/dL 3.6 3.6  --  3.9   TOTAL BILIRUBIN mg/dL 0.53 0.47  --  0.40   ALK PHOS U/L 61 56  --  62   ALT U/L 19 21  --  30   AST U/L 15 17  --  34   EGFR ml/min/1.73sq m 53 36 65 67   GLUCOSE RANDOM mg/dL 102 111 116 110     Results from last 7 days   Lab Units 12/17/24  2216 12/17/24  1954 12/17/24  1717   HS TNI 0HR ng/L  --   --  17   HS TNI 2HR ng/L  --  21  --    HS TNI 4HR ng/L 23  --   --               Results from last 7 days   Lab Units 12/18/24  1112   POC GLUCOSE mg/dl 128     Results from last 7 days   Lab Units 12/17/24  1717   HEMOGLOBIN A1C %  "5.4             Results from last 7 days   Lab Units 12/18/24  0259   TRIGLYCERIDES mg/dL 105   CHOLESTEROL mg/dL 107   LDL CALC mg/dL 54   HDL mg/dL 32*       Cultures:         Invalid input(s): \"URIBILINOGEN\"                Condition at Discharge:  good      Discharge instructions/Information to patient and family:   See after visit summary for information provided to patient and family.  The above hospital course, results, incidental findings, need for follow up was discussed in detail with the patient and/or family.    Provisions for Follow-Up Care:  See after visit summary for information related to follow-up care and any pertinent home health orders.      Disposition:     Home       Discharge Statement:  I spent 37 minutes discharging the patient. This time was spent on the day of discharge. I had direct contact with the patient on the day of discharge. Greater than 50% of the total time was spent examining patient, answering all patient questions, arranging and discussing plan of care with patient as well as directly providing post-discharge instructions.  Additional time then spent on discharge activities.    Discharge Medications:  See after visit summary for reconciled discharge medications provided to patient and family.      ** Please Note: This note has been constructed using a voice recognition system **    "

## 2024-12-26 ENCOUNTER — TELEPHONE (OUTPATIENT)
Dept: GASTROENTEROLOGY | Facility: CLINIC | Age: 64
End: 2024-12-26

## 2024-12-26 NOTE — TELEPHONE ENCOUNTER
----- Message from NEHEMIAH Kaufman sent at 12/2/2024  1:40 PM EST -----  This patient may be discharged from [facility] today but needs outpatient follow-up at the [facility] for iron deficiency anemia, cirrhosis and hepatitis B.  Can you please call the patient tomorrow and schedule appointment-he does not have his cell phone

## 2025-01-02 ENCOUNTER — OFFICE VISIT (OUTPATIENT)
Dept: GASTROENTEROLOGY | Facility: CLINIC | Age: 65
End: 2025-01-02
Payer: COMMERCIAL

## 2025-01-02 ENCOUNTER — ANTICOAG VISIT (OUTPATIENT)
Dept: CARDIOLOGY CLINIC | Facility: CLINIC | Age: 65
End: 2025-01-02

## 2025-01-02 VITALS
WEIGHT: 201 LBS | BODY MASS INDEX: 30.46 KG/M2 | SYSTOLIC BLOOD PRESSURE: 153 MMHG | DIASTOLIC BLOOD PRESSURE: 96 MMHG | HEIGHT: 68 IN

## 2025-01-02 DIAGNOSIS — D64.9 ANEMIA, UNSPECIFIED TYPE: ICD-10-CM

## 2025-01-02 DIAGNOSIS — I51.3 LEFT VENTRICULAR APICAL THROMBUS: Primary | ICD-10-CM

## 2025-01-02 DIAGNOSIS — E61.1 IRON DEFICIENCY: Primary | ICD-10-CM

## 2025-01-02 DIAGNOSIS — Z86.0100 HISTORY OF COLON POLYPS: ICD-10-CM

## 2025-01-02 DIAGNOSIS — Z79.01 LONG TERM (CURRENT) USE OF ANTICOAGULANTS: ICD-10-CM

## 2025-01-02 DIAGNOSIS — Z87.19 HX OF CIRRHOSIS: ICD-10-CM

## 2025-01-02 DIAGNOSIS — Z86.19 HX OF HEPATITIS C: ICD-10-CM

## 2025-01-02 DIAGNOSIS — Z87.11 HX OF GASTRIC ULCER: ICD-10-CM

## 2025-01-02 LAB — INR PPP: 4.5 (ref 0.85–1.19)

## 2025-01-02 PROCEDURE — 99204 OFFICE O/P NEW MOD 45 MIN: CPT | Performed by: NURSE PRACTITIONER

## 2025-01-02 NOTE — PATIENT INSTRUCTIONS
Please have hepatitis panel, hepatitis HCV/PCR and ultrasound completed at this time      The lab work for CBC, CMP and INR can be completed 1 to 2 weeks prior to follow-up office visit that has been scheduled.      Continue take pantoprazole 40 mg twice daily.  Continue aching iron oral supplement daily.

## 2025-01-02 NOTE — PROGRESS NOTES
Received INR 4.5, when reviewing chart, patient was hospitalized earlier this month, DC 12/2024 on Coumadin 5 mg daily.   Spoke with Bri at [facility], she did verify that patient was getting 5 mg daily.   Advised will hold tonight, 2.5 mg tomorrow, then go back to 2.5 mg Sun Wed, 5 mg all other days, will recheck next week 1/7/25    Physician order faxed to [facility] and [facility].

## 2025-01-03 ENCOUNTER — TELEPHONE (OUTPATIENT)
Age: 65
End: 2025-01-03

## 2025-01-03 NOTE — TELEPHONE ENCOUNTER
Received call from Sonam from \[facility]\"" labs asking for a [facility] auth number for the INR that was drawn on 12/31. Called Coumadin clinic in [place] for further assistance and s/w Dor[place]a who stated to have Sonam call his assisted living facility in [place] for further info. She voiced understanding. No further questions.

## 2025-01-05 NOTE — PROGRESS NOTES
Name: Chuy Norton      : 1960      MRN: [id number]  Encounter Provider: NEHEMIAH Howell  Encounter Date: 2025   Encounter department: [facility]  :  Assessment & Plan  Anemia, unspecified type  Patient with history of cirrhosis of the liver and most recently gastric ulcer possibly due to increased NSAID use.  Most recent hemoglobin 11.2, platelets 253.  Continue to monitor hemoglobin and monitor stool for melena.    Orders:    CBC and differential; Future    Iron deficiency  Most recent iron panel; iron 11, iron sat 2, TIBC 441, ferritin 6.  Patient currently taking daily p.o. iron supplementation.  Continue ferrous sulfate 325 mg daily.  Will follow-up with lab work and if necessary may consider consulting Hematology.  Monitor iron panel with follow-up office visit.         Hx of gastric ulcer  Gastric ulcer noted on EGD 5/10/2024; hiatal hernia with Poli lesion, gastric ulcer.  Patient did finally have follow-up EGD 2024 which noted medium hiatal hernia with Poli lesion erosion, negative bleeding, healed ulcer.  Patient is currently taking pantoprazole 40 mg p.o. twice daily.  Recommended continued use and follow-up as planned.  Recommended no NSAID use.       Hx of cirrhosis  Patient with history of cirrhosis of the liver due to history of alcohol and drug addiction.    CT C/A/P 2024 noted nodular hepatic contours may indicate cirrhosis of the liver.  History of thrombocytopenia however most recent platelet count 253.  MELD 3.0 score 22 with labs from 2024.  Monitor MELD score and follow-up planned.  If patient's MELD scores continue to increase likely recommend follow-up with [facility].  Orders:    US right upper quadrant; Future    Comprehensive metabolic panel    Protime-INR; Future    Hx of hepatitis C  Patient with history of hepatitis C antibody and stated treatment with Mavyret.  Initially unable to find in records  but EMR noted last viral load 5/2024 was negative.  Recheck patient's hepatitis panel and hepatitis C RNA/PCR for thoroughness.  Orders:    US right upper quadrant; Future    Hepatitis panel, acute; Future    Hepatitis C RNA, Quantitative PCR; Future    History of colon polyps  Colonoscopy 12/2024 moderate left and right diverticulosis, 3 mm polyp, small hemorrhoid, fair prep, 5-year recall.       Long term (current) use of anticoagulants  Currently on Coumadin due to history of left ventricular apical thrombus.  Review of patient's chart; INR 1.15 on 12/2/2024.  Most recent INR 3.56.  Patient does see cardiology for anticoagulation therapy.         Recommended follow-up 3 months, sooner if needed pending lab work.    History of Present Illness   HPI  Chuy Norton is a 64 y.o. male with past medical history of EtOH cirrhosis, hepatitis C, drug addiction, anemia, iron deficiency, chronic constipation, currently on Coumadin due to history of left ventricular apical thrombus and history of gastric ulcer presents for follow-up hospitalization at [facility] 12/17/2024.  Patient is a resident at [facility].  On exam, patient denies any nausea, vomiting or abdominal pain.  He denies any acid reflux symptoms at this time.  He is taking PPI every 12 hours.  Patient denies any difficulty swallowing.  States he is having daily normal bowel movements with current bowel regiment.  Denies hematochezia or melena.  Admits to half a pack a day smoker, denies EtOH use.  States his weight is stable.  Recent hospitalization on 12/17 was for strokelike symptoms.  Patient was having left-sided weakness and left facial numbness at presentation to the emergency room.  Most recent lab work reviewed reflects normal liver enzymes, INR 3.56, creatinine 1.39, sodium 136, hemoglobin 11.2, platelets 253, recent iron panel; iron 11, iron sat 2, TIBC 441, Ferritin 6.  Patient's most recent EGD completed on  "12/1/2024 noted medium hiatal hernia with Poli lesion erosion without bleeding.  Previous EGD 5/10/2024 noted hiatal hernia with Poli lesion, gastric ulcer.  Patient was placed on PPI every 12 hours with follow-up EGD recommended.            Review of Systems   All other systems reviewed and are negative.         Objective   /96   Ht 5' 8\" (1.727 m)   Wt 91.2 kg (201 lb)   BMI 30.56 kg/m²      Physical Exam  Constitutional:       Appearance: Normal appearance.   HENT:      Head: Normocephalic.   Eyes:      Extraocular Movements: Extraocular movements intact.   Cardiovascular:      Rate and Rhythm: Normal rate.   Pulmonary:      Effort: Pulmonary effort is normal.   Abdominal:      General: Abdomen is flat. Bowel sounds are normal. There is no distension.      Palpations: Abdomen is soft.      Tenderness: There is no abdominal tenderness.   Musculoskeletal:         General: Normal range of motion.      Cervical back: Normal range of motion.   Skin:     General: Skin is warm and dry.      Coloration: Skin is not jaundiced.      Findings: No rash.   Neurological:      Mental Status: He is alert and oriented to person, place, and time.   Psychiatric:         Mood and Affect: Mood normal.           "

## 2025-01-08 ENCOUNTER — ANTICOAG VISIT (OUTPATIENT)
Dept: CARDIOLOGY CLINIC | Facility: CLINIC | Age: 65
End: 2025-01-08

## 2025-01-08 DIAGNOSIS — I51.3 LEFT VENTRICULAR APICAL THROMBUS: Primary | ICD-10-CM

## 2025-01-08 LAB — INR PPP: 3.2 (ref 0.85–1.19)

## 2025-01-08 NOTE — PROGRESS NOTES
Spoke with Bri, advised INR still a little elevated. Advised will have patient take 2.5 mg Sun Wed Fri, 5 mg all other days, will recheck next week 1/14/25    Physician order faxed to [facility] and [facility].

## 2025-01-09 PROBLEM — A41.9 SEPSIS (HCC): Status: RESOLVED | Noted: 2024-12-08 | Resolved: 2025-01-09

## 2025-01-15 ENCOUNTER — ANTICOAG VISIT (OUTPATIENT)
Dept: CARDIOLOGY CLINIC | Facility: CLINIC | Age: 65
End: 2025-01-15

## 2025-01-15 DIAGNOSIS — I51.3 LEFT VENTRICULAR APICAL THROMBUS: Primary | ICD-10-CM

## 2025-01-15 LAB — INR PPP: 2.7 (ref 0.85–1.19)

## 2025-01-15 NOTE — PROGRESS NOTES
Faxed to [facility]....The current medical regimen is effective;  continue present plan and and recheck INR in 2 weeks.

## 2025-01-16 ENCOUNTER — DOCUMENTATION (OUTPATIENT)
Dept: CARDIOLOGY CLINIC | Facility: CLINIC | Age: 65
End: 2025-01-16

## 2025-01-29 ENCOUNTER — ANTICOAG VISIT (OUTPATIENT)
Dept: CARDIOLOGY CLINIC | Facility: CLINIC | Age: 65
End: 2025-01-29

## 2025-01-29 DIAGNOSIS — I51.3 LEFT VENTRICULAR APICAL THROMBUS: Primary | ICD-10-CM

## 2025-01-29 LAB — INR PPP: 3.9 (ref 0.85–1.19)

## 2025-01-29 NOTE — PROGRESS NOTES
Received call from Bri at [facility], calling with INR 3.9, no changes.  Advised to hold tonight, 2.5 mg tomorrow, then 5 mg Mon Thurs Sat, 2.5 mg all other days, will recheck in 2 weeks 2/11/25  Physician order faxed to [facility]

## 2025-02-06 ENCOUNTER — TELEPHONE (OUTPATIENT)
Age: 65
End: 2025-02-06

## 2025-02-06 NOTE — TELEPHONE ENCOUNTER
Reason for Conversation: Received a call from Mari with [facility] stating pt BP's have been elevated recently. Pt remains asymptomatic but BP today 163/107   Mari# [phone]  Pt in hospital 12/17/24 for HTN, stroke like symptoms and d/c on lisinopril PRN.   Mari will send along a log of recent BP's, and an updated med list    VS/Weight: (Note: Please include date/time vitals/weight were measured)  163/107    Pain: No    Risk Factors: Hypertension    Recent relevant testing and date of testing: Echo 12/18/24, stress 8/26/24    Medication: lisiniiopril 20 mg, warfarin, lipitor 40 mg    Upcoming Office Visit: Yes, tomorrow    Last Office Visit: 7/2024 w/ Dr Davalos

## 2025-02-07 ENCOUNTER — OFFICE VISIT (OUTPATIENT)
Dept: CARDIOLOGY CLINIC | Facility: CLINIC | Age: 65
End: 2025-02-07
Payer: COMMERCIAL

## 2025-02-07 VITALS
SYSTOLIC BLOOD PRESSURE: 118 MMHG | WEIGHT: 211.4 LBS | HEART RATE: 65 BPM | DIASTOLIC BLOOD PRESSURE: 78 MMHG | HEIGHT: 68 IN | BODY MASS INDEX: 32.04 KG/M2

## 2025-02-07 DIAGNOSIS — I51.3 LEFT VENTRICULAR APICAL THROMBUS: Primary | ICD-10-CM

## 2025-02-07 DIAGNOSIS — I10 PRIMARY HYPERTENSION: ICD-10-CM

## 2025-02-07 DIAGNOSIS — I10 HYPERTENSION: ICD-10-CM

## 2025-02-07 PROCEDURE — 99214 OFFICE O/P EST MOD 30 MIN: CPT | Performed by: INTERNAL MEDICINE

## 2025-02-07 RX ORDER — MELOXICAM 15 MG/1
TABLET ORAL
COMMUNITY
Start: 2025-01-09

## 2025-02-07 RX ORDER — LISINOPRIL 40 MG/1
40 TABLET ORAL DAILY
Qty: 90 TABLET | Refills: 0 | Status: SHIPPED | OUTPATIENT
Start: 2025-02-07

## 2025-02-07 NOTE — PROGRESS NOTES
Cardiology Outpatient Follow-Up Note - Chuy Norton 64 y.o. male MRN: [id number]      Assessment/Plan:    1. Left ventricular apical thrombus (Primary)  Continue warfarin    2. Primary hypertension  Increase lisinopril to 40 mg daily  Do not schedule anti-HTN meds PRN as this is an ineffective way to use these meds.         We will see Chuy Norton back as scheduled.     Subjective:     HPI: Chuy Norton is a 64 y.o. year old male with LV thrombus on warfarin, HTN normally seen by Dr. Davalos presenting today for an urgent visit due to blood pressure of 163/107 yesterday at his assisted living facility.    Received call from [facility] yesterday that patient had asymptomatic BP of 163/107 and urgent visit was requested with first available provider.    He is given lisinopril 20 mg daily on a PRN basis, however lately has been adminstered daily.       Cardiac Testing:    Echo 12/18/24    Interpretation Summary  Show Result Comparison     Left Ventricle: Left ventricular cavity size is normal. Wall thickness is increased. The left ventricular ejection fraction is 45%. Systolic function is mildly reduced. There is a moderately-sized, mural, solid, fixed thrombus at the apex.    The following segments are akinetic: apical septal, apical lateral and apex.    All other segments are normal.    Mitral Valve: There is mild annular calcification.    Prior TTE study available for comparison. Prior study date: 12/2/2024. No significant changes noted compared to the prior study.     Limited study for LV function and known history of LV apical thrombus. No significant changes in these compared to the last 2 studies.           NM SPECT MPI 8/26/24  Interpretation Summary  Show Result Comparison     Stress ECG: A pharmacological stress test was performed using regadenoson. The patient experienced no angina during the test. The patient reached the end of the protocol. The patient reported dyspnea during the stress test. Symptoms  "ended during recovery.    Stress ECG: No ST deviation is noted. The stress ECG is negative for ischemia after pharmacologic vasodilation.    Perfusion: There is a left ventricular perfusion defect that is large in size with severe reduction in uptake present in the apical anterior, inferior, septal and apex location(s) that is fixed. There is a left ventricular perfusion defect that is small in size with severe reduction in uptake present in the mid inferior location(s) that is partially reversible.    Stress Function: Left ventricular function post-stress is normal. Stress ejection fraction is 55%. There is a defect in the apical anterior, inferior and septal location(s), and apex. The defect is akinetic.    Stress Combined Conclusion: The ECG and SPECT imaging portions of the stress study are concordant with no evidence of stress induced myocardial ischemia. Left ventricular perfusion is abnormal. There is hazel-infarct ischemia. Findings are consistent with infarction.     Abnormal NM SPECT MPI  There is transmural infarct of the distal LAD territory with mild hazel-infarct ischemia. Perfusion defect does involve both mid and distal inferior wall which may suggest large LAD that wraps the apex or additional involvement of the RCA.   Gated SPECT LVEF is 55% with akinesis of the above territory.             EKGs, personally reviewed:  N/a    Relevant Labs & Results:        ROS:  Review of Systems:  Review of Systems    Objective:     Vitals:   Vitals:    02/07/25 0931   BP: 118/78   BP Location: Left arm   Patient Position: Sitting   Cuff Size: Standard   Pulse: 65   Weight: 95.9 kg (211 lb 6.4 oz)   Height: 5' 8\" (1.727 m)    Body surface area is 2.09 meters squared.  Wt Readings from Last 3 Encounters:   02/07/25 95.9 kg (211 lb 6.4 oz)   01/02/25 91.2 kg (201 lb)   12/18/24 93.2 kg (205 lb 7.5 oz)       Physical Exam:  General: Chuy Norton is a well appearing male, in no acute distress, sitting " comfortably  HEENT: moist mucous membranes, EOMI  Neck:  No JVD, supple, trachea midline  Cardiovascular: unremarkable S1/S2, regular rate and rhythm, no murmurs, rubs or gallops  Pulmonary: normal respiratory effort, CTAB  Abdomen: soft and nondistended  Extremities: No lower extremity edema. Warm and well perfused extremities.  Neuro: deferred  Psych: deferred      Medications (at the START of this encounter):  Outpatient Medications Prior to Visit   Medication Sig Dispense Refill    acetaminophen (TYLENOL) 650 mg CR tablet Take 1 tablet (650 mg total) by mouth every 8 (eight) hours as needed for mild pain 30 tablet 0    atorvastatin (LIPITOR) 40 mg tablet Take 1 tablet (40 mg total) by mouth daily with dinner 30 tablet 0    benzonatate (TESSALON) 200 MG capsule Take 1 capsule (200 mg total) by mouth 3 (three) times a day as needed for cough 20 capsule 0    escitalopram (LEXAPRO) 20 mg tablet Take 1 tablet (20 mg total) by mouth daily 21 tablet 0    ferrous sulfate 325 (65 Fe) mg tablet Take 1 tablet (325 mg total) by mouth daily with breakfast Do not start before December 7, 2024. 30 tablet 0    guaiFENesin (ROBITUSSIN) 100 MG/5ML oral liquid Take 10 mL (200 mg total) by mouth 3 (three) times a day as needed for cough 120 mL 0    lisinopril (ZESTRIL) 20 mg tablet Take 1 tablet (20 mg total) by mouth daily as needed (sbp > 140) 30 tablet 0    meloxicam (MOBIC) 15 mg tablet       nicotine polacrilex (NICORETTE) 2 mg gum Chew 1 each (2 mg total) every 3 (three) hours as needed for smoking cessation 100 each 0    pantoprazole (PROTONIX) 40 mg tablet Take 1 tablet (40 mg total) by mouth 2 (two) times a day before lunch and dinner Do not start before December 7, 2024. 30 tablet 0    polyethylene glycol (MIRALAX) 17 g packet Take 17 g by mouth daily as needed (constipation) 30 each 0    QUEtiapine (SEROquel) 300 mg tablet Take 1 tablet (300 mg total) by mouth daily at bedtime 30 tablet 0    traZODone (DESYREL) 100 mg  "tablet Take 1 tablet (100 mg total) by mouth daily at bedtime 21 tablet 0    warfarin (COUMADIN) 5 mg tablet Take 1 tablet (5 mg total) by mouth daily Do not start before December 21, 2024.      diclofenac sodium (Voltaren) 1 % Apply 2 g topically 4 (four) times a day (Patient not taking: Reported on 1/2/2025) 1 Tube 0     No facility-administered medications prior to visit.             This note was completed in part utilizing Dragon [employer] One voice recognition software. Grammatical errors, random word insertion, spelling mistakes, occasional wrong word or \"sound-alike\" substitutions and incomplete sentences may be an occasional consequence of the system secondary to software limitations, ambient noise and hardware issues. At the time of dictation, efforts were made to edit, clarify and /or correct errors.  Please read the chart carefully and recognize, using context, where substitutions have occurred.  If you have any questions or concerns about the context, text or information contained within the body of this dictation, please contact myself, the provider, for further clarification.    "

## 2025-02-12 ENCOUNTER — ANTICOAG VISIT (OUTPATIENT)
Dept: CARDIOLOGY CLINIC | Facility: CLINIC | Age: 65
End: 2025-02-12

## 2025-02-12 DIAGNOSIS — I51.3 LEFT VENTRICULAR APICAL THROMBUS: Primary | ICD-10-CM

## 2025-02-12 LAB — INR PPP: 2.8 (ref 0.85–1.19)

## 2025-02-12 NOTE — PROGRESS NOTES
Spoke with Bri for INR 2.8    Physician order faxed to [facility]  Continue 5 mg Mon Thurs Sat, 2.5 mg all other days, will recheck in 2 weeks 2/25/25

## 2025-02-20 ENCOUNTER — NURSE TRIAGE (OUTPATIENT)
Age: 65
End: 2025-02-20

## 2025-02-20 NOTE — TELEPHONE ENCOUNTER
"Reason for Conversation: Patient was seen on 2/7 with elevated BP at [facility] facility.  Lisinopril was changed to 40 mg daily.    Received a call today from Mari at [facility] who reports BP is still elevated there.  No change in BP since change in Lisinopril dose.  Today BP is 153/100.  Has been as high as 179/100 with lowest 145/86 since the office visit.      Mari is faxing a BP report to the [place] office now.    VS/Weight: BP ranging 145-170 / .  HR .    Pain: No    Risk Factors: Hypertension, Left ventricular apical thrombus    Recent relevant testing and date of testing  Echo 12/18/24, stress 8/26/24       Medication: Lisinopril 40 mg daily, Warfarin    Upcoming Office Visit: July, with Dr Davalos    Last Office Visit: 2/7/25 seen by Dr Membreno     Answer Assessment - Initial Assessment Questions  1. BLOOD PRESSURE: \"What is your blood pressure?\" \"Did you take at least two measurements 5 minutes apart?\"      BP at the [facility] facility remains elevated after ov on 2/7  2. ONSET: \"When did you take your blood pressure?\"      Taken at facility    4. HISTORY: \"Do you have a history of high blood pressure?\"      Yes  5. MEDICINES: \"Are you taking any medicines for blood pressure?\" \"Have you missed any doses recently?\"      Lisinopril 40 mg daily  6. OTHER SYMPTOMS: \"Do you have any symptoms?\" (e.g., blurred vision, chest pain, difficulty breathing, headache, weakness)      No    Protocols used: Blood Pressure - High-Adult-OH    "

## 2025-02-20 NOTE — TELEPHONE ENCOUNTER
[facility], message was relayed as given, provided a cb # and waiting for a cb to confirm receipt and to know where to send new Rx to

## 2025-02-21 DIAGNOSIS — I10 HYPERTENSION, UNSPECIFIED TYPE: ICD-10-CM

## 2025-02-21 DIAGNOSIS — I10 PRIMARY HYPERTENSION: Primary | ICD-10-CM

## 2025-02-21 RX ORDER — CARVEDILOL 12.5 MG/1
12.5 TABLET ORAL 2 TIMES DAILY WITH MEALS
Qty: 180 TABLET | Refills: 1 | Status: SHIPPED | OUTPATIENT
Start: 2025-02-21

## 2025-02-21 NOTE — TELEPHONE ENCOUNTER
Omari Davalos MD  You20 hours ago (3:42 PM)       Lets add beta-blocker to his regimen.  Lets add carvedilol 12.5 mg twice daily.  Thank you.       Carvedilol 12.5 mg BID to [facility]

## 2025-02-21 NOTE — TELEPHONE ENCOUNTER
Called left  for med room, message was relayed as given. Attempted to speak with staff immediately and was unable to reach anyone, new vitae to call back.

## 2025-03-03 ENCOUNTER — ANTICOAG VISIT (OUTPATIENT)
Dept: CARDIOLOGY CLINIC | Facility: CLINIC | Age: 65
End: 2025-03-03

## 2025-03-03 DIAGNOSIS — I51.3 LEFT VENTRICULAR APICAL THROMBUS: Primary | ICD-10-CM

## 2025-03-03 LAB — INR PPP: 2.7 (ref 0.85–1.19)

## 2025-03-03 NOTE — PROGRESS NOTES
Received INR results 3/1/25 that were drawn 2/25/25    INR good, will continue 5 mg Mon Thurs Sat, 2.5 mg all other days, will recheck in 3 weeks 3/18/25    Left message for Bri with instructions, advised to call with questions or concerns.     Physician order faxed to [facility] pharmacy

## 2025-03-05 ENCOUNTER — HOSPITAL ENCOUNTER (INPATIENT)
Facility: HOSPITAL | Age: 65
LOS: 17 days | Discharge: NURSING, REHAB OR LONG-TERM CARE | DRG: 871 | End: 2025-03-22
Attending: EMERGENCY MEDICINE | Admitting: INTERNAL MEDICINE
Payer: COMMERCIAL

## 2025-03-05 ENCOUNTER — APPOINTMENT (EMERGENCY)
Dept: CT IMAGING | Facility: HOSPITAL | Age: 65
DRG: 871 | End: 2025-03-05
Payer: COMMERCIAL

## 2025-03-05 ENCOUNTER — APPOINTMENT (OUTPATIENT)
Dept: RADIOLOGY | Facility: HOSPITAL | Age: 65
DRG: 871 | End: 2025-03-05
Payer: COMMERCIAL

## 2025-03-05 DIAGNOSIS — M54.50 ACUTE LEFT-SIDED LOW BACK PAIN WITHOUT SCIATICA: ICD-10-CM

## 2025-03-05 DIAGNOSIS — J90 PLEURAL EFFUSION: ICD-10-CM

## 2025-03-05 DIAGNOSIS — J18.9 PNEUMONIA: ICD-10-CM

## 2025-03-05 DIAGNOSIS — Z00.8 ENCOUNTER FOR ASSESSMENT OF DECISION-MAKING CAPACITY: ICD-10-CM

## 2025-03-05 DIAGNOSIS — R09.02 HYPOXIA: ICD-10-CM

## 2025-03-05 DIAGNOSIS — R93.89 ABNORMAL CT SCAN: ICD-10-CM

## 2025-03-05 DIAGNOSIS — I51.3 LV (LEFT VENTRICULAR) MURAL THROMBUS: ICD-10-CM

## 2025-03-05 DIAGNOSIS — J18.9 MULTIFOCAL PNEUMONIA: Primary | ICD-10-CM

## 2025-03-05 PROBLEM — R65.20 SEVERE SEPSIS (HCC): Status: ACTIVE | Noted: 2024-12-08

## 2025-03-05 LAB
2HR DELTA HS TROPONIN: -2 NG/L
4HR DELTA HS TROPONIN: -3 NG/L
ALBUMIN SERPL BCG-MCNC: 4.4 G/DL (ref 3.5–5)
ALP SERPL-CCNC: 55 U/L (ref 34–104)
ALT SERPL W P-5'-P-CCNC: 13 U/L (ref 7–52)
ANION GAP SERPL CALCULATED.3IONS-SCNC: 10 MMOL/L (ref 4–13)
APTT PPP: 46 SECONDS (ref 23–34)
APTT PPP: 50 SECONDS (ref 23–34)
AST SERPL W P-5'-P-CCNC: 19 U/L (ref 13–39)
ATRIAL RATE: 95 BPM
BASE EXCESS BLDA CALC-SCNC: -2 MMOL/L (ref -2–3)
BASOPHILS # BLD AUTO: 0.03 THOUSANDS/ÂΜL (ref 0–0.1)
BASOPHILS NFR BLD AUTO: 0 % (ref 0–1)
BILIRUB SERPL-MCNC: 0.78 MG/DL (ref 0.2–1)
BNP SERPL-MCNC: 381 PG/ML (ref 0–100)
BUN SERPL-MCNC: 23 MG/DL (ref 5–25)
CA-I BLD-SCNC: 1.13 MMOL/L (ref 1.12–1.32)
CALCIUM SERPL-MCNC: 8.7 MG/DL (ref 8.4–10.2)
CARDIAC TROPONIN I PNL SERPL HS: 16 NG/L (ref ?–50)
CARDIAC TROPONIN I PNL SERPL HS: 17 NG/L (ref ?–50)
CARDIAC TROPONIN I PNL SERPL HS: 19 NG/L (ref ?–50)
CHLORIDE SERPL-SCNC: 105 MMOL/L (ref 96–108)
CO2 SERPL-SCNC: 22 MMOL/L (ref 21–32)
CREAT SERPL-MCNC: 1.52 MG/DL (ref 0.6–1.3)
EOSINOPHIL # BLD AUTO: 0 THOUSAND/ÂΜL (ref 0–0.61)
EOSINOPHIL NFR BLD AUTO: 0 % (ref 0–6)
ERYTHROCYTE [DISTWIDTH] IN BLOOD BY AUTOMATED COUNT: 21 % (ref 11.6–15.1)
ERYTHROCYTE [DISTWIDTH] IN BLOOD BY AUTOMATED COUNT: 21.2 % (ref 11.6–15.1)
FLUAV RNA RESP QL NAA+PROBE: NEGATIVE
FLUBV RNA RESP QL NAA+PROBE: NEGATIVE
GFR SERPL CREATININE-BSD FRML MDRD: 48 ML/MIN/1.73SQ M
GLUCOSE SERPL-MCNC: 123 MG/DL (ref 65–140)
GLUCOSE SERPL-MCNC: 126 MG/DL (ref 65–140)
HCO3 BLDA-SCNC: 22 MMOL/L (ref 24–30)
HCT VFR BLD AUTO: 40.8 % (ref 36.5–49.3)
HCT VFR BLD AUTO: 43.5 % (ref 36.5–49.3)
HCT VFR BLD CALC: 44 % (ref 36.5–49.3)
HGB BLD-MCNC: 13.1 G/DL (ref 12–17)
HGB BLD-MCNC: 14.1 G/DL (ref 12–17)
HGB BLDA-MCNC: 15 G/DL (ref 12–17)
IMM GRANULOCYTES # BLD AUTO: 0.18 THOUSAND/UL (ref 0–0.2)
IMM GRANULOCYTES NFR BLD AUTO: 1 % (ref 0–2)
INR PPP: 2.68 (ref 0.85–1.19)
INR PPP: 2.76 (ref 0.85–1.19)
LACTATE SERPL-SCNC: 1.8 MMOL/L (ref 0.5–2)
LYMPHOCYTES # BLD AUTO: 0.85 THOUSANDS/ÂΜL (ref 0.6–4.47)
LYMPHOCYTES NFR BLD AUTO: 6 % (ref 14–44)
MCH RBC QN AUTO: 28.8 PG (ref 26.8–34.3)
MCH RBC QN AUTO: 28.9 PG (ref 26.8–34.3)
MCHC RBC AUTO-ENTMCNC: 32.1 G/DL (ref 31.4–37.4)
MCHC RBC AUTO-ENTMCNC: 32.4 G/DL (ref 31.4–37.4)
MCV RBC AUTO: 89 FL (ref 82–98)
MCV RBC AUTO: 90 FL (ref 82–98)
MONOCYTES # BLD AUTO: 1.49 THOUSAND/ÂΜL (ref 0.17–1.22)
MONOCYTES NFR BLD AUTO: 10 % (ref 4–12)
NEUTROPHILS # BLD AUTO: 12.2 THOUSANDS/ÂΜL (ref 1.85–7.62)
NEUTS SEG NFR BLD AUTO: 83 % (ref 43–75)
NRBC BLD AUTO-RTO: 0 /100 WBCS
P AXIS: 48 DEGREES
PCO2 BLD: 23 MMOL/L (ref 21–32)
PCO2 BLD: 36.5 MM HG (ref 42–50)
PH BLD: 7.39 [PH] (ref 7.3–7.4)
PLATELET # BLD AUTO: 108 THOUSANDS/UL (ref 149–390)
PLATELET # BLD AUTO: 96 THOUSANDS/UL (ref 149–390)
PLATELET BLD QL SMEAR: ABNORMAL
PO2 BLD: 35 MM HG (ref 35–45)
POTASSIUM BLD-SCNC: 4.3 MMOL/L (ref 3.5–5.3)
POTASSIUM SERPL-SCNC: 4.1 MMOL/L (ref 3.5–5.3)
PR INTERVAL: 210 MS
PROCALCITONIN SERPL-MCNC: 0.94 NG/ML
PROT SERPL-MCNC: 7.1 G/DL (ref 6.4–8.4)
PROTHROMBIN TIME: 28.7 SECONDS (ref 12.3–15)
PROTHROMBIN TIME: 29.4 SECONDS (ref 12.3–15)
QRS AXIS: -21 DEGREES
QRSD INTERVAL: 100 MS
QT INTERVAL: 320 MS
QTC INTERVAL: 403 MS
RBC # BLD AUTO: 4.55 MILLION/UL (ref 3.88–5.62)
RBC # BLD AUTO: 4.88 MILLION/UL (ref 3.88–5.62)
RBC MORPH BLD: NORMAL
RSV RNA RESP QL NAA+PROBE: NEGATIVE
SAO2 % BLD FROM PO2: 67 % (ref 60–85)
SARS-COV-2 RNA RESP QL NAA+PROBE: NEGATIVE
SODIUM BLD-SCNC: 139 MMOL/L (ref 136–145)
SODIUM SERPL-SCNC: 137 MMOL/L (ref 135–147)
SPECIMEN SOURCE: ABNORMAL
T WAVE AXIS: 109 DEGREES
VENTRICULAR RATE: 95 BPM
WBC # BLD AUTO: 12.48 THOUSAND/UL (ref 4.31–10.16)
WBC # BLD AUTO: 14.75 THOUSAND/UL (ref 4.31–10.16)

## 2025-03-05 PROCEDURE — 84132 ASSAY OF SERUM POTASSIUM: CPT

## 2025-03-05 PROCEDURE — 84295 ASSAY OF SERUM SODIUM: CPT

## 2025-03-05 PROCEDURE — 96375 TX/PRO/DX INJ NEW DRUG ADDON: CPT

## 2025-03-05 PROCEDURE — 85014 HEMATOCRIT: CPT

## 2025-03-05 PROCEDURE — 84484 ASSAY OF TROPONIN QUANT: CPT

## 2025-03-05 PROCEDURE — 36415 COLL VENOUS BLD VENIPUNCTURE: CPT

## 2025-03-05 PROCEDURE — 99291 CRITICAL CARE FIRST HOUR: CPT | Performed by: PHYSICIAN ASSISTANT

## 2025-03-05 PROCEDURE — 84145 PROCALCITONIN (PCT): CPT | Performed by: PHYSICIAN ASSISTANT

## 2025-03-05 PROCEDURE — 93005 ELECTROCARDIOGRAM TRACING: CPT

## 2025-03-05 PROCEDURE — 87147 CULTURE TYPE IMMUNOLOGIC: CPT

## 2025-03-05 PROCEDURE — 96367 TX/PROPH/DG ADDL SEQ IV INF: CPT

## 2025-03-05 PROCEDURE — 85610 PROTHROMBIN TIME: CPT | Performed by: PHYSICIAN ASSISTANT

## 2025-03-05 PROCEDURE — 94760 N-INVAS EAR/PLS OXIMETRY 1: CPT

## 2025-03-05 PROCEDURE — 71046 X-RAY EXAM CHEST 2 VIEWS: CPT

## 2025-03-05 PROCEDURE — 94644 CONT INHLJ TX 1ST HOUR: CPT

## 2025-03-05 PROCEDURE — 96365 THER/PROPH/DIAG IV INF INIT: CPT

## 2025-03-05 PROCEDURE — 80053 COMPREHEN METABOLIC PANEL: CPT

## 2025-03-05 PROCEDURE — 93010 ELECTROCARDIOGRAM REPORT: CPT | Performed by: INTERNAL MEDICINE

## 2025-03-05 PROCEDURE — 83605 ASSAY OF LACTIC ACID: CPT | Performed by: PHYSICIAN ASSISTANT

## 2025-03-05 PROCEDURE — 99285 EMERGENCY DEPT VISIT HI MDM: CPT

## 2025-03-05 PROCEDURE — 82803 BLOOD GASES ANY COMBINATION: CPT

## 2025-03-05 PROCEDURE — 85025 COMPLETE CBC W/AUTO DIFF WBC: CPT

## 2025-03-05 PROCEDURE — 85610 PROTHROMBIN TIME: CPT

## 2025-03-05 PROCEDURE — 82330 ASSAY OF CALCIUM: CPT

## 2025-03-05 PROCEDURE — 0202U NFCT DS 22 TRGT SARS-COV-2: CPT

## 2025-03-05 PROCEDURE — 99223 1ST HOSP IP/OBS HIGH 75: CPT

## 2025-03-05 PROCEDURE — 85730 THROMBOPLASTIN TIME PARTIAL: CPT | Performed by: PHYSICIAN ASSISTANT

## 2025-03-05 PROCEDURE — 0241U HB NFCT DS VIR RESP RNA 4 TRGT: CPT | Performed by: PHYSICIAN ASSISTANT

## 2025-03-05 PROCEDURE — 87040 BLOOD CULTURE FOR BACTERIA: CPT | Performed by: PHYSICIAN ASSISTANT

## 2025-03-05 PROCEDURE — 83880 ASSAY OF NATRIURETIC PEPTIDE: CPT | Performed by: PHYSICIAN ASSISTANT

## 2025-03-05 PROCEDURE — 74177 CT ABD & PELVIS W/CONTRAST: CPT

## 2025-03-05 PROCEDURE — 87081 CULTURE SCREEN ONLY: CPT

## 2025-03-05 PROCEDURE — 94002 VENT MGMT INPAT INIT DAY: CPT

## 2025-03-05 PROCEDURE — 85027 COMPLETE CBC AUTOMATED: CPT

## 2025-03-05 PROCEDURE — 85730 THROMBOPLASTIN TIME PARTIAL: CPT

## 2025-03-05 PROCEDURE — 82947 ASSAY GLUCOSE BLOOD QUANT: CPT

## 2025-03-05 PROCEDURE — 71260 CT THORAX DX C+: CPT

## 2025-03-05 RX ORDER — NICOTINE 21 MG/24HR
1 PATCH, TRANSDERMAL 24 HOURS TRANSDERMAL DAILY
Status: DISCONTINUED | OUTPATIENT
Start: 2025-03-06 | End: 2025-03-22 | Stop reason: HOSPADM

## 2025-03-05 RX ORDER — SODIUM CHLORIDE FOR INHALATION 0.9 %
12 VIAL, NEBULIZER (ML) INHALATION ONCE
Status: COMPLETED | OUTPATIENT
Start: 2025-03-05 | End: 2025-03-05

## 2025-03-05 RX ORDER — ALBUTEROL SULFATE 5 MG/ML
10 SOLUTION RESPIRATORY (INHALATION) ONCE
Status: COMPLETED | OUTPATIENT
Start: 2025-03-05 | End: 2025-03-05

## 2025-03-05 RX ORDER — HEPARIN SODIUM 10000 [USP'U]/100ML
3-20 INJECTION, SOLUTION INTRAVENOUS
Status: DISCONTINUED | OUTPATIENT
Start: 2025-03-05 | End: 2025-03-06

## 2025-03-05 RX ORDER — MAGNESIUM SULFATE HEPTAHYDRATE 40 MG/ML
2 INJECTION, SOLUTION INTRAVENOUS ONCE
Status: COMPLETED | OUTPATIENT
Start: 2025-03-05 | End: 2025-03-05

## 2025-03-05 RX ORDER — CEFTRIAXONE 1 G/50ML
1000 INJECTION, SOLUTION INTRAVENOUS EVERY 24 HOURS
Status: DISCONTINUED | OUTPATIENT
Start: 2025-03-06 | End: 2025-03-05

## 2025-03-05 RX ORDER — CEFTRIAXONE 2 G/50ML
2000 INJECTION, SOLUTION INTRAVENOUS ONCE
Status: COMPLETED | OUTPATIENT
Start: 2025-03-05 | End: 2025-03-05

## 2025-03-05 RX ORDER — METHYLPREDNISOLONE SODIUM SUCCINATE 125 MG/2ML
125 INJECTION, POWDER, LYOPHILIZED, FOR SOLUTION INTRAMUSCULAR; INTRAVENOUS ONCE
Status: COMPLETED | OUTPATIENT
Start: 2025-03-05 | End: 2025-03-05

## 2025-03-05 RX ORDER — CHLORHEXIDINE GLUCONATE ORAL RINSE 1.2 MG/ML
15 SOLUTION DENTAL EVERY 12 HOURS SCHEDULED
Status: DISCONTINUED | OUTPATIENT
Start: 2025-03-05 | End: 2025-03-18

## 2025-03-05 RX ORDER — METRONIDAZOLE 500 MG/100ML
500 INJECTION, SOLUTION INTRAVENOUS EVERY 8 HOURS
Status: DISCONTINUED | OUTPATIENT
Start: 2025-03-05 | End: 2025-03-08

## 2025-03-05 RX ORDER — CEFTRIAXONE 1 G/50ML
1000 INJECTION, SOLUTION INTRAVENOUS EVERY 24 HOURS
Status: DISCONTINUED | OUTPATIENT
Start: 2025-03-06 | End: 2025-03-12

## 2025-03-05 RX ADMIN — AZITHROMYCIN MONOHYDRATE 500 MG: 500 INJECTION, POWDER, LYOPHILIZED, FOR SOLUTION INTRAVENOUS at 17:45

## 2025-03-05 RX ADMIN — IPRATROPIUM BROMIDE 1 MG: 0.5 SOLUTION RESPIRATORY (INHALATION) at 18:15

## 2025-03-05 RX ADMIN — ALBUTEROL SULFATE 10 MG: 2.5 SOLUTION RESPIRATORY (INHALATION) at 18:14

## 2025-03-05 RX ADMIN — CEFTRIAXONE 2000 MG: 2 INJECTION, SOLUTION INTRAVENOUS at 16:01

## 2025-03-05 RX ADMIN — HEPARIN SODIUM 11.1 UNITS/KG/HR: 10000 INJECTION, SOLUTION INTRAVENOUS at 21:58

## 2025-03-05 RX ADMIN — IOHEXOL 100 ML: 350 INJECTION, SOLUTION INTRAVENOUS at 16:41

## 2025-03-05 RX ADMIN — METHYLPREDNISOLONE SODIUM SUCCINATE 125 MG: 125 INJECTION, POWDER, FOR SOLUTION INTRAMUSCULAR; INTRAVENOUS at 18:19

## 2025-03-05 RX ADMIN — MAGNESIUM SULFATE HEPTAHYDRATE 2 G: 40 INJECTION, SOLUTION INTRAVENOUS at 18:24

## 2025-03-05 RX ADMIN — METRONIDAZOLE 500 MG: 500 INJECTION, SOLUTION INTRAVENOUS at 21:59

## 2025-03-05 RX ADMIN — ISODIUM CHLORIDE 12 ML: 0.03 SOLUTION RESPIRATORY (INHALATION) at 18:15

## 2025-03-05 NOTE — Clinical Note
Case was discussed with Mirna Nuñez and the patient's admission status was agreed to be Admission Status: inpatient status to the service of Dr. Rick .

## 2025-03-05 NOTE — ED NOTES
Phone number to [facility] (manager) [phone].     Ravi Mcneill RN  03/05/25 3825

## 2025-03-05 NOTE — ED PROVIDER NOTES
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    3/5/2025  7:31 PM Mattie Archuleta [R09.02] Hypoxia           ED Disposition       ED Disposition   Admit    Condition   Stable    Date/Time   Wed Mar 5, 2025  7:38 PM    Comment   Case was discussed with Mirna Nuñez and the patient's admission status was agreed to be Admission Status: inpatient status to the service of Dr. Rick .               Assessment & Plan       Medical Decision Making  Patient with SOB, hypoxia, will order labs, EKG, CXR to r/o pneumonia, ACS, cardiac arrhythmia, anemia, electrolyte abnormality.  Patient with abdominal pain, will order CT scan to r/o colitis, obstruction, diverticulitis.  Patient with multifocal pneumonia, required bipap while in the ER, will admit for IV abx and further treatment.  Patient with stable aneurysms on CT scan, will need to f/u with PCP for further monitoring.     Amount and/or Complexity of Data Reviewed  Independent Historian: guardian     Details: Due to psychiatric disorder.   Labs: ordered.  Radiology: ordered.  ECG/medicine tests: ordered and independent interpretation performed.    Risk  Prescription drug management.  Decision regarding  hospitalization.        ED Course as of 03/05/25 1950   Wed Mar 05, 2025   1747 Patient diaphretic with tachypnea, RN help to lift patient up in bed and he improved.    1812 Spoke with Mirna from , she will see patient in ER after [facility].     1902 Mirna messaged RT, stable for midlow.     1903 Messaged SLIM for admission.    1932 Patient tachypneic off BIpap, SLIM evaluated patient in the ER, feels like patient needs higher level of care, Mirna accepted patient on SDU.   1936 Mirna would like to hold off on admission to see if patient improves on midflow.    1936 Mirna will be down in 5 minutes to evaluate patient.    1940 Patient with increased work of breathing, will place on bipap, Mirna aware.        Medications   cefTRIAXone (ROCEPHIN) IVPB (premix in dextrose) 2,000 mg 50 mL (0 mg Intravenous Stopped 3/5/25 1631)   azithromycin (ZITHROMAX) 500 mg in sodium chloride 0.9% 250mL IVPB 500 mg (500 mg Intravenous New Bag 3/5/25 1745)   iohexol (OMNIPAQUE) 350 MG/ML injection (SINGLE-DOSE) 100 mL (100 mL Intravenous Given 3/5/25 1641)   albuterol inhalation solution 10 mg (10 mg Nebulization Given 3/5/25 1814)   ipratropium (ATROVENT) 0.02 % inhalation solution 1 mg (1 mg Nebulization Given 3/5/25 1815)   sodium chloride 0.9 % inhalation solution 12 mL (12 mL Nebulization Given 3/5/25 1815)   methylPREDNISolone sodium succinate (Solu-MEDROL) injection 125 mg (125 mg Intravenous Given 3/5/25 1819)   magnesium sulfate 2 g/50 mL IVPB (premix) 2 g (2 g Intravenous New Bag 3/5/25 1824)       ED Risk Strat Scores                                                History of Present Illness       Chief Complaint   Patient presents with    Shortness of Breath     Pt reports SOB. Pt has cough x2 weeks. Pt reports dizziness with standing       Past Medical History:   Diagnosis Date    Alcohol abuse     Depression     Drug therapy     GERD (gastroesophageal reflux disease)     Hepatitis C      "Hypertension 01/2012    Knee pain, bilateral     Left ventricular apical thrombus     Psychiatric disorder     depression, anxiety    Renal disorder     Self-injurious behavior     Spinal stenosis of lumbar region     Suicide attempt (HCC)       Past Surgical History:   Procedure Laterality Date    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    HERNIA REPAIR  1997      Family History   Problem Relation Age of Onset    Depression Mother     Depression Father     No Known Problems Sister     No Known Problems Brother       Social History     Tobacco Use    Smoking status: Every Day     Current packs/day: 0.50     Types: Cigarettes, Cigars    Smokeless tobacco: Never    Tobacco comments:     PT \"3 CIGARS A DAY\" - quit smoking cigars   Vaping Use    Vaping status: Never Used   Substance Use Topics    Alcohol use: Not Currently     Alcohol/week: 0.0 standard drinks of alcohol     Comment: Stopped drinking for several months - recovered alcoholic    Drug use: Not Currently     Types: Marijuana     Comment: Last used marijuana 2 months ago.- no longer smoking      E-Cigarette/Vaping    E-Cigarette Use Never User       E-Cigarette/Vaping Substances    Nicotine No     THC No     CBD No     Flavoring No     Other No     Unknown No       I have reviewed and agree with the history as documented.     Patient is a 65 y/o M with h/o BIpolar d/o, CVA on Coumadin that presents to the ED with cough, SOB, abdominal pain.  History was obtained from  due to psychiatric disorder.  , which is staff from the facility, states patient has had a cough for 2 days and today c/o SOB.  No chest pain.  Patient also c/o generalized abdominal pain.  He has had low grade fevers.  Patient was placed on 2L NC upon arrival due to pulse ox 88%.  No leg pain or swelling.       History provided by:  Patient  Shortness of Breath  Associated symptoms: abdominal pain and cough    Associated symptoms: no chest pain, no fever and no vomiting  "       Review of Systems   Constitutional:  Positive for fatigue. Negative for chills and fever.   HENT: Negative.     Respiratory:  Positive for cough and shortness of breath.    Cardiovascular:  Negative for chest pain and leg swelling.   Gastrointestinal:  Positive for abdominal pain. Negative for diarrhea, nausea and vomiting.   Neurological:  Negative for dizziness, weakness and light-headedness.   Psychiatric/Behavioral:  Negative for confusion.    All other systems reviewed and are negative.          Objective       ED Triage Vitals   Temperature Pulse Blood Pressure Respirations SpO2 Patient Position - Orthostatic VS   03/05/25 1251 03/05/25 1250 03/05/25 1250 03/05/25 1250 03/05/25 1250 03/05/25 1250   99.1 °F (37.3 °C) 93 127/75 (!) 24 90 % Sitting      Temp Source Heart Rate Source BP Location FiO2 (%) Pain Score    03/05/25 1251 03/05/25 1250 03/05/25 1250 -- --    Temporal Monitor Left arm        Vitals      Date and Time Temp Pulse SpO2 Resp BP Pain Score FACES Pain Rating User   03/05/25 1941 -- -- 91 % -- -- -- -- PS   03/05/25 1934 -- -- 91 % -- -- -- -- PS   03/05/25 1830 -- 94 95 % 25 149/80 -- -- LK   03/05/25 1800 -- 100 92 % 35 175/101 -- -- LK   03/05/25 1747 99.2 °F (37.3 °C) -- -- -- -- -- -- CMD   03/05/25 1700 -- 109 91 % 28 155/85 -- -- LK   03/05/25 1600 -- 100 93 % 26 168/87 -- -- LK   03/05/25 1518 -- 98 93 % 26 201/106 -- -- AC   03/05/25 1515 -- 97 92 % 24 -- -- -- LK   03/05/25 1500 -- 99 91 % 24 201/106 -- -- LK   03/05/25 1251 99.1 °F (37.3 °C) -- -- -- -- -- -- CM   03/05/25 1250 -- 93 90 % 24 127/75 -- -- CM            Physical Exam  Vitals and nursing note reviewed.   Constitutional:       General: He is not in acute distress.     Appearance: Normal appearance. He is well-developed and well-groomed. He is not ill-appearing or diaphoretic.   HENT:      Head: Normocephalic and atraumatic.      Right Ear: Hearing normal.      Left Ear: Hearing normal.      Nose: Nose normal.    Eyes:      Conjunctiva/sclera: Conjunctivae normal.   Cardiovascular:      Rate and Rhythm: Regular rhythm. Tachycardia present.      Heart sounds: Normal heart sounds.   Pulmonary:      Effort: Tachypnea present.      Breath sounds: Decreased breath sounds and wheezing present.   Abdominal:      General: There is distension.      Palpations: Abdomen is soft.      Tenderness: There is generalized abdominal tenderness. There is no guarding or rebound.   Musculoskeletal:         General: Normal range of motion.      Cervical back: Normal range of motion and neck supple.      Right lower leg: No edema.      Left lower leg: No edema.   Skin:     General: Skin is warm and dry.      Coloration: Skin is not jaundiced or pale.      Findings: No rash.   Neurological:      General: No focal deficit present.      Mental Status: He is alert and oriented to person, place, and time.   Psychiatric:         Mood and Affect: Affect is flat.         Behavior: Behavior is cooperative.         Results Reviewed       Procedure Component Value Units Date/Time    HS Troponin I 4hr [[id number]]  (Normal) Collected: 03/05/25 1834    Lab Status: Final result Specimen: Blood from Arm, Left Updated: 03/05/25 1905     hs TnI 4hr 16 ng/L      Delta 4hr hsTnI -3 ng/L     FLU/RSV/COVID - if FLU/RSV clinically relevant (2hr TAT) [[id number]]  (Normal) Collected: 03/05/25 1510    Lab Status: Final result Specimen: Nares from Nose Updated: 03/05/25 1557     SARS-CoV-2 Negative     INFLUENZA A PCR Negative     INFLUENZA B PCR Negative     RSV PCR Negative    Narrative:      This test has been performed using the CoV-2/Flu/RSV plus assay on the [employer] GeneXpert platform. This test has been validated by the  and verified by the performing laboratory.     This test is designed to amplify and detect the following: nucleocapsid (N), envelope (E), and RNA-dependent RNA polymerase (RdRP) genes of the SARS-CoV-2 genome; matrix (M), basic  polymerase (PB2), and acidic protein (PA) segments of the influenza A genome; matrix (M) and non-structural protein (NS) segments of the influenza B genome, and the nucleocapsid genes of RSV A and RSV B.     Positive results are indicative of the presence of Flu A, Flu B, RSV, and/or SARS-CoV-2 RNA. Positive results for SARS-CoV-2 or suspected novel influenza should be reported to state, local, or federal health departments according to local reporting requirements.      All results should be assessed in conjunction with clinical presentation and other laboratory markers for clinical management.     FOR PEDIATRIC PATIENTS - copy/paste COVID Guidelines URL to browser: [email or web address]       Procalcitonin [[id number]]  (Abnormal) Collected: 03/05/25 1510    Lab Status: Final result Specimen: Blood from Arm, Left Updated: 03/05/25 1549     Procalcitonin 0.94 ng/ml     HS Troponin I 2hr [[id number]]  (Normal) Collected: 03/05/25 1514    Lab Status: Final result Specimen: Blood from Arm, Left Updated: 03/05/25 1547     hs TnI 2hr 17 ng/L      Delta 2hr hsTnI -2 ng/L     Lactic acid, plasma (w/reflex if result > 2.0) [[id number]]  (Normal) Collected: 03/05/25 1510    Lab Status: Final result Specimen: Blood from Arm, Left Updated: 03/05/25 1537     LACTIC ACID 1.8 mmol/L     Narrative:      Result may be elevated if tourniquet was used during collection.    Protime-INR [[id number]]  (Abnormal) Collected: 03/05/25 1510    Lab Status: Final result Specimen: Blood from Arm, Left Updated: 03/05/25 1535     Protime 28.7 seconds      INR 2.68    Narrative:      INR Therapeutic Range    Indication                                             INR Range      Atrial Fibrillation                                               2.0-3.0  Hypercoagulable State                                    2.0.2.3  Left Ventricular Asist Device                            2.0-3.0  Mechanical Heart  Valve                                  -    Aortic(with afib, MI, embolism, HF, LA enlargement,    and/or coagulopathy)                                     2.0-3.0 (2.5-3.5)     Mitral                                                             2.5-3.5  Prosthetic/Bioprosthetic Heart Valve               2.0-3.0  Venous thromboembolism (VTE: VT, PE        2.0-3.0    APTT [[id number]]  (Abnormal) Collected: 03/05/25 1510    Lab Status: Final result Specimen: Blood from Arm, Left Updated: 03/05/25 1535     PTT 46 seconds     B-Type Natriuretic Peptide(BNP) [[id number]]  (Abnormal) Collected: 03/05/25 1301    Lab Status: Final result Specimen: Blood from Arm, Left Updated: 03/05/25 1531      pg/mL     Blood culture #2 [[id number]] Collected: 03/05/25 1510    Lab Status: In process Specimen: Blood from Arm, Left Updated: 03/05/25 1518    Blood culture #1 [[id number]] Collected: 03/05/25 1510    Lab Status: In process Specimen: Blood from Arm, Left Updated: 03/05/25 1518    POCT Blood Gas (CG8+) [[id number]]  (Abnormal) Collected: 03/05/25 1511    Lab Status: Final result Specimen: Venous Updated: 03/05/25 1516     ph, Singh ISTAT 7.389     pCO2, Singh i-STAT 36.5 mm HG      pO2, Singh i-STAT 35.0 mm HG      BE, i-STAT -2 mmol/L      HCO3, Singh i-STAT 22.0 mmol/L      CO2, i-STAT 23 mmol/L      O2 Sat, i-STAT 67 %      SODIUM, I-STAT 139 mmol/l      Potassium, i-STAT 4.3 mmol/L      Calcium, Ionized i-STAT 1.13 mmol/L      Hct, i-STAT 44 %      Hgb, i-STAT 15.0 g/dl      Glucose, i-STAT 126 mg/dl      Specimen Type VENOUS    HS Troponin 0hr (reflex protocol) [[id number]]  (Normal) Collected: 03/05/25 1301    Lab Status: Final result Specimen: Blood from Arm, Left Updated: 03/05/25 1332     hs TnI 0hr 19 ng/L     CBC and differential [[id number]]  (Abnormal) Collected: 03/05/25 1301    Lab Status: Final result Specimen: Blood from Arm, Left Updated: 03/05/25 1330     WBC 14.75 Thousand/uL      RBC 4.88 Million/uL      Hemoglobin  14.1 g/dL      Hematocrit 43.5 %      MCV 89 fL      MCH 28.9 pg      MCHC 32.4 g/dL      RDW 21.2 %      Platelets 108 Thousands/uL      nRBC 0 /100 WBCs      Segmented % 83 %      Immature Grans % 1 %      Lymphocytes % 6 %      Monocytes % 10 %      Eosinophils Relative 0 %      Basophils Relative 0 %      Absolute Neutrophils 12.20 Thousands/µL      Absolute Immature Grans 0.18 Thousand/uL      Absolute Lymphocytes 0.85 Thousands/µL      Absolute Monocytes 1.49 Thousand/µL      Eosinophils Absolute 0.00 Thousand/µL      Basophils Absolute 0.03 Thousands/µL     Narrative:      This is an appended report.  These results have been appended to a previously verified report.    Smear Review(Phlebs Do Not Order) [[id number]]  (Abnormal) Collected: 03/05/25 1301    Lab Status: Final result Specimen: Blood from Arm, Left Updated: 03/05/25 1330     RBC Morphology Normal     Platelet Estimate Borderline    Comprehensive metabolic panel [[id number]]  (Abnormal) Collected: 03/05/25 1301    Lab Status: Final result Specimen: Blood from Arm, Left Updated: 03/05/25 1328     Sodium 137 mmol/L      Potassium 4.1 mmol/L      Chloride 105 mmol/L      CO2 22 mmol/L      ANION GAP 10 mmol/L      BUN 23 mg/dL      Creatinine 1.52 mg/dL      Glucose 123 mg/dL      Calcium 8.7 mg/dL      AST 19 U/L      ALT 13 U/L      Alkaline Phosphatase 55 U/L      Total Protein 7.1 g/dL      Albumin 4.4 g/dL      Total Bilirubin 0.78 mg/dL      eGFR 48 ml/min/1.73sq m     Narrative:      National Kid[employer] Disease Foundation guidelines for Chronic Kidney Disease (CKD):     Stage 1 with normal or high GFR (GFR > 90 mL/min/1.73 square meters)    Stage 2 Mild CKD (GFR = 60-89 mL/min/1.73 square meters)    Stage 3A Moderate CKD (GFR = 45-59 mL/min/1.73 square meters)    Stage 3B Moderate CKD (GFR = 30-44 mL/min/1.73 square meters)    Stage 4 Severe CKD (GFR = 15-29 mL/min/1.73 square meters)    Stage 5 End Stage CKD (GFR <15 mL/min/1.73 square meters)  Note:  GFR calculation is accurate only with a steady state creatinine            CT chest abdomen pelvis w contrast   Final Interpretation by Leigh Epps MD (03/05 8278)      Multifocal pneumonia involving the left lower lobe, lingula, and medial right lower lobe. Fluid in the esophagus suggests aspiration as a possible etiology.      Moderate size hiatal hernia and distended fluid-filled esophagus to the level of the thoracic inlet.      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      The study was marked in EPIC for immediate notification.               Workstation performed: [id number]         XR chest 2 views   Final Interpretation by Cj Bojorquez MD (03/05 7093)      Patchy lingular and lower lobe opacities, concerning for pneumonia. Radiographic follow-up to resolution is recommended.            Workstation performed: [id number]             ECG 12 Lead Documentation Only    Date/Time: 3/5/2025 1:10 PM    Performed by: Mattie Archuleta PA-C  Authorized by: Mattie Archuleta PA-C    Indications / Diagnosis:  95  ECG reviewed by me, the ED Provider: yes    Patient location:  ED  Rate:     ECG rate:  95  Rhythm:     Rhythm: sinus rhythm    Conduction:     Conduction: abnormal      Abnormal conduction: 1st degree    ST segments:     ST segments:  Normal  T waves:     T waves: non-specific    ECG 12 Lead Documentation Only    Date/Time: 3/5/2025 6:30 PM    Performed by: Mattie Archuleta PA-C  Authorized by: Mattie Archuleta PA-C    Indications / Diagnosis:  SOB  ECG reviewed by me, the ED Provider: yes    Patient location:  ED  Previous ECG:     Previous ECG:  Compared to current    Similarity:  No change  Rate:     ECG rate:  96  Rhythm:     Rhythm: sinus rhythm    Conduction:     Conduction: abnormal      Abnormal conduction: 1st degree    ST segments:     ST segments:   Normal  T waves:     T waves: non-specific      Critical Care Time Statement: Upon my evaluation, this patient had a high probability of imminent or life-threatening deterioration due to hypoxia, respiratory distress, which required my direct attention, intervention, and personal management.  I spent a total of 35 minutes directly providing critical care services, including complex medical decision making (to support/prevent further life-threatening deterioration). and placing patient on bipap and reassessing patient and discussion with critical care. . This time is exclusive of procedures, teaching, treating other patients, family meetings, and any prior time recorded by providers other than myself.     ED Medication and Procedure Management   Prior to Admission Medications   Prescriptions Last Dose Informant Patient Reported? Taking?   QUEtiapine (SEROquel) 300 mg tablet  Outside Facility (Specify) No No   Sig: Take 1 tablet (300 mg total) by mouth daily at bedtime   acetaminophen (TYLENOL) 650 mg CR tablet  Outside Facility (Specify) No No   Sig: Take 1 tablet (650 mg total) by mouth every 8 (eight) hours as needed for mild pain   atorvastatin (LIPITOR) 40 mg tablet  Outside Facility (Specify) No No   Sig: Take 1 tablet (40 mg total) by mouth daily with dinner   benzonatate (TESSALON) 200 MG capsule  Outside Facility (Specify) No No   Sig: Take 1 capsule (200 mg total) by mouth 3 (three) times a day as needed for cough   carvedilol (COREG) 12.5 mg tablet   No No   Sig: Take 1 tablet (12.5 mg total) by mouth 2 (two) times a day with meals   diclofenac sodium (Voltaren) 1 %  Outside Facility (Specify) No No   Sig: Apply 2 g topically 4 (four) times a day   Patient not taking: Reported on 1/2/2025   escitalopram (LEXAPRO) 20 mg tablet  Outside Facility (Specify) No No   Sig: Take 1 tablet (20 mg total) by mouth daily   ferrous sulfate 325 (65 Fe) mg tablet  Outside Facility (Specify) No No   Sig: Take 1 tablet (325  mg total) by mouth daily with breakfast Do not start before December 7, 2024.   guaiFENesin (ROBITUSSIN) 100 MG/5ML oral liquid  Outside Facility (Specify) No No   Sig: Take 10 mL (200 mg total) by mouth 3 (three) times a day as needed for cough   lisinopril (ZESTRIL) 40 mg tablet   No No   Sig: Take 1 tablet (40 mg total) by mouth daily   meloxicam (MOBIC) 15 mg tablet  Outside Facility (Specify) Yes No   nicotine polacrilex (NICORETTE) 2 mg gum  Outside Facility (Specify) No No   Sig: Chew 1 each (2 mg total) every 3 (three) hours as needed for smoking cessation   pantoprazole (PROTONIX) 40 mg tablet  Outside Facility (Specify) No No   Sig: Take 1 tablet (40 mg total) by mouth 2 (two) times a day before lunch and dinner Do not start before December 7, 2024.   polyethylene glycol (MIRALAX) 17 g packet  Outside Facility (Specify) No No   Sig: Take 17 g by mouth daily as needed (constipation)   traZODone (DESYREL) 100 mg tablet  Outside Facility (Specify) No No   Sig: Take 1 tablet (100 mg total) by mouth daily at bedtime   warfarin (COUMADIN) 5 mg tablet  Outside Facility (Specify) No No   Sig: Take 1 tablet (5 mg total) by mouth daily Do not start before December 21, 2024.      Facility-Administered Medications: None     Patient's Medications   Discharge Prescriptions    No medications on file     No discharge procedures on file.  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    3/5/2025  7:31 PM Mattie Archuleta Add [R09.02] Hypoxia            Initial Sepsis Screening       Row Name 03/05/25 2118                Is the patient's history suggestive of a new or worsening infection? Yes (Proceed)  -CD        Suspected source of infection pneumonia  -CD        Indicate SIRS criteria Tachycardia > 90 bpm;Leukocytosis (WBC > 64748 IJL) OR Leukopenia (WBC <4000 IJL) OR Bandemia (WBC >10% bands)  -CD        Are two or more of the above signs & symptoms of infection both present and new to the patient? Yes (Proceed)  -CD        Assess for evidence of organ dysfunction: Are any of the below criteria present within 6 hours of suspected infection and SIRS criteria that are NOT considered to be chronic conditions? --                  User Key  (r) = Recorded By, (t) = Taken By, (c) = Cosigned By      Initials Name Provider Type    CD Mattie Archuleta PA-C Physician Assistant                       Mattie Archuleta PA-C  03/05/25 1951

## 2025-03-05 NOTE — ED NOTES
Pt diaphoretic and increased work of breathing. Educated pt to take deep slow breaths in through nasal cannula. Sating at 91 on 6L. Provider aware and at bedside. Called respiratory. Respiratory staff place pt on bipap     Priyanka Bañuelos RN  03/05/25 0231

## 2025-03-06 ENCOUNTER — APPOINTMENT (INPATIENT)
Dept: RADIOLOGY | Facility: HOSPITAL | Age: 65
DRG: 871 | End: 2025-03-06
Payer: COMMERCIAL

## 2025-03-06 LAB
ANION GAP SERPL CALCULATED.3IONS-SCNC: 11 MMOL/L (ref 4–13)
APTT PPP: 87 SECONDS (ref 23–34)
APTT PPP: 91 SECONDS (ref 23–34)
ATRIAL RATE: 96 BPM
B PARAP IS1001 DNA NPH QL NAA+NON-PROBE: NOT DETECTED
B PERT.PT PRMT NPH QL NAA+NON-PROBE: NOT DETECTED
BASE EX.OXY STD BLDV CALC-SCNC: 54.5 % (ref 60–80)
BASE EXCESS BLDV CALC-SCNC: -5.6 MMOL/L
BASOPHILS # BLD AUTO: 0.05 THOUSANDS/ÂΜL (ref 0–0.1)
BASOPHILS NFR BLD AUTO: 0 % (ref 0–1)
BUN SERPL-MCNC: 25 MG/DL (ref 5–25)
C PNEUM DNA NPH QL NAA+NON-PROBE: NOT DETECTED
CA-I BLD-SCNC: 1.15 MMOL/L (ref 1.12–1.32)
CALCIUM SERPL-MCNC: 8.8 MG/DL (ref 8.4–10.2)
CHLORIDE SERPL-SCNC: 105 MMOL/L (ref 96–108)
CO2 SERPL-SCNC: 22 MMOL/L (ref 21–32)
CREAT SERPL-MCNC: 1.35 MG/DL (ref 0.6–1.3)
EOSINOPHIL # BLD AUTO: 0 THOUSAND/ÂΜL (ref 0–0.61)
EOSINOPHIL NFR BLD AUTO: 0 % (ref 0–6)
ERYTHROCYTE [DISTWIDTH] IN BLOOD BY AUTOMATED COUNT: 21 % (ref 11.6–15.1)
EST. AVERAGE GLUCOSE BLD GHB EST-MCNC: 117 MG/DL
FLUAV RNA NPH QL NAA+NON-PROBE: NOT DETECTED
FLUBV RNA NPH QL NAA+NON-PROBE: NOT DETECTED
GFR SERPL CREATININE-BSD FRML MDRD: 55 ML/MIN/1.73SQ M
GLUCOSE SERPL-MCNC: 147 MG/DL (ref 65–140)
GLUCOSE SERPL-MCNC: 153 MG/DL (ref 65–140)
GLUCOSE SERPL-MCNC: 174 MG/DL (ref 65–140)
GLUCOSE SERPL-MCNC: 182 MG/DL (ref 65–140)
HADV DNA NPH QL NAA+NON-PROBE: NOT DETECTED
HBA1C MFR BLD: 5.7 %
HCO3 BLDV-SCNC: 21.2 MMOL/L (ref 24–30)
HCOV 229E RNA NPH QL NAA+NON-PROBE: NOT DETECTED
HCOV HKU1 RNA NPH QL NAA+NON-PROBE: NOT DETECTED
HCOV NL63 RNA NPH QL NAA+NON-PROBE: NOT DETECTED
HCOV OC43 RNA NPH QL NAA+NON-PROBE: NOT DETECTED
HCT VFR BLD AUTO: 43.9 % (ref 36.5–49.3)
HGB BLD-MCNC: 13.9 G/DL (ref 12–17)
HMPV RNA NPH QL NAA+NON-PROBE: NOT DETECTED
HPIV1 RNA NPH QL NAA+NON-PROBE: NOT DETECTED
HPIV2 RNA NPH QL NAA+NON-PROBE: NOT DETECTED
HPIV3 RNA NPH QL NAA+NON-PROBE: NOT DETECTED
HPIV4 RNA NPH QL NAA+NON-PROBE: NOT DETECTED
IMM GRANULOCYTES # BLD AUTO: 0.17 THOUSAND/UL (ref 0–0.2)
IMM GRANULOCYTES NFR BLD AUTO: 1 % (ref 0–2)
L PNEUMO1 AG UR QL IA.RAPID: NEGATIVE
LYMPHOCYTES # BLD AUTO: 0.74 THOUSANDS/ÂΜL (ref 0.6–4.47)
LYMPHOCYTES NFR BLD AUTO: 5 % (ref 14–44)
M PNEUMO DNA NPH QL NAA+NON-PROBE: NOT DETECTED
MAGNESIUM SERPL-MCNC: 2.1 MG/DL (ref 1.9–2.7)
MCH RBC QN AUTO: 28.8 PG (ref 26.8–34.3)
MCHC RBC AUTO-ENTMCNC: 31.7 G/DL (ref 31.4–37.4)
MCV RBC AUTO: 91 FL (ref 82–98)
MONOCYTES # BLD AUTO: 0.52 THOUSAND/ÂΜL (ref 0.17–1.22)
MONOCYTES NFR BLD AUTO: 4 % (ref 4–12)
NEUTROPHILS # BLD AUTO: 12.42 THOUSANDS/ÂΜL (ref 1.85–7.62)
NEUTS SEG NFR BLD AUTO: 90 % (ref 43–75)
NON VENT- BIPAP: ABNORMAL
NRBC BLD AUTO-RTO: 0 /100 WBCS
O2 CT BLDV-SCNC: 11.3 ML/DL
P AXIS: 60 DEGREES
PCO2 BLDV: 46.4 MM HG (ref 42–50)
PH BLDV: 7.28 [PH] (ref 7.3–7.4)
PHOSPHATE SERPL-MCNC: 3.7 MG/DL (ref 2.3–4.1)
PLATELET # BLD AUTO: 105 THOUSANDS/UL (ref 149–390)
PO2 BLDV: 32.6 MM HG (ref 35–45)
POTASSIUM SERPL-SCNC: 4.1 MMOL/L (ref 3.5–5.3)
PR INTERVAL: 222 MS
PROCALCITONIN SERPL-MCNC: 2.69 NG/ML
QRS AXIS: -21 DEGREES
QRSD INTERVAL: 98 MS
QT INTERVAL: 352 MS
QTC INTERVAL: 444 MS
RBC # BLD AUTO: 4.82 MILLION/UL (ref 3.88–5.62)
RSV RNA NPH QL NAA+NON-PROBE: NOT DETECTED
RV+EV RNA NPH QL NAA+NON-PROBE: NOT DETECTED
S PNEUM AG UR QL: NEGATIVE
SARS-COV-2 RNA NPH QL NAA+NON-PROBE: NOT DETECTED
SODIUM SERPL-SCNC: 138 MMOL/L (ref 135–147)
T WAVE AXIS: 89 DEGREES
VENTRICULAR RATE: 96 BPM
WBC # BLD AUTO: 13.9 THOUSAND/UL (ref 4.31–10.16)

## 2025-03-06 PROCEDURE — 84145 PROCALCITONIN (PCT): CPT

## 2025-03-06 PROCEDURE — 94640 AIRWAY INHALATION TREATMENT: CPT

## 2025-03-06 PROCEDURE — 94002 VENT MGMT INPAT INIT DAY: CPT

## 2025-03-06 PROCEDURE — 82330 ASSAY OF CALCIUM: CPT

## 2025-03-06 PROCEDURE — 94660 CPAP INITIATION&MGMT: CPT

## 2025-03-06 PROCEDURE — 85025 COMPLETE CBC W/AUTO DIFF WBC: CPT

## 2025-03-06 PROCEDURE — 93010 ELECTROCARDIOGRAM REPORT: CPT | Performed by: INTERNAL MEDICINE

## 2025-03-06 PROCEDURE — 82948 REAGENT STRIP/BLOOD GLUCOSE: CPT

## 2025-03-06 PROCEDURE — 94664 DEMO&/EVAL PT USE INHALER: CPT

## 2025-03-06 PROCEDURE — 87449 NOS EACH ORGANISM AG IA: CPT

## 2025-03-06 PROCEDURE — 94760 N-INVAS EAR/PLS OXIMETRY 1: CPT

## 2025-03-06 PROCEDURE — 84100 ASSAY OF PHOSPHORUS: CPT

## 2025-03-06 PROCEDURE — 82805 BLOOD GASES W/O2 SATURATION: CPT

## 2025-03-06 PROCEDURE — 71045 X-RAY EXAM CHEST 1 VIEW: CPT

## 2025-03-06 PROCEDURE — 80048 BASIC METABOLIC PNL TOTAL CA: CPT

## 2025-03-06 PROCEDURE — 93005 ELECTROCARDIOGRAM TRACING: CPT

## 2025-03-06 PROCEDURE — 83036 HEMOGLOBIN GLYCOSYLATED A1C: CPT | Performed by: NURSE PRACTITIONER

## 2025-03-06 PROCEDURE — 83735 ASSAY OF MAGNESIUM: CPT

## 2025-03-06 PROCEDURE — 85730 THROMBOPLASTIN TIME PARTIAL: CPT

## 2025-03-06 PROCEDURE — 99291 CRITICAL CARE FIRST HOUR: CPT | Performed by: INTERNAL MEDICINE

## 2025-03-06 RX ORDER — PANTOPRAZOLE SODIUM 40 MG/1
40 TABLET, DELAYED RELEASE ORAL
Status: DISCONTINUED | OUTPATIENT
Start: 2025-03-06 | End: 2025-03-22 | Stop reason: HOSPADM

## 2025-03-06 RX ORDER — ACETAMINOPHEN 10 MG/ML
1000 INJECTION, SOLUTION INTRAVENOUS EVERY 6 HOURS PRN
Status: DISCONTINUED | OUTPATIENT
Start: 2025-03-06 | End: 2025-03-06

## 2025-03-06 RX ORDER — CARVEDILOL 12.5 MG/1
12.5 TABLET ORAL 2 TIMES DAILY WITH MEALS
Status: DISCONTINUED | OUTPATIENT
Start: 2025-03-06 | End: 2025-03-08

## 2025-03-06 RX ORDER — WARFARIN SODIUM 5 MG/1
5 TABLET ORAL 3 TIMES WEEKLY
Status: DISCONTINUED | OUTPATIENT
Start: 2025-03-06 | End: 2025-03-13

## 2025-03-06 RX ORDER — MAGNESIUM SULFATE HEPTAHYDRATE 40 MG/ML
2 INJECTION, SOLUTION INTRAVENOUS ONCE
Status: COMPLETED | OUTPATIENT
Start: 2025-03-06 | End: 2025-03-06

## 2025-03-06 RX ORDER — LEVALBUTEROL INHALATION SOLUTION 1.25 MG/3ML
SOLUTION RESPIRATORY (INHALATION)
Status: COMPLETED
Start: 2025-03-06 | End: 2025-03-06

## 2025-03-06 RX ORDER — ACETAMINOPHEN 325 MG/1
650 TABLET ORAL EVERY 6 HOURS PRN
Status: DISCONTINUED | OUTPATIENT
Start: 2025-03-06 | End: 2025-03-14

## 2025-03-06 RX ORDER — METHYLPREDNISOLONE SODIUM SUCCINATE 40 MG/ML
40 INJECTION, POWDER, LYOPHILIZED, FOR SOLUTION INTRAMUSCULAR; INTRAVENOUS EVERY 12 HOURS SCHEDULED
Status: DISCONTINUED | OUTPATIENT
Start: 2025-03-06 | End: 2025-03-07

## 2025-03-06 RX ORDER — ESCITALOPRAM OXALATE 20 MG/1
20 TABLET ORAL DAILY
Status: DISCONTINUED | OUTPATIENT
Start: 2025-03-06 | End: 2025-03-22 | Stop reason: HOSPADM

## 2025-03-06 RX ORDER — FERROUS SULFATE 325(65) MG
325 TABLET ORAL
Status: DISCONTINUED | OUTPATIENT
Start: 2025-03-07 | End: 2025-03-08

## 2025-03-06 RX ORDER — ATORVASTATIN CALCIUM 40 MG/1
40 TABLET, FILM COATED ORAL
Status: DISCONTINUED | OUTPATIENT
Start: 2025-03-06 | End: 2025-03-22 | Stop reason: HOSPADM

## 2025-03-06 RX ORDER — INSULIN LISPRO 100 [IU]/ML
1-6 INJECTION, SOLUTION INTRAVENOUS; SUBCUTANEOUS
Status: DISCONTINUED | OUTPATIENT
Start: 2025-03-06 | End: 2025-03-22 | Stop reason: HOSPADM

## 2025-03-06 RX ORDER — INSULIN LISPRO 100 [IU]/ML
1-6 INJECTION, SOLUTION INTRAVENOUS; SUBCUTANEOUS EVERY 6 HOURS SCHEDULED
Status: DISCONTINUED | OUTPATIENT
Start: 2025-03-06 | End: 2025-03-06

## 2025-03-06 RX ORDER — LEVALBUTEROL INHALATION SOLUTION 1.25 MG/3ML
1.25 SOLUTION RESPIRATORY (INHALATION)
Status: DISCONTINUED | OUTPATIENT
Start: 2025-03-06 | End: 2025-03-21

## 2025-03-06 RX ORDER — WARFARIN SODIUM 2.5 MG/1
2.5 TABLET ORAL
Status: DISCONTINUED | OUTPATIENT
Start: 2025-03-07 | End: 2025-03-13

## 2025-03-06 RX ORDER — LEVALBUTEROL INHALATION SOLUTION 1.25 MG/3ML
1.25 SOLUTION RESPIRATORY (INHALATION) EVERY 6 HOURS PRN
Status: DISCONTINUED | OUTPATIENT
Start: 2025-03-06 | End: 2025-03-22 | Stop reason: HOSPADM

## 2025-03-06 RX ADMIN — IPRATROPIUM BROMIDE 0.5 MG: 0.5 SOLUTION RESPIRATORY (INHALATION) at 13:50

## 2025-03-06 RX ADMIN — METHYLPREDNISOLONE SODIUM SUCCINATE 40 MG: 40 INJECTION, POWDER, FOR SOLUTION INTRAMUSCULAR; INTRAVENOUS at 21:04

## 2025-03-06 RX ADMIN — LEVALBUTEROL HYDROCHLORIDE 1.25 MG: 1.25 SOLUTION RESPIRATORY (INHALATION) at 19:22

## 2025-03-06 RX ADMIN — IPRATROPIUM BROMIDE 0.5 MG: 0.5 SOLUTION RESPIRATORY (INHALATION) at 19:23

## 2025-03-06 RX ADMIN — ACETAMINOPHEN 650 MG: 325 TABLET, FILM COATED ORAL at 17:40

## 2025-03-06 RX ADMIN — AZITHROMYCIN MONOHYDRATE 500 MG: 500 INJECTION, POWDER, LYOPHILIZED, FOR SOLUTION INTRAVENOUS at 15:46

## 2025-03-06 RX ADMIN — LEVALBUTEROL HYDROCHLORIDE 1.25 MG: 1.25 SOLUTION RESPIRATORY (INHALATION) at 09:02

## 2025-03-06 RX ADMIN — METHYLPREDNISOLONE SODIUM SUCCINATE 40 MG: 40 INJECTION, POWDER, FOR SOLUTION INTRAMUSCULAR; INTRAVENOUS at 09:07

## 2025-03-06 RX ADMIN — ACETAMINOPHEN 1000 MG: 10 INJECTION INTRAVENOUS at 11:44

## 2025-03-06 RX ADMIN — CARVEDILOL 12.5 MG: 12.5 TABLET, FILM COATED ORAL at 15:37

## 2025-03-06 RX ADMIN — LEVALBUTEROL HYDROCHLORIDE 1.25 MG: 1.25 SOLUTION RESPIRATORY (INHALATION) at 21:25

## 2025-03-06 RX ADMIN — PANTOPRAZOLE SODIUM 40 MG: 40 TABLET, DELAYED RELEASE ORAL at 13:25

## 2025-03-06 RX ADMIN — CEFTRIAXONE 1000 MG: 1 INJECTION, SOLUTION INTRAVENOUS at 14:12

## 2025-03-06 RX ADMIN — IPRATROPIUM BROMIDE 0.5 MG: 0.5 SOLUTION RESPIRATORY (INHALATION) at 09:03

## 2025-03-06 RX ADMIN — LEVALBUTEROL HYDROCHLORIDE 1.25 MG: 1.25 SOLUTION RESPIRATORY (INHALATION) at 13:50

## 2025-03-06 RX ADMIN — ATORVASTATIN CALCIUM 40 MG: 40 TABLET, FILM COATED ORAL at 15:37

## 2025-03-06 RX ADMIN — ESCITALOPRAM OXALATE 20 MG: 20 TABLET ORAL at 13:25

## 2025-03-06 RX ADMIN — NICOTINE 1 PATCH: 14 PATCH, EXTENDED RELEASE TRANSDERMAL at 09:07

## 2025-03-06 RX ADMIN — METRONIDAZOLE 500 MG: 500 INJECTION, SOLUTION INTRAVENOUS at 04:35

## 2025-03-06 RX ADMIN — INSULIN LISPRO 1 UNITS: 100 INJECTION, SOLUTION INTRAVENOUS; SUBCUTANEOUS at 16:59

## 2025-03-06 RX ADMIN — MAGNESIUM SULFATE HEPTAHYDRATE 2 G: 40 INJECTION, SOLUTION INTRAVENOUS at 21:24

## 2025-03-06 RX ADMIN — INSULIN LISPRO 1 UNITS: 100 INJECTION, SOLUTION INTRAVENOUS; SUBCUTANEOUS at 21:11

## 2025-03-06 RX ADMIN — METRONIDAZOLE 500 MG: 500 INJECTION, SOLUTION INTRAVENOUS at 13:25

## 2025-03-06 RX ADMIN — METRONIDAZOLE 500 MG: 500 INJECTION, SOLUTION INTRAVENOUS at 21:05

## 2025-03-06 RX ADMIN — WARFARIN SODIUM 5 MG: 5 TABLET ORAL at 17:04

## 2025-03-06 NOTE — ASSESSMENT & PLAN NOTE
SIRS: tachycardia, tachypnea and leukocytosis  CT and CXR show multifocal pneumonia, possibly due to aspiration  Flu/Covid/RSV negative  Afebrile  LA 1.8  Procal 0.94  In ED, received ceftriaxone and azithromycin    Plan  Continue ceftriaxone/azithromycin and add flagyl for possibility of aspirating gastric content  Follow blood cultures, MRSA cultures, sputum cultures, legionella/strep antigens  Add RP2 panel  AM procal

## 2025-03-06 NOTE — PLAN OF CARE
Problem: Potential for Falls  Goal: Patient will remain free of falls  Description: INTERVENTIONS:  - Educate patient/family on patient safety including physical limitations  - Instruct patient to call for assistance with activity   - Consult OT/PT to assist with strengthening/mobility   - Keep Call bell within reach  - Keep bed low and locked with side rails adjusted as appropriate  - Keep care items and personal belongings within reach  - Initiate and maintain comfort rounds  - Make Fall Risk Sign visible to staff    Problem: Prexisting or High Potential for Compromised Skin Integrity  Goal: Skin integrity is maintained or improved  Description: INTERVENTIONS:  - Identify patients at risk for skin breakdown  - Assess and monitor skin integrity  - Assess and monitor nutrition and hydration status  - Monitor labs   - Assess for incontinence   - Turn and reposition patient  - Assist with mobility/ambulation  - Relieve pressure over bony prominences  - Avoid friction and shearing  - Provide appropriate hygiene as needed including keeping skin clean and dry  - Evaluate need for skin moisturizer/barrier cream  - Collaborate with interdisciplinary team   - Patient/family teaching  - Consider wound care consult   Outcome: Progressing     Problem: PAIN - ADULT  Goal: Verbalizes/displays adequate comfort level or baseline comfort level  Description: Interventions:  - Encourage patient to monitor pain and request assistance  - Assess pain using appropriate pain scale  - Administer analgesics based on type and severity of pain and evaluate response  - Implement non-pharmacological measures as appropriate and evaluate response  - Consider cultural and social influences on pain and pain management  - Notify physician/advanced practitioner if interventions unsuccessful or patient reports new pain  Outcome: Progressing     Problem: INFECTION - ADULT  Goal: Absence or prevention of progression during  hospitalization  Description: INTERVENTIONS:  - Assess and monitor for signs and symptoms of infection  - Monitor lab/diagnostic results  - Monitor all insertion sites, i.e. indwelling lines, tubes, and drains  - Monitor endotracheal if appropriate and nasal secretions for changes in amount and color  - Whitt appropriate cooling/warming therapies per order  - Administer medications as ordered  - Instruct and encourage patient and family to use good hand hygiene technique  - Identify and instruct in appropriate isolation precautions for identified infection/condition  Outcome: Progressing  Goal: Absence of fever/infection during neutropenic period  Description: INTERVENTIONS:  - Monitor WBC    Outcome: Progressing     Problem: SAFETY ADULT  Goal: Patient will remain free of falls  Description: INTERVENTIONS:  - Educate patient/family on patient safety including physical limitations  - Instruct patient to call for assistance with activity   - Consult OT/PT to assist with strengthening/mobility   - Keep Call bell within reach  - Keep bed low and locked with side rails adjusted as appropriate  - Keep care items and personal belongings within reach  - Initiate and maintain comfort rounds  - Make Fall Risk Sign visible to staff    Problem: RESPIRATORY - ADULT  Goal: Achieves optimal ventilation and oxygenation  Description: INTERVENTIONS:  - Assess for changes in respiratory status  - Assess for changes in mentation and behavior  - Position to facilitate oxygenation and minimize respiratory effort  - Oxygen administered by appropriate delivery if ordered  - Initiate smoking cessation education as indicated  - Encourage broncho-pulmonary hygiene including cough, deep breathe, Incentive Spirometry  - Assess the need for suctioning and aspirate as needed  - Assess and instruct to report SOB or any respiratory difficulty  - Respiratory Therapy support as indicated  Outcome: Progressing     Problem: METABOLIC, FLUID AND  ELECTROLYTES - ADULT  Goal: Electrolytes maintained within normal limits  Description: INTERVENTIONS:  - Monitor labs and assess patient for signs and symptoms of electrolyte imbalances  - Administer electrolyte replacement as ordered  - Monitor response to electrolyte replacements, including repeat lab results as appropriate  - Instruct patient on fluid and nutrition as appropriate  Outcome: Progressing  Goal: Fluid balance maintained  Description: INTERVENTIONS:  - Monitor labs   - Monitor I/O and WT  - Instruct patient on fluid and nutrition as appropriate  - Assess for signs & symptoms of volume excess or deficit  Outcome: Progressing  Goal: Glucose maintained within target range  Description: INTERVENTIONS:  - Monitor Blood Glucose as ordered  - Assess for signs and symptoms of hyperglycemia and hypoglycemia  - Administer ordered medications to maintain glucose within target range  - Assess nutritional intake and initiate nutrition service referral as needed  Outcome: Progressing     - Apply yellow socks and bracelet for high fall risk patients  - Consider moving patient to room near nurses station  Outcome: Progressing  Goal: Maintain or return to baseline ADL function  Description: INTERVENTIONS:  -  Assess patient's ability to carry out ADLs; assess patient's baseline for ADL function and identify physical deficits which impact ability to perform ADLs (bathing, care of mouth/teeth, toileting, grooming, dressing, etc.)  - Assess/evaluate cause of self-care deficits   - Assess range of motion  - Assess patient's mobility; develop plan if impaired  - Assess patient's need for assistive devices and provide as appropriate  - Encourage maximum independence but intervene and supervise when necessary  - Involve family in performance of ADLs  - Assess for home care needs following discharge   - Consider OT consult to assist with ADL evaluation and planning for discharge  - Provide patient education as  appropriate  Outcome: Progressing  Goal: Maintains/Returns to pre admission functional level  Description: INTERVENTIONS:  - Perform AM-PAC 6 Click Basic Mobility/ Daily Activity assessment daily.  - Set and communicate daily mobility goal to care team and patient/family/caregiver.   - Collaborate with rehabilitation services on mobility goals if consulted    Problem: CARDIOVASCULAR - ADULT  Goal: Maintains optimal cardiac output and hemodynamic stability  Description: INTERVENTIONS:  - Monitor I/O, vital signs and rhythm  - Monitor for S/S and trends of decreased cardiac output  - Administer and titrate ordered vasoactive medications to optimize hemodynamic stability  - Assess quality of pulses, skin color and temperature  - Assess for signs of decreased coronary artery perfusion  - Instruct patient to report change in severity of symptoms  Outcome: Progressing  Goal: Absence of cardiac dysrhythmias or at baseline rhythm  Description: INTERVENTIONS:  - Continuous cardiac monitoring, vital signs, obtain 12 lead EKG if ordered  - Administer antiarrhythmic and heart rate control medications as ordered  - Monitor electrolytes and administer replacement therapy as ordered  Outcome: Progressing     - Out of bed for toileting  - Record patient progress and toleration of activity level   Outcome: Progressing     - Apply yellow socks and bracelet for high fall risk patients  - Consider moving patient to room near nurses station  Outcome: Progressing

## 2025-03-06 NOTE — H&P
H&P - Critical Care/ICU   Name: Chuy Norton 64 y.o. male I MRN: [id number]  Unit/Bed#: -01 I Date of Admission: 3/5/2025   Date of Service: 3/5/2025 I Hospital Day: 0       Assessment & Plan  Acute respiratory failure with hypoxia (HCC)  Patient brought in by facility staff for SOB, cough x2 weeks and dizziness with standing  At baseline patient states he does not wear oxygen but does wear nocturnal CPAP  In ED, initially stable on 4L NC but had increase WOB requiring BIPAP. Patient was tried off BIPAP after receiving TRUJILLO neb and steroids but failed trial and placed back on BIPAP.   Flu/Covid/RSV negative  CXR: Patchy lingular and lower lobe opacities, concerning for pneumonia.   CT CAP: Multifocal pneumonia involving the left lower lobe, lingula, and medial right lower lobe. Fluid in the esophagus suggests aspiration as a possible etiology. Moderate size hiatal hernia and distended fluid-filled esophagus to the level of the thoracic inlet.     Plan  Continue BIPAP   Xopenex/Atrovent TID  40mg IV solumedrol Q8H  SLP evaluation when weaned off BIPAP  Bipolar 2 disorder, major depressive episode (HCC)  Outpatient: 20mg lexapro, 300mg Seroquel HS, 100mg trazadone HS    Plan  Hold until able to take PO  Primary hypertension  Outpatient: 12.5mg Carvedilol BID, 40mg lisinopril     Plan  Hold outpatient medications until able to take PO  PRN labetolol, for SBP >180  Hyperlipidemia  Outpatient: 40 atorvastatin    Plan  Continue statin when able to take PO  Cirrhosis (HCC)  Follows with [facility], last seen Jan 2025  History of cirrhosis of the liver due to history of alcohol and drug addiction.   5/9/2024 CT C/A/P noted nodular hepatic contours may indicate cirrhosis of the liver.   MELD 3.0: 20  LFT normal  INR therapeutic on Comadin  No ascites present  Left ventricular apical thrombus  Outpatient: 2.5mg Warfarin (Sun, Tues, Wed, Fri) and 5mg (Mon, Thurs, Sat)  INR: 2.68    Plan   Heparin gtt while NPO  Hepatitis  C  Follows with [facility], last seen Jan 2025  History of hepatitis C antibody and stated treatment with Mavyret   May 2024 negative viral load  Severe sepsis (HCC)  SIRS: tachycardia, tachypnea and leukocytosis  CT and CXR show multifocal pneumonia, possibly due to aspiration  Flu/Covid/RSV negative  Afebrile  LA 1.8  Procal 0.94  In ED, received ceftriaxone and azithromycin    Plan  Continue ceftriaxone/azithromycin and add flagyl for possibility of aspirating gastric content  Follow blood cultures, MRSA cultures, sputum cultures, legionella/strep antigens  Add RP2 panel  AM procal    Disposition: Stepdown Level 1    History of Present Illness   Chuy Norton is a 64 y.o. M with PMH of bipolar, alcoholic cirrhosis, hepatitis C, HTN, HLD and left ventricular apical thrombus who presents from facility with c/o SOB and cough x2 weeks. In ED, imaging showed multifocal pneumonia. Initially required 4L NC but eventually required continuous BIPAP for WOB.     History obtained from chart review and the patient.  Review of Systems: Review of Systems   Constitutional:  Positive for diaphoresis and fatigue. Negative for chills and fever.   HENT: Negative.     Eyes: Negative.    Respiratory:  Positive for cough. Negative for chest tightness, shortness of breath and wheezing.    Cardiovascular:  Positive for leg swelling. Negative for chest pain and palpitations.   Gastrointestinal: Negative.    Endocrine: Negative.    Genitourinary: Negative.    Musculoskeletal: Negative.    Skin: Negative.    Allergic/Immunologic: Negative.    Neurological: Negative.    Hematological: Negative.    Psychiatric/Behavioral: Negative.         Historical Information   Past Medical History:  No date: Alcohol abuse  No date: Depression  No date: Drug therapy  No date: GERD (gastroesophageal reflux disease)  No date: Hepatitis C  01/2012: Hypertension  No date: Knee pain, bilateral  No date: Left ventricular apical thrombus  No date: Psychiatric  "disorder      Comment:  depression, anxiety  No date: Renal disorder  No date: Self-injurious behavior  No date: Spinal stenosis of lumbar region  No date: Suicide attempt (HCC) Past Surgical History:  10/1997: AMPUTATION; Right      Comment:  INDEX FINGER  1997: HERNIA REPAIR   Current Outpatient Medications   Medication Instructions    acetaminophen (TYLENOL) 650 mg, Oral, Every 8 hours PRN    atorvastatin (LIPITOR) 40 mg, Oral, Daily with dinner    benzonatate (TESSALON) 200 mg, Oral, 3 times daily PRN    carvedilol (COREG) 12.5 mg, Oral, 2 times daily with meals    diclofenac sodium (VOLTAREN) 2 g, Topical, 4 times daily    escitalopram (LEXAPRO) 20 mg, Oral, Daily    ferrous sulfate 325 mg, Oral, Daily with breakfast    guaiFENesin (ROBITUSSIN) 200 mg, Oral, 3 times daily PRN    lisinopril (ZESTRIL) 40 mg, Oral, Daily    meloxicam (MOBIC) 15 mg tablet     nicotine polacrilex (NICORETTE) 2 mg, Mouth/Throat, Every 3 hours PRN    pantoprazole (PROTONIX) 40 mg, Oral, 2 times daily (before lunch and dinner)    polyethylene glycol (MIRALAX) 17 g, Oral, Daily PRN    QUEtiapine (SEROQUEL) 300 mg, Oral, Daily at bedtime    traZODone (DESYREL) 100 mg, Oral, Daily at bedtime    warfarin (COUMADIN) 5 mg, Oral, Daily (warfarin)    Allergies   Allergen Reactions    Haloperidol Tremor and Vomiting     Other reaction(s): jittery      Social History     Tobacco Use    Smoking status: Every Day     Current packs/day: 0.50     Types: Cigarettes, Cigars    Smokeless tobacco: Never    Tobacco comments:     PT \"3 CIGARS A DAY\" - quit smoking cigars   Vaping Use    Vaping status: Never Used   Substance Use Topics    Alcohol use: Not Currently     Alcohol/week: 0.0 standard drinks of alcohol     Comment: Stopped drinking for several months - recovered alcoholic    Drug use: Not Currently     Types: Marijuana     Comment: Last used marijuana 2 months ago.- no longer smoking    Family History   Problem Relation Age of Onset    " Depression Mother     Depression Father     No Known Problems Sister     No Known Problems Brother           Objective :                   Vitals I/O      Most Recent Min/Max in 24hrs   Temp (!) 97.4 °F (36.3 °C) Temp  Min: 97.4 °F (36.3 °C)  Max: 99.5 °F (37.5 °C)   Pulse 84 Pulse  Min: 84  Max: 109   Resp 21 Resp  Min: 21  Max: 35   /76 BP  Min: 113/66  Max: 201/106   O2 Sat 91 % SpO2  Min: 90 %  Max: 95 %      Intake/Output Summary (Last 24 hours) at 3/5/2025 2128  Last data filed at 3/5/2025 1924  Gross per 24 hour   Intake 350 ml   Output --   Net 350 ml       Diet NPO    Invasive Monitoring           Physical Exam   Physical Exam  Eyes:      Extraocular Movements: Extraocular movements intact.      Pupils: Pupils are equal, round, and reactive to light.   Skin:     General: Skin is warm and dry.      Capillary Refill: Capillary refill takes less than 2 seconds.      Coloration: Skin is not jaundiced or pale.   HENT:      Head: Normocephalic and atraumatic.      Mouth/Throat:      Mouth: Mucous membranes are moist.   Cardiovascular:      Rate and Rhythm: Normal rate and regular rhythm.      Pulses: Normal pulses.   Musculoskeletal:      Right lower le+ Edema present.      Left lower le+ Edema present.   Abdominal: General: Bowel sounds are normal. There is no distension.      Palpations: Abdomen is soft.      Tenderness: There is no abdominal tenderness.   Constitutional:       General: He is not in acute distress.     Appearance: He is well-developed and well-nourished. He is ill-appearing.   Pulmonary:      Effort: Pulmonary effort is normal.      Breath sounds: Decreased breath sounds present.   Neurological:      General: No focal deficit present.      Mental Status: He is alert and oriented to person, place and time. Mental status is at baseline. He is calm.      Sensory: Sensation is intact.      Motor: gross motor function is at baseline for patient. Strength full and intact in all  extremities.   Genitourinary/Anorectal:     Comments: Spontaneous voiding         Diagnostic Studies        Lab Results: I have reviewed the following results:     Medications:  Scheduled PRN   [START ON 3/6/2025] cefTRIAXone, 1,000 mg, Q24H   And  [START ON 3/6/2025] azithromycin, 500 mg, Q24H  chlorhexidine, 15 mL, Q12H SHANNA  metroNIDAZOLE, 500 mg, Q8H  [START ON 3/6/2025] nicotine, 1 patch, Daily          Continuous    heparin (porcine), 3-20 Units/kg/hr (Order-Specific)         Labs:   CBC    Recent Labs     03/05/25  1301 03/05/25  1511   WBC 14.75*  --    HGB 14.1 15.0   HCT 43.5 44   *  --      BMP    Recent Labs     03/05/25  1301 03/05/25  1511   SODIUM 137  --    K 4.1  --      --    CO2 22 23   AGAP 10  --    BUN 23  --    CREATININE 1.52*  --    CALCIUM 8.7  --        Coags    Recent Labs     03/05/25  1510   INR 2.68*   PTT 46*        Additional Electrolytes  Recent Labs     03/05/25  1511   CAIONIZED 1.13          Blood Gas    No recent results  No recent results LFTs  Recent Labs     03/05/25  1301   ALT 13   AST 19   ALKPHOS 55   ALB 4.4   TBILI 0.78       Infectious  Recent Labs     03/05/25  1510   PROCALCITONI 0.94*     Glucose  Recent Labs     03/05/25  1301   GLUC 123

## 2025-03-06 NOTE — QUICK NOTE
Critical Care Interval Transfer Note:    Brief Hospital Summary:  Patient came in yesterday from a group home with c/o cough and SOB x 2 weeks. Found to have dense LLL PNA. Needed bipap in ED for WOB. Also had wheezing on admission. Hx of KENN wears CPAP HS. D2 of ABx. ALso on solumedrol and nebs. Came off BIPAP this AM and tolerating 5L NC.  Hx of LV apical thrombus on coumadin, stopping his heparin gtt now and resuming home coumadin tonight. Pulm to follow.     Barriers to discharge:   Pending cultures and narrowing Abx.   Wean off IV steroids  Possible O2 evaluation for home     Consults: None       Discharge Plan: Anticipate discharge in 48-72 hrs to group home.       Patient seen and evaluated by Critical Care today and deemed to be appropriate for transfer to Med Surg. Spoke to Dr. Chapa from [facility] to accept transfer. Critical care can be contacted via SecureChat with any questions or concerns. Please use the Critical Care AP Role in Secure Chat for any provider inquires until the patient is transferred out of the ICU or until tomorrow at 0600.

## 2025-03-06 NOTE — CASE MANAGEMENT
Case Management Discharge Planning Note    Patient name Chuy Norton  Location /-01 MRN [id number]  : 1960 Date 3/6/2025       Current Admission Date: 3/5/2025  Current Admission Diagnosis:Acute respiratory failure with hypoxia (HCC)   Patient Active Problem List    Diagnosis Date Noted Date Diagnosed    Stroke-like symptoms 2024     Severe sepsis (HCC) 2024     Thrombocytopenia (HCC) 2024     Acute respiratory failure with hypoxia (HCC) 2024     PUD (peptic ulcer disease) 2024     Abnormal CXR 2024     Shortness of breath 2024     Hepatitis C 05/15/2024     Acute pain of right knee 2024     Acute left-sided low back pain without sciatica 2024     Left ventricular apical thrombus 05/10/2024     Cirrhosis (HCC) 2024     Iron deficiency anemia 2024     History of CVA (cerebrovascular accident) 2021     Acute metabolic encephalopathy 2021     SIRS (systemic inflammatory response syndrome) (HCC) 06/15/2021     Patellofemoral pain syndrome of right knee 2020     Elevated d-dimer 2019     Left hip pain 2019     Encounter for medical examination to establish care 10/10/2018     Bipolar 2 disorder, major depressive episode (HCC) 2018     Attention deficit hyperactivity disorder (ADHD), combined type 2018     Hyperlipidemia 2015     Knee pain 2015     Spinal stenosis of lumbar region 2015     Tobacco dependence syndrome 2015     Vitamin D deficiency 2015     Anxiety disorder 2015     Depressive disorder 2013     Low back pain 2013     Primary hypertension 10/18/2012     Alcohol dependence in remission (HCC) 2011       LOS (days): 1  Geometric Mean LOS (GMLOS) (days):   Days to GMLOS:     OBJECTIVE:  Risk of Unplanned Readmission Score: 31.55         Current admission status: Inpatient   Preferred Pharmacy:   [facility] -  [facility] - [address]  [address] [place] [address]  Phone: [phone] Fax: [phone]    Primary Care Provider: Savanna Pan DO    Primary Insurance: [employer] [facility] [employer] [facility]  Secondary Insurance: [employer]    DISCHARGE DETAILS:    Additional Comments: CM left  for Chuy,  at [facility] ([phone] ext [phone]), requesting return pc to discuss pt's baseline functioning. CM to await return pc.

## 2025-03-06 NOTE — PROGRESS NOTES
Progress Note - Critical Care/ICU   Name: Chuy Norton 64 y.o. male I MRN: [id number]  Unit/Bed#: -01 I Date of Admission: 3/5/2025   Date of Service: 3/5/2025 I Hospital Day: 0      Assessment & Plan  Acute respiratory failure with hypoxia (HCC)  Patient brought in by facility staff for SOB, cough x2 weeks and dizziness with standing  At baseline patient states he does not wear oxygen but does wear nocturnal CPAP  In ED, initially stable on 4L NC but had increase WOB requiring BIPAP. Patient was tried off BIPAP after receiving TRUJILLO neb and steroids but failed trial and placed back on BIPAP.   Flu/Covid/RSV negative  CXR: Patchy lingular and lower lobe opacities, concerning for pneumonia.   CT CAP: Multifocal pneumonia involving the left lower lobe, lingula, and medial right lower lobe. Fluid in the esophagus suggests aspiration as a possible etiology. Moderate size hiatal hernia and distended fluid-filled esophagus to the level of the thoracic inlet.     Plan  Continue BIPAP   Xopenex/Atrovent TID  40mg IV solumedrol Q8H  SLP evaluation when weaned off BIPAP  Bipolar 2 disorder, major depressive episode (HCC)  Outpatient: 20mg lexapro, 300mg Seroquel HS, 100mg trazadone HS    Plan  Hold until able to take PO  Primary hypertension  Outpatient: 12.5mg Carvedilol BID, 40mg lisinopril     Plan  Hold outpatient medications until able to take PO  PRN labetolol, for SBP >180  Hyperlipidemia  Outpatient: 40 atorvastatin    Plan  Continue statin when able to take PO  Cirrhosis (HCC)  Follows with [facility], last seen Jan 2025  History of cirrhosis of the liver due to history of alcohol and drug addiction.   5/9/2024 CT C/A/P noted nodular hepatic contours may indicate cirrhosis of the liver.   MELD 3.0: 20  LFT normal  INR therapeutic on Comadin  No ascites present  Left ventricular apical thrombus  Outpatient: 2.5mg Warfarin (Sun, Tues, Wed, Fri) and 5mg (Mon, Thurs, Sat)  INR: 2.68    Plan   Heparin gtt while  NPO  Hepatitis C  Follows with [facility], last seen Jan 2025  History of hepatitis C antibody and stated treatment with Mavyret   May 2024 negative viral load  Severe sepsis (HCC)  SIRS: tachycardia, tachypnea and leukocytosis  CT and CXR show multifocal pneumonia, possibly due to aspiration  Flu/Covid/RSV negative  Afebrile  LA 1.8  Procal 0.94  In ED, received ceftriaxone and azithromycin    Plan  Continue ceftriaxone/azithromycin and add flagyl for possibility of aspirating gastric content  Follow blood cultures, MRSA cultures, sputum cultures, legionella/strep antigens  Add RP2 panel  AM procal  Disposition: Stepdown Level 1    ICU Core Measures     A: Assess, Prevent, and Manage Pain Has pain been assessed? Yes  Need for changes to pain regimen? No   B: Both SAT/SAT  N/A   C: Choice of Sedation RASS Goal: N/A patient not on sedation  Need for changes to sedation or analgesia regimen? NA   D: Delirium CAM-ICU: Negative   E: Early Mobility  Plan for early mobility? Yes   F: Family Engagement Plan for family engagement today? Yes       Antibiotic Review: Awaiting culture results.       Prophylaxis:  VTE VTE covered by:  heparin (porcine), Intravenous, 11.1 Units/kg/hr at 03/05/25 2158       Stress Ulcer  not orderedcovered bypantoprazole (PROTONIX) 40 mg tablet [[id number]] (Long-Term Med)         24 Hour Events : No acute events overnight. Stable on BIPAP.     Subjective   Review of Systems: Review of Systems   Constitutional:  Positive for fatigue. Negative for chills and fever.   HENT: Negative.     Eyes: Negative.    Respiratory:  Positive for cough. Negative for shortness of breath and wheezing.    Cardiovascular:  Positive for leg swelling. Negative for chest pain and palpitations.   Gastrointestinal: Negative.    Endocrine: Negative.    Genitourinary: Negative.    Musculoskeletal: Negative.    Skin: Negative.    Allergic/Immunologic: Negative.    Hematological: Negative.        Objective :                    Vitals I/O      Most Recent Min/Max in 24hrs   Temp (!) 97.4 °F (36.3 °C) Temp  Min: 97.4 °F (36.3 °C)  Max: 99.5 °F (37.5 °C)   Pulse 75 Pulse  Min: 75  Max: 109   Resp 15 Resp  Min: 15  Max: 35   /79 BP  Min: 113/66  Max: 201/106   O2 Sat 95 % SpO2  Min: 90 %  Max: 95 %      Intake/Output Summary (Last 24 hours) at 3/5/2025 2233  Last data filed at 3/5/2025 2200  Gross per 24 hour   Intake 350.33 ml   Output --   Net 350.33 ml       Diet NPO    Invasive Monitoring           Physical Exam   Physical Exam  Vitals and nursing note reviewed.   Eyes:      Extraocular Movements: Extraocular movements intact.      Pupils: Pupils are equal, round, and reactive to light.   Skin:     General: Skin is warm and dry.      Capillary Refill: Capillary refill takes less than 2 seconds.      Coloration: Skin is not pale.   HENT:      Head: Normocephalic and atraumatic.      Mouth/Throat:      Mouth: Mucous membranes are dry.   Cardiovascular:      Rate and Rhythm: Normal rate and regular rhythm.      Pulses: Normal pulses.   Musculoskeletal:      Right lower le+ Edema present.      Left lower le+ Edema present.   Abdominal: General: Bowel sounds are normal. There is no distension.      Palpations: Abdomen is soft.      Tenderness: There is no abdominal tenderness.   Constitutional:       Appearance: He is well-developed and well-nourished. He is ill-appearing. He is not toxic-appearing.   Pulmonary:      Effort: Pulmonary effort is normal.      Breath sounds: Decreased breath sounds present. No wheezing, rhonchi or rales.   Neurological:      General: No focal deficit present.      Mental Status: He is alert and oriented to person, place and time. Mental status is at baseline. He is calm.      Sensory: Sensation is intact.      Motor: gross motor function is at baseline for patient. Strength full and intact in all extremities.   Genitourinary/Anorectal:     Comments: Spontaneous voiding         Diagnostic Studies         Lab Results: I have reviewed the following results:     Medications:  Scheduled PRN   [START ON 3/6/2025] cefTRIAXone, 1,000 mg, Q24H   And  [START ON 3/6/2025] azithromycin, 500 mg, Q24H  chlorhexidine, 15 mL, Q12H SHANNA  metroNIDAZOLE, 500 mg, Q8H  [START ON 3/6/2025] nicotine, 1 patch, Daily          Continuous    heparin (porcine), 3-20 Units/kg/hr (Order-Specific), Last Rate: 11.1 Units/kg/hr (03/05/25 2158)         Labs:   CBC    Recent Labs     03/05/25  1301 03/05/25  1511 03/05/25  2106   WBC 14.75*  --  12.48*   HGB 14.1 15.0 13.1   HCT 43.5 44 40.8   *  --  96*     BMP    Recent Labs     03/05/25  1301 03/05/25  1511   SODIUM 137  --    K 4.1  --      --    CO2 22 23   AGAP 10  --    BUN 23  --    CREATININE 1.52*  --    CALCIUM 8.7  --        Coags    Recent Labs     03/05/25  1510 03/05/25  2106   INR 2.68* 2.76*   PTT 46* 50*        Additional Electrolytes  Recent Labs     03/05/25  1511   CAIONIZED 1.13          Blood Gas    No recent results  No recent results LFTs  Recent Labs     03/05/25  1301   ALT 13   AST 19   ALKPHOS 55   ALB 4.4   TBILI 0.78       Infectious  Recent Labs     03/05/25  1510   PROCALCITONI 0.94*     Glucose  Recent Labs     03/05/25  1301   GLUC 123

## 2025-03-06 NOTE — ASSESSMENT & PLAN NOTE
Outpatient: 20mg lexapro, 300mg Seroquel HS, 100mg trazadone HS    Plan  Hold until able to take PO

## 2025-03-06 NOTE — ASSESSMENT & PLAN NOTE
Outpatient: 12.5mg Carvedilol BID, 40mg lisinopril     Plan  Hold outpatient medications until able to take PO  PRN labetolol, for SBP >180

## 2025-03-06 NOTE — ASSESSMENT & PLAN NOTE
Follows with [facility], last seen Jan 2025  History of hepatitis C antibody and stated treatment with Mavyret   May 2024 negative viral load

## 2025-03-06 NOTE — RESPIRATORY THERAPY NOTE
"RT Protocol Note  Chuy Norton 64 y.o. male MRN: [id number]  Unit/Bed#: -01 Encounter: [id number]    Assessment    Principal Problem:    Acute respiratory failure with hypoxia (HCC)  Active Problems:    Bipolar 2 disorder, major depressive episode (HCC)    Primary hypertension    Hyperlipidemia    Cirrhosis (HCC)    Left ventricular apical thrombus    Hepatitis C    Severe sepsis (HCC)      Home Pulmonary Medications:  NA       Past Medical History:   Diagnosis Date    Alcohol abuse     Depression     Drug therapy     GERD (gastroesophageal reflux disease)     Hepatitis C     Hypertension 01/2012    Knee pain, bilateral     Left ventricular apical thrombus     Psychiatric disorder     depression, anxiety    Renal disorder     Self-injurious behavior     Spinal stenosis of lumbar region     Suicide attempt (HCC)      Social History     Socioeconomic History    Marital status: Single     Spouse name: None    Number of children: 0    Years of education: None    Highest education level: None   Occupational History    Occupation: Unemployed     Comment: Recently lost his job in August at [employer]    Tobacco Use    Smoking status: Every Day     Current packs/day: 0.50     Types: Cigarettes, Cigars    Smokeless tobacco: Never    Tobacco comments:     PT \"3 CIGARS A DAY\" - quit smoking cigars   Vaping Use    Vaping status: Never Used   Substance and Sexual Activity    Alcohol use: Not Currently     Alcohol/week: 0.0 standard drinks of alcohol     Comment: Stopped drinking for several months - recovered alcoholic    Drug use: Not Currently     Types: Marijuana     Comment: Last used marijuana 2 months ago.- no longer smoking    Sexual activity: Never   Other Topics Concern    None   Social History Narrative    He was told to leave the [employer] [place] when he tested positive for THC.  Now states that he is unemployed and homeless.  He has 3 sisters , 2 of which live in [place] and the other in [place].  " "     Social Drivers of Health     Financial Resource Strain: Not on file   Food Insecurity: No Food Insecurity (3/5/2025)    Nursing - Inadequate Food Risk Classification     Worried About Running Out of Food in the Last Year: Never true     Ran Out of Food in the Last Year: Never true     Ran Out of Food in the Last Year: Never true   Transportation Needs: No Transportation Needs (3/5/2025)    Nursing - Transportation Risk Classification     Lack of Transportation: Not on file     Lack of Transportation: No   Physical Activity: Not on file   Stress: Not on file   Social Connections: Not on file   Intimate Partner Violence: Unknown (3/5/2025)    Nursing IPS     Feels Physically and Emotionally Safe: Not on file     Physically Hurt by Someone: Not on file     Humiliated or Emotionally Abused by Someone: Not on file     Physically Hurt by Someone: No     Hurt or Threatened by Someone: No   Housing Stability: Unknown (3/5/2025)    Nursing: Inadequate Housing Risk Classification     Has Housing: Not on file     Worried About Losing Housing: Not on file     Unable to Get Utilities: Not on file     Unable to Pay for Housing in the Last Year: No     Has Housin   Recent Concern: Housing Stability - At Risk (2024)    Nursing: Inadequate Housing Risk Classification     Has Housing: Not on file     Worried About Losing Housing: Not on file     Unable to Get Utilities: Not on file     Unable to Pay for Housing in the Last Year: Yes     Has Housin       Subjective         Objective    Physical Exam:   Assessment Type: Assess only  General Appearance: Sleeping  Respiratory Pattern: Normal  Chest Assessment: Chest expansion symmetrical  Bilateral Breath Sounds: Coarse  O2 Device: BIPAP    Vitals:  Blood pressure 128/69, pulse 79, temperature 97.8 °F (36.6 °C), temperature source Axillary, resp. rate 18, height 5' 8\" (1.727 m), weight 95.4 kg (210 lb 5.1 oz), SpO2 92%.          Imaging and other studies: Results " Review Statement: No pertinent imaging studies reviewed.    O2 Device: BIPAP     Plan    Respiratory Plan: Mild Distress pathway, Vent/NIV/HFNC        Resp Comments: PT has HX of tobacco abuse. No other pulm HX/meds listed in documentation. PT remains on BIPAP 10/6, 50%. PT admitted with hypoxic resp. failure due to suspected COVID-19.

## 2025-03-06 NOTE — ASSESSMENT & PLAN NOTE
Patient brought in by facility staff for SOB, cough x2 weeks and dizziness with standing  At baseline patient states he does not wear oxygen but does wear nocturnal CPAP  In ED, initially stable on 4L NC but had increase WOB requiring BIPAP. Patient was tried off BIPAP after receiving TRUJILLO neb and steroids but failed trial and placed back on BIPAP.   Flu/Covid/RSV negative  CXR: Patchy lingular and lower lobe opacities, concerning for pneumonia.   CT CAP: Multifocal pneumonia involving the left lower lobe, lingula, and medial right lower lobe. Fluid in the esophagus suggests aspiration as a possible etiology. Moderate size hiatal hernia and distended fluid-filled esophagus to the level of the thoracic inlet.     Plan  Continue BIPAP   Xopenex/Atrovent TID  40mg IV solumedrol Q8H  SLP evaluation when weaned off BIPAP

## 2025-03-06 NOTE — ASSESSMENT & PLAN NOTE
Outpatient: 2.5mg Warfarin (Sun, Tues, Wed, Fri) and 5mg (Mon, Thurs, Sat)  INR: 2.68    Plan   Heparin gtt while NPO

## 2025-03-06 NOTE — UTILIZATION REVIEW
Initial Clinical Review    Admission: Date/Time/Statement:   Admission Orders (From admission, onward)       Ordered        03/05/25 1939  INPATIENT ADMISSION  Once            03/05/25 1932                    Orders Placed This Encounter   Procedures    INPATIENT ADMISSION     Standing Status:   Standing     Number of Occurrences:   1     Level of Care:   Level 1 Stepdown [13]     Estimated length of stay:   More than 2 Midnights     Certification:   I certify that inpatient services are medically necessary for this patient for a duration of greater than two midnights. See H&P and MD Progress Notes for additional information about the patient's course of treatment.     ED Arrival Information       Expected   -    Arrival   3/5/2025 12:40    Acuity   Emergent              Means of arrival   Wheelchair    Escorted by   Friend    Service   Critical Care/ICU    Admission type   Emergency              Arrival complaint   Sob             Chief Complaint   Patient presents with    Shortness of Breath     Pt reports SOB. Pt has cough x2 weeks. Pt reports dizziness with standing       Initial Presentation: 64 y.o. male  to ED via private vehicle from [facility].   Admitted to inpatient with Dx: acute respiratory failure with hypoxia/Sepsis.  Presented to ED with shortness of breath on day of arrival, for last 2 days with cough. PMHx: bipolar, alcoholic cirrhosis, hepatitis C, HTN, HLD and left ventricular apical thrombus.  On exam: tachycardia.  Tachypnea.  Decreased breath sounds and wheezing.  Abdominal distention.  Generalized abdominal tenderness.  Affect flat.  Procal 0.94.  .  Wbc 14.75.  bun 23.  Creatinine 1.52 and was 1.39 on 12/20/24.    INR 2.68 on coumadin.   Imaging shows multifocal pneumonia. ED treatment: given Blunt neb, steroids and Mg, started on antibiotics.  Placed on oxygen escalated to Bipap.     Plan includes to admit to Stepdown level 1.  Bipap.  Speech when came wean Bipap.  Continue IV  steroids,  Xopenex/Atrovent TID.  Hold home medications until can take po.  As needed labetalol for SBP > 180.  Start Heparin drip and hold coumadin.  Continue antibiotics.  Follow blood cultures, MRSA cultures, sputum cultures, legionella/strep antigens.  Check RP2 panel.     Date: 3/6/25    Day 2:   today with fatigue, cough and leg swelling.  on exam diminished breath sounds.  Mild respiratory distress.  Awake and alert.   wbc 13.90/  bun 25. Creatinine 1.35.  monitor mental status, restart psyche meds when able.  Continue BIPAP.   Continue steroids and nebs.  NPO.  Continue ceftriaxone and azithromycin. Continue heparin drip    ED Treatment-Medication Administration from 03/05/2025 1239 to 03/05/2025 2038         Date/Time Order Dose Route Action     03/05/2025 1601 cefTRIAXone (ROCEPHIN) IVPB (premix in dextrose) 2,000 mg 50 mL 2,000 mg Intravenous New Bag     03/05/2025 1745 azithromycin (ZITHROMAX) 500 mg in sodium chloride 0.9% 250mL IVPB 500 mg 500 mg Intravenous New Bag     03/05/2025 1814 albuterol inhalation solution 10 mg 10 mg Nebulization Given     03/05/2025 1815 ipratropium (ATROVENT) 0.02 % inhalation solution 1 mg 1 mg Nebulization Given     03/05/2025 1815 sodium chloride 0.9 % inhalation solution 12 mL 12 mL Nebulization Given     03/05/2025 1819 methylPREDNISolone sodium succinate (Solu-MEDROL) injection 125 mg 125 mg Intravenous Given     03/05/2025 1824 magnesium sulfate 2 g/50 mL IVPB (premix) 2 g 2 g Intravenous New Bag            Scheduled Medications:  cefTRIAXone, 1,000 mg, Intravenous, Q24H   And  azithromycin, 500 mg, Intravenous, Q24H  chlorhexidine, 15 mL, Mouth/Throat, Q12H SHANNA  insulin lispro, 1-6 Units, Subcutaneous, Q6H SHANNA  ipratropium, 0.5 mg, Nebulization, TID  levalbuterol, 1.25 mg, Nebulization, TID  methylPREDNISolone sodium succinate, 40 mg, Intravenous, Q12H SHANNA  metroNIDAZOLE, 500 mg, Intravenous, Q8H  nicotine, 1 patch, Transdermal, Daily    Continuous IV  Infusions:  heparin (porcine), 3-20 Units/kg/hr (Order-Specific), Intravenous, Titrated    PRN Meds:  acetaminophen, 1,000 mg, Intravenous, Q6H PRN - x 1 3/6      ED Triage Vitals   Temperature Pulse Respirations Blood Pressure SpO2 Pain Score   03/05/25 1251 03/05/25 1250 03/05/25 1250 03/05/25 1250 03/05/25 1250 03/05/25 2041   99.1 °F (37.3 °C) 93 (!) 24 127/75 90 % No Pain     Weight (last 2 days)       Date/Time Weight    03/06/25 0449 95.4 (210.32)    03/05/25 2041 95.4 (210.32)          Vital Signs (last 3 days)       Date/Time Temp Pulse Resp BP MAP (mmHg) SpO2 FiO2 (%) Calculated FIO2 (%) - Nasal Cannula Nasal Cannula O2 Flow Rate (L/min) O2 Device O2 Interface Device Patient Position - Orthostatic VS Savanna Coma Scale Score Pain    03/06/25 1120 98.1 °F (36.7 °C) 89 33 147/75 105 92 % -- -- -- -- -- -- -- --    03/06/25 1104 98.1 °F (36.7 °C) -- -- 181/89 126 -- -- 44 6 L/min BiPAP -- Lying -- --    03/06/25 0910 -- 90 27 157/88 117 92 % -- -- -- -- -- -- -- 8    03/06/25 0906 -- -- -- -- -- 93 % -- -- -- -- Full face mask -- -- --    03/06/25 0900 -- 87 24 180/94 129 94 % -- -- -- -- -- -- -- --    03/06/25 0800 -- 85 28 140/76 103 97 % -- -- -- BiPAP -- -- -- --    03/06/25 0730 -- -- -- -- -- -- -- -- -- -- -- -- 15 --    03/06/25 0700 98.1 °F (36.7 °C) 87 26 161/90 118 95 % -- 44 6 L/min BiPAP -- Lying -- --    03/06/25 0600 -- 89 18 150/92 116 92 % -- -- -- -- -- -- -- --    03/06/25 0500 -- 77 18 140/63 91 95 % -- -- -- -- -- -- -- --    03/06/25 0444 -- -- -- -- -- -- -- -- -- -- -- -- 15 --    03/06/25 0401 97.5 °F (36.4 °C) -- -- -- -- -- -- -- -- -- -- -- -- --    03/06/25 0400 -- 83 20 135/91 106 91 % -- -- -- BiPAP -- Lying -- No Pain    03/06/25 0300 -- 77 16 127/71 94 92 % -- -- -- -- -- -- -- --    03/06/25 0100 -- 79 18 128/69 93 92 % 50 -- -- BiPAP -- -- -- --    03/06/25 0000 -- 78 22 143/70 101 95 % -- -- -- -- -- -- 15 --    03/05/25 2300 -- 81 19 147/70 97 97 % -- -- -- BiPAP -- Lying  -- No Pain    03/05/25 2252 97.8 °F (36.6 °C) 72 16 134/82 102 96 % -- -- -- BiPAP -- Lying -- --    03/05/25 2200 -- 75 15 118/79 94 95 % -- -- -- -- -- -- -- --    03/05/25 2145 -- 75 19 117/75 91 95 % -- -- -- -- -- -- -- --    03/05/25 2130 -- 75 19 119/76 93 93 % -- -- -- -- -- -- -- --    03/05/25 2115 -- 76 18 119/78 94 93 % -- -- -- -- -- -- -- --    03/05/25 2100 -- 80 18 126/77 97 92 % -- -- -- -- -- -- -- --    03/05/25 2056 -- -- -- -- -- -- -- -- -- -- -- -- 15 --    03/05/25 2045 -- 79 18 119/72 91 94 % -- -- -- -- -- -- -- --    03/05/25 2041 97.4 °F (36.3 °C) 84 21 124/76 95 91 % -- -- -- BiPAP -- -- -- No Pain    03/05/25 2000 -- 84 23 113/66 85 92 % -- -- -- BiPAP -- -- -- --    03/05/25 1941 -- -- -- -- -- 91 % -- -- -- -- Full face mask -- -- --    03/05/25 1934 -- -- -- -- -- 91 % -- -- -- -- MFNC prongs -- -- --    03/05/25 1930 -- 92 24 129/70 93 90 % -- -- -- BiPAP -- -- -- --    03/05/25 1915 99.5 °F (37.5 °C) 95 25 -- -- 91 % -- -- -- BiPAP -- -- -- --    03/05/25 1830 -- 94 25 149/80 109 95 % -- -- -- BiPAP -- -- -- --    03/05/25 1816 -- -- -- -- -- -- -- -- -- -- Full face mask -- -- --    03/05/25 1800 -- 100 35 175/101 127 92 % -- 44 6 L/min BiPAP -- -- -- --    03/05/25 1747 99.2 °F (37.3 °C) -- -- -- -- -- -- -- -- -- -- -- -- --    03/05/25 1700 -- 109 28 155/85 -- 91 % -- 44 6 L/min Nasal cannula -- -- -- --    03/05/25 1600 -- 100 26 168/87 121 93 % -- 44 6 L/min Nasal cannula -- -- -- --    03/05/25 1548 -- -- -- -- -- -- -- -- -- -- -- -- 15 --    03/05/25 1518 -- 98 26 201/106 147 93 % -- -- -- Nasal cannula -- Lying -- --    03/05/25 1516 -- -- -- -- -- -- -- -- -- Nasal cannula -- -- -- --    03/05/25 1515 -- 97 24 -- -- 92 % -- 36 4 L/min Nasal cannula -- -- -- --    03/05/25 1500 -- 99 24 201/106 147 91 % -- 28 2 L/min Nasal cannula -- -- -- --    03/05/25 1251 99.1 °F (37.3 °C) -- -- -- -- -- -- -- -- -- -- -- -- --    03/05/25 1250 -- 93 24 127/75 -- 90 % -- -- -- None  (Room air) -- Sitting -- --            Pertinent Labs/Diagnostic Test Results:   Radiology:  CT chest abdomen pelvis w contrast   Final Interpretation by Leigh Epps MD (03/05 9828)      Multifocal pneumonia involving the left lower lobe, lingula, and medial right lower lobe. Fluid in the esophagus suggests aspiration as a possible etiology.      Moderate size hiatal hernia and distended fluid-filled esophagus to the level of the thoracic inlet.      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      The study was marked in EPIC for immediate notification.               Workstation performed: [id number]         XR chest 2 views   Final Interpretation by Cj Bojorquez MD (03/05 8343)      Patchy lingular and lower lobe opacities, concerning for pneumonia. Radiographic follow-up to resolution is recommended.            Workstation performed: [id number]           Cardiology:  ECG 12 lead   Final Result by Omari Davalos MD (03/06 0752)   Sinus rhythm with 1st degree A-V block   Inferior infarct (cited on or before 19-Sep-2018)   Anterolateral infarct (cited on or before 19-Sep-2018)   Abnormal ECG   When compared with ECG of 05-Mar-2025 13:01,   No significant change was found   Confirmed by Omari Davalos ([id number]) on 3/6/2025 7:52:33 AM      ECG 12 lead   Final Result by Omari Davalos MD (03/05 1446)   Sinus rhythm with 1st degree A-V block   Inferior infarct   Anterolateral infarct   Abnormal ECG   When compared with ECG of 17-Dec-2024 17:19, (unconfirmed)   No significant change was found   Confirmed by Omari Davalos ([id number]) on 3/5/2025 2:49:54 PM        GI:  No orders to display       Results from last 7 days   Lab Units 03/05/25 2106 03/05/25  1510   SARS-COV-2  Not Detected Negative     Results from last 7 days   Lab Units 03/06/25  0434 03/05/25  2106 03/05/25  1511 03/05/25  1301   WBC  Thousand/uL 13.90* 12.48*  --  14.75*   HEMOGLOBIN g/dL 13.9 13.1  --  14.1   I STAT HEMOGLOBIN g/dl  --   --  15.0  --    HEMATOCRIT % 43.9 40.8  --  43.5   HEMATOCRIT, ISTAT %  --   --  44  --    PLATELETS Thousands/uL 105* 96*  --  108*   TOTAL NEUT ABS Thousands/µL 12.42*  --   --  12.20*     Results from last 7 days   Lab Units 03/06/25 0434 03/05/25  1511 03/05/25  1301   SODIUM mmol/L 138  --  137   POTASSIUM mmol/L 4.1  --  4.1   CHLORIDE mmol/L 105  --  105   CO2 mmol/L 22  --  22   CO2, I-STAT mmol/L  --  23  --    ANION GAP mmol/L 11  --  10   BUN mg/dL 25  --  23   CREATININE mg/dL 1.35*  --  1.52*   EGFR ml/min/1.73sq m 55  --  48   CALCIUM mg/dL 8.8  --  8.7   CALCIUM, IONIZED mmol/L 1.15  --   --    CALCIUM, IONIZED, ISTAT mmol/L  --  1.13  --    MAGNESIUM mg/dL 2.1  --   --    PHOSPHORUS mg/dL 3.7  --   --      Results from last 7 days   Lab Units 03/05/25  1301   AST U/L 19   ALT U/L 13   ALK PHOS U/L 55   TOTAL PROTEIN g/dL 7.1   ALBUMIN g/dL 4.4   TOTAL BILIRUBIN mg/dL 0.78     Results from last 7 days   Lab Units 03/06/25  1124   POC GLUCOSE mg/dl 147*     Results from last 7 days   Lab Units 03/06/25  0434 03/05/25  1301   GLUCOSE RANDOM mg/dL 174* 123     Results from last 7 days   Lab Units 03/06/25  0434   PH STEPHANIE  7.278*   PCO2 STEPHANIE mm Hg 46.4   PO2 STEPHANIE mm Hg 32.6*   HCO3 STEPHANIE mmol/L 21.2*   BASE EXC STEPHANIE mmol/L -5.6   O2 CONTENT STEPHANIE ml/dL 11.3   O2 HGB, VENOUS % 54.5*     Results from last 7 days   Lab Units 03/05/25  1511   PH, STEPHANIE I-STAT  7.389   PCO2, STEPHANIE ISTAT mm HG 36.5*   PO2, STEPHANIE ISTAT mm HG 35.0   HCO3, STEPHANIE ISTAT mmol/L 22.0*   I STAT BASE EXC mmol/L -2   I STAT O2 SAT % 67     Results from last 7 days   Lab Units 03/05/25  1834 03/05/25  1514 03/05/25  1301   HS TNI 0HR ng/L  --   --  19   HS TNI 2HR ng/L  --  17  --    HSTNI D2 ng/L  --  -2  --    HS TNI 4HR ng/L 16  --   --    HSTNI D4 ng/L -3  --   --      Results from last 7 days   Lab Units 03/06/25  0434 03/05/25  2231  03/05/25  1510   PROTIME seconds  --  29.4* 28.7*   INR   --  2.76* 2.68*   PTT seconds 87* 50* 46*     Results from last 7 days   Lab Units 03/06/25  0434 03/05/25  1510   PROCALCITONIN ng/ml 2.69* 0.94*     Results from last 7 days   Lab Units 03/05/25  1510   LACTIC ACID mmol/L 1.8     Results from last 7 days   Lab Units 03/05/25  1301   BNP pg/mL 381*     Results from last 7 days   Lab Units 03/05/25  2106 03/05/25  1510   INFLUENZA A PCR   --  Negative   INFLUENZA B PCR   --  Negative   INFLUENZA B  Not Detected  --    RSV PCR   --  Negative   RESPIRATORY SYNCYTIAL VIRUS  Not Detected  --      Results from last 7 days   Lab Units 03/05/25  2106   ADENOVIRUS  Not Detected   BORDETELLA PARAPERTUSSIS  Not Detected   BORDETELLA PERTUSSIS  Not Detected   CHLAMYDIA PNEUMONIAE  Not Detected   CORONAVIRUS 229E  Not Detected   CORONAVIRUS HKU1  Not Detected   CORONAVIRUS NL63  Not Detected   CORONAVIRUS OC43  Not Detected   METAPNEUMOVIRUS  Not Detected   RHINOVIRUS  Not Detected   MYCOPLASMA PNEUMONIAE  Not Detected   PARAINFLUENZA 1  Not Detected   PARAINFLUENZA 2  Not Detected   PARAINFLUENZA 3  Not Detected   PARAINFLUENZA 4  Not Detected     Results from last 7 days   Lab Units 03/05/25  1510   BLOOD CULTURE  Received in Microbiology Lab. Culture in Progress.  Received in Microbiology Lab. Culture in Progress.     Past Medical History:   Diagnosis Date    Alcohol abuse     Depression     Drug therapy     GERD (gastroesophageal reflux disease)     Hepatitis C     Hypertension 01/2012    Knee pain, bilateral     Left ventricular apical thrombus     Psychiatric disorder     depression, anxiety    Renal disorder     Self-injurious behavior     Spinal stenosis of lumbar region     Suicide attempt (HCC)      Present on Admission:   Bipolar 2 disorder, major depressive episode (HCC)   Cirrhosis (HCC)   Primary hypertension   Hyperlipidemia   Hepatitis C   Severe sepsis (HCC)   Left ventricular apical thrombus   Acute  respiratory failure with hypoxia (HCC)      Admitting Diagnosis: Hypoxia [R09.02]  Abnormal CT scan [R93.89]  Shortness of breath [R06.02]  Multifocal pneumonia [J18.9]  Age/Sex: 64 y.o. male    Network Utilization Review Department  ATTENTION: Please call with any questions or concerns to [phone] and carefully listen to the prompts so that you are directed to the right person. All voicemails are confidential.   For Discharge needs, contact Care Management DC Support Team at [phone] opt. [phone]  Send all requests for admission clinical reviews, approved or denied determinations and any other requests to dedicated fax number below belonging to the campus where the patient is receiving treatment. List of dedicated fax numbers for the Facilities:  FACILITY NAME UR FAX NUMBER   ADMISSION DENIALS (Administrative/Medical Necessity) [phone]   DISCHARGE SUPPORT TEAM (NETWORK) [phone]   PARENT CHILD HEALTH (Maternity/NICU/Pediatrics) [phone]   [facility] [phone]   [facility] [phone]   [facility] [phone]   [facility] [phone]   [facility] [phone]   [facility] [phone]   [facility] [phone]   [facility] [phone]   [facility] [phone]   [facility] [phone]   [facility] [phone]   [facility] [phone]

## 2025-03-06 NOTE — ASSESSMENT & PLAN NOTE
Follows with WALE LOTT, last seen Jan 2025  History of cirrhosis of the liver due to history of alcohol and drug addiction.   5/9/2024 CT C/A/P noted nodular hepatic contours may indicate cirrhosis of the liver.   MELD 3.0: 20  LFT normal  INR therapeutic on Comadin  No ascites present

## 2025-03-06 NOTE — CASE MANAGEMENT
Case Management Assessment & Discharge Planning Note    Patient name Chuy Norton  Location /-01 MRN [id number]  : 1960 Date 3/6/2025       Current Admission Date: 3/5/2025  Current Admission Diagnosis:Acute respiratory failure with hypoxia (HCC)   Patient Active Problem List    Diagnosis Date Noted Date Diagnosed    Stroke-like symptoms 2024     Severe sepsis (HCC) 2024     Thrombocytopenia (HCC) 2024     Acute respiratory failure with hypoxia (HCC) 2024     PUD (peptic ulcer disease) 2024     Abnormal CXR 2024     Shortness of breath 2024     Hepatitis C 05/15/2024     Acute pain of right knee 2024     Acute left-sided low back pain without sciatica 2024     Left ventricular apical thrombus 05/10/2024     Cirrhosis (HCC) 2024     Iron deficiency anemia 2024     History of CVA (cerebrovascular accident) 2021     Acute metabolic encephalopathy 2021     SIRS (systemic inflammatory response syndrome) (HCC) 06/15/2021     Patellofemoral pain syndrome of right knee 2020     Elevated d-dimer 2019     Left hip pain 2019     Encounter for medical examination to establish care 10/10/2018     Bipolar 2 disorder, major depressive episode (HCC) 2018     Attention deficit hyperactivity disorder (ADHD), combined type 2018     Hyperlipidemia 2015     Knee pain 2015     Spinal stenosis of lumbar region 2015     Tobacco dependence syndrome 2015     Vitamin D deficiency 2015     Anxiety disorder 2015     Depressive disorder 2013     Low back pain 2013     Primary hypertension 10/18/2012     Alcohol dependence in remission (HCC) 2011       LOS (days): 1  Geometric Mean LOS (GMLOS) (days):   Days to GMLOS:     OBJECTIVE:    Risk of Unplanned Readmission Score: 31.55         Current admission status: Inpatient       Preferred Pharmacy:   [place]  [facility] - [address]  [address] [place] [address]  Phone: [phone] Fax: [phone]    Primary Care Provider: Savanna Pan DO    Primary Insurance: [facility]UM [facility]  Secondary Insurance: JOEL MUNOZ    ASSESSMENT:  Active Health Care Proxies    There are no active Health Care Proxies on file.                 Readmission Root Cause  30 Day Readmission: No    Patient Information  Admitted from:: Facility ([facility])  Mental Status: Alert  During Assessment patient was accompanied by: Not accompanied during assessment  Assessment information provided by:: [facility] - please [facility]  Primary Caregiver: Self  Support Systems: Self, Friends/neighbors, /  County of Residence: [place]  [facility] do you live [facility]  Type of Current Residence: Group home  Upon entering residence, is there a bedroom on the main floor (no further steps)?: Yes  Upon entering residence, is there a bathroom on the main floor (no further steps)?: Yes  Living Arrangements: Lives in Facility  Is patient a ?: Yes  Is patient active with [facility] ( Affairs)?: Yes  Is patient service connected?: Yes    Activities of Daily Living Prior to Admission  Functional Status: Independent  Completes ADLs independently?: Yes  Ambulates independently?: Yes  Does patient use assisted devices?: No  Does patient currently own DME?: Yes  What DME does the patient currently own?: [employer]ight [employer]  Does patient have a history of Outpatient Therapy (PT/OT)?: No  Does the patient have a history of Short-Term Rehab?: No  Does patient have a history of HHC?: Yes  Does patient currently have HHC?: No         Patient Information Continued  Income Source: SSI/SSD  Does patient have prescription coverage?: Yes  Does patient receive dialysis treatments?: No  Does patient have a history of substance abuse?:  Yes  Historical substance use preference: Alcohol/ETOH  History of Withdrawal Symptoms: Denies past symptoms  Is patient currently in treatment for substance abuse?: N/A - sober  Does patient have a history of Mental Health Diagnosis?: Yes (Anxiety and bipolar.)  Is patient receiving treatment for mental health?: Yes  Has patient received inpatient treatment related to mental health in the last 2 years?: No         Means of Transportation  Means of Transport to Appts:: Other (Comment) ([employer] [facility] transports.)          DISCHARGE DETAILS:    Discharge planning discussed with:: Chuy,  at [facility].  [place] of Choice: Yes  Comments - Freedom of Choice: Return to [facility]  CM contacted family/caregiver?: Yes  Were Treatment Team discharge recommendations reviewed with patient/caregiver?: Yes  Did patient/caregiver verbalize understanding of patient care needs?: Yes  Were patient/caregiver advised of the risks associated with not following Treatment Team discharge recommendations?: Yes    Contacts  Patient Contacts: Peter Vera/ [facility] CM.  Relationship to Patient:: Treatment Provider  Contact Method: Phone  Phone Number: [phone] ext. [phone]  Reason/Outcome: Continuity of Care, Discharge Planning    Requested Home Health Care         Is the patient interested in HHC at discharge?: No    DME Referral Provided  Referral made for DME?: No      Additional Comments: CM received return pc from Chuy of [facility]. Pt was IPTA with ADL's and ambulation. Pt owns a cane, but only uses it sometimes when he has knee pain. No hx of STR. Chuy unsure, but thinks pt has had HHC in past. Pt has anxiety and bipolar dx and is compliant with medication managment. Pt has hx of substance usage and is sober at this time. Chuy requested clinical be faxed to them at [phone]. CM faxed H&P and progress note from today. CM department to continue to update [facility] of  discharge timeframe.

## 2025-03-07 PROBLEM — G47.33 OSA (OBSTRUCTIVE SLEEP APNEA): Chronic | Status: ACTIVE | Noted: 2025-03-07

## 2025-03-07 PROBLEM — J18.9 MULTIFOCAL PNEUMONIA: Status: ACTIVE | Noted: 2025-03-07

## 2025-03-07 LAB
ANION GAP SERPL CALCULATED.3IONS-SCNC: 10 MMOL/L (ref 4–13)
ATRIAL RATE: 85 BPM
BUN SERPL-MCNC: 37 MG/DL (ref 5–25)
CALCIUM SERPL-MCNC: 8.9 MG/DL (ref 8.4–10.2)
CHLORIDE SERPL-SCNC: 106 MMOL/L (ref 96–108)
CO2 SERPL-SCNC: 20 MMOL/L (ref 21–32)
CREAT SERPL-MCNC: 1.18 MG/DL (ref 0.6–1.3)
ERYTHROCYTE [DISTWIDTH] IN BLOOD BY AUTOMATED COUNT: 21.1 % (ref 11.6–15.1)
GFR SERPL CREATININE-BSD FRML MDRD: 65 ML/MIN/1.73SQ M
GLUCOSE SERPL-MCNC: 136 MG/DL (ref 65–140)
GLUCOSE SERPL-MCNC: 139 MG/DL (ref 65–140)
GLUCOSE SERPL-MCNC: 143 MG/DL (ref 65–140)
GLUCOSE SERPL-MCNC: 159 MG/DL (ref 65–140)
GLUCOSE SERPL-MCNC: 174 MG/DL (ref 65–140)
HCT VFR BLD AUTO: 44.7 % (ref 36.5–49.3)
HGB BLD-MCNC: 14.1 G/DL (ref 12–17)
INR PPP: 2.86 (ref 0.85–1.19)
MAGNESIUM SERPL-MCNC: 2.8 MG/DL (ref 1.9–2.7)
MCH RBC QN AUTO: 28.4 PG (ref 26.8–34.3)
MCHC RBC AUTO-ENTMCNC: 31.5 G/DL (ref 31.4–37.4)
MCV RBC AUTO: 90 FL (ref 82–98)
MRSA NOSE QL CULT: ABNORMAL
MRSA NOSE QL CULT: ABNORMAL
P AXIS: 50 DEGREES
PHOSPHATE SERPL-MCNC: 2.6 MG/DL (ref 2.3–4.1)
PLATELET # BLD AUTO: 131 THOUSANDS/UL (ref 149–390)
POTASSIUM SERPL-SCNC: 4.2 MMOL/L (ref 3.5–5.3)
PR INTERVAL: 204 MS
PROCALCITONIN SERPL-MCNC: 2.75 NG/ML
PROTHROMBIN TIME: 30.2 SECONDS (ref 12.3–15)
QRS AXIS: -19 DEGREES
QRSD INTERVAL: 98 MS
QT INTERVAL: 348 MS
QTC INTERVAL: 414 MS
RBC # BLD AUTO: 4.97 MILLION/UL (ref 3.88–5.62)
SODIUM SERPL-SCNC: 136 MMOL/L (ref 135–147)
T WAVE AXIS: 84 DEGREES
VENTRICULAR RATE: 85 BPM
WBC # BLD AUTO: 16.44 THOUSAND/UL (ref 4.31–10.16)

## 2025-03-07 PROCEDURE — 82948 REAGENT STRIP/BLOOD GLUCOSE: CPT

## 2025-03-07 PROCEDURE — 94640 AIRWAY INHALATION TREATMENT: CPT

## 2025-03-07 PROCEDURE — 92610 EVALUATE SWALLOWING FUNCTION: CPT

## 2025-03-07 PROCEDURE — 85610 PROTHROMBIN TIME: CPT | Performed by: INTERNAL MEDICINE

## 2025-03-07 PROCEDURE — 80048 BASIC METABOLIC PNL TOTAL CA: CPT | Performed by: INTERNAL MEDICINE

## 2025-03-07 PROCEDURE — 93010 ELECTROCARDIOGRAM REPORT: CPT | Performed by: INTERNAL MEDICINE

## 2025-03-07 PROCEDURE — 83735 ASSAY OF MAGNESIUM: CPT | Performed by: INTERNAL MEDICINE

## 2025-03-07 PROCEDURE — 99232 SBSQ HOSP IP/OBS MODERATE 35: CPT | Performed by: INTERNAL MEDICINE

## 2025-03-07 PROCEDURE — 94760 N-INVAS EAR/PLS OXIMETRY 1: CPT

## 2025-03-07 PROCEDURE — 84100 ASSAY OF PHOSPHORUS: CPT | Performed by: INTERNAL MEDICINE

## 2025-03-07 PROCEDURE — 84145 PROCALCITONIN (PCT): CPT | Performed by: INTERNAL MEDICINE

## 2025-03-07 PROCEDURE — 85027 COMPLETE CBC AUTOMATED: CPT | Performed by: INTERNAL MEDICINE

## 2025-03-07 PROCEDURE — 94660 CPAP INITIATION&MGMT: CPT

## 2025-03-07 RX ORDER — POLYETHYLENE GLYCOL 3350 17 G/17G
17 POWDER, FOR SOLUTION ORAL DAILY PRN
Status: DISCONTINUED | OUTPATIENT
Start: 2025-03-07 | End: 2025-03-10

## 2025-03-07 RX ORDER — GUAIFENESIN 100 MG/5ML
200 SOLUTION ORAL 3 TIMES DAILY PRN
Status: DISCONTINUED | OUTPATIENT
Start: 2025-03-07 | End: 2025-03-22 | Stop reason: HOSPADM

## 2025-03-07 RX ORDER — LABETALOL HYDROCHLORIDE 5 MG/ML
10 INJECTION, SOLUTION INTRAVENOUS EVERY 6 HOURS PRN
Status: DISCONTINUED | OUTPATIENT
Start: 2025-03-07 | End: 2025-03-10

## 2025-03-07 RX ORDER — LISINOPRIL 20 MG/1
40 TABLET ORAL DAILY
Status: DISCONTINUED | OUTPATIENT
Start: 2025-03-07 | End: 2025-03-08

## 2025-03-07 RX ORDER — BENZONATATE 100 MG/1
200 CAPSULE ORAL 3 TIMES DAILY PRN
Status: DISCONTINUED | OUTPATIENT
Start: 2025-03-07 | End: 2025-03-22 | Stop reason: HOSPADM

## 2025-03-07 RX ORDER — PREDNISONE 20 MG/1
40 TABLET ORAL DAILY
Status: DISCONTINUED | OUTPATIENT
Start: 2025-03-08 | End: 2025-03-07

## 2025-03-07 RX ORDER — METHYLPREDNISOLONE SODIUM SUCCINATE 40 MG/ML
40 INJECTION, POWDER, LYOPHILIZED, FOR SOLUTION INTRAMUSCULAR; INTRAVENOUS EVERY 12 HOURS SCHEDULED
Status: COMPLETED | OUTPATIENT
Start: 2025-03-07 | End: 2025-03-07

## 2025-03-07 RX ADMIN — INSULIN LISPRO 1 UNITS: 100 INJECTION, SOLUTION INTRAVENOUS; SUBCUTANEOUS at 17:00

## 2025-03-07 RX ADMIN — IPRATROPIUM BROMIDE 0.5 MG: 0.5 SOLUTION RESPIRATORY (INHALATION) at 14:17

## 2025-03-07 RX ADMIN — FERROUS SULFATE TAB 325 MG (65 MG ELEMENTAL FE) 325 MG: 325 (65 FE) TAB at 07:43

## 2025-03-07 RX ADMIN — ATORVASTATIN CALCIUM 40 MG: 40 TABLET, FILM COATED ORAL at 16:58

## 2025-03-07 RX ADMIN — METRONIDAZOLE 500 MG: 500 INJECTION, SOLUTION INTRAVENOUS at 20:48

## 2025-03-07 RX ADMIN — AZITHROMYCIN MONOHYDRATE 500 MG: 500 INJECTION, POWDER, LYOPHILIZED, FOR SOLUTION INTRAVENOUS at 17:05

## 2025-03-07 RX ADMIN — METRONIDAZOLE 500 MG: 500 INJECTION, SOLUTION INTRAVENOUS at 04:17

## 2025-03-07 RX ADMIN — CARVEDILOL 12.5 MG: 12.5 TABLET, FILM COATED ORAL at 07:43

## 2025-03-07 RX ADMIN — LABETALOL HYDROCHLORIDE 10 MG: 5 INJECTION, SOLUTION INTRAVENOUS at 04:31

## 2025-03-07 RX ADMIN — NICOTINE 1 PATCH: 14 PATCH, EXTENDED RELEASE TRANSDERMAL at 08:56

## 2025-03-07 RX ADMIN — CEFTRIAXONE 1000 MG: 1 INJECTION, SOLUTION INTRAVENOUS at 14:33

## 2025-03-07 RX ADMIN — ESCITALOPRAM OXALATE 20 MG: 20 TABLET ORAL at 08:50

## 2025-03-07 RX ADMIN — CHLORHEXIDINE GLUCONATE 15 ML: 1.2 RINSE ORAL at 20:48

## 2025-03-07 RX ADMIN — INSULIN LISPRO 1 UNITS: 100 INJECTION, SOLUTION INTRAVENOUS; SUBCUTANEOUS at 11:56

## 2025-03-07 RX ADMIN — METRONIDAZOLE 500 MG: 500 INJECTION, SOLUTION INTRAVENOUS at 12:52

## 2025-03-07 RX ADMIN — IPRATROPIUM BROMIDE 0.5 MG: 0.5 SOLUTION RESPIRATORY (INHALATION) at 19:24

## 2025-03-07 RX ADMIN — PANTOPRAZOLE SODIUM 40 MG: 40 TABLET, DELAYED RELEASE ORAL at 11:55

## 2025-03-07 RX ADMIN — WARFARIN SODIUM 2.5 MG: 2.5 TABLET ORAL at 17:01

## 2025-03-07 RX ADMIN — CHLORHEXIDINE GLUCONATE 15 ML: 1.2 RINSE ORAL at 08:50

## 2025-03-07 RX ADMIN — METHYLPREDNISOLONE SODIUM SUCCINATE 40 MG: 40 INJECTION, POWDER, FOR SOLUTION INTRAMUSCULAR; INTRAVENOUS at 08:50

## 2025-03-07 RX ADMIN — LEVALBUTEROL HYDROCHLORIDE 1.25 MG: 1.25 SOLUTION RESPIRATORY (INHALATION) at 08:26

## 2025-03-07 RX ADMIN — LEVALBUTEROL HYDROCHLORIDE 1.25 MG: 1.25 SOLUTION RESPIRATORY (INHALATION) at 19:24

## 2025-03-07 RX ADMIN — LISINOPRIL 40 MG: 20 TABLET ORAL at 12:52

## 2025-03-07 RX ADMIN — PANTOPRAZOLE SODIUM 40 MG: 40 TABLET, DELAYED RELEASE ORAL at 16:58

## 2025-03-07 RX ADMIN — METHYLPREDNISOLONE SODIUM SUCCINATE 40 MG: 40 INJECTION, POWDER, FOR SOLUTION INTRAMUSCULAR; INTRAVENOUS at 20:48

## 2025-03-07 RX ADMIN — LEVALBUTEROL HYDROCHLORIDE 1.25 MG: 1.25 SOLUTION RESPIRATORY (INHALATION) at 14:17

## 2025-03-07 RX ADMIN — CARVEDILOL 12.5 MG: 12.5 TABLET, FILM COATED ORAL at 16:58

## 2025-03-07 RX ADMIN — IPRATROPIUM BROMIDE 0.5 MG: 0.5 SOLUTION RESPIRATORY (INHALATION) at 08:27

## 2025-03-07 NOTE — ASSESSMENT & PLAN NOTE
SIRS: tachycardia, tachypnea and leukocytosis  CT and CXR show multifocal pneumonia, possibly due to aspiration  Flu/Covid/RSV negative  Afebrile  LA 1.8  Procal 0.94  In ED, received ceftriaxone and azithromycin    Plan  Continue ceftriaxone/azithromycin and add flagyl   Follow-up infectious workup  Supportive care  Monitor hemodynamics

## 2025-03-07 NOTE — SEPSIS NOTE
[facility] Advisories       Sepsis Time Zero - SLIM       Date Triggers    03/06/25 9885 File Doc Flowsheets  Rule - CER: Hospitalist Service  [[id number]]  Rule - CER: Is Not Comfort Care [[id number]]  Flowsheet Row - ROSA: Pulse [8] (Value: 92)  Flowsheet Row - ROSA: Resp [9] (Value: 30)  Flowsheet Row - ROSA: O2 Device [162545] (Value: BIPAP)                      Sepsis Time Zero Documentation: The patient was evaluated and does NOT meet criteria for severe sepsis/septic shock (BiPAP utilized HS in lieu of CPAP due to PNA. BiPAP utilized at time of admit also. No new organ dysfunction.)

## 2025-03-07 NOTE — PROGRESS NOTES
"Progress Note - Hospitalist   Name: Chuy Norton 64 y.o. male I MRN: [id number]  Unit/Bed#: -01 I Date of Admission: 3/5/2025   Date of Service: 3/7/2025 I Hospital Day: 2    Assessment & Plan  Acute respiratory failure with hypoxia (HCC)  Patient brought in by facility staff for SOB, cough x2 weeks and dizziness with standing  At baseline patient states he does not wear oxygen but does wear nocturnal CPAP  In ED, initially stable on 4L NC but had increase WOB requiring BIPAP. Patient was tried off BIPAP after receiving TRUJILLO neb and steroids but failed trial and placed back on BIPAP.   Flu/Covid/RSV negative  CXR: Patchy lingular and lower lobe opacities, concerning for pneumonia.   CT CAP: Multifocal pneumonia involving the left lower lobe, lingula, and medial right lower lobe. Fluid in the esophagus suggests aspiration as a possible etiology. Moderate size hiatal hernia and distended fluid-filled esophagus to the level of the thoracic inlet.     Plan  Continue BiPAP nightly, currently on 4 L of oxygen through nasal cannula  Xopenex/Atrovent TID  40mg IV solumedrol every 12, will start on prednisone 40 mg in a.m.  Evaluated by speech, recommended regular diet with thin liquids  Bipolar 2 disorder, major depressive episode (HCC)  Outpatient: 20mg lexapro, 300mg Seroquel HS, 100mg trazadone HS    Plan  Resume in am     Primary hypertension  Outpatient: 12.5mg Carvedilol BID, 40mg lisinopril   BP (!) 192/118   Pulse 85   Temp 98.1 °F (36.7 °C)   Resp 20   Ht 5' 8\" (1.727 m)   Wt 97.2 kg (214 lb 4.6 oz)   SpO2 91%   BMI 32.58 kg/m²     Plan  continue  PRN labetolol, for SBP >180  Hyperlipidemia  Outpatient: 40 atorvastatin    Plan  Continue statin   Cirrhosis (HCC)  Follows with [facility], last seen Jan 2025  History of cirrhosis of the liver due to history of alcohol and drug addiction.   5/9/2024 CT C/A/P noted nodular hepatic contours may indicate cirrhosis of the liver.   MELD 3.0: 20  LFT normal  INR " therapeutic on Comadin  No ascites present  Left ventricular apical thrombus  Outpatient: 2.5mg Warfarin (Sun, Tues, Wed, Fri) and 5mg (Mon, Thurs, Sat)  INR: 2.68    Plan   Continue  Recent Labs     03/05/25  1510 03/05/25  2106 03/07/25  0307   INR 2.68* 2.76* 2.86*      Hepatitis C  Follows with [facility], last seen Jan 2025  History of hepatitis C antibody and stated treatment with Mavyret   May 2024 negative viral load  Severe sepsis (HCC)  SIRS: tachycardia, tachypnea and leukocytosis  CT and CXR show multifocal pneumonia, possibly due to aspiration  Flu/Covid/RSV negative  Afebrile  LA 1.8  Procal 0.94  In ED, received ceftriaxone and azithromycin    Plan  Continue ceftriaxone/azithromycin and add flagyl   Follow-up infectious workup  Supportive care  Monitor hemodynamics  Pneumonia  CXR 3/6 showing increasing left basilar opacity  CT CAP with consolidation in LLL and lingula, and small consolidation in RLL  Procal remains elevated (2.75, 2.69, 0.94)  Urinary antigens negative. RP2 negative.   Follow up MRSA culture. [id number]  Continue ceftriaxone, flagyl and azithromycin  Continue atrovent/xopenex nebs TID  Weaning down on steroids. Starting IV solumedrol 40mg q12h today, will decrease to prednisone 40mg tomorrow  Pulmonary hygiene: cough and deep breath, OOB as tolerated, IS, scheuduled nebs  Speech is following,  KENN (obstructive sleep apnea)  Maintained on CPAP at home  Continue BiPAP qhs for now    VTE Pharmacologic Prophylaxis:   Moderate Risk (Score 3-4) - Pharmacological DVT Prophylaxis Ordered: warfarin (Coumadin).    Mobility:   Basic Mobility Inpatient Raw Score: 17  JH-HLM Goal: 5: Stand one or more mins  JH-HLM Achieved: 5: Stand (1 or more minutes)  JH-HLM Goal achieved. Continue to encourage appropriate mobility.    Patient Centered Rounds: I performed bedside rounds with nursing staff today.   Discussions with Specialists or Other Care Team Provider: yes    Education and Discussions with Family  / Patient:  yes.     Current Length of Stay: 2 day(s)  Current Patient Status: Inpatient   Certification Statement: The patient will continue to require additional inpatient hospital stay due to pending stabilization  Discharge Plan:  until stabilized     Code Status: Level 1 - Full Code    Subjective   Seen and examined at bedside  Awake and alert  Comfortable  Not in distress  Still on 4 L of oxygen  No overnight events reported    Objective :  Temp:  [97.6 °F (36.4 °C)-98.2 °F (36.8 °C)] 98.1 °F (36.7 °C)  HR:  [76-92] 85  BP: (130-192)/() 192/118  Resp:  [20-37] 20  SpO2:  [88 %-95 %] 91 %  O2 Device: Nasal cannula  Nasal Cannula O2 Flow Rate (L/min):  [3 L/min-5 L/min] 4 L/min  FiO2 (%):  [50] 50    Body mass index is 32.58 kg/m².     Input and Output Summary (last 24 hours):     Intake/Output Summary (Last 24 hours) at 3/7/2025 1214  Last data filed at 3/7/2025 1103  Gross per 24 hour   Intake 1398.89 ml   Output 1340 ml   Net 58.89 ml       Physical Exam  Vitals reviewed.   Constitutional:       Appearance: Normal appearance.   HENT:      Head: Atraumatic.      Mouth/Throat:      Mouth: Mucous membranes are dry.   Eyes:      General: No scleral icterus.  Cardiovascular:      Rate and Rhythm: Normal rate.      Heart sounds: Normal heart sounds.   Pulmonary:      Effort: No respiratory distress.      Comments: 4 litres  Abdominal:      Tenderness: There is no abdominal tenderness.   Musculoskeletal:      Right lower leg: No edema.      Left lower leg: No edema.   Skin:     General: Skin is dry.      Capillary Refill: Capillary refill takes less than 2 seconds.      Findings: Bruising and erythema present.   Neurological:      Mental Status: He is alert and oriented to person, place, and time. Mental status is at baseline.           Lines/Drains:              Lab Results: I have reviewed the following results:   Results from last 7 days   Lab Units 03/07/25  0307 03/06/25  0434   WBC Thousand/uL 16.44*  13.90*   HEMOGLOBIN g/dL 14.1 13.9   HEMATOCRIT % 44.7 43.9   PLATELETS Thousands/uL 131* 105*   SEGS PCT %  --  90*   LYMPHO PCT %  --  5*   MONO PCT %  --  4   EOS PCT %  --  0     Results from last 7 days   Lab Units 03/07/25  0307 03/05/25  1511 03/05/25  1301   SODIUM mmol/L 136   < > 137   POTASSIUM mmol/L 4.2   < > 4.1   CHLORIDE mmol/L 106   < > 105   CO2 mmol/L 20*   < > 22   CO2, I-STAT   --    < >  --    BUN mg/dL 37*   < > 23   CREATININE mg/dL 1.18   < > 1.52*   ANION GAP mmol/L 10   < > 10   CALCIUM mg/dL 8.9   < > 8.7   ALBUMIN g/dL  --   --  4.4   TOTAL BILIRUBIN mg/dL  --   --  0.78   ALK PHOS U/L  --   --  55   ALT U/L  --   --  13   AST U/L  --   --  19   GLUCOSE RANDOM mg/dL 143*   < > 123    < > = values in this interval not displayed.     Results from last 7 days   Lab Units 03/07/25  0307   INR  2.86*     Results from last 7 days   Lab Units 03/07/25  1102 03/07/25  0742 03/06/25  2111 03/06/25  1652 03/06/25  1124   POC GLUCOSE mg/dl 159* 136 182* 153* 147*     Results from last 7 days   Lab Units 03/06/25  0434   HEMOGLOBIN A1C % 5.7*     Results from last 7 days   Lab Units 03/07/25  0307 03/06/25  0434 03/05/25  1510   LACTIC ACID mmol/L  --   --  1.8   PROCALCITONIN ng/ml 2.75* 2.69* 0.94*       Recent Cultures (last 7 days):   Results from last 7 days   Lab Units 03/06/25  1518 03/05/25  1510   BLOOD CULTURE   --  No Growth at 24 hrs.  No Growth at 24 hrs.   LEGIONELLA URINARY ANTIGEN  Negative  --        Imaging Results Review: I reviewed radiology reports from this admission including: CT chest.  Other Study Results Review: No additional pertinent studies reviewed.    Last 24 Hours Medication List:     Current Facility-Administered Medications:     acetaminophen (TYLENOL) tablet 650 mg, Q6H PRN    atorvastatin (LIPITOR) tablet 40 mg, Daily With Dinner    cefTRIAXone (ROCEPHIN) IVPB (premix in dextrose) 1,000 mg 50 mL, Q24H, Last Rate: Stopped (03/06/25 2319) **AND** azithromycin  (ZITHROMAX) 500 mg in sodium chloride 0.9% 250mL IVPB 500 mg, Q24H, Last Rate: Stopped (03/06/25 1705)    benzonatate (TESSALON PERLES) capsule 200 mg, TID PRN    carvedilol (COREG) tablet 12.5 mg, BID With Meals    chlorhexidine (PERIDEX) 0.12 % oral rinse 15 mL, Q12H SHANNA    escitalopram (LEXAPRO) tablet 20 mg, Daily    ferrous sulfate tablet 325 mg, Daily With Breakfast    guaiFENesin (ROBITUSSIN) oral liquid 200 mg, TID PRN    insulin lispro (HumALOG/ADMELOG) 100 units/mL subcutaneous injection 1-6 Units, 4x Daily (AC & HS) **AND** Fingerstick Glucose (POCT), 4x Daily AC and at bedtime    ipratropium (ATROVENT) 0.02 % inhalation solution 0.5 mg, TID    labetalol (NORMODYNE) injection 10 mg, Q6H PRN    levalbuterol (XOPENEX) inhalation solution 1.25 mg, TID    levalbuterol (XOPENEX) inhalation solution 1.25 mg, Q6H PRN    lisinopril (ZESTRIL) tablet 40 mg, Daily    methylPREDNISolone sodium succinate (Solu-MEDROL) injection 40 mg, Q12H SHANNA    metroNIDAZOLE (FLAGYL) IVPB (premix) 500 mg 100 mL, Q8H, Last Rate: 500 mg (03/07/25 7907)    nicotine (NICODERM CQ) 14 mg/24hr TD 24 hr patch 1 patch, Daily    pantoprazole (PROTONIX) EC tablet 40 mg, BID before lunch/dinner    polyethylene glycol (MIRALAX) packet 17 g, Daily PRN    warfarin (COUMADIN) tablet 2.5 mg, Once per day on Sunday Tuesday Wednesday Friday    warfarin (COUMADIN) tablet 5 mg, Once per day on Monday Thursday Saturday    Administrative Statements   Today, Patient Was Seen By: Danelle Chapa MD  I have spent a total time of 39 minutes in caring for this patient on the day of the visit/encounter including Importance of tx compliance, Risk factor reductions, Counseling / Coordination of care, Documenting in the medical record, Reviewing/placing orders in the medical record (including tests, medications, and/or procedures), Obtaining or reviewing history  , and Communicating with other healthcare professionals .    **Please Note: This note may have been  constructed using a voice recognition system.**

## 2025-03-07 NOTE — ASSESSMENT & PLAN NOTE
[facility]    CONSULTING IN BLOOD DISORDERS & CANCER      REASON FOR CONSULTATION/CHIEF COMPLAINT:     Evaluation and management for leukocytosis                             REQUESTING PHYSICIAN: ORLY Heart  RECORDS OBTAINED:  Records of the patients history including those from the electronic medical record were reviewed and summarized in detail.    HISTORY OF PRESENT ILLNESS:    The patient is a 37 y.o. year old female with medical history significant for hypothyroidism and chronic allergies who was noted to have elevated WBC count of 11.8 on a routine CBC from 7/18/2022.  Differential showed increased neutrophils.  No other immature cells or abnormalities on the differential.  Hemoglobin and platelets were within normal range.  On chart review, patient had waxing and waning leukocytosis at least since 2018 with WBC count ranging between 11- 15,000 range.  She did have normal WBC count in between.   Patient was referred to hematology for further evaluation and seen in this clinic on 8/10/2022.  Patient states she is currently pregnant with her second child, 11 weeks into pregnancy (8/10/2022).  She denies any fever, chills or night sweats.  No palpable lymphadenopathy.  Says she frequently gets runny nose and ear fullness for which she has to use Flonase.  Patient denies any recent or recurrent infections.  She does report of having diarrhea while taking progesterone to help with contraception.  Patient has now stopped taking progesterone (July 2022).  Patient is a non-smoker.  No history of alcohol or drug abuse.  She worked with [employer] system as a .      Past Medical History:   Diagnosis Date   • Abnormal Pap smear of cervix 2017    hpv +   • Anemia during pregnancy    • Cervical dysplasia    • Disease of thyroid gland     hypothyroidism   • HPV (human papilloma virus) infection    • Infertility, female    • Ovarian cyst    • Ovarian cyst     fallopian tubes  Maintained on CPAP at home  Continue BiPAP qhs for now       Past Surgical History:   Procedure Laterality Date   • CHOLECYSTECTOMY WITH INTRAOPERATIVE CHOLANGIOGRAM N/A 9/2/2021    Procedure: CHOLECYSTECTOMY LAPAROSCOPIC INTRAOPERATIVE CHOLANGIOGRAM;  Surgeon: Ricarda Cantrell DO;  Location: [facility];  Service: General;  Laterality: N/A;   • COLPOSCOPY W/ BIOPSY / CURETTAGE     • WISDOM TOOTH EXTRACTION         MEDICATIONS    Current Outpatient Medications:   •  levothyroxine (SYNTHROID, LEVOTHROID) 112 MCG tablet, Take 2 tablets by mouth Daily., Disp: 195 tablet, Rfl: 0  •  levothyroxine (SYNTHROID, LEVOTHROID) 25 MCG tablet, Take 1 tablet by mouth Every Morning. Add to current dose, Disp: 90 tablet, Rfl: 3  •  mupirocin (BACTROBAN) 2 % ointment, Apply two times per day to the infection/biopsy sites., Disp: 22 g, Rfl: 1  •  Progesterone (PROMETRIUM) 100 MG capsule, Take 100 mg by mouth Daily., Disp: , Rfl:     ALLERGIES:   No Known Allergies    SOCIAL HISTORY:       Social History     Socioeconomic History   • Marital status:    Tobacco Use   • Smoking status: Never Smoker   • Smokeless tobacco: Never Used   Vaping Use   • Vaping Use: Never used   Substance and Sexual Activity   • Alcohol use: Not Currently   • Drug use: Never   • Sexual activity: Yes     Partners: Male     Birth control/protection: None         FAMILY HISTORY:  Family History   Problem Relation Age of Onset   • Diabetes Mother    • Hypertension Mother    • Heart disease Mother    • Diabetes Father    • Hypertension Father    • Color blindness Brother    • Ovarian cancer Maternal Grandmother    • Breast cancer Cousin    • Malig Hyperthermia Neg Hx        REVIEW OF SYSTEMS:  Constitutional: [No fevers, chills, sweats]  Eye: [No recent visual problems]  ENMT: [C/O ear fullness and nasal congestion, no sore throat.]  Respiratory: [No shortness of breath, cough]  Cardiovascular: [No Chest pain, palpitations, syncope]  Gastrointestinal: [No nausea, vomiting, diarrhea]  Genitourinary: [No  "hematuria]  Hema/Lymph: [Negative for bruising tendency, swollen lymph glands]  Endocrine: [Negative for excessive thirst, excessive hunger]  Musculoskeletal: [Denies any musculoskeletal pain or swelling]  Integumentary: [No rash, pruritus, abrasions]  Neurologic: [ No weakness or numbness, Alert & oriented X 4]         Vitals:    08/10/22 1131   BP: 112/75   Pulse: 102   Resp: 20   Temp: 98.4 °F (36.9 °C)   TempSrc: Oral   SpO2: 97%   Weight: 115 kg (254 lb 6.4 oz)   Height: 172 cm (67.72\")   PainSc: 0-No pain     Current Status 8/10/2022   ECOG score 0      PHYSICAL EXAM:    CONSTITUTIONAL:  Vital signs reviewed.  No distress, looks comfortable.  EYES:  Conjunctiva and lids unremarkable.   EARS,NOSE,MOUTH,THROAT:  Ears and nose appear unremarkable.  Lips, teeth, gums appear unremarkable.  RESPIRATORY:  Normal respiratory effort.  Lungs clear to auscultation bilaterally.  CARDIOVASCULAR:  Normal S1, S2.  No murmurs rubs or gallops.  No significant lower extremity edema.  GASTROINTESTINAL: Abdomen appears unremarkable.  Nondistended LYMPHATIC:  No cervical, supraclavicular lymphadenopathy.  NEURO: AAOx3, no focal deficits.  Appears to have equal strength all 4 extremities.  MUSCULOSKELETAL:  Unremarkable digits/nails.  No cyanosis or clubbing.  No apparent joint deformities.  SKIN:  Warm.  No rashes.  PSYCHIATRIC:  Normal judgment and insight.  Normal mood and affect.     RECENT LABS:        Lab on 08/10/2022   Component Date Value Ref Range Status   • WBC 08/10/2022 12.07 (A) 3.40 - 10.80 10*3/mm3 Final   • RBC 08/10/2022 4.46  3.77 - 5.28 10*6/mm3 Final   • Hemoglobin 08/10/2022 12.5  12.0 - 15.9 g/dL Final   • Hematocrit 08/10/2022 37.4  34.0 - 46.6 % Final   • MCV 08/10/2022 83.9  79.0 - 97.0 fL Final   • MCH 08/10/2022 28.0  26.6 - 33.0 pg Final   • MCHC 08/10/2022 33.4  31.5 - 35.7 g/dL Final   • RDW 08/10/2022 13.9  12.3 - 15.4 % Final   • RDW-SD 08/10/2022 42.6  37.0 - 54.0 fl Final   • MPV 08/10/2022 10.5  " 6.0 - 12.0 fL Final   • Platelets 08/10/2022 187  140 - 450 10*3/mm3 Final   • Neutrophil % 08/10/2022 67.6  42.7 - 76.0 % Final   • Lymphocyte % 08/10/2022 23.2  19.6 - 45.3 % Final   • Monocyte % 08/10/2022 4.6 (A) 5.0 - 12.0 % Final   • Eosinophil % 08/10/2022 3.1  0.3 - 6.2 % Final   • Basophil % 08/10/2022 0.7  0.0 - 1.5 % Final   • Immature Grans % 08/10/2022 0.8 (A) 0.0 - 0.5 % Final   • Neutrophils, Absolute 08/10/2022 8.16 (A) 1.70 - 7.00 10*3/mm3 Final   • Lymphocytes, Absolute 08/10/2022 2.80  0.70 - 3.10 10*3/mm3 Final   • Monocytes, Absolute 08/10/2022 0.55  0.10 - 0.90 10*3/mm3 Final   • Eosinophils, Absolute 08/10/2022 0.37  0.00 - 0.40 10*3/mm3 Final   • Basophils, Absolute 08/10/2022 0.09  0.00 - 0.20 10*3/mm3 Final   • Immature Grans, Absolute 08/10/2022 0.10 (A) 0.00 - 0.05 10*3/mm3 Final   • nRBC 08/10/2022 0.0  0.0 - 0.2 /100 WBC Final       ASSESSMENT:  Ms. Sanches is a pleasant 37-year-old female with medical history significant for hypothyroidism and chronic allergies who comes for leukocytosis evaluation and management.     #Leukocytosis, neutrophilic:   · Patient was noted to have elevated WBC count of 11.8 on a routine CBC from 7/18/2022.  Differential showed increased neutrophils.  No other immature cells or abnormalities on the differential.  Hemoglobin and platelets were within normal range.  · On chart review, patient had waxing and waning leukocytosis at least since 2018 with WBC count ranging between 11- 15,000 range.  She did have normal WBC count in between.   · Peripheral smear review performed by me in clinic shows normocytic RBCs, no increased schistocytes, no blasts or increased immature cells, adequate platelets.   · Informed patient that the waxing and waning nature of neutrophilic leukocytosis is likely reactive in nature.  Low suspicion for myeloid or bone marrow disorder.  Suspect chronic sinusitis/allergies to be the likely culprit.  · Discussed plan to check peripheral  blood flow cytometry along with ESR, CRP and LDH.  · If work-up is negative, we will plan to monitor.    #Pregnancy: Follows up with OB/GYN  #Hypothyroidism: On chronic Synthroid.  Has an upcoming appointment with endocrinology  #Allergies/sinusitis: Uses Flonase as needed    PLAN:   -Likely reactive leukocytosis.  Low suspicion for myeloid or bone marrow disorder.  -Check peripheral blood flow cytometry.  -ESR, CRP and LDH  -Advised to maintain close follow-up with OB/GYN  -Follow-up in 4 weeks or sooner if needed    Orders Placed This Encounter   Procedures   • Sedimentation Rate     Order Specific Question:   Release to patient     Answer:   Routine Release   • C-reactive Protein     Order Specific Question:   Release to patient     Answer:   Routine Release   • Lactate Dehydrogenase     Order Specific Question:   Release to patient     Answer:   Routine Release   • Flow Cytometry, Blood     Order Specific Question:   Release to patient     Answer:   Routine Release   Total time spent during this patient encounter is 65 minutes. The total time spent with the patient includes at least one or more of the following: preparing to see the patient by reviewing of tests, prior notes or other relevant information, performing appropriate independent examination & evaluation, counseling, ordering of medications, tests or procedures, communicating with other healthcare professionals, when appropriate to coordinate care, documenting clinic information in the electronic medical records or other health records, independently interpreting results of tests and communicating the results to the patient/family or caregiver.

## 2025-03-07 NOTE — ASSESSMENT & PLAN NOTE
Patient brought in by facility staff for SOB, cough x2 weeks and dizziness with standing  At baseline patient states he does not wear oxygen but does wear nocturnal CPAP  In ED, initially stable on 4L NC but had increase WOB requiring BIPAP. Patient was tried off BIPAP after receiving TRUJILLO neb and steroids but failed trial and placed back on BIPAP.   Flu/Covid/RSV negative  CXR: Patchy lingular and lower lobe opacities, concerning for pneumonia.   CT CAP: Multifocal pneumonia involving the left lower lobe, lingula, and medial right lower lobe. Fluid in the esophagus suggests aspiration as a possible etiology. Moderate size hiatal hernia and distended fluid-filled esophagus to the level of the thoracic inlet.     Plan  Continue BiPAP nightly, currently on 4 L of oxygen through nasal cannula  Xopenex/Atrovent TID  40mg IV solumedrol every 12, will start on prednisone 40 mg in a.m.  Evaluated by speech, recommended regular diet with thin liquids

## 2025-03-07 NOTE — ASSESSMENT & PLAN NOTE
"Outpatient: 12.5mg Carvedilol BID, 40mg lisinopril   BP (!) 192/118   Pulse 85   Temp 98.1 °F (36.7 °C)   Resp 20   Ht 5' 8\" (1.727 m)   Wt 97.2 kg (214 lb 4.6 oz)   SpO2 91%   BMI 32.58 kg/m²     Plan  continue  PRN labetolol, for SBP >180  "

## 2025-03-07 NOTE — ASSESSMENT & PLAN NOTE
Outpatient: 2.5mg Warfarin (Sun, Tues, Wed, Fri) and 5mg (Mon, Thurs, Sat)  INR: 2.68    Plan   Continue  Recent Labs     03/05/25  1510 03/05/25  2106 03/07/25  0307   INR 2.68* 2.76* 2.86*

## 2025-03-07 NOTE — ASSESSMENT & PLAN NOTE
CXR 3/6 showing increasing left basilar opacity  CT CAP with consolidation in LLL and lingula, and small consolidation in RLL  Procal remains elevated (2.75, 2.69, 0.94)  Urinary antigens negative. RP2 negative.   Follow up MRSA culture. [id number]  Continue ceftriaxone, flagyl and azithromycin  Continue atrovent/xopenex nebs TID  Weaning down on steroids. Starting IV solumedrol 40mg q12h today, will decrease to prednisone 40mg tomorrow  Pulmonary hygiene: cough and deep breath, OOB as tolerated, IS, scheuduled nebs  Speech is following,

## 2025-03-07 NOTE — ASSESSMENT & PLAN NOTE
CXR 3/6 showing increasing left basilar opacity  CT CAP with consolidation in LLL and lingula, and small consolidation in RLL  Procal remains elevated (2.75, 2.69, 0.94)  Urinary antigens negative. RP2 negative.   Follow up MRSA culture. [id number]  Continue ceftriaxone, flagyl and azithromycin  Continue atrovent/xopenex nebs TID  Weaning down on steroids. Starting IV solumedrol 40mg q12h today, will decrease to prednisone 40mg tomorrow  Pulmonary hygiene: cough and deep breath, OOB as tolerated, IS, scheuduled nebs  Speech is following, suspicious of bottom up aspiration

## 2025-03-07 NOTE — PLAN OF CARE
Problem: Potential for Falls  Goal: Patient will remain free of falls  Description: INTERVENTIONS:  - Educate patient/family on patient safety including physical limitations  - Instruct patient to call for assistance with activity   - Consult OT/PT to assist with strengthening/mobility   - Keep Call bell within reach  - Keep bed low and locked with side rails adjusted as appropriate  - Keep care items and personal belongings within reach  - Initiate and maintain comfort rounds  - Make Fall Risk Sign visible to staff  - Offer Toileting every 2 Hours, in advance of need  - Initiate/Maintain bed alarm  - Apply yellow socks and bracelet for high fall risk patients  - Consider moving patient to room near nurses station  Outcome: Progressing     Problem: Prexisting or High Potential for Compromised Skin Integrity  Goal: Skin integrity is maintained or improved  Description: INTERVENTIONS:  - Identify patients at risk for skin breakdown  - Assess and monitor skin integrity  - Assess and monitor nutrition and hydration status  - Monitor labs   - Assess for incontinence   - Turn and reposition patient  - Assist with mobility/ambulation  - Relieve pressure over bony prominences  - Avoid friction and shearing  - Provide appropriate hygiene as needed including keeping skin clean and dry  - Evaluate need for skin moisturizer/barrier cream  - Collaborate with interdisciplinary team   - Patient/family teaching  - Consider wound care consult   Outcome: Progressing     Problem: PAIN - ADULT  Goal: Verbalizes/displays adequate comfort level or baseline comfort level  Description: Interventions:  - Encourage patient to monitor pain and request assistance  - Assess pain using appropriate pain scale  - Administer analgesics based on type and severity of pain and evaluate response  - Implement non-pharmacological measures as appropriate and evaluate response  - Consider cultural and social influences on pain and pain management  -  Notify physician/advanced practitioner if interventions unsuccessful or patient reports new pain  Outcome: Progressing     Problem: INFECTION - ADULT  Goal: Absence or prevention of progression during hospitalization  Description: INTERVENTIONS:  - Assess and monitor for signs and symptoms of infection  - Monitor lab/diagnostic results  - Monitor all insertion sites, i.e. indwelling lines, tubes, and drains  - Monitor endotracheal if appropriate and nasal secretions for changes in amount and color  - [place] appropriate cooling/warming therapies per order  - Administer medications as ordered  - Instruct and encourage patient and family to use good hand hygiene technique  - Identify and instruct in appropriate isolation precautions for identified infection/condition  Outcome: Progressing  Goal: Absence of fever/infection during neutropenic period  Description: INTERVENTIONS:  - Monitor WBC    Outcome: Progressing     Problem: SAFETY ADULT  Goal: Patient will remain free of falls  Description: INTERVENTIONS:  - Educate patient/family on patient safety including physical limitations  - Instruct patient to call for assistance with activity   - Consult OT/PT to assist with strengthening/mobility   - Keep Call bell within reach  - Keep bed low and locked with side rails adjusted as appropriate  - Keep care items and personal belongings within reach  - Initiate and maintain comfort rounds  - Make Fall Risk Sign visible to staff  - Offer Toileting every 2 Hours, in advance of need  - Initiate/Maintain bed alarm  - Apply yellow socks and bracelet for high fall risk patients  - Consider moving patient to room near nurses station  Outcome: Progressing  Goal: Maintain or return to baseline ADL function  Description: INTERVENTIONS:  -  Assess patient's ability to carry out ADLs; assess patient's baseline for ADL function and identify physical deficits which impact ability to perform ADLs (bathing, care of mouth/teeth,  toileting, grooming, dressing, etc.)  - Assess/evaluate cause of self-care deficits   - Assess range of motion  - Assess patient's mobility; develop plan if impaired  - Assess patient's need for assistive devices and provide as appropriate  - Encourage maximum independence but intervene and supervise when necessary  - Involve family in performance of ADLs  - Assess for home care needs following discharge   - Consider OT consult to assist with ADL evaluation and planning for discharge  - Provide patient education as appropriate  Outcome: Progressing  Goal: Maintains/Returns to pre admission functional level  Description: INTERVENTIONS:  - Perform AM-PAC 6 Click Basic Mobility/ Daily Activity assessment daily.  - Set and communicate daily mobility goal to care team and patient/family/caregiver.   - Collaborate with rehabilitation services on mobility goals if consulted  - Perform Range of Motion 3 times a day.  - Reposition patient every 2 hours.  - Dangle patient 2 times a day  - Stand patient 2 times a day  - Ambulate patient 2 times a day  - Out of bed to chair 2 times a day   - Out of bed for meals 2 times a day  - Out of bed for toileting  - Record patient progress and toleration of activity level   Outcome: Progressing     Problem: DISCHARGE PLANNING  Goal: Discharge to home or other facility with appropriate resources  Description: INTERVENTIONS:  - Identify barriers to discharge w/patient and caregiver  - Arrange for needed discharge resources and transportation as appropriate  - Identify discharge learning needs (meds, wound care, etc.)  - Arrange for interpretive services to assist at discharge as needed  - Refer to Case Management Department for coordinating discharge planning if the patient needs post-hospital services based on physician/advanced practitioner order or complex needs related to functional status, cognitive ability, or social support system  Outcome: Progressing     Problem: Knowledge  Deficit  Goal: Patient/family/caregiver demonstrates understanding of disease process, treatment plan, medications, and discharge instructions  Description: Complete learning assessment and assess knowledge base.  Interventions:  - Provide teaching at level of understanding  - Provide teaching via preferred learning methods  Outcome: Progressing     Problem: CARDIOVASCULAR - ADULT  Goal: Maintains optimal cardiac output and hemodynamic stability  Description: INTERVENTIONS:  - Monitor I/O, vital signs and rhythm  - Monitor for S/S and trends of decreased cardiac output  - Administer and titrate ordered vasoactive medications to optimize hemodynamic stability  - Assess quality of pulses, skin color and temperature  - Assess for signs of decreased coronary artery perfusion  - Instruct patient to report change in severity of symptoms  Outcome: Progressing  Goal: Absence of cardiac dysrhythmias or at baseline rhythm  Description: INTERVENTIONS:  - Continuous cardiac monitoring, vital signs, obtain 12 lead EKG if ordered  - Administer antiarrhythmic and heart rate control medications as ordered  - Monitor electrolytes and administer replacement therapy as ordered  Outcome: Progressing     Problem: RESPIRATORY - ADULT  Goal: Achieves optimal ventilation and oxygenation  Description: INTERVENTIONS:  - Assess for changes in respiratory status  - Assess for changes in mentation and behavior  - Position to facilitate oxygenation and minimize respiratory effort  - Oxygen administered by appropriate delivery if ordered  - Initiate smoking cessation education as indicated  - Encourage broncho-pulmonary hygiene including cough, deep breathe, Incentive Spirometry  - Assess the need for suctioning and aspirate as needed  - Assess and instruct to report SOB or any respiratory difficulty  - Respiratory Therapy support as indicated  Outcome: Progressing     Problem: METABOLIC, FLUID AND ELECTROLYTES - ADULT  Goal: Electrolytes  maintained within normal limits  Description: INTERVENTIONS:  - Monitor labs and assess patient for signs and symptoms of electrolyte imbalances  - Administer electrolyte replacement as ordered  - Monitor response to electrolyte replacements, including repeat lab results as appropriate  - Instruct patient on fluid and nutrition as appropriate  Outcome: Progressing  Goal: Fluid balance maintained  Description: INTERVENTIONS:  - Monitor labs   - Monitor I/O and WT  - Instruct patient on fluid and nutrition as appropriate  - Assess for signs & symptoms of volume excess or deficit  Outcome: Progressing  Goal: Glucose maintained within target range  Description: INTERVENTIONS:  - Monitor Blood Glucose as ordered  - Assess for signs and symptoms of hyperglycemia and hypoglycemia  - Administer ordered medications to maintain glucose within target range  - Assess nutritional intake and initiate nutrition service referral as needed  Outcome: Progressing

## 2025-03-07 NOTE — PLAN OF CARE
Problem: Potential for Falls  Goal: Patient will remain free of falls  Description: INTERVENTIONS:  - Educate patient/family on patient safety including physical limitations  - Instruct patient to call for assistance with activity   - Consult OT/PT to assist with strengthening/mobility   - Keep Call bell within reach  - Keep bed low and locked with side rails adjusted as appropriate  - Keep care items and personal belongings within reach  - Initiate and maintain comfort rounds  - Make Fall Risk Sign visible to staff  - Offer Toileting every 4 Hours, in advance of need  - Initiate/Maintain alarm  - Obtain necessary fall risk management equipment  - Apply yellow socks and bracelet for high fall risk patients  - Consider moving patient to room near nurses station  Outcome: Progressing     Problem: Prexisting or High Potential for Compromised Skin Integrity  Goal: Skin integrity is maintained or improved  Description: INTERVENTIONS:  - Identify patients at risk for skin breakdown  - Assess and monitor skin integrity  - Assess and monitor nutrition and hydration status  - Monitor labs   - Assess for incontinence   - Turn and reposition patient  - Assist with mobility/ambulation  - Relieve pressure over bony prominences  - Avoid friction and shearing  - Provide appropriate hygiene as needed including keeping skin clean and dry  - Evaluate need for skin moisturizer/barrier cream  - Collaborate with interdisciplinary team   - Patient/family teaching  - Consider wound care consult   Outcome: Progressing     Problem: PAIN - ADULT  Goal: Verbalizes/displays adequate comfort level or baseline comfort level  Description: Interventions:  - Encourage patient to monitor pain and request assistance  - Assess pain using appropriate pain scale  - Administer analgesics based on type and severity of pain and evaluate response  - Implement non-pharmacological measures as appropriate and evaluate response  - Consider cultural and social  influences on pain and pain management  - Notify physician/advanced practitioner if interventions unsuccessful or patient reports new pain  Outcome: Progressing     Problem: INFECTION - ADULT  Goal: Absence or prevention of progression during hospitalization  Description: INTERVENTIONS:  - Assess and monitor for signs and symptoms of infection  - Monitor lab/diagnostic results  - Monitor all insertion sites, i.e. indwelling lines, tubes, and drains  - Monitor endotracheal if appropriate and nasal secretions for changes in amount and color  - [place] appropriate cooling/warming therapies per order  - Administer medications as ordered  - Instruct and encourage patient and family to use good hand hygiene technique  - Identify and instruct in appropriate isolation precautions for identified infection/condition  Outcome: Progressing  Goal: Absence of fever/infection during neutropenic period  Description: INTERVENTIONS:  - Monitor WBC    Outcome: Progressing     Problem: SAFETY ADULT  Goal: Patient will remain free of falls  Description: INTERVENTIONS:  - Educate patient/family on patient safety including physical limitations  - Instruct patient to call for assistance with activity   - Consult OT/PT to assist with strengthening/mobility   - Keep Call bell within reach  - Keep bed low and locked with side rails adjusted as appropriate  - Keep care items and personal belongings within reach  - Initiate and maintain comfort rounds  - Make Fall Risk Sign visible to staff  - Offer Toileting every 4 Hours, in advance of need  - Initiate/Maintain alarm  - Obtain necessary fall risk management equipment  - Apply yellow socks and bracelet for high fall risk patients  - Consider moving patient to room near nurses station  Outcome: Progressing  Goal: Maintain or return to baseline ADL function  Description: INTERVENTIONS:  -  Assess patient's ability to carry out ADLs; assess patient's baseline for ADL function and identify  physical deficits which impact ability to perform ADLs (bathing, care of mouth/teeth, toileting, grooming, dressing, etc.)  - Assess/evaluate cause of self-care deficits   - Assess range of motion  - Assess patient's mobility; develop plan if impaired  - Assess patient's need for assistive devices and provide as appropriate  - Encourage maximum independence but intervene and supervise when necessary  - Involve family in performance of ADLs  - Assess for home care needs following discharge   - Consider OT consult to assist with ADL evaluation and planning for discharge  - Provide patient education as appropriate  Outcome: Progressing  Goal: Maintains/Returns to pre admission functional level  Description: INTERVENTIONS:  - Perform AM-PAC 6 Click Basic Mobility/ Daily Activity assessment daily.  - Set and communicate daily mobility goal to care team and patient/family/caregiver.   - Collaborate with rehabilitation services on mobility goals if consulted  - Perform Range of Motion 3 times a day.  - Reposition patient every 3 hours.  - Dangle patient 3 times a day  - Stand patient 3 times a day  - Ambulate patient 3 times a day  - Out of bed to chair 3 times a day   - Out of bed for meals 3 times a day  - Out of bed for toileting  - Record patient progress and toleration of activity level   Outcome: Progressing     Problem: DISCHARGE PLANNING  Goal: Discharge to home or other facility with appropriate resources  Description: INTERVENTIONS:  - Identify barriers to discharge w/patient and caregiver  - Arrange for needed discharge resources and transportation as appropriate  - Identify discharge learning needs (meds, wound care, etc.)  - Arrange for interpretive services to assist at discharge as needed  - Refer to Case Management Department for coordinating discharge planning if the patient needs post-hospital services based on physician/advanced practitioner order or complex needs related to functional status, cognitive  ability, or social support system  Outcome: Progressing     Problem: Knowledge Deficit  Goal: Patient/family/caregiver demonstrates understanding of disease process, treatment plan, medications, and discharge instructions  Description: Complete learning assessment and assess knowledge base.  Interventions:  - Provide teaching at level of understanding  - Provide teaching via preferred learning methods  Outcome: Progressing     Problem: CARDIOVASCULAR - ADULT  Goal: Maintains optimal cardiac output and hemodynamic stability  Description: INTERVENTIONS:  - Monitor I/O, vital signs and rhythm  - Monitor for S/S and trends of decreased cardiac output  - Administer and titrate ordered vasoactive medications to optimize hemodynamic stability  - Assess quality of pulses, skin color and temperature  - Assess for signs of decreased coronary artery perfusion  - Instruct patient to report change in severity of symptoms  Outcome: Progressing  Goal: Absence of cardiac dysrhythmias or at baseline rhythm  Description: INTERVENTIONS:  - Continuous cardiac monitoring, vital signs, obtain 12 lead EKG if ordered  - Administer antiarrhythmic and heart rate control medications as ordered  - Monitor electrolytes and administer replacement therapy as ordered  Outcome: Progressing     Problem: RESPIRATORY - ADULT  Goal: Achieves optimal ventilation and oxygenation  Description: INTERVENTIONS:  - Assess for changes in respiratory status  - Assess for changes in mentation and behavior  - Position to facilitate oxygenation and minimize respiratory effort  - Oxygen administered by appropriate delivery if ordered  - Initiate smoking cessation education as indicated  - Encourage broncho-pulmonary hygiene including cough, deep breathe, Incentive Spirometry  - Assess the need for suctioning and aspirate as needed  - Assess and instruct to report SOB or any respiratory difficulty  - Respiratory Therapy support as indicated  Outcome: Progressing      Problem: METABOLIC, FLUID AND ELECTROLYTES - ADULT  Goal: Electrolytes maintained within normal limits  Description: INTERVENTIONS:  - Monitor labs and assess patient for signs and symptoms of electrolyte imbalances  - Administer electrolyte replacement as ordered  - Monitor response to electrolyte replacements, including repeat lab results as appropriate  - Instruct patient on fluid and nutrition as appropriate  Outcome: Progressing  Goal: Fluid balance maintained  Description: INTERVENTIONS:  - Monitor labs   - Monitor I/O and WT  - Instruct patient on fluid and nutrition as appropriate  - Assess for signs & symptoms of volume excess or deficit  Outcome: Progressing  Goal: Glucose maintained within target range  Description: INTERVENTIONS:  - Monitor Blood Glucose as ordered  - Assess for signs and symptoms of hyperglycemia and hypoglycemia  - Administer ordered medications to maintain glucose within target range  - Assess nutritional intake and initiate nutrition service referral as needed  Outcome: Progressing

## 2025-03-07 NOTE — SPEECH THERAPY NOTE
Speech Language/Pathology  Speech-Language Pathology Bedside Swallow Evaluation      Patient Name: Chuy Norton    Today's Date: 3/7/2025     Problem List  Principal Problem:    Acute respiratory failure with hypoxia (HCC)  Active Problems:    Bipolar 2 disorder, major depressive episode (HCC)    Primary hypertension    Hyperlipidemia    Cirrhosis (HCC)    Left ventricular apical thrombus    Hepatitis C    Severe sepsis (HCC)    Pneumonia    KENN (obstructive sleep apnea)      Past Medical History  Past Medical History:   Diagnosis Date    Alcohol abuse     Depression     Drug therapy     GERD (gastroesophageal reflux disease)     Hepatitis C     Hypertension 01/2012    Knee pain, bilateral     Left ventricular apical thrombus     Psychiatric disorder     depression, anxiety    Renal disorder     Self-injurious behavior     Spinal stenosis of lumbar region     Suicide attempt (HCC)        Past Surgical History  Past Surgical History:   Procedure Laterality Date    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    HERNIA REPAIR  1997       Summary   Pt presents w/ s/s suggestive of functional oropharyngeal swallow skills w/ suspected esophageal component.     Complete labial seal for retrieval and containment of all materials, no anterior bolus loss present. Pt w/ large bolus intake, tolerated well though SLP provided verbal cue for decreased bolus size though did not aid in elicitation. Timely palatal mashing w/ complete bolus breakdown and functional bolus transfer. No oral residue noted. Suspected fairly prompt swallow initiation and fair hyolaryngeal elevation to palpation. No overt s/s of aspiration at this time.     Education initiated w/ pt regarding strategies to optimize swallow safety including frequent/thorough oral care and to notify medical staff if s/s of aspiration arise. Pt verbalized understanding and agreement, denies questions or concerns at this time.     Pt w/ increased risk of aspiration 2/2 current  admission w/ SOB and cough, however pt is at a HIGH risk of reverse aspiration 2/2 moderate sized HH and distended fluid filled esophagus. S/w MD via secure chat regarding obtaining GI consult as suspect the aforementioned may be what is contributing to his abn lung imaging and possible pna as he could be reverse/bottom-up aspirating. SLP to f/u for diet tolerance as able and appropriate, will monitor for GI consult and recommendations.       Recommended Diet: regular diet and thin liquids   Recommended Form of Meds: meds as tolerated    Aspiration precautions and swallowing strategies: upright posture, only feed when fully alert, slow rate of feeding, small bites/sips, and alternating bites and sips  Other Recommendations: Continue frequent oral care        Current Medical Status  Pt is a 64 y.o. male with PMH of bipolar, alcoholic cirrhosis, hepatitis C, HTN, HLD and left ventricular apical thrombus who presents from facility with c/o SOB and cough x2 weeks. In ED, imaging showed multifocal pneumonia. Initially required 4L NC but eventually required continuous BIPAP for WOB.     Current Precautions:  Fall  Aspiration  Reflux     Allergies:  No known food allergies    Past medical history:  Please see H&P for details    Special Studies:  3/6/25 XR chest portable ICU:  Increasing left basilar opacity in keeping with effusion and a component of pneumonia or atelectasis.     3/5/25 CT chest abdomen pelvis w contrast:  Multifocal pneumonia involving the left lower lobe, lingula, and medial right lower lobe. Fluid in the esophagus suggests aspiration as a possible etiology.     Moderate size hiatal hernia and distended fluid-filled esophagus to the level of the thoracic inlet.     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     3/5/25 XR chest 2 views:  Patchy lingular and lower lobe opacities, concerning for  pneumonia. Radiographic follow-up to resolution is recommended.       History of VFSS:  N/A    Social/Education/Vocational Hx:  Pt lives [facility]     Swallow Information   Current Diet: regular diet and thin liquids   Baseline Diet: regular diet and thin liquids      Baseline Assessment   Behavior/Cognition: alert  Speech/Language Status: able to participate in conversation and able to follow commands  Patient Positioning: upright in bed  Pain Status/Interventions/Response to Interventions: No report of or nonverbal indications of pain.       Oral Mechanism Exam  Facial: symmetrical  Labial: WFL  Lingual: WFL  Velum: symmetrical  Mandible: adequate ROM  Dentition: edentulous  Vocal quality:clear/adequate   Volitional Cough: strong/productive   Respiratory Status: on 4L O2      Consistencies Assessed and Performance   Consistencies Administered: thin liquids and hard solids    Oral Stage:   Complete labial seal for retrieval and containment of all materials, no anterior bolus loss present. Pt w/ large bolus intake, tolerated well though SLP provided verbal cue for decreased bolus size though did not aid in elicitation. Timely palatal mashing w/ complete bolus breakdown and functional bolus transfer. No oral residue noted.     Pharyngeal Stage:   Suspected fairly prompt swallow initiation and fair hyolaryngeal elevation to palpation. No overt s/s of aspiration at this time.     Esophageal Concerns: none reported however abn imaging- see above     Summary and Recommendations (see above)    Results Reviewed with: patient, RN, and MD     Treatment Recommended: as able and appropriate for diet tolerance, will monitor GI consult and recommendations     Dysphagia LTG  -Patient will demonstrate safe and effective oral intake (without overt s/s significant oral/pharyngeal dysphagia including s/s penetration or aspiration) for the highest appropriate diet level.     Short Term Goals:  -Pt will tolerate regular textures  and thin liquid with no significant s/s oral or pharyngeal dysphagia across 1-3 diagnostic session/s    -Patient will comply with a Video/Modified Barium Swallow study for more complete assessment of swallowing anatomy/physiology/aspiration risk and to assess efficacy of treatment techniques so as to best guide treatment plan if medically indicated     Speech Therapy Prognosis   Prognosis: good/fair     Prognosis Considerations: age, medical status, and prior medical history

## 2025-03-07 NOTE — ASSESSMENT & PLAN NOTE
Initially requiring 4L in ED, however had worsening respiratory status and placed on BiPAP. Continue on BiPAP as needed and qhs  Currently on 4L   Not oxygen dependent at baseline  Titrate oxygen to maintain saturations >89%  Will likely need home o2 eval prior to discharge

## 2025-03-08 PROBLEM — F31.81 BIPOLAR 2 DISORDER, MAJOR DEPRESSIVE EPISODE (HCC): Chronic | Status: RESOLVED | Noted: 2018-09-20 | Resolved: 2025-03-08

## 2025-03-08 LAB
ALBUMIN SERPL BCG-MCNC: 3.9 G/DL (ref 3.5–5)
ALP SERPL-CCNC: 48 U/L (ref 34–104)
ALT SERPL W P-5'-P-CCNC: 15 U/L (ref 7–52)
ANION GAP SERPL CALCULATED.3IONS-SCNC: 11 MMOL/L (ref 4–13)
AST SERPL W P-5'-P-CCNC: 19 U/L (ref 13–39)
BASOPHILS # BLD MANUAL: 0 THOUSAND/UL (ref 0–0.1)
BASOPHILS NFR MAR MANUAL: 0 % (ref 0–1)
BILIRUB SERPL-MCNC: 0.43 MG/DL (ref 0.2–1)
BUN SERPL-MCNC: 36 MG/DL (ref 5–25)
CALCIUM SERPL-MCNC: 8.3 MG/DL (ref 8.4–10.2)
CHLORIDE SERPL-SCNC: 108 MMOL/L (ref 96–108)
CO2 SERPL-SCNC: 19 MMOL/L (ref 21–32)
CREAT SERPL-MCNC: 1.06 MG/DL (ref 0.6–1.3)
EOSINOPHIL # BLD MANUAL: 0.17 THOUSAND/UL (ref 0–0.4)
EOSINOPHIL NFR BLD MANUAL: 1 % (ref 0–6)
ERYTHROCYTE [DISTWIDTH] IN BLOOD BY AUTOMATED COUNT: 21.1 % (ref 11.6–15.1)
GFR SERPL CREATININE-BSD FRML MDRD: 74 ML/MIN/1.73SQ M
GLUCOSE SERPL-MCNC: 120 MG/DL (ref 65–140)
GLUCOSE SERPL-MCNC: 143 MG/DL (ref 65–140)
GLUCOSE SERPL-MCNC: 152 MG/DL (ref 65–140)
GLUCOSE SERPL-MCNC: 153 MG/DL (ref 65–140)
GLUCOSE SERPL-MCNC: 190 MG/DL (ref 65–140)
HCT VFR BLD AUTO: 44.8 % (ref 36.5–49.3)
HGB BLD-MCNC: 14.3 G/DL (ref 12–17)
INR PPP: 2.75 (ref 0.85–1.19)
LYMPHOCYTES # BLD AUTO: 0.7 THOUSAND/UL (ref 0.6–4.47)
LYMPHOCYTES # BLD AUTO: 4 % (ref 14–44)
MAGNESIUM SERPL-MCNC: 2.3 MG/DL (ref 1.9–2.7)
MCH RBC QN AUTO: 28.8 PG (ref 26.8–34.3)
MCHC RBC AUTO-ENTMCNC: 31.9 G/DL (ref 31.4–37.4)
MCV RBC AUTO: 90 FL (ref 82–98)
MONOCYTES # BLD AUTO: 0.7 THOUSAND/UL (ref 0–1.22)
MONOCYTES NFR BLD: 4 % (ref 4–12)
NEUTROPHILS # BLD MANUAL: 15.92 THOUSAND/UL (ref 1.85–7.62)
NEUTS SEG NFR BLD AUTO: 91 % (ref 43–75)
PLATELET # BLD AUTO: 151 THOUSANDS/UL (ref 149–390)
PLATELET BLD QL SMEAR: ADEQUATE
POTASSIUM SERPL-SCNC: 4.1 MMOL/L (ref 3.5–5.3)
PROCALCITONIN SERPL-MCNC: 1.42 NG/ML
PROT SERPL-MCNC: 7.1 G/DL (ref 6.4–8.4)
PROTHROMBIN TIME: 29.3 SECONDS (ref 12.3–15)
RBC # BLD AUTO: 4.96 MILLION/UL (ref 3.88–5.62)
RBC MORPH BLD: NORMAL
SODIUM SERPL-SCNC: 138 MMOL/L (ref 135–147)
WBC # BLD AUTO: 17.49 THOUSAND/UL (ref 4.31–10.16)

## 2025-03-08 PROCEDURE — 84145 PROCALCITONIN (PCT): CPT | Performed by: INTERNAL MEDICINE

## 2025-03-08 PROCEDURE — 85007 BL SMEAR W/DIFF WBC COUNT: CPT | Performed by: INTERNAL MEDICINE

## 2025-03-08 PROCEDURE — 99232 SBSQ HOSP IP/OBS MODERATE 35: CPT | Performed by: INTERNAL MEDICINE

## 2025-03-08 PROCEDURE — 80053 COMPREHEN METABOLIC PANEL: CPT | Performed by: INTERNAL MEDICINE

## 2025-03-08 PROCEDURE — 85027 COMPLETE CBC AUTOMATED: CPT | Performed by: INTERNAL MEDICINE

## 2025-03-08 PROCEDURE — 94760 N-INVAS EAR/PLS OXIMETRY 1: CPT

## 2025-03-08 PROCEDURE — 94640 AIRWAY INHALATION TREATMENT: CPT

## 2025-03-08 PROCEDURE — 83735 ASSAY OF MAGNESIUM: CPT | Performed by: INTERNAL MEDICINE

## 2025-03-08 PROCEDURE — 94660 CPAP INITIATION&MGMT: CPT

## 2025-03-08 PROCEDURE — 82948 REAGENT STRIP/BLOOD GLUCOSE: CPT

## 2025-03-08 PROCEDURE — 85610 PROTHROMBIN TIME: CPT | Performed by: INTERNAL MEDICINE

## 2025-03-08 RX ORDER — LOSARTAN POTASSIUM 50 MG/1
100 TABLET ORAL DAILY
Status: DISCONTINUED | OUTPATIENT
Start: 2025-03-08 | End: 2025-03-22 | Stop reason: HOSPADM

## 2025-03-08 RX ORDER — VANCOMYCIN HYDROCHLORIDE 1 G/200ML
1000 INJECTION, SOLUTION INTRAVENOUS EVERY 12 HOURS
Status: DISCONTINUED | OUTPATIENT
Start: 2025-03-09 | End: 2025-03-14

## 2025-03-08 RX ORDER — TRAZODONE HYDROCHLORIDE 100 MG/1
100 TABLET ORAL
Status: DISCONTINUED | OUTPATIENT
Start: 2025-03-08 | End: 2025-03-22 | Stop reason: HOSPADM

## 2025-03-08 RX ORDER — QUETIAPINE FUMARATE 300 MG/1
300 TABLET, FILM COATED ORAL
Status: DISCONTINUED | OUTPATIENT
Start: 2025-03-08 | End: 2025-03-22 | Stop reason: HOSPADM

## 2025-03-08 RX ORDER — PREDNISONE 20 MG/1
40 TABLET ORAL DAILY
Status: COMPLETED | OUTPATIENT
Start: 2025-03-08 | End: 2025-03-12

## 2025-03-08 RX ORDER — CARVEDILOL 25 MG/1
25 TABLET ORAL 2 TIMES DAILY WITH MEALS
Status: DISCONTINUED | OUTPATIENT
Start: 2025-03-08 | End: 2025-03-22

## 2025-03-08 RX ADMIN — METRONIDAZOLE 500 MG: 500 INJECTION, SOLUTION INTRAVENOUS at 04:12

## 2025-03-08 RX ADMIN — WARFARIN SODIUM 5 MG: 5 TABLET ORAL at 17:20

## 2025-03-08 RX ADMIN — TRAZODONE HYDROCHLORIDE 100 MG: 100 TABLET ORAL at 21:09

## 2025-03-08 RX ADMIN — PANTOPRAZOLE SODIUM 40 MG: 40 TABLET, DELAYED RELEASE ORAL at 17:20

## 2025-03-08 RX ADMIN — CHLORHEXIDINE GLUCONATE 15 ML: 1.2 RINSE ORAL at 21:09

## 2025-03-08 RX ADMIN — LEVALBUTEROL HYDROCHLORIDE 1.25 MG: 1.25 SOLUTION RESPIRATORY (INHALATION) at 19:35

## 2025-03-08 RX ADMIN — LOSARTAN POTASSIUM 100 MG: 50 TABLET, FILM COATED ORAL at 08:27

## 2025-03-08 RX ADMIN — FERROUS SULFATE TAB 325 MG (65 MG ELEMENTAL FE) 325 MG: 325 (65 FE) TAB at 08:20

## 2025-03-08 RX ADMIN — CARVEDILOL 25 MG: 25 TABLET, FILM COATED ORAL at 17:20

## 2025-03-08 RX ADMIN — VANCOMYCIN HYDROCHLORIDE 2000 MG: 5 INJECTION, POWDER, LYOPHILIZED, FOR SOLUTION INTRAVENOUS at 12:37

## 2025-03-08 RX ADMIN — INSULIN LISPRO 1 UNITS: 100 INJECTION, SOLUTION INTRAVENOUS; SUBCUTANEOUS at 08:19

## 2025-03-08 RX ADMIN — IPRATROPIUM BROMIDE 0.5 MG: 0.5 SOLUTION RESPIRATORY (INHALATION) at 13:52

## 2025-03-08 RX ADMIN — INSULIN LISPRO 2 UNITS: 100 INJECTION, SOLUTION INTRAVENOUS; SUBCUTANEOUS at 12:03

## 2025-03-08 RX ADMIN — PANTOPRAZOLE SODIUM 40 MG: 40 TABLET, DELAYED RELEASE ORAL at 12:03

## 2025-03-08 RX ADMIN — QUETIAPINE 300 MG: 200 TABLET ORAL at 21:09

## 2025-03-08 RX ADMIN — IPRATROPIUM BROMIDE 0.5 MG: 0.5 SOLUTION RESPIRATORY (INHALATION) at 19:35

## 2025-03-08 RX ADMIN — LEVALBUTEROL HYDROCHLORIDE 1.25 MG: 1.25 SOLUTION RESPIRATORY (INHALATION) at 07:26

## 2025-03-08 RX ADMIN — CHLORHEXIDINE GLUCONATE 15 ML: 1.2 RINSE ORAL at 08:20

## 2025-03-08 RX ADMIN — IPRATROPIUM BROMIDE 0.5 MG: 0.5 SOLUTION RESPIRATORY (INHALATION) at 07:26

## 2025-03-08 RX ADMIN — LEVALBUTEROL HYDROCHLORIDE 1.25 MG: 1.25 SOLUTION RESPIRATORY (INHALATION) at 13:52

## 2025-03-08 RX ADMIN — CEFTRIAXONE 1000 MG: 1 INJECTION, SOLUTION INTRAVENOUS at 15:48

## 2025-03-08 RX ADMIN — ATORVASTATIN CALCIUM 40 MG: 40 TABLET, FILM COATED ORAL at 17:20

## 2025-03-08 RX ADMIN — ESCITALOPRAM OXALATE 20 MG: 20 TABLET ORAL at 08:20

## 2025-03-08 RX ADMIN — PREDNISONE 40 MG: 20 TABLET ORAL at 08:27

## 2025-03-08 RX ADMIN — NICOTINE 1 PATCH: 14 PATCH, EXTENDED RELEASE TRANSDERMAL at 08:20

## 2025-03-08 NOTE — PROGRESS NOTES
"Progress Note - Hospitalist   Name: Chuy Norton 64 y.o. male I MRN: [id number]  Unit/Bed#: -01 I Date of Admission: 3/5/2025   Date of Service: 3/8/2025 I Hospital Day: 3    Assessment & Plan  Acute respiratory failure with hypoxia (HCC)  Patient brought in by facility staff for SOB, cough x2 weeks and dizziness with standing  At baseline patient states he does not wear oxygen but does wear nocturnal CPAP  In ED, initially stable on 4L NC but had increase WOB requiring BIPAP. Patient was tried off BIPAP after receiving TRUJILLO neb and steroids but failed trial and placed back on BIPAP.   Flu/Covid/RSV negative  CXR: Patchy lingular and lower lobe opacities, concerning for pneumonia.   CT CAP: Multifocal pneumonia involving the left lower lobe, lingula, and medial right lower lobe. Fluid in the esophagus suggests aspiration as a possible etiology. Moderate size hiatal hernia and distended fluid-filled esophagus to the level of the thoracic inlet.     Plan  Continue BiPAP nightly, currently on 4 L of oxygen through nasal cannula, currently on 2 L of oxygen  Xopenex/Atrovent TID  Prednisone 40 mg daily   Evaluated by speech, recommended regular diet with thin liquids  Will need home oxygen evaluation prior to discharge  Bipolar 2 disorder, major depressive episode (HCC)  Outpatient: 20mg lexapro, 300mg Seroquel HS, 100mg trazadone HS    Plan  Continue     Primary hypertension  Outpatient: 12.5mg Carvedilol BID, 40mg lisinopril   BP (!) 173/102   Pulse 75   Temp 98 °F (36.7 °C)   Resp 20   Ht 5' 8\" (1.727 m)   Wt 96.1 kg (211 lb 13.8 oz) Comment: Pillow and blanket  SpO2 93%   BMI 32.21 kg/m²     Plan  continue  PRN labetolol, for SBP >180  Hyperlipidemia  Outpatient: 40 atorvastatin    Plan  Continue statin   Cirrhosis (HCC)  Follows with [facility], last seen Jan 2025  History of cirrhosis of the liver due to history of alcohol and drug addiction.   5/9/2024 CT C/A/P noted nodular hepatic contours may " indicate cirrhosis of the liver.   MELD 3.0: 20  LFT normal  INR therapeutic on Comadin  No ascites present  Left ventricular apical thrombus  Outpatient: 2.5mg Warfarin (Sun, Tues, Wed, Fri) and 5mg (Mon, Thurs, Sat)  INR: 2.68    Plan   Continue  Recent Labs     03/05/25  2106 03/07/25  0307 03/08/25  0707   INR 2.76* 2.86* 2.75*      Hepatitis C  Follows with W[facility] LOTT, last seen Jan 2025  History of hepatitis C antibody and stated treatment with Mavyret   May 2024 negative viral load  Severe sepsis (HCC)  SIRS: tachycardia, tachypnea and leukocytosis  CT and CXR show multifocal pneumonia, possibly due to aspiration  Flu/Covid/RSV negative  Afebrile  LA 1.8  Recent Labs     03/06/25  0434 03/07/25  0307 03/08/25  0101   PROCALCITONI 2.69* 2.75* 1.42*      In ED, received ceftriaxone and azithromycin    Plan  Deviously on IV ceftriaxone azithromycin and Flagyl, tailored to IV ceftriaxone and IV vancomycin on 3/8  Follow-up infectious workup  Supportive care  Monitor hemodynamics  Pneumonia  CXR 3/6 showing increasing left basilar opacity  CT CAP with consolidation in LLL and lingula, and small consolidation in RLL  Procal remains elevated (2.75, 2.69, 0.94)  Urinary antigens negative. RP2 negative.   Follow up MRSA culture. [id number]  Placed on ceftriaxone, Flagyl and azithromycin, tailored to IV ceftriaxone and IV vancomycin on 3/8  Continue atrovent/xopenex nebs TID  Previously on IV steroids, currently on p.o. prednisone taper  Pulmonary hygiene: cough and deep breath, OOB as tolerated, IS, scheuduled nebs  Speech is following,  KENN (obstructive sleep apnea)  Maintained on CPAP at home  Continue BiPAP qhs for now    VTE Pharmacologic Prophylaxis:   Moderate Risk (Score 3-4) - Pharmacological DVT Prophylaxis Ordered: warfarin (Coumadin).    Mobility:   Basic Mobility Inpatient Raw Score: 17  JH-HLM Goal: 5: Stand one or more mins  JH-HLM Achieved: 5: Stand (1 or more minutes)  JH-HLM Goal achieved. Continue to  encourage appropriate mobility.    Patient Centered Rounds: I performed bedside rounds with nursing staff today.   Discussions with Specialists or Other Care Team Provider: yes    Education and Discussions with Family / Patient:  yes.     Current Length of Stay: 3 day(s)  Current Patient Status: Inpatient   Certification Statement: 48 -72 hours  Discharge Plan: 48 - 72 hrs    Code Status: Level 1 - Full Code    Subjective   Seen and examined at bedside  Awake and alert  Comfortable  Not in distress    No overnight events reported    Objective :  Temp:  [97.6 °F (36.4 °C)-98.6 °F (37 °C)] 98 °F (36.7 °C)  HR:  [75-86] 75  BP: (155-173)/(102-111) 173/102  Resp:  [16-22] 20  SpO2:  [90 %-93 %] 93 %  O2 Device: Nasal cannula  Nasal Cannula O2 Flow Rate (L/min):  [4 L/min] 4 L/min    Body mass index is 32.21 kg/m².     Input and Output Summary (last 24 hours):     Intake/Output Summary (Last 24 hours) at 3/8/2025 1158  Last data filed at 3/8/2025 0817  Gross per 24 hour   Intake 960 ml   Output 900 ml   Net 60 ml       Physical Exam  Vitals reviewed.   Constitutional:       Appearance: Normal appearance.   HENT:      Head: Atraumatic.      Mouth/Throat:      Mouth: Mucous membranes are dry.   Eyes:      General: No scleral icterus.  Cardiovascular:      Rate and Rhythm: Normal rate.      Heart sounds: Normal heart sounds.   Pulmonary:      Effort: No respiratory distress.      Comments: 4 litres  Abdominal:      Tenderness: There is no abdominal tenderness.   Musculoskeletal:      Right lower leg: No edema.      Left lower leg: No edema.   Skin:     General: Skin is dry.      Capillary Refill: Capillary refill takes less than 2 seconds.      Findings: Bruising and erythema present.   Neurological:      Mental Status: He is alert and oriented to person, place, and time. Mental status is at baseline.           Lines/Drains:              Lab Results: I have reviewed the following results:   Results from last 7 days   Lab  Units 03/08/25  0101 03/07/25  0307 03/06/25  0434   WBC Thousand/uL 17.49*   < > 13.90*   HEMOGLOBIN g/dL 14.3   < > 13.9   HEMATOCRIT % 44.8   < > 43.9   PLATELETS Thousands/uL 151   < > 105*   SEGS PCT %  --   --  90*   LYMPHO PCT % 4*  --  5*   MONO PCT % 4  --  4   EOS PCT % 1  --  0    < > = values in this interval not displayed.     Results from last 7 days   Lab Units 03/08/25  0101   SODIUM mmol/L 138   POTASSIUM mmol/L 4.1   CHLORIDE mmol/L 108   CO2 mmol/L 19*   BUN mg/dL 36*   CREATININE mg/dL 1.06   ANION GAP mmol/L 11   CALCIUM mg/dL 8.3*   ALBUMIN g/dL 3.9   TOTAL BILIRUBIN mg/dL 0.43   ALK PHOS U/L 48   ALT U/L 15   AST U/L 19   GLUCOSE RANDOM mg/dL 153*     Results from last 7 days   Lab Units 03/08/25  0707   INR  2.75*     Results from last 7 days   Lab Units 03/08/25  1123 03/08/25  0715 03/07/25  2221 03/07/25  1608 03/07/25  1102 03/07/25  0742 03/06/25  2111 03/06/25  1652 03/06/25  1124   POC GLUCOSE mg/dl 190* 152* 139 174* 159* 136 182* 153* 147*     Results from last 7 days   Lab Units 03/06/25  0434   HEMOGLOBIN A1C % 5.7*     Results from last 7 days   Lab Units 03/08/25  0101 03/07/25  0307 03/06/25  0434 03/05/25  1510   LACTIC ACID mmol/L  --   --   --  1.8   PROCALCITONIN ng/ml 1.42* 2.75* 2.69* 0.94*       Recent Cultures (last 7 days):   Results from last 7 days   Lab Units 03/06/25  1518 03/05/25  1510   BLOOD CULTURE   --  No Growth at 48 hrs.  No Growth at 48 hrs.   LEGIONELLA URINARY ANTIGEN  Negative  --        Imaging Results Review: I reviewed radiology reports from this admission including: CT chest.  Other Study Results Review: No additional pertinent studies reviewed.    Last 24 Hours Medication List:     Current Facility-Administered Medications:     acetaminophen (TYLENOL) tablet 650 mg, Q6H PRN    atorvastatin (LIPITOR) tablet 40 mg, Daily With Dinner    benzonatate (TESSALON PERLES) capsule 200 mg, TID PRN    carvedilol (COREG) tablet 25 mg, BID With Meals     cefTRIAXone (ROCEPHIN) IVPB (premix in dextrose) 1,000 mg 50 mL, Q24H, Last Rate: 1,000 mg (03/07/25 1433) **AND** [COMPLETED] azithromycin (ZITHROMAX) 500 mg in sodium chloride 0.9% 250mL IVPB 500 mg, Q24H, Last Rate: 0 mg (03/06/25 1705)    chlorhexidine (PERIDEX) 0.12 % oral rinse 15 mL, Q12H SHANNA    escitalopram (LEXAPRO) tablet 20 mg, Daily    guaiFENesin (ROBITUSSIN) oral liquid 200 mg, TID PRN    insulin lispro (HumALOG/ADMELOG) 100 units/mL subcutaneous injection 1-6 Units, 4x Daily (AC & HS) **AND** Fingerstick Glucose (POCT), 4x Daily AC and at bedtime    ipratropium (ATROVENT) 0.02 % inhalation solution 0.5 mg, TID    labetalol (NORMODYNE) injection 10 mg, Q6H PRN    levalbuterol (XOPENEX) inhalation solution 1.25 mg, TID    levalbuterol (XOPENEX) inhalation solution 1.25 mg, Q6H PRN    losartan (COZAAR) tablet 100 mg, Daily    metroNIDAZOLE (FLAGYL) IVPB (premix) 500 mg 100 mL, Q8H, Last Rate: 500 mg (03/08/25 0412)    nicotine (NICODERM CQ) 14 mg/24hr TD 24 hr patch 1 patch, Daily    pantoprazole (PROTONIX) EC tablet 40 mg, BID before lunch/dinner    polyethylene glycol (MIRALAX) packet 17 g, Daily PRN    predniSONE tablet 40 mg, Daily    warfarin (COUMADIN) tablet 2.5 mg, Once per day on Sunday Tuesday Wednesday Friday    warfarin (COUMADIN) tablet 5 mg, Once per day on Monday Thursday Saturday    Administrative Statements   Today, Patient Was Seen By: Danelle Chapa MD  I have spent a total time of 39 minutes in caring for this patient on the day of the visit/encounter including Importance of tx compliance, Risk factor reductions, Counseling / Coordination of care, Documenting in the medical record, Reviewing/placing orders in the medical record (including tests, medications, and/or procedures), Obtaining or reviewing history  , and Communicating with other healthcare professionals .    **Please Note: This note may have been constructed using a voice recognition system.**

## 2025-03-08 NOTE — PLAN OF CARE
Problem: Potential for Falls  Goal: Patient will remain free of falls  Description: INTERVENTIONS:  - Educate patient/family on patient safety including physical limitations  - Instruct patient to call for assistance with activity   - Consult OT/PT to assist with strengthening/mobility   - Keep Call bell within reach  - Keep bed low and locked with side rails adjusted as appropriate  - Keep care items and personal belongings within reach  - Initiate and maintain comfort rounds  - Make Fall Risk Sign visible to staff  - Offer Toileting every 2 Hours, in advance of need  - Initiate/Maintain 2alarm  - Obtain necessary fall risk management equipment: 2  - Apply yellow socks and bracelet for high fall risk patients  - Consider moving patient to room near nurses station  Outcome: Progressing     Problem: Prexisting or High Potential for Compromised Skin Integrity  Goal: Skin integrity is maintained or improved  Description: INTERVENTIONS:  - Identify patients at risk for skin breakdown  - Assess and monitor skin integrity  - Assess and monitor nutrition and hydration status  - Monitor labs   - Assess for incontinence   - Turn and reposition patient  - Assist with mobility/ambulation  - Relieve pressure over bony prominences  - Avoid friction and shearing  - Provide appropriate hygiene as needed including keeping skin clean and dry  - Evaluate need for skin moisturizer/barrier cream  - Collaborate with interdisciplinary team   - Patient/family teaching  - Consider wound care consult   Outcome: Progressing     Problem: DISCHARGE PLANNING  Goal: Discharge to home or other facility with appropriate resources  Description: INTERVENTIONS:  - Identify barriers to discharge w/patient and caregiver  - Arrange for needed discharge resources and transportation as appropriate  - Identify discharge learning needs (meds, wound care, etc.)  - Arrange for interpretive services to assist at discharge as needed  - Refer to Case  Management Department for coordinating discharge planning if the patient needs post-hospital services based on physician/advanced practitioner order or complex needs related to functional status, cognitive ability, or social support system  Outcome: Progressing     Problem: Knowledge Deficit  Goal: Patient/family/caregiver demonstrates understanding of disease process, treatment plan, medications, and discharge instructions  Description: Complete learning assessment and assess knowledge base.  Interventions:  - Provide teaching at level of understanding  - Provide teaching via preferred learning methods  Outcome: Progressing

## 2025-03-08 NOTE — ASSESSMENT & PLAN NOTE
SIRS: tachycardia, tachypnea and leukocytosis  CT and CXR show multifocal pneumonia, possibly due to aspiration  Flu/Covid/RSV negative  Afebrile  LA 1.8  Recent Labs     03/06/25  0434 03/07/25  0307 03/08/25  0101   PROCALCITONI 2.69* 2.75* 1.42*      In ED, received ceftriaxone and azithromycin    Plan  Deviously on IV ceftriaxone azithromycin and Flagyl, tailored to IV ceftriaxone and IV vancomycin on 3/8  Follow-up infectious workup  Supportive care  Monitor hemodynamics

## 2025-03-08 NOTE — ASSESSMENT & PLAN NOTE
CXR 3/6 showing increasing left basilar opacity  CT CAP with consolidation in LLL and lingula, and small consolidation in RLL  Procal remains elevated (2.75, 2.69, 0.94)  Urinary antigens negative. RP2 negative.   Follow up MRSA culture. [id number]  Placed on ceftriaxone, Flagyl and azithromycin, tailored to IV ceftriaxone and IV vancomycin on 3/8  Continue atrovent/xopenex nebs TID  Previously on IV steroids, currently on p.o. prednisone taper  Pulmonary hygiene: cough and deep breath, OOB as tolerated, IS, scheuduled nebs  Speech is following,

## 2025-03-08 NOTE — PROGRESS NOTES
Chuy Norton is a 64 y.o. male who is currently ordered Vancomycin IV with management by the Pharmacy Consult service.  Relevant clinical data and objective / subjective history reviewed.  Vancomycin Assessment:  Indication and Goal AUC/Trough: Pneumonia (goal -600, trough >10)  Clinical Status:  new  Micro:     Collected Updated Procedure    2025 1518 2025 Strep Pneumoniae, Urine [[id number]]   Urine, Other    Component Value   Strep pneumoniae antigen, urine Negative          2025 1518 2025 2018 Legionella antigen, Urine [[id number]]   Urine, Other    Component Value   Legionella Urinary Antigen Negative          2025 2107 2025 1101 MRSA culture [[id number]]   (Abnormal)   Nares from Nose    Component Value   MRSA Culture Only Methicillin Resistant Staphylococcus aureus isolated Abnormal     This patient requires contact isolation precautions per [place] law. Contact precautions are not required in [place] for nasal surveillance cultures.        Renal Function:  SCr: 1.06 mg/dL  CrCl: 80.2 mL/min  Renal replacement: Not on dialysis  Days of Therapy: 1  Current Dose: 2000 mg once  Vancomycin Plan:  New Dosin mg Q12H  Estimated AUC: 517 mcg*hr/mL  Estimated Trough: 15.7 mcg/mL  Next Level: with AM labs on 03/10  Renal Function Monitoring: Daily BMP and UOP  Pharmacy will continue to follow closely for s/sx of nephrotoxicity, infusion reactions and appropriateness of therapy.  BMP and CBC will be ordered per protocol. We will continue to follow the patient’s culture results and clinical progress daily.    Diane Marie, Pharmacist

## 2025-03-08 NOTE — ASSESSMENT & PLAN NOTE
CXR 3/6 showing increasing left basilar opacity  CT CAP with consolidation in LLL and lingula, and small consolidation in RLL  Procal remains elevated - 1.42 today  Urinary antigens negative. RP2 negative.   MRSA culture positive.   On  ceftriaxone, flagyl and azithromycin --> switch to Vancomycin, Ceftriaxone and stop Azithromycin and Flagyl  Continue atrovent/xopenex nebs TID  Pulmonary hygiene: cough and deep breath, OOB as tolerated, IS, scheuduled nebs  Speech is following, suspicious of bottom up aspiration

## 2025-03-08 NOTE — PLAN OF CARE
Problem: Potential for Falls  Goal: Patient will remain free of falls  Description: INTERVENTIONS:  - Educate patient/family on patient safety including physical limitations  - Instruct patient to call for assistance with activity   - Consult OT/PT to assist with strengthening/mobility   - Keep Call bell within reach  - Keep bed low and locked with side rails adjusted as appropriate  - Keep care items and personal belongings within reach  - Initiate and maintain comfort rounds  - Make Fall Risk Sign visible to staff  - Offer Toileting every 2 Hours, in advance of need  - Initiate/Maintain bed alarm  - Obtain necessary fall risk management equipment: socks  - Apply yellow socks and bracelet for high fall risk patients  - Consider moving patient to room near nurses station  Outcome: Progressing     Problem: Prexisting or High Potential for Compromised Skin Integrity  Goal: Skin integrity is maintained or improved  Description: INTERVENTIONS:  - Identify patients at risk for skin breakdown  - Assess and monitor skin integrity  - Assess and monitor nutrition and hydration status  - Monitor labs   - Assess for incontinence   - Turn and reposition patient  - Assist with mobility/ambulation  - Relieve pressure over bony prominences  - Avoid friction and shearing  - Provide appropriate hygiene as needed including keeping skin clean and dry  - Evaluate need for skin moisturizer/barrier cream  - Collaborate with interdisciplinary team   - Patient/family teaching  - Consider wound care consult   Outcome: Progressing     Problem: PAIN - ADULT  Goal: Verbalizes/displays adequate comfort level or baseline comfort level  Description: Interventions:  - Encourage patient to monitor pain and request assistance  - Assess pain using appropriate pain scale  - Administer analgesics based on type and severity of pain and evaluate response  - Implement non-pharmacological measures as appropriate and evaluate response  - Consider cultural  and social influences on pain and pain management  - Notify physician/advanced practitioner if interventions unsuccessful or patient reports new pain  Outcome: Progressing     Problem: INFECTION - ADULT  Goal: Absence or prevention of progression during hospitalization  Description: INTERVENTIONS:  - Assess and monitor for signs and symptoms of infection  - Monitor lab/diagnostic results  - Monitor all insertion sites, i.e. indwelling lines, tubes, and drains  - Monitor endotracheal if appropriate and nasal secretions for changes in amount and color  - [place] appropriate cooling/warming therapies per order  - Administer medications as ordered  - Instruct and encourage patient and family to use good hand hygiene technique  - Identify and instruct in appropriate isolation precautions for identified infection/condition  Outcome: Progressing     Problem: SAFETY ADULT  Goal: Patient will remain free of falls  Description: INTERVENTIONS:  - Educate patient/family on patient safety including physical limitations  - Instruct patient to call for assistance with activity   - Consult OT/PT to assist with strengthening/mobility   - Keep Call bell within reach  - Keep bed low and locked with side rails adjusted as appropriate  - Keep care items and personal belongings within reach  - Initiate and maintain comfort rounds  - Make Fall Risk Sign visible to staff  - Offer Toileting every 2 Hours, in advance of need  - Initiate/Maintain bed alarm  - Obtain necessary fall risk management equipment: socks  - Apply yellow socks and bracelet for high fall risk patients  - Consider moving patient to room near nurses station  Outcome: Progressing  Goal: Maintain or return to baseline ADL function  Description: INTERVENTIONS:  -  Assess patient's ability to carry out ADLs; assess patient's baseline for ADL function and identify physical deficits which impact ability to perform ADLs (bathing, care of mouth/teeth, toileting, grooming,  dressing, etc.)  - Assess/evaluate cause of self-care deficits   - Assess range of motion  - Assess patient's mobility; develop plan if impaired  - Assess patient's need for assistive devices and provide as appropriate  - Encourage maximum independence but intervene and supervise when necessary  - Involve family in performance of ADLs  - Assess for home care needs following discharge   - Consider OT consult to assist with ADL evaluation and planning for discharge  - Provide patient education as appropriate  Outcome: Progressing  Goal: Maintains/Returns to pre admission functional level  Description: INTERVENTIONS:  - Perform AM-PAC 6 Click Basic Mobility/ Daily Activity assessment daily.  - Set and communicate daily mobility goal to care team and patient/family/caregiver.   - Collaborate with rehabilitation services on mobility goals if consulted  - Perform Range of Motion 3 times a day.  - Reposition patient every 2 hours.  - Dangle patient 3 times a day  - Stand patient 3 times a day  - Ambulate patient 3 times a day  - Out of bed to chair 3 times a day   - Out of bed for meals 3 times a day  - Out of bed for toileting  - Record patient progress and toleration of activity level   Outcome: Progressing     Problem: DISCHARGE PLANNING  Goal: Discharge to home or other facility with appropriate resources  Description: INTERVENTIONS:  - Identify barriers to discharge w/patient and caregiver  - Arrange for needed discharge resources and transportation as appropriate  - Identify discharge learning needs (meds, wound care, etc.)  - Arrange for interpretive services to assist at discharge as needed  - Refer to Case Management Department for coordinating discharge planning if the patient needs post-hospital services based on physician/advanced practitioner order or complex needs related to functional status, cognitive ability, or social support system  Outcome: Progressing     Problem: Knowledge Deficit  Goal:  Patient/family/caregiver demonstrates understanding of disease process, treatment plan, medications, and discharge instructions  Description: Complete learning assessment and assess knowledge base.  Interventions:  - Provide teaching at level of understanding  - Provide teaching via preferred learning methods  Outcome: Progressing

## 2025-03-08 NOTE — ASSESSMENT & PLAN NOTE
Initially requiring 4L in ED, however had worsening respiratory status and placed on BiPAP. Continue on BiPAP as needed and qhs - but patient is refusing   Currently on 4L   Not oxygen dependent at baseline  Titrate oxygen to maintain saturations >89%  Will likely need home o2 eval prior to discharge

## 2025-03-08 NOTE — ASSESSMENT & PLAN NOTE
"Outpatient: 12.5mg Carvedilol BID, 40mg lisinopril   BP (!) 173/102   Pulse 75   Temp 98 °F (36.7 °C)   Resp 20   Ht 5' 8\" (1.727 m)   Wt 96.1 kg (211 lb 13.8 oz) Comment: Pillow and blanket  SpO2 93%   BMI 32.21 kg/m²     Plan  continue  PRN labetolol, for SBP >180  "

## 2025-03-08 NOTE — ASSESSMENT & PLAN NOTE
Patient brought in by facility staff for SOB, cough x2 weeks and dizziness with standing  At baseline patient states he does not wear oxygen but does wear nocturnal CPAP  In ED, initially stable on 4L NC but had increase WOB requiring BIPAP. Patient was tried off BIPAP after receiving TRUJILLO neb and steroids but failed trial and placed back on BIPAP.   Flu/Covid/RSV negative  CXR: Patchy lingular and lower lobe opacities, concerning for pneumonia.   CT CAP: Multifocal pneumonia involving the left lower lobe, lingula, and medial right lower lobe. Fluid in the esophagus suggests aspiration as a possible etiology. Moderate size hiatal hernia and distended fluid-filled esophagus to the level of the thoracic inlet.     Plan  Continue BiPAP nightly, currently on 4 L of oxygen through nasal cannula, currently on 2 L of oxygen  Xopenex/Atrovent TID  Prednisone 40 mg daily   Evaluated by speech, recommended regular diet with thin liquids  Will need home oxygen evaluation prior to discharge

## 2025-03-08 NOTE — PROGRESS NOTES
Progress Note - Pulmonology   Name: Chuy Norton 64 y.o. male I MRN: [id number]  Unit/Bed#: -01 I Date of Admission: 3/5/2025   Date of Service: 3/8/2025 I Hospital Day: 3     Assessment & Plan  Acute respiratory failure with hypoxia (HCC)  Initially requiring 4L in ED, however had worsening respiratory status and placed on BiPAP. Continue on BiPAP as needed and qhs - but patient is refusing   Currently on 4L   Not oxygen dependent at baseline  Titrate oxygen to maintain saturations >89%  Will likely need home o2 eval prior to discharge   Pneumonia  CXR 3/6 showing increasing left basilar opacity  CT CAP with consolidation in LLL and lingula, and small consolidation in RLL  Procal remains elevated - 1.42 today  Urinary antigens negative. RP2 negative.   MRSA culture positive.   On  ceftriaxone, flagyl and azithromycin --> switch to Vancomycin, Ceftriaxone and stop Azithromycin and Flagyl  Continue atrovent/xopenex nebs TID  Pulmonary hygiene: cough and deep breath, OOB as tolerated, IS, scheuduled nebs  Speech is following, suspicious of bottom up aspiration  KENN (obstructive sleep apnea)  Maintained on CPAP at home  Continue BiPAP qhs for now  Left ventricular apical thrombus  Maintained on coumadin  Severe sepsis (HCC)  Plan as above    24 Hour Events : used BIPAP  Subjective : feels slightly better     Objective :  Temp:  [97.6 °F (36.4 °C)-98.6 °F (37 °C)] 98 °F (36.7 °C)  HR:  [75-86] 75  BP: (155-173)/(102-111) 173/102  Resp:  [16-22] 20  SpO2:  [90 %-93 %] 93 %  O2 Device: Nasal cannula  Nasal Cannula O2 Flow Rate (L/min):  [4 L/min] 4 L/min    Physical Exam  Vitals and nursing note reviewed.   Constitutional:       General: He is not in acute distress.     Appearance: He is well-developed. He is not ill-appearing, toxic-appearing or diaphoretic.   HENT:      Head: Normocephalic and atraumatic.   Eyes:      Conjunctiva/sclera: Conjunctivae normal.   Cardiovascular:      Rate and Rhythm: Normal rate and  regular rhythm.      Heart sounds: Normal heart sounds. No murmur heard.  Pulmonary:      Effort: Pulmonary effort is normal. No respiratory distress.      Breath sounds: Examination of the right-lower field reveals rales. Examination of the left-lower field reveals rales. Rales present. No wheezing.   Abdominal:      Palpations: Abdomen is soft.   Musculoskeletal:         General: No swelling.      Cervical back: Neck supple.      Right lower leg: No edema.      Left lower leg: No edema.   Skin:     General: Skin is warm and dry.      Capillary Refill: Capillary refill takes less than 2 seconds.      Coloration: Skin is not jaundiced or pale.   Neurological:      Mental Status: He is alert and oriented to person, place, and time.   Psychiatric:         Mood and Affect: Mood normal.         Behavior: Behavior normal.         Thought Content: Thought content normal.         Judgment: Judgment normal.           Lab Results: I have reviewed the following results:   .     03/08/25  0101 03/08/25  0707   WBC 17.49*  --    HGB 14.3  --    HCT 44.8  --      --    SODIUM 138  --    K 4.1  --      --    CO2 19*  --    BUN 36*  --    CREATININE 1.06  --    GLUC 153*  --    MG 2.3  --    AST 19  --    ALT 15  --    ALB 3.9  --    TBILI 0.43  --    ALKPHOS 48  --    INR  --  2.75*     ABG: No new results in last 24 hours.    Imaging Results Review: I personally reviewed the following image studies in PACS and associated radiology reports: CT chest. My interpretation of the radiology images/reports is: IMPRESSION:.     Multifocal pneumonia involving the left lower lobe, lingula, and medial right lower lobe. Fluid in the esophagus suggests aspiration as a possible etiology.     Moderate size hiatal hernia and distended fluid-filled esophagus to the level of the thoracic inlet.     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      Other Study Results Review: No additional pertinent studies reviewed.

## 2025-03-08 NOTE — ASSESSMENT & PLAN NOTE
Outpatient: 2.5mg Warfarin (Sun, Tues, Wed, Fri) and 5mg (Mon, Thurs, Sat)  INR: 2.68    Plan   Continue  Recent Labs     03/05/25  2106 03/07/25  0307 03/08/25  0707   INR 2.76* 2.86* 2.75*

## 2025-03-09 LAB
ANION GAP SERPL CALCULATED.3IONS-SCNC: 7 MMOL/L (ref 4–13)
BUN SERPL-MCNC: 32 MG/DL (ref 5–25)
CALCIUM SERPL-MCNC: 7.8 MG/DL (ref 8.4–10.2)
CHLORIDE SERPL-SCNC: 109 MMOL/L (ref 96–108)
CO2 SERPL-SCNC: 22 MMOL/L (ref 21–32)
CREAT SERPL-MCNC: 0.96 MG/DL (ref 0.6–1.3)
ERYTHROCYTE [DISTWIDTH] IN BLOOD BY AUTOMATED COUNT: 21.2 % (ref 11.6–15.1)
GFR SERPL CREATININE-BSD FRML MDRD: 83 ML/MIN/1.73SQ M
GLUCOSE SERPL-MCNC: 126 MG/DL (ref 65–140)
GLUCOSE SERPL-MCNC: 135 MG/DL (ref 65–140)
GLUCOSE SERPL-MCNC: 143 MG/DL (ref 65–140)
GLUCOSE SERPL-MCNC: 152 MG/DL (ref 65–140)
GLUCOSE SERPL-MCNC: 156 MG/DL (ref 65–140)
HCT VFR BLD AUTO: 39 % (ref 36.5–49.3)
HGB BLD-MCNC: 12.6 G/DL (ref 12–17)
INR PPP: 3.89 (ref 0.85–1.19)
MCH RBC QN AUTO: 29.1 PG (ref 26.8–34.3)
MCHC RBC AUTO-ENTMCNC: 32.3 G/DL (ref 31.4–37.4)
MCV RBC AUTO: 90 FL (ref 82–98)
NRBC BLD AUTO-RTO: 0 /100 WBCS
PLATELET # BLD AUTO: 131 THOUSANDS/UL (ref 149–390)
POTASSIUM SERPL-SCNC: 3.8 MMOL/L (ref 3.5–5.3)
PROCALCITONIN SERPL-MCNC: 0.7 NG/ML
PROTHROMBIN TIME: 38 SECONDS (ref 12.3–15)
RBC # BLD AUTO: 4.33 MILLION/UL (ref 3.88–5.62)
SODIUM SERPL-SCNC: 138 MMOL/L (ref 135–147)
WBC # BLD AUTO: 14.22 THOUSAND/UL (ref 4.31–10.16)

## 2025-03-09 PROCEDURE — 84145 PROCALCITONIN (PCT): CPT | Performed by: INTERNAL MEDICINE

## 2025-03-09 PROCEDURE — 94760 N-INVAS EAR/PLS OXIMETRY 1: CPT

## 2025-03-09 PROCEDURE — 85027 COMPLETE CBC AUTOMATED: CPT | Performed by: INTERNAL MEDICINE

## 2025-03-09 PROCEDURE — 99232 SBSQ HOSP IP/OBS MODERATE 35: CPT | Performed by: INTERNAL MEDICINE

## 2025-03-09 PROCEDURE — 82948 REAGENT STRIP/BLOOD GLUCOSE: CPT

## 2025-03-09 PROCEDURE — 85610 PROTHROMBIN TIME: CPT | Performed by: INTERNAL MEDICINE

## 2025-03-09 PROCEDURE — 80048 BASIC METABOLIC PNL TOTAL CA: CPT | Performed by: INTERNAL MEDICINE

## 2025-03-09 PROCEDURE — 94640 AIRWAY INHALATION TREATMENT: CPT

## 2025-03-09 RX ADMIN — LEVALBUTEROL HYDROCHLORIDE 1.25 MG: 1.25 SOLUTION RESPIRATORY (INHALATION) at 14:23

## 2025-03-09 RX ADMIN — IPRATROPIUM BROMIDE 0.5 MG: 0.5 SOLUTION RESPIRATORY (INHALATION) at 14:23

## 2025-03-09 RX ADMIN — PANTOPRAZOLE SODIUM 40 MG: 40 TABLET, DELAYED RELEASE ORAL at 17:00

## 2025-03-09 RX ADMIN — INSULIN LISPRO 1 UNITS: 100 INJECTION, SOLUTION INTRAVENOUS; SUBCUTANEOUS at 21:08

## 2025-03-09 RX ADMIN — CEFTRIAXONE 1000 MG: 1 INJECTION, SOLUTION INTRAVENOUS at 14:08

## 2025-03-09 RX ADMIN — CARVEDILOL 25 MG: 25 TABLET, FILM COATED ORAL at 17:00

## 2025-03-09 RX ADMIN — PREDNISONE 40 MG: 20 TABLET ORAL at 08:28

## 2025-03-09 RX ADMIN — VANCOMYCIN HYDROCHLORIDE 1000 MG: 1 INJECTION, SOLUTION INTRAVENOUS at 00:28

## 2025-03-09 RX ADMIN — CHLORHEXIDINE GLUCONATE 15 ML: 1.2 RINSE ORAL at 21:02

## 2025-03-09 RX ADMIN — ESCITALOPRAM OXALATE 20 MG: 20 TABLET ORAL at 08:28

## 2025-03-09 RX ADMIN — IPRATROPIUM BROMIDE 0.5 MG: 0.5 SOLUTION RESPIRATORY (INHALATION) at 19:00

## 2025-03-09 RX ADMIN — VANCOMYCIN HYDROCHLORIDE 1000 MG: 1 INJECTION, SOLUTION INTRAVENOUS at 23:48

## 2025-03-09 RX ADMIN — QUETIAPINE 300 MG: 200 TABLET ORAL at 21:05

## 2025-03-09 RX ADMIN — LOSARTAN POTASSIUM 100 MG: 50 TABLET, FILM COATED ORAL at 08:28

## 2025-03-09 RX ADMIN — ATORVASTATIN CALCIUM 40 MG: 40 TABLET, FILM COATED ORAL at 17:00

## 2025-03-09 RX ADMIN — CARVEDILOL 25 MG: 25 TABLET, FILM COATED ORAL at 08:28

## 2025-03-09 RX ADMIN — TRAZODONE HYDROCHLORIDE 100 MG: 100 TABLET ORAL at 21:05

## 2025-03-09 RX ADMIN — LEVALBUTEROL HYDROCHLORIDE 1.25 MG: 1.25 SOLUTION RESPIRATORY (INHALATION) at 07:23

## 2025-03-09 RX ADMIN — IPRATROPIUM BROMIDE 0.5 MG: 0.5 SOLUTION RESPIRATORY (INHALATION) at 07:23

## 2025-03-09 RX ADMIN — LEVALBUTEROL HYDROCHLORIDE 1.25 MG: 1.25 SOLUTION RESPIRATORY (INHALATION) at 19:00

## 2025-03-09 RX ADMIN — VANCOMYCIN HYDROCHLORIDE 1000 MG: 1 INJECTION, SOLUTION INTRAVENOUS at 12:38

## 2025-03-09 RX ADMIN — NICOTINE 1 PATCH: 14 PATCH, EXTENDED RELEASE TRANSDERMAL at 08:28

## 2025-03-09 NOTE — ASSESSMENT & PLAN NOTE
"Outpatient: 12.5mg Carvedilol BID, 40mg lisinopril   /74   Pulse 78   Temp 97.8 °F (36.6 °C)   Resp 20   Ht 5' 8\" (1.727 m)   Wt 96.1 kg (211 lb 13.8 oz)   SpO2 92%   BMI 32.21 kg/m²     Plan  continue  PRN labetolol, for SBP >180  "

## 2025-03-09 NOTE — ASSESSMENT & PLAN NOTE
CXR 3/6 showing increasing left basilar opacity  CT CAP with consolidation in LLL and lingula, and small consolidation in RLL  Procal remains elevated (2.75, 2.69, 0.94)  Urinary antigens negative. RP2 negative.   Follow up MRSA culture. [id number]  Placed on ceftriaxone, Flagyl and azithromycin, tailored to IV ceftriaxone and IV vancomycin on 3/8  Continue atrovent/xopenex nebs TID  Previously on IV steroids, currently on p.o. prednisone taper  Pulmonary hygiene: cough and deep breath, OOB as tolerated, IS, scheuduled nebs  Speech is following,

## 2025-03-09 NOTE — PROGRESS NOTES
"Progress Note - Hospitalist   Name: Chuy Norton 64 y.o. male I MRN: [id number]  Unit/Bed#: -01 I Date of Admission: 3/5/2025   Date of Service: 3/9/2025 I Hospital Day: 4    Assessment & Plan  Acute respiratory failure with hypoxia (HCC)  Patient brought in by facility staff for SOB, cough x2 weeks and dizziness with standing  At baseline patient states he does not wear oxygen but does wear nocturnal CPAP  In ED, initially stable on 4L NC but had increase WOB requiring BIPAP. Patient was tried off BIPAP after receiving TRUJILLO neb and steroids but failed trial and placed back on BIPAP.   Flu/Covid/RSV negative  CXR: Patchy lingular and lower lobe opacities, concerning for pneumonia.   CT CAP: Multifocal pneumonia involving the left lower lobe, lingula, and medial right lower lobe. Fluid in the esophagus suggests aspiration as a possible etiology. Moderate size hiatal hernia and distended fluid-filled esophagus to the level of the thoracic inlet.     Plan  Continue BiPAP nightly, currently on 4 L of oxygen through nasal cannula, currently on 2 L of oxygen  Xopenex/Atrovent TID  Prednisone 40 mg daily   Evaluated by speech, recommended regular diet with thin liquids  Will need home oxygen evaluation prior to discharge  Bipolar 2 disorder, major depressive episode (HCC)  Outpatient: 20mg lexapro, 300mg Seroquel HS, 100mg trazadone HS    Plan  Continue     Primary hypertension  Outpatient: 12.5mg Carvedilol BID, 40mg lisinopril   /74   Pulse 78   Temp 97.8 °F (36.6 °C)   Resp 20   Ht 5' 8\" (1.727 m)   Wt 96.1 kg (211 lb 13.8 oz)   SpO2 92%   BMI 32.21 kg/m²     Plan  continue  PRN labetolol, for SBP >180  Hyperlipidemia  Outpatient: 40 atorvastatin    Plan  Continue statin   Cirrhosis (HCC)  Follows with [facility], last seen Jan 2025  History of cirrhosis of the liver due to history of alcohol and drug addiction.   5/9/2024 CT C/A/P noted nodular hepatic contours may indicate cirrhosis of the liver. "     No ascites present  Left ventricular apical thrombus  Outpatient: 2.5mg Warfarin (Sun, Tues, Wed, Fri) and 5mg (Mon, Thurs, Sat)  INR as of today, supratherapeutic , will hold coumadin   Continue to monitor     Plan   Continue  Recent Labs     03/07/25  0307 03/08/25  0707 03/09/25  0348   INR 2.86* 2.75* 3.89*      Hepatitis C  Follows with [facility], last seen Jan 2025  History of hepatitis C antibody and stated treatment with Mavy[facility]   May 2024 negative viral load  Severe sepsis (HCC)  SIRS: tachycardia, tachypnea and leukocytosis  CT and CXR show multifocal pneumonia, possibly due to aspiration  Flu/Covid/RSV negative  Afebrile  LA 1.8  Recent Labs     03/07/25  0307 03/08/25  0101 03/09/25  0348   PROCALCITONI 2.75* 1.42* 0.70*      In ED, received ceftriaxone and azithromycin    Plan  Deviously on IV ceftriaxone azithromycin and Flagyl, tailored to IV ceftriaxone and IV vancomycin on 3/8, appreciate pulmonology input   Follow-up infectious workup  Supportive care  Monitor hemodynamics  Pneumonia  CXR 3/6 showing increasing left basilar opacity  CT CAP with consolidation in LLL and lingula, and small consolidation in RLL  Procal remains elevated (2.75, 2.69, 0.94)  Urinary antigens negative. RP2 negative.   Follow up MRSA culture. [id number]  Placed on ceftriaxone, Flagyl and azithromycin, tailored to IV ceftriaxone and IV vancomycin on 3/8  Continue atrovent/xopenex nebs TID  Previously on IV steroids, currently on p.o. prednisone taper  Pulmonary hygiene: cough and deep breath, OOB as tolerated, IS, scheuduled nebs  Speech is following,  KENN (obstructive sleep apnea)  Maintained on CPAP at home  Continue BiPAP qhs for now    VTE Pharmacologic Prophylaxis:   Moderate Risk (Score 3-4) - Pharmacological DVT Prophylaxis Ordered: warfarin (Coumadin).    Mobility:   Basic Mobility Inpatient Raw Score: 17  JH-HLM Goal: 5: Stand one or more mins  JH-HLM Achieved: 7: Walk 25 feet or more  JH-HLM Goal achieved.  Continue to encourage appropriate mobility.    Patient Centered Rounds: I performed bedside rounds with nursing staff today.   Discussions with Specialists or Other Care Team Provider: yes    Education and Discussions with Family / Patient:  yes.     Current Length of Stay: 4 day(s)  Current Patient Status: Inpatient   Certification Statement: 48 -72 hours  Discharge Plan: 48 - 72 hrs    Code Status: Level 1 - Full Code    Subjective   Seen and examined at bedside  Awake and alert  Comfortable  Not in distress    No overnight events reported    Objective :  Temp:  [97.4 °F (36.3 °C)-98.4 °F (36.9 °C)] 97.8 °F (36.6 °C)  HR:  [64-78] 78  BP: (119-185)/() 119/74  Resp:  [17-20] 20  SpO2:  [89 %-96 %] 92 %  O2 Device: Nasal cannula  Nasal Cannula O2 Flow Rate (L/min):  [2 L/min-4 L/min] 2 L/min    Body mass index is 32.21 kg/m².     Input and Output Summary (last 24 hours):     Intake/Output Summary (Last 24 hours) at 3/9/2025 1152  Last data filed at 3/9/2025 0947  Gross per 24 hour   Intake 560 ml   Output 500 ml   Net 60 ml       Physical Exam  Vitals reviewed.   Constitutional:       Appearance: Normal appearance.   HENT:      Head: Atraumatic.      Mouth/Throat:      Mouth: Mucous membranes are dry.   Eyes:      General: No scleral icterus.  Cardiovascular:      Rate and Rhythm: Normal rate and regular rhythm.      Heart sounds: Normal heart sounds.   Pulmonary:      Effort: No respiratory distress.      Comments: 4 litres  Abdominal:      Tenderness: There is no abdominal tenderness.   Musculoskeletal:      Right lower leg: No edema.      Left lower leg: No edema.   Skin:     General: Skin is dry.      Capillary Refill: Capillary refill takes less than 2 seconds.      Findings: Bruising and erythema present.   Neurological:      Mental Status: He is alert and oriented to person, place, and time. Mental status is at baseline.   Psychiatric:         Mood and Affect: Mood normal.            Lines/Drains:              Lab Results: I have reviewed the following results:   Results from last 7 days   Lab Units 03/09/25  0348 03/08/25  0101 03/07/25  0307 03/06/25  0434   WBC Thousand/uL 14.22* 17.49*   < > 13.90*   HEMOGLOBIN g/dL 12.6 14.3   < > 13.9   HEMATOCRIT % 39.0 44.8   < > 43.9   PLATELETS Thousands/uL 131* 151   < > 105*   SEGS PCT %  --   --   --  90*   LYMPHO PCT %  --  4*  --  5*   MONO PCT %  --  4  --  4   EOS PCT %  --  1  --  0    < > = values in this interval not displayed.     Results from last 7 days   Lab Units 03/09/25 0348 03/08/25  0101   SODIUM mmol/L 138 138   POTASSIUM mmol/L 3.8 4.1   CHLORIDE mmol/L 109* 108   CO2 mmol/L 22 19*   BUN mg/dL 32* 36*   CREATININE mg/dL 0.96 1.06   ANION GAP mmol/L 7 11   CALCIUM mg/dL 7.8* 8.3*   ALBUMIN g/dL  --  3.9   TOTAL BILIRUBIN mg/dL  --  0.43   ALK PHOS U/L  --  48   ALT U/L  --  15   AST U/L  --  19   GLUCOSE RANDOM mg/dL 143* 153*     Results from last 7 days   Lab Units 03/09/25  0348   INR  3.89*     Results from last 7 days   Lab Units 03/09/25  1114 03/09/25  0629 03/08/25  2107 03/08/25  1607 03/08/25  1123 03/08/25  0715 03/07/25  2221 03/07/25  1608 03/07/25  1102 03/07/25  0742 03/06/25  2111 03/06/25  1652   POC GLUCOSE mg/dl 156* 126 143* 120 190* 152* 139 174* 159* 136 182* 153*     Results from last 7 days   Lab Units 03/06/25  0434   HEMOGLOBIN A1C % 5.7*     Results from last 7 days   Lab Units 03/09/25  0348 03/08/25  0101 03/07/25  0307 03/06/25  0434 03/05/25  1510   LACTIC ACID mmol/L  --   --   --   --  1.8   PROCALCITONIN ng/ml 0.70* 1.42* 2.75* 2.69* 0.94*       Recent Cultures (last 7 days):   Results from last 7 days   Lab Units 03/06/25  1518 03/05/25  1510   BLOOD CULTURE   --  No Growth at 72 hrs.  No Growth at 72 hrs.   LEGIONELLA URINARY ANTIGEN  Negative  --        Imaging Results Review: I reviewed radiology reports from this admission including: CT chest.  Other Study Results Review: No  additional pertinent studies reviewed.    Last 24 Hours Medication List:     Current Facility-Administered Medications:     acetaminophen (TYLENOL) tablet 650 mg, Q6H PRN    atorvastatin (LIPITOR) tablet 40 mg, Daily With Dinner    benzonatate (TESSALON PERLES) capsule 200 mg, TID PRN    carvedilol (COREG) tablet 25 mg, BID With Meals    cefTRIAXone (ROCEPHIN) IVPB (premix in dextrose) 1,000 mg 50 mL, Q24H, Last Rate: Stopped (03/08/25 1619) **AND** [COMPLETED] azithromycin (ZITHROMAX) 500 mg in sodium chloride 0.9% 250mL IVPB 500 mg, Q24H, Last Rate: 0 mg (03/06/25 1705)    chlorhexidine (PERIDEX) 0.12 % oral rinse 15 mL, Q12H SHANNA    escitalopram (LEXAPRO) tablet 20 mg, Daily    guaiFENesin (ROBITUSSIN) oral liquid 200 mg, TID PRN    insulin lispro (HumALOG/ADMELOG) 100 units/mL subcutaneous injection 1-6 Units, 4x Daily (AC & HS) **AND** Fingerstick Glucose (POCT), 4x Daily AC and at bedtime    ipratropium (ATROVENT) 0.02 % inhalation solution 0.5 mg, TID    labetalol (NORMODYNE) injection 10 mg, Q6H PRN    levalbuterol (XOPENEX) inhalation solution 1.25 mg, TID    levalbuterol (XOPENEX) inhalation solution 1.25 mg, Q6H PRN    losartan (COZAAR) tablet 100 mg, Daily    nicotine (NICODERM CQ) 14 mg/24hr TD 24 hr patch 1 patch, Daily    pantoprazole (PROTONIX) EC tablet 40 mg, BID before lunch/dinner    polyethylene glycol (MIRALAX) packet 17 g, Daily PRN    predniSONE tablet 40 mg, Daily    QUEtiapine (SEROquel) tablet 300 mg, HS    traZODone (DESYREL) tablet 100 mg, HS    vancomycin (VANCOCIN) IVPB (premix in dextrose) 1,000 mg 200 mL, Q12H, Last Rate: 1,000 mg (03/09/25 0028)    [Held by provider] warfarin (COUMADIN) tablet 2.5 mg, Once per day on Sunday Tuesday Wednesday Friday    [Held by provider] warfarin (COUMADIN) tablet 5 mg, Once per day on Monday Thursday Saturday    Administrative Statements   Today, Patient Was Seen By: Danelle Chapa MD  I have spent a total time of 39 minutes in caring for this patient  on the day of the visit/encounter including Importance of tx compliance, Risk factor reductions, Counseling / Coordination of care, Documenting in the medical record, Reviewing/placing orders in the medical record (including tests, medications, and/or procedures), Obtaining or reviewing history  , and Communicating with other healthcare professionals .    **Please Note: This note may have been constructed using a voice recognition system.**

## 2025-03-09 NOTE — PROGRESS NOTES
Chuy Norton is a 64 y.o. male who is currently ordered Vancomycin IV with management by the Pharmacy Consult service.  Relevant clinical data and objective / subjective history reviewed.  Vancomycin Assessment:  Indication and Goal AUC/Trough: Pneumonia (goal -600, trough >10)  Clinical Status: stable  Micro:     Renal Function:  SCr: 0.96 mg/dL  CrCl: 88 mL/min  Renal replacement: Not on dialysis  Days of Therapy: 2  Current Dose: 1000mg Q12H  Vancomycin Plan:  New Dosing: No change  Estimated AUC: 480 mcg*hr/mL  Estimated Trough: 14.2 mcg/mL  Next Level: Random Level AM labs 0600 on 3/10/25  Renal Function Monitoring: Daily BMP and UOP  Pharmacy will continue to follow closely for s/sx of nephrotoxicity, infusion reactions and appropriateness of therapy.  BMP and CBC will be ordered per protocol. We will continue to follow the patient’s culture results and clinical progress daily.    Wood Rodriguez, Pharmacist

## 2025-03-09 NOTE — ASSESSMENT & PLAN NOTE
SIRS: tachycardia, tachypnea and leukocytosis  CT and CXR show multifocal pneumonia, possibly due to aspiration  Flu/Covid/RSV negative  Afebrile  LA 1.8  Recent Labs     03/07/25  0307 03/08/25  0101 03/09/25  0348   PROCALCITONI 2.75* 1.42* 0.70*      In ED, received ceftriaxone and azithromycin    Plan  Deviously on IV ceftriaxone azithromycin and Flagyl, tailored to IV ceftriaxone and IV vancomycin on 3/8, appreciate pulmonology input   Follow-up infectious workup  Supportive care  Monitor hemodynamics

## 2025-03-09 NOTE — ASSESSMENT & PLAN NOTE
Initially requiring 4L in ED, however had worsening respiratory status and placed on BiPAP. Continue on BiPAP as needed and qhs - but patient is refusing   Currently on 2L   Not oxygen dependent at baseline  Titrate oxygen to maintain saturations >89%  Will likely need home O2 eval prior to discharge

## 2025-03-09 NOTE — ASSESSMENT & PLAN NOTE
CXR 3/6 showing increasing left basilar opacity  CT CAP with consolidation in LLL and lingula, and small consolidation in RLL  Procal remains elevated - 1.42 today  Urinary antigens negative. RP2 negative.   MRSA culture positive.   Switched to Vancomycin, Ceftriaxone   If the patient is getting discharged tomorrow, would recommend to give him doxycycline course for another 5 days/continue total of 7 days of MRSA coverage  Continue atrovent/xopenex nebs TID  Pulmonary hygiene: cough and deep breath, OOB as tolerated, IS, scheuduled nebs  Speech cleared the patient for regular diet and thin liquids

## 2025-03-09 NOTE — PROGRESS NOTES
Progress Note - Pulmonology   Name: Chuy Norton 64 y.o. male I MRN: [id number]  Unit/Bed#: -01 I Date of Admission: 3/5/2025   Date of Service: 3/9/2025 I Hospital Day: 4     Assessment & Plan  Acute respiratory failure with hypoxia (HCC)  Initially requiring 4L in ED, however had worsening respiratory status and placed on BiPAP. Continue on BiPAP as needed and qhs - but patient is refusing   Currently on 2L   Not oxygen dependent at baseline  Titrate oxygen to maintain saturations >89%  Will likely need home O2 eval prior to discharge   Pneumonia  CXR 3/6 showing increasing left basilar opacity  CT CAP with consolidation in LLL and lingula, and small consolidation in RLL  Procal remains elevated - 1.42 today  Urinary antigens negative. RP2 negative.   MRSA culture positive.   Switched to Vancomycin, Ceftriaxone   If the patient is getting discharged tomorrow, would recommend to give him doxycycline course for another 5 days/continue total of 7 days of MRSA coverage  Continue atrovent/xopenex nebs TID  Pulmonary hygiene: cough and deep breath, OOB as tolerated, IS, scheuduled nebs  Speech cleared the patient for regular diet and thin liquids  KENN (obstructive sleep apnea)  Maintained on CPAP at home  Continue BiPAP qhs for now  Left ventricular apical thrombus  Maintained on coumadin  Severe sepsis (HCC)  Plan as above    24 Hour Events : Switched antibiotics to vancomycin and ceftriaxone  Subjective : Breathing is much better    Objective :  Temp:  [97.4 °F (36.3 °C)-98.4 °F (36.9 °C)] 97.8 °F (36.6 °C)  HR:  [64-78] 78  BP: (119-185)/() 119/74  Resp:  [17-20] 20  SpO2:  [89 %-96 %] 92 %  O2 Device: Nasal cannula  Nasal Cannula O2 Flow Rate (L/min):  [2 L/min-4 L/min] 2 L/min    Physical Exam  Vitals and nursing note reviewed.   Constitutional:       General: He is not in acute distress.     Appearance: He is well-developed. He is not ill-appearing, toxic-appearing or diaphoretic.   HENT:      Head:  Normocephalic and atraumatic.   Eyes:      Conjunctiva/sclera: Conjunctivae normal.   Cardiovascular:      Rate and Rhythm: Normal rate and regular rhythm.      Heart sounds: Normal heart sounds. No murmur heard.  Pulmonary:      Effort: Pulmonary effort is normal. No respiratory distress.      Breath sounds: Normal breath sounds. No wheezing or rales.   Abdominal:      Palpations: Abdomen is soft.   Musculoskeletal:         General: No swelling.      Cervical back: Neck supple.      Right lower leg: No edema.      Left lower leg: No edema.   Skin:     General: Skin is warm and dry.      Capillary Refill: Capillary refill takes less than 2 seconds.      Coloration: Skin is not jaundiced or pale.   Neurological:      Mental Status: He is alert and oriented to person, place, and time.   Psychiatric:         Mood and Affect: Mood normal.         Behavior: Behavior normal.         Thought Content: Thought content normal.         Judgment: Judgment normal.           Lab Results: I have reviewed the following results:   .     03/09/25  0348   WBC 14.22*   HGB 12.6   HCT 39.0   *   SODIUM 138   K 3.8   *   CO2 22   BUN 32*   CREATININE 0.96   GLUC 143*   INR 3.89*     ABG: No new results in last 24 hours.    Imaging Results Review: I personally reviewed the following image studies in PACS and associated radiology reports: CT chest. My interpretation of the radiology images/reports is:  .    Multifocal pneumonia involving the left lower lobe, lingula, and medial right lower lobe. Fluid in the esophagus suggests aspiration as a possible etiology.     Moderate size hiatal hernia and distended fluid-filled esophagus to the level of the thoracic inlet.     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  Other Study Results Review: No additional pertinent studies reviewed.

## 2025-03-09 NOTE — PLAN OF CARE
Problem: Potential for Falls  Goal: Patient will remain free of falls  Description: INTERVENTIONS:  - Educate patient/family on patient safety including physical limitations  - Instruct patient to call for assistance with activity   - Consult OT/PT to assist with strengthening/mobility   - Keep Call bell within reach  - Keep bed low and locked with side rails adjusted as appropriate  - Keep care items and personal belongings within reach  - Initiate and maintain comfort rounds  - Make Fall Risk Sign visible to staff  - Offer Toileting every 2 Hours, in advance of need  - Initiate/Maintain bed alarm  - Obtain necessary fall risk management equipment: socks  - Apply yellow socks and bracelet for high fall risk patients  - Consider moving patient to room near nurses station  Outcome: Progressing     Problem: Prexisting or High Potential for Compromised Skin Integrity  Goal: Skin integrity is maintained or improved  Description: INTERVENTIONS:  - Identify patients at risk for skin breakdown  - Assess and monitor skin integrity  - Assess and monitor nutrition and hydration status  - Monitor labs   - Assess for incontinence   - Turn and reposition patient  - Assist with mobility/ambulation  - Relieve pressure over bony prominences  - Avoid friction and shearing  - Provide appropriate hygiene as needed including keeping skin clean and dry  - Evaluate need for skin moisturizer/barrier cream  - Collaborate with interdisciplinary team   - Patient/family teaching  - Consider wound care consult   Outcome: Progressing     Problem: PAIN - ADULT  Goal: Verbalizes/displays adequate comfort level or baseline comfort level  Description: Interventions:  - Encourage patient to monitor pain and request assistance  - Assess pain using appropriate pain scale  - Administer analgesics based on type and severity of pain and evaluate response  - Implement non-pharmacological measures as appropriate and evaluate response  - Consider cultural  and social influences on pain and pain management  - Notify physician/advanced practitioner if interventions unsuccessful or patient reports new pain  Outcome: Progressing     Problem: INFECTION - ADULT  Goal: Absence or prevention of progression during hospitalization  Description: INTERVENTIONS:  - Assess and monitor for signs and symptoms of infection  - Monitor lab/diagnostic results  - Monitor all insertion sites, i.e. indwelling lines, tubes, and drains  - Monitor endotracheal if appropriate and nasal secretions for changes in amount and color  - [place] appropriate cooling/warming therapies per order  - Administer medications as ordered  - Instruct and encourage patient and family to use good hand hygiene technique  - Identify and instruct in appropriate isolation precautions for identified infection/condition  Outcome: Progressing     Problem: SAFETY ADULT  Goal: Patient will remain free of falls  Description: INTERVENTIONS:  - Educate patient/family on patient safety including physical limitations  - Instruct patient to call for assistance with activity   - Consult OT/PT to assist with strengthening/mobility   - Keep Call bell within reach  - Keep bed low and locked with side rails adjusted as appropriate  - Keep care items and personal belongings within reach  - Initiate and maintain comfort rounds  - Make Fall Risk Sign visible to staff  - Offer Toileting every 2 Hours, in advance of need  - Initiate/Maintain bed alarm  - Obtain necessary fall risk management equipment: socks  - Apply yellow socks and bracelet for high fall risk patients  - Consider moving patient to room near nurses station  Outcome: Progressing  Goal: Maintain or return to baseline ADL function  Description: INTERVENTIONS:  -  Assess patient's ability to carry out ADLs; assess patient's baseline for ADL function and identify physical deficits which impact ability to perform ADLs (bathing, care of mouth/teeth, toileting, grooming,  dressing, etc.)  - Assess/evaluate cause of self-care deficits   - Assess range of motion  - Assess patient's mobility; develop plan if impaired  - Assess patient's need for assistive devices and provide as appropriate  - Encourage maximum independence but intervene and supervise when necessary  - Involve family in performance of ADLs  - Assess for home care needs following discharge   - Consider OT consult to assist with ADL evaluation and planning for discharge  - Provide patient education as appropriate  Outcome: Progressing  Goal: Maintains/Returns to pre admission functional level  Description: INTERVENTIONS:  - Perform AM-PAC 6 Click Basic Mobility/ Daily Activity assessment daily.  - Set and communicate daily mobility goal to care team and patient/family/caregiver.   - Collaborate with rehabilitation services on mobility goals if consulted  - Perform Range of Motion 3 times a day.  - Reposition patient every 2 hours.  - Dangle patient 3 times a day  - Stand patient 3 times a day  - Ambulate patient 3 times a day  - Out of bed to chair 3 times a day   - Out of bed for meals 3 times a day  - Out of bed for toileting  - Record patient progress and toleration of activity level   Outcome: Progressing

## 2025-03-09 NOTE — PLAN OF CARE
Problem: Potential for Falls  Goal: Patient will remain free of falls  Description: INTERVENTIONS:  - Educate patient/family on patient safety including physical limitations  - Instruct patient to call for assistance with activity   - Consult OT/PT to assist with strengthening/mobility   - Keep Call bell within reach  - Keep bed low and locked with side rails adjusted as appropriate  - Keep care items and personal belongings within reach  - Initiate and maintain comfort rounds  - Make Fall Risk Sign visible to staff  - Offer Toileting every 2 Hours, in advance of need  - Initiate/Maintain bed alarm  - Obtain necessary fall risk management equipment: socks  - Apply yellow socks and bracelet for high fall risk patients  - Consider moving patient to room near nurses station  Outcome: Progressing     Problem: INFECTION - ADULT  Goal: Absence of fever/infection during neutropenic period  Description: INTERVENTIONS:  - Monitor WBC    Outcome: Progressing     Problem: DISCHARGE PLANNING  Goal: Discharge to home or other facility with appropriate resources  Description: INTERVENTIONS:  - Identify barriers to discharge w/patient and caregiver  - Arrange for needed discharge resources and transportation as appropriate  - Identify discharge learning needs (meds, wound care, etc.)  - Arrange for interpretive services to assist at discharge as needed  - Refer to Case Management Department for coordinating discharge planning if the patient needs post-hospital services based on physician/advanced practitioner order or complex needs related to functional status, cognitive ability, or social support system  Outcome: Progressing     Problem: SAFETY ADULT  Goal: Maintains/Returns to pre admission functional level  Description: INTERVENTIONS:  - Perform AM-PAC 6 Click Basic Mobility/ Daily Activity assessment daily.  - Set and communicate daily mobility goal to care team and patient/family/caregiver.   - Collaborate with  rehabilitation services on mobility goals if consulted  - Perform Range of Motion 3 times a day.  - Reposition patient every 2 hours.  - Dangle patient 3 times a day  - Stand patient 3 times a day  - Ambulate patient 3 times a day  - Out of bed to chair 3 times a day   - Out of bed for meals 3 times a day  - Out of bed for toileting  - Record patient progress and toleration of activity level   Outcome: Progressing

## 2025-03-09 NOTE — ASSESSMENT & PLAN NOTE
Outpatient: 2.5mg Warfarin (Sun, Tues, Wed, Fri) and 5mg (Mon, Thurs, Sat)  INR as of today, supratherapeutic , will hold coumadin   Continue to monitor     Plan   Continue  Recent Labs     03/07/25  0307 03/08/25  0707 03/09/25  0348   INR 2.86* 2.75* 3.89*

## 2025-03-09 NOTE — ASSESSMENT & PLAN NOTE
Follows with WALE LOTT, last seen Jan 2025  History of cirrhosis of the liver due to history of alcohol and drug addiction.   5/9/2024 CT C/A/P noted nodular hepatic contours may indicate cirrhosis of the liver.     No ascites present

## 2025-03-10 PROBLEM — A41.9 SEVERE SEPSIS (HCC): Status: RESOLVED | Noted: 2024-12-08 | Resolved: 2025-03-10

## 2025-03-10 PROBLEM — R65.20 SEVERE SEPSIS (HCC): Status: RESOLVED | Noted: 2024-12-08 | Resolved: 2025-03-10

## 2025-03-10 PROBLEM — R07.1 CHEST PAIN ON BREATHING: Status: ACTIVE | Noted: 2025-03-10

## 2025-03-10 LAB
2HR DELTA HS TROPONIN: -1 NG/L
ANION GAP SERPL CALCULATED.3IONS-SCNC: 7 MMOL/L (ref 4–13)
ATRIAL RATE: 62 BPM
BACTERIA BLD CULT: NORMAL
BACTERIA BLD CULT: NORMAL
BASOPHILS # BLD AUTO: 0.02 THOUSANDS/ÂΜL (ref 0–0.1)
BASOPHILS NFR BLD AUTO: 0 % (ref 0–1)
BUN SERPL-MCNC: 28 MG/DL (ref 5–25)
CALCIUM SERPL-MCNC: 7.5 MG/DL (ref 8.4–10.2)
CARDIAC TROPONIN I PNL SERPL HS: 8 NG/L (ref ?–50)
CARDIAC TROPONIN I PNL SERPL HS: 9 NG/L (ref ?–50)
CHLORIDE SERPL-SCNC: 110 MMOL/L (ref 96–108)
CO2 SERPL-SCNC: 22 MMOL/L (ref 21–32)
CREAT SERPL-MCNC: 0.98 MG/DL (ref 0.6–1.3)
EOSINOPHIL # BLD AUTO: 0.01 THOUSAND/ÂΜL (ref 0–0.61)
EOSINOPHIL NFR BLD AUTO: 0 % (ref 0–6)
ERYTHROCYTE [DISTWIDTH] IN BLOOD BY AUTOMATED COUNT: 20.6 % (ref 11.6–15.1)
GFR SERPL CREATININE-BSD FRML MDRD: 81 ML/MIN/1.73SQ M
GLUCOSE SERPL-MCNC: 116 MG/DL (ref 65–140)
GLUCOSE SERPL-MCNC: 148 MG/DL (ref 65–140)
GLUCOSE SERPL-MCNC: 188 MG/DL (ref 65–140)
GLUCOSE SERPL-MCNC: 195 MG/DL (ref 65–140)
GLUCOSE SERPL-MCNC: 98 MG/DL (ref 65–140)
HCT VFR BLD AUTO: 40 % (ref 36.5–49.3)
HGB BLD-MCNC: 13 G/DL (ref 12–17)
IMM GRANULOCYTES # BLD AUTO: 0.15 THOUSAND/UL (ref 0–0.2)
IMM GRANULOCYTES NFR BLD AUTO: 2 % (ref 0–2)
INR PPP: 4.98 (ref 0.85–1.19)
LYMPHOCYTES # BLD AUTO: 1.15 THOUSANDS/ÂΜL (ref 0.6–4.47)
LYMPHOCYTES NFR BLD AUTO: 12 % (ref 14–44)
MCH RBC QN AUTO: 29.3 PG (ref 26.8–34.3)
MCHC RBC AUTO-ENTMCNC: 32.5 G/DL (ref 31.4–37.4)
MCV RBC AUTO: 90 FL (ref 82–98)
MONOCYTES # BLD AUTO: 0.88 THOUSAND/ÂΜL (ref 0.17–1.22)
MONOCYTES NFR BLD AUTO: 9 % (ref 4–12)
NEUTROPHILS # BLD AUTO: 7.31 THOUSANDS/ÂΜL (ref 1.85–7.62)
NEUTS SEG NFR BLD AUTO: 77 % (ref 43–75)
NRBC BLD AUTO-RTO: 0 /100 WBCS
P AXIS: 41 DEGREES
PLATELET # BLD AUTO: 119 THOUSANDS/UL (ref 149–390)
POTASSIUM SERPL-SCNC: 4 MMOL/L (ref 3.5–5.3)
PR INTERVAL: 196 MS
PROTHROMBIN TIME: 45.8 SECONDS (ref 12.3–15)
QRS AXIS: -19 DEGREES
QRSD INTERVAL: 104 MS
QT INTERVAL: 388 MS
QTC INTERVAL: 393 MS
RBC # BLD AUTO: 4.44 MILLION/UL (ref 3.88–5.62)
SODIUM SERPL-SCNC: 139 MMOL/L (ref 135–147)
T WAVE AXIS: 82 DEGREES
VANCOMYCIN SERPL-MCNC: 16.6 UG/ML (ref 10–20)
VENTRICULAR RATE: 62 BPM
WBC # BLD AUTO: 9.52 THOUSAND/UL (ref 4.31–10.16)

## 2025-03-10 PROCEDURE — 82948 REAGENT STRIP/BLOOD GLUCOSE: CPT

## 2025-03-10 PROCEDURE — 80048 BASIC METABOLIC PNL TOTAL CA: CPT | Performed by: INTERNAL MEDICINE

## 2025-03-10 PROCEDURE — 99232 SBSQ HOSP IP/OBS MODERATE 35: CPT | Performed by: INTERNAL MEDICINE

## 2025-03-10 PROCEDURE — 85025 COMPLETE CBC W/AUTO DIFF WBC: CPT | Performed by: INTERNAL MEDICINE

## 2025-03-10 PROCEDURE — 94760 N-INVAS EAR/PLS OXIMETRY 1: CPT

## 2025-03-10 PROCEDURE — 84484 ASSAY OF TROPONIN QUANT: CPT | Performed by: INTERNAL MEDICINE

## 2025-03-10 PROCEDURE — 85610 PROTHROMBIN TIME: CPT | Performed by: INTERNAL MEDICINE

## 2025-03-10 PROCEDURE — 93010 ELECTROCARDIOGRAM REPORT: CPT | Performed by: INTERNAL MEDICINE

## 2025-03-10 PROCEDURE — 93005 ELECTROCARDIOGRAM TRACING: CPT

## 2025-03-10 PROCEDURE — 80202 ASSAY OF VANCOMYCIN: CPT | Performed by: INTERNAL MEDICINE

## 2025-03-10 PROCEDURE — 94640 AIRWAY INHALATION TREATMENT: CPT

## 2025-03-10 RX ORDER — POLYETHYLENE GLYCOL 3350 17 G/17G
17 POWDER, FOR SOLUTION ORAL DAILY
Status: DISCONTINUED | OUTPATIENT
Start: 2025-03-10 | End: 2025-03-22 | Stop reason: HOSPADM

## 2025-03-10 RX ORDER — HYDRALAZINE HYDROCHLORIDE 20 MG/ML
10 INJECTION INTRAMUSCULAR; INTRAVENOUS EVERY 6 HOURS PRN
Status: DISCONTINUED | OUTPATIENT
Start: 2025-03-10 | End: 2025-03-22 | Stop reason: HOSPADM

## 2025-03-10 RX ORDER — HYDROMORPHONE HCL/PF 1 MG/ML
0.5 SYRINGE (ML) INJECTION ONCE
Status: COMPLETED | OUTPATIENT
Start: 2025-03-10 | End: 2025-03-10

## 2025-03-10 RX ORDER — AMOXICILLIN 250 MG
1 CAPSULE ORAL 2 TIMES DAILY
Status: DISCONTINUED | OUTPATIENT
Start: 2025-03-10 | End: 2025-03-22 | Stop reason: HOSPADM

## 2025-03-10 RX ADMIN — INSULIN LISPRO 2 UNITS: 100 INJECTION, SOLUTION INTRAVENOUS; SUBCUTANEOUS at 12:22

## 2025-03-10 RX ADMIN — PANTOPRAZOLE SODIUM 40 MG: 40 TABLET, DELAYED RELEASE ORAL at 17:35

## 2025-03-10 RX ADMIN — POLYETHYLENE GLYCOL 3350 17 G: 17 POWDER, FOR SOLUTION ORAL at 08:00

## 2025-03-10 RX ADMIN — CHLORHEXIDINE GLUCONATE 15 ML: 1.2 RINSE ORAL at 21:31

## 2025-03-10 RX ADMIN — CHLORHEXIDINE GLUCONATE 15 ML: 1.2 RINSE ORAL at 08:00

## 2025-03-10 RX ADMIN — VANCOMYCIN HYDROCHLORIDE 1000 MG: 1 INJECTION, SOLUTION INTRAVENOUS at 12:16

## 2025-03-10 RX ADMIN — ESCITALOPRAM OXALATE 20 MG: 20 TABLET ORAL at 08:00

## 2025-03-10 RX ADMIN — TRAZODONE HYDROCHLORIDE 100 MG: 100 TABLET ORAL at 21:31

## 2025-03-10 RX ADMIN — NICOTINE 1 PATCH: 14 PATCH, EXTENDED RELEASE TRANSDERMAL at 08:00

## 2025-03-10 RX ADMIN — LEVALBUTEROL HYDROCHLORIDE 1.25 MG: 1.25 SOLUTION RESPIRATORY (INHALATION) at 20:31

## 2025-03-10 RX ADMIN — SENNOSIDES AND DOCUSATE SODIUM 1 TABLET: 50; 8.6 TABLET ORAL at 17:35

## 2025-03-10 RX ADMIN — QUETIAPINE 300 MG: 200 TABLET ORAL at 21:31

## 2025-03-10 RX ADMIN — ATORVASTATIN CALCIUM 40 MG: 40 TABLET, FILM COATED ORAL at 17:35

## 2025-03-10 RX ADMIN — CEFTRIAXONE 1000 MG: 1 INJECTION, SOLUTION INTRAVENOUS at 13:21

## 2025-03-10 RX ADMIN — PANTOPRAZOLE SODIUM 40 MG: 40 TABLET, DELAYED RELEASE ORAL at 12:16

## 2025-03-10 RX ADMIN — IPRATROPIUM BROMIDE 0.5 MG: 0.5 SOLUTION RESPIRATORY (INHALATION) at 07:32

## 2025-03-10 RX ADMIN — HYDROMORPHONE HYDROCHLORIDE 0.5 MG: 1 INJECTION, SOLUTION INTRAMUSCULAR; INTRAVENOUS; SUBCUTANEOUS at 07:57

## 2025-03-10 RX ADMIN — LEVALBUTEROL HYDROCHLORIDE 1.25 MG: 1.25 SOLUTION RESPIRATORY (INHALATION) at 14:06

## 2025-03-10 RX ADMIN — SENNOSIDES AND DOCUSATE SODIUM 1 TABLET: 50; 8.6 TABLET ORAL at 08:00

## 2025-03-10 RX ADMIN — CARVEDILOL 25 MG: 25 TABLET, FILM COATED ORAL at 07:57

## 2025-03-10 RX ADMIN — IPRATROPIUM BROMIDE 0.5 MG: 0.5 SOLUTION RESPIRATORY (INHALATION) at 14:06

## 2025-03-10 RX ADMIN — LOSARTAN POTASSIUM 100 MG: 50 TABLET, FILM COATED ORAL at 08:00

## 2025-03-10 RX ADMIN — IPRATROPIUM BROMIDE 0.5 MG: 0.5 SOLUTION RESPIRATORY (INHALATION) at 20:31

## 2025-03-10 RX ADMIN — LEVALBUTEROL HYDROCHLORIDE 1.25 MG: 1.25 SOLUTION RESPIRATORY (INHALATION) at 07:31

## 2025-03-10 RX ADMIN — CARVEDILOL 25 MG: 25 TABLET, FILM COATED ORAL at 17:35

## 2025-03-10 RX ADMIN — PREDNISONE 40 MG: 20 TABLET ORAL at 08:00

## 2025-03-10 RX ADMIN — INSULIN LISPRO 1 UNITS: 100 INJECTION, SOLUTION INTRAVENOUS; SUBCUTANEOUS at 17:35

## 2025-03-10 NOTE — ASSESSMENT & PLAN NOTE
Recent Labs     03/08/25  0707 03/09/25  0348 03/10/25  0838   INR 2.75* 3.89* 4.98*      Outpatient: 2.5mg Warfarin (Sun, Tues, Wed, Fri) and 5mg (Mon, Thurs, Sat)  INR as of today, supratherapeutic , will hold coumadin   Monitor INR a.m.

## 2025-03-10 NOTE — ASSESSMENT & PLAN NOTE
Initially requiring 4L in ED, however had worsening respiratory status and placed on BiPAP. Continue on BiPAP as needed and qhs - but patient is refusing   Currently on 2L . Drops to 88% at rest on room air.   Not oxygen dependent at baseline  Titrate oxygen to maintain saturations >89%  Will likely need home O2 eval prior to discharge

## 2025-03-10 NOTE — ASSESSMENT & PLAN NOTE
Reported left-sided chest pain worsening with deep breath this morning.  Elevated blood pressure, EKG unchanged compared to prior  Troponin x 2 negative.  Likely MSK.  Tylenol prn

## 2025-03-10 NOTE — PROGRESS NOTES
"Progress Note - Hospitalist   Name: Chuy Norton 64 y.o. male I MRN: [id number]  Unit/Bed#: -01 I Date of Admission: 3/5/2025   Date of Service: 3/10/2025 I Hospital Day: 5    Assessment & Plan  Acute respiratory failure with hypoxia (HCC)  Patient brought in by facility staff for SOB, cough x2 weeks and dizziness with standing  At baseline patient states he does not wear oxygen but does wear nocturnal CPAP  In ED required 4 L however had worsening respiratory status and was placed on BiPAP  Flu/Covid/RSV negative  CXR: Patchy lingular and lower lobe opacities, concerning for pneumonia.   CT CAP: Multifocal pneumonia involving the left lower lobe, lingula, and medial right lower lobe. Fluid in the esophagus suggests aspiration as a possible etiology. Moderate size hiatal hernia and distended fluid-filled esophagus to the level of the thoracic inlet.     Plan  Continue BiPAP nightly, currently on 2 L of oxygen through nasal cannula  Xopenex/Atrovent TID  Prednisone 40 mg daily continue for total of 5 days  Evaluated by speech, recommended regular diet with thin liquids  home oxygen evaluation prior to discharge  Bipolar 2 disorder, major depressive episode (HCC)  Outpatient: 20mg lexapro, 300mg Seroquel HS, 100mg trazadone HS    Plan  Continue   Primary hypertension  Outpatient: 12.5mg Carvedilol BID, 40mg lisinopril   /82 (BP Location: Right arm)   Pulse 64   Temp (!) 97.4 °F (36.3 °C) (Oral)   Resp 20   Ht 5' 8\" (1.727 m)   Wt 97.2 kg (214 lb 4.6 oz)   SpO2 91%   BMI 32.58 kg/m²     Plan  continue  PRN labetolol, for SBP >180  Hyperlipidemia  Outpatient: 40 atorvastatin    Plan  Continue statin   Cirrhosis (HCC)  Follows with [facility], last seen Jan 2025  History of cirrhosis of the liver due to history of alcohol and drug addiction.   5/9/2024 CT C/A/P noted nodular hepatic contours may indicate cirrhosis of the liver.   No ascites present  Left ventricular apical thrombus    Recent Labs     " 03/08/25  0707 03/09/25  0348 03/10/25  0838   INR 2.75* 3.89* 4.98*      Outpatient: 2.5mg Warfarin (Sun, Tues, Wed, Fri) and 5mg (Mon, Thurs, Sat)  INR as of today, supratherapeutic , will hold coumadin   Monitor INR a.m.  Hepatitis C  Follows with [facility] LOTT, last seen Jan 2025  History of hepatitis C antibody and stated treatment with Mavyret   May 2024 negative viral load  Severe sepsis (HCC) (Resolved: 3/10/2025)  SIRS: tachycardia, tachypnea and leukocytosis  CT and CXR show multifocal pneumonia, possibly due to aspiration  Flu/Covid/RSV negative  Afebrile  LA 1.8  Recent Labs     03/08/25  0101 03/09/25  0348   PROCALCITONI 1.42* 0.70*      In ED, received ceftriaxone and azithromycin    Plan  Deviously on IV ceftriaxone azithromycin and Flagyl, tailored to IV ceftriaxone and IV vancomycin on 3/8, appreciate pulmonology input   Follow-up infectious workup  Supportive care  Monitor hemodynamics  Pneumonia  CXR 3/6 showing increasing left basilar opacity  CT CAP with consolidation in LLL and lingula, and small consolidation in RLL  Procal remains elevated (2.75, 2.69, 0.94)  Urinary antigens negative. RP2 negative.   Placed on ceftriaxone, Flagyl and azithromycin, tailored to IV ceftriaxone and IV vancomycin on 3/8  Continue atrovent/xopenex nebs TID  Previously on IV steroids, currently on p.o. prednisone  Pulmonary hygiene: cough and deep breath, OOB as tolerated, IS, scheuduled nebs  KENN (obstructive sleep apnea)  Maintained on CPAP at home  Continue BiPAP qhs for now  Chest pain on breathing  Reported left-sided chest pain worsening with deep breath this morning.  Elevated blood pressure, EKG unchanged compared to prior  Troponin x 2 negative.  Likely MSK.  Tylenol prn     VTE Pharmacologic Prophylaxis:   Moderate Risk (Score 3-4) - Pharmacological DVT Prophylaxis Ordered: warfarin (Coumadin).    Mobility:   Basic Mobility Inpatient Raw Score: 17  -[facility] Goal: 5: Stand one or more mins  -HL Achieved: 5:  Stand (1 or more minutes)  -HLM Goal achieved. Continue to encourage appropriate mobility.    Patient Centered Rounds: I performed bedside rounds with nursing staff today.   Discussions with Specialists or Other Care Team Provider: kieran    Education and Discussions with Family / Patient: Patient declined call to .     Current Length of Stay: 5 day(s)  Current Patient Status: Inpatient   Certification Statement: The patient will continue to require additional inpatient hospital stay due to hypoxia   Discharge Plan: Anticipate discharge tomorrow to home.    Code Status: Level 1 - Full Code    Subjective     Left-sided chest pain started this morning, suddenly.  No nausea or vomiting.  Pain worsened with deep breathing.  No bowel movement in the past few days.  No worsening shortness of breath    Objective :  Temp:  [97.4 °F (36.3 °C)-97.7 °F (36.5 °C)] 97.4 °F (36.3 °C)  HR:  [62-77] 64  BP: (138-193)/() 138/82  Resp:  [20-24] 20  SpO2:  [90 %-92 %] 91 %  O2 Device: Nasal cannula  Nasal Cannula O2 Flow Rate (L/min):  [2 L/min] 2 L/min    Body mass index is 32.58 kg/m².     Input and Output Summary (last 24 hours):     Intake/Output Summary (Last 24 hours) at 3/10/2025 1440  Last data filed at 3/10/2025 1311  Gross per 24 hour   Intake 1280 ml   Output 1615 ml   Net -335 ml       Physical Exam  Vitals and nursing note reviewed.   Constitutional:       General: He is not in acute distress.     Appearance: He is not diaphoretic.   HENT:      Head: Normocephalic.   Eyes:      General:         Right eye: No discharge.         Left eye: No discharge.   Cardiovascular:      Rate and Rhythm: Normal rate and regular rhythm.   Pulmonary:      Effort: Pulmonary effort is normal. No respiratory distress.      Breath sounds: Normal breath sounds. No wheezing, rhonchi or rales.   Abdominal:      General: There is no distension.      Palpations: Abdomen is soft.      Tenderness: There is no abdominal tenderness.  There is no guarding or rebound.   Musculoskeletal:      Cervical back: Normal range of motion.      Right lower leg: No edema.      Left lower leg: No edema.   Skin:     General: Skin is warm.   Neurological:      Mental Status: He is alert.   Psychiatric:         Mood and Affect: Mood normal.         Behavior: Behavior normal.           Lines/Drains:              Lab Results: I have reviewed the following results:   Results from last 7 days   Lab Units 03/10/25  0512   WBC Thousand/uL 9.52   HEMOGLOBIN g/dL 13.0   HEMATOCRIT % 40.0   PLATELETS Thousands/uL 119*   SEGS PCT % 77*   LYMPHO PCT % 12*   MONO PCT % 9   EOS PCT % 0     Results from last 7 days   Lab Units 03/10/25  0512 03/09/25  0348 03/08/25  0101   SODIUM mmol/L 139   < > 138   POTASSIUM mmol/L 4.0   < > 4.1   CHLORIDE mmol/L 110*   < > 108   CO2 mmol/L 22   < > 19*   BUN mg/dL 28*   < > 36*   CREATININE mg/dL 0.98   < > 1.06   ANION GAP mmol/L 7   < > 11   CALCIUM mg/dL 7.5*   < > 8.3*   ALBUMIN g/dL  --   --  3.9   TOTAL BILIRUBIN mg/dL  --   --  0.43   ALK PHOS U/L  --   --  48   ALT U/L  --   --  15   AST U/L  --   --  19   GLUCOSE RANDOM mg/dL 116   < > 153*    < > = values in this interval not displayed.     Results from last 7 days   Lab Units 03/10/25  0838   INR  4.98*     Results from last 7 days   Lab Units 03/10/25  1133 03/10/25  0713 03/09/25  2107 03/09/25  1620 03/09/25  1114 03/09/25  0629 03/08/25  2107 03/08/25  1607 03/08/25  1123 03/08/25  0715 03/07/25  2221 03/07/25  1608   POC GLUCOSE mg/dl 195* 98 152* 135 156* 126 143* 120 190* 152* 139 174*     Results from last 7 days   Lab Units 03/06/25  0434   HEMOGLOBIN A1C % 5.7*     Results from last 7 days   Lab Units 03/09/25  0348 03/08/25  0101 03/07/25  0307 03/06/25  0434 03/05/25  1510   LACTIC ACID mmol/L  --   --   --   --  1.8   PROCALCITONIN ng/ml 0.70* 1.42* 2.75* 2.69* 0.94*       Recent Cultures (last 7 days):   Results from last 7 days   Lab Units 03/06/25  1514  03/05/25  1510   BLOOD CULTURE   --  No Growth After 4 Days.  No Growth After 4 Days.   LEGIONELLA URINARY ANTIGEN  Negative  --        Imaging Results Review: I reviewed radiology reports from this admission including: chest xray.  Other Study Results Review: EKG was reviewed.     Last 24 Hours Medication List:     Current Facility-Administered Medications:     acetaminophen (TYLENOL) tablet 650 mg, Q6H PRN    atorvastatin (LIPITOR) tablet 40 mg, Daily With Dinner    benzonatate (TESSALON PERLES) capsule 200 mg, TID PRN    carvedilol (COREG) tablet 25 mg, BID With Meals    cefTRIAXone (ROCEPHIN) IVPB (premix in dextrose) 1,000 mg 50 mL, Q24H, Last Rate: 1,000 mg (03/10/25 1321) **AND** [COMPLETED] azithromycin (ZITHROMAX) 500 mg in sodium chloride 0.9% 250mL IVPB 500 mg, Q24H, Last Rate: 0 mg (03/06/25 1705)    chlorhexidine (PERIDEX) 0.12 % oral rinse 15 mL, Q12H SHANNA    escitalopram (LEXAPRO) tablet 20 mg, Daily    guaiFENesin (ROBITUSSIN) oral liquid 200 mg, TID PRN    hydrALAZINE (APRESOLINE) injection 10 mg, Q6H PRN    insulin lispro (HumALOG/ADMELOG) 100 units/mL subcutaneous injection 1-6 Units, 4x Daily (AC & HS) **AND** Fingerstick Glucose (POCT), 4x Daily AC and at bedtime    ipratropium (ATROVENT) 0.02 % inhalation solution 0.5 mg, TID    levalbuterol (XOPENEX) inhalation solution 1.25 mg, TID    levalbuterol (XOPENEX) inhalation solution 1.25 mg, Q6H PRN    losartan (COZAAR) tablet 100 mg, Daily    nicotine (NICODERM CQ) 14 mg/24hr TD 24 hr patch 1 patch, Daily    pantoprazole (PROTONIX) EC tablet 40 mg, BID before lunch/dinner    polyethylene glycol (MIRALAX) packet 17 g, Daily    predniSONE tablet 40 mg, Daily    QUEtiapine (SEROquel) tablet 300 mg, HS    senna-docusate sodium (SENOKOT S) 8.6-50 mg per tablet 1 tablet, BID    traZODone (DESYREL) tablet 100 mg, HS    vancomycin (VANCOCIN) IVPB (premix in dextrose) 1,000 mg 200 mL, Q12H, Last Rate: 1,000 mg (03/10/25 1216)    [Held by provider] warfarin  (COUMADIN) tablet 2.5 mg, Once per day on Sunday Tuesday Wednesday Friday    [Held by provider] warfarin (COUMADIN) tablet 5 mg, Once per day on Monday Thursday Saturday    Administrative Statements   Today, Patient Was Seen By: Zainab Ribeiro MD      **Please Note: This note may have been constructed using a voice recognition system.**

## 2025-03-10 NOTE — PLAN OF CARE
Problem: INFECTION - ADULT  Goal: Absence or prevention of progression during hospitalization  Description: INTERVENTIONS:  - Assess and monitor for signs and symptoms of infection  - Monitor lab/diagnostic results  - Monitor all insertion sites, i.e. indwelling lines, tubes, and drains  - Monitor endotracheal if appropriate and nasal secretions for changes in amount and color  - [place] appropriate cooling/warming therapies per order  - Administer medications as ordered  - Instruct and encourage patient and family to use good hand hygiene technique  - Identify and instruct in appropriate isolation precautions for identified infection/condition  Outcome: Progressing  Goal: Absence of fever/infection during neutropenic period  Description: INTERVENTIONS:  - Monitor WBC    Outcome: Progressing

## 2025-03-10 NOTE — PROGRESS NOTES
Progress Note - Pulmonology   Name: Chuy Norton 64 y.o. male I MRN: [id number]  Unit/Bed#: -01 I Date of Admission: 3/5/2025   Date of Service: 3/10/2025 I Hospital Day: 5    Assessment & Plan  Acute respiratory failure with hypoxia (HCC)  Initially requiring 4L in ED, however had worsening respiratory status and placed on BiPAP. Continue on BiPAP as needed and qhs - but patient is refusing   Currently on 2L . Drops to 88% at rest on room air.   Not oxygen dependent at baseline  Titrate oxygen to maintain saturations >89%  Will likely need home O2 eval prior to discharge   Pneumonia  CXR 3/6 showing increasing left basilar opacity  CT CAP with consolidation in LLL and lingula, and small consolidation in RLL  Procal remains elevated   Urinary antigens negative. RP2 negative.   MRSA culture positive.   Switched to Vancomycin, Ceftriaxone   Whenever the patient is discharged, would recommend to give him doxycycline course to continue total of 7 days of MRSA coverage  Continue atrovent/xopenex nebs TID  Pulmonary hygiene: cough and deep breath, OOB as tolerated, IS, scheuduled nebs  Speech cleared the patient for regular diet and thin liquids  Currently on prednisone 40 mg to be completed for 5 days  Will need outpatient follow-up imaging and PFTs as well as smoking cessation support  KENN (obstructive sleep apnea)  Maintained on CPAP at home  Continue BiPAP qhs for now  Left ventricular apical thrombus  Maintained on coumadin  Severe sepsis (HCC)  Plan as above  Bipolar 2 disorder, major depressive episode (HCC)    Primary hypertension    Cirrhosis (HCC)      Will arrange outpatient follow-up    Subjective : Patient still short of breath. No new complaints.    Objective :  Temp:  [97.5 °F (36.4 °C)-97.8 °F (36.6 °C)] 97.5 °F (36.4 °C)  HR:  [62-77] 62  BP: (156-193)/() 178/101  Resp:  [16-24] 22  SpO2:  [90 %-95 %] 90 %  O2 Device: Nasal cannula  Nasal Cannula O2 Flow Rate (L/min):  [2 L/min] 2  L/min    Physical Exam    General appearance: Alert and awake, in no acute distress  Head: Normocephalic, without obvious abnormality, atraumatic  Eyes: No scleral icterus   Lungs: Decreased breath sounds  Heart: Regular rate  Abdomen:  No appreciable distension or tenderness  Extremities: No deformity  Skin: Warm and dry  Neurologic: No acute focal deficits are noted        Lab Results: I have reviewed the following results:   .     03/10/25  0512 03/10/25  0838 03/10/25  1055   WBC 9.52  --   --    HGB 13.0  --   --    HCT 40.0  --   --    *  --   --    SODIUM 139  --   --    K 4.0  --   --    *  --   --    CO2 22  --   --    BUN 28*  --   --    CREATININE 0.98  --   --    GLUC 116  --   --    INR  --  4.98*  --    HSTNI0  --  9  --    HSTNI2  --   --  8     ABG: No new results in last 24 hours.    Imaging Results Review: I reviewed radiology reports from this admission including: chest xray.  Other Study Results Review: No additional pertinent studies reviewed.  PFT Results Reviewed: na

## 2025-03-10 NOTE — PLAN OF CARE
Problem: Potential for Falls  Goal: Patient will remain free of falls  Description: INTERVENTIONS:  - Educate patient/family on patient safety including physical limitations  - Instruct patient to call for assistance with activity   - Consult OT/PT to assist with strengthening/mobility   - Keep Call bell within reach  - Keep bed low and locked with side rails adjusted as appropriate  - Keep care items and personal belongings within reach  - Initiate and maintain comfort rounds  - Make Fall Risk Sign visible to staff  - Offer Toileting every 2 Hours, in advance of need  - Initiate/Maintain bed alarm  - Obtain necessary fall risk management equipment: socks  - Apply yellow socks and bracelet for high fall risk patients  - Consider moving patient to room near nurses station  Outcome: Progressing     Problem: Prexisting or High Potential for Compromised Skin Integrity  Goal: Skin integrity is maintained or improved  Description: INTERVENTIONS:  - Identify patients at risk for skin breakdown  - Assess and monitor skin integrity  - Assess and monitor nutrition and hydration status  - Monitor labs   - Assess for incontinence   - Turn and reposition patient  - Assist with mobility/ambulation  - Relieve pressure over bony prominences  - Avoid friction and shearing  - Provide appropriate hygiene as needed including keeping skin clean and dry  - Evaluate need for skin moisturizer/barrier cream  - Collaborate with interdisciplinary team   - Patient/family teaching  - Consider wound care consult   Outcome: Progressing     Problem: PAIN - ADULT  Goal: Verbalizes/displays adequate comfort level or baseline comfort level  Description: Interventions:  - Encourage patient to monitor pain and request assistance  - Assess pain using appropriate pain scale  - Administer analgesics based on type and severity of pain and evaluate response  - Implement non-pharmacological measures as appropriate and evaluate response  - Consider cultural  and social influences on pain and pain management  - Notify physician/advanced practitioner if interventions unsuccessful or patient reports new pain  Outcome: Progressing     Problem: INFECTION - ADULT  Goal: Absence or prevention of progression during hospitalization  Description: INTERVENTIONS:  - Assess and monitor for signs and symptoms of infection  - Monitor lab/diagnostic results  - Monitor all insertion sites, i.e. indwelling lines, tubes, and drains  - Monitor endotracheal if appropriate and nasal secretions for changes in amount and color  - [place] appropriate cooling/warming therapies per order  - Administer medications as ordered  - Instruct and encourage patient and family to use good hand hygiene technique  - Identify and instruct in appropriate isolation precautions for identified infection/condition  Outcome: Progressing  Goal: Absence of fever/infection during neutropenic period  Description: INTERVENTIONS:  - Monitor WBC    Outcome: Progressing     Problem: SAFETY ADULT  Goal: Patient will remain free of falls  Description: INTERVENTIONS:  - Educate patient/family on patient safety including physical limitations  - Instruct patient to call for assistance with activity   - Consult OT/PT to assist with strengthening/mobility   - Keep Call bell within reach  - Keep bed low and locked with side rails adjusted as appropriate  - Keep care items and personal belongings within reach  - Initiate and maintain comfort rounds  - Make Fall Risk Sign visible to staff  - Offer Toileting every 2 Hours, in advance of need  - Initiate/Maintain bed alarm  - Obtain necessary fall risk management equipment: socks  - Apply yellow socks and bracelet for high fall risk patients  - Consider moving patient to room near nurses station  Outcome: Progressing  Goal: Maintain or return to baseline ADL function  Description: INTERVENTIONS:  -  Assess patient's ability to carry out ADLs; assess patient's baseline for ADL  function and identify physical deficits which impact ability to perform ADLs (bathing, care of mouth/teeth, toileting, grooming, dressing, etc.)  - Assess/evaluate cause of self-care deficits   - Assess range of motion  - Assess patient's mobility; develop plan if impaired  - Assess patient's need for assistive devices and provide as appropriate  - Encourage maximum independence but intervene and supervise when necessary  - Involve family in performance of ADLs  - Assess for home care needs following discharge   - Consider OT consult to assist with ADL evaluation and planning for discharge  - Provide patient education as appropriate  Outcome: Progressing  Goal: Maintains/Returns to pre admission functional level  Description: INTERVENTIONS:  - Perform AM-PAC 6 Click Basic Mobility/ Daily Activity assessment daily.  - Set and communicate daily mobility goal to care team and patient/family/caregiver.   - Collaborate with rehabilitation services on mobility goals if consulted  - Perform Range of Motion 3 times a day.  - Reposition patient every 2 hours.  - Dangle patient 3 times a day  - Stand patient 3 times a day  - Ambulate patient 3 times a day  - Out of bed to chair 3 times a day   - Out of bed for meals 3 times a day  - Out of bed for toileting  - Record patient progress and toleration of activity level   Outcome: Progressing     Problem: DISCHARGE PLANNING  Goal: Discharge to home or other facility with appropriate resources  Description: INTERVENTIONS:  - Identify barriers to discharge w/patient and caregiver  - Arrange for needed discharge resources and transportation as appropriate  - Identify discharge learning needs (meds, wound care, etc.)  - Arrange for interpretive services to assist at discharge as needed  - Refer to Case Management Department for coordinating discharge planning if the patient needs post-hospital services based on physician/advanced practitioner order or complex needs related to  functional status, cognitive ability, or social support system  Outcome: Progressing     Problem: Knowledge Deficit  Goal: Patient/family/caregiver demonstrates understanding of disease process, treatment plan, medications, and discharge instructions  Description: Complete learning assessment and assess knowledge base.  Interventions:  - Provide teaching at level of understanding  - Provide teaching via preferred learning methods  Outcome: Progressing     Problem: CARDIOVASCULAR - ADULT  Goal: Maintains optimal cardiac output and hemodynamic stability  Description: INTERVENTIONS:  - Monitor I/O, vital signs and rhythm  - Monitor for S/S and trends of decreased cardiac output  - Administer and titrate ordered vasoactive medications to optimize hemodynamic stability  - Assess quality of pulses, skin color and temperature  - Assess for signs of decreased coronary artery perfusion  - Instruct patient to report change in severity of symptoms  Outcome: Progressing  Goal: Absence of cardiac dysrhythmias or at baseline rhythm  Description: INTERVENTIONS:  - Continuous cardiac monitoring, vital signs, obtain 12 lead EKG if ordered  - Administer antiarrhythmic and heart rate control medications as ordered  - Monitor electrolytes and administer replacement therapy as ordered  Outcome: Progressing     Problem: RESPIRATORY - ADULT  Goal: Achieves optimal ventilation and oxygenation  Description: INTERVENTIONS:  - Assess for changes in respiratory status  - Assess for changes in mentation and behavior  - Position to facilitate oxygenation and minimize respiratory effort  - Oxygen administered by appropriate delivery if ordered  - Initiate smoking cessation education as indicated  - Encourage broncho-pulmonary hygiene including cough, deep breathe, Incentive Spirometry  - Assess the need for suctioning and aspirate as needed  - Assess and instruct to report SOB or any respiratory difficulty  - Respiratory Therapy support as  indicated  Outcome: Progressing     Problem: METABOLIC, FLUID AND ELECTROLYTES - ADULT  Goal: Electrolytes maintained within normal limits  Description: INTERVENTIONS:  - Monitor labs and assess patient for signs and symptoms of electrolyte imbalances  - Administer electrolyte replacement as ordered  - Monitor response to electrolyte replacements, including repeat lab results as appropriate  - Instruct patient on fluid and nutrition as appropriate  Outcome: Progressing  Goal: Fluid balance maintained  Description: INTERVENTIONS:  - Monitor labs   - Monitor I/O and WT  - Instruct patient on fluid and nutrition as appropriate  - Assess for signs & symptoms of volume excess or deficit  Outcome: Progressing  Goal: Glucose maintained within target range  Description: INTERVENTIONS:  - Monitor Blood Glucose as ordered  - Assess for signs and symptoms of hyperglycemia and hypoglycemia  - Administer ordered medications to maintain glucose within target range  - Assess nutritional intake and initiate nutrition service referral as needed  Outcome: Progressing

## 2025-03-10 NOTE — PROGRESS NOTES
Chuy Norton is a 64 y.o. male who is currently ordered Vancomycin IV with management by the Pharmacy Consult service.  Relevant clinical data and objective / subjective history reviewed.  Vancomycin Assessment:  Indication and Goal AUC/Trough: Pneumonia (goal -600, trough >10)  Clinical Status: stable  Micro:     Renal Function:  SCr: 0.98 mg/dL  CrCl: 87 mL/min  Renal replacement: Not on dialysis  Days of Therapy: 3  Current Dose: 1000mg IV Q12H  Vancomycin Plan:  New Dosing: No change  Estimated AUC: 450 mcg*hr/mL  Estimated Trough: 13.1 mcg/mL  Next Level: Random level AM labs 3/17/25  Renal Function Monitoring: Daily BMP and UOP  Pharmacy will continue to follow closely for s/sx of nephrotoxicity, infusion reactions and appropriateness of therapy.  BMP and CBC will be ordered per protocol. We will continue to follow the patient’s culture results and clinical progress daily.    Wood Rodriguez, Pharmacist

## 2025-03-10 NOTE — ASSESSMENT & PLAN NOTE
CXR 3/6 showing increasing left basilar opacity  CT CAP with consolidation in LLL and lingula, and small consolidation in RLL  Procal remains elevated (2.75, 2.69, 0.94)  Urinary antigens negative. RP2 negative.   Placed on ceftriaxone, Flagyl and azithromycin, tailored to IV ceftriaxone and IV vancomycin on 3/8  Continue atrovent/xopenex nebs TID  Previously on IV steroids, currently on p.o. prednisone  Pulmonary hygiene: cough and deep breath, OOB as tolerated, IS, scheuduled nebs

## 2025-03-10 NOTE — ASSESSMENT & PLAN NOTE
"Outpatient: 12.5mg Carvedilol BID, 40mg lisinopril   /82 (BP Location: Right arm)   Pulse 64   Temp (!) 97.4 °F (36.3 °C) (Oral)   Resp 20   Ht 5' 8\" (1.727 m)   Wt 97.2 kg (214 lb 4.6 oz)   SpO2 91%   BMI 32.58 kg/m²     Plan  continue  PRN labetolol, for SBP >180  "

## 2025-03-10 NOTE — ASSESSMENT & PLAN NOTE
SIRS: tachycardia, tachypnea and leukocytosis  CT and CXR show multifocal pneumonia, possibly due to aspiration  Flu/Covid/RSV negative  Afebrile  LA 1.8  Recent Labs     03/08/25  0101 03/09/25  0348   PROCALCITONI 1.42* 0.70*      In ED, received ceftriaxone and azithromycin    Plan  Deviously on IV ceftriaxone azithromycin and Flagyl, tailored to IV ceftriaxone and IV vancomycin on 3/8, appreciate pulmonology input   Follow-up infectious workup  Supportive care  Monitor hemodynamics

## 2025-03-10 NOTE — UTILIZATION REVIEW
Continued Stay Review    Date: 3/10 for  3/9/25                           Current Patient Class: Inpatient  Current Level of Care: MS     HPI:64 y.o. male initially admitted on 3/5/25   Current Diagnosis:  Acute respiratory failure with hypoxia . Multifocal PNA . Fluid in the esophagus on CT C/A/P  suggests aspiration as a possible etiology. Severe sepsis .     Assessment/Plan:      Pt has been evaluated by speech, recommended regular diet with thin liquids . Continue BiPAP nightly, currently on  2 L of oxygen nc .Breathing is better . + SALAZAR per nsg . Diminished breath sounds . Pulmonology following . Previously on IV ceftriaxone ,azithromycin and Flagyl, tailored to IV ceftriaxone and IV vancomycin on 3/8  . Continue Xopenex/Atrovent TID  .Previously on IV steroids, currently on  Prednisone 40 mg daily  . Will need home oxygen evaluation prior to discharge  . INR as of today, supratherapeutic , will hold coumadin . Monitot INR     Medications:   Scheduled Medications:  atorvastatin, 40 mg, Oral, Daily With Dinner  carvedilol, 25 mg, Oral, BID With Meals  cefTRIAXone, 1,000 mg, Intravenous, Q24H  chlorhexidine, 15 mL, Mouth/Throat, Q12H SHANNA  escitalopram, 20 mg, Oral, Daily  insulin lispro, 1-6 Units, Subcutaneous, 4x Daily (AC & HS)  ipratropium, 0.5 mg, Nebulization, TID  levalbuterol, 1.25 mg, Nebulization, TID  losartan, 100 mg, Oral, Daily  nicotine, 1 patch, Transdermal, Daily  pantoprazole, 40 mg, Oral, BID before lunch/dinner  polyethylene glycol, 17 g, Oral, Daily  predniSONE, 40 mg, Oral, Daily  QUEtiapine, 300 mg, Oral, HS  senna-docusate sodium, 1 tablet, Oral, BID  traZODone, 100 mg, Oral, HS  vancomycin, 1,000 mg, Intravenous, Q12H  [Held by provider] warfarin, 2.5 mg, Oral, Once per day on Sunday Tuesday Wednesday Friday  [Held by provider] warfarin, 5 mg, Oral, Once per day on Monday Thursday Saturday      Continuous IV Infusions:     PRN Meds:  acetaminophen, 650 mg, Oral, Q6H PRN  benzonatate,  200 mg, Oral, TID PRN  guaiFENesin, 200 mg, Oral, TID PRN  hydrALAZINE, 10 mg, Intravenous, Q6H PRN  levalbuterol, 1.25 mg, Nebulization, Q6H PRN      Discharge Plan: TBD     Vital Signs (last 3 days)       Date/Time Temp Pulse Resp BP MAP (mmHg) SpO2 Calculated FIO2 (%) - Nasal Cannula O2 Flow Rate (L/min) Nasal Cannula O2 Flow Rate (L/min) O2 Device O2 Interface Device Patient Position - Orthostatic VS [place] Coma Scale Score Pain    03/09/25 19:45:33 97.7 °F (36.5 °C) 62 22 159/87 111 92 % 28 -- 2 L/min Nasal cannula -- Lying 15 No Pain    03/09/25 1900 -- -- -- -- -- -- 28 -- 2 L/min Nasal cannula -- -- -- --    03/09/25 1434 -- -- -- -- -- 95 % -- -- -- -- -- -- -- --    03/09/25 1424 -- -- -- -- -- 92 % 28 -- 2 L/min None (Room air) -- -- -- --    03/09/25 14:15:27 97.8 °F (36.6 °C) 72 16 156/92 113 94 % -- -- -- None (Room air) -- Lying -- --    03/09/25 0900 -- -- -- -- -- -- -- -- -- -- -- -- -- No Pain    03/09/25 0735 -- -- -- -- -- 92 % -- -- -- -- -- -- -- --    03/09/25 0723 -- -- -- -- -- 90 % 28 -- 2 L/min Nasal cannula -- -- -- --    03/09/25 06:31:19 97.8 °F (36.6 °C) 78 20 119/74 89 89 % -- -- -- -- -- -- -- --    03/08/25 2018 -- -- -- -- -- -- -- 50 L/min -- BiPAP Full face mask -- -- --    03/08/25 19:48:09 98.4 °F (36.9 °C) 67 18 150/92 111 93 % 28 -- 2 L/min Nasal cannula -- Sitting 15 No Pain    03/08/25 1935 -- -- -- -- -- -- 28 -- 2 L/min Nasal cannula -- -- -- --    03/08/25 15:36:05 97.4 °F (36.3 °C) 64 17 165/98 120 93 % 36 -- 4 L/min Nasal cannula -- Sitting -- --    03/08/25 15:34:15 97.4 °F (36.3 °C) 77 -- 185/108 134 93 % -- -- -- -- -- -- -- --    03/08/25 1402 -- -- -- -- -- 96 % -- -- -- -- -- -- -- --    03/08/25 1352 -- -- -- -- -- 94 % -- -- -- Nasal cannula -- -- -- --    03/08/25 0851 -- -- -- -- -- -- 36 -- 4 L/min Nasal cannula -- -- -- No Pain    03/08/25 0737 -- -- -- -- -- 93 % -- -- -- -- -- -- -- --    03/08/25 0726 -- -- -- -- -- 91 % 36 -- 4 L/min Nasal cannula --  -- -- --    03/08/25 07:14:49 98 °F (36.7 °C) 75 20 173/102 126 93 % -- -- -- -- -- -- -- --    03/08/25 01:03:28 97.6 °F (36.4 °C) -- 22 168/111 130 -- 36 -- 4 L/min Nasal cannula -- Lying -- --    03/07/25 2241 -- -- -- -- -- -- 36 -- 4 L/min Nasal cannula -- -- -- --    03/07/25 20:34:55 98.6 °F (37 °C) 81 18 169/107 128 90 % 36 -- 4 L/min Nasal cannula -- Lying 15 No Pain    03/07/25 1924 -- -- -- -- -- -- 36 -- 4 L/min Nasal cannula -- -- -- --    03/07/25 15:14:47 98.1 °F (36.7 °C) 84 16 157/102 120 91 % -- -- -- -- -- -- -- --    03/07/25 1417 -- -- -- -- -- 92 % 36 -- 4 L/min Nasal cannula -- -- -- --    03/07/25 12:53:04 -- 86 -- 155/103 120 90 % -- -- -- -- -- -- -- --    03/07/25 0828 -- 85 20 -- -- 91 % 36 -- 4 L/min Nasal cannula -- -- -- --    03/07/25 0751 -- -- 28 -- -- -- 36 -- 4 L/min Nasal cannula -- -- -- No Pain    03/07/25 07:43:41 98.1 °F (36.7 °C) 88 -- 192/118 143 89 % -- -- -- -- -- -- -- --    03/07/25 0743 -- 90 -- 192/119 -- -- -- -- -- -- -- -- -- --    03/07/25 0623 -- -- -- -- -- -- 36 -- 4 L/min Nasal cannula -- -- -- --    03/07/25 05:31:30 97.7 °F (36.5 °C) 79 24 174/109 131 94 % -- -- -- -- -- -- -- --    03/07/25 04:20:01 -- 90 -- 187/118 141 95 % -- -- -- BiPAP -- -- -- --    03/07/25 0355 -- -- -- -- -- -- -- -- -- -- Full face mask -- -- --    03/07/25 03:01:41 97.7 °F (36.5 °C) 92 24 188/117 141 88 % 32 -- 3 L/min Nasal cannula -- -- 15 5    03/07/25 0100 -- -- 23 -- -- -- -- -- -- -- -- -- -- --          Weight (last 2 days)       Date/Time Weight    03/10/25 0455 97.2 (214.29)    03/09/25 0600 96.1 (211.86)    03/08/25 0300 96.1 (211.86)     Weight: Pillow and blanket at 03/08/25 0300            Pertinent Labs/Diagnostic Results:   Radiology:  XR chest portable ICU   Final Interpretation by Bradley Landon Kocher, MD (03/07 0725)      Increasing left basilar opacity in keeping with effusion and a component of pneumonia or atelectasis.            Workstation performed:  [id number]              Cardiology:  ECG 12 lead   Final Result by Kale Estevez MD (03/10 1139)   Normal sinus rhythm   Minimal voltage criteria for LVH, may be normal variant ( R in aVL )   Inferior infarct (cited on or before 19-Sep-2018)   Anterolateral infarct (cited on or before 19-Sep-2018)   Abnormal ECG   When compared with ECG of 06-Mar-2025 21:22,   T wave inversion more evident in Lateral leads   Confirmed by Kale Estevez ([id number]) on 3/10/2025 11:39:20 AM      ECG 12 lead   Final Result by Omari Davalos MD (03/07 0752)   Normal sinus rhythm   Inferior infarct (cited on or before 19-Sep-2018)   Anterior infarct (cited on or before 19-Sep-2018)   Abnormal ECG   When compared with ECG of 05-Mar-2025 18:29,   No significant change was found   Confirmed by Omari Davalos ([id number]) on 3/7/2025 7:52:37 AM          Results from last 7 days   Lab Units 03/05/25 2106 03/05/25  1510   SARS-COV-2  Not Detected Negative     Results from last 7 days   Lab Units 03/09/25  0348 03/08/25  0101 03/07/25  0307 03/06/25  0434 03/05/25  1511 03/05/25  1301   WBC Thousand/uL 14.22* 17.49* 16.44* 13.90*   < > 14.75*   HEMOGLOBIN g/dL 12.6 14.3 14.1 13.9   < > 14.1   I STAT HEMOGLOBIN   --   --   --   --    < >  --    HEMATOCRIT % 39.0 44.8 44.7 43.9   < > 43.5   HEMATOCRIT, ISTAT   --   --   --   --    < >  --    PLATELETS Thousands/uL 131* 151 131* 105*   < > 108*   TOTAL NEUT ABS Thousands/µL  --   --   --  12.42*  --  12.20*    < > = values in this interval not displayed.         Results from last 7 days   Lab Units 03/09/25  0348 03/08/25  0101 03/07/25  0307 03/06/25  0434 03/05/25  1511   SODIUM mmol/L 138 138 136 138  --    POTASSIUM mmol/L 3.8 4.1 4.2 4.1  --    CHLORIDE mmol/L 109* 108 106 105  --    CO2 mmol/L 22 19* 20* 22  --    CO2, I-STAT mmol/L  --   --   --   --  23   ANION GAP mmol/L 7 11 10 11  --    BUN mg/dL 32* 36* 37* 25  --    CREATININE mg/dL 0.96 1.06 1.18 1.35*  --    EGFR ml/min/1.73sq m 83  "74 65 55  --    CALCIUM mg/dL 7.8* 8.3* 8.9 8.8  --    CALCIUM, IONIZED mmol/L  --   --   --  1.15  --    CALCIUM, IONIZED, ISTAT mmol/L  --   --   --   --  1.13   MAGNESIUM mg/dL  --  2.3 2.8* 2.1  --    PHOSPHORUS mg/dL  --   --  2.6 3.7  --      Results from last 7 days   Lab Units 03/08/25  0101 03/05/25  1301   AST U/L 19 19   ALT U/L 15 13   ALK PHOS U/L 48 55   TOTAL PROTEIN g/dL 7.1 7.1   ALBUMIN g/dL 3.9 4.4   TOTAL BILIRUBIN mg/dL 0.43 0.78     Results from last 7 days   Lab Units 03/09/25  2107 03/09/25  1620 03/09/25  1114 03/09/25  0629 03/08/25  2107 03/08/25  1607 03/08/25  1123 03/08/25  0715 03/07/25  2221 03/07/25  1608   POC GLUCOSE mg/dl 152* 135 156* 126 143* 120 190* 152* 139 174*     Results from last 7 days   Lab Units 03/09/25  0348 03/08/25  0101 03/07/25  0307 03/06/25  0434 03/05/25  1301   GLUCOSE RANDOM mg/dL 143* 153* 143* 174* 123         Results from last 7 days   Lab Units 03/06/25  0434   HEMOGLOBIN A1C % 5.7*   EAG mg/dl 117     No results found for: \"BETA-HYDROXYBUTYRATE\"       Results from last 7 days   Lab Units 03/06/25  0434   PH STEPHANIE  7.278*   PCO2 STEPHANIE mm Hg 46.4   PO2 STEPHANIE mm Hg 32.6*   HCO3 STEPHANIE mmol/L 21.2*   BASE EXC STEPHANIE mmol/L -5.6   O2 CONTENT STEPHANIE ml/dL 11.3   O2 HGB, VENOUS % 54.5*     Results from last 7 days   Lab Units 03/05/25  1511   PH, STEPHANIE I-STAT  7.389   PCO2, STEPHANIE ISTAT mm HG 36.5*   PO2, STEPHANIE ISTAT mm HG 35.0   HCO3, STEPHANIE ISTAT mmol/L 22.0*   I STAT BASE EXC mmol/L -2   I STAT O2 SAT % 67         Results from last 7 days   Lab Units 03/10/25  0838 03/05/25  1834 03/05/25  1514 03/05/25  1301   HS TNI 0HR ng/L 9  --   --  19   HS TNI 2HR ng/L  --   --  17  --    HSTNI D2 ng/L  --   --  -2  --    HS TNI 4HR ng/L  --  16  --   --    HSTNI D4 ng/L  --  -3  --   --          Results from last 7 days   Lab Units 03/09/25  0348 03/08/25  0707 03/07/25  0307 03/06/25  1127 03/06/25  0434 03/05/25  2106   PROTIME seconds 38.0* 29.3*   < >  --   --  29.4*   INR  3.89* 2.75*  "  < >  --   --  2.76*   PTT seconds  --   --   --  91* 87* 50*    < > = values in this interval not displayed.         Results from last 7 days   Lab Units 03/09/25  0348 03/08/25  0101 03/07/25  0307 03/06/25  0434 03/05/25  1510   PROCALCITONIN ng/ml 0.70* 1.42* 2.75* 2.69* 0.94*     Results from last 7 days   Lab Units 03/05/25  1510   LACTIC ACID mmol/L 1.8             Results from last 7 days   Lab Units 03/05/25  1301   BNP pg/mL 381*               Results from last 7 days   Lab Units 03/06/25  1518 03/05/25  2106 03/05/25  1510   STREP PNEUMONIAE ANTIGEN, URINE  Negative  --   --    LEGIONELLA URINARY ANTIGEN  Negative  --   --    INFLUENZA A PCR   --   --  Negative   INFLUENZA B PCR   --   --  Negative   INFLUENZA B   --  Not Detected  --    RSV PCR   --   --  Negative   RESPIRATORY SYNCYTIAL VIRUS   --  Not Detected  --      Results from last 7 days   Lab Units 03/05/25  2106   ADENOVIRUS  Not Detected   BORDETELLA PARAPERTUSSIS  Not Detected   BORDETELLA PERTUSSIS  Not Detected   CHLAMYDIA PNEUMONIAE  Not Detected   CORONAVIRUS 229E  Not Detected   CORONAVIRUS HKU1  Not Detected   CORONAVIRUS NL63  Not Detected   CORONAVIRUS OC43  Not Detected   METAPNEUMOVIRUS  Not Detected   RHINOVIRUS  Not Detected   MYCOPLASMA PNEUMONIAE  Not Detected   PARAINFLUENZA 1  Not Detected   PARAINFLUENZA 2  Not Detected   PARAINFLUENZA 3  Not Detected   PARAINFLUENZA 4  Not Detected                         Results from last 7 days   Lab Units 03/05/25  1510   BLOOD CULTURE  No Growth After 4 Days.  No Growth After 4 Days.             Results from last 7 days   Lab Units 03/10/25  0512   VANCOMYCIN RM ug/mL 16.6       Network Utilization Review Department  ATTENTION: Please call with any questions or concerns to [phone] and carefully listen to the prompts so that you are directed to the right person. All voicemails are confidential.   For Discharge needs, contact Care Management DC Support Team at [phone] opt.  2  Send all requests for admission clinical reviews, approved or denied determinations and any other requests to dedicated fax number below belonging to the campus where the patient is receiving treatment. List of dedicated fax numbers for the Facilities:  FACILITY NAME UR FAX NUMBER   ADMISSION DENIALS (Administrative/Medical Necessity) [phone]   DISCHARGE SUPPORT TEAM (NETWORK) [phone]   PARENT CHILD HEALTH (Maternity/NICU/Pediatrics) [phone]   [facility] [phone]   [facility] [phone]   [facility] [phone]   [facility] [phone]   [facility] [phone]   [facility] [phone]   [facility] [phone]   [facility] [phone]   [facility] [phone]   [facility] [phone]   [facility] [phone]   [facility] [phone]

## 2025-03-10 NOTE — ASSESSMENT & PLAN NOTE
CXR 3/6 showing increasing left basilar opacity  CT CAP with consolidation in LLL and lingula, and small consolidation in RLL  Procal remains elevated   Urinary antigens negative. RP2 negative.   MRSA culture positive.   Switched to Vancomycin, Ceftriaxone   Whenever the patient is discharged, would recommend to give him doxycycline course to continue total of 7 days of MRSA coverage  Continue atrovent/xopenex nebs TID  Pulmonary hygiene: cough and deep breath, OOB as tolerated, IS, scheuduled nebs  Speech cleared the patient for regular diet and thin liquids  Currently on prednisone 40 mg to be completed for 5 days  Will need outpatient follow-up imaging and PFTs as well as smoking cessation support

## 2025-03-11 ENCOUNTER — APPOINTMENT (INPATIENT)
Dept: RADIOLOGY | Facility: HOSPITAL | Age: 65
DRG: 871 | End: 2025-03-11
Payer: COMMERCIAL

## 2025-03-11 PROBLEM — I50.20 HFREF (HEART FAILURE WITH REDUCED EJECTION FRACTION) (HCC): Status: ACTIVE | Noted: 2025-03-11

## 2025-03-11 PROBLEM — N17.9 AKI (ACUTE KIDNEY INJURY) (HCC): Status: RESOLVED | Noted: 2021-06-15 | Resolved: 2025-03-11

## 2025-03-11 PROBLEM — R07.1 CHEST PAIN ON BREATHING: Status: RESOLVED | Noted: 2025-03-10 | Resolved: 2025-03-11

## 2025-03-11 PROBLEM — I25.5 ISCHEMIC CARDIOMYOPATHY: Status: ACTIVE | Noted: 2025-03-11

## 2025-03-11 LAB
ANION GAP SERPL CALCULATED.3IONS-SCNC: 6 MMOL/L (ref 4–13)
ATRIAL RATE: 65 BPM
ATRIAL RATE: 66 BPM
ATRIAL RATE: 68 BPM
BUN SERPL-MCNC: 28 MG/DL (ref 5–25)
CALCIUM SERPL-MCNC: 7.7 MG/DL (ref 8.4–10.2)
CARDIAC TROPONIN I PNL SERPL HS: 7 NG/L (ref 8–18)
CHLORIDE SERPL-SCNC: 108 MMOL/L (ref 96–108)
CO2 SERPL-SCNC: 24 MMOL/L (ref 21–32)
CREAT SERPL-MCNC: 0.98 MG/DL (ref 0.6–1.3)
ERYTHROCYTE [DISTWIDTH] IN BLOOD BY AUTOMATED COUNT: 20.4 % (ref 11.6–15.1)
GFR SERPL CREATININE-BSD FRML MDRD: 81 ML/MIN/1.73SQ M
GLUCOSE SERPL-MCNC: 102 MG/DL (ref 65–140)
GLUCOSE SERPL-MCNC: 108 MG/DL (ref 65–140)
GLUCOSE SERPL-MCNC: 161 MG/DL (ref 65–140)
GLUCOSE SERPL-MCNC: 172 MG/DL (ref 65–140)
GLUCOSE SERPL-MCNC: 200 MG/DL (ref 65–140)
HCT VFR BLD AUTO: 41.7 % (ref 36.5–49.3)
HGB BLD-MCNC: 13.3 G/DL (ref 12–17)
INR PPP: 5.61 (ref 0.85–1.19)
MCH RBC QN AUTO: 29.2 PG (ref 26.8–34.3)
MCHC RBC AUTO-ENTMCNC: 31.9 G/DL (ref 31.4–37.4)
MCV RBC AUTO: 91 FL (ref 82–98)
PLATELET # BLD AUTO: 117 THOUSANDS/UL (ref 149–390)
POTASSIUM SERPL-SCNC: 3.8 MMOL/L (ref 3.5–5.3)
PR INTERVAL: 194 MS
PR INTERVAL: 196 MS
PR INTERVAL: 200 MS
PROTHROMBIN TIME: 50.1 SECONDS (ref 12.3–15)
QRS AXIS: 219 DEGREES
QRS AXIS: 219 DEGREES
QRS AXIS: 220 DEGREES
QRSD INTERVAL: 104 MS
QRSD INTERVAL: 104 MS
QRSD INTERVAL: 94 MS
QT INTERVAL: 378 MS
QT INTERVAL: 380 MS
QT INTERVAL: 426 MS
QTC INTERVAL: 395 MS
QTC INTERVAL: 396 MS
QTC INTERVAL: 452 MS
RBC # BLD AUTO: 4.56 MILLION/UL (ref 3.88–5.62)
SODIUM SERPL-SCNC: 138 MMOL/L (ref 135–147)
T WAVE AXIS: 94 DEGREES
T WAVE AXIS: 96 DEGREES
T WAVE AXIS: 97 DEGREES
VENTRICULAR RATE: 65 BPM
VENTRICULAR RATE: 66 BPM
VENTRICULAR RATE: 68 BPM
WBC # BLD AUTO: 10.39 THOUSAND/UL (ref 4.31–10.16)

## 2025-03-11 PROCEDURE — 99232 SBSQ HOSP IP/OBS MODERATE 35: CPT | Performed by: INTERNAL MEDICINE

## 2025-03-11 PROCEDURE — 71045 X-RAY EXAM CHEST 1 VIEW: CPT

## 2025-03-11 PROCEDURE — 94640 AIRWAY INHALATION TREATMENT: CPT

## 2025-03-11 PROCEDURE — 93005 ELECTROCARDIOGRAM TRACING: CPT

## 2025-03-11 PROCEDURE — 80048 BASIC METABOLIC PNL TOTAL CA: CPT | Performed by: INTERNAL MEDICINE

## 2025-03-11 PROCEDURE — 82948 REAGENT STRIP/BLOOD GLUCOSE: CPT

## 2025-03-11 PROCEDURE — 85610 PROTHROMBIN TIME: CPT | Performed by: INTERNAL MEDICINE

## 2025-03-11 PROCEDURE — 94760 N-INVAS EAR/PLS OXIMETRY 1: CPT

## 2025-03-11 PROCEDURE — 94002 VENT MGMT INPAT INIT DAY: CPT

## 2025-03-11 PROCEDURE — 93010 ELECTROCARDIOGRAM REPORT: CPT | Performed by: INTERNAL MEDICINE

## 2025-03-11 PROCEDURE — 84484 ASSAY OF TROPONIN QUANT: CPT | Performed by: INTERNAL MEDICINE

## 2025-03-11 PROCEDURE — 85027 COMPLETE CBC AUTOMATED: CPT | Performed by: INTERNAL MEDICINE

## 2025-03-11 RX ORDER — FUROSEMIDE 10 MG/ML
20 INJECTION INTRAMUSCULAR; INTRAVENOUS ONCE
Status: COMPLETED | OUTPATIENT
Start: 2025-03-11 | End: 2025-03-11

## 2025-03-11 RX ADMIN — INSULIN LISPRO 1 UNITS: 100 INJECTION, SOLUTION INTRAVENOUS; SUBCUTANEOUS at 12:01

## 2025-03-11 RX ADMIN — ACETAMINOPHEN 650 MG: 325 TABLET, FILM COATED ORAL at 17:06

## 2025-03-11 RX ADMIN — INSULIN LISPRO 2 UNITS: 100 INJECTION, SOLUTION INTRAVENOUS; SUBCUTANEOUS at 17:12

## 2025-03-11 RX ADMIN — INSULIN LISPRO 1 UNITS: 100 INJECTION, SOLUTION INTRAVENOUS; SUBCUTANEOUS at 21:37

## 2025-03-11 RX ADMIN — PANTOPRAZOLE SODIUM 40 MG: 40 TABLET, DELAYED RELEASE ORAL at 17:10

## 2025-03-11 RX ADMIN — LOSARTAN POTASSIUM 100 MG: 50 TABLET, FILM COATED ORAL at 08:01

## 2025-03-11 RX ADMIN — PREDNISONE 40 MG: 20 TABLET ORAL at 08:00

## 2025-03-11 RX ADMIN — SENNOSIDES AND DOCUSATE SODIUM 1 TABLET: 50; 8.6 TABLET ORAL at 17:06

## 2025-03-11 RX ADMIN — LEVALBUTEROL HYDROCHLORIDE 1.25 MG: 1.25 SOLUTION RESPIRATORY (INHALATION) at 07:58

## 2025-03-11 RX ADMIN — CHLORHEXIDINE GLUCONATE 15 ML: 1.2 RINSE ORAL at 21:37

## 2025-03-11 RX ADMIN — IPRATROPIUM BROMIDE 0.5 MG: 0.5 SOLUTION RESPIRATORY (INHALATION) at 07:58

## 2025-03-11 RX ADMIN — ATORVASTATIN CALCIUM 40 MG: 40 TABLET, FILM COATED ORAL at 17:06

## 2025-03-11 RX ADMIN — NICOTINE 1 PATCH: 14 PATCH, EXTENDED RELEASE TRANSDERMAL at 08:05

## 2025-03-11 RX ADMIN — ACETAMINOPHEN 650 MG: 325 TABLET, FILM COATED ORAL at 04:46

## 2025-03-11 RX ADMIN — CEFTRIAXONE 1000 MG: 1 INJECTION, SOLUTION INTRAVENOUS at 14:20

## 2025-03-11 RX ADMIN — CARVEDILOL 25 MG: 25 TABLET, FILM COATED ORAL at 08:00

## 2025-03-11 RX ADMIN — VANCOMYCIN HYDROCHLORIDE 1000 MG: 1 INJECTION, SOLUTION INTRAVENOUS at 12:01

## 2025-03-11 RX ADMIN — LEVALBUTEROL HYDROCHLORIDE 1.25 MG: 1.25 SOLUTION RESPIRATORY (INHALATION) at 13:19

## 2025-03-11 RX ADMIN — VANCOMYCIN HYDROCHLORIDE 1000 MG: 1 INJECTION, SOLUTION INTRAVENOUS at 00:17

## 2025-03-11 RX ADMIN — QUETIAPINE 300 MG: 200 TABLET ORAL at 21:36

## 2025-03-11 RX ADMIN — POLYETHYLENE GLYCOL 3350 17 G: 17 POWDER, FOR SOLUTION ORAL at 08:00

## 2025-03-11 RX ADMIN — PANTOPRAZOLE SODIUM 40 MG: 40 TABLET, DELAYED RELEASE ORAL at 12:01

## 2025-03-11 RX ADMIN — FUROSEMIDE 20 MG: 10 INJECTION, SOLUTION INTRAVENOUS at 14:20

## 2025-03-11 RX ADMIN — LEVALBUTEROL HYDROCHLORIDE 1.25 MG: 1.25 SOLUTION RESPIRATORY (INHALATION) at 19:34

## 2025-03-11 RX ADMIN — ESCITALOPRAM OXALATE 20 MG: 20 TABLET ORAL at 08:01

## 2025-03-11 RX ADMIN — CHLORHEXIDINE GLUCONATE 15 ML: 1.2 RINSE ORAL at 08:00

## 2025-03-11 RX ADMIN — IPRATROPIUM BROMIDE 0.5 MG: 0.5 SOLUTION RESPIRATORY (INHALATION) at 13:19

## 2025-03-11 RX ADMIN — LEVALBUTEROL HYDROCHLORIDE 1.25 MG: 1.25 SOLUTION RESPIRATORY (INHALATION) at 05:07

## 2025-03-11 RX ADMIN — CARVEDILOL 25 MG: 25 TABLET, FILM COATED ORAL at 17:11

## 2025-03-11 RX ADMIN — SENNOSIDES AND DOCUSATE SODIUM 1 TABLET: 50; 8.6 TABLET ORAL at 08:01

## 2025-03-11 RX ADMIN — TRAZODONE HYDROCHLORIDE 100 MG: 100 TABLET ORAL at 21:36

## 2025-03-11 RX ADMIN — IPRATROPIUM BROMIDE 0.5 MG: 0.5 SOLUTION RESPIRATORY (INHALATION) at 19:34

## 2025-03-11 NOTE — ASSESSMENT & PLAN NOTE
Patient brought in by facility staff for SOB, cough x2 weeks and dizziness with standing  At baseline patient states he does not wear oxygen but does wear nocturnal CPAP  In ED required 4 L however had worsening respiratory status and was placed on BiPAP  Flu/Covid/RSV negative  CXR: Patchy lingular and lower lobe opacities, concerning for pneumonia.   CT CAP: Multifocal pneumonia involving the left lower lobe, lingula, and medial right lower lobe. Fluid in the esophagus suggests aspiration as a possible etiology. Moderate size hiatal hernia and distended fluid-filled esophagus to the level of the thoracic inlet.     Plan  Continue BiPAP nightly, currently on 2 L of oxygen through nasal cannula  Xopenex/Atrovent TID  Prednisone 40 mg daily continue for total of 5 days  Evaluated by speech, recommended regular diet with thin liquids  home oxygen evaluation prior to discharge

## 2025-03-11 NOTE — ASSESSMENT & PLAN NOTE
"Outpatient: 12.5mg Carvedilol BID, 40mg lisinopril   /91   Pulse 71   Temp (!) 97.3 °F (36.3 °C)   Resp 20   Ht 5' 8\" (1.727 m)   Wt 97.2 kg (214 lb 4.6 oz)   SpO2 (!) 88%   BMI 32.58 kg/m²     Plan  continue  PRN labetolol, for SBP >180  "

## 2025-03-11 NOTE — ASSESSMENT & PLAN NOTE
Recent Labs     03/09/25  0348 03/10/25  0512 03/11/25  0544   CREATININE 0.96 0.98 0.98   EGFR 83 81 81     Estimated Creatinine Clearance: 87.2 mL/min (by C-G formula based on SCr of 0.98 mg/dL).    Baseline 0.9- 1.1   On admission 1.5 likely prerenal   Now resolved

## 2025-03-11 NOTE — UTILIZATION REVIEW
Continued Stay Review    Date: 3/8/25    & 3/11/25                     Current Patient Class: Inpatient  Current Level of Care: med surg    HPI:64 y.o. male initially admitted on 3/5/25 inpatient    Current Diagnosis:acute respiratory failure with hypoxia/Sepsis/Pneumonia/left atrial thrombus on Coumadin.      Assessment/Plan:  3/8/25:  feels slightly better. Remains on oxygen at 4 liters.    Lungs rales. Procal 1.42.   Wbc 17.49.  Co2 19.    Bun 36. Creatinine 1.06.  glucose 153.  Titrate oxygen to maintain saturations >89%. On  ceftriaxone, flagyl and azithromycin --> switch to Vancomycin, Ceftriaxone and stop Azithromycin and Flagyl. Continue atrovent/xopenex nebs TID.   Pulmonary hygiene: cough and deep breath, OOB as tolerated, IS, scheuduled nebs.   Speech is following, suspicious of bottom up aspiration         3/11/2025 .  Patient presents with  let shoulder pain.   No BM.    On exam: 5 pound weight gain since admission, 13 pound weight gain since January.  Lower extremity edema present.  Appears with mild volume overload.   Abnormal labs or imaging:  INR 5.61.  bun 28. Creatinine 0.98.    Diagnosis/Plan    acute respiratory failure with hypoxia/Pneumonia.      Continue BiPAP nightly, currently on 2 L of oxygen through nasal cannula. Titrate oxygen to maintain saturations >89%.  Check CxR.   Scheduled nebs.  Prednisone.  Speech cleared for regular diet.  .   Hold coumadin.  Check INR in am.  Pulmonary hygiene: cough and deep breath, OOB as tolerated, IS, scheuduled nebs/  continue IV ceftriaxone and vancomycin.   Completed 3 days of azithromycin.  IV Lasix trial today.      Medications:   Scheduled Medications:  atorvastatin, 40 mg, Oral, Daily With Dinner  carvedilol, 25 mg, Oral, BID With Meals  cefTRIAXone, 1,000 mg, Intravenous, Q24H  chlorhexidine, 15 mL, Mouth/Throat, Q12H SHANNA  escitalopram, 20 mg, Oral, Daily  furosemide, 20 mg, Intravenous, Once 3/11/25  insulin lispro, 1-6 Units, Subcutaneous, 4x  Daily (AC & HS)  ipratropium, 0.5 mg, Nebulization, TID  levalbuterol, 1.25 mg, Nebulization, TID  losartan, 100 mg, Oral, Daily  nicotine, 1 patch, Transdermal, Daily  pantoprazole, 40 mg, Oral, BID before lunch/dinner  polyethylene glycol, 17 g, Oral, Daily  predniSONE, 40 mg, Oral, Daily  QUEtiapine, 300 mg, Oral, HS  senna-docusate sodium, 1 tablet, Oral, BID  traZODone, 100 mg, Oral, HS  vancomycin, 1,000 mg, Intravenous, Q12H  [Held by provider] warfarin, 2.5 mg, Oral, Once per day on Sunday Tuesday Wednesday Friday  [Held by provider] warfarin, 5 mg, Oral, Once per day on Monday Thursday Saturday    carvedilol (COREG) tablet 12.5 mg  Dose: 12.5 mg  Freq: 2 times daily with meals Route: PO  Start: 03/06/25 1630 End: 03/08/25 0804    Continuous IV Infusions:     PRN Meds:  acetaminophen, 650 mg, Oral, Q6H PRN  benzonatate, 200 mg, Oral, TID PRN  guaiFENesin, 200 mg, Oral, TID PRN  hydrALAZINE, 10 mg, Intravenous, Q6H PRN  levalbuterol, 1.25 mg, Nebulization, Q6H PRN x 1 3/11      Discharge Plan:  to be determined.     Vital Signs (last 3 days)       Date/Time Temp Pulse Resp BP MAP (mmHg) SpO2 FiO2 (%) Calculated FIO2 (%) - Nasal Cannula O2 Flow Rate (L/min) Nasal Cannula O2 Flow Rate (L/min) O2 Device O2 Interface Device Patient Position - Orthostatic VS [place] Coma Scale Score Pain    03/11/25 1124 -- -- -- -- -- 88 % -- -- -- -- None (Room air) -- -- 15 No Pain    03/11/25 07:26:14 97.3 °F (36.3 °C) 71 -- 157/91 113 90 % -- -- -- -- -- -- -- -- --    03/11/25 0609 -- -- -- -- -- 91 % 32 -- 3 L/min -- CPAP Full face mask -- -- --    03/11/25 05:03:13 -- 65 -- 149/81 104 92 % -- 32 -- 3 L/min Nasal cannula -- -- -- --    03/11/25 04:48:21 -- 71 -- 162/108 126 91 % -- -- -- -- -- -- -- -- --    03/11/25 0446 -- -- -- -- -- -- -- -- -- -- -- -- -- -- 8    03/11/25 04:44:14 -- 66 -- 178/110 133 88 % -- -- -- -- -- -- -- -- --    03/11/25 0015 -- -- -- -- -- -- -- 28 -- 2 L/min Nasal cannula -- -- 15 No Pain     03/10/25 21:41:14 97 °F (36.1 °C) 55 -- -- -- 92 % -- -- -- -- -- -- -- -- --    03/10/25 2133 -- -- -- -- -- -- -- -- -- -- -- -- -- 15 No Pain    03/10/25 20:47:13 97.5 °F (36.4 °C) 57 20 144/92 109 95 % -- -- -- -- -- -- Lying -- --    03/10/25 2031 -- -- -- -- -- 93 % -- 28 -- 2 L/min Nasal cannula -- -- -- --    03/10/25 14:28:26 97.4 °F (36.3 °C) 64 20 138/82 101 91 % -- 28 -- 2 L/min Nasal cannula -- Sitting -- --    03/10/25 1406 -- -- -- -- -- 90 % -- 28 -- 2 L/min Nasal cannula -- -- -- --    03/10/25 0900 -- -- -- -- -- -- -- -- -- -- -- -- -- -- 5    03/10/25 0757 -- -- -- -- -- -- -- -- -- -- -- -- -- -- 7    03/10/25 07:32:10 -- 62 22 178/101 127 90 % -- 28 -- 2 L/min Nasal cannula -- Sitting -- --    03/10/25 0732 -- -- -- -- -- 91 % -- 28 -- 2 L/min Nasal cannula -- -- -- --    03/10/25 07:15:18 97.5 °F (36.4 °C) 77 24 193/114 140 91 % -- 28 -- 2 L/min Nasal cannula -- Lying -- --    03/10/25 04:26:16 97.7 °F (36.5 °C) 77 -- 157/94 115 91 % -- -- -- -- -- -- -- -- --    03/09/25 19:45:33 97.7 °F (36.5 °C) 62 22 159/87 111 92 % -- 28 -- 2 L/min Nasal cannula -- Lying 15 No Pain    03/09/25 1900 -- -- -- -- -- -- -- 28 -- 2 L/min Nasal cannula -- -- -- --    03/09/25 1434 -- -- -- -- -- 95 % -- -- -- -- -- -- -- -- --    03/09/25 1424 -- -- -- -- -- 92 % -- 28 -- 2 L/min None (Room air) -- -- -- --    03/09/25 14:15:27 97.8 °F (36.6 °C) 72 16 156/92 113 94 % -- -- -- -- None (Room air) -- Lying -- --    03/09/25 0900 -- -- -- -- -- -- -- -- -- -- -- -- -- -- No Pain    03/09/25 0735 -- -- -- -- -- 92 % -- -- -- -- -- -- -- -- --    03/09/25 0723 -- -- -- -- -- 90 % -- 28 -- 2 L/min Nasal cannula -- -- -- --    03/09/25 06:31:19 97.8 °F (36.6 °C) 78 20 119/74 89 89 % -- -- -- -- -- -- -- -- --    03/08/25 2018 -- -- -- -- -- -- -- -- 50 L/min -- BiPAP Full face mask -- -- --    03/08/25 19:48:09 98.4 °F (36.9 °C) 67 18 150/92 111 93 % -- 28 -- 2 L/min Nasal cannula -- Sitting 15 No Pain    03/08/25 1935  -- -- -- -- -- -- -- 28 -- 2 L/min Nasal cannula -- -- -- --    03/08/25 15:36:05 97.4 °F (36.3 °C) 64 17 165/98 120 93 % -- 36 -- 4 L/min Nasal cannula -- Sitting -- --    03/08/25 15:34:15 97.4 °F (36.3 °C) 77 -- 185/108 134 93 % -- -- -- -- -- -- -- -- --    03/08/25 1402 -- -- -- -- -- 96 % -- -- -- -- -- -- -- -- --    03/08/25 1352 -- -- -- -- -- 94 % -- -- -- -- Nasal cannula -- -- -- --    03/08/25 0851 -- -- -- -- -- -- -- 36 -- 4 L/min Nasal cannula -- -- -- No Pain    03/08/25 0737 -- -- -- -- -- 93 % -- -- -- -- -- -- -- -- --    03/08/25 0726 -- -- -- -- -- 91 % -- 36 -- 4 L/min Nasal cannula -- -- -- --    03/08/25 07:14:49 98 °F (36.7 °C) 75 20 173/102 126 93 % -- -- -- -- -- -- -- -- --    03/08/25 01:03:28 97.6 °F (36.4 °C) -- 22 168/111 130 -- -- 36 -- 4 L/min Nasal cannula -- Lying -- --          Weight (last 2 days)       Date/Time Weight    03/10/25 0455 97.2 (214.29)    03/09/25 0600 96.1 (211.86)            Pertinent Labs/Diagnostic Results:   Radiology:  XR chest portable ICU   Final Interpretation by Bradley Landon Kocher, MD (03/07 0725)      Increasing left basilar opacity in keeping with effusion and a component of pneumonia or atelectasis.            Workstation performed: [id number]         CT chest abdomen pelvis w contrast   Final Interpretation by Leigh Epps MD (03/05 1738)      Multifocal pneumonia involving the left lower lobe, lingula, and medial right lower lobe. Fluid in the esophagus suggests aspiration as a possible etiology.      Moderate size hiatal hernia and distended fluid-filled esophagus to the level of the thoracic inlet.      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      The study was marked in EPIC for immediate notification.               Workstation performed: [id number]         XR chest 2 views   Final Interpretation by Cj Bojorquez MD  (03/05 1523)      Patchy lingular and lower lobe opacities, concerning for pneumonia. Radiographic follow-up to resolution is recommended.            Workstation performed: [id number]         XR chest portable    (Results Pending)     Cardiology:  ECG 12 lead    by Interface, Ris Results In (03/11 0451)      ECG 12 lead    by Interface, Ris Results In (03/11 0451)      ECG 12 lead    by Interface, Ris Results In (03/11 0450)      ECG 12 lead   Final Result by Kale Estevez MD (03/10 2339)   Normal sinus rhythm   Minimal voltage criteria for LVH, may be normal variant ( R in aVL )   Inferior infarct (cited on or before 19-Sep-2018)   Anterolateral infarct (cited on or before 19-Sep-2018)   Abnormal ECG   When compared with ECG of 06-Mar-2025 21:22,   T wave inversion more evident in Lateral leads   Confirmed by Kale Estevez ([id number]) on 3/10/2025 11:39:20 AM      ECG 12 lead   Final Result by Omari Davalos MD (03/07 0752)   Normal sinus rhythm   Inferior infarct (cited on or before 19-Sep-2018)   Anterior infarct (cited on or before 19-Sep-2018)   Abnormal ECG   When compared with ECG of 05-Mar-2025 18:29,   No significant change was found   Confirmed by Omari Davalos ([id number]) on 3/7/2025 7:52:37 AM      ECG 12 lead   Final Result by Omari Davalos MD (03/06 0752)   Sinus rhythm with 1st degree A-V block   Inferior infarct (cited on or before 19-Sep-2018)   Anterolateral infarct (cited on or before 19-Sep-2018)   Abnormal ECG   When compared with ECG of 05-Mar-2025 13:01,   No significant change was found   Confirmed by Omari Davalos ([id number]) on 3/6/2025 7:52:33 AM      ECG 12 lead   Final Result by Omari Davalos MD (03/05 1449)   Sinus rhythm with 1st degree A-V block   Inferior infarct   Anterolateral infarct   Abnormal ECG   When compared with ECG of 17-Dec-2024 17:19, (unconfirmed)   No significant change was found   Confirmed by Omari Davalos ([id number]) on 3/5/2025 2:49:54 PM        GI:  No orders to  display       Results from last 7 days   Lab Units 03/05/25  2106 03/05/25  1510   SARS-COV-2  Not Detected Negative     Results from last 7 days   Lab Units 03/11/25  0544 03/10/25  0512 03/09/25  0348 03/08/25  0101 03/07/25  0307 03/06/25  0434 03/05/25  1511 03/05/25  1301   WBC Thousand/uL 10.39* 9.52 14.22* 17.49* 16.44* 13.90*   < > 14.75*   HEMOGLOBIN g/dL 13.3 13.0 12.6 14.3 14.1 13.9   < > 14.1   I STAT HEMOGLOBIN   --   --   --   --   --   --    < >  --    HEMATOCRIT % 41.7 40.0 39.0 44.8 44.7 43.9   < > 43.5   HEMATOCRIT, ISTAT   --   --   --   --   --   --    < >  --    PLATELETS Thousands/uL 117* 119* 131* 151 131* 105*   < > 108*   TOTAL NEUT ABS Thousands/µL  --  7.31  --   --   --  12.42*  --  12.20*    < > = values in this interval not displayed.     Results from last 7 days   Lab Units 03/11/25  0544 03/10/25  0512 03/09/25  0348 03/08/25  0101 03/07/25  0307 03/06/25  0434 03/05/25  1511   SODIUM mmol/L 138 139 138 138 136 138  --    POTASSIUM mmol/L 3.8 4.0 3.8 4.1 4.2 4.1  --    CHLORIDE mmol/L 108 110* 109* 108 106 105  --    CO2 mmol/L 24 22 22 19* 20* 22  --    CO2, I-STAT mmol/L  --   --   --   --   --   --  23   ANION GAP mmol/L 6 7 7 11 10 11  --    BUN mg/dL 28* 28* 32* 36* 37* 25  --    CREATININE mg/dL 0.98 0.98 0.96 1.06 1.18 1.35*  --    EGFR ml/min/1.73sq m 81 81 83 74 65 55  --    CALCIUM mg/dL 7.7* 7.5* 7.8* 8.3* 8.9 8.8  --    CALCIUM, IONIZED mmol/L  --   --   --   --   --  1.15  --    CALCIUM, IONIZED, ISTAT mmol/L  --   --   --   --   --   --  1.13   MAGNESIUM mg/dL  --   --   --  2.3 2.8* 2.1  --    PHOSPHORUS mg/dL  --   --   --   --  2.6 3.7  --      Results from last 7 days   Lab Units 03/08/25  0101 03/05/25  1301   AST U/L 19 19   ALT U/L 15 13   ALK PHOS U/L 48 55   TOTAL PROTEIN g/dL 7.1 7.1   ALBUMIN g/dL 3.9 4.4   TOTAL BILIRUBIN mg/dL 0.43 0.78     Results from last 7 days   Lab Units 03/11/25  1103 03/11/25  0724 03/10/25  2045 03/10/25  1614 03/10/25  1133  03/10/25  0713 03/09/25  2107 03/09/25  1620 03/09/25  1114 03/09/25  0629 03/08/25  2107 03/08/25  1607   POC GLUCOSE mg/dl 161* 102 148* 188* 195* 98 152* 135 156* 126 143* 120     Results from last 7 days   Lab Units 03/11/25  0544 03/10/25  0512 03/09/25  0348 03/08/25  0101 03/07/25  0307 03/06/25  0434 03/05/25  1301   GLUCOSE RANDOM mg/dL 108 116 143* 153* 143* 174* 123     Results from last 7 days   Lab Units 03/06/25  0434   HEMOGLOBIN A1C % 5.7*   EAG mg/dl 117     Results from last 7 days   Lab Units 03/06/25  0434   PH STEPHANIE  7.278*   PCO2 STEPHANIE mm Hg 46.4   PO2 STEPHANIE mm Hg 32.6*   HCO3 STEPHANIE mmol/L 21.2*   BASE EXC STEPHANIE mmol/L -5.6   O2 CONTENT STEPHANIE ml/dL 11.3   O2 HGB, VENOUS % 54.5*     Results from last 7 days   Lab Units 03/05/25  1511   PH, STEPHANIE I-STAT  7.389   PCO2, STEPHANIE ISTAT mm HG 36.5*   PO2, STEPHANIE ISTAT mm HG 35.0   HCO3, STEPHANIE ISTAT mmol/L 22.0*   I STAT BASE EXC mmol/L -2   I STAT O2 SAT % 67     Results from last 7 days   Lab Units 03/10/25  1055 03/10/25  0838 03/05/25  1834 03/05/25  1514 03/05/25  1301   HS TNI 0HR ng/L  --  9  --   --  19   HS TNI 2HR ng/L 8  --   --  17  --    HSTNI D2 ng/L -1  --   --  -2  --    HS TNI 4HR ng/L  --   --  16  --   --    HSTNI D4 ng/L  --   --  -3  --   --      Results from last 7 days   Lab Units 03/11/25  0544 03/10/25  0838 03/09/25  0348 03/07/25  0307 03/06/25  1127 03/06/25  0434 03/05/25  2106   PROTIME seconds 50.1* 45.8* 38.0*   < >  --   --  29.4*   INR  5.61* 4.98* 3.89*   < >  --   --  2.76*   PTT seconds  --   --   --   --  91* 87* 50*    < > = values in this interval not displayed.     Results from last 7 days   Lab Units 03/09/25  0348 03/08/25  0101 03/07/25  0307 03/06/25  0434 03/05/25  1510   PROCALCITONIN ng/ml 0.70* 1.42* 2.75* 2.69* 0.94*     Results from last 7 days   Lab Units 03/05/25  1510   LACTIC ACID mmol/L 1.8     Results from last 7 days   Lab Units 03/05/25  1301   BNP pg/mL 381*     Results from last 7 days   Lab Units 03/06/25  1518  03/05/25  2106 03/05/25  1510   STREP PNEUMONIAE ANTIGEN, URINE  Negative  --   --    LEGIONELLA URINARY ANTIGEN  Negative  --   --    INFLUENZA A PCR   --   --  Negative   INFLUENZA B PCR   --   --  Negative   INFLUENZA B   --  Not Detected  --    RSV PCR   --   --  Negative   RESPIRATORY SYNCYTIAL VIRUS   --  Not Detected  --      Results from last 7 days   Lab Units 03/05/25  2106   ADENOVIRUS  Not Detected   BORDETELLA PARAPERTUSSIS  Not Detected   BORDETELLA PERTUSSIS  Not Detected   CHLAMYDIA PNEUMONIAE  Not Detected   CORONAVIRUS 229E  Not Detected   CORONAVIRUS HKU1  Not Detected   CORONAVIRUS NL63  Not Detected   CORONAVIRUS OC43  Not Detected   METAPNEUMOVIRUS  Not Detected   RHINOVIRUS  Not Detected   MYCOPLASMA PNEUMONIAE  Not Detected   PARAINFLUENZA 1  Not Detected   PARAINFLUENZA 2  Not Detected   PARAINFLUENZA 3  Not Detected   PARAINFLUENZA 4  Not Detected     Results from last 7 days   Lab Units 03/05/25  1510   BLOOD CULTURE  No Growth After 5 Days.  No Growth After 5 Days.     Results from last 7 days   Lab Units 03/10/25  0512   VANCOMYCIN RM ug/mL 16.6       Network Utilization Review Department  ATTENTION: Please call with any questions or concerns to [phone] and carefully listen to the prompts so that you are directed to the right person. All voicemails are confidential.   For Discharge needs, contact Care Management DC Support Team at [phone] opt. [phone]  Send all requests for admission clinical reviews, approved or denied determinations and any other requests to dedicated fax number below belonging to the campus where the patient is receiving treatment. List of dedicated fax numbers for the Facilities:  FACILITY NAME UR FAX NUMBER   ADMISSION DENIALS (Administrative/Medical Necessity) [phone]   DISCHARGE SUPPORT TEAM (NETWORK) [phone]   PARENT CHILD HEALTH (Maternity/NICU/Pediatrics) [phone]   [facility] [phone]   [facility] [phone]   [facility] [phone]   [facility] [phone]   [facility] [phone]   [facility] [phone]   [facility] [phone]   [facility] [phone]   [facility] [phone]   [facility] [phone]   [facility] [phone]   [facility] [phone]

## 2025-03-11 NOTE — ASSESSMENT & PLAN NOTE
CXR 3/6 showing increasing left basilar opacity  CT CAP with consolidation in LLL and lingula, and small consolidation in RLL  Treated for pneumonia in December with ceftriaxone and azithromycin  WBC 14.22-9.52-10.39  Procalcitonin 2.75-1.42-.70  Urinary antigens negative. RP2 negative.   MRSA culture positive.   Completed 3 days of azithromycin  Ceftriaxone day #7/7  Vancomycin day #4-complete 7-day course  Continue atrovent/xopenex nebs TID  Speech cleared the patient for regular diet and thin liquids  Currently on prednisone 40 mg to be completed for 5 days  Will need outpatient follow-up imaging and PFTs as well as smoking cessation support  Repeat imaging in 4 to 6 weeks

## 2025-03-11 NOTE — PLAN OF CARE
Problem: Potential for Falls  Goal: Patient will remain free of falls  Description: INTERVENTIONS:  - Educate patient/family on patient safety including physical limitations  - Instruct patient to call for assistance with activity   - Consult OT/PT to assist with strengthening/mobility   - Keep Call bell within reach  - Keep bed low and locked with side rails adjusted as appropriate  - Keep care items and personal belongings within reach  - Initiate and maintain comfort rounds  - Make Fall Risk Sign visible to staff  - Offer Toileting every 2 Hours, in advance of need  - Initiate/Maintain bed alarm  - Obtain necessary fall risk management equipment: socks  - Apply yellow socks and bracelet for high fall risk patients  - Consider moving patient to room near nurses station  Outcome: Progressing     Problem: Prexisting or High Potential for Compromised Skin Integrity  Goal: Skin integrity is maintained or improved  Description: INTERVENTIONS:  - Identify patients at risk for skin breakdown  - Assess and monitor skin integrity  - Assess and monitor nutrition and hydration status  - Monitor labs   - Assess for incontinence   - Turn and reposition patient  - Assist with mobility/ambulation  - Relieve pressure over bony prominences  - Avoid friction and shearing  - Provide appropriate hygiene as needed including keeping skin clean and dry  - Evaluate need for skin moisturizer/barrier cream  - Collaborate with interdisciplinary team   - Patient/family teaching  - Consider wound care consult   Outcome: Progressing     Problem: PAIN - ADULT  Goal: Verbalizes/displays adequate comfort level or baseline comfort level  Description: Interventions:  - Encourage patient to monitor pain and request assistance  - Assess pain using appropriate pain scale  - Administer analgesics based on type and severity of pain and evaluate response  - Implement non-pharmacological measures as appropriate and evaluate response  - Consider cultural  and social influences on pain and pain management  - Notify physician/advanced practitioner if interventions unsuccessful or patient reports new pain  Outcome: Progressing     Problem: INFECTION - ADULT  Goal: Absence or prevention of progression during hospitalization  Description: INTERVENTIONS:  - Assess and monitor for signs and symptoms of infection  - Monitor lab/diagnostic results  - Monitor all insertion sites, i.e. indwelling lines, tubes, and drains  - Monitor endotracheal if appropriate and nasal secretions for changes in amount and color  - [place] appropriate cooling/warming therapies per order  - Administer medications as ordered  - Instruct and encourage patient and family to use good hand hygiene technique  - Identify and instruct in appropriate isolation precautions for identified infection/condition  Outcome: Progressing  Goal: Absence of fever/infection during neutropenic period  Description: INTERVENTIONS:  - Monitor WBC    Outcome: Progressing     Problem: SAFETY ADULT  Goal: Patient will remain free of falls  Description: INTERVENTIONS:  - Educate patient/family on patient safety including physical limitations  - Instruct patient to call for assistance with activity   - Consult OT/PT to assist with strengthening/mobility   - Keep Call bell within reach  - Keep bed low and locked with side rails adjusted as appropriate  - Keep care items and personal belongings within reach  - Initiate and maintain comfort rounds  - Make Fall Risk Sign visible to staff  - Offer Toileting every 2 Hours, in advance of need  - Initiate/Maintain bed alarm  - Obtain necessary fall risk management equipment: socks  - Apply yellow socks and bracelet for high fall risk patients  - Consider moving patient to room near nurses station  Outcome: Progressing  Goal: Maintain or return to baseline ADL function  Description: INTERVENTIONS:  -  Assess patient's ability to carry out ADLs; assess patient's baseline for ADL  function and identify physical deficits which impact ability to perform ADLs (bathing, care of mouth/teeth, toileting, grooming, dressing, etc.)  - Assess/evaluate cause of self-care deficits   - Assess range of motion  - Assess patient's mobility; develop plan if impaired  - Assess patient's need for assistive devices and provide as appropriate  - Encourage maximum independence but intervene and supervise when necessary  - Involve family in performance of ADLs  - Assess for home care needs following discharge   - Consider OT consult to assist with ADL evaluation and planning for discharge  - Provide patient education as appropriate  Outcome: Progressing  Goal: Maintains/Returns to pre admission functional level  Description: INTERVENTIONS:  - Perform AM-PAC 6 Click Basic Mobility/ Daily Activity assessment daily.  - Set and communicate daily mobility goal to care team and patient/family/caregiver.   - Collaborate with rehabilitation services on mobility goals if consulted  - Perform Range of Motion 3 times a day.  - Reposition patient every 2 hours.  - Dangle patient 3 times a day  - Stand patient 3 times a day  - Ambulate patient 3 times a day  - Out of bed to chair 3 times a day   - Out of bed for meals 3 times a day  - Out of bed for toileting  - Record patient progress and toleration of activity level   Outcome: Progressing     Problem: DISCHARGE PLANNING  Goal: Discharge to home or other facility with appropriate resources  Description: INTERVENTIONS:  - Identify barriers to discharge w/patient and caregiver  - Arrange for needed discharge resources and transportation as appropriate  - Identify discharge learning needs (meds, wound care, etc.)  - Arrange for interpretive services to assist at discharge as needed  - Refer to Case Management Department for coordinating discharge planning if the patient needs post-hospital services based on physician/advanced practitioner order or complex needs related to  functional status, cognitive ability, or social support system  Outcome: Progressing     Problem: Knowledge Deficit  Goal: Patient/family/caregiver demonstrates understanding of disease process, treatment plan, medications, and discharge instructions  Description: Complete learning assessment and assess knowledge base.  Interventions:  - Provide teaching at level of understanding  - Provide teaching via preferred learning methods  Outcome: Progressing     Problem: CARDIOVASCULAR - ADULT  Goal: Maintains optimal cardiac output and hemodynamic stability  Description: INTERVENTIONS:  - Monitor I/O, vital signs and rhythm  - Monitor for S/S and trends of decreased cardiac output  - Administer and titrate ordered vasoactive medications to optimize hemodynamic stability  - Assess quality of pulses, skin color and temperature  - Assess for signs of decreased coronary artery perfusion  - Instruct patient to report change in severity of symptoms  Outcome: Progressing  Goal: Absence of cardiac dysrhythmias or at baseline rhythm  Description: INTERVENTIONS:  - Continuous cardiac monitoring, vital signs, obtain 12 lead EKG if ordered  - Administer antiarrhythmic and heart rate control medications as ordered  - Monitor electrolytes and administer replacement therapy as ordered  Outcome: Progressing     Problem: RESPIRATORY - ADULT  Goal: Achieves optimal ventilation and oxygenation  Description: INTERVENTIONS:  - Assess for changes in respiratory status  - Assess for changes in mentation and behavior  - Position to facilitate oxygenation and minimize respiratory effort  - Oxygen administered by appropriate delivery if ordered  - Initiate smoking cessation education as indicated  - Encourage broncho-pulmonary hygiene including cough, deep breathe, Incentive Spirometry  - Assess the need for suctioning and aspirate as needed  - Assess and instruct to report SOB or any respiratory difficulty  - Respiratory Therapy support as  indicated  Outcome: Progressing     Problem: METABOLIC, FLUID AND ELECTROLYTES - ADULT  Goal: Electrolytes maintained within normal limits  Description: INTERVENTIONS:  - Monitor labs and assess patient for signs and symptoms of electrolyte imbalances  - Administer electrolyte replacement as ordered  - Monitor response to electrolyte replacements, including repeat lab results as appropriate  - Instruct patient on fluid and nutrition as appropriate  Outcome: Progressing  Goal: Fluid balance maintained  Description: INTERVENTIONS:  - Monitor labs   - Monitor I/O and WT  - Instruct patient on fluid and nutrition as appropriate  - Assess for signs & symptoms of volume excess or deficit  Outcome: Progressing  Goal: Glucose maintained within target range  Description: INTERVENTIONS:  - Monitor Blood Glucose as ordered  - Assess for signs and symptoms of hyperglycemia and hypoglycemia  - Administer ordered medications to maintain glucose within target range  - Assess nutritional intake and initiate nutrition service referral as needed  Outcome: Progressing

## 2025-03-11 NOTE — PROGRESS NOTES
"Progress Note - Hospitalist   Name: Chuy Norton 64 y.o. male I MRN: [id number]  Unit/Bed#: -01 I Date of Admission: 3/5/2025   Date of Service: 3/11/2025 I Hospital Day: 6    Assessment & Plan  Acute respiratory failure with hypoxia (HCC)  Patient brought in by facility staff for SOB, cough x2 weeks and dizziness with standing  At baseline patient states he does not wear oxygen but does wear nocturnal CPAP  In ED required 4 L however had worsening respiratory status and was placed on BiPAP  Flu/Covid/RSV negative  CXR: Patchy lingular and lower lobe opacities, concerning for pneumonia.   CT CAP: Multifocal pneumonia involving the left lower lobe, lingula, and medial right lower lobe. Fluid in the esophagus suggests aspiration as a possible etiology. Moderate size hiatal hernia and distended fluid-filled esophagus to the level of the thoracic inlet.     Plan  Continue BiPAP nightly, currently on 2 L of oxygen through nasal cannula  Xopenex/Atrovent TID  Prednisone 40 mg daily continue for total of 5 days  Evaluated by speech, recommended regular diet with thin liquids  home oxygen evaluation prior to discharge  Bipolar 2 disorder, major depressive episode (HCC)  Outpatient: 20mg lexapro, 300mg Seroquel HS, 100mg trazadone HS    Plan  Continue   Primary hypertension  Outpatient: 12.5mg Carvedilol BID, 40mg lisinopril   /91   Pulse 71   Temp (!) 97.3 °F (36.3 °C)   Resp 20   Ht 5' 8\" (1.727 m)   Wt 97.2 kg (214 lb 4.6 oz)   SpO2 (!) 88%   BMI 32.58 kg/m²     Plan  continue  PRN labetolol, for SBP >180  Hyperlipidemia  Outpatient: 40 atorvastatin    Plan  Continue statin   Cirrhosis (HCC)  Follows with [facility]OTT, last seen Jan 2025  History of cirrhosis of the liver due to history of alcohol and drug addiction.   5/9/2024 CT C/A/P noted nodular hepatic contours may indicate cirrhosis of the liver.   No ascites present  Left ventricular apical thrombus    Recent Labs     03/09/25  0348 03/10/25  0838 " 03/11/25  0544   INR 3.89* 4.98* 5.61*      Outpatient: 2.5mg Warfarin (Sun, Tues, Wed, Fri) and 5mg (Mon, Thurs, Sat)  INR as of today, supratherapeutic , will hold coumadin. No bleeding noted, HB stable   Monitor INR a.m.  Hepatitis C  Follows with [facility] LOTT, last seen Jan 2025  History of hepatitis C antibody and stated treatment with Mavyret   May 2024 negative viral load  Pneumonia  CXR 3/6 showing increasing left basilar opacity  CT CAP with consolidation in LLL and lingula, and small consolidation in RLL  Procal remains elevated (2.75, 2.69, 0.94)  Urinary antigens negative. RP2 negative.   Placed on ceftriaxone, Flagyl and azithromycin, tailored to IV ceftriaxone and IV vancomycin on 3/8  Continue atrovent/xopenex nebs TID  Previously on IV steroids, currently on p.o. prednisone  Pulmonary hygiene: cough and deep breath, OOB as tolerated, IS, scheuduled nebs  KENN (obstructive sleep apnea)  Maintained on CPAP at home  Continue BiPAP qhs for now  Chest pain on breathing (Resolved: 3/11/2025)  Reported left-sided chest pain worsening with deep breath this morning.  Elevated blood pressure, EKG unchanged compared to prior  Troponin x 2 negative.  Likely MSK.  Tylenol prn   MARIELY (acute kidney injury) (HCC) (Resolved: 3/11/2025)  Recent Labs     03/09/25  0348 03/10/25  0512 03/11/25  0544   CREATININE 0.96 0.98 0.98   EGFR 83 81 81     Estimated Creatinine Clearance: 87.2 mL/min (by C-G formula based on SCr of 0.98 mg/dL).    Baseline 0.9- 1.1   On admission 1.5 likely prerenal   Now resolved   Ischemic cardiomyopathy      VTE Pharmacologic Prophylaxis:   Moderate Risk (Score 3-4) - Pharmacological DVT Prophylaxis Ordered: warfarin (Coumadin).    Mobility:   Basic Mobility Inpatient Raw Score: 19  JH-HLM Goal: 6: Walk 10 steps or more  JH-HLM Achieved: 6: Walk 10 steps or more  JH-HLM Goal achieved. Continue to encourage appropriate mobility.    Patient Centered Rounds: I performed bedside rounds with nursing staff  today.   Discussions with Specialists or Other Care Team Provider: kieran    Education and Discussions with Family / Patient: Attempted to update  (friend) via phone. Phone number not in service     Current Length of Stay: 6 day(s)  Current Patient Status: Inpatient   Certification Statement: The patient will continue to require additional inpatient hospital stay due to supratherapeutic inr   Discharge Plan: Anticipate discharge in 24-48 hrs to prior assisted or independent living facility.    Code Status: Level 1 - Full Code    Subjective     Is feeling well, left shoulder pain this am, otherwise no complaints . No bm yet     Objective :  Temp:  [97 °F (36.1 °C)-97.5 °F (36.4 °C)] 97.3 °F (36.3 °C)  HR:  [55-71] 71  BP: (138-178)/() 157/91  Resp:  [20] 20  SpO2:  [88 %-95 %] 88 %  O2 Device: None (Room air)  Nasal Cannula O2 Flow Rate (L/min):  [2 L/min-3 L/min] 3 L/min  FiO2 (%):  [32] 32    Body mass index is 32.58 kg/m².     Input and Output Summary (last 24 hours):     Intake/Output Summary (Last 24 hours) at 3/11/2025 1329  Last data filed at 3/11/2025 1314  Gross per 24 hour   Intake 880 ml   Output 300 ml   Net 580 ml       Physical Exam  Vitals and nursing note reviewed.   Constitutional:       General: He is not in acute distress.     Appearance: He is not diaphoretic.   HENT:      Head: Normocephalic.   Eyes:      General:         Right eye: No discharge.         Left eye: No discharge.   Cardiovascular:      Rate and Rhythm: Normal rate and regular rhythm.   Pulmonary:      Effort: Pulmonary effort is normal. No respiratory distress.      Breath sounds: No wheezing, rhonchi or rales.   Abdominal:      General: There is no distension.      Palpations: Abdomen is soft.      Tenderness: There is no abdominal tenderness. There is no guarding or rebound.   Musculoskeletal:      Cervical back: Normal range of motion.      Right lower leg: No edema.      Left lower leg: No edema.   Skin:      General: Skin is warm.   Neurological:      Mental Status: He is alert.   Psychiatric:         Mood and Affect: Mood normal.         Behavior: Behavior normal.         Thought Content: Thought content normal.         Judgment: Judgment normal.           Lines/Drains:              Lab Results: I have reviewed the following results:   Results from last 7 days   Lab Units 03/11/25  0544 03/10/25  0512   WBC Thousand/uL 10.39* 9.52   HEMOGLOBIN g/dL 13.3 13.0   HEMATOCRIT % 41.7 40.0   PLATELETS Thousands/uL 117* 119*   SEGS PCT %  --  77*   LYMPHO PCT %  --  12*   MONO PCT %  --  9   EOS PCT %  --  0     Results from last 7 days   Lab Units 03/11/25  0544 03/09/25  0348 03/08/25  0101   SODIUM mmol/L 138   < > 138   POTASSIUM mmol/L 3.8   < > 4.1   CHLORIDE mmol/L 108   < > 108   CO2 mmol/L 24   < > 19*   BUN mg/dL 28*   < > 36*   CREATININE mg/dL 0.98   < > 1.06   ANION GAP mmol/L 6   < > 11   CALCIUM mg/dL 7.7*   < > 8.3*   ALBUMIN g/dL  --   --  3.9   TOTAL BILIRUBIN mg/dL  --   --  0.43   ALK PHOS U/L  --   --  48   ALT U/L  --   --  15   AST U/L  --   --  19   GLUCOSE RANDOM mg/dL 108   < > 153*    < > = values in this interval not displayed.     Results from last 7 days   Lab Units 03/11/25  0544   INR  5.61*     Results from last 7 days   Lab Units 03/11/25  1103 03/11/25  0724 03/10/25  2045 03/10/25  1614 03/10/25  1133 03/10/25  0713 03/09/25  2107 03/09/25  1620 03/09/25  1114 03/09/25  0629 03/08/25  2107 03/08/25  1607   POC GLUCOSE mg/dl 161* 102 148* 188* 195* 98 152* 135 156* 126 143* 120     Results from last 7 days   Lab Units 03/06/25  0434   HEMOGLOBIN A1C % 5.7*     Results from last 7 days   Lab Units 03/09/25  0348 03/08/25  0101 03/07/25  0307 03/06/25  0434 03/05/25  1510   LACTIC ACID mmol/L  --   --   --   --  1.8   PROCALCITONIN ng/ml 0.70* 1.42* 2.75* 2.69* 0.94*       Recent Cultures (last 7 days):   Results from last 7 days   Lab Units 03/06/25  1518 03/05/25  1510   BLOOD CULTURE   --   No Growth After 5 Days.  No Growth After 5 Days.   LEGIONELLA URINARY ANTIGEN  Negative  --        Imaging Results Review: No pertinent imaging studies reviewed.  Other Study Results Review: No additional pertinent studies reviewed.    Last 24 Hours Medication List:     Current Facility-Administered Medications:     acetaminophen (TYLENOL) tablet 650 mg, Q6H PRN    atorvastatin (LIPITOR) tablet 40 mg, Daily With Dinner    benzonatate (TESSALON PERLES) capsule 200 mg, TID PRN    carvedilol (COREG) tablet 25 mg, BID With Meals    cefTRIAXone (ROCEPHIN) IVPB (premix in dextrose) 1,000 mg 50 mL, Q24H, Last Rate: 1,000 mg (03/10/25 1321) **AND** [COMPLETED] azithromycin (ZITHROMAX) 500 mg in sodium chloride 0.9% 250mL IVPB 500 mg, Q24H, Last Rate: 0 mg (03/06/25 1705)    chlorhexidine (PERIDEX) 0.12 % oral rinse 15 mL, Q12H SHANNA    escitalopram (LEXAPRO) tablet 20 mg, Daily    furosemide (LASIX) injection 20 mg, Once    guaiFENesin (ROBITUSSIN) oral liquid 200 mg, TID PRN    hydrALAZINE (APRESOLINE) injection 10 mg, Q6H PRN    insulin lispro (HumALOG/ADMELOG) 100 units/mL subcutaneous injection 1-6 Units, 4x Daily (AC & HS) **AND** Fingerstick Glucose (POCT), 4x Daily AC and at bedtime    ipratropium (ATROVENT) 0.02 % inhalation solution 0.5 mg, TID    levalbuterol (XOPENEX) inhalation solution 1.25 mg, TID    levalbuterol (XOPENEX) inhalation solution 1.25 mg, Q6H PRN    losartan (COZAAR) tablet 100 mg, Daily    nicotine (NICODERM CQ) 14 mg/24hr TD 24 hr patch 1 patch, Daily    pantoprazole (PROTONIX) EC tablet 40 mg, BID before lunch/dinner    polyethylene glycol (MIRALAX) packet 17 g, Daily    predniSONE tablet 40 mg, Daily    QUEtiapine (SEROquel) tablet 300 mg, HS    senna-docusate sodium (SENOKOT S) 8.6-50 mg per tablet 1 tablet, BID    traZODone (DESYREL) tablet 100 mg, HS    vancomycin (VANCOCIN) IVPB (premix in dextrose) 1,000 mg 200 mL, Q12H, Last Rate: 1,000 mg (03/11/25 1201)    [Held by provider] warfarin  (COUMADIN) tablet 2.5 mg, Once per day on Sunday Tuesday Wednesday Friday    [Held by provider] warfarin (COUMADIN) tablet 5 mg, Once per day on Monday Thursday Saturday    Administrative Statements   Today, Patient Was Seen By: Zainab Ribeiro MD      **Please Note: This note may have been constructed using a voice recognition system.**

## 2025-03-11 NOTE — ASSESSMENT & PLAN NOTE
Initially requiring 4L in ED, however had worsening respiratory status and placed on BiPAP. Continue on BiPAP as needed and qhs - but patient is refusing   91% on 2 L NC, patient does not wear any supplemental oxygen  Chest x-ray ordered  Titrate oxygen to maintain saturations >89%  Recommend pulmonary hygiene: Deep breathing with cough, OOB as tolerated, incentive spirometry and flutter valve  Will likely need home O2 eval prior to discharge

## 2025-03-11 NOTE — PROGRESS NOTES
Chuy Norton is a 64 y.o. male who is currently ordered Vancomycin IV with management by the Pharmacy Consult service.  Relevant clinical data and objective / subjective history reviewed.  Vancomycin Assessment:  Indication and Goal AUC/Trough: Pneumonia (goal -600, trough >10)  Clinical Status: stable  Micro:     Renal Function:  SCr: 0.98 mg/dL  CrCl: 87.2 mL/min  Renal replacement: Not on dialysis  Days of Therapy: 3  Current Dose: 1000 mg Q12h  Vancomycin Plan:  New Dosing: continue the same  Estimated AUC: 457 mcg*hr/mL  Estimated Trough: 13.4 mcg/mL  Next Level: random level with AM labs on 03/17  Renal Function Monitoring: Daily BMP and UOP  Pharmacy will continue to follow closely for s/sx of nephrotoxicity, infusion reactions and appropriateness of therapy.  BMP and CBC will be ordered per protocol. We will continue to follow the patient’s culture results and clinical progress daily.    Diane Marie, Pharmacist

## 2025-03-11 NOTE — PHYSICAL THERAPY NOTE
Physical Therapy Cancellation Note       03/11/25 1050   PT Last Visit   PT Visit Date 03/11/25   Note Type   Note type Cancelled Session   Cancel Reasons Medical status   Additional Comments INR 5.61. Will hold until medically stable.     Waleska Khan

## 2025-03-11 NOTE — PROGRESS NOTES
Progress Note - Pulmonology   Name: Chuy Norton 64 y.o. male I MRN: [id number]  Unit/Bed#: -01 I Date of Admission: 3/5/2025   Date of Service: 3/11/2025 I Hospital Day: 6    Assessment & Plan  Acute respiratory failure with hypoxia (HCC)  Initially requiring 4L in ED, however had worsening respiratory status and placed on BiPAP. Continue on BiPAP as needed and qhs - but patient is refusing   91% on 2 L NC, patient does not wear any supplemental oxygen  Chest x-ray ordered  Titrate oxygen to maintain saturations >89%  Recommend pulmonary hygiene: Deep breathing with cough, OOB as tolerated, incentive spirometry and flutter valve  Will likely need home O2 eval prior to discharge   Pneumonia  CXR 3/6 showing increasing left basilar opacity  CT CAP with consolidation in LLL and lingula, and small consolidation in RLL  Treated for pneumonia in December with ceftriaxone and azithromycin  WBC 14.22-9.52-10.39  Procalcitonin 2.75-1.42-.70  Urinary antigens negative. RP2 negative.   MRSA culture positive.   Completed 3 days of azithromycin  Ceftriaxone day #7/7  Vancomycin day #4-complete 7-day course  Continue atrovent/xopenex nebs TID  Speech cleared the patient for regular diet and thin liquids  Currently on prednisone 40 mg to be completed for 5 days  Will need outpatient follow-up imaging and PFTs as well as smoking cessation support  Repeat imaging in 4 to 6 weeks  KENN (obstructive sleep apnea)  Maintained on CPAP at home  Continue BiPAP qhs for now  Left ventricular apical thrombus  Maintained on coumadin  Ischemic cardiomyopathy  Wt Readings from Last 3 Encounters:   03/10/25 97.2 kg (214 lb 4.6 oz)   02/07/25 95.9 kg (211 lb 6.4 oz)   01/02/25 91.2 kg (201 lb)      on admission  5 pound weight gain since admission, 13 pound weight gain since January  Echo 12/18/2024 shows LVEF 45%, systolic function mildly reduced, moderate solid fixed thrombus in the apex  Lower extremity edema present  Patient  appears mildly volume overloaded likely in the setting of sepsis resuscitation  Will give Lasix 20 mg IV    24 Hour Events : No overnight events  Subjective : Patient seen and examined at bedside.  Found resting comfortably in bed.  Oxygen saturation stable on 2 L.  He does not appear in any respiratory distress at this time.  Denies any fever chills night sweats chest pain or hemoptysis    Objective :  Temp:  [97 °F (36.1 °C)-97.5 °F (36.4 °C)] 97.3 °F (36.3 °C)  HR:  [55-71] 71  BP: (138-178)/() 157/91  Resp:  [20] 20  SpO2:  [88 %-95 %] 90 %  O2 Device: CPAP  Nasal Cannula O2 Flow Rate (L/min):  [2 L/min-3 L/min] 3 L/min  FiO2 (%):  [32] 32    Physical Exam  Vitals reviewed.   Constitutional:       General: He is not in acute distress.     Appearance: He is not ill-appearing.   HENT:      Head: Normocephalic and atraumatic.      Right Ear: External ear normal.      Left Ear: External ear normal.      Mouth/Throat:      Mouth: Mucous membranes are moist.   Eyes:      Pupils: Pupils are equal, round, and reactive to light.   Cardiovascular:      Rate and Rhythm: Normal rate and regular rhythm.      Pulses: Normal pulses.      Heart sounds: Normal heart sounds.   Pulmonary:      Effort: Pulmonary effort is normal.      Breath sounds: No wheezing or rhonchi.      Comments: Decreased breath sounds on the left side otherwise CTA  Abdominal:      General: Bowel sounds are normal.   Musculoskeletal:         General: Normal range of motion.      Cervical back: Normal range of motion and neck supple.      Right lower leg: Edema present.      Left lower leg: Edema present.      Comments: Trace edema in bilateral lower extremities   Skin:     General: Skin is warm and dry.   Neurological:      Mental Status: He is alert and oriented to person, place, and time.   Psychiatric:         Mood and Affect: Mood normal.         Behavior: Behavior normal.         Thought Content: Thought content normal.         Judgment:  Judgment normal.         Lab Results: I have reviewed the following results:   .     03/10/25  1055 03/11/25  0544   WBC  --  10.39*   HGB  --  13.3   HCT  --  41.7   PLT  --  117*   SODIUM  --  138   K  --  3.8   CL  --  108   CO2  --  24   BUN  --  28*   CREATININE  --  0.98   GLUC  --  108   INR  --  5.61*   HSTNI2 8  --      ABG: No new results in last 24 hours.    Imaging Results Review: I reviewed radiology reports from this admission including: chest xray.  Chest x-ray 03/06/2025 shows increasing left basilar opacity in keeping with effusion and a component of pneumonia or atelectasis  Other Study Results Review: Other studies reviewed include: ECHO  Echo 12/18/2024 shows LVEF 45%, systolic function mildly reduced, moderate solid fixed thrombus in the apex  PFT Results Reviewed: No formal PFT's

## 2025-03-11 NOTE — CASE MANAGEMENT
Case Management Discharge Planning Note    Patient name Chuy Norton  Location /-01 MRN [id number]  : 1960 Date 3/11/2025       Current Admission Date: 3/5/2025  Current Admission Diagnosis:Acute respiratory failure with hypoxia (HCC)   Patient Active Problem List    Diagnosis Date Noted Date Diagnosed    Ischemic cardiomyopathy 2025     Pneumonia 2025     KENN (obstructive sleep apnea) 2025     Stroke-like symptoms 2024     Thrombocytopenia (HCC) 2024     Acute respiratory failure with hypoxia (HCC) 2024     PUD (peptic ulcer disease) 2024     Abnormal CXR 2024     Shortness of breath 2024     Hepatitis C 05/15/2024     Acute pain of right knee 2024     Acute left-sided low back pain without sciatica 2024     Left ventricular apical thrombus 05/10/2024     Cirrhosis (HCC) 2024     Iron deficiency anemia 2024     History of CVA (cerebrovascular accident) 2021     Acute metabolic encephalopathy 2021     SIRS (systemic inflammatory response syndrome) (HCC) 06/15/2021     Patellofemoral pain syndrome of right knee 2020     Elevated d-dimer 2019     Left hip pain 2019     Encounter for medical examination to establish care 10/10/2018     Bipolar 2 disorder, major depressive episode (HCC) 2018     Attention deficit hyperactivity disorder (ADHD), combined type 2018     Hyperlipidemia 2015     Knee pain 2015     Spinal stenosis of lumbar region 2015     Tobacco dependence syndrome 2015     Vitamin D deficiency 2015     Anxiety disorder 2015     Depressive disorder 2013     Low back pain 2013     Primary hypertension 10/18/2012     Alcohol dependence in remission (HCC) 2011       LOS (days): 6  Geometric Mean LOS (GMLOS) (days): 4.9  Days to GMLOS:-0.9     OBJECTIVE:  Risk of Unplanned Readmission Score: 37.43         Current  admission status: Inpatient   Preferred Pharmacy:   [facility] - [address]  [address]  [place] [address]  Phone: [phone] Fax: [phone]    Primary Care Provider: Savanna Pan DO    [facility]  Secondary Insurance: JOEL C[facility]WF[facility]    DISCHARGE DETAILS:     Pt resides on [address] [facility] at [facility] and would need to move to a different room if home O2 is needed. Home O2 eval to be completed tomorrow prior to discharge. CM spoke with Chuy at  and will need to know O2 needs asap.

## 2025-03-11 NOTE — ASSESSMENT & PLAN NOTE
Wt Readings from Last 3 Encounters:   03/10/25 97.2 kg (214 lb 4.6 oz)   02/07/25 95.9 kg (211 lb 6.4 oz)   01/02/25 91.2 kg (201 lb)      on admission  5 pound weight gain since admission, 13 pound weight gain since January  Echo 12/18/2024 shows LVEF 45%, systolic function mildly reduced, moderate solid fixed thrombus in the apex  Lower extremity edema present  Patient appears mildly volume overloaded likely in the setting of sepsis resuscitation  Will give Lasix 20 mg IV

## 2025-03-11 NOTE — PLAN OF CARE
Problem: Potential for Falls  Goal: Patient will remain free of falls  Description: INTERVENTIONS:  - Educate patient/family on patient safety including physical limitations  - Instruct patient to call for assistance with activity   - Consult OT/PT to assist with strengthening/mobility   - Keep Call bell within reach  - Keep bed low and locked with side rails adjusted as appropriate  - Keep care items and personal belongings within reach  - Initiate and maintain comfort rounds  - Make Fall Risk Sign visible to staff  - Offer Toileting every 2 Hours, in advance of need  - Initiate/Maintain bed alarm  - Obtain necessary fall risk management equipment: socks  - Apply yellow socks and bracelet for high fall risk patients  - Consider moving patient to room near nurses station  Outcome: Progressing     Problem: Prexisting or High Potential for Compromised Skin Integrity  Goal: Skin integrity is maintained or improved  Description: INTERVENTIONS:  - Identify patients at risk for skin breakdown  - Assess and monitor skin integrity  - Assess and monitor nutrition and hydration status  - Monitor labs   - Assess for incontinence   - Turn and reposition patient  - Assist with mobility/ambulation  - Relieve pressure over bony prominences  - Avoid friction and shearing  - Provide appropriate hygiene as needed including keeping skin clean and dry  - Evaluate need for skin moisturizer/barrier cream  - Collaborate with interdisciplinary team   - Patient/family teaching  - Consider wound care consult   Outcome: Progressing     Problem: PAIN - ADULT  Goal: Verbalizes/displays adequate comfort level or baseline comfort level  Description: Interventions:  - Encourage patient to monitor pain and request assistance  - Assess pain using appropriate pain scale  - Administer analgesics based on type and severity of pain and evaluate response  - Implement non-pharmacological measures as appropriate and evaluate response  - Consider cultural  and social influences on pain and pain management  - Notify physician/advanced practitioner if interventions unsuccessful or patient reports new pain  Outcome: Progressing     Problem: INFECTION - ADULT  Goal: Absence or prevention of progression during hospitalization  Description: INTERVENTIONS:  - Assess and monitor for signs and symptoms of infection  - Monitor lab/diagnostic results  - Monitor all insertion sites, i.e. indwelling lines, tubes, and drains  - Monitor endotracheal if appropriate and nasal secretions for changes in amount and color  - [place] appropriate cooling/warming therapies per order  - Administer medications as ordered  - Instruct and encourage patient and family to use good hand hygiene technique  - Identify and instruct in appropriate isolation precautions for identified infection/condition  Outcome: Progressing  Goal: Absence of fever/infection during neutropenic period  Description: INTERVENTIONS:  - Monitor WBC    Outcome: Progressing     Problem: SAFETY ADULT  Goal: Patient will remain free of falls  Description: INTERVENTIONS:  - Educate patient/family on patient safety including physical limitations  - Instruct patient to call for assistance with activity   - Consult OT/PT to assist with strengthening/mobility   - Keep Call bell within reach  - Keep bed low and locked with side rails adjusted as appropriate  - Keep care items and personal belongings within reach  - Initiate and maintain comfort rounds  - Make Fall Risk Sign visible to staff  - Offer Toileting every 2 Hours, in advance of need  - Initiate/Maintain bed alarm  - Obtain necessary fall risk management equipment: socks  - Apply yellow socks and bracelet for high fall risk patients  - Consider moving patient to room near nurses station  Outcome: Progressing  Goal: Maintain or return to baseline ADL function  Description: INTERVENTIONS:  -  Assess patient's ability to carry out ADLs; assess patient's baseline for ADL  function and identify physical deficits which impact ability to perform ADLs (bathing, care of mouth/teeth, toileting, grooming, dressing, etc.)  - Assess/evaluate cause of self-care deficits   - Assess range of motion  - Assess patient's mobility; develop plan if impaired  - Assess patient's need for assistive devices and provide as appropriate  - Encourage maximum independence but intervene and supervise when necessary  - Involve family in performance of ADLs  - Assess for home care needs following discharge   - Consider OT consult to assist with ADL evaluation and planning for discharge  - Provide patient education as appropriate  Outcome: Progressing  Goal: Maintains/Returns to pre admission functional level  Description: INTERVENTIONS:  - Perform AM-PAC 6 Click Basic Mobility/ Daily Activity assessment daily.  - Set and communicate daily mobility goal to care team and patient/family/caregiver.   - Collaborate with rehabilitation services on mobility goals if consulted  - Perform Range of Motion 3 times a day.  - Reposition patient every 2 hours.  - Dangle patient 3 times a day  - Stand patient 3 times a day  - Ambulate patient 3 times a day  - Out of bed to chair 3 times a day   - Out of bed for meals 3 times a day  - Out of bed for toileting  - Record patient progress and toleration of activity level   Outcome: Progressing     Problem: DISCHARGE PLANNING  Goal: Discharge to home or other facility with appropriate resources  Description: INTERVENTIONS:  - Identify barriers to discharge w/patient and caregiver  - Arrange for needed discharge resources and transportation as appropriate  - Identify discharge learning needs (meds, wound care, etc.)  - Arrange for interpretive services to assist at discharge as needed  - Refer to Case Management Department for coordinating discharge planning if the patient needs post-hospital services based on physician/advanced practitioner order or complex needs related to  functional status, cognitive ability, or social support system  Outcome: Progressing     Problem: Knowledge Deficit  Goal: Patient/family/caregiver demonstrates understanding of disease process, treatment plan, medications, and discharge instructions  Description: Complete learning assessment and assess knowledge base.  Interventions:  - Provide teaching at level of understanding  - Provide teaching via preferred learning methods  Outcome: Progressing     Problem: CARDIOVASCULAR - ADULT  Goal: Maintains optimal cardiac output and hemodynamic stability  Description: INTERVENTIONS:  - Monitor I/O, vital signs and rhythm  - Monitor for S/S and trends of decreased cardiac output  - Administer and titrate ordered vasoactive medications to optimize hemodynamic stability  - Assess quality of pulses, skin color and temperature  - Assess for signs of decreased coronary artery perfusion  - Instruct patient to report change in severity of symptoms  Outcome: Progressing  Goal: Absence of cardiac dysrhythmias or at baseline rhythm  Description: INTERVENTIONS:  - Continuous cardiac monitoring, vital signs, obtain 12 lead EKG if ordered  - Administer antiarrhythmic and heart rate control medications as ordered  - Monitor electrolytes and administer replacement therapy as ordered  Outcome: Progressing     Problem: RESPIRATORY - ADULT  Goal: Achieves optimal ventilation and oxygenation  Description: INTERVENTIONS:  - Assess for changes in respiratory status  - Assess for changes in mentation and behavior  - Position to facilitate oxygenation and minimize respiratory effort  - Oxygen administered by appropriate delivery if ordered  - Initiate smoking cessation education as indicated  - Encourage broncho-pulmonary hygiene including cough, deep breathe, Incentive Spirometry  - Assess the need for suctioning and aspirate as needed  - Assess and instruct to report SOB or any respiratory difficulty  - Respiratory Therapy support as  indicated  Outcome: Progressing     Problem: METABOLIC, FLUID AND ELECTROLYTES - ADULT  Goal: Electrolytes maintained within normal limits  Description: INTERVENTIONS:  - Monitor labs and assess patient for signs and symptoms of electrolyte imbalances  - Administer electrolyte replacement as ordered  - Monitor response to electrolyte replacements, including repeat lab results as appropriate  - Instruct patient on fluid and nutrition as appropriate  Outcome: Progressing  Goal: Fluid balance maintained  Description: INTERVENTIONS:  - Monitor labs   - Monitor I/O and WT  - Instruct patient on fluid and nutrition as appropriate  - Assess for signs & symptoms of volume excess or deficit  Outcome: Progressing  Goal: Glucose maintained within target range  Description: INTERVENTIONS:  - Monitor Blood Glucose as ordered  - Assess for signs and symptoms of hyperglycemia and hypoglycemia  - Administer ordered medications to maintain glucose within target range  - Assess nutritional intake and initiate nutrition service referral as needed  Outcome: Progressing

## 2025-03-11 NOTE — OCCUPATIONAL THERAPY NOTE
Occupational Therapy Cancel Note        Patient Name: Chuy Norton  Today's Date: 3/11/2025       03/11/25 0903   OT Last Visit   OT Visit Date 03/11/25   Note Type   Note type Cancelled Session   Cancel Reasons Medical status   Additional Comments OT orders received and chart review completed. Pt with elevated INR (5.61). Will hold OT evaluation and f/u as appropriate and able.       Magaly Dc, OTR/L

## 2025-03-11 NOTE — ASSESSMENT & PLAN NOTE
Recent Labs     03/09/25  0348 03/10/25  0838 03/11/25  0544   INR 3.89* 4.98* 5.61*      Outpatient: 2.5mg Warfarin (Sun, Tues, Wed, Fri) and 5mg (Mon, Thurs, Sat)  INR as of today, supratherapeutic , will hold coumadin. No bleeding noted, HB stable   Monitor INR a.m.

## 2025-03-12 ENCOUNTER — APPOINTMENT (INPATIENT)
Dept: RADIOLOGY | Facility: HOSPITAL | Age: 65
DRG: 871 | End: 2025-03-12
Payer: COMMERCIAL

## 2025-03-12 ENCOUNTER — PATIENT OUTREACH (OUTPATIENT)
Dept: PULMONOLOGY | Facility: CLINIC | Age: 65
End: 2025-03-12

## 2025-03-12 ENCOUNTER — APPOINTMENT (INPATIENT)
Dept: CT IMAGING | Facility: HOSPITAL | Age: 65
DRG: 871 | End: 2025-03-12
Attending: STUDENT IN AN ORGANIZED HEALTH CARE EDUCATION/TRAINING PROGRAM
Payer: COMMERCIAL

## 2025-03-12 DIAGNOSIS — F17.200 TOBACCO DEPENDENCE SYNDROME: Primary | ICD-10-CM

## 2025-03-12 PROBLEM — J90 PLEURAL EFFUSION: Status: ACTIVE | Noted: 2025-03-12

## 2025-03-12 LAB
ABO GROUP BLD: NORMAL
ALBUMIN SERPL BCG-MCNC: 2.9 G/DL (ref 3.5–5)
ALP SERPL-CCNC: 38 U/L (ref 34–104)
ALT SERPL W P-5'-P-CCNC: 13 U/L (ref 7–52)
ANION GAP SERPL CALCULATED.3IONS-SCNC: 7 MMOL/L (ref 4–13)
AST SERPL W P-5'-P-CCNC: 12 U/L (ref 13–39)
BILIRUB SERPL-MCNC: 0.36 MG/DL (ref 0.2–1)
BLD GP AB SCN SERPL QL: NEGATIVE
BUN SERPL-MCNC: 30 MG/DL (ref 5–25)
CALCIUM ALBUM COR SERPL-MCNC: 8.7 MG/DL (ref 8.3–10.1)
CALCIUM SERPL-MCNC: 7.8 MG/DL (ref 8.4–10.2)
CHLORIDE SERPL-SCNC: 107 MMOL/L (ref 96–108)
CO2 SERPL-SCNC: 22 MMOL/L (ref 21–32)
CREAT SERPL-MCNC: 1.1 MG/DL (ref 0.6–1.3)
ERYTHROCYTE [DISTWIDTH] IN BLOOD BY AUTOMATED COUNT: 20.4 % (ref 11.6–15.1)
GFR SERPL CREATININE-BSD FRML MDRD: 71 ML/MIN/1.73SQ M
GLUCOSE SERPL-MCNC: 109 MG/DL (ref 65–140)
GLUCOSE SERPL-MCNC: 136 MG/DL (ref 65–140)
GLUCOSE SERPL-MCNC: 159 MG/DL (ref 65–140)
GLUCOSE SERPL-MCNC: 173 MG/DL (ref 65–140)
GLUCOSE SERPL-MCNC: 180 MG/DL (ref 65–140)
HCT VFR BLD AUTO: 41.1 % (ref 36.5–49.3)
HGB BLD-MCNC: 13.4 G/DL (ref 12–17)
INR PPP: 5.88 (ref 0.85–1.19)
MCH RBC QN AUTO: 29.7 PG (ref 26.8–34.3)
MCHC RBC AUTO-ENTMCNC: 32.6 G/DL (ref 31.4–37.4)
MCV RBC AUTO: 91 FL (ref 82–98)
PLATELET # BLD AUTO: 136 THOUSANDS/UL (ref 149–390)
POTASSIUM SERPL-SCNC: 4.2 MMOL/L (ref 3.5–5.3)
PROT SERPL-MCNC: 5.7 G/DL (ref 6.4–8.4)
PROTHROMBIN TIME: 51.9 SECONDS (ref 12.3–15)
RBC # BLD AUTO: 4.51 MILLION/UL (ref 3.88–5.62)
RH BLD: POSITIVE
SODIUM SERPL-SCNC: 136 MMOL/L (ref 135–147)
SPECIMEN EXPIRATION DATE: NORMAL
WBC # BLD AUTO: 14.98 THOUSAND/UL (ref 4.31–10.16)

## 2025-03-12 PROCEDURE — 94660 CPAP INITIATION&MGMT: CPT

## 2025-03-12 PROCEDURE — 87070 CULTURE OTHR SPECIMN AEROBIC: CPT | Performed by: STUDENT IN AN ORGANIZED HEALTH CARE EDUCATION/TRAINING PROGRAM

## 2025-03-12 PROCEDURE — 99232 SBSQ HOSP IP/OBS MODERATE 35: CPT | Performed by: INTERNAL MEDICINE

## 2025-03-12 PROCEDURE — 32557 INSERT CATH PLEURA W/ IMAGE: CPT | Performed by: STUDENT IN AN ORGANIZED HEALTH CARE EDUCATION/TRAINING PROGRAM

## 2025-03-12 PROCEDURE — 86901 BLOOD TYPING SEROLOGIC RH(D): CPT | Performed by: STUDENT IN AN ORGANIZED HEALTH CARE EDUCATION/TRAINING PROGRAM

## 2025-03-12 PROCEDURE — 0W9B30Z DRAINAGE OF LEFT PLEURAL CAVITY WITH DRAINAGE DEVICE, PERCUTANEOUS APPROACH: ICD-10-PCS | Performed by: INTERNAL MEDICINE

## 2025-03-12 PROCEDURE — 71250 CT THORAX DX C-: CPT

## 2025-03-12 PROCEDURE — 94640 AIRWAY INHALATION TREATMENT: CPT

## 2025-03-12 PROCEDURE — 86900 BLOOD TYPING SEROLOGIC ABO: CPT | Performed by: STUDENT IN AN ORGANIZED HEALTH CARE EDUCATION/TRAINING PROGRAM

## 2025-03-12 PROCEDURE — 71045 X-RAY EXAM CHEST 1 VIEW: CPT

## 2025-03-12 PROCEDURE — NC001 PR NO CHARGE: Performed by: STUDENT IN AN ORGANIZED HEALTH CARE EDUCATION/TRAINING PROGRAM

## 2025-03-12 PROCEDURE — 99233 SBSQ HOSP IP/OBS HIGH 50: CPT | Performed by: STUDENT IN AN ORGANIZED HEALTH CARE EDUCATION/TRAINING PROGRAM

## 2025-03-12 PROCEDURE — 85610 PROTHROMBIN TIME: CPT | Performed by: INTERNAL MEDICINE

## 2025-03-12 PROCEDURE — 85027 COMPLETE CBC AUTOMATED: CPT | Performed by: INTERNAL MEDICINE

## 2025-03-12 PROCEDURE — 86850 RBC ANTIBODY SCREEN: CPT | Performed by: STUDENT IN AN ORGANIZED HEALTH CARE EDUCATION/TRAINING PROGRAM

## 2025-03-12 PROCEDURE — P9017 PLASMA 1 DONOR FRZ W/IN 8 HR: HCPCS

## 2025-03-12 PROCEDURE — 87205 SMEAR GRAM STAIN: CPT | Performed by: STUDENT IN AN ORGANIZED HEALTH CARE EDUCATION/TRAINING PROGRAM

## 2025-03-12 PROCEDURE — 82948 REAGENT STRIP/BLOOD GLUCOSE: CPT

## 2025-03-12 PROCEDURE — 30233L1 TRANSFUSION OF NONAUTOLOGOUS FRESH PLASMA INTO PERIPHERAL VEIN, PERCUTANEOUS APPROACH: ICD-10-PCS | Performed by: INTERNAL MEDICINE

## 2025-03-12 PROCEDURE — 80053 COMPREHEN METABOLIC PANEL: CPT | Performed by: INTERNAL MEDICINE

## 2025-03-12 PROCEDURE — 94760 N-INVAS EAR/PLS OXIMETRY 1: CPT

## 2025-03-12 RX ORDER — LIDOCAINE HYDROCHLORIDE 10 MG/ML
10 INJECTION, SOLUTION EPIDURAL; INFILTRATION; INTRACAUDAL; PERINEURAL ONCE
Status: COMPLETED | OUTPATIENT
Start: 2025-03-12 | End: 2025-03-12

## 2025-03-12 RX ORDER — TRAMADOL HYDROCHLORIDE 50 MG/1
50 TABLET ORAL ONCE
Status: COMPLETED | OUTPATIENT
Start: 2025-03-12 | End: 2025-03-12

## 2025-03-12 RX ORDER — LIDOCAINE 50 MG/G
1 PATCH TOPICAL DAILY
Status: DISCONTINUED | OUTPATIENT
Start: 2025-03-12 | End: 2025-03-22 | Stop reason: HOSPADM

## 2025-03-12 RX ORDER — CEFTRIAXONE 1 G/50ML
1000 INJECTION, SOLUTION INTRAVENOUS EVERY 24 HOURS
Status: COMPLETED | OUTPATIENT
Start: 2025-03-12 | End: 2025-03-12

## 2025-03-12 RX ADMIN — LEVALBUTEROL HYDROCHLORIDE 1.25 MG: 1.25 SOLUTION RESPIRATORY (INHALATION) at 20:07

## 2025-03-12 RX ADMIN — LIDOCAINE HYDROCHLORIDE 10 ML: 10 INJECTION, SOLUTION EPIDURAL; INFILTRATION; INTRACAUDAL; PERINEURAL at 14:14

## 2025-03-12 RX ADMIN — CEFTRIAXONE 1000 MG: 1 INJECTION, SOLUTION INTRAVENOUS at 17:05

## 2025-03-12 RX ADMIN — ATORVASTATIN CALCIUM 40 MG: 40 TABLET, FILM COATED ORAL at 17:05

## 2025-03-12 RX ADMIN — TRAZODONE HYDROCHLORIDE 100 MG: 100 TABLET ORAL at 21:26

## 2025-03-12 RX ADMIN — CHLORHEXIDINE GLUCONATE 15 ML: 1.2 RINSE ORAL at 08:39

## 2025-03-12 RX ADMIN — SENNOSIDES AND DOCUSATE SODIUM 1 TABLET: 50; 8.6 TABLET ORAL at 17:05

## 2025-03-12 RX ADMIN — IPRATROPIUM BROMIDE 0.5 MG: 0.5 SOLUTION RESPIRATORY (INHALATION) at 08:02

## 2025-03-12 RX ADMIN — MORPHINE SULFATE 2 MG: 2 INJECTION, SOLUTION INTRAMUSCULAR; INTRAVENOUS at 13:41

## 2025-03-12 RX ADMIN — ESCITALOPRAM OXALATE 20 MG: 20 TABLET ORAL at 08:39

## 2025-03-12 RX ADMIN — PANTOPRAZOLE SODIUM 40 MG: 40 TABLET, DELAYED RELEASE ORAL at 17:05

## 2025-03-12 RX ADMIN — VANCOMYCIN HYDROCHLORIDE 1000 MG: 1 INJECTION, SOLUTION INTRAVENOUS at 01:37

## 2025-03-12 RX ADMIN — TRAMADOL HYDROCHLORIDE 50 MG: 50 TABLET, COATED ORAL at 09:21

## 2025-03-12 RX ADMIN — CARVEDILOL 25 MG: 25 TABLET, FILM COATED ORAL at 08:43

## 2025-03-12 RX ADMIN — PANTOPRAZOLE SODIUM 40 MG: 40 TABLET, DELAYED RELEASE ORAL at 11:41

## 2025-03-12 RX ADMIN — IPRATROPIUM BROMIDE 0.5 MG: 0.5 SOLUTION RESPIRATORY (INHALATION) at 20:07

## 2025-03-12 RX ADMIN — ACETAMINOPHEN 650 MG: 325 TABLET, FILM COATED ORAL at 06:16

## 2025-03-12 RX ADMIN — CARVEDILOL 25 MG: 25 TABLET, FILM COATED ORAL at 17:05

## 2025-03-12 RX ADMIN — QUETIAPINE 300 MG: 200 TABLET ORAL at 21:26

## 2025-03-12 RX ADMIN — VANCOMYCIN HYDROCHLORIDE 1000 MG: 1 INJECTION, SOLUTION INTRAVENOUS at 11:41

## 2025-03-12 RX ADMIN — SENNOSIDES AND DOCUSATE SODIUM 1 TABLET: 50; 8.6 TABLET ORAL at 08:39

## 2025-03-12 RX ADMIN — NICOTINE 1 PATCH: 14 PATCH, EXTENDED RELEASE TRANSDERMAL at 08:43

## 2025-03-12 RX ADMIN — PREDNISONE 40 MG: 20 TABLET ORAL at 08:39

## 2025-03-12 RX ADMIN — MORPHINE SULFATE 2 MG: 2 INJECTION, SOLUTION INTRAMUSCULAR; INTRAVENOUS at 14:46

## 2025-03-12 RX ADMIN — LEVALBUTEROL HYDROCHLORIDE 1.25 MG: 1.25 SOLUTION RESPIRATORY (INHALATION) at 08:02

## 2025-03-12 RX ADMIN — CHLORHEXIDINE GLUCONATE 15 ML: 1.2 RINSE ORAL at 21:26

## 2025-03-12 RX ADMIN — LOSARTAN POTASSIUM 100 MG: 50 TABLET, FILM COATED ORAL at 08:43

## 2025-03-12 RX ADMIN — INSULIN LISPRO 1 UNITS: 100 INJECTION, SOLUTION INTRAVENOUS; SUBCUTANEOUS at 17:06

## 2025-03-12 RX ADMIN — INSULIN LISPRO 1 UNITS: 100 INJECTION, SOLUTION INTRAVENOUS; SUBCUTANEOUS at 11:41

## 2025-03-12 RX ADMIN — POLYETHYLENE GLYCOL 3350 17 G: 17 POWDER, FOR SOLUTION ORAL at 08:39

## 2025-03-12 NOTE — PLAN OF CARE
Problem: Potential for Falls  Goal: Patient will remain free of falls  Description: INTERVENTIONS:  - Educate patient/family on patient safety including physical limitations  - Instruct patient to call for assistance with activity   - Consult OT/PT to assist with strengthening/mobility   - Keep Call bell within reach  - Keep bed low and locked with side rails adjusted as appropriate  - Keep care items and personal belongings within reach  - Initiate and maintain comfort rounds  - Make Fall Risk Sign visible to staff  - Offer Toileting every 2 Hours, in advance of need  - Initiate/Maintain bed alarm  - Obtain necessary fall risk management equipment: socks  - Apply yellow socks and bracelet for high fall risk patients  - Consider moving patient to room near nurses station  Outcome: Progressing     Problem: Prexisting or High Potential for Compromised Skin Integrity  Goal: Skin integrity is maintained or improved  Description: INTERVENTIONS:  - Identify patients at risk for skin breakdown  - Assess and monitor skin integrity  - Assess and monitor nutrition and hydration status  - Monitor labs   - Assess for incontinence   - Turn and reposition patient  - Assist with mobility/ambulation  - Relieve pressure over bony prominences  - Avoid friction and shearing  - Provide appropriate hygiene as needed including keeping skin clean and dry  - Evaluate need for skin moisturizer/barrier cream  - Collaborate with interdisciplinary team   - Patient/family teaching  - Consider wound care consult   Outcome: Progressing     Problem: PAIN - ADULT  Goal: Verbalizes/displays adequate comfort level or baseline comfort level  Description: Interventions:  - Encourage patient to monitor pain and request assistance  - Assess pain using appropriate pain scale  - Administer analgesics based on type and severity of pain and evaluate response  - Implement non-pharmacological measures as appropriate and evaluate response  - Consider cultural  and social influences on pain and pain management  - Notify physician/advanced practitioner if interventions unsuccessful or patient reports new pain  Outcome: Progressing     Problem: INFECTION - ADULT  Goal: Absence or prevention of progression during hospitalization  Description: INTERVENTIONS:  - Assess and monitor for signs and symptoms of infection  - Monitor lab/diagnostic results  - Monitor all insertion sites, i.e. indwelling lines, tubes, and drains  - Monitor endotracheal if appropriate and nasal secretions for changes in amount and color  - [place] appropriate cooling/warming therapies per order  - Administer medications as ordered  - Instruct and encourage patient and family to use good hand hygiene technique  - Identify and instruct in appropriate isolation precautions for identified infection/condition  Outcome: Progressing  Goal: Absence of fever/infection during neutropenic period  Description: INTERVENTIONS:  - Monitor WBC    Outcome: Progressing     Problem: SAFETY ADULT  Goal: Patient will remain free of falls  Description: INTERVENTIONS:  - Educate patient/family on patient safety including physical limitations  - Instruct patient to call for assistance with activity   - Consult OT/PT to assist with strengthening/mobility   - Keep Call bell within reach  - Keep bed low and locked with side rails adjusted as appropriate  - Keep care items and personal belongings within reach  - Initiate and maintain comfort rounds  - Make Fall Risk Sign visible to staff  - Offer Toileting every 2 Hours, in advance of need  - Initiate/Maintain bed alarm  - Obtain necessary fall risk management equipment: socks  - Apply yellow socks and bracelet for high fall risk patients  - Consider moving patient to room near nurses station  Outcome: Progressing  Goal: Maintain or return to baseline ADL function  Description: INTERVENTIONS:  -  Assess patient's ability to carry out ADLs; assess patient's baseline for ADL  function and identify physical deficits which impact ability to perform ADLs (bathing, care of mouth/teeth, toileting, grooming, dressing, etc.)  - Assess/evaluate cause of self-care deficits   - Assess range of motion  - Assess patient's mobility; develop plan if impaired  - Assess patient's need for assistive devices and provide as appropriate  - Encourage maximum independence but intervene and supervise when necessary  - Involve family in performance of ADLs  - Assess for home care needs following discharge   - Consider OT consult to assist with ADL evaluation and planning for discharge  - Provide patient education as appropriate  Outcome: Progressing  Goal: Maintains/Returns to pre admission functional level  Description: INTERVENTIONS:  - Perform AM-PAC 6 Click Basic Mobility/ Daily Activity assessment daily.  - Set and communicate daily mobility goal to care team and patient/family/caregiver.   - Collaborate with rehabilitation services on mobility goals if consulted  - Perform Range of Motion 3 times a day.  - Reposition patient every 2 hours.  - Dangle patient 3 times a day  - Stand patient 3 times a day  - Ambulate patient 3 times a day  - Out of bed to chair 3 times a day   - Out of bed for meals 3 times a day  - Out of bed for toileting  - Record patient progress and toleration of activity level   Outcome: Progressing     Problem: DISCHARGE PLANNING  Goal: Discharge to home or other facility with appropriate resources  Description: INTERVENTIONS:  - Identify barriers to discharge w/patient and caregiver  - Arrange for needed discharge resources and transportation as appropriate  - Identify discharge learning needs (meds, wound care, etc.)  - Arrange for interpretive services to assist at discharge as needed  - Refer to Case Management Department for coordinating discharge planning if the patient needs post-hospital services based on physician/advanced practitioner order or complex needs related to  functional status, cognitive ability, or social support system  Outcome: Progressing     Problem: Knowledge Deficit  Goal: Patient/family/caregiver demonstrates understanding of disease process, treatment plan, medications, and discharge instructions  Description: Complete learning assessment and assess knowledge base.  Interventions:  - Provide teaching at level of understanding  - Provide teaching via preferred learning methods  Outcome: Progressing     Problem: CARDIOVASCULAR - ADULT  Goal: Maintains optimal cardiac output and hemodynamic stability  Description: INTERVENTIONS:  - Monitor I/O, vital signs and rhythm  - Monitor for S/S and trends of decreased cardiac output  - Administer and titrate ordered vasoactive medications to optimize hemodynamic stability  - Assess quality of pulses, skin color and temperature  - Assess for signs of decreased coronary artery perfusion  - Instruct patient to report change in severity of symptoms  Outcome: Progressing  Goal: Absence of cardiac dysrhythmias or at baseline rhythm  Description: INTERVENTIONS:  - Continuous cardiac monitoring, vital signs, obtain 12 lead EKG if ordered  - Administer antiarrhythmic and heart rate control medications as ordered  - Monitor electrolytes and administer replacement therapy as ordered  Outcome: Progressing     Problem: RESPIRATORY - ADULT  Goal: Achieves optimal ventilation and oxygenation  Description: INTERVENTIONS:  - Assess for changes in respiratory status  - Assess for changes in mentation and behavior  - Position to facilitate oxygenation and minimize respiratory effort  - Oxygen administered by appropriate delivery if ordered  - Initiate smoking cessation education as indicated  - Encourage broncho-pulmonary hygiene including cough, deep breathe, Incentive Spirometry  - Assess the need for suctioning and aspirate as needed  - Assess and instruct to report SOB or any respiratory difficulty  - Respiratory Therapy support as  indicated  Outcome: Progressing     Problem: METABOLIC, FLUID AND ELECTROLYTES - ADULT  Goal: Electrolytes maintained within normal limits  Description: INTERVENTIONS:  - Monitor labs and assess patient for signs and symptoms of electrolyte imbalances  - Administer electrolyte replacement as ordered  - Monitor response to electrolyte replacements, including repeat lab results as appropriate  - Instruct patient on fluid and nutrition as appropriate  Outcome: Progressing  Goal: Fluid balance maintained  Description: INTERVENTIONS:  - Monitor labs   - Monitor I/O and WT  - Instruct patient on fluid and nutrition as appropriate  - Assess for signs & symptoms of volume excess or deficit  Outcome: Progressing  Goal: Glucose maintained within target range  Description: INTERVENTIONS:  - Monitor Blood Glucose as ordered  - Assess for signs and symptoms of hyperglycemia and hypoglycemia  - Administer ordered medications to maintain glucose within target range  - Assess nutritional intake and initiate nutrition service referral as needed  Outcome: Progressing

## 2025-03-12 NOTE — ASSESSMENT & PLAN NOTE
Wt Readings from Last 3 Encounters:   03/10/25 97.2 kg (214 lb 4.6 oz)   02/07/25 95.9 kg (211 lb 6.4 oz)   01/02/25 91.2 kg (201 lb)      on admission  5 pound weight gain since admission, 13 pound weight gain since January  Echo 12/18/2024 shows LVEF 45%, systolic function mildly reduced, moderate solid fixed thrombus in the apex  Lower extremity edema present  Patient appears mildly volume overloaded likely in the setting of sepsis resuscitation  Given IV Lasix 20 mg yesterday

## 2025-03-12 NOTE — OCCUPATIONAL THERAPY NOTE
Occupational Therapy Cancel Note        Patient Name: Chuy Norton  Today's Date: 3/12/2025         03/12/25 0842   OT Last Visit   OT Visit Date 03/12/25   Note Type   Note type Cancelled Session   Cancel Reasons Medical status   Additional Comments Pt continues w/ elevated INR 5.88.  Per MD Lamb, will hold on OT evaluation at this time. Will continue to f/u.       Magaly Dc, OTR/L

## 2025-03-12 NOTE — PROGRESS NOTES
Progress Note - Pulmonology   Name: Chuy Norton 64 y.o. male I MRN: [id number]  Unit/Bed#: -01 I Date of Admission: 3/5/2025   Date of Service: 3/12/2025 I Hospital Day: 7    Assessment & Plan  Acute respiratory failure with hypoxia (HCC)  Initially requiring 4L in ED, however had worsening respiratory status and placed on BiPAP. Continue on BiPAP as needed and qhs - but patient is refusing   Worsening hypoxia today - RT called for patient to go on midflow  Chest x-ray ordered - compared to yesterday there has been significantly increased opacity in the left chest felt to mostly be due to large potentially partially loculated pleural effusion   Will discuss w Dr. Mccormack regarding need for procedural interventions  Titrate oxygen to maintain saturations >89%  Recommend pulmonary hygiene: Deep breathing with cough, OOB as tolerated, incentive spirometry and flutter valve  Will need home O2 eval prior to discharge   Pneumonia    MRSA culture positive.   Completed 3 days of azithromycin  Ceftriaxone - completed 7 days  Vancomycin day #5-complete 7-day course  Continue atrovent/xopenex nebs TID  Speech cleared the patient for regular diet and thin liquids  Currently on prednisone 40 mg to be completed for 5 days  Will need outpatient follow-up imaging and PFTs as well as smoking cessation support  Repeat imaging in 4 to 6 weeks  KENN (obstructive sleep apnea)  Maintained on CPAP at home  Continue BiPAP qhs for now (as patient tolerates)  Left ventricular apical thrombus  Maintained on coumadin  Ischemic cardiomyopathy  Wt Readings from Last 3 Encounters:   03/10/25 97.2 kg (214 lb 4.6 oz)   02/07/25 95.9 kg (211 lb 6.4 oz)   01/02/25 91.2 kg (201 lb)      on admission  5 pound weight gain since admission, 13 pound weight gain since January  Echo 12/18/2024 shows LVEF 45%, systolic function mildly reduced, moderate solid fixed thrombus in the apex  Lower extremity edema present  Patient appears mildly volume  overloaded likely in the setting of sepsis resuscitation  Given IV Lasix 20 mg yesterday    Bipolar 2 disorder, major depressive episode (HCC)    Primary hypertension    Cirrhosis (HCC)      Discussed with [facility] and nursing    24 Hour Events : repeat cxr done today for worsening hypoxia  Subjective : Patient says his breathing feels worse and more painful today. Has pain in L upper abdomen when he takes a deep breath in. A bit of a cough which is nonproductive. Getting up to 1000 on IS.     Objective :  Temp:  [97 °F (36.1 °C)-97.3 °F (36.3 °C)] 97 °F (36.1 °C)  HR:  [62-82] 62  BP: (136-144)/(65-89) 142/89  Resp:  [18] 18  SpO2:  [87 %-91 %] 91 %  O2 Device: Nasal cannula  Nasal Cannula O2 Flow Rate (L/min):  [3 L/min-5 L/min] 5 L/min  FiO2 (%):  [6-36] 6    Physical Exam  General appearance: Alert and awake, in no acute distress  Head: Normocephalic, without obvious abnormality, atraumatic  Eyes: No scleral icterus   Lungs: Decreased breath sounds  Heart: Regular rate  Abdomen:  No appreciable distension or tenderness  Extremities: No deformity  Skin: Warm and dry  Neurologic: No acute focal deficits are noted        Lab Results: I have reviewed the following results:   .     03/12/25  0137   WBC 14.98*   HGB 13.4   HCT 41.1   *   SODIUM 136   K 4.2      CO2 22   BUN 30*   CREATININE 1.10   GLUC 173*   AST 12*   ALT 13   ALB 2.9*   TBILI 0.36   ALKPHOS 38   INR 5.88*     ABG: No new results in last 24 hours.    Imaging Results Review: I reviewed radiology reports from this admission including: chest xray.  Other Study Results Review: No additional pertinent studies reviewed.  PFT Results Reviewed: na

## 2025-03-12 NOTE — ASSESSMENT & PLAN NOTE
Required higher oxygen requirements this morning.  Chest x-ray showed significantly increased opacity in the left chest felt to be mostly due to large potentially partially loculated pleural effusion.  CT chest 3/12 showed loculated pleural effusion pockets in the left apex posteriorly and in the major fissure.  Small free effusion in the left lung base.  Compressive atelectasis of the left lower lung with focal hypodensity and cystic changes suggesting parenchymal necrosis  Discussed with pulmonology.  Plan for 1 FFP today given high INR and chest tube placement.

## 2025-03-12 NOTE — PLAN OF CARE
Problem: Potential for Falls  Goal: Patient will remain free of falls  Description: INTERVENTIONS:  - Educate patient/family on patient safety including physical limitations  - Instruct patient to call for assistance with activity   - Consult OT/PT to assist with strengthening/mobility   - Keep Call bell within reach  - Keep bed low and locked with side rails adjusted as appropriate  - Keep care items and personal belongings within reach  - Initiate and maintain comfort rounds  - Make Fall Risk Sign visible to staff  - Offer Toileting every 2 Hours, in advance of need  - Initiate/Maintain bed alarm  - Obtain necessary fall risk management equipment: socks  - Apply yellow socks and bracelet for high fall risk patients  - Consider moving patient to room near nurses station  Outcome: Progressing     Problem: Prexisting or High Potential for Compromised Skin Integrity  Goal: Skin integrity is maintained or improved  Description: INTERVENTIONS:  - Identify patients at risk for skin breakdown  - Assess and monitor skin integrity  - Assess and monitor nutrition and hydration status  - Monitor labs   - Assess for incontinence   - Turn and reposition patient  - Assist with mobility/ambulation  - Relieve pressure over bony prominences  - Avoid friction and shearing  - Provide appropriate hygiene as needed including keeping skin clean and dry  - Evaluate need for skin moisturizer/barrier cream  - Collaborate with interdisciplinary team   - Patient/family teaching  - Consider wound care consult   Outcome: Progressing     Problem: PAIN - ADULT  Goal: Verbalizes/displays adequate comfort level or baseline comfort level  Description: Interventions:  - Encourage patient to monitor pain and request assistance  - Assess pain using appropriate pain scale  - Administer analgesics based on type and severity of pain and evaluate response  - Implement non-pharmacological measures as appropriate and evaluate response  - Consider cultural  and social influences on pain and pain management  - Notify physician/advanced practitioner if interventions unsuccessful or patient reports new pain  Outcome: Progressing     Problem: INFECTION - ADULT  Goal: Absence or prevention of progression during hospitalization  Description: INTERVENTIONS:  - Assess and monitor for signs and symptoms of infection  - Monitor lab/diagnostic results  - Monitor all insertion sites, i.e. indwelling lines, tubes, and drains  - Monitor endotracheal if appropriate and nasal secretions for changes in amount and color  - [place] appropriate cooling/warming therapies per order  - Administer medications as ordered  - Instruct and encourage patient and family to use good hand hygiene technique  - Identify and instruct in appropriate isolation precautions for identified infection/condition  Outcome: Progressing  Goal: Absence of fever/infection during neutropenic period  Description: INTERVENTIONS:  - Monitor WBC    Outcome: Progressing     Problem: SAFETY ADULT  Goal: Patient will remain free of falls  Description: INTERVENTIONS:  - Educate patient/family on patient safety including physical limitations  - Instruct patient to call for assistance with activity   - Consult OT/PT to assist with strengthening/mobility   - Keep Call bell within reach  - Keep bed low and locked with side rails adjusted as appropriate  - Keep care items and personal belongings within reach  - Initiate and maintain comfort rounds  - Make Fall Risk Sign visible to staff  - Offer Toileting every 2 Hours, in advance of need  - Initiate/Maintain bed alarm  - Obtain necessary fall risk management equipment: socks  - Apply yellow socks and bracelet for high fall risk patients  - Consider moving patient to room near nurses station  Outcome: Progressing  Goal: Maintain or return to baseline ADL function  Description: INTERVENTIONS:  -  Assess patient's ability to carry out ADLs; assess patient's baseline for ADL  function and identify physical deficits which impact ability to perform ADLs (bathing, care of mouth/teeth, toileting, grooming, dressing, etc.)  - Assess/evaluate cause of self-care deficits   - Assess range of motion  - Assess patient's mobility; develop plan if impaired  - Assess patient's need for assistive devices and provide as appropriate  - Encourage maximum independence but intervene and supervise when necessary  - Involve family in performance of ADLs  - Assess for home care needs following discharge   - Consider OT consult to assist with ADL evaluation and planning for discharge  - Provide patient education as appropriate  Outcome: Progressing  Goal: Maintains/Returns to pre admission functional level  Description: INTERVENTIONS:  - Perform AM-PAC 6 Click Basic Mobility/ Daily Activity assessment daily.  - Set and communicate daily mobility goal to care team and patient/family/caregiver.   - Collaborate with rehabilitation services on mobility goals if consulted  - Perform Range of Motion 3 times a day.  - Reposition patient every 2 hours.  - Dangle patient 3 times a day  - Stand patient 3 times a day  - Ambulate patient 3 times a day  - Out of bed to chair 3 times a day   - Out of bed for meals 3 times a day  - Out of bed for toileting  - Record patient progress and toleration of activity level   Outcome: Progressing     Problem: DISCHARGE PLANNING  Goal: Discharge to home or other facility with appropriate resources  Description: INTERVENTIONS:  - Identify barriers to discharge w/patient and caregiver  - Arrange for needed discharge resources and transportation as appropriate  - Identify discharge learning needs (meds, wound care, etc.)  - Arrange for interpretive services to assist at discharge as needed  - Refer to Case Management Department for coordinating discharge planning if the patient needs post-hospital services based on physician/advanced practitioner order or complex needs related to  functional status, cognitive ability, or social support system  Outcome: Progressing     Problem: Knowledge Deficit  Goal: Patient/family/caregiver demonstrates understanding of disease process, treatment plan, medications, and discharge instructions  Description: Complete learning assessment and assess knowledge base.  Interventions:  - Provide teaching at level of understanding  - Provide teaching via preferred learning methods  Outcome: Progressing     Problem: CARDIOVASCULAR - ADULT  Goal: Maintains optimal cardiac output and hemodynamic stability  Description: INTERVENTIONS:  - Monitor I/O, vital signs and rhythm  - Monitor for S/S and trends of decreased cardiac output  - Administer and titrate ordered vasoactive medications to optimize hemodynamic stability  - Assess quality of pulses, skin color and temperature  - Assess for signs of decreased coronary artery perfusion  - Instruct patient to report change in severity of symptoms  Outcome: Progressing  Goal: Absence of cardiac dysrhythmias or at baseline rhythm  Description: INTERVENTIONS:  - Continuous cardiac monitoring, vital signs, obtain 12 lead EKG if ordered  - Administer antiarrhythmic and heart rate control medications as ordered  - Monitor electrolytes and administer replacement therapy as ordered  Outcome: Progressing

## 2025-03-12 NOTE — ASSESSMENT & PLAN NOTE
Patient brought in by facility staff for SOB, cough x2 weeks and dizziness with standing  At baseline patient states he does not wear oxygen but does wear nocturnal CPAP  In ED required 4 L however had worsening respiratory status and was placed on BiPAP  Flu/Covid/RSV negative  CXR: Patchy lingular and lower lobe opacities, concerning for pneumonia.   CT CAP: Multifocal pneumonia involving the left lower lobe, lingula, and medial right lower lobe. Fluid in the esophagus suggests aspiration as a possible etiology. Moderate size hiatal hernia and distended fluid-filled esophagus to the level of the thoracic inlet.   Xopenex/Atrovent TID  Prednisone 40 mg daily continue for total of 5 days  Evaluated by speech, recommended regular diet with thin liquids  home oxygen evaluation prior to discharge  Required 6 L nasal cannula oxygen this morning.  Repeat x-ray showed loculated effusion that was confirmed with CT chest  Plan for chest tube placement per pulmonology

## 2025-03-12 NOTE — ASSESSMENT & PLAN NOTE
MRSA culture positive.   Completed 3 days of azithromycin  Ceftriaxone - completed 7 days  Vancomycin day #5-complete 7-day course  Continue atrovent/xopenex nebs TID  Speech cleared the patient for regular diet and thin liquids  Currently on prednisone 40 mg to be completed for 5 days  Will need outpatient follow-up imaging and PFTs as well as smoking cessation support  Repeat imaging in 4 to 6 weeks

## 2025-03-12 NOTE — ASSESSMENT & PLAN NOTE
CXR 3/6 showing increasing left basilar opacity  CT CAP with consolidation in LLL and lingula, and small consolidation in RLL  Procal remains elevated (2.75, 2.69, 0.94)  Urinary antigens negative. RP2 negative.   Placed on ceftriaxone, Flagyl and azithromycin, tailored to IV ceftriaxone and IV vancomycin on 3/8.    Continue ceftriaxone last day today.  Continue vancomycin.

## 2025-03-12 NOTE — PROGRESS NOTES
Chuy Norton is a 64 y.o. male who is currently ordered Vancomycin IV with management by the Pharmacy Consult service.  Relevant clinical data and objective / subjective history reviewed.  Vancomycin Assessment:  Indication and Goal AUC/Trough: Pneumonia (goal -600, trough >10)  Clinical Status: stable  Micro:     Renal Function:  SCr: 1.1 mg/dL  CrCl: 77.7 mL/min  Renal replacement: Not on dialysis  Days of Therapy: 6  Current Dose: 1000 mg Q12H  Vancomycin Plan:  New Dosing: continue the same  Estimated AUC: 511 mcg*hr/mL  Estimated Trough: 15.5 mcg/mL  Next Level: Random level with AM labs on 03/17  Renal Function Monitoring: Daily BMP and UOP  Pharmacy will continue to follow closely for s/sx of nephrotoxicity, infusion reactions and appropriateness of therapy.  BMP and CBC will be ordered per protocol. We will continue to follow the patient’s culture results and clinical progress daily.    Diane Marie, Pharmacist

## 2025-03-12 NOTE — PROCEDURES
Chest Tube Insertion    Date/Time: 3/12/2025 3:49 PM    Performed by: Leon Mccormack MD  Authorized by: Leon Mccormack MD    Patient location:  Bedside  Consent:     Consent obtained:  Written    Consent given by:  Patient    Risks discussed:  Bleeding, incomplete drainage, nerve damage, pain, infection and damage to surrounding structures    Alternatives discussed:  No treatment and delayed treatment  Universal protocol:     Patient identity confirmed:  Verbally with patient and arm band  Pre-procedure details:     Skin preparation:  ChloraPrep    Preparation: Patient was prepped and draped in the usual sterile fashion    Indications:     Indications: pleural effusion    Anesthesia (see MAR for exact dosages):     Anesthesia method:  Local infiltration    Local anesthetic:  Lidocaine 1% w/o epi  Procedure details:     Placement location:  Lateral    Approach:  Percutaneous    Scalpel size:  11    Angiocath size:  18G x 1 1/4    Thal-Quick Chest Tube Kit:  20 Fr    Dissection instrument: Dilator.    Ultrasound guidance: yes      Tension pneumothorax: no      Needle Decompression: no      Tube connected to:  Suction    Drainage characteristics:  Serosanguinous    Suture material:  0 silk    Dressing:  4x4 sterile gauze  Post-procedure details:     Patient tolerance of procedure:  Tolerated well, no immediate complications  Comments:      CT with loculated left pleural effusion  Risk and benefits discussed with patient  Left 20 Kiswahili chest tube inserted into pleural space  Challenging insertion, required 2 needle sticks both with positive pleural fluid.  Likely body habitus playing a role  Sutured at 18 cm  Atrium with titling and grade 1 airleak  Chest x-ray pending

## 2025-03-12 NOTE — ASSESSMENT & PLAN NOTE
Recent Labs     03/10/25  0838 03/11/25  0544 03/12/25  0137   INR 4.98* 5.61* 5.88*      Outpatient: 2.5mg Warfarin (Sun, Tues, Wed, Fri) and 5mg (Mon, Thurs, Sat)  INR supratherapeutic , continue to hold coumadin. No bleeding noted, HB stable   Monitor INR a.m.

## 2025-03-12 NOTE — ASSESSMENT & PLAN NOTE
Initially requiring 4L in ED, however had worsening respiratory status and placed on BiPAP. Continue on BiPAP as needed and qhs - but patient is refusing   Worsening hypoxia today - RT called for patient to go on midflow  Chest x-ray ordered - compared to yesterday there has been significantly increased opacity in the left chest felt to mostly be due to large potentially partially loculated pleural effusion   Will discuss w Dr. Mccormack regarding need for procedural interventions  Titrate oxygen to maintain saturations >89%  Recommend pulmonary hygiene: Deep breathing with cough, OOB as tolerated, incentive spirometry and flutter valve  Will need home O2 eval prior to discharge

## 2025-03-12 NOTE — PHYSICAL THERAPY NOTE
Physical Therapy Cancellation Note       03/12/25 1510   PT Last Visit   PT Visit Date 03/12/25   Note Type   Note type Cancelled Session   Cancel Reasons Medical status   Additional Comments Pt continues w/ elevated INR 5.88. Per MD Lamb, will hold on PT evaluation at this time. Will continue to f/tio.     Waleska Khan

## 2025-03-12 NOTE — ASSESSMENT & PLAN NOTE
"Outpatient: 12.5mg Carvedilol BID, 40mg lisinopril   /84   Pulse 69   Temp (!) 97.3 °F (36.3 °C)   Resp 18   Ht 5' 8\" (1.727 m)   Wt 97.2 kg (214 lb 4.6 oz)   SpO2 (!) 87%   BMI 32.58 kg/m²     Plan  continue  PRN labetolol, for SBP >180  "

## 2025-03-12 NOTE — PROGRESS NOTES
"Progress Note - Hospitalist   Name: Chuy Norton 64 y.o. male I MRN: [id number]  Unit/Bed#: -01 I Date of Admission: 3/5/2025   Date of Service: 3/12/2025 I Hospital Day: 7    Assessment & Plan  Acute respiratory failure with hypoxia (HCC)  Patient brought in by facility staff for SOB, cough x2 weeks and dizziness with standing  At baseline patient states he does not wear oxygen but does wear nocturnal CPAP  In ED required 4 L however had worsening respiratory status and was placed on BiPAP  Flu/Covid/RSV negative  CXR: Patchy lingular and lower lobe opacities, concerning for pneumonia.   CT CAP: Multifocal pneumonia involving the left lower lobe, lingula, and medial right lower lobe. Fluid in the esophagus suggests aspiration as a possible etiology. Moderate size hiatal hernia and distended fluid-filled esophagus to the level of the thoracic inlet.   Xopenex/Atrovent TID  Prednisone 40 mg daily continue for total of 5 days  Evaluated by speech, recommended regular diet with thin liquids  home oxygen evaluation prior to discharge  Required 6 L nasal cannula oxygen this morning.  Repeat x-ray showed loculated effusion that was confirmed with CT chest  Plan for chest tube placement per pulmonology  Bipolar 2 disorder, major depressive episode (HCC)  Outpatient: 20mg lexapro, 300mg Seroquel HS, 100mg trazadone HS    Plan  Continue   Primary hypertension  Outpatient: 12.5mg Carvedilol BID, 40mg lisinopril   /84   Pulse 69   Temp (!) 97.3 °F (36.3 °C)   Resp 18   Ht 5' 8\" (1.727 m)   Wt 97.2 kg (214 lb 4.6 oz)   SpO2 (!) 87%   BMI 32.58 kg/m²     Plan  continue  PRN labetolol, for SBP >180  Hyperlipidemia  Outpatient: 40 atorvastatin    Plan  Continue statin   Cirrhosis (HCC)  Follows with [facility], last seen Jan 2025  History of cirrhosis of the liver due to history of alcohol and drug addiction.   5/9/2024 CT C/A/P noted nodular hepatic contours may indicate cirrhosis of the liver.   No ascites " present  Left ventricular apical thrombus    Recent Labs     03/10/25  0838 03/11/25  0544 03/12/25  0137   INR 4.98* 5.61* 5.88*      Outpatient: 2.5mg Warfarin (Sun, Tues, Wed, Fri) and 5mg (Mon, Thurs, Sat)  INR supratherapeutic , continue to hold coumadin. No bleeding noted, HB stable   Monitor INR a.m.  Hepatitis C  Follows with [facility], last seen Jan 2025  History of hepatitis C antibody and stated treatment with Mavyret   May 2024 negative viral load  Pneumonia  CXR 3/6 showing increasing left basilar opacity  CT CAP with consolidation in LLL and lingula, and small consolidation in RLL  Procal remains elevated (2.75, 2.69, 0.94)  Urinary antigens negative. RP2 negative.   Placed on ceftriaxone, Flagyl and azithromycin, tailored to IV ceftriaxone and IV vancomycin on 3/8.    Continue ceftriaxone last day today.  Continue vancomycin.    KENN (obstructive sleep apnea)  Maintained on CPAP at home  Continue BiPAP qhs for now  Ischemic cardiomyopathy    Pleural effusion  Required higher oxygen requirements this morning.  Chest x-ray showed significantly increased opacity in the left chest felt to be mostly due to large potentially partially loculated pleural effusion.  CT chest 3/12 showed loculated pleural effusion pockets in the left apex posteriorly and in the major fissure.  Small free effusion in the left lung base.  Compressive atelectasis of the left lower lung with focal hypodensity and cystic changes suggesting parenchymal necrosis  Discussed with pulmonology.  Plan for 1 FFP today given high INR and chest tube placement.    VTE Pharmacologic Prophylaxis:   High Risk (Score >/= 5) - Pharmacological DVT Prophylaxis Ordered: High INR not on anticoagulation. Sequential Compression Devices Ordered.    Mobility:   Basic Mobility Inpatient Raw Score: 19  JH-HLM Goal: 6: Walk 10 steps or more  JH-HLM Achieved: 6: Walk 10 steps or more  JH-HLM Goal achieved. Continue to encourage appropriate mobility.    Patient  Centered Rounds: I performed bedside rounds with nursing staff today.   Discussions with Specialists or Other Care Team Provider: cm, pulmonology    Education and Discussions with Family / Patient: Patient declined call to .     Current Length of Stay: 7 day(s)  Current Patient Status: Inpatient   Certification Statement: The patient will continue to require additional inpatient hospital stay due to pleural effusion   Discharge Plan:  tbd    Code Status: Level 1 - Full Code    Subjective     Back pain today. No nausea or vomiting. Shortness of breath , no cp     Objective :  Temp:  [97 °F (36.1 °C)-97.8 °F (36.6 °C)] 97.3 °F (36.3 °C)  HR:  [62-82] 69  BP: (129-144)/(64-89) 133/84  Resp:  [17-18] 18  SpO2:  [87 %-92 %] 87 %  O2 Device: Nasal cannula  Nasal Cannula O2 Flow Rate (L/min):  [3 L/min-6 L/min] 6 L/min  FiO2 (%):  [6-36] 6    Body mass index is 32.58 kg/m².     Input and Output Summary (last 24 hours):     Intake/Output Summary (Last 24 hours) at 3/12/2025 1449  Last data filed at 3/12/2025 1237  Gross per 24 hour   Intake 760 ml   Output 1200 ml   Net -440 ml       Physical Exam  Vitals and nursing note reviewed.   Constitutional:       General: He is not in acute distress.     Appearance: He is not diaphoretic.   HENT:      Head: Normocephalic.   Eyes:      General:         Right eye: No discharge.         Left eye: No discharge.   Cardiovascular:      Rate and Rhythm: Normal rate and regular rhythm.   Pulmonary:      Effort: Pulmonary effort is normal. No respiratory distress.      Breath sounds: No wheezing, rhonchi or rales.   Abdominal:      General: There is no distension.      Palpations: Abdomen is soft.      Tenderness: There is no abdominal tenderness. There is no guarding or rebound.   Musculoskeletal:      Cervical back: Normal range of motion.      Right lower leg: No edema.      Left lower leg: No edema.   Skin:     General: Skin is warm.   Neurological:      Mental Status: He  is alert and oriented to person, place, and time.   Psychiatric:         Mood and Affect: Mood normal.         Behavior: Behavior normal.           Lines/Drains:              Lab Results: I have reviewed the following results:   Results from last 7 days   Lab Units 03/12/25  0137 03/11/25  0544 03/10/25  0512   WBC Thousand/uL 14.98*   < > 9.52   HEMOGLOBIN g/dL 13.4   < > 13.0   HEMATOCRIT % 41.1   < > 40.0   PLATELETS Thousands/uL 136*   < > 119*   SEGS PCT %  --   --  77*   LYMPHO PCT %  --   --  12*   MONO PCT %  --   --  9   EOS PCT %  --   --  0    < > = values in this interval not displayed.     Results from last 7 days   Lab Units 03/12/25  0137   SODIUM mmol/L 136   POTASSIUM mmol/L 4.2   CHLORIDE mmol/L 107   CO2 mmol/L 22   BUN mg/dL 30*   CREATININE mg/dL 1.10   ANION GAP mmol/L 7   CALCIUM mg/dL 7.8*   ALBUMIN g/dL 2.9*   TOTAL BILIRUBIN mg/dL 0.36   ALK PHOS U/L 38   ALT U/L 13   AST U/L 12*   GLUCOSE RANDOM mg/dL 173*     Results from last 7 days   Lab Units 03/12/25  0137   INR  5.88*     Results from last 7 days   Lab Units 03/12/25  1105 03/12/25  0730 03/11/25  2025 03/11/25  1603 03/11/25  1103 03/11/25  0724 03/10/25  2045 03/10/25  1614 03/10/25  1133 03/10/25  0713 03/09/25  2107 03/09/25  1620   POC GLUCOSE mg/dl 180* 109 172* 200* 161* 102 148* 188* 195* 98 152* 135     Results from last 7 days   Lab Units 03/06/25  0434   HEMOGLOBIN A1C % 5.7*     Results from last 7 days   Lab Units 03/09/25  0348 03/08/25  0101 03/07/25  0307 03/06/25  0434 03/05/25  1510   LACTIC ACID mmol/L  --   --   --   --  1.8   PROCALCITONIN ng/ml 0.70* 1.42* 2.75* 2.69* 0.94*       Recent Cultures (last 7 days):   Results from last 7 days   Lab Units 03/06/25  1518 03/05/25  1510   BLOOD CULTURE   --  No Growth After 5 Days.  No Growth After 5 Days.   LEGIONELLA URINARY ANTIGEN  Negative  --        Imaging Results Review: I reviewed radiology reports from this admission including: chest xray and CT chest.  Other  Study Results Review: No additional pertinent studies reviewed.    Last 24 Hours Medication List:     Current Facility-Administered Medications:     acetaminophen (TYLENOL) tablet 650 mg, Q6H PRN    atorvastatin (LIPITOR) tablet 40 mg, Daily With Dinner    benzonatate (TESSALON PERLES) capsule 200 mg, TID PRN    carvedilol (COREG) tablet 25 mg, BID With Meals    cefTRIAXone (ROCEPHIN) IVPB (premix in dextrose) 1,000 mg 50 mL, Q24H, Last Rate: 1,000 mg (03/11/25 1420) **AND** [COMPLETED] azithromycin (ZITHROMAX) 500 mg in sodium chloride 0.9% 250mL IVPB 500 mg, Q24H, Last Rate: 0 mg (03/06/25 1705)    chlorhexidine (PERIDEX) 0.12 % oral rinse 15 mL, Q12H SHANNA    escitalopram (LEXAPRO) tablet 20 mg, Daily    guaiFENesin (ROBITUSSIN) oral liquid 200 mg, TID PRN    hydrALAZINE (APRESOLINE) injection 10 mg, Q6H PRN    insulin lispro (HumALOG/ADMELOG) 100 units/mL subcutaneous injection 1-6 Units, 4x Daily (AC & HS) **AND** Fingerstick Glucose (POCT), 4x Daily AC and at bedtime    ipratropium (ATROVENT) 0.02 % inhalation solution 0.5 mg, TID    levalbuterol (XOPENEX) inhalation solution 1.25 mg, TID    levalbuterol (XOPENEX) inhalation solution 1.25 mg, Q6H PRN    losartan (COZAAR) tablet 100 mg, Daily    nicotine (NICODERM CQ) 14 mg/24hr TD 24 hr patch 1 patch, Daily    pantoprazole (PROTONIX) EC tablet 40 mg, BID before lunch/dinner    polyethylene glycol (MIRALAX) packet 17 g, Daily    QUEtiapine (SEROquel) tablet 300 mg, HS    senna-docusate sodium (SENOKOT S) 8.6-50 mg per tablet 1 tablet, BID    traZODone (DESYREL) tablet 100 mg, HS    vancomycin (VANCOCIN) IVPB (premix in dextrose) 1,000 mg 200 mL, Q12H, Last Rate: 1,000 mg (03/12/25 1141)    [Held by provider] warfarin (COUMADIN) tablet 2.5 mg, Once per day on Sunday Tuesday Wednesday Friday    [Held by provider] warfarin (COUMADIN) tablet 5 mg, Once per day on Monday Thursday Saturday    Administrative Statements   Today, Patient Was Seen By: Zainab Ribeiro,  MD      **Please Note: This note may have been constructed using a voice recognition system.**

## 2025-03-13 ENCOUNTER — APPOINTMENT (INPATIENT)
Dept: RADIOLOGY | Facility: HOSPITAL | Age: 65
DRG: 871 | End: 2025-03-13
Payer: COMMERCIAL

## 2025-03-13 LAB
ABO GROUP BLD BPU: NORMAL
ANION GAP SERPL CALCULATED.3IONS-SCNC: 6 MMOL/L (ref 4–13)
BPU ID: NORMAL
BUN SERPL-MCNC: 27 MG/DL (ref 5–25)
CALCIUM SERPL-MCNC: 8 MG/DL (ref 8.4–10.2)
CHLORIDE SERPL-SCNC: 105 MMOL/L (ref 96–108)
CO2 SERPL-SCNC: 26 MMOL/L (ref 21–32)
CREAT SERPL-MCNC: 1.01 MG/DL (ref 0.6–1.3)
ERYTHROCYTE [DISTWIDTH] IN BLOOD BY AUTOMATED COUNT: 19.9 % (ref 11.6–15.1)
GFR SERPL CREATININE-BSD FRML MDRD: 78 ML/MIN/1.73SQ M
GLUCOSE SERPL-MCNC: 109 MG/DL (ref 65–140)
GLUCOSE SERPL-MCNC: 113 MG/DL (ref 65–140)
GLUCOSE SERPL-MCNC: 113 MG/DL (ref 65–140)
GLUCOSE SERPL-MCNC: 122 MG/DL (ref 65–140)
GLUCOSE SERPL-MCNC: 123 MG/DL (ref 65–140)
HCT VFR BLD AUTO: 38.2 % (ref 36.5–49.3)
HGB BLD-MCNC: 12.3 G/DL (ref 12–17)
INR PPP: 2.81 (ref 0.85–1.19)
MCH RBC QN AUTO: 29.4 PG (ref 26.8–34.3)
MCHC RBC AUTO-ENTMCNC: 32.2 G/DL (ref 31.4–37.4)
MCV RBC AUTO: 91 FL (ref 82–98)
PLATELET # BLD AUTO: 140 THOUSANDS/UL (ref 149–390)
POTASSIUM SERPL-SCNC: 4.2 MMOL/L (ref 3.5–5.3)
PROTHROMBIN TIME: 29.8 SECONDS (ref 12.3–15)
RBC # BLD AUTO: 4.18 MILLION/UL (ref 3.88–5.62)
SODIUM SERPL-SCNC: 137 MMOL/L (ref 135–147)
UNIT DISPENSE STATUS: NORMAL
UNIT PRODUCT CODE: NORMAL
UNIT PRODUCT VOLUME: 354 ML
UNIT RH: NORMAL
WBC # BLD AUTO: 15.62 THOUSAND/UL (ref 4.31–10.16)

## 2025-03-13 PROCEDURE — 99232 SBSQ HOSP IP/OBS MODERATE 35: CPT | Performed by: INTERNAL MEDICINE

## 2025-03-13 PROCEDURE — 94640 AIRWAY INHALATION TREATMENT: CPT

## 2025-03-13 PROCEDURE — 80048 BASIC METABOLIC PNL TOTAL CA: CPT | Performed by: INTERNAL MEDICINE

## 2025-03-13 PROCEDURE — 71045 X-RAY EXAM CHEST 1 VIEW: CPT

## 2025-03-13 PROCEDURE — 94760 N-INVAS EAR/PLS OXIMETRY 1: CPT

## 2025-03-13 PROCEDURE — 82948 REAGENT STRIP/BLOOD GLUCOSE: CPT

## 2025-03-13 PROCEDURE — 97163 PT EVAL HIGH COMPLEX 45 MIN: CPT

## 2025-03-13 PROCEDURE — 97167 OT EVAL HIGH COMPLEX 60 MIN: CPT

## 2025-03-13 PROCEDURE — 85610 PROTHROMBIN TIME: CPT | Performed by: INTERNAL MEDICINE

## 2025-03-13 PROCEDURE — 85027 COMPLETE CBC AUTOMATED: CPT | Performed by: INTERNAL MEDICINE

## 2025-03-13 RX ORDER — BISACODYL 10 MG
10 SUPPOSITORY, RECTAL RECTAL DAILY
Status: DISCONTINUED | OUTPATIENT
Start: 2025-03-13 | End: 2025-03-22 | Stop reason: HOSPADM

## 2025-03-13 RX ORDER — WARFARIN SODIUM 2 MG/1
2 TABLET ORAL
Status: DISCONTINUED | OUTPATIENT
Start: 2025-03-13 | End: 2025-03-13

## 2025-03-13 RX ADMIN — CHLORHEXIDINE GLUCONATE 15 ML: 1.2 RINSE ORAL at 08:08

## 2025-03-13 RX ADMIN — LEVALBUTEROL HYDROCHLORIDE 1.25 MG: 1.25 SOLUTION RESPIRATORY (INHALATION) at 13:26

## 2025-03-13 RX ADMIN — ATORVASTATIN CALCIUM 40 MG: 40 TABLET, FILM COATED ORAL at 18:04

## 2025-03-13 RX ADMIN — GUAIFENESIN 200 MG: 200 SOLUTION ORAL at 19:44

## 2025-03-13 RX ADMIN — IPRATROPIUM BROMIDE 0.5 MG: 0.5 SOLUTION RESPIRATORY (INHALATION) at 20:01

## 2025-03-13 RX ADMIN — BENZONATATE 200 MG: 100 CAPSULE ORAL at 19:44

## 2025-03-13 RX ADMIN — SENNOSIDES AND DOCUSATE SODIUM 1 TABLET: 50; 8.6 TABLET ORAL at 18:04

## 2025-03-13 RX ADMIN — LEVALBUTEROL HYDROCHLORIDE 1.25 MG: 1.25 SOLUTION RESPIRATORY (INHALATION) at 07:24

## 2025-03-13 RX ADMIN — IPRATROPIUM BROMIDE 0.5 MG: 0.5 SOLUTION RESPIRATORY (INHALATION) at 13:26

## 2025-03-13 RX ADMIN — SENNOSIDES AND DOCUSATE SODIUM 1 TABLET: 50; 8.6 TABLET ORAL at 08:08

## 2025-03-13 RX ADMIN — BISACODYL 10 MG: 10 SUPPOSITORY RECTAL at 11:34

## 2025-03-13 RX ADMIN — LIDOCAINE 5% 1 PATCH: 700 PATCH TOPICAL at 18:04

## 2025-03-13 RX ADMIN — VANCOMYCIN HYDROCHLORIDE 1000 MG: 1 INJECTION, SOLUTION INTRAVENOUS at 00:36

## 2025-03-13 RX ADMIN — PANTOPRAZOLE SODIUM 40 MG: 40 TABLET, DELAYED RELEASE ORAL at 18:04

## 2025-03-13 RX ADMIN — LEVALBUTEROL HYDROCHLORIDE 1.25 MG: 1.25 SOLUTION RESPIRATORY (INHALATION) at 20:01

## 2025-03-13 RX ADMIN — TRAZODONE HYDROCHLORIDE 100 MG: 100 TABLET ORAL at 19:44

## 2025-03-13 RX ADMIN — CARVEDILOL 25 MG: 25 TABLET, FILM COATED ORAL at 18:04

## 2025-03-13 RX ADMIN — NICOTINE 1 PATCH: 14 PATCH, EXTENDED RELEASE TRANSDERMAL at 08:09

## 2025-03-13 RX ADMIN — VANCOMYCIN HYDROCHLORIDE 1000 MG: 1 INJECTION, SOLUTION INTRAVENOUS at 11:34

## 2025-03-13 RX ADMIN — LOSARTAN POTASSIUM 100 MG: 50 TABLET, FILM COATED ORAL at 08:08

## 2025-03-13 RX ADMIN — QUETIAPINE 300 MG: 200 TABLET ORAL at 19:43

## 2025-03-13 RX ADMIN — IPRATROPIUM BROMIDE 0.5 MG: 0.5 SOLUTION RESPIRATORY (INHALATION) at 07:24

## 2025-03-13 RX ADMIN — CHLORHEXIDINE GLUCONATE 15 ML: 1.2 RINSE ORAL at 19:44

## 2025-03-13 RX ADMIN — POLYETHYLENE GLYCOL 3350 17 G: 17 POWDER, FOR SOLUTION ORAL at 08:08

## 2025-03-13 RX ADMIN — PANTOPRAZOLE SODIUM 40 MG: 40 TABLET, DELAYED RELEASE ORAL at 11:35

## 2025-03-13 RX ADMIN — ESCITALOPRAM OXALATE 20 MG: 20 TABLET ORAL at 08:08

## 2025-03-13 RX ADMIN — CARVEDILOL 25 MG: 25 TABLET, FILM COATED ORAL at 08:08

## 2025-03-13 NOTE — ASSESSMENT & PLAN NOTE
Initially requiring 4L in ED, however had worsening respiratory status and placed on BiPAP. Continue on BiPAP as needed and qhs - but patient is refusing   Currently on 6L  Titrate oxygen to maintain saturations >89%  Recommend pulmonary hygiene: Deep breathing with cough, OOB as tolerated, incentive spirometry and flutter valve  Will need home O2 eval prior to discharge

## 2025-03-13 NOTE — ASSESSMENT & PLAN NOTE
Wt Readings from Last 3 Encounters:   03/10/25 97.2 kg (214 lb 4.6 oz)   02/07/25 95.9 kg (211 lb 6.4 oz)   01/02/25 91.2 kg (201 lb)      on admission  5 pound weight gain since admission, 13 pound weight gain since January  Echo 12/18/2024 shows LVEF 45%, systolic function mildly reduced, moderate solid fixed thrombus in the apex  Lower extremity edema present  Patient appears mildly volume overloaded likely in the setting of sepsis resuscitation  Given IV Lasix 20 mg 3/10

## 2025-03-13 NOTE — PLAN OF CARE
Problem: Potential for Falls  Goal: Patient will remain free of falls  Description: INTERVENTIONS:  - Educate patient/family on patient safety including physical limitations  - Instruct patient to call for assistance with activity   - Consult OT/PT to assist with strengthening/mobility   - Keep Call bell within reach  - Keep bed low and locked with side rails adjusted as appropriate  - Keep care items and personal belongings within reach  - Initiate and maintain comfort rounds  - Make Fall Risk Sign visible to staff  - Offer Toileting every 2 Hours, in advance of need  - Initiate/Maintain bed alarm  - Obtain necessary fall risk management equipment: socks  - Apply yellow socks and bracelet for high fall risk patients  - Consider moving patient to room near nurses station  Outcome: Progressing     Problem: Prexisting or High Potential for Compromised Skin Integrity  Goal: Skin integrity is maintained or improved  Description: INTERVENTIONS:  - Identify patients at risk for skin breakdown  - Assess and monitor skin integrity  - Assess and monitor nutrition and hydration status  - Monitor labs   - Assess for incontinence   - Turn and reposition patient  - Assist with mobility/ambulation  - Relieve pressure over bony prominences  - Avoid friction and shearing  - Provide appropriate hygiene as needed including keeping skin clean and dry  - Evaluate need for skin moisturizer/barrier cream  - Collaborate with interdisciplinary team   - Patient/family teaching  - Consider wound care consult   Outcome: Progressing     Problem: PAIN - ADULT  Goal: Verbalizes/displays adequate comfort level or baseline comfort level  Description: Interventions:  - Encourage patient to monitor pain and request assistance  - Assess pain using appropriate pain scale  - Administer analgesics based on type and severity of pain and evaluate response  - Implement non-pharmacological measures as appropriate and evaluate response  - Consider cultural  and social influences on pain and pain management  - Notify physician/advanced practitioner if interventions unsuccessful or patient reports new pain  Outcome: Progressing     Problem: INFECTION - ADULT  Goal: Absence or prevention of progression during hospitalization  Description: INTERVENTIONS:  - Assess and monitor for signs and symptoms of infection  - Monitor lab/diagnostic results  - Monitor all insertion sites, i.e. indwelling lines, tubes, and drains  - Monitor endotracheal if appropriate and nasal secretions for changes in amount and color  - [place] appropriate cooling/warming therapies per order  - Administer medications as ordered  - Instruct and encourage patient and family to use good hand hygiene technique  - Identify and instruct in appropriate isolation precautions for identified infection/condition  Outcome: Progressing  Goal: Absence of fever/infection during neutropenic period  Description: INTERVENTIONS:  - Monitor WBC    Outcome: Progressing     Problem: SAFETY ADULT  Goal: Patient will remain free of falls  Description: INTERVENTIONS:  - Educate patient/family on patient safety including physical limitations  - Instruct patient to call for assistance with activity   - Consult OT/PT to assist with strengthening/mobility   - Keep Call bell within reach  - Keep bed low and locked with side rails adjusted as appropriate  - Keep care items and personal belongings within reach  - Initiate and maintain comfort rounds  - Make Fall Risk Sign visible to staff  - Offer Toileting every 2 Hours, in advance of need  - Initiate/Maintain bed alarm  - Obtain necessary fall risk management equipment: socks  - Apply yellow socks and bracelet for high fall risk patients  - Consider moving patient to room near nurses station  Outcome: Progressing  Goal: Maintain or return to baseline ADL function  Description: INTERVENTIONS:  -  Assess patient's ability to carry out ADLs; assess patient's baseline for ADL  function and identify physical deficits which impact ability to perform ADLs (bathing, care of mouth/teeth, toileting, grooming, dressing, etc.)  - Assess/evaluate cause of self-care deficits   - Assess range of motion  - Assess patient's mobility; develop plan if impaired  - Assess patient's need for assistive devices and provide as appropriate  - Encourage maximum independence but intervene and supervise when necessary  - Involve family in performance of ADLs  - Assess for home care needs following discharge   - Consider OT consult to assist with ADL evaluation and planning for discharge  - Provide patient education as appropriate  Outcome: Progressing  Goal: Maintains/Returns to pre admission functional level  Description: INTERVENTIONS:  - Perform AM-PAC 6 Click Basic Mobility/ Daily Activity assessment daily.  - Set and communicate daily mobility goal to care team and patient/family/caregiver.   - Collaborate with rehabilitation services on mobility goals if consulted  - Perform Range of Motion 3 times a day.  - Reposition patient every 2 hours.  - Dangle patient 3 times a day  - Stand patient 3 times a day  - Ambulate patient 3 times a day  - Out of bed to chair 3 times a day   - Out of bed for meals 3 times a day  - Out of bed for toileting  - Record patient progress and toleration of activity level   Outcome: Progressing     Problem: DISCHARGE PLANNING  Goal: Discharge to home or other facility with appropriate resources  Description: INTERVENTIONS:  - Identify barriers to discharge w/patient and caregiver  - Arrange for needed discharge resources and transportation as appropriate  - Identify discharge learning needs (meds, wound care, etc.)  - Arrange for interpretive services to assist at discharge as needed  - Refer to Case Management Department for coordinating discharge planning if the patient needs post-hospital services based on physician/advanced practitioner order or complex needs related to  functional status, cognitive ability, or social support system  Outcome: Progressing     Problem: Knowledge Deficit  Goal: Patient/family/caregiver demonstrates understanding of disease process, treatment plan, medications, and discharge instructions  Description: Complete learning assessment and assess knowledge base.  Interventions:  - Provide teaching at level of understanding  - Provide teaching via preferred learning methods  Outcome: Progressing     Problem: CARDIOVASCULAR - ADULT  Goal: Maintains optimal cardiac output and hemodynamic stability  Description: INTERVENTIONS:  - Monitor I/O, vital signs and rhythm  - Monitor for S/S and trends of decreased cardiac output  - Administer and titrate ordered vasoactive medications to optimize hemodynamic stability  - Assess quality of pulses, skin color and temperature  - Assess for signs of decreased coronary artery perfusion  - Instruct patient to report change in severity of symptoms  Outcome: Progressing  Goal: Absence of cardiac dysrhythmias or at baseline rhythm  Description: INTERVENTIONS:  - Continuous cardiac monitoring, vital signs, obtain 12 lead EKG if ordered  - Administer antiarrhythmic and heart rate control medications as ordered  - Monitor electrolytes and administer replacement therapy as ordered  Outcome: Progressing     Problem: RESPIRATORY - ADULT  Goal: Achieves optimal ventilation and oxygenation  Description: INTERVENTIONS:  - Assess for changes in respiratory status  - Assess for changes in mentation and behavior  - Position to facilitate oxygenation and minimize respiratory effort  - Oxygen administered by appropriate delivery if ordered  - Initiate smoking cessation education as indicated  - Encourage broncho-pulmonary hygiene including cough, deep breathe, Incentive Spirometry  - Assess the need for suctioning and aspirate as needed  - Assess and instruct to report SOB or any respiratory difficulty  - Respiratory Therapy support as  indicated  Outcome: Progressing     Problem: METABOLIC, FLUID AND ELECTROLYTES - ADULT  Goal: Electrolytes maintained within normal limits  Description: INTERVENTIONS:  - Monitor labs and assess patient for signs and symptoms of electrolyte imbalances  - Administer electrolyte replacement as ordered  - Monitor response to electrolyte replacements, including repeat lab results as appropriate  - Instruct patient on fluid and nutrition as appropriate  Outcome: Progressing  Goal: Fluid balance maintained  Description: INTERVENTIONS:  - Monitor labs   - Monitor I/O and WT  - Instruct patient on fluid and nutrition as appropriate  - Assess for signs & symptoms of volume excess or deficit  Outcome: Progressing  Goal: Glucose maintained within target range  Description: INTERVENTIONS:  - Monitor Blood Glucose as ordered  - Assess for signs and symptoms of hyperglycemia and hypoglycemia  - Administer ordered medications to maintain glucose within target range  - Assess nutritional intake and initiate nutrition service referral as needed  Outcome: Progressing

## 2025-03-13 NOTE — ASSESSMENT & PLAN NOTE
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 Continue chest tube to -20 suction. Drainage over past 24H is 110mL. Follow up pleural fluid cultures  He would likely benefit from tpa/dornase, however given INR will discuss with attending    Patient Instructions     None      Please Note     If a lab draw is part of your appointment, please arrive 15 minutes early.       To Do List     Wednesday May 24, 2017 11:00 AM     Appointment with KLARISSA Cho at [facility] in [place] ([phone] For medical emergencies, dial 911.

## 2025-03-13 NOTE — ASSESSMENT & PLAN NOTE
MRSA culture positive  Completed 3 days of azithromycin  Ceftriaxone - completed 7 days  Vancomycin day #6-complete 7-day course  Continue atrovent/xopenex nebs TID  Speech cleared the patient for regular diet and thin liquids  Completed 5 days of prednisone 40 mg  Will need outpatient follow-up imaging and PFTs as well as smoking cessation support  Repeat imaging in 4 to 6 weeks

## 2025-03-13 NOTE — PLAN OF CARE
Problem: Potential for Falls  Goal: Patient will remain free of falls  Description: INTERVENTIONS:  - Educate patient/family on patient safety including physical limitations  - Instruct patient to call for assistance with activity   - Consult OT/PT to assist with strengthening/mobility   - Keep Call bell within reach  - Keep bed low and locked with side rails adjusted as appropriate  - Keep care items and personal belongings within reach  - Initiate and maintain comfort rounds  - Make Fall Risk Sign visible to staff  - Offer Toileting every 2 Hours, in advance of need  - Initiate/Maintain bed alarm  - Obtain necessary fall risk management equipment: socks  - Apply yellow socks and bracelet for high fall risk patients  - Consider moving patient to room near nurses station  Outcome: Progressing     Problem: Prexisting or High Potential for Compromised Skin Integrity  Goal: Skin integrity is maintained or improved  Description: INTERVENTIONS:  - Identify patients at risk for skin breakdown  - Assess and monitor skin integrity  - Assess and monitor nutrition and hydration status  - Monitor labs   - Assess for incontinence   - Turn and reposition patient  - Assist with mobility/ambulation  - Relieve pressure over bony prominences  - Avoid friction and shearing  - Provide appropriate hygiene as needed including keeping skin clean and dry  - Evaluate need for skin moisturizer/barrier cream  - Collaborate with interdisciplinary team   - Patient/family teaching  - Consider wound care consult   Outcome: Progressing     Problem: PAIN - ADULT  Goal: Verbalizes/displays adequate comfort level or baseline comfort level  Description: Interventions:  - Encourage patient to monitor pain and request assistance  - Assess pain using appropriate pain scale  - Administer analgesics based on type and severity of pain and evaluate response  - Implement non-pharmacological measures as appropriate and evaluate response  - Consider cultural  and social influences on pain and pain management  - Notify physician/advanced practitioner if interventions unsuccessful or patient reports new pain  Outcome: Progressing     Problem: INFECTION - ADULT  Goal: Absence or prevention of progression during hospitalization  Description: INTERVENTIONS:  - Assess and monitor for signs and symptoms of infection  - Monitor lab/diagnostic results  - Monitor all insertion sites, i.e. indwelling lines, tubes, and drains  - Monitor endotracheal if appropriate and nasal secretions for changes in amount and color  - [place] appropriate cooling/warming therapies per order  - Administer medications as ordered  - Instruct and encourage patient and family to use good hand hygiene technique  - Identify and instruct in appropriate isolation precautions for identified infection/condition  Outcome: Progressing  Goal: Absence of fever/infection during neutropenic period  Description: INTERVENTIONS:  - Monitor WBC    Outcome: Progressing     Problem: SAFETY ADULT  Goal: Patient will remain free of falls  Description: INTERVENTIONS:  - Educate patient/family on patient safety including physical limitations  - Instruct patient to call for assistance with activity   - Consult OT/PT to assist with strengthening/mobility   - Keep Call bell within reach  - Keep bed low and locked with side rails adjusted as appropriate  - Keep care items and personal belongings within reach  - Initiate and maintain comfort rounds  - Make Fall Risk Sign visible to staff  - Offer Toileting every 2 Hours, in advance of need  - Initiate/Maintain bed alarm  - Obtain necessary fall risk management equipment: socks  - Apply yellow socks and bracelet for high fall risk patients  - Consider moving patient to room near nurses station  Outcome: Progressing  Goal: Maintain or return to baseline ADL function  Description: INTERVENTIONS:  -  Assess patient's ability to carry out ADLs; assess patient's baseline for ADL  function and identify physical deficits which impact ability to perform ADLs (bathing, care of mouth/teeth, toileting, grooming, dressing, etc.)  - Assess/evaluate cause of self-care deficits   - Assess range of motion  - Assess patient's mobility; develop plan if impaired  - Assess patient's need for assistive devices and provide as appropriate  - Encourage maximum independence but intervene and supervise when necessary  - Involve family in performance of ADLs  - Assess for home care needs following discharge   - Consider OT consult to assist with ADL evaluation and planning for discharge  - Provide patient education as appropriate  Outcome: Progressing  Goal: Maintains/Returns to pre admission functional level  Description: INTERVENTIONS:  - Perform AM-PAC 6 Click Basic Mobility/ Daily Activity assessment daily.  - Set and communicate daily mobility goal to care team and patient/family/caregiver.   - Collaborate with rehabilitation services on mobility goals if consulted  - Perform Range of Motion 3 times a day.  - Reposition patient every 2 hours.  - Dangle patient 3 times a day  - Stand patient 3 times a day  - Ambulate patient 3 times a day  - Out of bed to chair 3 times a day   - Out of bed for meals 3 times a day  - Out of bed for toileting  - Record patient progress and toleration of activity level   Outcome: Progressing     Problem: DISCHARGE PLANNING  Goal: Discharge to home or other facility with appropriate resources  Description: INTERVENTIONS:  - Identify barriers to discharge w/patient and caregiver  - Arrange for needed discharge resources and transportation as appropriate  - Identify discharge learning needs (meds, wound care, etc.)  - Arrange for interpretive services to assist at discharge as needed  - Refer to Case Management Department for coordinating discharge planning if the patient needs post-hospital services based on physician/advanced practitioner order or complex needs related to  functional status, cognitive ability, or social support system  Outcome: Progressing     Problem: Knowledge Deficit  Goal: Patient/family/caregiver demonstrates understanding of disease process, treatment plan, medications, and discharge instructions  Description: Complete learning assessment and assess knowledge base.  Interventions:  - Provide teaching at level of understanding  - Provide teaching via preferred learning methods  Outcome: Progressing     Problem: CARDIOVASCULAR - ADULT  Goal: Maintains optimal cardiac output and hemodynamic stability  Description: INTERVENTIONS:  - Monitor I/O, vital signs and rhythm  - Monitor for S/S and trends of decreased cardiac output  - Administer and titrate ordered vasoactive medications to optimize hemodynamic stability  - Assess quality of pulses, skin color and temperature  - Assess for signs of decreased coronary artery perfusion  - Instruct patient to report change in severity of symptoms  Outcome: Progressing  Goal: Absence of cardiac dysrhythmias or at baseline rhythm  Description: INTERVENTIONS:  - Continuous cardiac monitoring, vital signs, obtain 12 lead EKG if ordered  - Administer antiarrhythmic and heart rate control medications as ordered  - Monitor electrolytes and administer replacement therapy as ordered  Outcome: Progressing     Problem: RESPIRATORY - ADULT  Goal: Achieves optimal ventilation and oxygenation  Description: INTERVENTIONS:  - Assess for changes in respiratory status  - Assess for changes in mentation and behavior  - Position to facilitate oxygenation and minimize respiratory effort  - Oxygen administered by appropriate delivery if ordered  - Initiate smoking cessation education as indicated  - Encourage broncho-pulmonary hygiene including cough, deep breathe, Incentive Spirometry  - Assess the need for suctioning and aspirate as needed  - Assess and instruct to report SOB or any respiratory difficulty  - Respiratory Therapy support as  indicated  Outcome: Progressing     Problem: METABOLIC, FLUID AND ELECTROLYTES - ADULT  Goal: Electrolytes maintained within normal limits  Description: INTERVENTIONS:  - Monitor labs and assess patient for signs and symptoms of electrolyte imbalances  - Administer electrolyte replacement as ordered  - Monitor response to electrolyte replacements, including repeat lab results as appropriate  - Instruct patient on fluid and nutrition as appropriate  Outcome: Progressing  Goal: Fluid balance maintained  Description: INTERVENTIONS:  - Monitor labs   - Monitor I/O and WT  - Instruct patient on fluid and nutrition as appropriate  - Assess for signs & symptoms of volume excess or deficit  Outcome: Progressing  Goal: Glucose maintained within target range  Description: INTERVENTIONS:  - Monitor Blood Glucose as ordered  - Assess for signs and symptoms of hyperglycemia and hypoglycemia  - Administer ordered medications to maintain glucose within target range  - Assess nutritional intake and initiate nutrition service referral as needed  Outcome: Progressing      No post-discharge thromboprophylaxis indicated.

## 2025-03-13 NOTE — ASSESSMENT & PLAN NOTE
Recent Labs     03/11/25  0544 03/12/25  0137 03/13/25  0424   INR 5.61* 5.88* 2.81*      Outpatient: 2.5mg Warfarin (Sun, Tues, Wed, Fri) and 5mg (Mon, Thurs, Sat)  Patient with supratherapeutic INR in the past 4 days or so, warfarin was held.  Eventually INR improved today.  We will resume warfarin 2 mg daily for now.  Suspect INR will drop even more in the next day or so  Monitor INR

## 2025-03-13 NOTE — PROGRESS NOTES
Chuy Norton is a 64 y.o. male who is currently ordered Vancomycin IV with management by the Pharmacy Consult service.  Relevant clinical data and objective / subjective history reviewed.  Vancomycin Assessment:  Indication and Goal AUC/Trough: Pneumonia (goal -600, trough >10)  Clinical Status: stable  Micro:     Renal Function:  SCr: 1.01 mg/dL  CrCl: 84.6 mL/min  Renal replacement: Not on dialysis  Days of Therapy: 7  Current Dose: 1000 mg Q12h  Vancomycin Plan:  New Dosing: continue the same  Estimated AUC: 479 mcg*hr/mL  Estimated Trough: 84.6 mcg/mL  Next Level: random level with AM labs on 03/17  Renal Function Monitoring: Daily BMP and UOP  Pharmacy will continue to follow closely for s/sx of nephrotoxicity, infusion reactions and appropriateness of therapy.  BMP and CBC will be ordered per protocol. We will continue to follow the patient’s culture results and clinical progress daily.    Diane Marie, Pharmacist

## 2025-03-13 NOTE — UTILIZATION REVIEW
Continued Stay Review    Date: 3/13/25                  Current Patient Class: Inpatient  Current Level of Care: Med Surg    HPI:64 y.o. male initially admitted on 3/5/25    Current Diagnosis: Acute respiratory failure with hypoxia, pneumonia,  left ventricular apical thrombus on Coumadin, pleural effusion s/p chest tube placement 3/12/25.     3/13 Pulmonology:  Chest tube placed yesterday, continue to -20 suction. Drainage over past 24H is 110mL. Follow up pleural fluid cultures. Currently on 6L O2. On exam, decreased bibasilar breath sounds L>R. L lateral chest tube in place, no air leak present.   Internal Medicine:    Completed azithromycin, 7 days of ceftriaxone and is currently on vancomycin. Completed 5 days of prednisone.  Repeat x-ray 3/12 showed loculated effusion that was confirmed with CT chest. Chest tube placed yesterday by pulmonology. Patient on 6L NC O2. Patient with supratherapeutic INR in the past 4 days or so, warfarin was held. INR improved today.  Resume warfarin 2 mg daily for now.  Monitor INR.       Medications:     Scheduled Medications:    atorvastatin, 40 mg, Oral, Daily With Dinner  carvedilol, 25 mg, Oral, BID With Meals  chlorhexidine, 15 mL, Mouth/Throat, Q12H SHANNA  escitalopram, 20 mg, Oral, Daily  insulin lispro, 1-6 Units, Subcutaneous, 4x Daily (AC & HS)  ipratropium, 0.5 mg, Nebulization, TID  levalbuterol, 1.25 mg, Nebulization, TID  lidocaine, 1 patch, Topical, Daily  losartan, 100 mg, Oral, Daily  nicotine, 1 patch, Transdermal, Daily  pantoprazole, 40 mg, Oral, BID before lunch/dinner  polyethylene glycol, 17 g, Oral, Daily  QUEtiapine, 300 mg, Oral, HS  senna-docusate sodium, 1 tablet, Oral, BID  traZODone, 100 mg, Oral, HS  vancomycin, 1,000 mg, Intravenous, Q12H  [Held by provider] warfarin, 2.5 mg, Oral, Once per day on Sunday Tuesday Wednesday Friday  [Held by provider] warfarin, 5 mg, Oral, Once per day on Monday Thursday Saturday      warfarin (COUMADIN) tablet 2  mg  Dose: 2 mg  Freq: Daily (warfarin) Route: PO  Indications Comment: apical thrombus  Start: 03/13/25 1800       cefTRIAXone (ROCEPHIN) IVPB (premix in dextrose) 1,000 mg 50 mL x 1 dose  Dose: 1,000 mg  Freq: Every 24 hours Route: IV  Indications of Use: PNEUMONIA  Last Dose: 1,000 mg (03/12/25 1705)  Start: 03/12/25 1505 End: 03/12/25 1735    cefTRIAXone (ROCEPHIN) IVPB (premix in dextrose) 1,000 mg 50 mL x 6 doses  Dose: 1,000 mg  Freq: Every 24 hours Route: IV  Indications of Use: PNEUMONIA  Last Dose: 1,000 mg (03/11/25 1420)  Start: 03/06/25 1400 End: 03/12/25 1459      predniSONE tablet 40 mg  Dose: 40 mg  Freq: Daily Route: PO  Start: 03/08/25 0900 End: 03/12/25 0839    Continuous IV Infusions:     PRN Meds:      acetaminophen, 650 mg, Oral, Q6H PRN  benzonatate, 200 mg, Oral, TID PRN  guaiFENesin, 200 mg, Oral, TID PRN  hydrALAZINE, 10 mg, Intravenous, Q6H PRN  levalbuterol, 1.25 mg, Nebulization, Q6H PRN  morphine injection, 2 mg, Intravenous, Q4H PRN      Discharge Plan:  TBD      Vital Signs (last 3 days)      Date/Time Temp Pulse Resp BP MAP (mmHg) SpO2 Calculated FIO2 (%) - Nasal Cannula O2 Flow Rate (L/min) Nasal Cannula O2 Flow Rate (L/min) O2 Device O2 Interface Device [place] Coma Scale Score Pain   03/13/25 1020 -- -- -- -- -- -- -- -- -- -- -- 15 --   03/13/25 1004 -- -- -- -- -- -- -- -- -- -- -- -- No Pain   03/13/25 0808 97.3 °F (36.3 °C) 79 20 163/96 -- -- 44 -- 6 L/min Nasal cannula -- -- No Pain   03/13/25 0700 -- -- -- -- -- -- 44 -- 6 L/min Nasal cannula -- -- --   03/12/25 2120 -- -- -- -- -- 91 % -- -- -- -- -- -- --   03/12/25 21:18:51 97.5 °F (36.4 °C) 77 16 166/97 120 87 % -- -- -- -- -- -- --   03/12/25 2022 -- -- -- -- -- -- 44 -- 6 L/min Nasal cannula -- 15 7   03/12/25 2008 -- -- -- -- -- 94 % -- -- -- -- -- -- --   03/12/25 1645 98.2 °F (36.8 °C) 78 20 163/91 -- -- -- -- -- -- -- -- --   03/12/25 1446 -- -- -- -- -- -- -- -- -- -- -- -- 10 - Worst Possible Pain   03/12/25  14:21:03 97.3 °F (36.3 °C) 69 -- 133/84 100 87 % -- -- -- -- -- -- --   03/12/25 1421 97.8 °F (36.6 °C) 68 18 135/64 -- -- -- -- -- -- -- -- --   03/12/25 1411 97.3 °F (36.3 °C) 67 17 129/83 -- 92 % 44 -- 6 L/min Nasal cannula -- -- --   03/12/25 1216 -- 76 -- -- -- 88 % -- -- -- -- -- -- --   03/12/25 0921 -- -- -- -- -- -- 44 -- 6 L/min -- -- -- 5   03/12/25 0851 -- -- -- -- -- -- 40 -- 5 L/min -- -- -- --   03/12/25 0850 -- -- -- -- -- -- 36 -- 4 L/min Nasal cannula -- -- --   03/12/25 0843 -- -- -- -- -- -- -- -- -- Nasal cannula -- 15 --   03/12/25 0700 -- -- -- -- -- 91 % -- -- -- -- -- -- --   03/12/25 0616 -- -- -- -- -- -- -- -- -- -- -- -- 10 - Worst Possible Pain   03/12/25 0455 -- -- -- -- -- 90 % -- 4 L/min -- BiPAP Full face mask -- --   03/12/25 0059 -- -- -- -- -- -- -- -- -- -- Full face mask -- --   03/11/25 22:15:11 97 °F (36.1 °C) 62 18 -- 107 90 % -- -- -- -- -- -- --   03/11/25 2215 -- -- -- 142/89 -- -- -- -- -- -- -- -- --   03/11/25 2142 -- -- -- -- -- -- -- 4 L/min -- BiPAP Full face mask -- --   03/11/25 21:40:24 97.3 °F (36.3 °C) 69 -- 142/89 107 87 % -- -- -- -- -- -- --   03/11/25 2130 -- -- -- -- -- -- 32 -- 3 L/min Nasal cannula -- 15 8   03/11/25 1935 -- -- -- -- -- -- 32 -- 3 L/min Nasal cannula -- -- --   03/11/25 17:11:06 -- 82 -- 144/85 105 91 % -- -- -- -- -- -- --   03/11/25 1711 -- 82 -- 144/65 -- -- -- -- -- -- -- -- --   03/11/25 1706 -- -- -- -- -- -- -- -- -- -- -- -- 9   03/11/25 14:24:54 -- 67 -- 136/72 93 91 % -- -- -- -- -- -- --         Pertinent Labs/Diagnostic Results:     Radiology:  XR chest portable   Final Interpretation by Ruben Wilkins MD (03/12 5956)      Slightly decreased left pleural effusion status post chest tube placement.            Workstation performed: [id number]         CT chest wo contrast   Final Interpretation by Lalo Bacon MD (03/12 0520)      Loculated pleural fluid pockets in the left apex posteriorly and in the major  fissure.   Small free effusion in the left lung base.      Compressive atelectasis of the left lower lung with focal hypodensity and cystic changes suggesting parenchymal necrosis.               Workstation performed: [id number]         XR chest portable   Final Interpretation by Maxim Lopes MD (03/12 1022)      Significantly increased opacity in the left chest felt to mostly be due to large potentially partially loculated pleural effusion.      See above      The study was marked in EPIC for immediate notification.            Workstation performed: [id number]         XR chest portable   Final Interpretation by Tyron White MD (03/11 1629)      Worsening left-sided pneumonia with probable small pleural effusion.      The study was marked in EPIC for immediate notification.            Workstation performed: [id number]         Cardiology:  ECG 12 lead   Final Result by Kale Estevez MD (03/11 2335)   Normal sinus rhythm   Right superior axis deviation   Inferior infarct (cited on or before 19-Sep-2018)   Possible Anterior infarct , age undetermined   Abnormal ECG   When compared with ECG of 11-Mar-2025 04:51, (unconfirmed)   QT has lengthened   Confirmed by Kale Estevez ([id number]) on 3/11/2025 11:35:00 PM      ECG 12 lead   Final Result by Kale Estevez MD (03/11 2334)   Suspect arm lead reversal, interpretation assumes no reversal   Normal sinus rhythm   Right superior axis deviation   Inferior infarct (cited on or before 19-Sep-2018)   Abnormal ECG   When compared with ECG of 11-Mar-2025 04:51, (unconfirmed)   No significant change was found   Confirmed by Kale Estevez ([id number]) on 3/11/2025 11:34:54 PM      ECG 12 lead   Final Result by Kale Estevez MD (03/11 8642)   Normal sinus rhythm   Right superior axis deviation   Inferior infarct (cited on or before 19-Sep-2018)   Possible Anterior infarct (cited on or before 19-Sep-2018)   Abnormal ECG   When compared with ECG of  10-Mar-2025 07:30,   QRS axis Shifted left   Confirmed by Kale Estevez ([id number]) on 3/11/2025 11:18:22 PM                Results from last 7 days   Lab Units 03/13/25  0332 03/12/25 0137 03/11/25  0544 03/10/25  0512 03/09/25  0348   WBC Thousand/uL 15.62* 14.98* 10.39* 9.52 14.22*   HEMOGLOBIN g/dL 12.3 13.4 13.3 13.0 12.6   HEMATOCRIT % 38.2 41.1 41.7 40.0 39.0   PLATELETS Thousands/uL 140* 136* 117* 119* 131*   TOTAL NEUT ABS Thousands/µL  --   --   --  7.31  --          Results from last 7 days   Lab Units 03/13/25  0424 03/12/25 0137 03/11/25  0544 03/10/25  0512 03/09/25  0348 03/08/25  0101 03/07/25  0307   SODIUM mmol/L 137 136 138 139 138 138 136   POTASSIUM mmol/L 4.2 4.2 3.8 4.0 3.8 4.1 4.2   CHLORIDE mmol/L 105 107 108 110* 109* 108 106   CO2 mmol/L 26 22 24 22 22 19* 20*   ANION GAP mmol/L 6 7 6 7 7 11 10   BUN mg/dL 27* 30* 28* 28* 32* 36* 37*   CREATININE mg/dL 1.01 1.10 0.98 0.98 0.96 1.06 1.18   EGFR ml/min/1.73sq m 78 71 81 81 83 74 65   CALCIUM mg/dL 8.0* 7.8* 7.7* 7.5* 7.8* 8.3* 8.9   MAGNESIUM mg/dL  --   --   --   --   --  2.3 2.8*   PHOSPHORUS mg/dL  --   --   --   --   --   --  2.6     Results from last 7 days   Lab Units 03/12/25 0137 03/08/25  0101   AST U/L 12* 19   ALT U/L 13 15   ALK PHOS U/L 38 48   TOTAL PROTEIN g/dL 5.7* 7.1   ALBUMIN g/dL 2.9* 3.9   TOTAL BILIRUBIN mg/dL 0.36 0.43     Results from last 7 days   Lab Units 03/13/25  1047 03/13/25  0717 03/12/25  2046 03/12/25  1636 03/12/25  1105 03/12/25  0730 03/11/25  2025 03/11/25  1603 03/11/25  1103 03/11/25  0724 03/10/25  2045 03/10/25  1614   POC GLUCOSE mg/dl 123 109 136 159* 180* 109 172* 200* 161* 102 148* 188*     Results from last 7 days   Lab Units 03/13/25  0424 03/12/25  0137 03/11/25  0544 03/10/25  0512 03/09/25  0348 03/08/25  0101 03/07/25  0307   GLUCOSE RANDOM mg/dL 113 173* 108 116 143* 153* 143*                   Results from last 7 days   Lab Units 03/10/25  1055 03/10/25  0838   HS TNI 0HR ng/L  --  9   HS  TNI 2HR ng/L 8  --    HSTNI D2 ng/L -1  --          Results from last 7 days   Lab Units 03/13/25  0424 03/12/25  0137 03/11/25  0544   PROTIME seconds 29.8* 51.9* 50.1*   INR  2.81* 5.88* 5.61*         Results from last 7 days   Lab Units 03/09/25  0348 03/08/25  0101 03/07/25  0307   PROCALCITONIN ng/ml 0.70* 1.42* 2.75*             Results from last 7 days   Lab Units 03/13/25  0549   UNIT PRODUCT CODE  [id number]   UNIT NUMBER  [id number]   UNITABO  A   UNITRH  POS   UNIT DISPENSE STATUS  Presumed Trans   UNIT PRODUCT VOL ml 354                       Results from last 7 days   Lab Units 03/06/25  1518   STREP PNEUMONIAE ANTIGEN, URINE  Negative   LEGIONELLA URINARY ANTIGEN  Negative     Results from last 7 days   Lab Units 03/12/25  1630   BODY FLUID CULTURE, STERILE  No growth             Results from last 7 days   Lab Units 03/10/25  0512   VANCOMYCIN RM ug/mL 16.6       Network Utilization Review Department  ATTENTION: Please call with any questions or concerns to [phone] and carefully listen to the prompts so that you are directed to the right person. All voicemails are confidential.   For Discharge needs, contact Care Management DC Support Team at [phone] opt. 2  Send all requests for admission clinical reviews, approved or denied determinations and any other requests to dedicated fax number below belonging to the campus where the patient is receiving treatment. List of dedicated fax numbers for the Facilities:  FACILITY NAME UR FAX NUMBER   ADMISSION DENIALS (Administrative/Medical Necessity) [phone]   DISCHARGE SUPPORT TEAM (NETWORK) [phone]   PARENT CHILD HEALTH (Maternity/NICU/Pediatrics) [phone]   [facility] [phone]   [facility] [phone]   [facility] [phone]   [facility] [phone]   [facility] [phone]   [facility]  [facility] [phone]   [facility] [phone]   [facility] [phone]   [facility] [phone]   [facility] [phone]   [facility] [phone]   [facility] [phone]

## 2025-03-13 NOTE — ASSESSMENT & PLAN NOTE
CXR 3/6 showing increasing left basilar opacity  CT CAP with consolidation in LLL and lingula, and small consolidation in RLL  Procal remains elevated (2.75, 2.69, 0.94)  Urinary antigens negative. RP2 negative.   Placed on ceftriaxone, Flagyl and azithromycin, tailored to IV ceftriaxone and IV vancomycin on 3/8.    Completed 7 days of ceftriaxone, prior to that completed 3 days of azithromycin.  Currently on vancomycin only.  Continue vancomycin day 6 out of 7  CT chest 3/12 showed compressive atelectasis of the left lower lung with focal hypodensity and cystic changes suggesting parenchymal necrosis.  Likely in the setting of MRSA pneumonia.  Discussed with pulmonology, will require repeat CT chest f/u

## 2025-03-13 NOTE — ASSESSMENT & PLAN NOTE
"Outpatient: 12.5mg Carvedilol BID, 40mg lisinopril   /96 (BP Location: Right arm)   Pulse 79   Temp (!) 97.3 °F (36.3 °C) (Temporal)   Resp 20   Ht 5' 8\" (1.727 m)   Wt 97.2 kg (214 lb 4.6 oz)   SpO2 91%   BMI 32.58 kg/m²     Plan  continue  PRN labetolol, for SBP >180  "

## 2025-03-13 NOTE — PLAN OF CARE
Problem: OCCUPATIONAL THERAPY ADULT  Goal: Performs self-care activities at highest level of function for planned discharge setting.  See evaluation for individualized goals.  Description: Treatment Interventions: ADL retraining, Functional transfer training, UE strengthening/ROM, Endurance training, Cognitive reorientation, Patient/family training, Equipment evaluation/education, Compensatory technique education, Continued evaluation          See flowsheet documentation for full assessment, interventions and recommendations.   Note: Limitation: Decreased ADL status, Decreased UE strength, Decreased Safe judgement during ADL, Decreased cognition, Decreased endurance, Decreased self-care trans, Decreased high-level ADLs  Prognosis: Fair  Assessment: Pt is a 64 y.o. male seen for OT evaluation at [facility], admitted 3/5/2025 w/ Acute respiratory failure with hypoxia (HCC). Extensive chart review completed by OT. Comorbidities affecting the pt's functional performance include a significant PMH of: Bipolar 2 disorder, hypertension, hyperlipidemia, cirrhosis, L ventricular apical thrombus, hepatitis C, PNA, ADHD, anxiety, alcohol dependence in remission, spinal stenosis of lumbar region, low back pain, L hip pain, SOB. Personal factors affecting pt at time of IE include: steps to enter environment, behavioral pattern, difficulty performing ADLS, difficulty performing IADLS , limited insight into deficits, flat affect, health management , and environment. Pt admitted from [facility]. PTA pt was IND with ADLs, A with IADLs, and IND with functional mobility with use of SPC PRN. Upon OT IE pt demonstrated  max Ax2 for bed mobility, min Ax2 for functional transfers, [place] for UB ADLs and modA for LB ADLS 2* the following deficits impacting occupational performance: weakness, decreased strength, decreased balance, decreased tolerance, impaired memory, impaired sequencing, impaired problem solving, decreased safety awareness, and  increased pain. Based on OT IE, pt demonstrates the need for continued skilled OT tx focused on eating, grooming, bathing/shower, toilet hygiene, dressing, functional mobility, and clothing management during their stay in the hospital to address the stated deficits and maximize their level of functional independence w/ their ADLS and functional mobility. The patient's raw score on the AM-PAC Daily Activity inpatient short form is 16, standardized score is 35.96, less than 39.4. Patients at this level are likely to benefit from DC to post-acute rehabilitation services. However, please refer to therapist recommendation for discharge planning given other factors that may influence destination. At this time, OT recommendations at time of discharge are DC with Level II - Moderate Rehab Resource Intensity resources.     Rehab Resource Intensity Level, OT: II (Moderate Resource Intensity)

## 2025-03-13 NOTE — ASSESSMENT & PLAN NOTE
Chest x-ray 3/12 showed significantly increased opacity in the left chest felt to be mostly due to large potentially partially loculated pleural effusion.  CT chest 3/12 showed loculated pleural effusion pockets in the left apex posteriorly and in the major fissure.  Small free effusion in the left lung base.  Compressive atelectasis of the left lower lung with focal hypodensity and cystic changes suggesting parenchymal necrosis  Chest tube was placed 3/12.  Discussed with pulmonology

## 2025-03-13 NOTE — PHYSICAL THERAPY NOTE
PHYSICAL THERAPY EVAL  Physical Therapy Evaluation    Performed at least 2 patient identifiers during session:  Patient Active Problem List   Diagnosis    Bipolar 2 disorder, major depressive episode (HCC)    Attention deficit hyperactivity disorder (ADHD), combined type    Alcohol dependence in remission (HCC)    Anxiety disorder    Depressive disorder    Primary hypertension    Hyperlipidemia    Knee pain    Low back pain    Spinal stenosis of lumbar region    Tobacco dependence syndrome    Vitamin D deficiency    Encounter for medical examination to establish care    Left hip pain    Elevated d-dimer    Patellofemoral pain syndrome of right knee    SIRS (systemic inflammatory response syndrome) (HCC)    History of CVA (cerebrovascular accident)    Acute metabolic encephalopathy    Cirrhosis (HCC)    Iron deficiency anemia    Left ventricular apical thrombus    Acute pain of right knee    Acute left-sided low back pain without sciatica    Hepatitis C    Abnormal CXR    Shortness of breath    PUD (peptic ulcer disease)    Thrombocytopenia (HCC)    Acute respiratory failure with hypoxia (HCC)    Stroke-like symptoms    Pneumonia    KENN (obstructive sleep apnea)    Ischemic cardiomyopathy    Pleural effusion       Past Medical History:   Diagnosis Date    Alcohol abuse     Depression     Drug therapy     GERD (gastroesophageal reflux disease)     Hepatitis C     Hypertension 01/2012    Knee pain, bilateral     Left ventricular apical thrombus     Psychiatric disorder     depression, anxiety    Renal disorder     Self-injurious behavior     Spinal stenosis of lumbar region     Suicide attempt (HCC)        Past Surgical History:   Procedure Laterality Date    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    HERNIA REPAIR  1997 03/13/25 1015   PT Last Visit   PT Visit Date 03/13/25   Note Type   Note type Evaluation   Pain Assessment  "  Pain Assessment Tool FLACC   Pain Rating: FLACC (Rest) - Face 0   Pain Rating: FLACC (Rest) - Legs 0   Pain Rating: FLACC (Rest) - Activity 0   Pain Rating: FLACC (Rest) - Cry 0   Pain Rating: FLACC (Rest) - Consolability 0   Score: FLACC (Rest) 0   Pain Rating: FLACC (Activity) - Face 0   Pain Rating: FLACC (Activity) - Legs 0   Pain Rating: FLACC (Activity) - Activity 0   Pain Rating: FLACC (Activity) - Cry 0   Pain Rating: FLACC (Activity) - Consolability 0   Score: FLACC (Activity) 0   Restrictions/Precautions   Weight Bearing Precautions Per Order No   Other Precautions Cognitive;Chair Alarm;Bed Alarm;Multiple lines;O2;Fall Risk;Pain  (chest tube, 6L)   Home Living   Type of Home Group Home  ([facility])   Home Layout Bed/bath upstairs  (2 full flights to bedroom/bathroom)   Bathroom Shower/Tub Walk-in shower   Bathroom Toilet Standard   Bathroom Equipment Shower chair;Grab bars in shower   Home Equipment Cane   Prior Function   Level of [place] Independent with ADLs;Independent with functional mobility;Needs assistance with IADLS   Lives With Other (Comment)  (housemates)   IADLs Family/Friend/Other provides transportation;Family/Friend/Other provides meals;Family/Friend/Other provides medication management   Falls in the last 6 months 0   General   Additional Pertinent History Hx of bipolar   Family/Caregiver Present No   Cognition   Overall Cognitive Status Impaired   Arousal/Participation Alert   Attention Attends with cues to redirect   Orientation Level Oriented to person;Oriented to place;Disoriented to time   Memory Decreased short term memory;Decreased recall of recent events;Decreased recall of precautions   Following Commands Follows one step commands with increased time or repetition   Comments Flat affect   Subjective   Subjective \"I feel like crap\"   RLE Assessment   RLE Assessment WFL   LLE Assessment   LLE Assessment WFL   Bed Mobility   Supine to Sit 2  Maximal assistance   Additional " items Assist x 1;HOB elevated;Bedrails;Increased time required;Verbal cues;LE management   Additional Comments Needed assistance to get to EOB and get feet flat on floor. Max A x 1.   Transfers   Sit to Stand 4  Minimal assistance   Additional items Assist x 2;Armrests;Verbal cues   Stand to Sit 4  Minimal assistance   Additional items Assist x 2;Armrests;Verbal cues   Stand pivot 4  Minimal assistance   Additional items Assist x 2;Armrests;Verbal cues   Additional Comments Initial STS required mod Ax2, progressing to min Ax2 with 2nd trial and SPT; SpO2 88-92% on 6L   Balance   Static Sitting Good   Dynamic Sitting Fair +   Static Standing Fair -   Dynamic Standing Fair -   Endurance Deficit   Endurance Deficit Yes   Endurance Deficit Description Easily fatigued, O2 desat   Activity Tolerance   Activity Tolerance Patient limited by fatigue   Medical Staff Made Aware Magaly GLOVER   Nurse Made Aware RNCindy   Assessment   Prognosis Good   Problem List Decreased strength;Decreased endurance;Impaired balance;Decreased mobility;Decreased cognition;Impaired judgement;Decreased safety awareness   Assessment Patient is a 63y/o M who presents with acute respiratory failure with hypoxia, pneumonia, pleural effusion. Patient has a history of cirrhosis, hep C, and bipolar. Patient resides at a group home where he is independent and uses a SPC at needed. Current medical status includes decreased cognition, flat affect, multiple lines, chest tube, O2, SOB, fall risk, bed/chair alarm, decreased strength, balance, endurance and mobility. Patient was received supine in bed and agreeable to session. O2 saturation 88-91% throughout session. Patient required assistance of 1-2 for all mobility. Only able to tolerate a stand pivot transfer to the chair. Patient is deconditioned and is not at his baseline. Recommending level 2 resources. Moderate intensity. The patient's AM-PAC Basic Mobility Inpatient Short Form Raw Score is 10. A Raw  score of less than or equal to 17post-acute rehabilitation services. Please also refer to the recommendation of the Physical Therapist for safe discharge planning.   Barriers to Discharge Inaccessible home environment;Decreased caregiver support   Goals   Patient Goals None stated   STG Expiration Date 03/27/25   Short Term Goal #1 1. Perform supine<>sit with HOB flat without use of bedrails with min A x 1 2. Perform sit<>stand transfers supervision level 3. Ambulate 75ft with a RW supervision   PT Treatment Day 0   Plan   Treatment/Interventions LE strengthening/ROM;Functional transfer training;Therapeutic exercise;Endurance training;Cognitive reorientation;Patient/family training;Equipment eval/education;Bed mobility;Gait training;Spoke to nursing;OT   PT Frequency 2-3x/wk   Discharge Recommendation   Rehab Resource Intensity Level, PT II (Moderate Resource Intensity)   AM-PAC Basic Mobility Inpatient   Turning in Flat Bed Without Bedrails 2   Lying on Back to Sitting on Edge of Flat Bed Without Bedrails 2   Moving Bed to Chair 2   Standing Up From Chair Using Arms 2   Walk in Room 1   Climb 3-5 Stairs With Railing 1   Basic Mobility Inpatient Raw Score 10   Turning Head Towards Sound 3   Follow Simple Instructions 3   Low Function Basic Mobility Raw Score  16   Low Function Basic Mobility Standardized Score  25.72   [place] Highest Level Of Mobility   -HL Goal 4: Move to chair/commode   -HLM Achieved 4: Move to chair/commode   End of Consult   Patient Position at End of Consult Bedside chair;Bed/Chair alarm activated;All needs within reach     Waleska Khan, PT             Patient Name: Chuy Norton  Today's Date: 3/13/2025

## 2025-03-13 NOTE — PROGRESS NOTES
Progress Note - Pulmonology   Name: Chuy Norton 64 y.o. male I MRN: [id number]  Unit/Bed#: -01 I Date of Admission: 3/5/2025   Date of Service: 3/13/2025 I Hospital Day: 8    Assessment & Plan  Acute respiratory failure with hypoxia (HCC)  Initially requiring 4L in ED, however had worsening respiratory status and placed on BiPAP. Continue on BiPAP as needed and qhs - but patient is refusing   Currently on 6L  Titrate oxygen to maintain saturations >89%  Recommend pulmonary hygiene: Deep breathing with cough, OOB as tolerated, incentive spirometry and flutter valve  Will need home O2 eval prior to discharge   Pneumonia  MRSA culture positive  Completed 3 days of azithromycin  Ceftriaxone - completed 7 days  Vancomycin day #6-complete 7-day course  Continue atrovent/xopenex nebs TID  Speech cleared the patient for regular diet and thin liquids  Completed 5 days of prednisone 40 mg  Will need outpatient follow-up imaging and PFTs as well as smoking cessation support  Repeat imaging in 4 to 6 weeks  Pleural effusion  CT chest with loculated left pleural effusion   Dr Mccormack placed L chest tube yesterday. Continue chest tube to -20 suction. Drainage over past 24H is 110mL. Follow up pleural fluid cultures  He would likely benefit from tpa/dornase, however given INR will discuss with attending   KENN (obstructive sleep apnea)  Maintained on CPAP at home  Continue BiPAP qhs for now (as patient tolerates)  Left ventricular apical thrombus  Maintained on coumadin  Ischemic cardiomyopathy  Wt Readings from Last 3 Encounters:   03/10/25 97.2 kg (214 lb 4.6 oz)   02/07/25 95.9 kg (211 lb 6.4 oz)   01/02/25 91.2 kg (201 lb)      on admission  5 pound weight gain since admission, 13 pound weight gain since January  Echo 12/18/2024 shows LVEF 45%, systolic function mildly reduced, moderate solid fixed thrombus in the apex  Lower extremity edema present  Patient appears mildly volume overloaded likely in the setting of  sepsis resuscitation  Given IV Lasix 20 mg 3/10    Bipolar 2 disorder, major depressive episode (HCC)    Primary hypertension    Cirrhosis (HCC)      24 Hour Events : None   Subjective : Chuy is awake resting in bed. He denies any chest discomfort, cough or shortness of breath. No respiratory c/o.    Objective :  Temp:  [97.3 °F (36.3 °C)-98.2 °F (36.8 °C)] 97.3 °F (36.3 °C)  HR:  [67-79] 79  BP: (129-166)/(64-97) 163/96  Resp:  [16-20] 20  SpO2:  [87 %-94 %] 91 %  O2 Device: Nasal cannula  Nasal Cannula O2 Flow Rate (L/min):  [4 L/min-6 L/min] 6 L/min    Physical Exam  General appearance:   Alert and awake, in no acute distress  Head:   Normocephalic, without obvious abnormality, atraumatic  Eyes:   No scleral icterus   Lungs:  Pulmonary effort non labored at rest. L lateral chest tube in place no tubing kinks or air leaks present.  Breath sounds: Decreased bibasilar breath sounds L>R. No wheezes.  Cardiovascular:   Regular rate and rhythm. No murmurs. Capillary refill < 3 seconds.  Abdomen:    No appreciable distension or tenderness  Extremities:   No deformity. No clubbing present. No edema to extremities.   Skin:   Warm and dry. Intact. Color appropriate for ethnicity.  Neurologic:   No acute focal deficits are noted.  Psychiatric:   Normal mood. Answers questions appropriately.       Lab Results: I have reviewed the following results:   .     03/13/25  0332 03/13/25  0424   WBC 15.62*  --    HGB 12.3  --    HCT 38.2  --    *  --    SODIUM  --  137   K  --  4.2   CL  --  105   CO2  --  26   BUN  --  27*   CREATININE  --  1.01   GLUC  --  113   INR  --  2.81*     ABG: No new results in last 24 hours.    Imaging Results Review: I reviewed radiology reports from this admission including: chest xray and CT chest.  Other Study Results Review: No additional pertinent studies reviewed.  PFT Results Reviewed: N/A      Ninoska Causey, ROSIBEL RN FNP-BC  Nurse Practitioner  [facility]

## 2025-03-13 NOTE — OCCUPATIONAL THERAPY NOTE
Occupational Therapy Evaluation     Patient Name: Chuy Norton  Today's Date: 3/13/2025  Problem List  Principal Problem:    Acute respiratory failure with hypoxia (HCC)  Active Problems:    Bipolar 2 disorder, major depressive episode (HCC)    Primary hypertension    Hyperlipidemia    Cirrhosis (HCC)    Left ventricular apical thrombus    Hepatitis C    Pneumonia    KENN (obstructive sleep apnea)    Ischemic cardiomyopathy    Pleural effusion    Past Medical History  Past Medical History:   Diagnosis Date    Alcohol abuse     Depression     Drug therapy     GERD (gastroesophageal reflux disease)     Hepatitis C     Hypertension 01/2012    Knee pain, bilateral     Left ventricular apical thrombus     Psychiatric disorder     depression, anxiety    Renal disorder     Self-injurious behavior     Spinal stenosis of lumbar region     Suicide attempt (HCC)      Past Surgical History  Past Surgical History:   Procedure Laterality Date    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    HERNIA REPAIR  1997 03/13/25 1004   OT Last Visit   OT Visit Date 03/13/25   Note Type   Note type Evaluation   Pain Assessment   Pain Assessment Tool FLACC   Pain Score No Pain   Patient's Stated Pain Goal No pain   Pain Rating: FLACC (Rest) - Face 0   Pain Rating: FLACC (Rest) - Legs 0   Pain Rating: FLACC (Rest) - Activity 0   Pain Rating: FLACC (Rest) - Cry 0   Pain Rating: FLACC (Rest) - Consolability 0   Score: FLACC (Rest) 0   Pain Rating: FLACC (Activity) - Face 0   Pain Rating: FLACC (Activity) - Legs 0   Pain Rating: FLACC (Activity) - Activity 0   Pain Rating: FLACC (Activity) - Cry 0   Pain Rating: FLACC (Activity) - Consolability 0   Score: FLACC (Activity) 0   Restrictions/Precautions   Weight Bearing Precautions Per Order No   Other Precautions Cognitive;Chair Alarm;Bed Alarm;Multiple lines;O2;Fall Risk;Pain  (chest tube; 6L O2 via NC)   Home Living   Type of Home Group Home  ([facility])   Home Layout Bed/bath  "upstairs  (2 FF to bed/bath, 1 flight to laundry)   Bathroom Shower/Tub Walk-in shower   Bathroom Toilet Standard   Bathroom Equipment Shower chair;Grab bars in shower   Home Equipment Cane   Prior Function   Level of [place] Independent with ADLs;Independent with functional mobility;Needs assistance with IADLS   Lives With Other (Comment)  (housemates)   IADLs Family/Friend/Other provides transportation;Family/Friend/Other provides meals;Family/Friend/Other provides medication management   Falls in the last 6 months 0   Lifestyle   Autonomy PTA pt was IND with ADLs, A with IADLs, and IND with functional mobility with use of SPC PRN   General   Additional Pertinent History H/o biploar disorder, ETOH abuse, CVA   Family/Caregiver Present No   Subjective   Subjective \"I can't do that.\"   ADL   Eating Assistance 5  Supervision/Setup   Eating Deficit Setup   Grooming Assistance 5  Supervision/Setup   Grooming Deficit Setup   UB Bathing Assistance 4  Minimal Assistance   LB Bathing Assistance 3  Moderate Assistance   UB Dressing Assistance 4  Minimal Assistance   LB Dressing Assistance 3  Moderate Assistance   Toileting Assistance  4  Minimal Assistance   Bed Mobility   Supine to Sit 2  Maximal assistance   Additional items Assist x 1;HOB elevated;Bedrails;Increased time required;Verbal cues;LE management   Additional Comments Pt able to sit EOB with supervision   Transfers   Sit to Stand 4  Minimal assistance   Additional items Assist x 2;Armrests;Verbal cues   Stand to Sit 4  Minimal assistance   Additional items Assist x 2;Armrests;Verbal cues   Stand pivot 4  Minimal assistance   Additional items Assist x 2;Verbal cues   Additional Comments Initial STS required mod Ax2, progressing to min Ax2 with 2nd trial and SPT; SpO2 88-92% on 6L   Balance   Static Sitting Good   Dynamic Sitting Good   Static Standing Fair -   Dynamic Standing Fair -   Activity Tolerance   Activity Tolerance Patient tolerated treatment " well;Patient limited by fatigue   Medical Staff Made Aware PT Deidra   Nurse Made Aware RN Cindy VALDEZ Assessment   RUE Assessment WFL   LUE Assessment   LUE Assessment WFL   Hand Function   Gross Motor Coordination Functional   Fine Motor Coordination Functional   Vision-Basic Assessment   Current Vision Wears glasses all the time   Psychosocial   Psychosocial (WDL) WDL   Cognition   Overall Cognitive Status Impaired   Arousal/Participation Alert   Attention Attends with cues to redirect   Orientation Level Oriented to person;Oriented to place;Disoriented to time  (unable to recall specific date, able to recall date)   Memory Decreased recall of precautions;Decreased recall of recent events;Decreased short term memory   Following Commands Follows one step commands with increased time or repetition   Comments Pt willing to participate in OT evaluation. Flat affect t/o session.   Assessment   Limitation Decreased ADL status;Decreased UE strength;Decreased Safe judgement during ADL;Decreased cognition;Decreased endurance;Decreased self-care trans;Decreased high-level ADLs   Prognosis Fair   Assessment Pt is a 64 y.o. male seen for OT evaluation at [facility], admitted 3/5/2025 w/ Acute respiratory failure with hypoxia (HCC). Extensive chart review completed by OT. Comorbidities affecting the pt's functional performance include a significant PMH of: Bipolar 2 disorder, hypertension, hyperlipidemia, cirrhosis, L ventricular apical thrombus, hepatitis C, PNA, ADHD, anxiety, alcohol dependence in remission, spinal stenosis of lumbar region, low back pain, L hip pain, SOB. Personal factors affecting pt at time of IE include: steps to enter environment, behavioral pattern, difficulty performing ADLS, difficulty performing IADLS , limited insight into deficits, flat affect, health management , and environment. Pt admitted from [facility]. PTA pt was IND with ADLs, A with IADLs, and IND with functional mobility with use of SPC PRN.  Upon OT IE pt demonstrated  max Ax2 for bed mobility, min Ax2 for functional transfers, [place] for UB ADLs and modA for LB ADLS 2* the following deficits impacting occupational performance: weakness, decreased strength, decreased balance, decreased tolerance, impaired memory, impaired sequencing, impaired problem solving, decreased safety awareness, and increased pain. Based on OT IE, pt demonstrates the need for continued skilled OT tx focused on eating, grooming, bathing/shower, toilet hygiene, dressing, functional mobility, and clothing management during their stay in the hospital to address the stated deficits and maximize their level of functional independence w/ their ADLS and functional mobility. The patient's raw score on the AM-PAC Daily Activity inpatient short form is 16, standardized score is 35.96, less than 39.4. Patients at this level are likely to benefit from DC to post-acute rehabilitation services. However, please refer to therapist recommendation for discharge planning given other factors that may influence destination. At this time, OT recommendations at time of discharge are DC with Level II - Moderate Rehab Resource Intensity resources.   Goals   Patient Goals None stated   Plan   Treatment Interventions ADL retraining;Functional transfer training;UE strengthening/ROM;Endurance training;Cognitive reorientation;Patient/family training;Equipment evaluation/education;Compensatory technique education;Continued evaluation   Goal Expiration Date 03/23/25   OT Treatment Day 0   OT Frequency 2-3x/wk   Discharge Recommendation   Rehab Resource Intensity Level, OT II (Moderate Resource Intensity)   AM-PAC Daily Activity Inpatient   Lower Body Dressing 2   Bathing 2   Toileting 2   Upper Body Dressing 3   Grooming 3   Eating 4   Daily Activity Raw Score 16   Daily Activity Standardized Score (Calc for Raw Score >=11) 35.96   AM-PAC Applied Cognition Inpatient   Following a Speech/Presentation 2    Understanding Ordinary Conversation 3   Taking Medications 2   Remembering Where Things Are Placed or Put Away 2   Remembering List of 4-5 Errands 2   Taking Care of Complicated Tasks 2   Applied Cognition Raw Score 13   Applied Cognition Standardized Score 30.46   End of Consult   Education Provided Yes   Patient Position at End of Consult Bedside chair;Bed/Chair alarm activated;All needs within reach   Nurse Communication Nurse aware of consult       Pt will complete the following goals within 10 days:     Pt will complete grooming with Elizabeth.    Pt will complete UB bathing and dressing with supervision  .    Pt will complete LB bathing and dressing with supervision & DME PRN.    Pt will complete toileting w/ supervision  w/ G hygiene/thoroughness using DME PRN    Pt will improve functional mobility during ADL/IADL/leisure tasks to supervision  using DME as needed w/ G balance/safety.    Pt will perform functional transfers with supervision in order to facilitate completion of  ADL routine.    Pt will increase standing tolerance to 10+ minutes in order to complete ADL routine.    Pt will demonstrate G carryover of pt/caregiver education and training as appropriate w/ Mod I w/o cues w/ good tolerance.      Pt will improve balance by 1/2 grade to facilitate completion of ADL tasks.     Pt will improve functional activity tolerance to 10 minutes of sustained functional tasks to increase participation in basic self-care and decrease assistance level.     Pt will identify 3-5 fall risks to ensure safety upon discharge.      Magaly Dc, OTR/L

## 2025-03-13 NOTE — PLAN OF CARE
Problem: PHYSICAL THERAPY ADULT  Goal: Performs mobility at highest level of function for planned discharge setting.  See evaluation for individualized goals.  Description: Treatment/Interventions: LE strengthening/ROM, Functional transfer training, Therapeutic exercise, Endurance training, Cognitive reorientation, Patient/family training, Equipment eval/education, Bed mobility, Gait training, Spoke to nursing, OT          See flowsheet documentation for full assessment, interventions and recommendations.  Note: Prognosis: Good  Problem List: Decreased strength, Decreased endurance, Impaired balance, Decreased mobility, Decreased cognition, Impaired judgement, Decreased safety awareness  Assessment: Patient is a 63y/o M who presents with acute respiratory failure with hypoxia, pneumonia, pleural effusion. Patient has a history of cirrhosis, hep C, and bipolar. Patient resides at a group home where he is independent and uses a SPC at needed. Current medical status includes decreased cognition, flat affect, multiple lines, chest tube, O2, SOB, fall risk, bed/chair alarm, decreased strength, balance, endurance and mobility. Patient was received supine in bed and agreeable to session. O2 saturation 88-91% throughout session. Patient required assistance of 1-2 for all mobility. Only able to tolerate a stand pivot transfer to the chair. Patient is deconditioned and is not at his baseline. Recommending level 2 resources. Moderate intensity. The patient's AM-PAC Basic Mobility Inpatient Short Form Raw Score is 10. A Raw score of less than or equal to 17post-acute rehabilitation services. Please also refer to the recommendation of the Physical Therapist for safe discharge planning.  Barriers to Discharge: Inaccessible home environment, Decreased caregiver support     Rehab Resource Intensity Level, PT: II (Moderate Resource Intensity)    See flowsheet documentation for full assessment.

## 2025-03-13 NOTE — PROGRESS NOTES
"Progress Note - Hospitalist   Name: Chuy Norton 64 y.o. male I MRN: [id number]  Unit/Bed#: -01 I Date of Admission: 3/5/2025   Date of Service: 3/13/2025 I Hospital Day: 8    Assessment & Plan  Acute respiratory failure with hypoxia (HCC)  Patient brought in by facility staff for SOB, cough x2 weeks and dizziness with standing  At baseline patient states he does not wear oxygen but does wear nocturnal CPAP  In ED required 4 L however had worsening respiratory status and was placed on BiPAP  Flu/Covid/RSV negative  CXR: Patchy lingular and lower lobe opacities, concerning for pneumonia.   CT CAP: Multifocal pneumonia involving the left lower lobe, lingula, and medial right lower lobe. Fluid in the esophagus suggests aspiration as a possible etiology. Moderate size hiatal hernia and distended fluid-filled esophagus to the level of the thoracic inlet.   Completed azithromycin, 7 days of ceftriaxone and is currently on vancomycin.  Completed 5 days of prednisone.  Evaluated by speech, recommended regular diet with thin liquids  home oxygen evaluation prior to discharge  Required 6 L nasal cannula oxygen   Repeat x-ray 3/12 showed loculated effusion that was confirmed with CT chest  Chest tube placed yesterday by pulmonology, drained about 100 mL since yesterday.  Will defer to pulmonology further management/tPA dornase  Patient on 6 L nasal cannula oxygen  Bipolar 2 disorder, major depressive episode (HCC)  Outpatient: 20mg lexapro, 300mg Seroquel HS, 100mg trazadone HS    Plan  Continue   Primary hypertension  Outpatient: 12.5mg Carvedilol BID, 40mg lisinopril   /96 (BP Location: Right arm)   Pulse 79   Temp (!) 97.3 °F (36.3 °C) (Temporal)   Resp 20   Ht 5' 8\" (1.727 m)   Wt 97.2 kg (214 lb 4.6 oz)   SpO2 91%   BMI 32.58 kg/m²     Plan  continue  PRN labetolol, for SBP >180  Hyperlipidemia  Outpatient: 40 atorvastatin    Plan  Continue statin   Cirrhosis (HCC)  Follows with [facility], last seen Jan " 2025  History of cirrhosis of the liver due to history of alcohol and drug addiction.   5/9/2024 CT C/A/P noted nodular hepatic contours may indicate cirrhosis of the liver.   No ascites present  Left ventricular apical thrombus    Recent Labs     03/11/25  0544 03/12/25  0137 03/13/25  0424   INR 5.61* 5.88* 2.81*      Outpatient: 2.5mg Warfarin (Sun, Tues, Wed, Fri) and 5mg (Mon, Thurs, Sat)  Patient with supratherapeutic INR in the past 4 days or so, warfarin was held.  Eventually INR improved today.  We will resume warfarin 2 mg daily for now.  Suspect INR will drop even more in the next day or so  Monitor INR  Hepatitis C  Follows with [facility], last seen Jan 2025  History of hepatitis C antibody and stated treatment with Mavyret   May 2024 negative viral load  Pneumonia  CXR 3/6 showing increasing left basilar opacity  CT CAP with consolidation in LLL and lingula, and small consolidation in RLL  Procal remains elevated (2.75, 2.69, 0.94)  Urinary antigens negative. RP2 negative.   Placed on ceftriaxone, Flagyl and azithromycin, tailored to IV ceftriaxone and IV vancomycin on 3/8.    Completed 7 days of ceftriaxone, prior to that completed 3 days of azithromycin.  Currently on vancomycin only.  Continue vancomycin day 6 out of 7  CT chest 3/12 showed compressive atelectasis of the left lower lung with focal hypodensity and cystic changes suggesting parenchymal necrosis.  Likely in the setting of MRSA pneumonia.  Discussed with pulmonology, will require repeat CT chest f/u  KENN (obstructive sleep apnea)  Maintained on CPAP at home  Continue BiPAP qhs for now  Ischemic cardiomyopathy    Pleural effusion    Chest x-ray 3/12 showed significantly increased opacity in the left chest felt to be mostly due to large potentially partially loculated pleural effusion.  CT chest 3/12 showed loculated pleural effusion pockets in the left apex posteriorly and in the major fissure.  Small free effusion in the left lung base.   Compressive atelectasis of the left lower lung with focal hypodensity and cystic changes suggesting parenchymal necrosis  Chest tube was placed 3/12.  Discussed with pulmonology    VTE Pharmacologic Prophylaxis:   Moderate Risk (Score 3-4) - Pharmacological DVT Prophylaxis Ordered: warfarin (Coumadin).    Mobility:   Basic Mobility Inpatient Raw Score: 19  JH-HLM Goal: 6: Walk 10 steps or more  JH-HLM Achieved: 6: Walk 10 steps or more  JH-HLM Goal achieved. Continue to encourage appropriate mobility.    Patient Centered Rounds: I performed bedside rounds with nursing staff today.   Discussions with Specialists or Other Care Team Provider: kieran pulm    Education and Discussions with Family / Patient: Patient declined call to .     Current Length of Stay: 8 day(s)  Current Patient Status: Inpatient   Certification Statement: The patient will continue to require additional inpatient hospital stay due to pleural effusion  Discharge Plan: Anticipate discharge in >72 hrs to prior assisted or independent living facility.    Code Status: Level 1 - Full Code    Subjective     Chest pain because of the chest tube. Shortness of breath.     Objective :  Temp:  [97.3 °F (36.3 °C)-98.2 °F (36.8 °C)] 97.3 °F (36.3 °C)  HR:  [67-79] 79  BP: (129-166)/(64-97) 163/96  Resp:  [16-20] 20  SpO2:  [87 %-94 %] 91 %  O2 Device: Nasal cannula  Nasal Cannula O2 Flow Rate (L/min):  [6 L/min] 6 L/min    Body mass index is 32.58 kg/m².     Input and Output Summary (last 24 hours):     Intake/Output Summary (Last 24 hours) at 3/13/2025 1119  Last data filed at 3/13/2025 1023  Gross per 24 hour   Intake 1006.67 ml   Output 355 ml   Net 651.67 ml       Physical Exam  Vitals and nursing note reviewed.   Constitutional:       General: He is not in acute distress.     Appearance: He is not diaphoretic.   HENT:      Head: Normocephalic.   Eyes:      General:         Right eye: No discharge.         Left eye: No discharge.    Cardiovascular:      Rate and Rhythm: Normal rate and regular rhythm.   Pulmonary:      Effort: Pulmonary effort is normal. No respiratory distress.      Breath sounds: No wheezing, rhonchi or rales.      Comments: No bs left apical  Abdominal:      General: There is no distension.      Palpations: Abdomen is soft.      Tenderness: There is no abdominal tenderness. There is no guarding or rebound.   Musculoskeletal:      Cervical back: Normal range of motion.      Right lower leg: No edema.      Left lower leg: No edema.   Skin:     General: Skin is warm.   Neurological:      Mental Status: He is alert.   Psychiatric:         Mood and Affect: Mood normal.         Behavior: Behavior normal.           Lines/Drains:  Lines/Drains/Airways       Active Status       Name Placement date Placement time Site Days    Chest Tube 03/12/25  1549  --  less than 1                            Lab Results: I have reviewed the following results:   Results from last 7 days   Lab Units 03/13/25  0332 03/11/25  0544 03/10/25  0512   WBC Thousand/uL 15.62*   < > 9.52   HEMOGLOBIN g/dL 12.3   < > 13.0   HEMATOCRIT % 38.2   < > 40.0   PLATELETS Thousands/uL 140*   < > 119*   SEGS PCT %  --   --  77*   LYMPHO PCT %  --   --  12*   MONO PCT %  --   --  9   EOS PCT %  --   --  0    < > = values in this interval not displayed.     Results from last 7 days   Lab Units 03/13/25  0424 03/12/25  0137   SODIUM mmol/L 137 136   POTASSIUM mmol/L 4.2 4.2   CHLORIDE mmol/L 105 107   CO2 mmol/L 26 22   BUN mg/dL 27* 30*   CREATININE mg/dL 1.01 1.10   ANION GAP mmol/L 6 7   CALCIUM mg/dL 8.0* 7.8*   ALBUMIN g/dL  --  2.9*   TOTAL BILIRUBIN mg/dL  --  0.36   ALK PHOS U/L  --  38   ALT U/L  --  13   AST U/L  --  12*   GLUCOSE RANDOM mg/dL 113 173*     Results from last 7 days   Lab Units 03/13/25  0424   INR  2.81*     Results from last 7 days   Lab Units 03/13/25  1047 03/13/25  0717 03/12/25  2046 03/12/25  1636 03/12/25  1105 03/12/25  0730  03/11/25  2025 03/11/25  1603 03/11/25  1103 03/11/25  0724 03/10/25  2045 03/10/25  1614   POC GLUCOSE mg/dl 123 109 136 159* 180* 109 172* 200* 161* 102 148* 188*         Results from last 7 days   Lab Units 03/09/25  0348 03/08/25  0101 03/07/25  0307   PROCALCITONIN ng/ml 0.70* 1.42* 2.75*       Recent Cultures (last 7 days):   Results from last 7 days   Lab Units 03/06/25  1518   LEGIONELLA URINARY ANTIGEN  Negative       Imaging Results Review: I reviewed radiology reports from this admission including: chest xray.  Other Study Results Review: No additional pertinent studies reviewed.    Last 24 Hours Medication List:     Current Facility-Administered Medications:     acetaminophen (TYLENOL) tablet 650 mg, Q6H PRN    atorvastatin (LIPITOR) tablet 40 mg, Daily With Dinner    benzonatate (TESSALON PERLES) capsule 200 mg, TID PRN    bisacodyl (DULCOLAX) rectal suppository 10 mg, Daily    carvedilol (COREG) tablet 25 mg, BID With Meals    chlorhexidine (PERIDEX) 0.12 % oral rinse 15 mL, Q12H SHANNA    escitalopram (LEXAPRO) tablet 20 mg, Daily    guaiFENesin (ROBITUSSIN) oral liquid 200 mg, TID PRN    hydrALAZINE (APRESOLINE) injection 10 mg, Q6H PRN    insulin lispro (HumALOG/ADMELOG) 100 units/mL subcutaneous injection 1-6 Units, 4x Daily (AC & HS) **AND** Fingerstick Glucose (POCT), 4x Daily AC and at bedtime    ipratropium (ATROVENT) 0.02 % inhalation solution 0.5 mg, TID    levalbuterol (XOPENEX) inhalation solution 1.25 mg, TID    levalbuterol (XOPENEX) inhalation solution 1.25 mg, Q6H PRN    lidocaine (LIDODERM) 5 % patch 1 patch, Daily    losartan (COZAAR) tablet 100 mg, Daily    morphine injection 2 mg, Q4H PRN    nicotine (NICODERM CQ) 14 mg/24hr TD 24 hr patch 1 patch, Daily    pantoprazole (PROTONIX) EC tablet 40 mg, BID before lunch/dinner    polyethylene glycol (MIRALAX) packet 17 g, Daily    QUEtiapine (SEROquel) tablet 300 mg, HS    senna-docusate sodium (SENOKOT S) 8.6-50 mg per tablet 1 tablet,  BID    traZODone (DESYREL) tablet 100 mg, HS    vancomycin (VANCOCIN) IVPB (premix in dextrose) 1,000 mg 200 mL, Q12H, Last Rate: 1,000 mg (03/13/25 0036)    [Held by provider] warfarin (COUMADIN) tablet 2.5 mg, Once per day on Sunday Tuesday Wednesday Friday    [Held by provider] warfarin (COUMADIN) tablet 5 mg, Once per day on Monday Thursday Saturday    Administrative Statements   Today, Patient Was Seen By: Zainab Ribeiro MD      **Please Note: This note may have been constructed using a voice recognition system.**

## 2025-03-13 NOTE — ASSESSMENT & PLAN NOTE
Patient brought in by facility staff for SOB, cough x2 weeks and dizziness with standing  At baseline patient states he does not wear oxygen but does wear nocturnal CPAP  In ED required 4 L however had worsening respiratory status and was placed on BiPAP  Flu/Covid/RSV negative  CXR: Patchy lingular and lower lobe opacities, concerning for pneumonia.   CT CAP: Multifocal pneumonia involving the left lower lobe, lingula, and medial right lower lobe. Fluid in the esophagus suggests aspiration as a possible etiology. Moderate size hiatal hernia and distended fluid-filled esophagus to the level of the thoracic inlet.   Completed azithromycin, 7 days of ceftriaxone and is currently on vancomycin.  Completed 5 days of prednisone.  Evaluated by speech, recommended regular diet with thin liquids  home oxygen evaluation prior to discharge  Required 6 L nasal cannula oxygen   Repeat x-ray 3/12 showed loculated effusion that was confirmed with CT chest  Chest tube placed yesterday by pulmonology, drained about 100 mL since yesterday.  Will defer to pulmonology further management/tPA dornase  Patient on 6 L nasal cannula oxygen

## 2025-03-14 ENCOUNTER — APPOINTMENT (INPATIENT)
Dept: CT IMAGING | Facility: HOSPITAL | Age: 65
DRG: 871 | End: 2025-03-14
Payer: COMMERCIAL

## 2025-03-14 PROBLEM — J91.8 PARAPNEUMONIC EFFUSION: Status: ACTIVE | Noted: 2025-03-12

## 2025-03-14 PROBLEM — J18.9 PARAPNEUMONIC EFFUSION: Status: ACTIVE | Noted: 2025-03-12

## 2025-03-14 LAB
ANION GAP SERPL CALCULATED.3IONS-SCNC: 5 MMOL/L (ref 4–13)
BUN SERPL-MCNC: 31 MG/DL (ref 5–25)
CALCIUM SERPL-MCNC: 8 MG/DL (ref 8.4–10.2)
CHLORIDE SERPL-SCNC: 103 MMOL/L (ref 96–108)
CO2 SERPL-SCNC: 27 MMOL/L (ref 21–32)
CREAT SERPL-MCNC: 1.16 MG/DL (ref 0.6–1.3)
ERYTHROCYTE [DISTWIDTH] IN BLOOD BY AUTOMATED COUNT: 19.7 % (ref 11.6–15.1)
GFR SERPL CREATININE-BSD FRML MDRD: 66 ML/MIN/1.73SQ M
GLUCOSE SERPL-MCNC: 112 MG/DL (ref 65–140)
GLUCOSE SERPL-MCNC: 117 MG/DL (ref 65–140)
GLUCOSE SERPL-MCNC: 124 MG/DL (ref 65–140)
GLUCOSE SERPL-MCNC: 134 MG/DL (ref 65–140)
GLUCOSE SERPL-MCNC: 262 MG/DL (ref 65–140)
HCT VFR BLD AUTO: 41.5 % (ref 36.5–49.3)
HGB BLD-MCNC: 13.5 G/DL (ref 12–17)
INR PPP: 2.12 (ref 0.85–1.19)
MCH RBC QN AUTO: 29.8 PG (ref 26.8–34.3)
MCHC RBC AUTO-ENTMCNC: 32.5 G/DL (ref 31.4–37.4)
MCV RBC AUTO: 92 FL (ref 82–98)
PLATELET # BLD AUTO: 150 THOUSANDS/UL (ref 149–390)
PMV BLD AUTO: 10.4 FL (ref 8.9–12.7)
POTASSIUM SERPL-SCNC: 4.4 MMOL/L (ref 3.5–5.3)
PROTHROMBIN TIME: 24.1 SECONDS (ref 12.3–15)
RBC # BLD AUTO: 4.53 MILLION/UL (ref 3.88–5.62)
SODIUM SERPL-SCNC: 135 MMOL/L (ref 135–147)
WBC # BLD AUTO: 17.64 THOUSAND/UL (ref 4.31–10.16)

## 2025-03-14 PROCEDURE — 99232 SBSQ HOSP IP/OBS MODERATE 35: CPT | Performed by: INTERNAL MEDICINE

## 2025-03-14 PROCEDURE — 97530 THERAPEUTIC ACTIVITIES: CPT

## 2025-03-14 PROCEDURE — 82948 REAGENT STRIP/BLOOD GLUCOSE: CPT

## 2025-03-14 PROCEDURE — 94760 N-INVAS EAR/PLS OXIMETRY 1: CPT

## 2025-03-14 PROCEDURE — 85610 PROTHROMBIN TIME: CPT | Performed by: INTERNAL MEDICINE

## 2025-03-14 PROCEDURE — 85027 COMPLETE CBC AUTOMATED: CPT | Performed by: INTERNAL MEDICINE

## 2025-03-14 PROCEDURE — 94640 AIRWAY INHALATION TREATMENT: CPT

## 2025-03-14 PROCEDURE — 80048 BASIC METABOLIC PNL TOTAL CA: CPT | Performed by: INTERNAL MEDICINE

## 2025-03-14 PROCEDURE — 71250 CT THORAX DX C-: CPT

## 2025-03-14 RX ORDER — OXYCODONE HYDROCHLORIDE 5 MG/1
5 TABLET ORAL EVERY 6 HOURS PRN
Refills: 0 | Status: DISCONTINUED | OUTPATIENT
Start: 2025-03-14 | End: 2025-03-22 | Stop reason: HOSPADM

## 2025-03-14 RX ORDER — HYDROMORPHONE HCL/PF 1 MG/ML
0.5 SYRINGE (ML) INJECTION EVERY 4 HOURS PRN
Refills: 0 | Status: DISCONTINUED | OUTPATIENT
Start: 2025-03-14 | End: 2025-03-22 | Stop reason: HOSPADM

## 2025-03-14 RX ORDER — KETOROLAC TROMETHAMINE 30 MG/ML
15 INJECTION, SOLUTION INTRAMUSCULAR; INTRAVENOUS EVERY 6 HOURS PRN
Status: DISCONTINUED | OUTPATIENT
Start: 2025-03-14 | End: 2025-03-15

## 2025-03-14 RX ORDER — ACETAMINOPHEN 325 MG/1
975 TABLET ORAL EVERY 8 HOURS SCHEDULED
Status: DISCONTINUED | OUTPATIENT
Start: 2025-03-14 | End: 2025-03-20

## 2025-03-14 RX ADMIN — ESCITALOPRAM OXALATE 20 MG: 20 TABLET ORAL at 08:22

## 2025-03-14 RX ADMIN — ACETAMINOPHEN 975 MG: 325 TABLET, FILM COATED ORAL at 17:41

## 2025-03-14 RX ADMIN — SENNOSIDES AND DOCUSATE SODIUM 1 TABLET: 50; 8.6 TABLET ORAL at 08:22

## 2025-03-14 RX ADMIN — LEVALBUTEROL HYDROCHLORIDE 1.25 MG: 1.25 SOLUTION RESPIRATORY (INHALATION) at 14:25

## 2025-03-14 RX ADMIN — NICOTINE 1 PATCH: 14 PATCH, EXTENDED RELEASE TRANSDERMAL at 08:24

## 2025-03-14 RX ADMIN — PANTOPRAZOLE SODIUM 40 MG: 40 TABLET, DELAYED RELEASE ORAL at 12:05

## 2025-03-14 RX ADMIN — INSULIN LISPRO 3 UNITS: 100 INJECTION, SOLUTION INTRAVENOUS; SUBCUTANEOUS at 12:08

## 2025-03-14 RX ADMIN — KETOROLAC TROMETHAMINE 15 MG: 30 INJECTION, SOLUTION INTRAMUSCULAR; INTRAVENOUS at 12:04

## 2025-03-14 RX ADMIN — SENNOSIDES AND DOCUSATE SODIUM 1 TABLET: 50; 8.6 TABLET ORAL at 17:41

## 2025-03-14 RX ADMIN — OXYCODONE HYDROCHLORIDE 5 MG: 5 TABLET ORAL at 09:43

## 2025-03-14 RX ADMIN — LEVALBUTEROL HYDROCHLORIDE 1.25 MG: 1.25 SOLUTION RESPIRATORY (INHALATION) at 19:50

## 2025-03-14 RX ADMIN — PANTOPRAZOLE SODIUM 40 MG: 40 TABLET, DELAYED RELEASE ORAL at 17:48

## 2025-03-14 RX ADMIN — POLYETHYLENE GLYCOL 3350 17 G: 17 POWDER, FOR SOLUTION ORAL at 08:22

## 2025-03-14 RX ADMIN — CHLORHEXIDINE GLUCONATE 15 ML: 1.2 RINSE ORAL at 08:24

## 2025-03-14 RX ADMIN — VANCOMYCIN HYDROCHLORIDE 1000 MG: 1 INJECTION, SOLUTION INTRAVENOUS at 01:13

## 2025-03-14 RX ADMIN — LIDOCAINE 5% 1 PATCH: 700 PATCH TOPICAL at 17:48

## 2025-03-14 RX ADMIN — LEVALBUTEROL HYDROCHLORIDE 1.25 MG: 1.25 SOLUTION RESPIRATORY (INHALATION) at 07:28

## 2025-03-14 RX ADMIN — IPRATROPIUM BROMIDE 0.5 MG: 0.5 SOLUTION RESPIRATORY (INHALATION) at 07:28

## 2025-03-14 RX ADMIN — ACETAMINOPHEN 975 MG: 325 TABLET, FILM COATED ORAL at 09:43

## 2025-03-14 RX ADMIN — QUETIAPINE 300 MG: 200 TABLET ORAL at 21:32

## 2025-03-14 RX ADMIN — VANCOMYCIN HYDROCHLORIDE 1500 MG: 1 INJECTION, POWDER, LYOPHILIZED, FOR SOLUTION INTRAVENOUS at 20:37

## 2025-03-14 RX ADMIN — TRAZODONE HYDROCHLORIDE 100 MG: 100 TABLET ORAL at 21:32

## 2025-03-14 RX ADMIN — ATORVASTATIN CALCIUM 40 MG: 40 TABLET, FILM COATED ORAL at 17:41

## 2025-03-14 NOTE — ASSESSMENT & PLAN NOTE
CXR 3/6 showing increasing left basilar opacity  CT CAP with consolidation in LLL and lingula, and small consolidation in RLL  Procal remains elevated (2.75, 2.69, 0.94)  Urinary antigens negative. RP2 negative.   Placed on ceftriaxone, Flagyl and azithromycin, tailored to IV ceftriaxone and IV vancomycin on 3/8.    Completed 7 days of ceftriaxone, prior to that completed 3 days of azithromycin.  Currently on vancomycin only.  Continue vancomycin day 7/7  CT chest 3/12 showed compressive atelectasis of the left lower lung with focal hypodensity and cystic changes suggesting parenchymal necrosis.  Likely in the setting of MRSA pneumonia.  Discussed with pulmonology, will require repeat CT chest f/u

## 2025-03-14 NOTE — PLAN OF CARE
Problem: Potential for Falls  Goal: Patient will remain free of falls  Description: INTERVENTIONS:  - Educate patient/family on patient safety including physical limitations  - Instruct patient to call for assistance with activity   - Consult OT/PT to assist with strengthening/mobility   - Keep Call bell within reach  - Keep bed low and locked with side rails adjusted as appropriate  - Keep care items and personal belongings within reach  - Initiate and maintain comfort rounds  - Make Fall Risk Sign visible to staff  - Offer Toileting every 2 Hours, in advance of need  - Initiate/Maintain bed alarm  - Obtain necessary fall risk management equipment: socks  - Apply yellow socks and bracelet for high fall risk patients  - Consider moving patient to room near nurses station  Outcome: Progressing     Problem: Prexisting or High Potential for Compromised Skin Integrity  Goal: Skin integrity is maintained or improved  Description: INTERVENTIONS:  - Identify patients at risk for skin breakdown  - Assess and monitor skin integrity  - Assess and monitor nutrition and hydration status  - Monitor labs   - Assess for incontinence   - Turn and reposition patient  - Assist with mobility/ambulation  - Relieve pressure over bony prominences  - Avoid friction and shearing  - Provide appropriate hygiene as needed including keeping skin clean and dry  - Evaluate need for skin moisturizer/barrier cream  - Collaborate with interdisciplinary team   - Patient/family teaching  - Consider wound care consult   Outcome: Progressing     Problem: PAIN - ADULT  Goal: Verbalizes/displays adequate comfort level or baseline comfort level  Description: Interventions:  - Encourage patient to monitor pain and request assistance  - Assess pain using appropriate pain scale  - Administer analgesics based on type and severity of pain and evaluate response  - Implement non-pharmacological measures as appropriate and evaluate response  - Consider cultural  and social influences on pain and pain management  - Notify physician/advanced practitioner if interventions unsuccessful or patient reports new pain  Outcome: Progressing     Problem: INFECTION - ADULT  Goal: Absence or prevention of progression during hospitalization  Description: INTERVENTIONS:  - Assess and monitor for signs and symptoms of infection  - Monitor lab/diagnostic results  - Monitor all insertion sites, i.e. indwelling lines, tubes, and drains  - Monitor endotracheal if appropriate and nasal secretions for changes in amount and color  - [place] appropriate cooling/warming therapies per order  - Administer medications as ordered  - Instruct and encourage patient and family to use good hand hygiene technique  - Identify and instruct in appropriate isolation precautions for identified infection/condition  Outcome: Progressing  Goal: Absence of fever/infection during neutropenic period  Description: INTERVENTIONS:  - Monitor WBC    Outcome: Progressing     Problem: SAFETY ADULT  Goal: Patient will remain free of falls  Description: INTERVENTIONS:  - Educate patient/family on patient safety including physical limitations  - Instruct patient to call for assistance with activity   - Consult OT/PT to assist with strengthening/mobility   - Keep Call bell within reach  - Keep bed low and locked with side rails adjusted as appropriate  - Keep care items and personal belongings within reach  - Initiate and maintain comfort rounds  - Make Fall Risk Sign visible to staff  - Offer Toileting every 2 Hours, in advance of need  - Initiate/Maintain bed alarm  - Obtain necessary fall risk management equipment: socks  - Apply yellow socks and bracelet for high fall risk patients  - Consider moving patient to room near nurses station  Outcome: Progressing  Goal: Maintain or return to baseline ADL function  Description: INTERVENTIONS:  -  Assess patient's ability to carry out ADLs; assess patient's baseline for ADL  function and identify physical deficits which impact ability to perform ADLs (bathing, care of mouth/teeth, toileting, grooming, dressing, etc.)  - Assess/evaluate cause of self-care deficits   - Assess range of motion  - Assess patient's mobility; develop plan if impaired  - Assess patient's need for assistive devices and provide as appropriate  - Encourage maximum independence but intervene and supervise when necessary  - Involve family in performance of ADLs  - Assess for home care needs following discharge   - Consider OT consult to assist with ADL evaluation and planning for discharge  - Provide patient education as appropriate  Outcome: Progressing  Goal: Maintains/Returns to pre admission functional level  Description: INTERVENTIONS:  - Perform AM-PAC 6 Click Basic Mobility/ Daily Activity assessment daily.  - Set and communicate daily mobility goal to care team and patient/family/caregiver.   - Collaborate with rehabilitation services on mobility goals if consulted  - Perform Range of Motion 3 times a day.  - Reposition patient every 2 hours.  - Dangle patient 3 times a day  - Stand patient 3 times a day  - Ambulate patient 3 times a day  - Out of bed to chair 3 times a day   - Out of bed for meals 3 times a day  - Out of bed for toileting  - Record patient progress and toleration of activity level   Outcome: Progressing     Problem: DISCHARGE PLANNING  Goal: Discharge to home or other facility with appropriate resources  Description: INTERVENTIONS:  - Identify barriers to discharge w/patient and caregiver  - Arrange for needed discharge resources and transportation as appropriate  - Identify discharge learning needs (meds, wound care, etc.)  - Arrange for interpretive services to assist at discharge as needed  - Refer to Case Management Department for coordinating discharge planning if the patient needs post-hospital services based on physician/advanced practitioner order or complex needs related to  functional status, cognitive ability, or social support system  Outcome: Progressing     Problem: Knowledge Deficit  Goal: Patient/family/caregiver demonstrates understanding of disease process, treatment plan, medications, and discharge instructions  Description: Complete learning assessment and assess knowledge base.  Interventions:  - Provide teaching at level of understanding  - Provide teaching via preferred learning methods  Outcome: Progressing     Problem: CARDIOVASCULAR - ADULT  Goal: Maintains optimal cardiac output and hemodynamic stability  Description: INTERVENTIONS:  - Monitor I/O, vital signs and rhythm  - Monitor for S/S and trends of decreased cardiac output  - Administer and titrate ordered vasoactive medications to optimize hemodynamic stability  - Assess quality of pulses, skin color and temperature  - Assess for signs of decreased coronary artery perfusion  - Instruct patient to report change in severity of symptoms  Outcome: Progressing  Goal: Absence of cardiac dysrhythmias or at baseline rhythm  Description: INTERVENTIONS:  - Continuous cardiac monitoring, vital signs, obtain 12 lead EKG if ordered  - Administer antiarrhythmic and heart rate control medications as ordered  - Monitor electrolytes and administer replacement therapy as ordered  Outcome: Progressing     Problem: RESPIRATORY - ADULT  Goal: Achieves optimal ventilation and oxygenation  Description: INTERVENTIONS:  - Assess for changes in respiratory status  - Assess for changes in mentation and behavior  - Position to facilitate oxygenation and minimize respiratory effort  - Oxygen administered by appropriate delivery if ordered  - Initiate smoking cessation education as indicated  - Encourage broncho-pulmonary hygiene including cough, deep breathe, Incentive Spirometry  - Assess the need for suctioning and aspirate as needed  - Assess and instruct to report SOB or any respiratory difficulty  - Respiratory Therapy support as  indicated  Outcome: Progressing     Problem: METABOLIC, FLUID AND ELECTROLYTES - ADULT  Goal: Electrolytes maintained within normal limits  Description: INTERVENTIONS:  - Monitor labs and assess patient for signs and symptoms of electrolyte imbalances  - Administer electrolyte replacement as ordered  - Monitor response to electrolyte replacements, including repeat lab results as appropriate  - Instruct patient on fluid and nutrition as appropriate  Outcome: Progressing  Goal: Fluid balance maintained  Description: INTERVENTIONS:  - Monitor labs   - Monitor I/O and WT  - Instruct patient on fluid and nutrition as appropriate  - Assess for signs & symptoms of volume excess or deficit  Outcome: Progressing  Goal: Glucose maintained within target range  Description: INTERVENTIONS:  - Monitor Blood Glucose as ordered  - Assess for signs and symptoms of hyperglycemia and hypoglycemia  - Administer ordered medications to maintain glucose within target range  - Assess nutritional intake and initiate nutrition service referral as needed  Outcome: Progressing

## 2025-03-14 NOTE — ASSESSMENT & PLAN NOTE
Chest x-ray 3/12 showed significantly increased opacity in the left chest felt to be mostly due to large potentially partially loculated pleural effusion.  CT chest 3/12 showed loculated pleural effusion pockets in the left apex posteriorly and in the major fissure.  Small free effusion in the left lung base.  Compressive atelectasis of the left lower lung with focal hypodensity and cystic changes suggesting parenchymal necrosis  Chest tube was placed 3/12.  Discussed with pulmonology.  Repeat CT chest today.  If CT chest still with persistent pleural effusion and INR less than 2, might proceed with tPA tomorrow.   Patient reported pain where the tube is.  Start pain regimen.

## 2025-03-14 NOTE — PLAN OF CARE
Problem: PHYSICAL THERAPY ADULT  Goal: Performs mobility at highest level of function for planned discharge setting.  See evaluation for individualized goals.  Description: Treatment/Interventions: LE strengthening/ROM, Functional transfer training, Therapeutic exercise, Endurance training, Cognitive reorientation, Patient/family training, Equipment eval/education, Bed mobility, Gait training, Spoke to nursing, OT          See flowsheet documentation for full assessment, interventions and recommendations.  Outcome: Not Progressing  Note: Prognosis: Good  Problem List: Decreased strength, Decreased endurance, Impaired balance, Decreased mobility, Decreased cognition, Obesity, Pain  Assessment: Patient was received supine in bed. Agreeable to session with encouragement. Patient reports moderate pain levels. Completed bed mobility with assistance of 1, transfers and amb with assistance of 2. Patient did not appear to be SOB but was limited by pain. He is deconditioned and fatigues easily. He is not at his baseline and level 2 resources are recommended. The patient's AM-PAC Basic Mobility Inpatient Short Form Raw Score is 10. A Raw score of less than or equal to 17 suggests the patient may benefit from discharge to post-acute rehabilitation services. Please also refer to the recommendation of the Physical Therapist for safe discharge planning.  Barriers to Discharge: Inaccessible home environment, Decreased caregiver support     Rehab Resource Intensity Level, PT: II (Moderate Resource Intensity)    See flowsheet documentation for full assessment.

## 2025-03-14 NOTE — ASSESSMENT & PLAN NOTE
Patient brought in by facility staff for SOB, cough x2 weeks and dizziness with standing  At baseline patient states he does not wear oxygen but does wear nocturnal CPAP  In ED required 4 L however had worsening respiratory status and was placed on BiPAP  Flu/Covid/RSV negative  CXR: Patchy lingular and lower lobe opacities, concerning for pneumonia.   CT CAP: Multifocal pneumonia involving the left lower lobe, lingula, and medial right lower lobe. Fluid in the esophagus suggests aspiration as a possible etiology. Moderate size hiatal hernia and distended fluid-filled esophagus to the level of the thoracic inlet.   Completed azithromycin, 7 days of ceftriaxone and is currently on vancomycin.  Completed 5 days of prednisone.  Evaluated by speech, recommended regular diet with thin liquids  home oxygen evaluation prior to discharge  Required 6 L nasal cannula oxygen   Repeat x-ray 3/12 showed loculated effusion that was confirmed with CT chest  Chest tube placed 3/12 by pulmonology  Patient required mid flow nasal cannula oxygen this morning, he is not taking deep breaths because of the pain.   Discussed with pulmonology.  Given patient's INR is greater than 2, we will not do tPA.  Check CT chest today.  Further recommendations after CT chest

## 2025-03-14 NOTE — OCCUPATIONAL THERAPY NOTE
"  Occupational Therapy Treatment Note     Patient Name: Chuy Norton  Today's Date: 3/14/2025  Problem List  Principal Problem:    Acute respiratory failure with hypoxia (HCC)  Active Problems:    Bipolar 2 disorder, major depressive episode (HCC)    Primary hypertension    Hyperlipidemia    Dependence on nicotine from cigarettes    Cirrhosis (HCC)    Left ventricular apical thrombus    Hepatitis C    Left lower lobe pneumonia    KENN (obstructive sleep apnea)    Ischemic cardiomyopathy    Left Parapneumonic effusion          03/14/25 1220   OT Last Visit   OT Visit Date 03/14/25   Note Type   Note Type Treatment   Pain Assessment   Pain Assessment Tool Vincent-Baker FACES   Vincent-Baker FACES Pain Rating 6   Pain Location/Orientation Other (Comment)  (chest tube insertion site)   Hospital Pain Intervention(s) Repositioned;Ambulation/increased activity   Restrictions/Precautions   Weight Bearing Precautions Per Order No   Other Precautions Cognitive;Chair Alarm;Bed Alarm;Multiple lines;Fall Risk;Pain   ADL   Grooming Assistance 5  Supervision/Setup   Grooming Deficit Setup   Grooming Comments Washing/dry face, brushing hair seated in recliner chair   Bed Mobility   Supine to Sit 2  Maximal assistance   Additional items Assist x 1;HOB elevated;Bedrails;Increased time required;Verbal cues;LE management   Transfers   Sit to Stand 4  Minimal assistance   Additional items Assist x 2;Armrests;Increased time required;Verbal cues   Stand to Sit 4  Minimal assistance   Additional items Assist x 2;Armrests;Increased time required;Verbal cues   Additional Comments Pt completed 2 STS from EOB.   Functional Mobility   Functional Mobility 4  Minimal assistance   Additional Comments x2   Additional items Hand hold assistance  (short distance ~3ft from EOB <> recliner chair)   Subjective   Subjective \"I'm not feeling good.\"   Cognition   Overall Cognitive Status Impaired   Arousal/Participation Alert;Cooperative   Attention Within " functional limits   Memory Decreased recall of recent events;Decreased recall of precautions;Decreased short term memory   Following Commands Follows one step commands without difficulty   Comments Pt with very flat affect t/o session   Activity Tolerance   Activity Tolerance Patient tolerated treatment well   Medical Staff Made Aware PT MARY JO Gay   Assessment   Assessment Pt seen for OT tx session with focus on bed mobility, transfers, functional mobility, standing tolerance, activity tolerance, and ADLs. Patient agreeable to OT treatment session. Pt received supine in bed. Pt able to tolerate OOB mobility. Pt demonstrated good sitting balance fair good standing balance. Pt on 8L O2 via NC with chest tube in place. Pt demonstrated the need for max Ax1 for supine <> sit bed mobility, min Ax2 for 2 STS trials from EOB, and min Ax2 for functional mobility with b/l HHA and verbal cues. Once seated in recliner chair pt completed simple grooming activity with setup/supervision (I.e washing/drying face and brushing hair). Patient continues to be functioning below baseline level, occupational performance remains limited secondary to factors listed above, and pt at increased risk for falls and injury. The patient's raw score on the AM-PAC Daily Activity inpatient short form is 17, standardized score is 37.26, less than 39.4. Patients at this level are likely to benefit from DC to post-acute rehabilitation services. Please refer to the recommendation of the Occupational Therapist for safe DC planning.  From OT standpoint, recommendation at time of D/C would be DC with Level II - Moderate Rehab Resource Intensity resources. Patient to benefit from continued Occupational Therapy treatment while in the hospital to address deficits as defined above and maximize level of functional independence with ADLs and functional mobility. Pt left seated OOB to Recliner with call bell in reach, tray table in reach, needs met, chair alarm  activated, and RN informed.   Plan   Treatment Interventions ADL retraining;Functional transfer training;UE strengthening/ROM;Endurance training;Cognitive reorientation;Patient/family training;Equipment evaluation/education;Compensatory technique education;Continued evaluation   Goal Expiration Date 03/23/25   OT Treatment Day 1   OT Frequency 2-3x/wk   Discharge Recommendation   Rehab Resource Intensity Level, OT II (Moderate Resource Intensity)   AM-PAC Daily Activity Inpatient   Lower Body Dressing 2   Bathing 2   Toileting 3   Upper Body Dressing 3   Grooming 3   Eating 4   Daily Activity Raw Score 17   Daily Activity Standardized Score (Calc for Raw Score >=11) 37.26   AM-PAC Applied Cognition Inpatient   Following a Speech/Presentation 2   Understanding Ordinary Conversation 3   Taking Medications 2   Remembering Where Things Are Placed or Put Away 2   Remembering List of 4-5 Errands 2   Taking Care of Complicated Tasks 2   Applied Cognition Raw Score 13   Applied Cognition Standardized Score 30.46   End of Consult   Education Provided Yes   Patient Position at End of Consult Bedside chair;Bed/Chair alarm activated;All needs within reach   Nurse Communication Nurse aware of consult         Magaly Dc OTR/L

## 2025-03-14 NOTE — PROGRESS NOTES
Progress Note - Pulmonology   Name: Chuy Norton 64 y.o. male I MRN: [id number]  Unit/Bed#: -01 I Date of Admission: 3/5/2025   Date of Service: 3/14/2025 I Hospital Day: 9     Assessment & Plan  Acute respiratory failure with hypoxia (HCC)  Placed on MidFlow at 12L this am secondary to splinting in the setting of pleuritic chest pain   Recommend pulmonary hygiene: Deep breathing with cough, OOB as tolerated, incentive spirometry and flutter valve  Toradol prn pain  Will need home O2 eval prior to discharge   Left lower lobe pneumonia  With MRSA nasal culture positive, treating empirically with MRSA coverage  Completed 3 days of azithromycin and 7 days of Ceftriaxone  Completed 5 days of prednisone 40 mg  Vancomycin day #7 - completed course today    Speech cleared the patient for regular diet and thin liquids - no signs of aspiration    Repeat imaging in 4 to 6 weeks after discharge  Left Parapneumonic effusion  CT chest with loculated left pleural effusion CT placed 3/12. Cultures remain negative. <100cc in past 24 hours.  He would likely benefit from tpa/dornase, however given elevated INR would hold off for concern of hemothorax.   Will check CT chest now to assess degree of remaining fluid/loculation  If significant remaining fluid and loculation - will do tpa/dornase once INR <2  KENN (obstructive sleep apnea)  Maintained on CPAP at home  Continue BiPAP qhs for now (as patient tolerates)  Left ventricular apical thrombus  Maintained on coumadin - currently on hold for possible tpa/dornase  Once INR < 2 will need heparin gtt - but will need to hold if instilling tpa/dornase  Ischemic cardiomyopathy  Wt Readings from Last 3 Encounters:   03/10/25 97.2 kg (214 lb 4.6 oz)   02/07/25 95.9 kg (211 lb 6.4 oz)   01/02/25 91.2 kg (201 lb)      on admission  5 pound weight gain since admission, 13 pound weight gain since January  Echo 12/18/2024 shows LVEF 45%, systolic function mildly reduced, moderate  solid fixed thrombus in the apex  Lower extremity edema present  Patient appears mildly volume overloaded likely in the setting of sepsis resuscitation  Given IV Lasix 20 mg 3/10    Dependence on nicotine from cigarettes  Clinical concern for COPD - however no PFTs on file   Continue xopenex nebs TID - D/C atrovent  Will need outpatient follow-up imaging and PFTs as well as smoking cessation support    24 Hour Events : Chest pain  Subjective : He reports ongoing pain at chest tube site  No dyspnea  Cough - but unable to expectorate mucous    Objective :  Temp:  [97 °F (36.1 °C)-97.5 °F (36.4 °C)] 97.5 °F (36.4 °C)  HR:  [60-79] 64  BP: (109-142)/(73-91) 109/91  Resp:  [19] 19  SpO2:  [85 %-94 %] 91 %  O2 Device: Mid flow nasal cannula  Nasal Cannula O2 Flow Rate (L/min):  [6 L/min] 6 L/min  FiO2 (%):  [8] 8    Physical Exam    General:  Patient is awake, non-toxic and in no acute respiratory distress  Eyes: no scleral icterus  CV:  Regular, +S1 and S2, No murmurs, gallops or rubs appreciated  Lungs: Diminished BS at left base - no wheeze  Abdomen: Soft, +BS, Non-tender, non-distended  Extremities: +edema  Neuro: Generalized weakness, No focal motor/sensory deficits        Lab Results: I have reviewed the following results:   .     03/14/25  0520   WBC 17.64*   HGB 13.5   HCT 41.5      SODIUM 135   K 4.4      CO2 27   BUN 31*   CREATININE 1.16   GLUC 112   INR 2.12*

## 2025-03-14 NOTE — ASSESSMENT & PLAN NOTE
"Outpatient: 12.5mg Carvedilol BID, 40mg lisinopril   /91   Pulse 64   Temp 97.5 °F (36.4 °C)   Resp 19   Ht 5' 8\" (1.727 m)   Wt 97.2 kg (214 lb 4.6 oz)   SpO2 (!) 89%   BMI 32.58 kg/m²     Plan  continue  PRN labetolol, for SBP >180  "

## 2025-03-14 NOTE — ASSESSMENT & PLAN NOTE
Clinical concern for COPD - however no PFTs on file   Continue xopenex nebs TID - D/C atrovent  Will need outpatient follow-up imaging and PFTs as well as smoking cessation support

## 2025-03-14 NOTE — PLAN OF CARE
Problem: OCCUPATIONAL THERAPY ADULT  Goal: Performs self-care activities at highest level of function for planned discharge setting.  See evaluation for individualized goals.  Description: Treatment Interventions: ADL retraining, Functional transfer training, UE strengthening/ROM, Endurance training, Cognitive reorientation, Patient/family training, Equipment evaluation/education, Compensatory technique education, Continued evaluation          See flowsheet documentation for full assessment, interventions and recommendations.   Note: Limitation: Decreased ADL status, Decreased UE strength, Decreased Safe judgement during ADL, Decreased cognition, Decreased endurance, Decreased self-care trans, Decreased high-level ADLs  Prognosis: Fair  Assessment: Pt seen for OT tx session with focus on bed mobility, transfers, functional mobility, standing tolerance, activity tolerance, and ADLs. Patient agreeable to OT treatment session. Pt received supine in bed. Pt able to tolerate OOB mobility. Pt demonstrated good sitting balance fair good standing balance. Pt on 8L O2 via NC with chest tube in place. Pt demonstrated the need for max Ax1 for supine <> sit bed mobility, min Ax2 for 2 STS trials from EOB, and min Ax2 for functional mobility with b/l HHA and verbal cues. Once seated in recliner chair pt completed simple grooming activity with setup/supervision (I.e washing/drying face and brushing hair). Patient continues to be functioning below baseline level, occupational performance remains limited secondary to factors listed above, and pt at increased risk for falls and injury. The patient's raw score on the AM-PAC Daily Activity inpatient short form is 17, standardized score is 37.26, less than 39.4. Patients at this level are likely to benefit from DC to post-acute rehabilitation services. Please refer to the recommendation of the Occupational Therapist for safe DC planning.  From OT standpoint, recommendation at time of  D/C would be DC with Level II - Moderate Rehab Resource Intensity resources. Patient to benefit from continued Occupational Therapy treatment while in the hospital to address deficits as defined above and maximize level of functional independence with ADLs and functional mobility. Pt left seated OOB to Recliner with call bell in reach, tray table in reach, needs met, chair alarm activated, and RN informed.     Rehab Resource Intensity Level, OT: II (Moderate Resource Intensity)

## 2025-03-14 NOTE — PLAN OF CARE
Problem: Potential for Falls  Goal: Patient will remain free of falls  Description: INTERVENTIONS:  - Educate patient/family on patient safety including physical limitations  - Instruct patient to call for assistance with activity   - Consult OT/PT to assist with strengthening/mobility   - Keep Call bell within reach  - Keep bed low and locked with side rails adjusted as appropriate  - Keep care items and personal belongings within reach  - Initiate and maintain comfort rounds  - Make Fall Risk Sign visible to staff  - Offer Toileting every 2 Hours, in advance of need  - Initiate/Maintain bed alarm  - Obtain necessary fall risk management equipment: socks  - Apply yellow socks and bracelet for high fall risk patients  - Consider moving patient to room near nurses station  Outcome: Progressing     Problem: Prexisting or High Potential for Compromised Skin Integrity  Goal: Skin integrity is maintained or improved  Description: INTERVENTIONS:  - Identify patients at risk for skin breakdown  - Assess and monitor skin integrity  - Assess and monitor nutrition and hydration status  - Monitor labs   - Assess for incontinence   - Turn and reposition patient  - Assist with mobility/ambulation  - Relieve pressure over bony prominences  - Avoid friction and shearing  - Provide appropriate hygiene as needed including keeping skin clean and dry  - Evaluate need for skin moisturizer/barrier cream  - Collaborate with interdisciplinary team   - Patient/family teaching  - Consider wound care consult   Outcome: Progressing     Problem: PAIN - ADULT  Goal: Verbalizes/displays adequate comfort level or baseline comfort level  Description: Interventions:  - Encourage patient to monitor pain and request assistance  - Assess pain using appropriate pain scale  - Administer analgesics based on type and severity of pain and evaluate response  - Implement non-pharmacological measures as appropriate and evaluate response  - Consider cultural  and social influences on pain and pain management  - Notify physician/advanced practitioner if interventions unsuccessful or patient reports new pain  Outcome: Progressing     Problem: INFECTION - ADULT  Goal: Absence or prevention of progression during hospitalization  Description: INTERVENTIONS:  - Assess and monitor for signs and symptoms of infection  - Monitor lab/diagnostic results  - Monitor all insertion sites, i.e. indwelling lines, tubes, and drains  - Monitor endotracheal if appropriate and nasal secretions for changes in amount and color  - [place] appropriate cooling/warming therapies per order  - Administer medications as ordered  - Instruct and encourage patient and family to use good hand hygiene technique  - Identify and instruct in appropriate isolation precautions for identified infection/condition  Outcome: Progressing  Goal: Absence of fever/infection during neutropenic period  Description: INTERVENTIONS:  - Monitor WBC    Outcome: Progressing     Problem: SAFETY ADULT  Goal: Patient will remain free of falls  Description: INTERVENTIONS:  - Educate patient/family on patient safety including physical limitations  - Instruct patient to call for assistance with activity   - Consult OT/PT to assist with strengthening/mobility   - Keep Call bell within reach  - Keep bed low and locked with side rails adjusted as appropriate  - Keep care items and personal belongings within reach  - Initiate and maintain comfort rounds  - Make Fall Risk Sign visible to staff  - Offer Toileting every 2 Hours, in advance of need  - Initiate/Maintain bed alarm  - Obtain necessary fall risk management equipment: socks  - Apply yellow socks and bracelet for high fall risk patients  - Consider moving patient to room near nurses station  Outcome: Progressing  Goal: Maintain or return to baseline ADL function  Description: INTERVENTIONS:  -  Assess patient's ability to carry out ADLs; assess patient's baseline for ADL  function and identify physical deficits which impact ability to perform ADLs (bathing, care of mouth/teeth, toileting, grooming, dressing, etc.)  - Assess/evaluate cause of self-care deficits   - Assess range of motion  - Assess patient's mobility; develop plan if impaired  - Assess patient's need for assistive devices and provide as appropriate  - Encourage maximum independence but intervene and supervise when necessary  - Involve family in performance of ADLs  - Assess for home care needs following discharge   - Consider OT consult to assist with ADL evaluation and planning for discharge  - Provide patient education as appropriate  Outcome: Progressing  Goal: Maintains/Returns to pre admission functional level  Description: INTERVENTIONS:  - Perform AM-PAC 6 Click Basic Mobility/ Daily Activity assessment daily.  - Set and communicate daily mobility goal to care team and patient/family/caregiver.   - Collaborate with rehabilitation services on mobility goals if consulted  - Perform Range of Motion 3 times a day.  - Reposition patient every 2 hours.  - Dangle patient 3 times a day  - Stand patient 3 times a day  - Ambulate patient 3 times a day  - Out of bed to chair 3 times a day   - Out of bed for meals 3 times a day  - Out of bed for toileting  - Record patient progress and toleration of activity level   Outcome: Progressing     Problem: DISCHARGE PLANNING  Goal: Discharge to home or other facility with appropriate resources  Description: INTERVENTIONS:  - Identify barriers to discharge w/patient and caregiver  - Arrange for needed discharge resources and transportation as appropriate  - Identify discharge learning needs (meds, wound care, etc.)  - Arrange for interpretive services to assist at discharge as needed  - Refer to Case Management Department for coordinating discharge planning if the patient needs post-hospital services based on physician/advanced practitioner order or complex needs related to  functional status, cognitive ability, or social support system  Outcome: Progressing     Problem: Knowledge Deficit  Goal: Patient/family/caregiver demonstrates understanding of disease process, treatment plan, medications, and discharge instructions  Description: Complete learning assessment and assess knowledge base.  Interventions:  - Provide teaching at level of understanding  - Provide teaching via preferred learning methods  Outcome: Progressing     Problem: CARDIOVASCULAR - ADULT  Goal: Maintains optimal cardiac output and hemodynamic stability  Description: INTERVENTIONS:  - Monitor I/O, vital signs and rhythm  - Monitor for S/S and trends of decreased cardiac output  - Administer and titrate ordered vasoactive medications to optimize hemodynamic stability  - Assess quality of pulses, skin color and temperature  - Assess for signs of decreased coronary artery perfusion  - Instruct patient to report change in severity of symptoms  Outcome: Progressing  Goal: Absence of cardiac dysrhythmias or at baseline rhythm  Description: INTERVENTIONS:  - Continuous cardiac monitoring, vital signs, obtain 12 lead EKG if ordered  - Administer antiarrhythmic and heart rate control medications as ordered  - Monitor electrolytes and administer replacement therapy as ordered  Outcome: Progressing     Problem: RESPIRATORY - ADULT  Goal: Achieves optimal ventilation and oxygenation  Description: INTERVENTIONS:  - Assess for changes in respiratory status  - Assess for changes in mentation and behavior  - Position to facilitate oxygenation and minimize respiratory effort  - Oxygen administered by appropriate delivery if ordered  - Initiate smoking cessation education as indicated  - Encourage broncho-pulmonary hygiene including cough, deep breathe, Incentive Spirometry  - Assess the need for suctioning and aspirate as needed  - Assess and instruct to report SOB or any respiratory difficulty  - Respiratory Therapy support as  indicated  Outcome: Progressing     Problem: METABOLIC, FLUID AND ELECTROLYTES - ADULT  Goal: Electrolytes maintained within normal limits  Description: INTERVENTIONS:  - Monitor labs and assess patient for signs and symptoms of electrolyte imbalances  - Administer electrolyte replacement as ordered  - Monitor response to electrolyte replacements, including repeat lab results as appropriate  - Instruct patient on fluid and nutrition as appropriate  Outcome: Progressing  Goal: Fluid balance maintained  Description: INTERVENTIONS:  - Monitor labs   - Monitor I/O and WT  - Instruct patient on fluid and nutrition as appropriate  - Assess for signs & symptoms of volume excess or deficit  Outcome: Progressing  Goal: Glucose maintained within target range  Description: INTERVENTIONS:  - Monitor Blood Glucose as ordered  - Assess for signs and symptoms of hyperglycemia and hypoglycemia  - Administer ordered medications to maintain glucose within target range  - Assess nutritional intake and initiate nutrition service referral as needed  Outcome: Progressing

## 2025-03-14 NOTE — ASSESSMENT & PLAN NOTE
With MRSA nasal culture positive, treating empirically with MRSA coverage  Completed 3 days of azithromycin and 7 days of Ceftriaxone  Completed 5 days of prednisone 40 mg  Vancomycin day #7 - completed course today    Speech cleared the patient for regular diet and thin liquids - no signs of aspiration    Repeat imaging in 4 to 6 weeks after discharge

## 2025-03-14 NOTE — ASSESSMENT & PLAN NOTE
CT chest with loculated left pleural effusion CT placed 3/12. Cultures remain negative. <100cc in past 24 hours.  He would likely benefit from tpa/dornase, however given elevated INR would hold off for concern of hemothorax.   Will check CT chest now to assess degree of remaining fluid/loculation  If significant remaining fluid and loculation - will do tpa/dornase once INR <2

## 2025-03-14 NOTE — PHYSICAL THERAPY NOTE
"                                                                                  PHYSICAL THERAPY NOTE       03/14/25 1228   PT Last Visit   PT Visit Date 03/14/25   Note Type   Note Type Treatment   Pain Assessment   Pain Assessment Tool Vincent-Baker FACES   Vincent-Baker FACES Pain Rating 6   Pain Location/Orientation   (chest tube insertion site)   Hospital Pain Intervention(s) Repositioned   Restrictions/Precautions   Weight Bearing Precautions Per Order No   Other Precautions Cognitive;Chair Alarm;Bed Alarm;Multiple lines;Fall Risk;Pain   General   Chart Reviewed Yes   Response to Previous Treatment Patient with no complaints from previous session.   Family/Caregiver Present No   Cognition   Overall Cognitive Status Impaired   Arousal/Participation Alert   Attention Within functional limits   Memory Decreased short term memory   Following Commands Follows one step commands without difficulty   Comments Flat affect   Subjective   Subjective \"I'm tired.\"   Bed Mobility   Supine to Sit 2  Maximal assistance   Additional items Assist x 1;HOB elevated;Bedrails;Increased time required;Verbal cues;LE management   Transfers   Sit to Stand 4  Minimal assistance   Additional items Assist x 2;Armrests;Increased time required;Verbal cues   Stand to Sit 4  Minimal assistance   Additional items Assist x 2;Armrests;Increased time required;Verbal cues   Additional Comments 2 sit<>stand transfers completed.   Ambulation/Elevation   Gait pattern Shuffling;Forward Flexion;Decreased foot clearance   Gait Assistance 4  Minimal assist   Additional items Assist x 2;Verbal cues;Tactile cues   Assistive Device Other (Comment)  (HHA)   Distance 2ft  (bed to chair)   Balance   Static Sitting Good   Dynamic Sitting Fair +   Static Standing Fair   Dynamic Standing Fair -   Ambulatory Fair -   Endurance Deficit   Endurance Deficit Yes   Endurance Deficit Description Easily fatigued   Activity Tolerance   Activity Tolerance Patient limited by " fatigue   Medical Staff Made Aware Magaly GLOVER   Nurse Made Aware Cindy CAMARGO   Assessment   Prognosis Good   Problem List Decreased strength;Decreased endurance;Impaired balance;Decreased mobility;Decreased cognition;Obesity;Pain   Assessment Patient was received supine in bed. Agreeable to session with encouragement. Patient reports moderate pain levels. Completed bed mobility with assistance of 1, transfers and amb with assistance of 2. Patient did not appear to be SOB but was limited by pain. He is deconditioned and fatigues easily. He is not at his baseline and level 2 resources are recommended. The patient's [facility] Basic Mobility Inpatient Short Form Raw Score is 10. A Raw score of less than or equal to 17 suggests the patient may benefit from discharge to post-acute rehabilitation services. Please also refer to the recommendation of the Physical Therapist for safe discharge planning.   Barriers to Discharge Inaccessible home environment;Decreased caregiver support   Goals   Patient Goals None stated   STG Expiration Date 03/27/25   PT Treatment Day 1   Plan   Treatment/Interventions Functional transfer training;LE strengthening/ROM;Therapeutic exercise;Endurance training;Cognitive reorientation;Patient/family training;Equipment eval/education;Bed mobility;Gait training;Spoke to nursing;OT   Progress Slow progress, decreased activity tolerance   PT Frequency 2-3x/wk   Discharge Recommendation   Rehab Resource Intensity Level, PT II (Moderate Resource Intensity)   AM-PAC Basic Mobility Inpatient   Turning in Flat Bed Without Bedrails 2   Lying on Back to Sitting on Edge of Flat Bed Without Bedrails 2   Moving Bed to Chair 2   Standing Up From Chair Using Arms 2   Walk in Room 1   Climb 3-5 Stairs With Railing 1   Basic Mobility Inpatient Raw Score 10   Turning Head Towards Sound 4   Follow Simple Instructions 3   Low Function Basic Mobility Raw Score  17   Low Function Basic Mobility Standardized Score  27.46   Rogelio  Keyon Highest Level Of Mobility   -[facility] Goal 4: Move to chair/commode   -[facility] Achieved 4: Move to chair/commode   Education   Education Provided Mobility training   Patient Reinforcement needed   End of Consult   Patient Position at End of Consult Bedside chair;Bed/Chair alarm activated;All needs within reach   Waleska Khan            Patient Name: Chuy Norton  Today's Date: 3/14/2025

## 2025-03-14 NOTE — ASSESSMENT & PLAN NOTE
Placed on MidFlow at 12L this am secondary to splinting in the setting of pleuritic chest pain   Recommend pulmonary hygiene: Deep breathing with cough, OOB as tolerated, incentive spirometry and flutter valve  Toradol prn pain  Will need home O2 eval prior to discharge

## 2025-03-14 NOTE — ASSESSMENT & PLAN NOTE
Maintained on coumadin - currently on hold for possible tpa/dornase  Once INR < 2 will need heparin gtt - but will need to hold if instilling tpa/dornase

## 2025-03-14 NOTE — PROGRESS NOTES
Chuy Norton is a 64 y.o. male who is currently ordered Vancomycin IV with management by the Pharmacy Consult service.  Relevant clinical data and objective / subjective history reviewed.  Vancomycin Assessment:  Indication and Goal AUC/Trough: Pneumonia (goal -600, trough >10)  Clinical Status: stable  Micro:     Renal Function:  SCr: 1.16 mg/dL  CrCl: 73.7 mL/min  Renal replacement: Not on dialysis  Days of Therapy: 8  Current Dose: 1000 mg Q12h  Vancomycin Plan:  New Dosin mg q24h  Estimated AUC: 431 mcg*hr/mL  Estimated Trough: 10.5 mcg/mL  Next Level: random level with AM labs on   Renal Function Monitoring: Daily BMP and UOP  Pharmacy will continue to follow closely for s/sx of nephrotoxicity, infusion reactions and appropriateness of therapy.  BMP and CBC will be ordered per protocol. We will continue to follow the patient’s culture results and clinical progress daily.    Diane Marie, Pharmacist

## 2025-03-15 ENCOUNTER — APPOINTMENT (INPATIENT)
Dept: RADIOLOGY | Facility: HOSPITAL | Age: 65
DRG: 871 | End: 2025-03-15
Payer: COMMERCIAL

## 2025-03-15 LAB
ANION GAP SERPL CALCULATED.3IONS-SCNC: 6 MMOL/L (ref 4–13)
BACTERIA SPEC BFLD CULT: NO GROWTH
BUN SERPL-MCNC: 36 MG/DL (ref 5–25)
CALCIUM SERPL-MCNC: 7.7 MG/DL (ref 8.4–10.2)
CHLORIDE SERPL-SCNC: 104 MMOL/L (ref 96–108)
CO2 SERPL-SCNC: 27 MMOL/L (ref 21–32)
CREAT SERPL-MCNC: 1.39 MG/DL (ref 0.6–1.3)
ERYTHROCYTE [DISTWIDTH] IN BLOOD BY AUTOMATED COUNT: 19.3 % (ref 11.6–15.1)
GFR SERPL CREATININE-BSD FRML MDRD: 53 ML/MIN/1.73SQ M
GLUCOSE SERPL-MCNC: 108 MG/DL (ref 65–140)
GLUCOSE SERPL-MCNC: 109 MG/DL (ref 65–140)
GLUCOSE SERPL-MCNC: 114 MG/DL (ref 65–140)
GLUCOSE SERPL-MCNC: 134 MG/DL (ref 65–140)
GLUCOSE SERPL-MCNC: 165 MG/DL (ref 65–140)
GRAM STN SPEC: NORMAL
GRAM STN SPEC: NORMAL
HCT VFR BLD AUTO: 40.2 % (ref 36.5–49.3)
HGB BLD-MCNC: 12.9 G/DL (ref 12–17)
INR PPP: 1.91 (ref 0.85–1.19)
MCH RBC QN AUTO: 29.8 PG (ref 26.8–34.3)
MCHC RBC AUTO-ENTMCNC: 32.1 G/DL (ref 31.4–37.4)
MCV RBC AUTO: 93 FL (ref 82–98)
PLATELET # BLD AUTO: 145 THOUSANDS/UL (ref 149–390)
PMV BLD AUTO: 10.3 FL (ref 8.9–12.7)
POTASSIUM SERPL-SCNC: 4 MMOL/L (ref 3.5–5.3)
PROTHROMBIN TIME: 22.3 SECONDS (ref 12.3–15)
RBC # BLD AUTO: 4.33 MILLION/UL (ref 3.88–5.62)
SODIUM SERPL-SCNC: 137 MMOL/L (ref 135–147)
WBC # BLD AUTO: 13.53 THOUSAND/UL (ref 4.31–10.16)

## 2025-03-15 PROCEDURE — 94760 N-INVAS EAR/PLS OXIMETRY 1: CPT

## 2025-03-15 PROCEDURE — 82948 REAGENT STRIP/BLOOD GLUCOSE: CPT

## 2025-03-15 PROCEDURE — 3E0L317 INTRODUCTION OF OTHER THROMBOLYTIC INTO PLEURAL CAVITY, PERCUTANEOUS APPROACH: ICD-10-PCS | Performed by: INTERNAL MEDICINE

## 2025-03-15 PROCEDURE — 99232 SBSQ HOSP IP/OBS MODERATE 35: CPT | Performed by: INTERNAL MEDICINE

## 2025-03-15 PROCEDURE — 71045 X-RAY EXAM CHEST 1 VIEW: CPT

## 2025-03-15 PROCEDURE — 99233 SBSQ HOSP IP/OBS HIGH 50: CPT | Performed by: INTERNAL MEDICINE

## 2025-03-15 PROCEDURE — 32561 LYSE CHEST FIBRIN INIT DAY: CPT | Performed by: INTERNAL MEDICINE

## 2025-03-15 PROCEDURE — 85610 PROTHROMBIN TIME: CPT | Performed by: INTERNAL MEDICINE

## 2025-03-15 PROCEDURE — 85027 COMPLETE CBC AUTOMATED: CPT | Performed by: INTERNAL MEDICINE

## 2025-03-15 PROCEDURE — 94640 AIRWAY INHALATION TREATMENT: CPT

## 2025-03-15 PROCEDURE — 94660 CPAP INITIATION&MGMT: CPT

## 2025-03-15 PROCEDURE — 80048 BASIC METABOLIC PNL TOTAL CA: CPT | Performed by: INTERNAL MEDICINE

## 2025-03-15 RX ADMIN — POLYETHYLENE GLYCOL 3350 17 G: 17 POWDER, FOR SOLUTION ORAL at 09:35

## 2025-03-15 RX ADMIN — ACETAMINOPHEN 975 MG: 325 TABLET, FILM COATED ORAL at 02:55

## 2025-03-15 RX ADMIN — ALTEPLASE 10 MG: 2.2 INJECTION, POWDER, LYOPHILIZED, FOR SOLUTION INTRAVENOUS at 23:53

## 2025-03-15 RX ADMIN — DORNASE ALFA 5 MG: 1 SOLUTION RESPIRATORY (INHALATION) at 11:53

## 2025-03-15 RX ADMIN — SENNOSIDES AND DOCUSATE SODIUM 1 TABLET: 50; 8.6 TABLET ORAL at 17:26

## 2025-03-15 RX ADMIN — TRAZODONE HYDROCHLORIDE 100 MG: 100 TABLET ORAL at 21:05

## 2025-03-15 RX ADMIN — ALTEPLASE 10 MG: 2.2 INJECTION, POWDER, LYOPHILIZED, FOR SOLUTION INTRAVENOUS at 11:54

## 2025-03-15 RX ADMIN — LEVALBUTEROL HYDROCHLORIDE 1.25 MG: 1.25 SOLUTION RESPIRATORY (INHALATION) at 13:01

## 2025-03-15 RX ADMIN — LEVALBUTEROL HYDROCHLORIDE 1.25 MG: 1.25 SOLUTION RESPIRATORY (INHALATION) at 19:15

## 2025-03-15 RX ADMIN — SENNOSIDES AND DOCUSATE SODIUM 1 TABLET: 50; 8.6 TABLET ORAL at 09:37

## 2025-03-15 RX ADMIN — CARVEDILOL 25 MG: 25 TABLET, FILM COATED ORAL at 17:26

## 2025-03-15 RX ADMIN — ATORVASTATIN CALCIUM 40 MG: 40 TABLET, FILM COATED ORAL at 17:26

## 2025-03-15 RX ADMIN — LIDOCAINE 5% 1 PATCH: 700 PATCH TOPICAL at 17:25

## 2025-03-15 RX ADMIN — Medication 2.5 MG: at 17:25

## 2025-03-15 RX ADMIN — CARVEDILOL 25 MG: 25 TABLET, FILM COATED ORAL at 09:37

## 2025-03-15 RX ADMIN — ACETAMINOPHEN 975 MG: 325 TABLET, FILM COATED ORAL at 09:37

## 2025-03-15 RX ADMIN — PANTOPRAZOLE SODIUM 40 MG: 40 TABLET, DELAYED RELEASE ORAL at 17:25

## 2025-03-15 RX ADMIN — CHLORHEXIDINE GLUCONATE 15 ML: 1.2 RINSE ORAL at 21:05

## 2025-03-15 RX ADMIN — DORNASE ALFA 5 MG: 1 SOLUTION RESPIRATORY (INHALATION) at 23:53

## 2025-03-15 RX ADMIN — CHLORHEXIDINE GLUCONATE 15 ML: 1.2 RINSE ORAL at 09:39

## 2025-03-15 RX ADMIN — LOSARTAN POTASSIUM 100 MG: 50 TABLET, FILM COATED ORAL at 09:37

## 2025-03-15 RX ADMIN — ACETAMINOPHEN 975 MG: 325 TABLET, FILM COATED ORAL at 17:26

## 2025-03-15 RX ADMIN — LEVALBUTEROL HYDROCHLORIDE 1.25 MG: 1.25 SOLUTION RESPIRATORY (INHALATION) at 07:31

## 2025-03-15 RX ADMIN — QUETIAPINE 300 MG: 200 TABLET ORAL at 21:05

## 2025-03-15 RX ADMIN — PANTOPRAZOLE SODIUM 40 MG: 40 TABLET, DELAYED RELEASE ORAL at 11:54

## 2025-03-15 RX ADMIN — ESCITALOPRAM OXALATE 20 MG: 20 TABLET ORAL at 09:37

## 2025-03-15 RX ADMIN — INSULIN LISPRO 1 UNITS: 100 INJECTION, SOLUTION INTRAVENOUS; SUBCUTANEOUS at 11:55

## 2025-03-15 RX ADMIN — NICOTINE 1 PATCH: 14 PATCH, EXTENDED RELEASE TRANSDERMAL at 09:35

## 2025-03-15 NOTE — PROCEDURES
03/15/25    Pulmonary Procedure Note    tPA/Dornase ordered and delivered to bedside by pharmacy. Administered dose # 1/6. Chest tube was instilled with 10mg tPA (30mL) and 5mg dornase (30mL). Chest tube clamped at 1145 am.  The medications were scanned by RN with the plan to unclamp at 1245. The patient tolerated without complication or complaints. For any concerns or for any change in patient condition, contact Pulmonary team via [employer] Secure Chat.       Dinah Mcmahan, DO

## 2025-03-15 NOTE — ASSESSMENT & PLAN NOTE
Maintained on coumadin - currently on hold for tpa/dornase  Once INR < 2 will need heparin gtt - but will need to hold if instilling tpa/dornase

## 2025-03-15 NOTE — NURSING NOTE
"Pt with \"grunting\" type of short breaths. Pox 94% on 8L midflow NC. States he is having pain around the chest tube site. Small amount of blood and bruising noted. Eloina rodriguez np in to assess pt and pain meds given as ordered. Breath sounds present and cxr obtained.   "

## 2025-03-15 NOTE — ASSESSMENT & PLAN NOTE
Follows with WALE LOTT, last seen Jan 2025  History of hepatitis C antibody and stated treatment with Mavyret   May 2024 negative viral load   DISPLAY PLAN FREE TEXT

## 2025-03-15 NOTE — PROGRESS NOTES
Progress Note - Hospitalist   Name: Chuy Norton 64 y.o. male I MRN: [id number]  Unit/Bed#: -01 SDU I Date of Admission: 3/5/2025   Date of Service: 3/15/2025 I Hospital Day: 10    Assessment & Plan  Acute respiratory failure with hypoxia (HCC)  Patient brought in by facility staff for SOB, cough x2 weeks and dizziness with standing  At baseline patient states he does not wear oxygen but does wear nocturnal CPAP  In ED required 4 L however had worsening respiratory status and was placed on BiPAP  Flu/Covid/RSV negative  CXR: Patchy lingular and lower lobe opacities, concerning for pneumonia.   CT CAP: Multifocal pneumonia involving the left lower lobe, lingula, and medial right lower lobe. Fluid in the esophagus suggests aspiration as a possible etiology. Moderate size hiatal hernia and distended fluid-filled esophagus to the level of the thoracic inlet.   Completed azithromycin, 7 days of ceftriaxone and is currently on vancomycin.  Completed 5 days of prednisone.  Evaluated by speech, recommended regular diet with thin liquids  home oxygen evaluation prior to discharge  Repeat x-ray 3/12 showed loculated effusion that was confirmed with CT chest  Chest tube placed 3/12 by pulmonology  Patient required 12L mid flow nasal cannula oxygen , he is not taking deep breaths    CT chest 3/14 showed small left pleural effusion which is probably free-flowing at the left lung base appears grossly stable to slightly decreased status postplacement of left chest tube; pockets of loculated pleural fluid in the posterior left apex and along the major fissure in the mid chest appear increased in size, this do not appear to be communicating with the left chest tube; near complete compressive atelectasis of the left lower lobe  Given patient's persistent lung issues, patient transferred SD1   Bipolar 2 disorder, major depressive episode (HCC)  Outpatient: 20mg lexapro, 300mg Seroquel HS, 100mg trazadone HS    Plan  Continue  "  Primary hypertension  Outpatient: 12.5mg Carvedilol BID, 40mg lisinopril   /75 (BP Location: Right arm)   Pulse 67   Temp (!) 97.2 °F (36.2 °C) (Temporal)   Resp 18   Ht 5' 8\" (1.727 m)   Wt 97.2 kg (214 lb 4.6 oz)   SpO2 (!) 88%   BMI 32.58 kg/m²     Plan  continue  PRN labetolol, for SBP >180  Hyperlipidemia  Outpatient: 40 atorvastatin    Plan  Continue statin   Cirrhosis (HCC)  Follows with WALE LOTT, last seen Jan 2025  History of cirrhosis of the liver due to history of alcohol and drug addiction.   5/9/2024 CT C/A/P noted nodular hepatic contours may indicate cirrhosis of the liver.   No ascites present  Left ventricular apical thrombus    Recent Labs     03/13/25  0424 03/14/25  0520 03/15/25  0316   INR 2.81* 2.12* 1.91*      Outpatient: 2.5mg Warfarin (Sun, Tues, Wed, Fri) and 5mg (Mon, Thurs, Sat)  Patient with supratherapeutic INR in the past 4 days or so, warfarin was held.  Warfarin held.  Will need heparin drip as INR is less than 2, if no bleeding after tPA  Hepatitis C  Follows with WALE LOTT, last seen Jan 2025  History of hepatitis C antibody and stated treatment with Mavyret   May 2024 negative viral load  Left lower lobe pneumonia  CXR 3/6 showing increasing left basilar opacity  CT CAP with consolidation in LLL and lingula, and small consolidation in RLL  Procal remains elevated (2.75, 2.69, 0.94)  Urinary antigens negative. RP2 negative.   Placed on ceftriaxone, Flagyl and azithromycin, tailored to IV ceftriaxone and IV vancomycin on 3/8.    Completed 7 days of ceftriaxone, prior to that completed 3 days of azithromycin.  Currently on vancomycin only.  Continue vancomycin day 7/7  CT chest 3/12 showed compressive atelectasis of the left lower lung with focal hypodensity and cystic changes suggesting parenchymal necrosis.  Likely in the setting of MRSA pneumonia.   Completed 7 days of vancomycin.  KENN (obstructive sleep apnea)  Maintained on CPAP at home  Continue BiPAP qhs for " now  Ischemic cardiomyopathy    Left Parapneumonic effusion    Chest x-ray 3/12 showed significantly increased opacity in the left chest felt to be mostly due to large potentially partially loculated pleural effusion.  CT chest 3/12 showed loculated pleural effusion pockets in the left apex posteriorly and in the major fissure.  Small free effusion in the left lung base.  Compressive atelectasis of the left lower lung with focal hypodensity and cystic changes suggesting parenchymal necrosis  Chest tube was placed 3/12.  Patient transferred sd1  Dependence on nicotine from cigarettes      VTE Pharmacologic Prophylaxis:   Moderate Risk (Score 3-4) - Pharmacological DVT Prophylaxis Contraindicated. Sequential Compression Devices Ordered.    Mobility:   Basic Mobility Inpatient Raw Score: 10  JH-HLM Goal: 4: Move to chair/commode  JH-HLM Achieved: 2: Bed activities/Dependent transfer  JH-HLM Goal achieved. Continue to encourage appropriate mobility.    Patient Centered Rounds: I performed bedside rounds with nursing staff today.   Discussions with Specialists or Other Care Team Provider: cc    Education and Discussions with Family / Patient: Patient declined call to .     Current Length of Stay: 10 day(s)  Current Patient Status: Inpatient   Certification Statement: The patient will continue to require additional inpatient hospital stay due to pl effusion   Discharge Plan:  tbd    Code Status: Level 1 - Full Code    Subjective     Intermittent SOB, chest pain.     Objective :  Temp:  [97.2 °F (36.2 °C)-98.1 °F (36.7 °C)] 97.2 °F (36.2 °C)  HR:  [67-70] 67  BP: (103-128)/(66-75) 128/75  Resp:  [18] 18  SpO2:  [88 %-92 %] 88 %  O2 Device: Mid flow nasal cannula  Nasal Cannula O2 Flow Rate (L/min):  [10 L/min-12 L/min] 12 L/min  FiO2 (%):  [8] 8    Body mass index is 32.58 kg/m².     Input and Output Summary (last 24 hours):     Intake/Output Summary (Last 24 hours) at 3/15/2025 1221  Last data filed at  3/15/2025 0601  Gross per 24 hour   Intake 300 ml   Output 228 ml   Net 72 ml       Physical Exam  Vitals and nursing note reviewed.   Constitutional:       General: He is not in acute distress.     Appearance: He is not diaphoretic.   HENT:      Head: Normocephalic.   Eyes:      General:         Right eye: No discharge.         Left eye: No discharge.   Cardiovascular:      Rate and Rhythm: Normal rate and regular rhythm.   Pulmonary:      Effort: Pulmonary effort is normal. No respiratory distress.      Breath sounds: No wheezing, rhonchi or rales.      Comments: No bs left hemithorax   Abdominal:      General: There is no distension.      Palpations: Abdomen is soft.      Tenderness: There is no abdominal tenderness. There is no guarding or rebound.   Musculoskeletal:      Cervical back: Normal range of motion.      Right lower leg: No edema.      Left lower leg: No edema.   Skin:     General: Skin is warm.   Neurological:      Mental Status: He is alert.   Psychiatric:         Mood and Affect: Mood normal.         Behavior: Behavior normal.           Lines/Drains:  Lines/Drains/Airways       Active Status       Name Placement date Placement time Site Days    Chest Tube 03/12/25  1549  --  2    External Urinary Catheter Small 03/15/25  0300  -- less than 1                            Lab Results: I have reviewed the following results:   Results from last 7 days   Lab Units 03/15/25  0316 03/11/25  0544 03/10/25  0512   WBC Thousand/uL 13.53*   < > 9.52   HEMOGLOBIN g/dL 12.9   < > 13.0   HEMATOCRIT % 40.2   < > 40.0   PLATELETS Thousands/uL 145*   < > 119*   SEGS PCT %  --   --  77*   LYMPHO PCT %  --   --  12*   MONO PCT %  --   --  9   EOS PCT %  --   --  0    < > = values in this interval not displayed.     Results from last 7 days   Lab Units 03/15/25  0316 03/13/25  0424 03/12/25  0137   SODIUM mmol/L 137   < > 136   POTASSIUM mmol/L 4.0   < > 4.2   CHLORIDE mmol/L 104   < > 107   CO2 mmol/L 27   < > 22   BUN  mg/dL 36*   < > 30*   CREATININE mg/dL 1.39*   < > 1.10   ANION GAP mmol/L 6   < > 7   CALCIUM mg/dL 7.7*   < > 7.8*   ALBUMIN g/dL  --   --  2.9*   TOTAL BILIRUBIN mg/dL  --   --  0.36   ALK PHOS U/L  --   --  38   ALT U/L  --   --  13   AST U/L  --   --  12*   GLUCOSE RANDOM mg/dL 109   < > 173*    < > = values in this interval not displayed.     Results from last 7 days   Lab Units 03/15/25  0316   INR  1.91*     Results from last 7 days   Lab Units 03/15/25  1152 03/15/25  0727 03/14/25  2034 03/14/25  1612 03/14/25  1102 03/14/25  0741 03/13/25  2001 03/13/25  1645 03/13/25  1047 03/13/25  0717 03/12/25  2046 03/12/25  1636   POC GLUCOSE mg/dl 165* 108 134 117 262* 124 122 113 123 109 136 159*         Results from last 7 days   Lab Units 03/09/25  0348   PROCALCITONIN ng/ml 0.70*       Recent Cultures (last 7 days):   Results from last 7 days   Lab Units 03/12/25  1630   GRAM STAIN RESULT  Rare Polys  No bacteria seen   BODY FLUID CULTURE, STERILE  No growth       Imaging Results Review: I reviewed radiology reports from this admission including: CT chest.  Other Study Results Review: No additional pertinent studies reviewed.    Last 24 Hours Medication List:     Current Facility-Administered Medications:     acetaminophen (TYLENOL) tablet 975 mg, Q8H SHANNA    atorvastatin (LIPITOR) tablet 40 mg, Daily With Dinner    benzonatate (TESSALON PERLES) capsule 200 mg, TID PRN    bisacodyl (DULCOLAX) rectal suppository 10 mg, Daily    carvedilol (COREG) tablet 25 mg, BID With Meals    chlorhexidine (PERIDEX) 0.12 % oral rinse 15 mL, Q12H SHANNA    escitalopram (LEXAPRO) tablet 20 mg, Daily    guaiFENesin (ROBITUSSIN) oral liquid 200 mg, TID PRN    hydrALAZINE (APRESOLINE) injection 10 mg, Q6H PRN    HYDROmorphone (DILAUDID) injection 0.5 mg, Q4H PRN    insulin lispro (HumALOG/ADMELOG) 100 units/mL subcutaneous injection 1-6 Units, 4x Daily (AC & HS) **AND** Fingerstick Glucose (POCT), 4x Daily AC and at bedtime     levalbuterol (XOPENEX) inhalation solution 1.25 mg, TID    levalbuterol (XOPENEX) inhalation solution 1.25 mg, Q6H PRN    lidocaine (LIDODERM) 5 % patch 1 patch, Daily    losartan (COZAAR) tablet 100 mg, Daily    nicotine (NICODERM CQ) 14 mg/24hr TD 24 hr patch 1 patch, Daily    oxyCODONE (ROXICODONE) IR tablet 5 mg, Q6H PRN    oxyCODONE (ROXICODONE) split tablet 2.5 mg, Q6H PRN    pantoprazole (PROTONIX) EC tablet 40 mg, BID before lunch/dinner    polyethylene glycol (MIRALAX) packet 17 g, Daily    QUEtiapine (SEROquel) tablet 300 mg, HS    senna-docusate sodium (SENOKOT S) 8.6-50 mg per tablet 1 tablet, BID    traZODone (DESYREL) tablet 100 mg, HS    Administrative Statements   Today, Patient Was Seen By: Zainab Ribeiro MD      **Please Note: This note may have been constructed using a voice recognition system.**

## 2025-03-15 NOTE — ASSESSMENT & PLAN NOTE
CXR 3/6 showing increasing left basilar opacity  CT CAP with consolidation in LLL and lingula, and small consolidation in RLL  Procal remains elevated (2.75, 2.69, 0.94)  Urinary antigens negative. RP2 negative.   Placed on ceftriaxone, Flagyl and azithromycin, tailored to IV ceftriaxone and IV vancomycin on 3/8.    Completed 7 days of ceftriaxone, prior to that completed 3 days of azithromycin.  Currently on vancomycin only.  Continue vancomycin day 7/7  CT chest 3/12 showed compressive atelectasis of the left lower lung with focal hypodensity and cystic changes suggesting parenchymal necrosis.  Likely in the setting of MRSA pneumonia.   Completed 7 days of vancomycin.

## 2025-03-15 NOTE — ASSESSMENT & PLAN NOTE
CT chest with loculated left pleural effusion CT placed 3/12.  Pleural fluid studies suggest complicated parapneumonic effusion.  No evidence of empyema.  Cultures remain negative.  Minimal output.  Repeat chest CT performed yesterday shows increase in loculated pleural fluid.  Discussed at length with Dr. Bills from thoracic surgery.  At this point, our options are to proceed cautiously with tPA/dornase instillation via chest tube or directly to surgery for VATS decortication.  Both options carry significant risk.  I had a detailed conversation with the patient regarding these risks and benefits.  We have elected to proceed with tPA/dornase instillation, recognizing that if he has any bleeding complication, he would need urgent transfer to [facility] for VATS.  He further understands that if he would require surgical intervention, he may require mechanical ventilator support due to tenuous pulmonary status.

## 2025-03-15 NOTE — PLAN OF CARE
Problem: Potential for Falls  Goal: Patient will remain free of falls  Description: INTERVENTIONS:  - Educate patient/family on patient safety including physical limitations  - Instruct patient to call for assistance with activity   - Consult OT/PT to assist with strengthening/mobility   - Keep Call bell within reach  - Keep bed low and locked with side rails adjusted as appropriate  - Keep care items and personal belongings within reach  - Initiate and maintain comfort rounds  - Make Fall Risk Sign visible to staff  - Offer Toileting every 2 Hours, in advance of need  - Initiate/Maintain bed alarm  - Obtain necessary fall risk management equipment: socks  - Apply yellow socks and bracelet for high fall risk patients  - Consider moving patient to room near nurses station  Outcome: Progressing     Problem: Prexisting or High Potential for Compromised Skin Integrity  Goal: Skin integrity is maintained or improved  Description: INTERVENTIONS:  - Identify patients at risk for skin breakdown  - Assess and monitor skin integrity  - Assess and monitor nutrition and hydration status  - Monitor labs   - Assess for incontinence   - Turn and reposition patient  - Assist with mobility/ambulation  - Relieve pressure over bony prominences  - Avoid friction and shearing  - Provide appropriate hygiene as needed including keeping skin clean and dry  - Evaluate need for skin moisturizer/barrier cream  - Collaborate with interdisciplinary team   - Patient/family teaching  - Consider wound care consult   Outcome: Progressing     Problem: PAIN - ADULT  Goal: Verbalizes/displays adequate comfort level or baseline comfort level  Description: Interventions:  - Encourage patient to monitor pain and request assistance  - Assess pain using appropriate pain scale  - Administer analgesics based on type and severity of pain and evaluate response  - Implement non-pharmacological measures as appropriate and evaluate response  - Consider cultural  and social influences on pain and pain management  - Notify physician/advanced practitioner if interventions unsuccessful or patient reports new pain  Outcome: Progressing     Problem: INFECTION - ADULT  Goal: Absence or prevention of progression during hospitalization  Description: INTERVENTIONS:  - Assess and monitor for signs and symptoms of infection  - Monitor lab/diagnostic results  - Monitor all insertion sites, i.e. indwelling lines, tubes, and drains  - Monitor endotracheal if appropriate and nasal secretions for changes in amount and color  - Perry appropriate cooling/warming therapies per order  - Administer medications as ordered  - Instruct and encourage patient and family to use good hand hygiene technique  - Identify and instruct in appropriate isolation precautions for identified infection/condition  Outcome: Progressing  Goal: Absence of fever/infection during neutropenic period  Description: INTERVENTIONS:  - Monitor WBC    Outcome: Progressing     Problem: SAFETY ADULT  Goal: Patient will remain free of falls  Description: INTERVENTIONS:  - Educate patient/family on patient safety including physical limitations  - Instruct patient to call for assistance with activity   - Consult OT/PT to assist with strengthening/mobility   - Keep Call bell within reach  - Keep bed low and locked with side rails adjusted as appropriate  - Keep care items and personal belongings within reach  - Initiate and maintain comfort rounds  - Make Fall Risk Sign visible to staff  - Offer Toileting every 2 Hours, in advance of need  - Initiate/Maintain bed alarm  - Obtain necessary fall risk management equipment: socks  - Apply yellow socks and bracelet for high fall risk patients  - Consider moving patient to room near nurses station  Outcome: Progressing  Goal: Maintain or return to baseline ADL function  Description: INTERVENTIONS:  -  Assess patient's ability to carry out ADLs; assess patient's baseline for ADL  function and identify physical deficits which impact ability to perform ADLs (bathing, care of mouth/teeth, toileting, grooming, dressing, etc.)  - Assess/evaluate cause of self-care deficits   - Assess range of motion  - Assess patient's mobility; develop plan if impaired  - Assess patient's need for assistive devices and provide as appropriate  - Encourage maximum independence but intervene and supervise when necessary  - Involve family in performance of ADLs  - Assess for home care needs following discharge   - Consider OT consult to assist with ADL evaluation and planning for discharge  - Provide patient education as appropriate  Outcome: Progressing  Goal: Maintains/Returns to pre admission functional level  Description: INTERVENTIONS:  - Perform AM-PAC 6 Click Basic Mobility/ Daily Activity assessment daily.  - Set and communicate daily mobility goal to care team and patient/family/caregiver.   - Collaborate with rehabilitation services on mobility goals if consulted  - Perform Range of Motion 3 times a day.  - Reposition patient every 2 hours.  - Dangle patient 3 times a day  - Stand patient 3 times a day  - Ambulate patient 3 times a day  - Out of bed to chair 3 times a day   - Out of bed for meals 3 times a day  - Out of bed for toileting  - Record patient progress and toleration of activity level   Outcome: Progressing     Problem: DISCHARGE PLANNING  Goal: Discharge to home or other facility with appropriate resources  Description: INTERVENTIONS:  - Identify barriers to discharge w/patient and caregiver  - Arrange for needed discharge resources and transportation as appropriate  - Identify discharge learning needs (meds, wound care, etc.)  - Arrange for interpretive services to assist at discharge as needed  - Refer to Case Management Department for coordinating discharge planning if the patient needs post-hospital services based on physician/advanced practitioner order or complex needs related to  functional status, cognitive ability, or social support system  Outcome: Progressing     Problem: Knowledge Deficit  Goal: Patient/family/caregiver demonstrates understanding of disease process, treatment plan, medications, and discharge instructions  Description: Complete learning assessment and assess knowledge base.  Interventions:  - Provide teaching at level of understanding  - Provide teaching via preferred learning methods  Outcome: Progressing     Problem: CARDIOVASCULAR - ADULT  Goal: Maintains optimal cardiac output and hemodynamic stability  Description: INTERVENTIONS:  - Monitor I/O, vital signs and rhythm  - Monitor for S/S and trends of decreased cardiac output  - Administer and titrate ordered vasoactive medications to optimize hemodynamic stability  - Assess quality of pulses, skin color and temperature  - Assess for signs of decreased coronary artery perfusion  - Instruct patient to report change in severity of symptoms  Outcome: Progressing  Goal: Absence of cardiac dysrhythmias or at baseline rhythm  Description: INTERVENTIONS:  - Continuous cardiac monitoring, vital signs, obtain 12 lead EKG if ordered  - Administer antiarrhythmic and heart rate control medications as ordered  - Monitor electrolytes and administer replacement therapy as ordered  Outcome: Progressing     Problem: RESPIRATORY - ADULT  Goal: Achieves optimal ventilation and oxygenation  Description: INTERVENTIONS:  - Assess for changes in respiratory status  - Assess for changes in mentation and behavior  - Position to facilitate oxygenation and minimize respiratory effort  - Oxygen administered by appropriate delivery if ordered  - Initiate smoking cessation education as indicated  - Encourage broncho-pulmonary hygiene including cough, deep breathe, Incentive Spirometry  - Assess the need for suctioning and aspirate as needed  - Assess and instruct to report SOB or any respiratory difficulty  - Respiratory Therapy support as  indicated  Outcome: Progressing     Problem: METABOLIC, FLUID AND ELECTROLYTES - ADULT  Goal: Electrolytes maintained within normal limits  Description: INTERVENTIONS:  - Monitor labs and assess patient for signs and symptoms of electrolyte imbalances  - Administer electrolyte replacement as ordered  - Monitor response to electrolyte replacements, including repeat lab results as appropriate  - Instruct patient on fluid and nutrition as appropriate  Outcome: Progressing  Goal: Fluid balance maintained  Description: INTERVENTIONS:  - Monitor labs   - Monitor I/O and WT  - Instruct patient on fluid and nutrition as appropriate  - Assess for signs & symptoms of volume excess or deficit  Outcome: Progressing  Goal: Glucose maintained within target range  Description: INTERVENTIONS:  - Monitor Blood Glucose as ordered  - Assess for signs and symptoms of hyperglycemia and hypoglycemia  - Administer ordered medications to maintain glucose within target range  - Assess nutritional intake and initiate nutrition service referral as needed  Outcome: Progressing

## 2025-03-15 NOTE — ASSESSMENT & PLAN NOTE
With MRSA nasal culture positive, treating empirically with MRSA coverage  Completed 3 days of azithromycin and 7 days of Ceftriaxone  Completed 5 days of prednisone 40 mg  Vancomycin x 7 days completed on 3/14/2025    Speech cleared the patient for regular diet and thin liquids - no signs of aspiration    Repeat imaging in 4 to 6 weeks after discharge

## 2025-03-15 NOTE — ASSESSMENT & PLAN NOTE
NAME: Taiwo Pierce  DATE: 08/20/2024    [facility] Phase I  [id number]    Services Provided    Group Psychotherapy x 1  Education/Training x 1  Activity Therapy  x 1  Individual Therapy x 0 0 minutes  Family Therapy x 0  MD Initial Visit  x 0  MD Follow-Up   x 0    Number of participants: 3    DATA:  Patient shared this morning about some of his frustrations with the state of [place]. Otherwise, he had gone to the dentist to check on the state of his false teeth.     Individual: No    Homework: None assigned    Group 1 (Psychotherapy: Check-ins): Therapist continued facilitation of rapport building strategies between group members. Therapist asked that each patient check in with home life and recovery efforts and identify triggers, cravings, and high risk situations that arise between group sessions.  Group members discussed barriers and benefits of attending recovery meetings.  Therapist provided empathy and support during group session. Daily Reading: Man's Search for Meaning (1945) by Francisco Javier Alvarez    Group 2  (Recreation Therapy) Crafts     Group 3 (Mindfulness) Continued discussing mindfulness and alternatives to traditional meditation practices.     ASSESSMENT:    Personal Assessment 0-10 Scale (10 worst)    Anxiety:  6 (no comment)  Depression:  1 (no comment)  Cravings: 0 (no comment)     MSE within normal limits? Yes Affect: euthymic Mood: calm  Pt Response:  open/receptive  Engaged in activity/Process and self-disclosed: adequate  Applies today's group topics to self: adequate  Able to give and receive feedback: adequate  Demonstrated insightful thinking: consistent and adequate  Other pt response comments:  Patient was a positive participant. They demonstrated a relatively pessimistic attitude, a moderate degree of motivation, and moderate insight, as evidenced by his level of engagement combined with his frustration with the state of the country. They seemed interested and attentive. They  Saturations are 91% on 12 L.  Titrate to maintain saturations above 90%  Recommend pulmonary hygiene: Deep breathing with cough, OOB as tolerated, incentive spirometry and flutter valve  Toradol prn pain  Will need home O2 eval prior to discharge      appeared to comprehend well the concepts being discussed. The patient seemed receptive of, and willing to implement, the ideas being discussed. Their thought process appeared linear and goal directed, and their speech was regular rate and rhythm.       Community Group Engagement:  Yes: [employer]    Reported relapse since last meeting? No: no lapse    .  Office Visit on 08/19/2024   Component Date Value Ref Range Status    External Amphetamine Screen Urine 08/19/2024 Negative   Final    External Benzodiazepine Screen Uri* 08/19/2024 Negative   Final    External Cocaine Screen Urine 08/19/2024 Negative   Final    External THC Screen Urine 08/19/2024 Negative   Final    External Methadone Screen Urine 08/19/2024 Negative   Final    External Methamphetamine Screen Ur* 08/19/2024 Negative   Final    External Oxycodone Screen Urine 08/19/2024 Positive (A)   Final    External Buprenorphine Screen Urine 08/19/2024 Negative   Final    External MDMA 08/19/2024 Negative   Final    External Opiates Screen Urine 08/19/2024 Positive (A)   Final   Office Visit on 08/12/2024   Component Date Value Ref Range Status    External Amphetamine Screen Urine 08/12/2024 Negative   Final    External Benzodiazepine Screen Uri* 08/12/2024 Negative   Final    External Cocaine Screen Urine 08/12/2024 Negative   Final    External THC Screen Urine 08/12/2024 Negative   Final    External Methadone Screen Urine 08/12/2024 Negative   Final    External Methamphetamine Screen Ur* 08/12/2024 Negative   Final    External Oxycodone Screen Urine 08/12/2024 Positive (A)   Final    External Buprenorphine Screen Urine 08/12/2024 Negative   Final    External MDMA 08/12/2024 Negative   Final    External Opiates Screen Urine 08/12/2024 Positive (A)   Final   Office Visit on 08/05/2024   Component Date Value Ref Range Status    External Amphetamine Screen Urine 08/05/2024 Negative   Final    External Benzodiazepine Screen Uri* 08/05/2024 Negative   Final    External  Cocaine Screen Urine 08/05/2024 Negative   Final    External THC Screen Urine 08/05/2024 Negative   Final    External Methadone Screen Urine 08/05/2024 Negative   Final    External Methamphetamine Screen Ur* 08/05/2024 Negative   Final    External Oxycodone Screen Urine 08/05/2024 Positive (A)   Final    External Buprenorphine Screen Urine 08/05/2024 Negative   Final    External MDMA 08/05/2024 Negative   Final    External Opiates Screen Urine 08/05/2024 Positive (A)   Final       Impression/Formulation:    ICD-10-CM ICD-9-CM   1. Alcohol use disorder, severe, dependence  F10.20 303.90   2. Anxiety disorder, unspecified type  F41.9 300.00   3. Chronic pain disorder  G89.4 338.4        CLINICAL MANEUVERING/INTERVENTIONS: Therapist utilized a person-centered approach to build and maintain rapport with group member.   Therapist promoted safe nonjudgmental environment by providing group members with unconditional positive regard.  Assisted member in processing above session content; acknowledged and normalized patient's thoughts, feelings and concerns.  Allowed patient to freely discuss issues without interruption or judgment. Provided safe, confidential environment to facilitate the development of positive therapeutic relationship and encourage open, honest communication. Therapist assisted group member with identifying and implementing healthier coping strategies and maintaining healthier boundaries. Assisted patient in identifying risk factors which would indicate the need for higher level of care including thoughts to harm self or others and/or self-harming behavior and encouraged patient to contact this office, call 911, or present to the nearest emergency room should any of these events occur. Pt agreeable to adhere to medication regimen as prescribed and report any side effects.  Discussed crisis intervention services and means to access.  Patient adamantly and convincingly denies current suicidal or homicidal  ideation or perceptual disturbance.      Therapist implemented motivational interviewing techniques to assist client with exploring and resolving ambivalence associated with commitment to change behaviors.  Yes  Therapist utilized dialectical behavior techniques to teach and model emotional regulation and relaxation.  No   Therapist applied cognitive behavioral strategies to facilitate identification of maladaptive patterns of thinking and behavior.  Yes     PLAN:    Continue [facility] Phase I  Aftercare:  [facility] Outpatient Phase 2      Please note that portions of this note were completed with a voice recognition program. Efforts were made to edit dictation, but occasionally words are mistranscribed.     This document signed by Ben Ortega LCSW, August 20, 2024, 09:21 EDT

## 2025-03-15 NOTE — QUICK NOTE
The chest tube was unclamped at 1245. Patient tolerated without complaints. I notified RN that chest tube was no longer clamped and back to wall suction.

## 2025-03-15 NOTE — ASSESSMENT & PLAN NOTE
Chest x-ray 3/12 showed significantly increased opacity in the left chest felt to be mostly due to large potentially partially loculated pleural effusion.  CT chest 3/12 showed loculated pleural effusion pockets in the left apex posteriorly and in the major fissure.  Small free effusion in the left lung base.  Compressive atelectasis of the left lower lung with focal hypodensity and cystic changes suggesting parenchymal necrosis  Chest tube was placed 3/12.  Patient transferred sd1

## 2025-03-15 NOTE — ASSESSMENT & PLAN NOTE
Recent Labs     03/13/25  0424 03/14/25  0520 03/15/25  0316   INR 2.81* 2.12* 1.91*      Outpatient: 2.5mg Warfarin (Sun, Tues, Wed, Fri) and 5mg (Mon, Thurs, Sat)  Patient with supratherapeutic INR in the past 4 days or so, warfarin was held.  Warfarin held.  Will need heparin drip as INR is less than 2, if no bleeding after tPA

## 2025-03-15 NOTE — ASSESSMENT & PLAN NOTE
"Outpatient: 12.5mg Carvedilol BID, 40mg lisinopril   /75 (BP Location: Right arm)   Pulse 67   Temp (!) 97.2 °F (36.2 °C) (Temporal)   Resp 18   Ht 5' 8\" (1.727 m)   Wt 97.2 kg (214 lb 4.6 oz)   SpO2 (!) 88%   BMI 32.58 kg/m²     Plan  continue  PRN labetolol, for SBP >180  "

## 2025-03-15 NOTE — CONSULTS
The patient’s vancomycin therapy has been completed / discontinued. Thank you for allowing us to take part in this patient's care. Pharmacy will sign-off now; please call or re-consult if there are any questions.       Westley De AndaD

## 2025-03-15 NOTE — PROGRESS NOTES
Progress Note - Pulmonology   Critical Care Acceptance Note  Name: Chuy Norton 64 y.o. male I MRN: [id number]  Unit/Bed#: -01 I Date of Admission: 3/5/2025   Date of Service: 3/15/2025 I Hospital Day: 10    Assessment & Plan  Acute respiratory failure with hypoxia (HCC)  Saturations are 91% on 12 L.  Titrate to maintain saturations above 90%  Recommend pulmonary hygiene: Deep breathing with cough, OOB as tolerated, incentive spirometry and flutter valve  Toradol prn pain  Will need home O2 eval prior to discharge     Left lower lobe pneumonia  With MRSA nasal culture positive, treating empirically with MRSA coverage  Completed 3 days of azithromycin and 7 days of Ceftriaxone  Completed 5 days of prednisone 40 mg  Vancomycin x 7 days completed on 3/14/2025    Speech cleared the patient for regular diet and thin liquids - no signs of aspiration    Repeat imaging in 4 to 6 weeks after discharge  Left Parapneumonic effusion  CT chest with loculated left pleural effusion CT placed 3/12.  Pleural fluid studies suggest complicated parapneumonic effusion.  No evidence of empyema.  Cultures remain negative.  Minimal output.  Repeat chest CT performed yesterday shows increase in loculated pleural fluid.  Discussed at length with Dr. Bills from thoracic surgery.  At this point, our options are to proceed cautiously with tPA/dornase instillation via chest tube or directly to surgery for VATS decortication.  Both options carry significant risk.  I had a detailed conversation with the patient regarding these risks and benefits.  We have elected to proceed with tPA/dornase instillation, recognizing that if he has any bleeding complication, he would need urgent transfer to [facility] for VATS.  He further understands that if he would require surgical intervention, he may require mechanical ventilator support due to tenuous pulmonary status.    KENN (obstructive sleep apnea)  Maintained on CPAP at home  Continue BiPAP  qhs for now (as patient tolerates)  Left ventricular apical thrombus  Maintained on coumadin - currently on hold for tpa/dornase  Once INR < 2 will need heparin gtt - but will need to hold if instilling tpa/dornase  Ischemic cardiomyopathy  Wt Readings from Last 3 Encounters:   03/10/25 97.2 kg (214 lb 4.6 oz)   02/07/25 95.9 kg (211 lb 6.4 oz)   01/02/25 91.2 kg (201 lb)      on admission  5 pound weight gain since admission, 13 pound weight gain since January  Echo 12/18/2024 shows LVEF 45%, systolic function mildly reduced, moderate solid fixed thrombus in the apex  Lower extremity edema present  Patient appears mildly volume overloaded likely in the setting of sepsis resuscitation  Given IV Lasix 20 mg 3/10    Dependence on nicotine from cigarettes  Clinical concern for COPD - however no PFTs on file   Continue xopenex nebs TID - D/C atrovent  Will need outpatient follow-up imaging and PFTs as well as smoking cessation support    Due to complicated clinical scenario, tenuous pulmonary status and concern for the possibility of bleeding after tPA/dornase instillation, we will transfer the patient to stepdown level 1 on critical care service.      24 Hour Events : Remains on mid flow nasal cannula at 12 L/min  Subjective : Patient feels worse today.  Reports increasing shortness of breath.  Discomfort at chest tube site.    Objective :  Temp:  [97.2 °F (36.2 °C)-98.1 °F (36.7 °C)] 97.2 °F (36.2 °C)  HR:  [67-70] 67  BP: (103-128)/(66-75) 128/75  Resp:  [18] 18  SpO2:  [88 %-92 %] 88 %  O2 Device: Mid flow nasal cannula  Nasal Cannula O2 Flow Rate (L/min):  [10 L/min-12 L/min] 12 L/min  FiO2 (%):  [8] 8    Physical Exam  Vitals reviewed.   Constitutional:       General: He is not in acute distress.     Appearance: He is well-developed.   Eyes:      General: No scleral icterus.  Neck:      Vascular: No JVD.   Cardiovascular:      Rate and Rhythm: Normal rate and regular rhythm.   Pulmonary:      Breath sounds:  Examination of the left-upper field reveals decreased breath sounds. Examination of the left-middle field reveals decreased breath sounds. Examination of the left-lower field reveals decreased breath sounds. Decreased breath sounds present. No wheezing, rhonchi or rales.   Abdominal:      Tenderness: There is no abdominal tenderness.   Musculoskeletal:      Right lower leg: No edema.      Left lower leg: No edema.   Skin:     General: Skin is warm and dry.   Neurological:      Mental Status: He is alert and oriented to person, place, and time.   Psychiatric:         Mood and Affect: Mood normal.         Lab Results: I have reviewed the following results:   .     03/15/25  0316   WBC 13.53*   HGB 12.9   HCT 40.2   *   SODIUM 137   K 4.0      CO2 27   BUN 36*   CREATININE 1.39*   GLUC 109   INR 1.91*     ABG: No new results in last 24 hours.    Imaging Results Review: I personally reviewed the following image studies in PACS and associated radiology reports: CT chest. My interpretation of the radiology images/reports is: CT of the chest performed on 3/14/2025 shows loculated pleural fluid in the left apex and along the major fissure, increased from previous study on 3/12/2025.  Left lower lobe compressive atelectasis.

## 2025-03-15 NOTE — PLAN OF CARE
Problem: Potential for Falls  Goal: Patient will remain free of falls  Description: INTERVENTIONS:  - Educate patient/family on patient safety including physical limitations  - Instruct patient to call for assistance with activity   - Consult OT/PT to assist with strengthening/mobility   - Keep Call bell within reach  - Keep bed low and locked with side rails adjusted as appropriate  - Keep care items and personal belongings within reach  - Initiate and maintain comfort rounds  - Make Fall Risk Sign visible to staff  - Offer Toileting every 2 Hours, in advance of need  - Initiate/Maintain bed alarm  - Obtain necessary fall risk management equipment: socks  - Apply yellow socks and bracelet for high fall risk patients  - Consider moving patient to room near nurses station  Outcome: Progressing     Problem: Prexisting or High Potential for Compromised Skin Integrity  Goal: Skin integrity is maintained or improved  Description: INTERVENTIONS:  - Identify patients at risk for skin breakdown  - Assess and monitor skin integrity  - Assess and monitor nutrition and hydration status  - Monitor labs   - Assess for incontinence   - Turn and reposition patient  - Assist with mobility/ambulation  - Relieve pressure over bony prominences  - Avoid friction and shearing  - Provide appropriate hygiene as needed including keeping skin clean and dry  - Evaluate need for skin moisturizer/barrier cream  - Collaborate with interdisciplinary team   - Patient/family teaching  - Consider wound care consult   Outcome: Progressing     Problem: PAIN - ADULT  Goal: Verbalizes/displays adequate comfort level or baseline comfort level  Description: Interventions:  - Encourage patient to monitor pain and request assistance  - Assess pain using appropriate pain scale  - Administer analgesics based on type and severity of pain and evaluate response  - Implement non-pharmacological measures as appropriate and evaluate response  - Consider cultural  and social influences on pain and pain management  - Notify physician/advanced practitioner if interventions unsuccessful or patient reports new pain  Outcome: Progressing     Problem: INFECTION - ADULT  Goal: Absence or prevention of progression during hospitalization  Description: INTERVENTIONS:  - Assess and monitor for signs and symptoms of infection  - Monitor lab/diagnostic results  - Monitor all insertion sites, i.e. indwelling lines, tubes, and drains  - Monitor endotracheal if appropriate and nasal secretions for changes in amount and color  - [place] appropriate cooling/warming therapies per order  - Administer medications as ordered  - Instruct and encourage patient and family to use good hand hygiene technique  - Identify and instruct in appropriate isolation precautions for identified infection/condition  Outcome: Progressing  Goal: Absence of fever/infection during neutropenic period  Description: INTERVENTIONS:  - Monitor WBC    Outcome: Progressing     Problem: SAFETY ADULT  Goal: Patient will remain free of falls  Description: INTERVENTIONS:  - Educate patient/family on patient safety including physical limitations  - Instruct patient to call for assistance with activity   - Consult OT/PT to assist with strengthening/mobility   - Keep Call bell within reach  - Keep bed low and locked with side rails adjusted as appropriate  - Keep care items and personal belongings within reach  - Initiate and maintain comfort rounds  - Make Fall Risk Sign visible to staff  - Offer Toileting every 2 Hours, in advance of need  - Initiate/Maintain bed alarm  - Obtain necessary fall risk management equipment: socks  - Apply yellow socks and bracelet for high fall risk patients  - Consider moving patient to room near nurses station  Outcome: Progressing  Goal: Maintain or return to baseline ADL function  Description: INTERVENTIONS:  -  Assess patient's ability to carry out ADLs; assess patient's baseline for ADL  function and identify physical deficits which impact ability to perform ADLs (bathing, care of mouth/teeth, toileting, grooming, dressing, etc.)  - Assess/evaluate cause of self-care deficits   - Assess range of motion  - Assess patient's mobility; develop plan if impaired  - Assess patient's need for assistive devices and provide as appropriate  - Encourage maximum independence but intervene and supervise when necessary  - Involve family in performance of ADLs  - Assess for home care needs following discharge   - Consider OT consult to assist with ADL evaluation and planning for discharge  - Provide patient education as appropriate  Outcome: Progressing  Goal: Maintains/Returns to pre admission functional level  Description: INTERVENTIONS:  - Perform AM-PAC 6 Click Basic Mobility/ Daily Activity assessment daily.  - Set and communicate daily mobility goal to care team and patient/family/caregiver.   - Collaborate with rehabilitation services on mobility goals if consulted  - Perform Range of Motion 3 times a day.  - Reposition patient every 2 hours.  - Dangle patient 3 times a day  - Stand patient 3 times a day  - Ambulate patient 3 times a day  - Out of bed to chair 3 times a day   - Out of bed for meals 3 times a day  - Out of bed for toileting  - Record patient progress and toleration of activity level   Outcome: Progressing     Problem: DISCHARGE PLANNING  Goal: Discharge to home or other facility with appropriate resources  Description: INTERVENTIONS:  - Identify barriers to discharge w/patient and caregiver  - Arrange for needed discharge resources and transportation as appropriate  - Identify discharge learning needs (meds, wound care, etc.)  - Arrange for interpretive services to assist at discharge as needed  - Refer to Case Management Department for coordinating discharge planning if the patient needs post-hospital services based on physician/advanced practitioner order or complex needs related to  functional status, cognitive ability, or social support system  Outcome: Progressing     Problem: Knowledge Deficit  Goal: Patient/family/caregiver demonstrates understanding of disease process, treatment plan, medications, and discharge instructions  Description: Complete learning assessment and assess knowledge base.  Interventions:  - Provide teaching at level of understanding  - Provide teaching via preferred learning methods  Outcome: Progressing     Problem: CARDIOVASCULAR - ADULT  Goal: Maintains optimal cardiac output and hemodynamic stability  Description: INTERVENTIONS:  - Monitor I/O, vital signs and rhythm  - Monitor for S/S and trends of decreased cardiac output  - Administer and titrate ordered vasoactive medications to optimize hemodynamic stability  - Assess quality of pulses, skin color and temperature  - Assess for signs of decreased coronary artery perfusion  - Instruct patient to report change in severity of symptoms  Outcome: Progressing  Goal: Absence of cardiac dysrhythmias or at baseline rhythm  Description: INTERVENTIONS:  - Continuous cardiac monitoring, vital signs, obtain 12 lead EKG if ordered  - Administer antiarrhythmic and heart rate control medications as ordered  - Monitor electrolytes and administer replacement therapy as ordered  Outcome: Progressing

## 2025-03-15 NOTE — PLAN OF CARE
Problem: Potential for Falls  Goal: Patient will remain free of falls  Description: INTERVENTIONS:  - Educate patient/family on patient safety including physical limitations  - Instruct patient to call for assistance with activity   - Consult OT/PT to assist with strengthening/mobility   - Keep Call bell within reach  - Keep bed low and locked with side rails adjusted as appropriate  - Keep care items and personal belongings within reach  - Initiate and maintain comfort rounds  - Make Fall Risk Sign visible to staff  - Offer Toileting every 2 Hours, in advance of need  - Initiate/Maintain bed alarm  - Obtain necessary fall risk management equipment: socks  - Apply yellow socks and bracelet for high fall risk patients  - Consider moving patient to room near nurses station  Outcome: Progressing     Problem: Prexisting or High Potential for Compromised Skin Integrity  Goal: Skin integrity is maintained or improved  Description: INTERVENTIONS:  - Identify patients at risk for skin breakdown  - Assess and monitor skin integrity  - Assess and monitor nutrition and hydration status  - Monitor labs   - Assess for incontinence   - Turn and reposition patient  - Assist with mobility/ambulation  - Relieve pressure over bony prominences  - Avoid friction and shearing  - Provide appropriate hygiene as needed including keeping skin clean and dry  - Evaluate need for skin moisturizer/barrier cream  - Collaborate with interdisciplinary team   - Patient/family teaching  - Consider wound care consult   Outcome: Progressing     Problem: PAIN - ADULT  Goal: Verbalizes/displays adequate comfort level or baseline comfort level  Description: Interventions:  - Encourage patient to monitor pain and request assistance  - Assess pain using appropriate pain scale  - Administer analgesics based on type and severity of pain and evaluate response  - Implement non-pharmacological measures as appropriate and evaluate response  - Consider cultural  and social influences on pain and pain management  - Notify physician/advanced practitioner if interventions unsuccessful or patient reports new pain  Outcome: Progressing     Problem: INFECTION - ADULT  Goal: Absence or prevention of progression during hospitalization  Description: INTERVENTIONS:  - Assess and monitor for signs and symptoms of infection  - Monitor lab/diagnostic results  - Monitor all insertion sites, i.e. indwelling lines, tubes, and drains  - Monitor endotracheal if appropriate and nasal secretions for changes in amount and color  - [place] appropriate cooling/warming therapies per order  - Administer medications as ordered  - Instruct and encourage patient and family to use good hand hygiene technique  - Identify and instruct in appropriate isolation precautions for identified infection/condition  Outcome: Progressing  Goal: Absence of fever/infection during neutropenic period  Description: INTERVENTIONS:  - Monitor WBC    Outcome: Progressing     Problem: SAFETY ADULT  Goal: Patient will remain free of falls  Description: INTERVENTIONS:  - Educate patient/family on patient safety including physical limitations  - Instruct patient to call for assistance with activity   - Consult OT/PT to assist with strengthening/mobility   - Keep Call bell within reach  - Keep bed low and locked with side rails adjusted as appropriate  - Keep care items and personal belongings within reach  - Initiate and maintain comfort rounds  - Make Fall Risk Sign visible to staff  - Offer Toileting every 2 Hours, in advance of need  - Initiate/Maintain bed alarm  - Obtain necessary fall risk management equipment: socks  - Apply yellow socks and bracelet for high fall risk patients  - Consider moving patient to room near nurses station  Outcome: Progressing  Goal: Maintain or return to baseline ADL function  Description: INTERVENTIONS:  -  Assess patient's ability to carry out ADLs; assess patient's baseline for ADL  function and identify physical deficits which impact ability to perform ADLs (bathing, care of mouth/teeth, toileting, grooming, dressing, etc.)  - Assess/evaluate cause of self-care deficits   - Assess range of motion  - Assess patient's mobility; develop plan if impaired  - Assess patient's need for assistive devices and provide as appropriate  - Encourage maximum independence but intervene and supervise when necessary  - Involve family in performance of ADLs  - Assess for home care needs following discharge   - Consider OT consult to assist with ADL evaluation and planning for discharge  - Provide patient education as appropriate  Outcome: Progressing  Goal: Maintains/Returns to pre admission functional level  Description: INTERVENTIONS:  - Perform AM-PAC 6 Click Basic Mobility/ Daily Activity assessment daily.  - Set and communicate daily mobility goal to care team and patient/family/caregiver.   - Collaborate with rehabilitation services on mobility goals if consulted  - Perform Range of Motion 3 times a day.  - Reposition patient every 2 hours.  - Dangle patient 3 times a day  - Stand patient 3 times a day  - Ambulate patient 3 times a day  - Out of bed to chair 3 times a day   - Out of bed for meals 3 times a day  - Out of bed for toileting  - Record patient progress and toleration of activity level   Outcome: Progressing     Problem: DISCHARGE PLANNING  Goal: Discharge to home or other facility with appropriate resources  Description: INTERVENTIONS:  - Identify barriers to discharge w/patient and caregiver  - Arrange for needed discharge resources and transportation as appropriate  - Identify discharge learning needs (meds, wound care, etc.)  - Arrange for interpretive services to assist at discharge as needed  - Refer to Case Management Department for coordinating discharge planning if the patient needs post-hospital services based on physician/advanced practitioner order or complex needs related to  functional status, cognitive ability, or social support system  Outcome: Progressing     Problem: Knowledge Deficit  Goal: Patient/family/caregiver demonstrates understanding of disease process, treatment plan, medications, and discharge instructions  Description: Complete learning assessment and assess knowledge base.  Interventions:  - Provide teaching at level of understanding  - Provide teaching via preferred learning methods  Outcome: Progressing     Problem: CARDIOVASCULAR - ADULT  Goal: Maintains optimal cardiac output and hemodynamic stability  Description: INTERVENTIONS:  - Monitor I/O, vital signs and rhythm  - Monitor for S/S and trends of decreased cardiac output  - Administer and titrate ordered vasoactive medications to optimize hemodynamic stability  - Assess quality of pulses, skin color and temperature  - Assess for signs of decreased coronary artery perfusion  - Instruct patient to report change in severity of symptoms  Outcome: Progressing  Goal: Absence of cardiac dysrhythmias or at baseline rhythm  Description: INTERVENTIONS:  - Continuous cardiac monitoring, vital signs, obtain 12 lead EKG if ordered  - Administer antiarrhythmic and heart rate control medications as ordered  - Monitor electrolytes and administer replacement therapy as ordered  Outcome: Progressing     Problem: RESPIRATORY - ADULT  Goal: Achieves optimal ventilation and oxygenation  Description: INTERVENTIONS:  - Assess for changes in respiratory status  - Assess for changes in mentation and behavior  - Position to facilitate oxygenation and minimize respiratory effort  - Oxygen administered by appropriate delivery if ordered  - Initiate smoking cessation education as indicated  - Encourage broncho-pulmonary hygiene including cough, deep breathe, Incentive Spirometry  - Assess the need for suctioning and aspirate as needed  - Assess and instruct to report SOB or any respiratory difficulty  - Respiratory Therapy support as  indicated  Outcome: Progressing     Problem: METABOLIC, FLUID AND ELECTROLYTES - ADULT  Goal: Electrolytes maintained within normal limits  Description: INTERVENTIONS:  - Monitor labs and assess patient for signs and symptoms of electrolyte imbalances  - Administer electrolyte replacement as ordered  - Monitor response to electrolyte replacements, including repeat lab results as appropriate  - Instruct patient on fluid and nutrition as appropriate  Outcome: Progressing  Goal: Fluid balance maintained  Description: INTERVENTIONS:  - Monitor labs   - Monitor I/O and WT  - Instruct patient on fluid and nutrition as appropriate  - Assess for signs & symptoms of volume excess or deficit  Outcome: Progressing  Goal: Glucose maintained within target range  Description: INTERVENTIONS:  - Monitor Blood Glucose as ordered  - Assess for signs and symptoms of hyperglycemia and hypoglycemia  - Administer ordered medications to maintain glucose within target range  - Assess nutritional intake and initiate nutrition service referral as needed  Outcome: Progressing

## 2025-03-15 NOTE — ASSESSMENT & PLAN NOTE
Patient brought in by facility staff for SOB, cough x2 weeks and dizziness with standing  At baseline patient states he does not wear oxygen but does wear nocturnal CPAP  In ED required 4 L however had worsening respiratory status and was placed on BiPAP  Flu/Covid/RSV negative  CXR: Patchy lingular and lower lobe opacities, concerning for pneumonia.   CT CAP: Multifocal pneumonia involving the left lower lobe, lingula, and medial right lower lobe. Fluid in the esophagus suggests aspiration as a possible etiology. Moderate size hiatal hernia and distended fluid-filled esophagus to the level of the thoracic inlet.   Completed azithromycin, 7 days of ceftriaxone and is currently on vancomycin.  Completed 5 days of prednisone.  Evaluated by speech, recommended regular diet with thin liquids  home oxygen evaluation prior to discharge  Repeat x-ray 3/12 showed loculated effusion that was confirmed with CT chest  Chest tube placed 3/12 by pulmonology  Patient required 12L mid flow nasal cannula oxygen , he is not taking deep breaths    CT chest 3/14 showed small left pleural effusion which is probably free-flowing at the left lung base appears grossly stable to slightly decreased status postplacement of left chest tube; pockets of loculated pleural fluid in the posterior left apex and along the major fissure in the mid chest appear increased in size, this do not appear to be communicating with the left chest tube; near complete compressive atelectasis of the left lower lobe  Given patient's persistent lung issues, patient transferred SD1

## 2025-03-16 ENCOUNTER — APPOINTMENT (INPATIENT)
Dept: RADIOLOGY | Facility: HOSPITAL | Age: 65
DRG: 871 | End: 2025-03-16
Payer: COMMERCIAL

## 2025-03-16 LAB
ANION GAP SERPL CALCULATED.3IONS-SCNC: 7 MMOL/L (ref 4–13)
BUN SERPL-MCNC: 36 MG/DL (ref 5–25)
CALCIUM SERPL-MCNC: 7.9 MG/DL (ref 8.4–10.2)
CHLORIDE SERPL-SCNC: 105 MMOL/L (ref 96–108)
CO2 SERPL-SCNC: 22 MMOL/L (ref 21–32)
CREAT SERPL-MCNC: 1.43 MG/DL (ref 0.6–1.3)
ERYTHROCYTE [DISTWIDTH] IN BLOOD BY AUTOMATED COUNT: 19.1 % (ref 11.6–15.1)
GFR SERPL CREATININE-BSD FRML MDRD: 51 ML/MIN/1.73SQ M
GLUCOSE SERPL-MCNC: 127 MG/DL (ref 65–140)
GLUCOSE SERPL-MCNC: 130 MG/DL (ref 65–140)
GLUCOSE SERPL-MCNC: 135 MG/DL (ref 65–140)
GLUCOSE SERPL-MCNC: 166 MG/DL (ref 65–140)
GLUCOSE SERPL-MCNC: 170 MG/DL (ref 65–140)
HCT VFR BLD AUTO: 40.5 % (ref 36.5–49.3)
HGB BLD-MCNC: 13 G/DL (ref 12–17)
INR PPP: 1.5 (ref 0.85–1.19)
MAGNESIUM SERPL-MCNC: 1.9 MG/DL (ref 1.9–2.7)
MCH RBC QN AUTO: 30 PG (ref 26.8–34.3)
MCHC RBC AUTO-ENTMCNC: 32.1 G/DL (ref 31.4–37.4)
MCV RBC AUTO: 94 FL (ref 82–98)
PHOSPHATE SERPL-MCNC: 3.2 MG/DL (ref 2.3–4.1)
PLATELET # BLD AUTO: 166 THOUSANDS/UL (ref 149–390)
PMV BLD AUTO: 10.9 FL (ref 8.9–12.7)
POTASSIUM SERPL-SCNC: 4.6 MMOL/L (ref 3.5–5.3)
PROTHROMBIN TIME: 18.6 SECONDS (ref 12.3–15)
RBC # BLD AUTO: 4.33 MILLION/UL (ref 3.88–5.62)
SODIUM SERPL-SCNC: 134 MMOL/L (ref 135–147)
WBC # BLD AUTO: 17.83 THOUSAND/UL (ref 4.31–10.16)

## 2025-03-16 PROCEDURE — 83735 ASSAY OF MAGNESIUM: CPT | Performed by: NURSE PRACTITIONER

## 2025-03-16 PROCEDURE — 94760 N-INVAS EAR/PLS OXIMETRY 1: CPT

## 2025-03-16 PROCEDURE — 85610 PROTHROMBIN TIME: CPT

## 2025-03-16 PROCEDURE — 84100 ASSAY OF PHOSPHORUS: CPT | Performed by: NURSE PRACTITIONER

## 2025-03-16 PROCEDURE — 82948 REAGENT STRIP/BLOOD GLUCOSE: CPT

## 2025-03-16 PROCEDURE — 85027 COMPLETE CBC AUTOMATED: CPT | Performed by: NURSE PRACTITIONER

## 2025-03-16 PROCEDURE — 80048 BASIC METABOLIC PNL TOTAL CA: CPT | Performed by: NURSE PRACTITIONER

## 2025-03-16 PROCEDURE — 71045 X-RAY EXAM CHEST 1 VIEW: CPT

## 2025-03-16 PROCEDURE — 74018 RADEX ABDOMEN 1 VIEW: CPT

## 2025-03-16 PROCEDURE — 99233 SBSQ HOSP IP/OBS HIGH 50: CPT | Performed by: INTERNAL MEDICINE

## 2025-03-16 PROCEDURE — 94640 AIRWAY INHALATION TREATMENT: CPT

## 2025-03-16 RX ORDER — MAGNESIUM SULFATE HEPTAHYDRATE 40 MG/ML
2 INJECTION, SOLUTION INTRAVENOUS ONCE
Status: COMPLETED | OUTPATIENT
Start: 2025-03-16 | End: 2025-03-16

## 2025-03-16 RX ORDER — SODIUM CHLORIDE, SODIUM GLUCONATE, SODIUM ACETATE, POTASSIUM CHLORIDE, MAGNESIUM CHLORIDE, SODIUM PHOSPHATE, DIBASIC, AND POTASSIUM PHOSPHATE .53; .5; .37; .037; .03; .012; .00082 G/100ML; G/100ML; G/100ML; G/100ML; G/100ML; G/100ML; G/100ML
75 INJECTION, SOLUTION INTRAVENOUS CONTINUOUS
Status: DISCONTINUED | OUTPATIENT
Start: 2025-03-16 | End: 2025-03-17

## 2025-03-16 RX ADMIN — DORNASE ALFA 5 MG: 1 SOLUTION RESPIRATORY (INHALATION) at 12:28

## 2025-03-16 RX ADMIN — PANTOPRAZOLE SODIUM 40 MG: 40 TABLET, DELAYED RELEASE ORAL at 10:30

## 2025-03-16 RX ADMIN — ALTEPLASE 10 MG: 2.2 INJECTION, POWDER, LYOPHILIZED, FOR SOLUTION INTRAVENOUS at 12:28

## 2025-03-16 RX ADMIN — ATORVASTATIN CALCIUM 40 MG: 40 TABLET, FILM COATED ORAL at 17:44

## 2025-03-16 RX ADMIN — INSULIN LISPRO 1 UNITS: 100 INJECTION, SOLUTION INTRAVENOUS; SUBCUTANEOUS at 21:54

## 2025-03-16 RX ADMIN — BISACODYL 10 MG: 10 SUPPOSITORY RECTAL at 10:15

## 2025-03-16 RX ADMIN — ACETAMINOPHEN 975 MG: 325 TABLET, FILM COATED ORAL at 10:15

## 2025-03-16 RX ADMIN — CHLORHEXIDINE GLUCONATE 15 ML: 1.2 RINSE ORAL at 21:53

## 2025-03-16 RX ADMIN — OXYCODONE HYDROCHLORIDE 5 MG: 5 TABLET ORAL at 12:35

## 2025-03-16 RX ADMIN — NICOTINE 1 PATCH: 14 PATCH, EXTENDED RELEASE TRANSDERMAL at 10:17

## 2025-03-16 RX ADMIN — LEVALBUTEROL HYDROCHLORIDE 1.25 MG: 1.25 SOLUTION RESPIRATORY (INHALATION) at 14:11

## 2025-03-16 RX ADMIN — TRAZODONE HYDROCHLORIDE 100 MG: 100 TABLET ORAL at 21:53

## 2025-03-16 RX ADMIN — SENNOSIDES AND DOCUSATE SODIUM 1 TABLET: 50; 8.6 TABLET ORAL at 17:44

## 2025-03-16 RX ADMIN — INSULIN LISPRO 1 UNITS: 100 INJECTION, SOLUTION INTRAVENOUS; SUBCUTANEOUS at 12:29

## 2025-03-16 RX ADMIN — ACETAMINOPHEN 975 MG: 325 TABLET, FILM COATED ORAL at 02:10

## 2025-03-16 RX ADMIN — SENNOSIDES AND DOCUSATE SODIUM 1 TABLET: 50; 8.6 TABLET ORAL at 10:15

## 2025-03-16 RX ADMIN — LIDOCAINE 5% 1 PATCH: 700 PATCH TOPICAL at 17:43

## 2025-03-16 RX ADMIN — CHLORHEXIDINE GLUCONATE 15 ML: 1.2 RINSE ORAL at 10:15

## 2025-03-16 RX ADMIN — QUETIAPINE 300 MG: 200 TABLET ORAL at 21:53

## 2025-03-16 RX ADMIN — PANTOPRAZOLE SODIUM 40 MG: 40 TABLET, DELAYED RELEASE ORAL at 17:44

## 2025-03-16 RX ADMIN — MAGNESIUM SULFATE HEPTAHYDRATE 2 G: 40 INJECTION, SOLUTION INTRAVENOUS at 10:29

## 2025-03-16 RX ADMIN — Medication 2.5 MG: at 21:53

## 2025-03-16 RX ADMIN — LEVALBUTEROL HYDROCHLORIDE 1.25 MG: 1.25 SOLUTION RESPIRATORY (INHALATION) at 07:53

## 2025-03-16 RX ADMIN — ACETAMINOPHEN 975 MG: 325 TABLET, FILM COATED ORAL at 17:44

## 2025-03-16 RX ADMIN — ESCITALOPRAM OXALATE 20 MG: 20 TABLET ORAL at 10:15

## 2025-03-16 RX ADMIN — POLYETHYLENE GLYCOL 3350 17 G: 17 POWDER, FOR SOLUTION ORAL at 10:15

## 2025-03-16 RX ADMIN — Medication 2.5 MG: at 05:23

## 2025-03-16 RX ADMIN — SODIUM CHLORIDE, SODIUM GLUCONATE, SODIUM ACETATE, POTASSIUM CHLORIDE, MAGNESIUM CHLORIDE, SODIUM PHOSPHATE, DIBASIC, AND POTASSIUM PHOSPHATE 75 ML/HR: .53; .5; .37; .037; .03; .012; .00082 INJECTION, SOLUTION INTRAVENOUS at 10:29

## 2025-03-16 RX ADMIN — SODIUM CHLORIDE, SODIUM GLUCONATE, SODIUM ACETATE, POTASSIUM CHLORIDE, MAGNESIUM CHLORIDE, SODIUM PHOSPHATE, DIBASIC, AND POTASSIUM PHOSPHATE 75 ML/HR: .53; .5; .37; .037; .03; .012; .00082 INJECTION, SOLUTION INTRAVENOUS at 23:33

## 2025-03-16 RX ADMIN — LEVALBUTEROL HYDROCHLORIDE 1.25 MG: 1.25 SOLUTION RESPIRATORY (INHALATION) at 19:12

## 2025-03-16 NOTE — PROGRESS NOTES
Progress Note - Critical Care/ICU   Name: Chuy Norton 64 y.o. male I MRN: [id number]  Unit/Bed#: -01 SDU I Date of Admission: 3/5/2025   Date of Service: 3/16/2025 I Hospital Day: 11      Assessment & Plan  Acute respiratory failure with hypoxia (HCC)  Patient brought in by facility staff for SOB, cough x2 weeks and dizziness with standing  At baseline patient states he does not wear oxygen but does wear nocturnal CPAP  In ED, initially stable on 4L NC but had increase WOB requiring BIPAP. Patient was tried off BIPAP after receiving TRUJILLO neb and steroids but failed trial and placed back on BIPAP.   Flu/Covid/RSV negative  CXR: Patchy lingular and lower lobe opacities, concerning for pneumonia.   CT CAP: Multifocal pneumonia involving the left lower lobe, lingula, and medial right lower lobe. Fluid in the esophagus suggests aspiration as a possible etiology. Moderate size hiatal hernia and distended fluid-filled esophagus to the level of the thoracic inlet.   3/12 Lt chest tube placed    Plan  Continue BIPAP HS  Supplemental O2 for Sats > 90%, currently 15 L MFNC  Xopenex TID  Continue close monitoring of Lt Chest tube output, he has received 2 doses of TPA/Dornase    Bipolar 2 disorder, major depressive episode (HCC)  Outpatient: 20mg lexapro, 300mg Seroquel HS, 100mg trazadone HS    Plan  Cont home regiment   Primary hypertension  Outpatient: 12.5mg Carvedilol BID, 40mg lisinopril     Plan  Cont home regiment   PRN labetolol, for SBP >180  Hyperlipidemia  Outpatient: 40 atorvastatin    Plan  Continue statin   Cirrhosis (HCC)  Follows with [facility], last seen Jan 2025  History of cirrhosis of the liver due to history of alcohol and drug addiction.   5/9/2024 CT C/A/P noted nodular hepatic contours may indicate cirrhosis of the liver.   MELD 3.0: 20  LFT normal  INR therapeutic on Comadin  No ascites present   Left ventricular apical thrombus  Outpatient: 2.5mg Warfarin (Sun, Tues, Wed, Fri) and 5mg (Mon,  Thurs, Sat)  INR: 2.68    Plan   Holding systemic AC while receiving TPA/Dornase for Lt Chest tube  Hepatitis C  Follows with [facility], last seen Jan 2025  History of hepatitis C antibody and stated treatment with Mavyret   May 2024 negative viral load   KENN (obstructive sleep apnea)    Left Parapneumonic effusion    Disposition: Stepdown Level 1    ICU Core Measures     A: Assess, Prevent, and Manage Pain Has pain been assessed? Yes  Need for changes to pain regimen? No   B: Both SAT/SAT  N/A   C: Choice of Sedation RASS Goal: N/A patient not on sedation  Need for changes to sedation or analgesia regimen? NA   D: Delirium CAM-ICU: Negative   E: Early Mobility  Plan for early mobility? Yes   F: Family Engagement Plan for family engagement today? Yes         Prophylaxis:  VTE VTE covered by:    None       Stress Ulcer  covered bypantoprazole (PROTONIX) 40 mg tablet [[id number]] (Long-Term Med), pantoprazole (PROTONIX) EC tablet 40 mg [[id number]]         24 Hour Events : Pt transferred to SD1 yesterday due to need for TPA/Dornase instillation into Lt Chest tube and pt being a high risk for bleeding given LV apical thrombus Hx. He had his second installation of TPA/Dornase overnight. Overnight, pt with increased abd distention and O2 needs, requiring 15L MFNC now. KUB done, final read pending, but appears to have bowls full of stool despite having multiple BMs overnight.     Subjective   Review of Systems: See HPI for Review of Systems    Objective :                   Vitals I/O      Most Recent Min/Max in 24hrs   Temp (!) 97.4 °F (36.3 °C) Temp  Min: 97.2 °F (36.2 °C)  Max: 99 °F (37.2 °C)   Pulse 63 Pulse  Min: 63  Max: 73   Resp 21 Resp  Min: 15  Max: 25   /64 BP  Min: 95/61  Max: 128/75   O2 Sat 93 % SpO2  Min: 88 %  Max: 93 %      Intake/Output Summary (Last 24 hours) at 3/16/2025 0438  Last data filed at 3/16/2025 0300  Gross per 24 hour   Intake 690 ml   Output 1632 ml   Net -942 ml       Diet  Cardiovascular; Cardiac    Invasive Monitoring           Physical Exam   Physical Exam  Vitals and nursing note reviewed.   Eyes:      Conjunctiva/sclera: Conjunctivae normal.   Skin:     General: Skin is warm and dry.   HENT:      Head: Normocephalic and atraumatic.      Mouth/Throat:      Mouth: Mucous membranes are moist.   Cardiovascular:      Rate and Rhythm: Normal rate and regular rhythm.      Heart sounds: Normal heart sounds.   Musculoskeletal:         General: Normal range of motion.   Abdominal: General: Bowel sounds are normal. There is distension.     Tenderness: There is abdominal tenderness.   Constitutional:       Appearance: He is well-nourished. He is ill-appearing. He is not toxic-appearing.   Pulmonary:      Effort: Accessory muscle usage and accessory muscle usage present.      Comments: 15 L MFNC with SpO2 93%, LS Clear in upper fields and decreased at bases b/l  Neurological:      General: No focal deficit present.      Mental Status: Mental status is at baseline. He is calm.   Genitourinary/Anorectal:  external catheter present.        Diagnostic Studies        Lab Results: I have reviewed the following results:     Medications:  Scheduled PRN   acetaminophen, 975 mg, Q8H SHANNA  atorvastatin, 40 mg, Daily With Dinner  bisacodyl, 10 mg, Daily  carvedilol, 25 mg, BID With Meals  chlorhexidine, 15 mL, Q12H SHANNA  escitalopram, 20 mg, Daily  insulin lispro, 1-6 Units, 4x Daily (AC & HS)  levalbuterol, 1.25 mg, TID  lidocaine, 1 patch, Daily  losartan, 100 mg, Daily  nicotine, 1 patch, Daily  pantoprazole, 40 mg, BID before lunch/dinner  polyethylene glycol, 17 g, Daily  QUEtiapine, 300 mg, HS  senna-docusate sodium, 1 tablet, BID  traZODone, 100 mg, HS      benzonatate, 200 mg, TID PRN  guaiFENesin, 200 mg, TID PRN  hydrALAZINE, 10 mg, Q6H PRN  HYDROmorphone, 0.5 mg, Q4H PRN  levalbuterol, 1.25 mg, Q6H PRN  oxyCODONE, 5 mg, Q6H PRN  oxyCODONE, 2.5 mg, Q6H PRN       Continuous          Labs:    CBC    Recent Labs     03/14/25  0520 03/15/25  0316   WBC 17.64* 13.53*   HGB 13.5 12.9   HCT 41.5 40.2    145*     BMP    Recent Labs     03/14/25  0520 03/15/25  0316   SODIUM 135 137   K 4.4 4.0    104   CO2 27 27   AGAP 5 6   BUN 31* 36*   CREATININE 1.16 1.39*   CALCIUM 8.0* 7.7*       Coags    Recent Labs     03/14/25  0520 03/15/25  0316   INR 2.12* 1.91*        Additional Electrolytes  No recent results       Blood Gas    No recent results  No recent results LFTs  No recent results    Infectious  No recent results  Glucose  Recent Labs     03/14/25  0520 03/15/25  0316   GLUC 112 109

## 2025-03-16 NOTE — ASSESSMENT & PLAN NOTE
Patient brought in by facility staff for SOB, cough x2 weeks and dizziness with standing  At baseline patient states he does not wear oxygen but does wear nocturnal CPAP  In ED, initially stable on 4L NC but had increase WOB requiring BIPAP. Patient was tried off BIPAP after receiving TRUJILLO neb and steroids but failed trial and placed back on BIPAP.   Flu/Covid/RSV negative  CXR: Patchy lingular and lower lobe opacities, concerning for pneumonia.   CT CAP: Multifocal pneumonia involving the left lower lobe, lingula, and medial right lower lobe. Fluid in the esophagus suggests aspiration as a possible etiology. Moderate size hiatal hernia and distended fluid-filled esophagus to the level of the thoracic inlet.   3/12 Lt chest tube placed    Plan  Continue BIPAP HS  Supplemental O2 for Sats > 90%, currently 15 L MFNC  Xopenex TID  Continue close monitoring of Lt Chest tube output, he has received 2 doses of TPA/Dornase

## 2025-03-16 NOTE — ASSESSMENT & PLAN NOTE
Outpatient: 2.5mg Warfarin (Sun, Tues, Wed, Fri) and 5mg (Mon, Thurs, Sat)  INR: 2.68    Plan   Holding systemic AC while receiving TPA/Dornase for Lt Chest tube

## 2025-03-16 NOTE — QUICK NOTE
At 2355 TPA/Dornase was instilled into Left Cxt per order set. The pt tolerated the procedure without difficulty. Nataly CAMARGO was present for the procedure and is aware that the Cxt is to remain clamped for 1 hour (to be unclamped at 0100). If there is any change in pt status, including but not limited to: chest pain, SOB, increased pain; please unclamp the chest tube and contact critical care immediatel

## 2025-03-16 NOTE — QUICK NOTE
Chest tube was instilled with 10mg tPA (30mL) and 5mg dornase (30mL) at 1225pm and clamped post instillation. Patient tolerated the procedure well without complications.  Bedside RN at bedside for procedure and I informed the RN that I would be back in 1 hour to unclamped the chest tube but to notify critical care immediately if the patient suddenly complains of shortness of breath, chest pain, or significant pain at the injection site.     The chest tube was unclamped at 1330pm. Patient tolerated without complaints. I notified RN that chest tube was no longer clamped and back to wall suction.

## 2025-03-16 NOTE — PLAN OF CARE
Problem: Potential for Falls  Goal: Patient will remain free of falls  Description: INTERVENTIONS:  - Educate patient/family on patient safety including physical limitations  - Instruct patient to call for assistance with activity   - Consult OT/PT to assist with strengthening/mobility   - Keep Call bell within reach  - Keep bed low and locked with side rails adjusted as appropriate  - Keep care items and personal belongings within reach  - Initiate and maintain comfort rounds  - Make Fall Risk Sign visible to staff  - Offer Toileting every 2 Hours, in advance of need  - Initiate/Maintain bed alarm  - Obtain necessary fall risk management equipment: socks  - Apply yellow socks and bracelet for high fall risk patients  - Consider moving patient to room near nurses station  Outcome: Progressing     Problem: Prexisting or High Potential for Compromised Skin Integrity  Goal: Skin integrity is maintained or improved  Description: INTERVENTIONS:  - Identify patients at risk for skin breakdown  - Assess and monitor skin integrity  - Assess and monitor nutrition and hydration status  - Monitor labs   - Assess for incontinence   - Turn and reposition patient  - Assist with mobility/ambulation  - Relieve pressure over bony prominences  - Avoid friction and shearing  - Provide appropriate hygiene as needed including keeping skin clean and dry  - Evaluate need for skin moisturizer/barrier cream  - Collaborate with interdisciplinary team   - Patient/family teaching  - Consider wound care consult   Outcome: Progressing     Problem: PAIN - ADULT  Goal: Verbalizes/displays adequate comfort level or baseline comfort level  Description: Interventions:  - Encourage patient to monitor pain and request assistance  - Assess pain using appropriate pain scale  - Administer analgesics based on type and severity of pain and evaluate response  - Implement non-pharmacological measures as appropriate and evaluate response  - Consider cultural  and social influences on pain and pain management  - Notify physician/advanced practitioner if interventions unsuccessful or patient reports new pain  Outcome: Progressing     Problem: INFECTION - ADULT  Goal: Absence or prevention of progression during hospitalization  Description: INTERVENTIONS:  - Assess and monitor for signs and symptoms of infection  - Monitor lab/diagnostic results  - Monitor all insertion sites, i.e. indwelling lines, tubes, and drains  - Monitor endotracheal if appropriate and nasal secretions for changes in amount and color  - [place]ero appropriate cooling/warming therapies per order  - Administer medications as ordered  - Instruct and encourage patient and family to use good hand hygiene technique  - Identify and instruct in appropriate isolation precautions for identified infection/condition  Outcome: Progressing  Goal: Absence of fever/infection during neutropenic period  Description: INTERVENTIONS:  - Monitor WBC    Outcome: Progressing     Problem: SAFETY ADULT  Goal: Patient will remain free of falls  Description: INTERVENTIONS:  - Educate patient/family on patient safety including physical limitations  - Instruct patient to call for assistance with activity   - Consult OT/PT to assist with strengthening/mobility   - Keep Call bell within reach  - Keep bed low and locked with side rails adjusted as appropriate  - Keep care items and personal belongings within reach  - Initiate and maintain comfort rounds  - Make Fall Risk Sign visible to staff  - Offer Toileting every 2 Hours, in advance of need  - Initiate/Maintain bed alarm  - Obtain necessary fall risk management equipment: socks  - Apply yellow socks and bracelet for high fall risk patients  - Consider moving patient to room near nurses station  Outcome: Progressing  Goal: Maintain or return to baseline ADL function  Description: INTERVENTIONS:  -  Assess patient's ability to carry out ADLs; assess patient's baseline for ADL  function and identify physical deficits which impact ability to perform ADLs (bathing, care of mouth/teeth, toileting, grooming, dressing, etc.)  - Assess/evaluate cause of self-care deficits   - Assess range of motion  - Assess patient's mobility; develop plan if impaired  - Assess patient's need for assistive devices and provide as appropriate  - Encourage maximum independence but intervene and supervise when necessary  - Involve family in performance of ADLs  - Assess for home care needs following discharge   - Consider OT consult to assist with ADL evaluation and planning for discharge  - Provide patient education as appropriate  Outcome: Progressing  Goal: Maintains/Returns to pre admission functional level  Description: INTERVENTIONS:  - Perform AM-PAC 6 Click Basic Mobility/ Daily Activity assessment daily.  - Set and communicate daily mobility goal to care team and patient/family/caregiver.   - Collaborate with rehabilitation services on mobility goals if consulted  - Perform Range of Motion 3 times a day.  - Reposition patient every 2 hours.  - Dangle patient 3 times a day  - Stand patient 3 times a day  - Ambulate patient 3 times a day  - Out of bed to chair 3 times a day   - Out of bed for meals 3 times a day  - Out of bed for toileting  - Record patient progress and toleration of activity level   Outcome: Progressing     Problem: DISCHARGE PLANNING  Goal: Discharge to home or other facility with appropriate resources  Description: INTERVENTIONS:  - Identify barriers to discharge w/patient and caregiver  - Arrange for needed discharge resources and transportation as appropriate  - Identify discharge learning needs (meds, wound care, etc.)  - Arrange for interpretive services to assist at discharge as needed  - Refer to Case Management Department for coordinating discharge planning if the patient needs post-hospital services based on physician/advanced practitioner order or complex needs related to  functional status, cognitive ability, or social support system  Outcome: Progressing     Problem: Knowledge Deficit  Goal: Patient/family/caregiver demonstrates understanding of disease process, treatment plan, medications, and discharge instructions  Description: Complete learning assessment and assess knowledge base.  Interventions:  - Provide teaching at level of understanding  - Provide teaching via preferred learning methods  Outcome: Progressing     Problem: CARDIOVASCULAR - ADULT  Goal: Maintains optimal cardiac output and hemodynamic stability  Description: INTERVENTIONS:  - Monitor I/O, vital signs and rhythm  - Monitor for S/S and trends of decreased cardiac output  - Administer and titrate ordered vasoactive medications to optimize hemodynamic stability  - Assess quality of pulses, skin color and temperature  - Assess for signs of decreased coronary artery perfusion  - Instruct patient to report change in severity of symptoms  Outcome: Progressing  Goal: Absence of cardiac dysrhythmias or at baseline rhythm  Description: INTERVENTIONS:  - Continuous cardiac monitoring, vital signs, obtain 12 lead EKG if ordered  - Administer antiarrhythmic and heart rate control medications as ordered  - Monitor electrolytes and administer replacement therapy as ordered  Outcome: Progressing     Problem: RESPIRATORY - ADULT  Goal: Achieves optimal ventilation and oxygenation  Description: INTERVENTIONS:  - Assess for changes in respiratory status  - Assess for changes in mentation and behavior  - Position to facilitate oxygenation and minimize respiratory effort  - Oxygen administered by appropriate delivery if ordered  - Initiate smoking cessation education as indicated  - Encourage broncho-pulmonary hygiene including cough, deep breathe, Incentive Spirometry  - Assess the need for suctioning and aspirate as needed  - Assess and instruct to report SOB or any respiratory difficulty  - Respiratory Therapy support as  indicated  Outcome: Progressing     Problem: METABOLIC, FLUID AND ELECTROLYTES - ADULT  Goal: Electrolytes maintained within normal limits  Description: INTERVENTIONS:  - Monitor labs and assess patient for signs and symptoms of electrolyte imbalances  - Administer electrolyte replacement as ordered  - Monitor response to electrolyte replacements, including repeat lab results as appropriate  - Instruct patient on fluid and nutrition as appropriate  Outcome: Progressing  Goal: Fluid balance maintained  Description: INTERVENTIONS:  - Monitor labs   - Monitor I/O and WT  - Instruct patient on fluid and nutrition as appropriate  - Assess for signs & symptoms of volume excess or deficit  Outcome: Progressing  Goal: Glucose maintained within target range  Description: INTERVENTIONS:  - Monitor Blood Glucose as ordered  - Assess for signs and symptoms of hyperglycemia and hypoglycemia  - Administer ordered medications to maintain glucose within target range  - Assess nutritional intake and initiate nutrition service referral as needed  Outcome: Progressing

## 2025-03-16 NOTE — PLAN OF CARE
Problem: Potential for Falls  Goal: Patient will remain free of falls  Description: INTERVENTIONS:  - Educate patient/family on patient safety including physical limitations  - Instruct patient to call for assistance with activity   - Consult OT/PT to assist with strengthening/mobility   - Keep Call bell within reach  - Keep bed low and locked with side rails adjusted as appropriate  - Keep care items and personal belongings within reach  - Initiate and maintain comfort rounds  - Make Fall Risk Sign visible to staff  - Offer Toileting every 2 Hours, in advance of need  - Initiate/Maintain bed alarm  - Obtain necessary fall risk management equipment: socks  - Apply yellow socks and bracelet for high fall risk patients  - Consider moving patient to room near nurses station  Outcome: Progressing     Problem: Prexisting or High Potential for Compromised Skin Integrity  Goal: Skin integrity is maintained or improved  Description: INTERVENTIONS:  - Identify patients at risk for skin breakdown  - Assess and monitor skin integrity  - Assess and monitor nutrition and hydration status  - Monitor labs   - Assess for incontinence   - Turn and reposition patient  - Assist with mobility/ambulation  - Relieve pressure over bony prominences  - Avoid friction and shearing  - Provide appropriate hygiene as needed including keeping skin clean and dry  - Evaluate need for skin moisturizer/barrier cream  - Collaborate with interdisciplinary team   - Patient/family teaching  - Consider wound care consult   Outcome: Progressing     Problem: PAIN - ADULT  Goal: Verbalizes/displays adequate comfort level or baseline comfort level  Description: Interventions:  - Encourage patient to monitor pain and request assistance  - Assess pain using appropriate pain scale  - Administer analgesics based on type and severity of pain and evaluate response  - Implement non-pharmacological measures as appropriate and evaluate response  - Consider cultural  and social influences on pain and pain management  - Notify physician/advanced practitioner if interventions unsuccessful or patient reports new pain  Outcome: Progressing     Problem: INFECTION - ADULT  Goal: Absence or prevention of progression during hospitalization  Description: INTERVENTIONS:  - Assess and monitor for signs and symptoms of infection  - Monitor lab/diagnostic results  - Monitor all insertion sites, i.e. indwelling lines, tubes, and drains  - Monitor endotracheal if appropriate and nasal secretions for changes in amount and color  - [place] appropriate cooling/warming therapies per order  - Administer medications as ordered  - Instruct and encourage patient and family to use good hand hygiene technique  - Identify and instruct in appropriate isolation precautions for identified infection/condition  Outcome: Progressing  Goal: Absence of fever/infection during neutropenic period  Description: INTERVENTIONS:  - Monitor WBC    Outcome: Progressing     Problem: SAFETY ADULT  Goal: Patient will remain free of falls  Description: INTERVENTIONS:  - Educate patient/family on patient safety including physical limitations  - Instruct patient to call for assistance with activity   - Consult OT/PT to assist with strengthening/mobility   - Keep Call bell within reach  - Keep bed low and locked with side rails adjusted as appropriate  - Keep care items and personal belongings within reach  - Initiate and maintain comfort rounds  - Make Fall Risk Sign visible to staff  - Offer Toileting every 2 Hours, in advance of need  - Initiate/Maintain bed alarm  - Obtain necessary fall risk management equipment: socks  - Apply yellow socks and bracelet for high fall risk patients  - Consider moving patient to room near nurses station  Outcome: Progressing  Goal: Maintain or return to baseline ADL function  Description: INTERVENTIONS:  -  Assess patient's ability to carry out ADLs; assess patient's baseline for ADL  function and identify physical deficits which impact ability to perform ADLs (bathing, care of mouth/teeth, toileting, grooming, dressing, etc.)  - Assess/evaluate cause of self-care deficits   - Assess range of motion  - Assess patient's mobility; develop plan if impaired  - Assess patient's need for assistive devices and provide as appropriate  - Encourage maximum independence but intervene and supervise when necessary  - Involve family in performance of ADLs  - Assess for home care needs following discharge   - Consider OT consult to assist with ADL evaluation and planning for discharge  - Provide patient education as appropriate  Outcome: Progressing  Goal: Maintains/Returns to pre admission functional level  Description: INTERVENTIONS:  - Perform AM-PAC 6 Click Basic Mobility/ Daily Activity assessment daily.  - Set and communicate daily mobility goal to care team and patient/family/caregiver.   - Collaborate with rehabilitation services on mobility goals if consulted  - Perform Range of Motion 3 times a day.  - Reposition patient every 2 hours.  - Dangle patient 3 times a day  - Stand patient 3 times a day  - Ambulate patient 3 times a day  - Out of bed to chair 3 times a day   - Out of bed for meals 3 times a day  - Out of bed for toileting  - Record patient progress and toleration of activity level   Outcome: Progressing     Problem: DISCHARGE PLANNING  Goal: Discharge to home or other facility with appropriate resources  Description: INTERVENTIONS:  - Identify barriers to discharge w/patient and caregiver  - Arrange for needed discharge resources and transportation as appropriate  - Identify discharge learning needs (meds, wound care, etc.)  - Arrange for interpretive services to assist at discharge as needed  - Refer to Case Management Department for coordinating discharge planning if the patient needs post-hospital services based on physician/advanced practitioner order or complex needs related to  functional status, cognitive ability, or social support system  Outcome: Progressing     Problem: Knowledge Deficit  Goal: Patient/family/caregiver demonstrates understanding of disease process, treatment plan, medications, and discharge instructions  Description: Complete learning assessment and assess knowledge base.  Interventions:  - Provide teaching at level of understanding  - Provide teaching via preferred learning methods  Outcome: Progressing     Problem: CARDIOVASCULAR - ADULT  Goal: Maintains optimal cardiac output and hemodynamic stability  Description: INTERVENTIONS:  - Monitor I/O, vital signs and rhythm  - Monitor for S/S and trends of decreased cardiac output  - Administer and titrate ordered vasoactive medications to optimize hemodynamic stability  - Assess quality of pulses, skin color and temperature  - Assess for signs of decreased coronary artery perfusion  - Instruct patient to report change in severity of symptoms  Outcome: Progressing  Goal: Absence of cardiac dysrhythmias or at baseline rhythm  Description: INTERVENTIONS:  - Continuous cardiac monitoring, vital signs, obtain 12 lead EKG if ordered  - Administer antiarrhythmic and heart rate control medications as ordered  - Monitor electrolytes and administer replacement therapy as ordered  Outcome: Progressing     Problem: RESPIRATORY - ADULT  Goal: Achieves optimal ventilation and oxygenation  Description: INTERVENTIONS:  - Assess for changes in respiratory status  - Assess for changes in mentation and behavior  - Position to facilitate oxygenation and minimize respiratory effort  - Oxygen administered by appropriate delivery if ordered  - Initiate smoking cessation education as indicated  - Encourage broncho-pulmonary hygiene including cough, deep breathe, Incentive Spirometry  - Assess the need for suctioning and aspirate as needed  - Assess and instruct to report SOB or any respiratory difficulty  - Respiratory Therapy support as  indicated  Outcome: Progressing     Problem: METABOLIC, FLUID AND ELECTROLYTES - ADULT  Goal: Electrolytes maintained within normal limits  Description: INTERVENTIONS:  - Monitor labs and assess patient for signs and symptoms of electrolyte imbalances  - Administer electrolyte replacement as ordered  - Monitor response to electrolyte replacements, including repeat lab results as appropriate  - Instruct patient on fluid and nutrition as appropriate  Outcome: Progressing  Goal: Fluid balance maintained  Description: INTERVENTIONS:  - Monitor labs   - Monitor I/O and WT  - Instruct patient on fluid and nutrition as appropriate  - Assess for signs & symptoms of volume excess or deficit  Outcome: Progressing  Goal: Glucose maintained within target range  Description: INTERVENTIONS:  - Monitor Blood Glucose as ordered  - Assess for signs and symptoms of hyperglycemia and hypoglycemia  - Administer ordered medications to maintain glucose within target range  - Assess nutritional intake and initiate nutrition service referral as needed  Outcome: Progressing

## 2025-03-16 NOTE — ASSESSMENT & PLAN NOTE
Outpatient: 12.5mg Carvedilol BID, 40mg lisinopril     Plan  Cont home regiment   PRN labetolol, for SBP >180

## 2025-03-17 ENCOUNTER — APPOINTMENT (INPATIENT)
Dept: ULTRASOUND IMAGING | Facility: HOSPITAL | Age: 65
DRG: 871 | End: 2025-03-17
Payer: COMMERCIAL

## 2025-03-17 LAB
ANION GAP SERPL CALCULATED.3IONS-SCNC: 7 MMOL/L (ref 4–13)
BACTERIA UR QL AUTO: ABNORMAL /HPF
BASE EX.OXY STD BLDV CALC-SCNC: 95.2 % (ref 60–80)
BASE EXCESS BLDV CALC-SCNC: -1 MMOL/L
BASOPHILS # BLD AUTO: 0.02 THOUSANDS/ÂΜL (ref 0–0.1)
BASOPHILS NFR BLD AUTO: 0 % (ref 0–1)
BILIRUB UR QL STRIP: NEGATIVE
BUN SERPL-MCNC: 42 MG/DL (ref 5–25)
CA-I BLD-SCNC: 1 MMOL/L (ref 1.12–1.32)
CALCIUM SERPL-MCNC: 7.3 MG/DL (ref 8.4–10.2)
CHLORIDE SERPL-SCNC: 103 MMOL/L (ref 96–108)
CLARITY UR: CLEAR
CO2 SERPL-SCNC: 23 MMOL/L (ref 21–32)
COLOR UR: YELLOW
CREAT SERPL-MCNC: 1.68 MG/DL (ref 0.6–1.3)
EOSINOPHIL # BLD AUTO: 0.1 THOUSAND/ÂΜL (ref 0–0.61)
EOSINOPHIL NFR BLD AUTO: 1 % (ref 0–6)
ERYTHROCYTE [DISTWIDTH] IN BLOOD BY AUTOMATED COUNT: 18.9 % (ref 11.6–15.1)
GFR SERPL CREATININE-BSD FRML MDRD: 42 ML/MIN/1.73SQ M
GLUCOSE SERPL-MCNC: 106 MG/DL (ref 65–140)
GLUCOSE SERPL-MCNC: 119 MG/DL (ref 65–140)
GLUCOSE SERPL-MCNC: 154 MG/DL (ref 65–140)
GLUCOSE SERPL-MCNC: 276 MG/DL (ref 65–140)
GLUCOSE SERPL-MCNC: 84 MG/DL (ref 65–140)
GLUCOSE UR STRIP-MCNC: NEGATIVE MG/DL
HCO3 BLDV-SCNC: 22.1 MMOL/L (ref 24–30)
HCT VFR BLD AUTO: 40.6 % (ref 36.5–49.3)
HGB BLD-MCNC: 12.7 G/DL (ref 12–17)
HGB UR QL STRIP.AUTO: ABNORMAL
IMM GRANULOCYTES # BLD AUTO: 0.08 THOUSAND/UL (ref 0–0.2)
IMM GRANULOCYTES NFR BLD AUTO: 1 % (ref 0–2)
INR PPP: 1.4 (ref 0.85–1.19)
KETONES UR STRIP-MCNC: NEGATIVE MG/DL
LACTATE SERPL-SCNC: 0.8 MMOL/L (ref 0.5–2)
LEUKOCYTE ESTERASE UR QL STRIP: NEGATIVE
LYMPHOCYTES # BLD AUTO: 1.01 THOUSANDS/ÂΜL (ref 0.6–4.47)
LYMPHOCYTES NFR BLD AUTO: 8 % (ref 14–44)
MAGNESIUM SERPL-MCNC: 2.5 MG/DL (ref 1.9–2.7)
MCH RBC QN AUTO: 29.6 PG (ref 26.8–34.3)
MCHC RBC AUTO-ENTMCNC: 31.3 G/DL (ref 31.4–37.4)
MCV RBC AUTO: 95 FL (ref 82–98)
MONOCYTES # BLD AUTO: 0.99 THOUSAND/ÂΜL (ref 0.17–1.22)
MONOCYTES NFR BLD AUTO: 8 % (ref 4–12)
NEUTROPHILS # BLD AUTO: 10.84 THOUSANDS/ÂΜL (ref 1.85–7.62)
NEUTS SEG NFR BLD AUTO: 82 % (ref 43–75)
NITRITE UR QL STRIP: NEGATIVE
NON-SQ EPI CELLS URNS QL MICRO: ABNORMAL /HPF
NRBC BLD AUTO-RTO: 0 /100 WBCS
O2 CT BLDV-SCNC: 17.2 ML/DL
PCO2 BLDV: 32.2 MM HG (ref 42–50)
PH BLDV: 7.46 [PH] (ref 7.3–7.4)
PH UR STRIP.AUTO: 5 [PH]
PHOSPHATE SERPL-MCNC: 3.5 MG/DL (ref 2.3–4.1)
PLATELET # BLD AUTO: 158 THOUSANDS/UL (ref 149–390)
PMV BLD AUTO: 10.4 FL (ref 8.9–12.7)
PO2 BLDV: 110.6 MM HG (ref 35–45)
POTASSIUM SERPL-SCNC: 4.1 MMOL/L (ref 3.5–5.3)
PROCALCITONIN SERPL-MCNC: 0.21 NG/ML
PROT UR STRIP-MCNC: ABNORMAL MG/DL
PROTHROMBIN TIME: 17.7 SECONDS (ref 12.3–15)
RBC # BLD AUTO: 4.29 MILLION/UL (ref 3.88–5.62)
RBC #/AREA URNS AUTO: ABNORMAL /HPF
SODIUM SERPL-SCNC: 133 MMOL/L (ref 135–147)
SP GR UR STRIP.AUTO: >=1.03 (ref 1–1.03)
UROBILINOGEN UR STRIP-ACNC: <2 MG/DL
WBC # BLD AUTO: 13.04 THOUSAND/UL (ref 4.31–10.16)
WBC #/AREA URNS AUTO: ABNORMAL /HPF

## 2025-03-17 PROCEDURE — 94760 N-INVAS EAR/PLS OXIMETRY 1: CPT

## 2025-03-17 PROCEDURE — 84100 ASSAY OF PHOSPHORUS: CPT

## 2025-03-17 PROCEDURE — 82805 BLOOD GASES W/O2 SATURATION: CPT | Performed by: NURSE PRACTITIONER

## 2025-03-17 PROCEDURE — 94640 AIRWAY INHALATION TREATMENT: CPT

## 2025-03-17 PROCEDURE — 82948 REAGENT STRIP/BLOOD GLUCOSE: CPT

## 2025-03-17 PROCEDURE — 76775 US EXAM ABDO BACK WALL LIM: CPT

## 2025-03-17 PROCEDURE — 85025 COMPLETE CBC W/AUTO DIFF WBC: CPT

## 2025-03-17 PROCEDURE — 83735 ASSAY OF MAGNESIUM: CPT

## 2025-03-17 PROCEDURE — 82330 ASSAY OF CALCIUM: CPT

## 2025-03-17 PROCEDURE — 85610 PROTHROMBIN TIME: CPT

## 2025-03-17 PROCEDURE — 99233 SBSQ HOSP IP/OBS HIGH 50: CPT | Performed by: INTERNAL MEDICINE

## 2025-03-17 PROCEDURE — 84145 PROCALCITONIN (PCT): CPT | Performed by: INTERNAL MEDICINE

## 2025-03-17 PROCEDURE — 80048 BASIC METABOLIC PNL TOTAL CA: CPT

## 2025-03-17 PROCEDURE — 81001 URINALYSIS AUTO W/SCOPE: CPT | Performed by: PHYSICIAN ASSISTANT

## 2025-03-17 PROCEDURE — 83605 ASSAY OF LACTIC ACID: CPT | Performed by: NURSE PRACTITIONER

## 2025-03-17 RX ORDER — ALBUMIN HUMAN 50 G/1000ML
25 SOLUTION INTRAVENOUS ONCE
Status: DISCONTINUED | OUTPATIENT
Start: 2025-03-17 | End: 2025-03-17

## 2025-03-17 RX ORDER — ALBUMIN HUMAN 50 G/1000ML
SOLUTION INTRAVENOUS
Status: COMPLETED
Start: 2025-03-17 | End: 2025-03-17

## 2025-03-17 RX ORDER — CALCIUM GLUCONATE 20 MG/ML
2 INJECTION, SOLUTION INTRAVENOUS ONCE
Status: COMPLETED | OUTPATIENT
Start: 2025-03-17 | End: 2025-03-17

## 2025-03-17 RX ORDER — SODIUM CHLORIDE, SODIUM GLUCONATE, SODIUM ACETATE, POTASSIUM CHLORIDE, MAGNESIUM CHLORIDE, SODIUM PHOSPHATE, DIBASIC, AND POTASSIUM PHOSPHATE .53; .5; .37; .037; .03; .012; .00082 G/100ML; G/100ML; G/100ML; G/100ML; G/100ML; G/100ML; G/100ML
500 INJECTION, SOLUTION INTRAVENOUS ONCE
Status: COMPLETED | OUTPATIENT
Start: 2025-03-17 | End: 2025-03-17

## 2025-03-17 RX ORDER — ALBUMIN HUMAN 50 G/1000ML
12.5 SOLUTION INTRAVENOUS ONCE
Status: COMPLETED | OUTPATIENT
Start: 2025-03-17 | End: 2025-03-17

## 2025-03-17 RX ORDER — HEPARIN SODIUM 5000 [USP'U]/ML
5000 INJECTION, SOLUTION INTRAVENOUS; SUBCUTANEOUS EVERY 8 HOURS SCHEDULED
Status: DISCONTINUED | OUTPATIENT
Start: 2025-03-17 | End: 2025-03-19

## 2025-03-17 RX ADMIN — INSULIN LISPRO 4 UNITS: 100 INJECTION, SOLUTION INTRAVENOUS; SUBCUTANEOUS at 21:55

## 2025-03-17 RX ADMIN — ALTEPLASE 10 MG: 2.2 INJECTION, POWDER, LYOPHILIZED, FOR SOLUTION INTRAVENOUS at 00:34

## 2025-03-17 RX ADMIN — PANTOPRAZOLE SODIUM 40 MG: 40 TABLET, DELAYED RELEASE ORAL at 15:31

## 2025-03-17 RX ADMIN — ACETAMINOPHEN 975 MG: 325 TABLET, FILM COATED ORAL at 10:51

## 2025-03-17 RX ADMIN — TRAZODONE HYDROCHLORIDE 100 MG: 100 TABLET ORAL at 21:55

## 2025-03-17 RX ADMIN — CALCIUM GLUCONATE 2 G: 20 INJECTION, SOLUTION INTRAVENOUS at 08:33

## 2025-03-17 RX ADMIN — DORNASE ALFA 5 MG: 1 SOLUTION RESPIRATORY (INHALATION) at 13:28

## 2025-03-17 RX ADMIN — SODIUM CHLORIDE, SODIUM GLUCONATE, SODIUM ACETATE, POTASSIUM CHLORIDE, MAGNESIUM CHLORIDE, SODIUM PHOSPHATE, DIBASIC, AND POTASSIUM PHOSPHATE 500 ML: .53; .5; .37; .037; .03; .012; .00082 INJECTION, SOLUTION INTRAVENOUS at 00:12

## 2025-03-17 RX ADMIN — POLYETHYLENE GLYCOL 3350 17 G: 17 POWDER, FOR SOLUTION ORAL at 08:36

## 2025-03-17 RX ADMIN — NICOTINE 1 PATCH: 14 PATCH, EXTENDED RELEASE TRANSDERMAL at 08:37

## 2025-03-17 RX ADMIN — BISACODYL 10 MG: 10 SUPPOSITORY RECTAL at 08:38

## 2025-03-17 RX ADMIN — SENNOSIDES AND DOCUSATE SODIUM 1 TABLET: 50; 8.6 TABLET ORAL at 08:38

## 2025-03-17 RX ADMIN — LIDOCAINE 5% 1 PATCH: 700 PATCH TOPICAL at 15:31

## 2025-03-17 RX ADMIN — SENNOSIDES AND DOCUSATE SODIUM 1 TABLET: 50; 8.6 TABLET ORAL at 17:26

## 2025-03-17 RX ADMIN — LEVALBUTEROL HYDROCHLORIDE 1.25 MG: 1.25 SOLUTION RESPIRATORY (INHALATION) at 08:16

## 2025-03-17 RX ADMIN — QUETIAPINE 300 MG: 200 TABLET ORAL at 21:55

## 2025-03-17 RX ADMIN — LEVALBUTEROL HYDROCHLORIDE 1.25 MG: 1.25 SOLUTION RESPIRATORY (INHALATION) at 19:36

## 2025-03-17 RX ADMIN — SODIUM CHLORIDE, SODIUM GLUCONATE, SODIUM ACETATE, POTASSIUM CHLORIDE, MAGNESIUM CHLORIDE, SODIUM PHOSPHATE, DIBASIC, AND POTASSIUM PHOSPHATE 75 ML/HR: .53; .5; .37; .037; .03; .012; .00082 INJECTION, SOLUTION INTRAVENOUS at 07:03

## 2025-03-17 RX ADMIN — DORNASE ALFA 5 MG: 1 SOLUTION RESPIRATORY (INHALATION) at 00:34

## 2025-03-17 RX ADMIN — ESCITALOPRAM OXALATE 20 MG: 20 TABLET ORAL at 08:38

## 2025-03-17 RX ADMIN — ATORVASTATIN CALCIUM 40 MG: 40 TABLET, FILM COATED ORAL at 15:31

## 2025-03-17 RX ADMIN — ALBUMIN HUMAN 12.5 G: 50 SOLUTION INTRAVENOUS at 02:09

## 2025-03-17 RX ADMIN — CHLORHEXIDINE GLUCONATE 15 ML: 1.2 RINSE ORAL at 21:54

## 2025-03-17 RX ADMIN — PANTOPRAZOLE SODIUM 40 MG: 40 TABLET, DELAYED RELEASE ORAL at 10:51

## 2025-03-17 RX ADMIN — ACETAMINOPHEN 975 MG: 325 TABLET, FILM COATED ORAL at 17:26

## 2025-03-17 RX ADMIN — HEPARIN SODIUM 5000 UNITS: 5000 INJECTION INTRAVENOUS; SUBCUTANEOUS at 21:55

## 2025-03-17 RX ADMIN — ALTEPLASE 10 MG: 2.2 INJECTION, POWDER, LYOPHILIZED, FOR SOLUTION INTRAVENOUS at 13:28

## 2025-03-17 RX ADMIN — CHLORHEXIDINE GLUCONATE 15 ML: 1.2 RINSE ORAL at 08:36

## 2025-03-17 RX ADMIN — ALBUMIN (HUMAN) 12.5 G: 12.5 INJECTION, SOLUTION INTRAVENOUS at 02:09

## 2025-03-17 RX ADMIN — NOREPINEPHRINE BITARTRATE 1 MCG/MIN: 1 INJECTION INTRAVENOUS at 02:49

## 2025-03-17 NOTE — CASE MANAGEMENT
Case Management Progress Note    Patient name Chuy Norton  Location  SDU/-01 S* MRN [id number]  : 1960 Date 3/17/2025       LOS (days): 12  Geometric Mean LOS (GMLOS) (days): 4.9  Days to GMLOS:-6.7        OBJECTIVE:        Current admission status: Inpatient  Preferred Pharmacy:   [facility] [address]  [address] [facility] [address]  Phone: [phone] Fax: [phone]    Primary Care Provider: Savanna Pan DO    [facility] Insurance[facility]  Secondary Insurance: JOEL CRAWFORD [facility] [place]    PROGRESS NOTE:  Call placed to Chuy,  at [facility]([phone]), left message informing him that Pt is currently on 15LO2 midflow and has chest tube placed for pneumothorax. Await return call. CM to follow.

## 2025-03-17 NOTE — QUICK NOTE
At 0030 TPA/Dornase was instilled into Left Cxt per order set. The pt tolerated the procedure without difficulty. Nataly CAMARGO was present for the procedure and is aware that the Cxt is to remain clamped for 1 hour (to be unclamped at 0130). If there is any change in pt status, including but not limited to: chest pain, SOB, increased pain; please unclamp the chest tube and contact critical care immediatel 0030

## 2025-03-17 NOTE — PLAN OF CARE
Problem: Potential for Falls  Goal: Patient will remain free of falls  Description: INTERVENTIONS:  - Educate patient/family on patient safety including physical limitations  - Instruct patient to call for assistance with activity   - Consult OT/PT to assist with strengthening/mobility   - Keep Call bell within reach  - Keep bed low and locked with side rails adjusted as appropriate  - Keep care items and personal belongings within reach  - Initiate and maintain comfort rounds  - Make Fall Risk Sign visible to staff  - Offer Toileting every 2 Hours, in advance of need  - Initiate/Maintain bed alarm  - Obtain necessary fall risk management equipment: socks  - Apply yellow socks and bracelet for high fall risk patients  - Consider moving patient to room near nurses station  Outcome: Progressing     Problem: Prexisting or High Potential for Compromised Skin Integrity  Goal: Skin integrity is maintained or improved  Description: INTERVENTIONS:  - Identify patients at risk for skin breakdown  - Assess and monitor skin integrity  - Assess and monitor nutrition and hydration status  - Monitor labs   - Assess for incontinence   - Turn and reposition patient  - Assist with mobility/ambulation  - Relieve pressure over bony prominences  - Avoid friction and shearing  - Provide appropriate hygiene as needed including keeping skin clean and dry  - Evaluate need for skin moisturizer/barrier cream  - Collaborate with interdisciplinary team   - Patient/family teaching  - Consider wound care consult   Outcome: Progressing     Problem: PAIN - ADULT  Goal: Verbalizes/displays adequate comfort level or baseline comfort level  Description: Interventions:  - Encourage patient to monitor pain and request assistance  - Assess pain using appropriate pain scale  - Administer analgesics based on type and severity of pain and evaluate response  - Implement non-pharmacological measures as appropriate and evaluate response  - Consider cultural  and social influences on pain and pain management  - Notify physician/advanced practitioner if interventions unsuccessful or patient reports new pain  Outcome: Progressing     Problem: INFECTION - ADULT  Goal: Absence or prevention of progression during hospitalization  Description: INTERVENTIONS:  - Assess and monitor for signs and symptoms of infection  - Monitor lab/diagnostic results  - Monitor all insertion sites, i.e. indwelling lines, tubes, and drains  - Monitor endotracheal if appropriate and nasal secretions for changes in amount and color  - [place] appropriate cooling/warming therapies per order  - Administer medications as ordered  - Instruct and encourage patient and family to use good hand hygiene technique  - Identify and instruct in appropriate isolation precautions for identified infection/condition  Outcome: Progressing  Goal: Absence of fever/infection during neutropenic period  Description: INTERVENTIONS:  - Monitor WBC    Outcome: Progressing     Problem: SAFETY ADULT  Goal: Patient will remain free of falls  Description: INTERVENTIONS:  - Educate patient/family on patient safety including physical limitations  - Instruct patient to call for assistance with activity   - Consult OT/PT to assist with strengthening/mobility   - Keep Call bell within reach  - Keep bed low and locked with side rails adjusted as appropriate  - Keep care items and personal belongings within reach  - Initiate and maintain comfort rounds  - Make Fall Risk Sign visible to staff  - Offer Toileting every 2 Hours, in advance of need  - Initiate/Maintain bed alarm  - Obtain necessary fall risk management equipment: socks  - Apply yellow socks and bracelet for high fall risk patients  - Consider moving patient to room near nurses station  Outcome: Progressing  Goal: Maintain or return to baseline ADL function  Description: INTERVENTIONS:  -  Assess patient's ability to carry out ADLs; assess patient's baseline for ADL  function and identify physical deficits which impact ability to perform ADLs (bathing, care of mouth/teeth, toileting, grooming, dressing, etc.)  - Assess/evaluate cause of self-care deficits   - Assess range of motion  - Assess patient's mobility; develop plan if impaired  - Assess patient's need for assistive devices and provide as appropriate  - Encourage maximum independence but intervene and supervise when necessary  - Involve family in performance of ADLs  - Assess for home care needs following discharge   - Consider OT consult to assist with ADL evaluation and planning for discharge  - Provide patient education as appropriate  Outcome: Progressing  Goal: Maintains/Returns to pre admission functional level  Description: INTERVENTIONS:  - Perform AM-PAC 6 Click Basic Mobility/ Daily Activity assessment daily.  - Set and communicate daily mobility goal to care team and patient/family/caregiver.   - Collaborate with rehabilitation services on mobility goals if consulted  - Perform Range of Motion 3 times a day.  - Reposition patient every 2 hours.  - Dangle patient 3 times a day  - Stand patient 3 times a day  - Ambulate patient 3 times a day  - Out of bed to chair 3 times a day   - Out of bed for meals 3 times a day  - Out of bed for toileting  - Record patient progress and toleration of activity level   Outcome: Progressing     Problem: DISCHARGE PLANNING  Goal: Discharge to home or other facility with appropriate resources  Description: INTERVENTIONS:  - Identify barriers to discharge w/patient and caregiver  - Arrange for needed discharge resources and transportation as appropriate  - Identify discharge learning needs (meds, wound care, etc.)  - Arrange for interpretive services to assist at discharge as needed  - Refer to Case Management Department for coordinating discharge planning if the patient needs post-hospital services based on physician/advanced practitioner order or complex needs related to  functional status, cognitive ability, or social support system  Outcome: Progressing     Problem: Knowledge Deficit  Goal: Patient/family/caregiver demonstrates understanding of disease process, treatment plan, medications, and discharge instructions  Description: Complete learning assessment and assess knowledge base.  Interventions:  - Provide teaching at level of understanding  - Provide teaching via preferred learning methods  Outcome: Progressing     Problem: CARDIOVASCULAR - ADULT  Goal: Maintains optimal cardiac output and hemodynamic stability  Description: INTERVENTIONS:  - Monitor I/O, vital signs and rhythm  - Monitor for S/S and trends of decreased cardiac output  - Administer and titrate ordered vasoactive medications to optimize hemodynamic stability  - Assess quality of pulses, skin color and temperature  - Assess for signs of decreased coronary artery perfusion  - Instruct patient to report change in severity of symptoms  Outcome: Progressing  Goal: Absence of cardiac dysrhythmias or at baseline rhythm  Description: INTERVENTIONS:  - Continuous cardiac monitoring, vital signs, obtain 12 lead EKG if ordered  - Administer antiarrhythmic and heart rate control medications as ordered  - Monitor electrolytes and administer replacement therapy as ordered  Outcome: Progressing     Problem: RESPIRATORY - ADULT  Goal: Achieves optimal ventilation and oxygenation  Description: INTERVENTIONS:  - Assess for changes in respiratory status  - Assess for changes in mentation and behavior  - Position to facilitate oxygenation and minimize respiratory effort  - Oxygen administered by appropriate delivery if ordered  - Initiate smoking cessation education as indicated  - Encourage broncho-pulmonary hygiene including cough, deep breathe, Incentive Spirometry  - Assess the need for suctioning and aspirate as needed  - Assess and instruct to report SOB or any respiratory difficulty  - Respiratory Therapy support as  indicated  Outcome: Progressing     Problem: METABOLIC, FLUID AND ELECTROLYTES - ADULT  Goal: Electrolytes maintained within normal limits  Description: INTERVENTIONS:  - Monitor labs and assess patient for signs and symptoms of electrolyte imbalances  - Administer electrolyte replacement as ordered  - Monitor response to electrolyte replacements, including repeat lab results as appropriate  - Instruct patient on fluid and nutrition as appropriate  Outcome: Progressing  Goal: Fluid balance maintained  Description: INTERVENTIONS:  - Monitor labs   - Monitor I/O and WT  - Instruct patient on fluid and nutrition as appropriate  - Assess for signs & symptoms of volume excess or deficit  Outcome: Progressing  Goal: Glucose maintained within target range  Description: INTERVENTIONS:  - Monitor Blood Glucose as ordered  - Assess for signs and symptoms of hyperglycemia and hypoglycemia  - Administer ordered medications to maintain glucose within target range  - Assess nutritional intake and initiate nutrition service referral as needed  Outcome: Progressing

## 2025-03-17 NOTE — ASSESSMENT & PLAN NOTE
Outpatient: 12.5mg Carvedilol BID, 40mg lisinopril     Plan  Cont home regiment   PRN labetolol, for SBP >180    Diagnosis: HIV associated DLBCL with CNS involvement     Protocol/Chemo Regimen: DA-R-EPOCH Cycle 4 with 20% dose escalation (Rituxan given inpatient on Day 0)    Day: 6    Pt endorsed: No acute complaints     Review of Systems: Patient denies headache, dizziness, visual changes, chest pain, palpitations, SOB, abdominal pain, vomiting, diarrhea or dysuria.     Pain scale: 0    Diet: Regular     Allergies: No Known Allergies      ANTIMICROBIALS  abacavir 600 mG/dolutegravir 50 mG/lamiVUDine 300 mG 1 Tablet(s) Oral daily  acyclovir   Tablet 400 milliGRAM(s) Oral three times a day      HEME/ONC MEDICATIONS  DOXOrubicin IVPB w/vinCRIStine 34 milliGRAM(s) IV Intermittent daily  etoposide IVPB 172 milliGRAM(s) IV Intermittent daily      STANDING MEDICATIONS  acetaminophen   Tablet 650 milliGRAM(s) Oral once  allopurinol 300 milliGRAM(s) Oral daily  diphenhydrAMINE   Injectable 50 milliGRAM(s) IV Push once  ondansetron Injectable 8 milliGRAM(s) IV Push every 8 hours  predniSONE   Tablet 120 milliGRAM(s) Oral two times a day  sodium chloride 0.9%. 1000 milliLiter(s) IV Continuous <Continuous>      PRN MEDICATIONS  acetaminophen   Tablet 650 milliGRAM(s) Oral every 6 hours PRN  acetaminophen   Tablet. 650 milliGRAM(s) Oral every 6 hours PRN  hydrocortisone sodium succinate Injectable 100 milliGRAM(s) IV Push once PRN  metoclopramide Injectable 10 milliGRAM(s) IV Push every 6 hours PRN      Vital Signs Last 24 Hrs  T(C): 36.6 (01 May 2018 05:50), Max: 36.7 (30 Apr 2018 17:21)  T(F): 97.8 (01 May 2018 05:50), Max: 98.1 (01 May 2018 00:45)  HR: 74 (01 May 2018 05:50) (74 - 103)  BP: 151/85 (01 May 2018 05:50) (130/76 - 168/97)  BP(mean): --  RR: 18 (01 May 2018 05:50) (18 - 18)  SpO2: 97% (01 May 2018 05:50) (97% - 100%)      PHYSICAL EXAM  General: NAD  HEENT: PERRLA, EOMI, clear oropharynx, anicteric sclera, pink conjunctiva  Neck: supple  CV: (+) S1/S2 RRR  Lungs: clear to auscultation, no wheezes or rales  Abdomen: soft, non-tender, non-distended (+) BS  Ext: no clubbing, cyanosis or edema  Skin: no rashes and no petechiae  Neuro: alert and oriented X 3, no focal deficits  Central Line:  R ACW Mediport C/D/I        LABS: Diagnosis: HIV associated DLBCL with CNS involvement     Protocol/Chemo Regimen: DA-R-EPOCH Cycle 4 with 20% dose escalation (Rituxan given inpatient on Day 0)    Day: 6    Pt endorsed: No acute complaints     Review of Systems: Patient denies headache, dizziness, visual changes, chest pain, palpitations, SOB, abdominal pain, vomiting, diarrhea or dysuria.     Pain scale: 0    Diet: Regular     Allergies: No Known Allergies      ANTIMICROBIALS  abacavir 600 mG/dolutegravir 50 mG/lamiVUDine 300 mG 1 Tablet(s) Oral daily  acyclovir   Tablet 400 milliGRAM(s) Oral three times a day      HEME/ONC MEDICATIONS  DOXOrubicin IVPB w/vinCRIStine 34 milliGRAM(s) IV Intermittent daily  etoposide IVPB 172 milliGRAM(s) IV Intermittent daily      STANDING MEDICATIONS  acetaminophen   Tablet 650 milliGRAM(s) Oral once  allopurinol 300 milliGRAM(s) Oral daily  diphenhydrAMINE   Injectable 50 milliGRAM(s) IV Push once  ondansetron Injectable 8 milliGRAM(s) IV Push every 8 hours  predniSONE   Tablet 120 milliGRAM(s) Oral two times a day  sodium chloride 0.9%. 1000 milliLiter(s) IV Continuous <Continuous>      PRN MEDICATIONS  acetaminophen   Tablet 650 milliGRAM(s) Oral every 6 hours PRN  acetaminophen   Tablet. 650 milliGRAM(s) Oral every 6 hours PRN  hydrocortisone sodium succinate Injectable 100 milliGRAM(s) IV Push once PRN  metoclopramide Injectable 10 milliGRAM(s) IV Push every 6 hours PRN      Vital Signs Last 24 Hrs  T(C): 36.6 (01 May 2018 05:50), Max: 36.7 (30 Apr 2018 17:21)  T(F): 97.8 (01 May 2018 05:50), Max: 98.1 (01 May 2018 00:45)  HR: 74 (01 May 2018 05:50) (74 - 103)  BP: 151/85 (01 May 2018 05:50) (130/76 - 168/97)  BP(mean): --  RR: 18 (01 May 2018 05:50) (18 - 18)  SpO2: 97% (01 May 2018 05:50) (97% - 100%)      PHYSICAL EXAM  General: NAD  HEENT: PERRLA, EOMI, clear oropharynx, anicteric sclera, pink conjunctiva  Neck: supple  CV: (+) S1/S2 RRR  Lungs: clear to auscultation, no wheezes or rales  Abdomen: soft, non-tender, non-distended (+) BS  Ext: no clubbing, cyanosis or edema  Skin: no rashes and no petechiae  Neuro: alert and oriented X 3, no focal deficits  Central Line:  R ACW Mediport C/D/I        LABS:                  11.1   5.2   )-----------( 319      ( 01 May 2018 06:45 )             32.8         Mean Cell Volume : 89.1 fl  Mean Cell Hemoglobin : 30.3 pg  Mean Cell Hemoglobin Concentration : 34.0 gm/dL  Auto Neutrophil # : 4.5 K/uL  Auto Lymphocyte # : 0.6 K/uL  Auto Monocyte # : 0.0 K/uL  Auto Eosinophil # : 0.0 K/uL  Auto Basophil # : 0.0 K/uL  Auto Neutrophil % : 87.4 %  Auto Lymphocyte % : 12.3 %  Auto Monocyte % : 0.2 %  Auto Eosinophil % : 0.1 %  Auto Basophil % : 0.0 %      05-01    136  |  97  |  14  ----------------------------<  145<H>  3.5   |  26  |  0.88    Ca    9.4      01 May 2018 06:44  Phos  3.1     05-01  Mg     2.3     05-01    TPro  6.7  /  Alb  3.9  /  TBili  0.5  /  DBili  x   /  AST  16  /  ALT  26  /  AlkPhos  62  05-01      Mg 2.3  Phos 3.1

## 2025-03-17 NOTE — PROGRESS NOTES
Progress Note - Critical Care/ICU   Name: Chuy Norton 64 y.o. male I MRN: [id number]  Unit/Bed#: -01 SDU I Date of Admission: 3/5/2025   Date of Service: 3/17/2025 I Hospital Day: 12      Assessment & Plan  Acute respiratory failure with hypoxia (HCC)  Patient brought in by facility staff for SOB, cough x2 weeks and dizziness with standing  At baseline patient states he does not wear oxygen but does wear nocturnal CPAP  In ED, initially stable on 4L NC but had increase WOB requiring BIPAP. Patient was tried off BIPAP after receiving TRUJILLO neb and steroids but failed trial and placed back on BIPAP.   Flu/Covid/RSV negative  CXR: Patchy lingular and lower lobe opacities, concerning for pneumonia.   CT CAP: Multifocal pneumonia involving the left lower lobe, lingula, and medial right lower lobe. Fluid in the esophagus suggests aspiration as a possible etiology. Moderate size hiatal hernia and distended fluid-filled esophagus to the level of the thoracic inlet.     3/12 Lt chest tube placed for parapneumonic effusion    Plan  Continue BIPAP HS  Supplemental O2 for Sats > 88%, currently 12 L MFNC  Xopenex TID  Continue close monitoring of Lt Chest tube output, he has received 4 doses of TPA/Dornase  Plan to complete 6 doses of TPA/Dornase and then repeat CT chest to assess LT  parapneumonic effusion   Holding home coumadin while receiving TPA/Dornase due to high risk of bleeding.     Bipolar 2 disorder, major depressive episode (HCC)  Outpatient: 20mg lexapro, 300mg Seroquel HS, 100mg trazadone HS    Plan  Cont home regiment    Primary hypertension  Outpatient: 12.5mg Carvedilol BID, 40mg lisinopril     Plan  Cont home regiment   PRN labetolol, for SBP >180   Hyperlipidemia  Outpatient: 40 atorvastatin    Plan  Continue statin    Cirrhosis (HCC)  Follows with [facility], last seen Jan 2025  History of cirrhosis of the liver due to history of alcohol and drug addiction.   5/9/2024 CT C/A/P noted nodular hepatic  contours may indicate cirrhosis of the liver.   MELD 3.0: 20  LFT normal   No ascites present   Close monitoring of INR when coumadin resumed  Left ventricular apical thrombus  Outpatient: 2.5mg Warfarin (Sun, Tues, Wed, Fri) and 5mg (Mon, Thurs, Sat)  INR: 2.68    Plan   Holding systemic AC while receiving TPA/Dornase for Lt Chest tube   Hepatitis C  Follows with [facility], last seen Jan 2025  History of hepatitis C antibody and stated treatment with Mavyret   May 2024 negative viral load    KENN (obstructive sleep apnea)  BIPAP HS  Left Parapneumonic effusion  See above under Acute Respiratory Failure with hypoxia  Dependence on nicotine from cigarettes  Continue NRT   Left lower lobe pneumonia    Ischemic cardiomyopathy    Disposition: Stepdown Level 1    ICU Core Measures     A: Assess, Prevent, and Manage Pain Has pain been assessed? Yes  Need for changes to pain regimen? No   B: Both SAT/SAT  N/A   C: Choice of Sedation RASS Goal: N/A patient not on sedation  Need for changes to sedation or analgesia regimen? NA   D: Delirium CAM-ICU: Negative   E: Early Mobility  Plan for early mobility? Yes   F: Family Engagement Plan for family engagement today? Yes         Prophylaxis:  VTE VTE covered by:    None       Stress Ulcer  covered bypantoprazole (PROTONIX) 40 mg tablet [[id number]] (Long-Term Med), pantoprazole (PROTONIX) EC tablet 40 mg [[id number]]         24 Hour Events : Pt received his 4th dose of TPA/Dornase overnight for Lt parapneumonic effusion.  Overnight pt was hypotensive with transient response to IVF (received a total of 750 ml) but had to go on vasopressors for BP support.      Subjective   Review of Systems: See HPI for Review of Systems    Objective :                   Vitals I/O      Most Recent Min/Max in 24hrs   Temp 98.4 °F (36.9 °C) Temp  Min: 98.1 °F (36.7 °C)  Max: 99 °F (37.2 °C)   Pulse 74 Pulse  Min: 69  Max: 85   Resp 18 Resp  Min: 14  Max: 32   BP (!) 82/46 BP  Min: 82/46  Max: 126/78    O2 Sat 91 % SpO2  Min: 87 %  Max: 96 %      Intake/Output Summary (Last 24 hours) at 3/17/2025 0053  Last data filed at 3/16/2025 2200  Gross per 24 hour   Intake 1803.75 ml   Output 815 ml   Net 988.75 ml       Diet Cardiovascular; Cardiac    Invasive Monitoring           Physical Exam   Physical Exam  Vitals and nursing note reviewed.   Eyes:      Conjunctiva/sclera: Conjunctivae normal.   Skin:     General: Skin is warm and dry.   HENT:      Head: Normocephalic and atraumatic.      Mouth/Throat:      Mouth: Mucous membranes are moist.   Cardiovascular:      Rate and Rhythm: Normal rate and regular rhythm.      Heart sounds: Normal heart sounds.   Abdominal: General: Bowel sounds are normal. There is distension.     Palpations: Abdomen is soft.      Tenderness: There is no abdominal tenderness.   Constitutional:       Appearance: He is well-developed and well-nourished. He is ill-appearing.   Pulmonary:      Effort: No respiratory distress.      Comments: On 12 L NC with SpO2 92%. LS clear in upper fields and decreased at bases. Lt chest tube intact   Neurological:      General: No focal deficit present.      Mental Status: Mental status is at baseline.      Motor: gross motor function is at baseline for patient. Strength full and intact in all extremities.          Diagnostic Studies        Lab Results: I have reviewed the following results:     Medications:  Scheduled PRN   acetaminophen, 975 mg, Q8H SHANNA  atorvastatin, 40 mg, Daily With Dinner  bisacodyl, 10 mg, Daily  carvedilol, 25 mg, BID With Meals  chlorhexidine, 15 mL, Q12H SHANNA  escitalopram, 20 mg, Daily  insulin lispro, 1-6 Units, 4x Daily (AC & HS)  levalbuterol, 1.25 mg, TID  lidocaine, 1 patch, Daily  losartan, 100 mg, Daily  multi-electrolyte, 500 mL, Once  nicotine, 1 patch, Daily  pantoprazole, 40 mg, BID before lunch/dinner  polyethylene glycol, 17 g, Daily  QUEtiapine, 300 mg, HS  senna-docusate sodium, 1 tablet, BID  traZODone, 100 mg, HS       benzonatate, 200 mg, TID PRN  guaiFENesin, 200 mg, TID PRN  hydrALAZINE, 10 mg, Q6H PRN  HYDROmorphone, 0.5 mg, Q4H PRN  levalbuterol, 1.25 mg, Q6H PRN  oxyCODONE, 5 mg, Q6H PRN  oxyCODONE, 2.5 mg, Q6H PRN       Continuous    multi-electrolyte, 75 mL/hr, Last Rate: 75 mL/hr (03/16/25 4741)         Labs:   CBC    Recent Labs     03/15/25  0316 03/16/25  0441   WBC 13.53* 17.83*   HGB 12.9 13.0   HCT 40.2 40.5   * 166     BMP    Recent Labs     03/15/25  0316 03/16/25  0441   SODIUM 137 134*   K 4.0 4.6    105   CO2 27 22   AGAP 6 7   BUN 36* 36*   CREATININE 1.39* 1.43*   CALCIUM 7.7* 7.9*       Coags    Recent Labs     03/15/25  0316 03/16/25  0441   INR 1.91* 1.50*        Additional Electrolytes  Recent Labs     03/16/25  0441   MG 1.9   PHOS 3.2          Blood Gas    No recent results  No recent results LFTs  No recent results    Infectious  No recent results  Glucose  Recent Labs     03/15/25  0316 03/16/25  0441   GLUC 109 130

## 2025-03-17 NOTE — ASSESSMENT & PLAN NOTE
HISTORY OF PRESENT ILLNESS:   The patient is a 76 year old female with   Past Medical History:   Diagnosis Date   • Acute blood loss anemia 8/7/2020   • Acute on chronic systolic heart failure (CMS/[facility]) 7/23/2020   • AARON (acute kidney injury) (CMS/[facility]) 7/23/2020   • Diabetes mellitus (CMS/[facility])    • Fracture    • Neuropathy     admitted for   ED Diagnosis   1. Closed nondisplaced fracture of pelvis, unspecified part of pelvis, initial encounter (CMS/HCC)  DISCONTINUED: morphine injection 2 mg    DISCONTINUED: morphine injection 4 mg    DISCONTINUED: baclofen (LIORESAL) tablet 5 mg    DISCONTINUED: gabapentin (NEURONTIN) capsule 600 mg    DISCONTINUED: morphine injection 1 mg   2. Fall, initial encounter     3. Hip pain, bilateral     4. Inability to ambulate due to hip     5. Acute renal insufficiency     6. Advance care planning     7. Acute on chronic systolic heart failure (CMS/[facility])     8. AARON (acute kidney injury) (CMS/[facility])     9. Cerebellar infarct (CMS/HCC)     10. Encephalopathy     11. Septic shock (CMS/HCC)     12. Palliative care encounter        Pt transferred to ICU due to GI bleed  S/p 4 unit PRBC transfusion  Remains on levophed   More awake today  EGD -- gastric ulcers with no bleeding but vessel seen and epi injected     PAST MEDICAL HISTORY:   Past Medical History:   Diagnosis Date   • Acute blood loss anemia 8/7/2020   • Acute on chronic systolic heart failure (CMS/[facility]) 7/23/2020   • AARON (acute kidney injury) (CMS/[facility]) 7/23/2020   • Diabetes mellitus (CMS/[facility])    • Fracture    • Neuropathy        CURRENT MEDICATIONS:   Current Facility-Administered Medications   Medication   • pantoprazole (PROTONIX) injection 40 mg   • midodrine (PROAMATINE) tablet 10 mg   • sodium chloride (NORMAL SALINE) 0.9 % bolus 100-200 mL   • insulin lispro (HumaLOG) scheduled dose AND correction dose 4-16 Units   • metroNIDAZOLE (FLAGYL) IVPB 500 mg   • sodium chloride 0.9% infusion   • ondansetron (ZOFRAN) injection 4 mg  Outpatient: 2.5mg Warfarin (Sun, Tues, Wed, Fri) and 5mg (Mon, Thurs, Sat)  INR: 2.68    Plan   Holding systemic AC while receiving TPA/Dornase for Lt Chest tube      • prochlorperazine (COMPAZINE) injection 5 mg   • sodium chloride 0.9% infusion   • cefepime (MAXIPIME) 1,000 mg in sodium chloride 0.9 % 100 mL IVPB   • traMADol (ULTRAM) tablet 50 mg   • acetaminophen (TYLENOL) tablet 650 mg   • HYDROcodone-acetaminophen (NORCO) 5-325 MG per tablet 1 tablet   • dextrose 50 % injection 12.5 g   • AMIODarone (PACERONE) tablet 200 mg   • bisacodyl (DULCOLAX) suppository 10 mg   • sodium chloride 0.9 % flush bag 25 mL   • sodium chloride (PF) 0.9 % injection 2 mL   • sodium chloride (NORMAL SALINE) 0.9 % bolus 500 mL   • dextrose 5 % infusion   • dextrose (GLUTOSE) 40 % gel 30 g   • dextrose 50 % injection 25 g   • glucagon (GLUCAGEN) injection 1 mg   • dextrose (GLUTOSE) 40 % gel 15 g         PHYSICAL EXAMINATION:   VITAL SIGNS: Taken by the nurse -     Visit Vitals  /62   Pulse 97   Temp 96.4 °F (35.8 °C) (Axillary)   Resp (!) 11   Ht 5' 8\" (1.727 m)   Wt 75.4 kg (166 lb 3.6 oz)   SpO2 99%   BMI 25.27 kg/m²     I/O last 3 completed shifts:  In: 946.9 [P.O.:320; I.V.:394.1; IV Piggyback:232.8]  Out: 120 [Urine:120]     GENERAL: The patient is not awake today, not in any acute distress or respiratory distress.   HEAD: Normocephalic, atraumatic.   EYES: Pupils equal, round, reactive to light and accommodation. Sclerae anicteric.   NECK: Supple, no jugular venous distention, no thyromegaly. Trachea central. No carotid bruit appreciated.   LUNGS: Clear on auscultation bilaterally; no wheezing, rhonchi, or crackles appreciated.   HEART: S1, S2 regular, without any rub or gallop.   ABDOMEN: Bowel sounds positive, soft, nontender. No hepatosplenomegaly. appreciated. No flank tenderness or suprapubic dullness. No abdominal bruit noticed.   EXTREMITIES: severe edema in b/l UE and LE . Bilateral radial pulses palpable. Bilateral dorsalis pedis pulses palpable. RIJ temp HD catheter   BACK: Percussion of lower back is nontender.     LABORATORY REVIEW:   Recent Labs   Lab 08/12/20  7476  08/11/20  0435 08/10/20  0408   SODIUM 141 143 140   POTASSIUM 3.6 3.3* 3.0*   CHLORIDE 109* 109* 108*   CO2 25 26 21   BUN 54* 52* 115*   CREATININE 2.04* 1.62* 2.56*   GLUCOSE 124* 95 167*   CALCIUM 7.5* 7.4* 7.8*      Recent Labs   Lab 08/12/20  0410   WBC 12.1*   RBC 2.65*   HGB 8.0*   HCT 25.0*   *      Recent Labs   Lab 08/12/20  0410 08/11/20 0435   SODIUM 141 143   POTASSIUM 3.6 3.3*   CHLORIDE 109* 109*   CO2 25 26   BUN 54* 52*   CREATININE 2.04* 1.62*   CALCIUM 7.5* 7.4*   ALBUMIN  --  1.6*   BILIRUBIN  --  1.2*   ALKPT  --  236*   GPT  --  <6   AST  --  41*   GLUCOSE 124* 95      No results found   Recent Labs   Lab 08/10/20  0408 08/08/20  0410   PTT  --  31   PT 16.4* 16.7*   INR 1.6 1.6        EGD   Final Result by Annie Cruz RN (08/10 1357)      US VASC EXTREMITY UPPER VENOUS DUPLEX   Final Result by David Gutierrez MD (08/09 9991)   Scattered bilateral upper extremity DVT again identified appearing improved compared to 07/29/2020.      Electronically Signed by: DAVID GUTIERREZ M.D.    Signed on: 8/9/2020 5:51 PM          XR CHEST PA OR AP 1 VIEW   Final Result by Dari Martinez DO (08/09 0917)   1.    Redemonstrated bilateral lung findings.  The bilateral airspace haziness and central mixed interstitial opacity left greater than right is increased.  Findings likely combination pleural fluid with edema or infection.  Suggest continued close    follow-up.      Electronically Signed by: DARI MARTINEZ D.O.    Signed on: 8/9/2020 9:17 AM          XR CHEST PA OR AP 1 VIEW   Final Result by Lacey Funes MD (08/08 4946)   1.  Persistent dense left base opacity.  Background airspace haziness, central interstitial opacities and vascular congestion.  This may represent combination of pneumonia and edema.  Follow-up advised to exclude an underlying lesion.   2.  Small bilateral pleural effusions.      **The absence of findings should not deter follow-up or further evaluation of  concerning clinical findings.      Electronically Signed by: LACEY MCKEON M.D.    Signed on: 8/8/2020 11:19 AM          CT ABDOMEN PELVIS WO CONTRAST   Final Result by David Gutierrez MD (08/08 9928)   1. Stable pelvic fractures with small unchanged adjacent left periacetabular hematoma.   2. No evidence of evolving pelvic, peritoneal or retroperitoneal hematoma.   3.  Stable bilateral pleural effusions and generalized anasarca.   4.  Stable gallbladder distention.      Electronically Signed by: DAVID GUTIERREZ M.D.    Signed on: 8/8/2020 9:51 AM          XR CHEST PA OR AP 1 VIEW   Final Result by Hannah Powell MD (08/04 2701)   1.  Increasing consolidation and effusion at the left lung base.   2.  Increasing atelectasis and effusion at the right lung base.   3.  Cardiomegaly.   4.  Interval placement of a right internal jugular catheter without evidence of pneumothorax.      Electronically Signed by: HANNAH POWELL M.D.    Signed on: 8/4/2020 1:06 PM          IR TEMPORARY DIALYSIS CATHETER INSERTION AGE 5 OR OLDER   Final Result by Rica Blackburn MD (08/03 5935)   Successful placement of a temporary dialysis catheter to the internal jugular vein.      Electronically Signed by: RICA BLACKBURN M.D.    Signed on: 8/3/2020 4:35 PM          XR CHEST PA OR AP 1 VIEW   Final Result by Lacey Mckeon MD (07/31 0950)   1.  Dense left basilar space opacity.  Query pneumonia.  Follow-up to resolution is advised, to exclude an underlying lesion.   2.  Background nonfocal airspace haziness and interstitial opacities.  Query unspecified pneumonitis or edema.      **The absence of findings should not deter follow-up or further evaluation of concerning clinical findings.      Electronically Signed by: LACEY MCKEON M.D.    Signed on: 7/31/2020 1:29 PM          CT ABDOMEN PELVIS FOR KIDNEY STONES WO CONTRAST   Final Result by Aron Ross MD (07/31 2302)      1.  No hydroureteronephrosis.   2.  Moderate wall  thickening of the urinary bladder which is predominantly decompressed by a Hicks catheter.   3.  Moderate bilateral pleural effusions with adjacent bibasilar subsegmental atelectasis.     4.  Small volume ascites within the abdomen and pelvis.   5.  Diffuse edematous changes of the subcutaneous soft tissues, suggesting anasarca.   6.  Multiple pelvic fractures, as detailed above.   7.  Dilated gallbladder.  Recommend clinical correlation regarding signs/symptoms of cholecystitis.      Electronically Signed by: ARON ROSS M.D.    Signed on: 7/31/2020 1:42 PM          CT HEAD WO CONTRAST   Final Result by Aron Ross MD (07/31 1323)      1.  No CT evidence of an acute intracranial hemorrhage.   2.  Subacute lacunar infarction involving the deep matter of the right frontal lobe towards the high convexity.   3.  Chronic lacunar infarct of the right posterior basal ganglia.         Electronically Signed by: ARON ROSS M.D.    Signed on: 7/31/2020 1:23 PM          US Carotid Duplex   Final Result by Dale Hernandez MD (07/30 1136)   1.    Unremarkable exam without carotid stenosis or flow disturbance seen.       Electronically Signed by: DALE HERNANDEZ M.D.    Signed on: 7/30/2020 3:41 PM          MRI BRAIN WO CONTRAST   Final Result by Hannah Powell MD (07/30 1634)   1. New areas of acute infarct in the cerebellar hemispheres bilaterally as well as in the right parietal region as described in the body of the report.      Electronically Signed by: HANNAH POWELL M.D.    Signed on: 7/30/2020 8:35 AM          US VASC EXTREMITY UPPER VENOUS DUPLEX   Final Result by David Gutierrez MD (07/29 0905)   1.  Bilateral upper extremity venous thrombus from axillary veins distally.      Electronically Signed by: DAVID GUTIERREZ M.D.    Signed on: 7/29/2020 11:19 AM          CT HEAD WO CONTRAST   Final Result by Hannah Powell MD (07/25 8705)   1.   Atrophy with stable chronic small vessel ischemic changes  without evidence of intraparenchymal hemorrhage, midline shift or obvious mass.     2.    New area of low attenuation in the mid upper right parietal deep white matter which could represent an evolving area of ischemia.      Electronically Signed by: HANNAH POWELL M.D.    Signed on: 7/25/2020 4:24 PM          XR CHEST PA OR AP 1 VIEW   Final Result by Lacey Mckeon MD (07/25 8780)   1.  New bibasilar hazy airspace opacities and small bilateral pleural effusions. Query edema.  Pneumonia component not excluded.      **The absence of findings should not deter follow-up or further evaluation of concerning clinical findings.      Electronically Signed by: LACEY MCKEON M.D.    Signed on: 7/25/2020 8:36 AM          US LIVER GALLBLADDER PANCREAS   Final Result by Lacey Mckeon MD (07/23 3121)   1.  Mild gallbladder distention, sludge and gallstone.  Mild gallbladder wall thickening and trace pericholecystic fluid.  No biliary duct dilatation noted.  Cholecystitis in the differential.  Please correlate with clinical and laboratory findings.   2.  Indeterminate solid 1.3 cm hyperechoic lesion of the inferior right kidney.   3.  Right pleural effusion.   4.  Query mild fibrofatty infiltration of the liver.      **The absence of findings should not deter follow-up or further evaluation of concerning clinical findings.      Electronically Signed by: LACEY MCKEON M.D.    Signed on: 7/23/2020 5:55 PM          US KIDNEY BILATERAL   Final Result by Dari Martinez DO (07/22 0912)   1.    No hydronephrosis.    2.    1.3 cm hyperechoic solid lesion lower pole of the right kidney may represent an angiomyolipoma.   3.    Decompressed urinary bladder with a Hicks catheter present.  Limits assessment for wall thickness.   4.    Cholelithiasis.   5.    Small amount of free fluid right upper abdomen.      Electronically Signed by: DARI MARTINEZ D.O.    Signed on: 7/22/2020 9:12 AM          MRI BRAIN WO CONTRAST   Final Result by  Saran Haynes MD (07/21 1153)   1.  Exam is limited by motion artifact.   2.  No acute intracranial abnormality is identified.   3.  Atrophy and scattered foci of chronic ischemic change.   4.  Minimal paranasal sinus mucosal thickening.      Electronically Signed by: SARAN HAYNES M.D.    Signed on: 7/21/2020 11:53 AM          CT PELVIS WO CONTRAST   Final Result by Lacey Funes MD (07/20 9329)   1.    Comminuted fracture of the lateral aspect of the left superior pubic ramus involving the anterior portion of the acetabulum.  Soft tissue density and lucency to the fracture region raising the possibility of an underlying lesion versus healing    osteolysis.   2.    Fracture of the lateral right sacrum.  Nearby enlargement of the right piriformis muscle which contains an ill-defined band of gas and calcifications.  Concerning for abscess.  Contrast CT may provide additional helpful formation.   3.    Patchy osteosclerosis in the region of a cortical fracture of the medial left iliac bone suggesting a healing fracture.   4.    Transversely oriented band of sclerosis of the inferior sacrum /coccyx suggesting healing fracture.   5.   Comminuted fracture of the left inferior pubic ramus.   6.   Additional findings as above.      **The absence of findings should not deter follow-up or further evaluation of concerning clinical findings.      Electronically Signed by: LACEY FUNES M.D.    Signed on: 7/20/2020 11:24 PM          CT HEAD WO CONTRAST   Final Result by Lacey Funes MD (07/20 4022)   1.  No acute intracranial findings visualized.   2.  Nonspecific punctate calcification of the leo without associated edema.  Undetermined clinical significance.  If further imaging is desired, MRI can be obtained.      **The absence of findings should not deter follow-up or further evaluation of concerning clinical findings.      Electronically Signed by: LACEY FUNES M.D.    Signed on: 7/20/2020 6:52 PM          XR CHEST PA  OR AP 1 VIEW   Final Result by Peter Jc MD (07/20 9699)   1.   Cardiomegaly and mild chronic interstitial changes with no significant focal acute intrathoracic abnormalities identified.      Electronically Signed by: PETER JC M.D.    Signed on: 7/20/2020 1:03 PM          MRI LUMBAR SPINE WO CONTRAST   Final Result by Peter Jc MD (07/20 0425)   1.   Exam limited by extensive patient motion artifact.   2.   Severe multifactorial central and bi-foraminal stenosis at L3-L4 and L4-L5.   3.   Moderate multifactorial central and severe bi-foraminal stenosis at L5-S1.   4.   Moderate multifactorial central and bi-foraminal stenosis at L2-L3.   5.   Mild multifactorial central and bi-foraminal stenosis at L1-L2.      Electronically Signed by: PETER JC M.D.    Signed on: 7/20/2020 11:18 AM          XR HIPS 2 BILATERAL VIEWS EACH HIP W PELVIS 1 VIEW   Final Result by David Lance MD (07/19 8940)   1.  Findings suggesting nondisplaced fractures of left inferior and possibly superior pubic rami not well seen due to patient positioning or   2.  No evidence of proximal femoral fracture or dislocation.      Electronically Signed by: DAVID LANCE M.D.    Signed on: 7/19/2020 4:17 PM          IR TEMPORARY DIALYSIS CATHETER INSERTION AGE 5 OR OLDER    (Results Pending)   IR PERMANENT DIALYSIS CATHETER CHECK    (Results Pending)   INPATIENT CONSULT TO IR    (Results Pending)   cca 9.5      75 yo with chf,dm admitted after fall with pelvic fracture and now with AARON    1. AARON   No h/o CKD with Bcr 0.5  AARON from volume depletion with low fena and AIN from oxacillin  Plan to place external catheter  Minimal UOP   HD again today     IHD initiated on 8/3  no signs of recovery      2. Sepsis syndrome   Leukocytosis better today   Possible endocarditis  7/21 blood cultures w/ staph aureus bacteremia  Ct pelvis concerning for abscess /osteo  On Flagyl and cefepime per ID     --  hesitate to give penicillins due to possible AIN      3. AMS  Multifactorial   Related to sepsis /pain meds/uremia  Possible embolic stroke  Unable to tolerate IV heparin due to recurrent bleeding    Repeat mri on 7/29 noted w/ cerebellar and parietal strokes and DVT on UE US      4 volume status  Volume overloaded and started IHD 8/3 to improve this   Edema in part from hypoalbuminemia and third spacing  UF with HD as her BP tolerates    -- may need to increase levophed     5. S/p afib w/ RVR  On amiodarone po; LFT stable    S/p cardioversion on 7/24 7/21 Ef is low 25% to 30%     6 S/p fall   Pubic rami fracture   Spinal stenosis   Pain mgmt and ortho following      7 Hx dm   On insulin      8 S/p Thrombocytopenia  Lower today and follow      9 Abnormal lfts/elevated alk phos   Monitor   On amiodarone  Likely more from bone      10 Bilateral upper extremity thrombus  As per primary team   heparin drip on hold      11 Nutrition   Was on tpn   Poor intake      12 Deconditioning  Sec sepsis  Sec fall and stroke     13 electrolytes  hypoK from lack of intake and improved with supplementation   na,ca,mg,phos fine     14 anemia  From GI bleed  No active bleeding on EGD but visible vessel  Follow hb and transfuse as needed  Transfused again 8/10  Hb slight lower today     15 dispo  Prognosis poor   Need to consider hospice/palliative care    Thank you. We will follow with you.  Teofilo Corado MD   D/w Dr Gibson

## 2025-03-17 NOTE — CASE MANAGEMENT
Case Management Progress Note    Patient name Chuy Norton  Location  SDU/-01 S* MRN [id number]  : 1960 Date 3/17/2025       LOS (days): 12  Geometric Mean LOS (GMLOS) (days): 4.9  Days to GMLOS:-6.7        OBJECTIVE:        Current admission status: Inpatient  Preferred Pharmacy:   [facility] [id number]  [address] [address]  Phone: [phone] Fax: [phone]    Primary Care Provider: Savanna Pan DO    [facility]  Secondary Insurance: JOEL MUNOZ    PROGRESS NOTE:  [facility]([phone]), left message to see if Pt is service connected for SNF. Await return call.

## 2025-03-17 NOTE — ASSESSMENT & PLAN NOTE
Patient brought in by facility staff for SOB, cough x2 weeks and dizziness with standing  At baseline patient states he does not wear oxygen but does wear nocturnal CPAP  In ED, initially stable on 4L NC but had increase WOB requiring BIPAP. Patient was tried off BIPAP after receiving TRUJILLO neb and steroids but failed trial and placed back on BIPAP.   Flu/Covid/RSV negative  CXR: Patchy lingular and lower lobe opacities, concerning for pneumonia.   CT CAP: Multifocal pneumonia involving the left lower lobe, lingula, and medial right lower lobe. Fluid in the esophagus suggests aspiration as a possible etiology. Moderate size hiatal hernia and distended fluid-filled esophagus to the level of the thoracic inlet.     3/12 Lt chest tube placed for parapneumonic effusion    Plan  Continue BIPAP HS  Supplemental O2 for Sats > 88%, currently 12 L MFNC  Xopenex TID  Continue close monitoring of Lt Chest tube output, he has received 4 doses of TPA/Dornase  Plan to complete 6 doses of TPA/Dornase and then repeat CT chest to assess LT  parapneumonic effusion   Holding home coumadin while receiving TPA/Dornase due to high risk of bleeding.

## 2025-03-17 NOTE — ASSESSMENT & PLAN NOTE
Follows with WALE LOTT, last seen Jan 2025  History of cirrhosis of the liver due to history of alcohol and drug addiction.   5/9/2024 CT C/A/P noted nodular hepatic contours may indicate cirrhosis of the liver.   MELD 3.0: 20  LFT normal   No ascites present   Close monitoring of INR when coumadin resumed

## 2025-03-17 NOTE — UTILIZATION REVIEW
Continued Stay Review    Date: 3/17/25                          Current Patient Class: Inpatient Current Level of Care: Level 1 Stepdown    HPI:64 y.o. male initially admitted on 3/5/25 inpatient    Current Diagnosis: acute respiratory failure with hypoxia-MRSA pneumonia with parapneumonic effusion - chest tube 3/12/LV thrombus/Bipolar/Cirrhosis, history of Hep C    Assessment/Plan:     3/17/2025 .  Patient presents with hypotension overnight with transient response to IVF,  required vasopressors.  Given 4th dose of TPA/Dornase overnight for Lt parapneumonic effusion.   On exam: abdominal distention.  Appears ill.  On mid flow oxygen 12 liters with sat 92%.  Decreased breath sounds at bases.  Left chest tube intact.   Abnormal labs or imaging:  na 133.  Bun 42. Creatinine 1.68.  INR 1.40.  wbc 13.04.   Plan     Supplemental O2 for Sats > 88%, currently 12 L MFNC.   Continue BIPAP HS . Xopenex TID.   Continue close monitoring of Lt Chest tube output, he has received 4 doses of TPA/Dornase,  Plan to complete 6 doses of TPA/Dornase and then repeat CT chest to assess LT parapneumonic effusion.  Home Coumadin on hold.  Stop IVF and Levophed weaned to off.    Continue home psyche medications, antihypertensives, statin    Medications:   Scheduled Medications:  acetaminophen, 975 mg, Oral, Q8H SHANNA  alteplase (CATHFLO) 10 mg in sterile water 30 mL instillation, 10 mg, Chest Tube, Once   And  dornase shanique (PULMOZYME) 5 mg in sterile water 30 mL instillation, 5 mg, Chest Tube, Once - given 0034 and 1328  atorvastatin, 40 mg, Oral, Daily With Dinner  bisacodyl, 10 mg, Rectal, Daily  [Held by provider] carvedilol, 25 mg, Oral, BID With Meals  chlorhexidine, 15 mL, Mouth/Throat, Q12H SHANNA  escitalopram, 20 mg, Oral, Daily  insulin lispro, 1-6 Units, Subcutaneous, 4x Daily (AC & HS)  levalbuterol, 1.25 mg, Nebulization, TID  lidocaine, 1 patch, Topical, Daily  [Held by provider] losartan, 100 mg, Oral, Daily  nicotine, 1 patch,  Transdermal, Daily  pantoprazole, 40 mg, Oral, BID before lunch/dinner  polyethylene glycol, 17 g, Oral, Daily  QUEtiapine, 300 mg, Oral, HS  senna-docusate sodium, 1 tablet, Oral, BID  traZODone, 100 mg, Oral, HS    albumin human (FLEXBUMIN) 5 % injection 12.5 g  Dose: 12.5 g  Freq: Once Route: IV  Last Dose: Stopped (03/17/25 0336)  Start: 03/17/25 0215 End: 03/17/25 0336   multi-electrolyte (Plasmalyte-A/Isolyte-S PH 7.4/Normosol-R) IV solution 500 mL  Dose: 500 mL  Freq: Once Route: IV  Last Dose: Stopped (03/17/25 0230)  Start: 03/17/25 0015 End: 03/17/25 0230  calcium gluconate 2 g in sodium chloride 0.9% 100 mL (premix)  Dose: 2 g  Freq: Once Route: IV  Last Dose: Stopped (03/17/25 0901)  Start: 03/17/25 0630 End: 03/17/25 0901    Continuous IV Infusions:  multi-electrolyte (Plasmalyte-A/Isolyte-S PH 7.4/Normosol-R) IV solution  Rate: 75 mL/hr Dose: 75 mL/hr  Freq: Continuous Route: IV  Last Dose: Stopped (03/17/25 1050)  Start: 03/16/25 1015 End: 03/17/25 1035  norepinephrine (LEVOPHED) 8 mg (DOUBLE CONCENTRATION) IV in sodium chloride 0.9% 250 mL  Rate: 1.9-56.3 mL/hr Dose: 1-30 mcg/min  Freq: Titrated Route: IV  Last Dose: Stopped (03/17/25 0531)  Start: 03/17/25 0300 End: 03/17/25 1126     PRN Meds: not used.   benzonatate, 200 mg, Oral, TID PRN  guaiFENesin, 200 mg, Oral, TID PRN  hydrALAZINE, 10 mg, Intravenous, Q6H PRN  HYDROmorphone, 0.5 mg, Intravenous, Q4H PRN  levalbuterol, 1.25 mg, Nebulization, Q6H PRN  oxyCODONE, 5 mg, Oral, Q6H PRN  oxyCODONE, 2.5 mg, Oral, Q6H PRN      Discharge Plan:  to be determined.     Vital Signs (last 3 days)       Date/Time Temp Pulse Resp BP MAP (mmHg) SpO2 FiO2 (%) Calculated FIO2 (%) - Nasal Cannula O2 Flow Rate (L/min) Nasal Cannula O2 Flow Rate (L/min) O2 Device O2 Interface Device Patient Position - Orthostatic VS Meacham Coma Scale Score Pain    03/17/25 1200 -- 78 20 110/58 79 86 % -- -- -- -- -- -- -- 15 --    03/17/25 1100 97.9 °F (36.6 °C) 70 20 104/55 73 92  % -- -- -- -- Mid flow nasal cannula -- Lying -- --    03/17/25 1051 -- -- -- -- -- -- -- -- -- -- -- -- -- -- 6    03/17/25 1000 -- 68 15 111/60 80 90 % -- -- -- -- -- -- -- -- --    03/17/25 0900 -- 71 16 95/53 68 90 % -- -- -- -- -- -- -- -- --    03/17/25 0800 -- 78 23 105/58 78 90 % -- -- -- -- -- -- -- 15 No Pain    03/17/25 0743 98.5 °F (36.9 °C) 66 16 96/53 70 91 % -- 80 -- 15 L/min Mid flow nasal cannula -- -- -- --    03/17/25 0630 -- 75 16 101/57 76 90 % -- -- -- -- -- -- -- -- --    03/17/25 0611 -- 75 15 102/59 77 90 % -- -- -- -- -- -- -- -- --    03/17/25 0531 -- -- -- 123/64 84 -- -- -- -- -- -- -- -- -- --    03/17/25 0500 -- 79 28 101/65 77 93 % -- -- -- -- -- -- -- -- --    03/17/25 0400 -- 72 29 99/60 75 91 % -- -- -- -- -- -- -- 15 2    03/17/25 0342 98.5 °F (36.9 °C) 69 12 -- -- 92 % -- -- -- -- -- -- -- -- --    03/17/25 0330 -- 71 15 81/52 61 92 % -- -- -- -- -- -- -- -- --    03/17/25 0300 -- 78 45 87/63 72 91 % -- -- -- -- -- -- -- -- --    03/17/25 0249 -- -- -- 82/53 63 -- -- -- -- -- -- -- -- -- --    03/17/25 0154 -- -- -- -- -- 90 % -- 76 -- 14 L/min Mid flow nasal cannula MFNC prongs -- -- --    03/17/25 0000 -- 74 18 82/46 59 91 % -- -- -- -- -- -- -- 15 --    03/16/25 2353 98.4 °F (36.9 °C) 75 20 -- -- 91 % -- 76 -- 14 L/min Mid flow nasal cannula -- -- -- --    03/16/25 2300 -- 69 14 82/53 63 93 % -- -- -- -- -- -- -- -- --    03/16/25 2153 -- -- -- -- -- -- -- -- -- -- -- -- -- -- 4    03/16/25 2139 -- -- -- -- -- 90 % -- 76 -- 14 L/min Mid flow nasal cannula MFNC shannags -- -- --    03/16/25 2000 -- 75 20 92/60 72 90 % -- -- -- -- -- -- -- 15 No Pain    03/16/25 1955 -- 79 31 -- -- 90 % -- -- -- -- -- -- -- -- --    03/16/25 1948 99 °F (37.2 °C) 79 29 -- -- 89 % -- -- -- -- -- -- -- -- --    03/16/25 1913 -- -- -- -- -- 90 % -- 68 -- 12 L/min Mid flow nasal cannula MFNC shannags -- -- --    03/16/25 1900 -- 80 16 96/65 75 90 % -- -- -- -- -- -- -- -- --    03/16/25 1800 -- 85 32  126/78 96 88 % -- -- -- -- -- -- -- -- --    03/16/25 1744 -- 84 28 -- -- 88 % -- -- -- -- -- -- -- -- 5 03/16/25 1700 -- 80 25 112/77 90 90 % -- -- -- -- -- -- -- -- --    03/16/25 1600 -- 79 30 102/68 81 87 % -- -- -- -- -- -- -- 15 --    03/16/25 1500 98.4 °F (36.9 °C) 74 27 104/67 81 90 % -- -- -- -- Mid flow nasal cannula -- Lying -- --    03/16/25 1411 -- -- -- -- -- 91 % -- -- -- -- -- -- -- -- --    03/16/25 1400 -- 72 23 98/58 72 91 % -- -- -- -- -- -- -- -- --    03/16/25 1300 -- 78 23 108/74 88 93 % -- -- -- -- -- -- -- -- --    03/16/25 1235 -- -- -- -- -- -- -- -- -- -- -- -- -- -- 8    03/16/25 1200 -- 77 19 96/64 75 93 % -- -- -- -- -- -- -- 15 --    03/16/25 1100 98.1 °F (36.7 °C) 74 24 125/68 89 90 % -- 68 -- 12 L/min Mid flow nasal cannula -- Lying -- --    03/16/25 1016 -- 72 15 99/64 -- 92 % -- -- -- -- -- -- -- -- --    03/16/25 1015 -- -- -- -- -- -- -- -- -- -- -- -- -- -- 5 03/16/25 1000 -- 73 16 99/64 75 91 % -- -- -- -- -- -- -- -- --    03/16/25 0900 -- 79 23 93/61 72 91 % -- -- -- -- -- -- -- -- --    03/16/25 0819 98.9 °F (37.2 °C) -- -- -- -- -- -- -- -- -- -- -- -- -- --    03/16/25 0800 -- 71 24 111/67 82 93 % -- -- -- -- -- -- -- 15 No Pain    03/16/25 0754 -- -- -- -- -- 96 % -- 68 -- 12 L/min Mid flow nasal cannula -- -- -- --    03/16/25 0700 -- 74 26 104/71 82 91 % -- -- -- -- -- -- -- -- --    03/16/25 0523 -- -- -- -- -- -- -- -- -- -- -- -- -- -- 6    03/16/25 0255 -- -- -- -- -- 93 % -- 80 -- 15 L/min Mid flow nasal cannula MFNC prongs -- -- --    03/16/25 0210 -- -- -- -- -- -- -- -- -- -- -- -- -- -- 6    03/16/25 0156 -- -- -- -- -- -- -- 52 -- 8 L/min Mid flow nasal cannula MFNC prongs -- -- --    03/16/25 0000 -- -- -- -- -- -- -- -- -- -- -- -- -- 15 No Pain    03/15/25 2355 97.4 °F (36.3 °C) -- -- -- -- -- -- -- -- -- -- -- -- -- --    03/15/25 2301 -- 63 21 117/64 86 93 % -- 52 -- 8 L/min Mid flow nasal cannula -- -- -- --    03/15/25 2141 -- -- -- -- -- -- 44  -- 6 L/min -- BiPAP Full face mask -- -- --    03/15/25 2100 -- 63 19 108/63 81 90 % -- -- -- -- -- -- -- -- --    03/15/25 2027 98.2 °F (36.8 °C) -- -- -- -- -- -- -- -- -- -- -- -- -- --    03/15/25 2000 -- -- -- -- -- -- -- -- -- -- -- -- -- 15 3    03/15/25 1915 -- -- -- -- -- 92 % -- 52 -- 8 L/min Mid flow nasal cannula MFNC prongs -- -- --    03/15/25 1900 -- 71 23 107/63 81 92 % -- -- -- -- -- -- -- -- --    03/15/25 1800 -- 73 25 117/73 91 93 % -- -- -- -- -- -- -- -- --    03/15/25 1725 -- -- -- -- -- -- -- -- -- -- -- -- -- -- 5    03/15/25 1709 99 °F (37.2 °C) -- -- -- -- -- -- -- -- -- -- -- -- -- --    03/15/25 1600 -- 64 16 120/67 87 92 % -- -- 8 L/min -- Mid flow nasal cannula -- Lying 15 No Pain    03/15/25 1500 -- 64 15 95/61 74 93 % -- -- -- -- -- -- -- -- --    03/15/25 1400 -- 66 18 120/66 88 92 % -- -- -- -- -- -- -- -- --    03/15/25 1350 -- 68 19 99/62 77 93 % -- -- -- -- Mid flow nasal cannula -- Lying -- --    03/15/25 1303 -- -- -- -- -- 91 % -- -- 8 L/min -- Mid flow nasal cannula -- -- -- --    03/15/25 1200 -- -- -- -- -- -- -- -- -- -- -- -- -- 15 No Pain    03/15/25 1130 97.6 °F (36.4 °C) 70 20 116/72 89 92 % -- 68 -- 12 L/min Mid flow nasal cannula -- Lying -- No Pain    03/15/25 0937 -- -- -- -- -- -- -- -- -- -- -- -- -- -- Med Not Given for Pain - for MAR use only    03/15/25 0900 -- -- -- -- -- -- -- 68 -- 12 L/min Mid flow nasal cannula -- -- -- --    03/15/25 0732 -- -- -- -- -- -- -- 68 -- 12 L/min Mid flow nasal cannula -- -- -- --    03/15/25 07:28:23 97.2 °F (36.2 °C) 67 18 128/75 93 88 % -- -- -- -- -- -- Lying -- --    03/15/25 0154 -- -- -- -- -- -- -- 68 12 L/min 12 L/min Mid flow nasal cannula -- -- -- --    03/15/25 0000 -- -- -- -- -- -- 8 -- -- -- -- -- -- 15 No Pain    03/14/25 2239 -- -- -- -- -- 92 % -- 68 -- 12 L/min Mid flow nasal cannula -- -- -- --    03/14/25 21:36:35 -- 70 18 117/66 83 89 % -- 60 -- 10 L/min Mid flow nasal cannula -- Lying -- --    03/14/25  2136 -- -- -- -- -- 89 % -- -- -- -- -- -- -- -- --    03/14/25 2134 98.1 °F (36.7 °C) -- -- -- -- -- -- -- -- -- -- -- -- -- --    03/14/25 1951 -- -- -- -- -- -- -- 60 10 L/min 10 L/min Mid flow nasal cannula MFNC prongs -- -- --    03/14/25 1910 -- -- -- -- -- -- -- -- -- -- -- -- -- 15 No Pain    03/14/25 1741 -- -- -- -- -- -- -- -- -- -- -- -- -- -- No Pain    03/14/25 16:14:36 97.3 °F (36.3 °C) 70 -- 103/68 80 90 % -- -- -- -- -- -- -- -- --    03/14/25 1425 -- -- -- -- -- -- -- -- 8 L/min -- Mid flow nasal cannula MFNC shannags -- -- --    03/14/25 1232 -- -- -- -- -- 91 % 8 -- -- -- Mid flow nasal cannula -- -- 15 --    03/14/25 1204 -- -- -- -- -- -- -- -- -- -- -- -- -- -- 5    03/14/25 0943 -- -- -- -- -- -- -- -- -- -- -- -- -- -- 8    03/14/25 0925 -- -- -- -- -- 89 % -- -- 12 L/min -- Mid flow nasal cannula MFNC prongs -- -- --    03/14/25 07:42:22 97.5 °F (36.4 °C) 64 19 109/91 97 94 % -- -- -- -- -- -- -- -- --    03/14/25 0730 -- -- -- -- -- 89 % -- 44 -- 6 L/min Nasal cannula -- -- -- --    03/14/25 05:12:36 -- 60 -- 112/73 86 92 % -- -- -- -- -- -- -- -- --    03/14/25 05:12:14 97 °F (36.1 °C) 62 -- 112/73 86 91 % -- -- -- -- -- -- -- -- --          Weight (last 2 days)       None            Pertinent Labs/Diagnostic Results:   Radiology:  XR chest portable   Final Interpretation by Breanne Barnett MD (03/16 1126)      Unchanged positioning of the left chest tube with similar size of the left pleural effusion.      The previously noted left apical pneumothorax not visualized            Resident: Randell Oliver I, the attending radiologist, have reviewed the images and agree with the final report above.      Workstation performed: [id number]         XR abdomen 1 view kub   Final Interpretation by Eugenio Villegas MD (03/16 1026)      Nonobstructive bowel gas pattern.               Workstation performed: [id number]         XR chest portable   Final Interpretation by Lalo Henderson  MD Arleen (03/16 0987)   Left chest tube in place   Decreased left effusion with underlying distal changes with trace left apical pneumothorax..            Workstation performed: [id number]         CT chest wo contrast   Final Interpretation by Kwadwo Mcclendon DO (03/15 0710)      1.  Limited assessment without IV contrast. Small left pleural effusion which is probably free-flowing at the left lung base appears grossly stable to slightly decreased status post placement of left chest tube. A few locules of pleural gas.      2.  Pockets of loculated pleural fluid in the posterior left apex and along the major fissure in the mid chest appear increased in size from previous study. These do not appear to be in communication with the left chest tube.      3.  Near complete compressive atelectasis of the left lower lobe. Small locules of gas in the peripheral collapsed lung are more conspicuous than the prior study. Some element of cavitary pneumonia can not be excluded.      4.  Additional incidental findings as above.               Workstation performed: [id number]         XR chest portable   Final Interpretation by Oj Wells MD (03/13 9086)      Stable position of left-sided chest tube.      Stable left pleural effusion and left lung base opacity.            Workstation performed: [id number]         XR chest portable   Final Interpretation by Ruben Wilkins MD (03/12 1628)      Slightly decreased left pleural effusion status post chest tube placement.            Workstation performed: [id number]         CT chest wo contrast   Final Interpretation by Lalo Bacon MD (03/12 1304)      Loculated pleural fluid pockets in the left apex posteriorly and in the major fissure.   Small free effusion in the left lung base.      Compressive atelectasis of the left lower lung with focal hypodensity and cystic changes suggesting parenchymal necrosis.               Workstation performed: [id number]          XR chest portable   Final Interpretation by Maxim Lopes MD (03/12 1022)      Significantly increased opacity in the left chest felt to mostly be due to large potentially partially loculated pleural effusion.      See above      The study was marked in EPIC for immediate notification.            Workstation performed: [id number]         XR chest portable   Final Interpretation by Tyron White MD (03/11 1629)      Worsening left-sided pneumonia with probable small pleural effusion.      The study was marked in EPIC for immediate notification.            Workstation performed: [id number]         XR chest portable ICU   Final Interpretation by Bradley Landon Kocher, MD (03/07 0725)      Increasing left basilar opacity in keeping with effusion and a component of pneumonia or atelectasis.            Workstation performed: [id number]         CT chest abdomen pelvis w contrast   Final Interpretation by Leigh Epps MD (03/05 9188)      Multifocal pneumonia involving the left lower lobe, lingula, and medial right lower lobe. Fluid in the esophagus suggests aspiration as a possible etiology.      Moderate size hiatal hernia and distended fluid-filled esophagus to the level of the thoracic inlet.      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      The study was marked in EPIC for immediate notification.               Workstation performed: [id number]         XR chest 2 views   Final Interpretation by Cj Bojorquez MD (03/05 1523)      Patchy lingular and lower lobe opacities, concerning for pneumonia. Radiographic follow-up to resolution is recommended.            Workstation performed: [id number]         US kidney and bladder    (Results Pending)     Cardiology:  ECG 12 lead   Final Result by Kale Estevez MD (03/11 4133)   Normal sinus rhythm   Right superior axis deviation    Inferior infarct (cited on or before 19-Sep-2018)   Possible Anterior infarct , age undetermined   Abnormal ECG   When compared with ECG of 11-Mar-2025 04:51, (unconfirmed)   QT has lengthened   Confirmed by Kale Estevez ([id number]) on 3/11/2025 11:35:00 PM      ECG 12 lead   Final Result by Kale Estevez MD (03/11 3196)   **Suspect arm lead reversal, interpretation assumes no reversal   Normal sinus rhythm   Right superior axis deviation   Inferior infarct (cited on or before 19-Sep-2018)   Abnormal ECG   When compared with ECG of 11-Mar-2025 04:51, (unconfirmed)   No significant change was found   Confirmed by Kale Estevez ([id number]) on 3/11/2025 11:34:54 PM      ECG 12 lead   Final Result by Kale Estevez MD (03/11 3586)   Normal sinus rhythm   Right superior axis deviation   Inferior infarct (cited on or before 19-Sep-2018)   Possible Anterior infarct (cited on or before 19-Sep-2018)   Abnormal ECG   When compared with ECG of 10-Mar-2025 07:30,   QRS axis Shifted left   Confirmed by Kale Estevez ([id number]) on 3/11/2025 11:18:22 PM      ECG 12 lead   Final Result by Kale Estevez MD (03/10 6136)   Normal sinus rhythm   Minimal voltage criteria for LVH, may be normal variant ( R in aVL )   Inferior infarct (cited on or before 19-Sep-2018)   Anterolateral infarct (cited on or before 19-Sep-2018)   Abnormal ECG   When compared with ECG of 06-Mar-2025 21:22,   T wave inversion more evident in Lateral leads   Confirmed by Kale Estevez ([id number]) on 3/10/2025 11:39:20 AM      ECG 12 lead   Final Result by Omari Davalos MD (03/07 0752)   Normal sinus rhythm   Inferior infarct (cited on or before 19-Sep-2018)   Anterior infarct (cited on or before 19-Sep-2018)   Abnormal ECG   When compared with ECG of 05-Mar-2025 18:29,   No significant change was found   Confirmed by Omari Davalos ([id number]) on 3/7/2025 7:52:37 AM      ECG 12 lead   Final Result by Omari Davalos MD (03/06 0752)   Sinus rhythm with  1st degree A-V block   Inferior infarct (cited on or before 19-Sep-2018)   Anterolateral infarct (cited on or before 19-Sep-2018)   Abnormal ECG   When compared with ECG of 05-Mar-2025 13:01,   No significant change was found   Confirmed by Omari Davalos ([id number]) on 3/6/2025 7:52:33 AM      ECG 12 lead   Final Result by Omari Davalos MD (03/05 1449)   Sinus rhythm with 1st degree A-V block   Inferior infarct   Anterolateral infarct   Abnormal ECG   When compared with ECG of 17-Dec-2024 17:19, (unconfirmed)   No significant change was found   Confirmed by Omari Davalos ([id number]) on 3/5/2025 2:49:54 PM        GI:  No orders to display     Results from last 7 days   Lab Units 03/17/25  0531 03/16/25  0441 03/15/25  0316 03/14/25  0520 03/13/25  0332   WBC Thousand/uL 13.04* 17.83* 13.53* 17.64* 15.62*   HEMOGLOBIN g/dL 12.7 13.0 12.9 13.5 12.3   HEMATOCRIT % 40.6 40.5 40.2 41.5 38.2   PLATELETS Thousands/uL 158 166 145* 150 140*   TOTAL NEUT ABS Thousands/µL 10.84*  --   --   --   --      Results from last 7 days   Lab Units 03/17/25  0531 03/16/25  0441 03/15/25  0316 03/14/25  0520 03/13/25  0424   SODIUM mmol/L 133* 134* 137 135 137   POTASSIUM mmol/L 4.1 4.6 4.0 4.4 4.2   CHLORIDE mmol/L 103 105 104 103 105   CO2 mmol/L 23 22 27 27 26   ANION GAP mmol/L 7 7 6 5 6   BUN mg/dL 42* 36* 36* 31* 27*   CREATININE mg/dL 1.68* 1.43* 1.39* 1.16 1.01   EGFR ml/min/1.73sq m 42 51 53 66 78   CALCIUM mg/dL 7.3* 7.9* 7.7* 8.0* 8.0*   CALCIUM, IONIZED mmol/L 1.00*  --   --   --   --    MAGNESIUM mg/dL 2.5 1.9  --   --   --    PHOSPHORUS mg/dL 3.5 3.2  --   --   --      Results from last 7 days   Lab Units 03/12/25  0137   AST U/L 12*   ALT U/L 13   ALK PHOS U/L 38   TOTAL PROTEIN g/dL 5.7*   ALBUMIN g/dL 2.9*   TOTAL BILIRUBIN mg/dL 0.36     Results from last 7 days   Lab Units 03/17/25  1146 03/17/25  0744 03/16/25  2036 03/16/25  1613 03/16/25  1126 03/16/25  0819 03/15/25  2118 03/15/25  1705 03/15/25  1152 03/15/25  0727  03/14/25  2034 03/14/25  1612   POC GLUCOSE mg/dl 84 119 166* 135 170* 127 134 114 165* 108 134 117     Results from last 7 days   Lab Units 03/17/25  0531 03/16/25  0441 03/15/25  0316 03/14/25  0520 03/13/25  0424 03/12/25  0137 03/11/25  0544   GLUCOSE RANDOM mg/dL 154* 130 109 112 113 173* 108     Results from last 7 days   Lab Units 03/17/25  0223   PH STEPHANIE  7.455*   PCO2 STEPHANIE mm Hg 32.2*   PO2 STEPHANIE mm Hg 110.6*   HCO3 STEPHANIE mmol/L 22.1*   BASE EXC STEPHANIE mmol/L -1.0   O2 CONTENT STEPHANIE ml/dL 17.2   O2 HGB, VENOUS % 95.2*     Results from last 7 days   Lab Units 03/17/25  0531 03/16/25 0441 03/15/25  0316   PROTIME seconds 17.7* 18.6* 22.3*   INR  1.40* 1.50* 1.91*     Results from last 7 days   Lab Units 03/17/25  0531   PROCALCITONIN ng/ml 0.21     Results from last 7 days   Lab Units 03/17/25  0223   LACTIC ACID mmol/L 0.8     Results from last 7 days   Lab Units 03/13/25  0549   UNIT PRODUCT CODE  [id number]   UNIT NUMBER  [id number]   UNITABO  A   UNITRH  POS   UNIT DISPENSE STATUS  Presumed Trans   UNIT PRODUCT VOL ml 354     Results from last 7 days   Lab Units 03/12/25  1630   GRAM STAIN RESULT  Rare Polys  No bacteria seen   BODY FLUID CULTURE, STERILE  No growth     Network Utilization Review Department  ATTENTION: Please call with any questions or concerns to [phone] and carefully listen to the prompts so that you are directed to the right person. All voicemails are confidential.   For Discharge needs, contact Care Management DC Support Team at [phone] opt. [phone]  Send all requests for admission clinical reviews, approved or denied determinations and any other requests to dedicated fax number below belonging to the campus where the patient is receiving treatment. List of dedicated fax numbers for the Facilities:  FACILITY NAME UR FAX NUMBER   ADMISSION DENIALS (Administrative/Medical Necessity) [phone]   DISCHARGE SUPPORT TEAM (NETWORK) [phone]   PARENT CHILD HEALTH  (Maternity/NICU/Pediatrics) [phone]   [facility] [phone]   [facility] [phone]   [facility] [phone]   [facility] [phone]   [facility] [phone]   [facility] [phone]   [facility] [phone]   [facility] [phone]   [facility] [phone]   [facility] [phone]   [facility] [phone]   [facility] [phone]

## 2025-03-17 NOTE — QUICK NOTE
tPA/dornase instilled into left chest tube at 1330.  Patient tolerated procedure well without difficulty.  SpO2 94% on 15 L mid flow.  Discussed with nursing that chest tube should remain clamped for 1 hour (unclamp at 1430) unless patient experiences worsening chest pain, shortness of breath, or hypoxia in which case clamp should be removed immediately.    Discussed with nursing that increase in output from left chest tube is expected after removal of clamp, however if the output is significantly more bloody than previous, critical care should be contacted immediately.

## 2025-03-18 ENCOUNTER — APPOINTMENT (INPATIENT)
Dept: CT IMAGING | Facility: HOSPITAL | Age: 65
DRG: 871 | End: 2025-03-18
Payer: COMMERCIAL

## 2025-03-18 LAB
ANION GAP SERPL CALCULATED.3IONS-SCNC: 4 MMOL/L (ref 4–13)
BUN SERPL-MCNC: 36 MG/DL (ref 5–25)
CA-I BLD-SCNC: 1.07 MMOL/L (ref 1.12–1.32)
CALCIUM SERPL-MCNC: 7.6 MG/DL (ref 8.4–10.2)
CHLORIDE SERPL-SCNC: 105 MMOL/L (ref 96–108)
CO2 SERPL-SCNC: 26 MMOL/L (ref 21–32)
CREAT SERPL-MCNC: 1.37 MG/DL (ref 0.6–1.3)
ERYTHROCYTE [DISTWIDTH] IN BLOOD BY AUTOMATED COUNT: 18.3 % (ref 11.6–15.1)
GFR SERPL CREATININE-BSD FRML MDRD: 54 ML/MIN/1.73SQ M
GLUCOSE SERPL-MCNC: 102 MG/DL (ref 65–140)
GLUCOSE SERPL-MCNC: 119 MG/DL (ref 65–140)
GLUCOSE SERPL-MCNC: 126 MG/DL (ref 65–140)
GLUCOSE SERPL-MCNC: 97 MG/DL (ref 65–140)
GLUCOSE SERPL-MCNC: 99 MG/DL (ref 65–140)
HCT VFR BLD AUTO: 36.4 % (ref 36.5–49.3)
HGB BLD-MCNC: 11.7 G/DL (ref 12–17)
INR PPP: 1.36 (ref 0.85–1.19)
MCH RBC QN AUTO: 29.7 PG (ref 26.8–34.3)
MCHC RBC AUTO-ENTMCNC: 32.1 G/DL (ref 31.4–37.4)
MCV RBC AUTO: 92 FL (ref 82–98)
PLATELET # BLD AUTO: 162 THOUSANDS/UL (ref 149–390)
PMV BLD AUTO: 11.1 FL (ref 8.9–12.7)
POTASSIUM SERPL-SCNC: 4.2 MMOL/L (ref 3.5–5.3)
PROCALCITONIN SERPL-MCNC: 0.15 NG/ML
PROTHROMBIN TIME: 17.2 SECONDS (ref 12.3–15)
RBC # BLD AUTO: 3.94 MILLION/UL (ref 3.88–5.62)
SODIUM SERPL-SCNC: 135 MMOL/L (ref 135–147)
WBC # BLD AUTO: 10.04 THOUSAND/UL (ref 4.31–10.16)

## 2025-03-18 PROCEDURE — 85027 COMPLETE CBC AUTOMATED: CPT | Performed by: PHYSICIAN ASSISTANT

## 2025-03-18 PROCEDURE — 85610 PROTHROMBIN TIME: CPT | Performed by: PHYSICIAN ASSISTANT

## 2025-03-18 PROCEDURE — 82948 REAGENT STRIP/BLOOD GLUCOSE: CPT

## 2025-03-18 PROCEDURE — 94660 CPAP INITIATION&MGMT: CPT

## 2025-03-18 PROCEDURE — 94760 N-INVAS EAR/PLS OXIMETRY 1: CPT

## 2025-03-18 PROCEDURE — 84145 PROCALCITONIN (PCT): CPT | Performed by: PHYSICIAN ASSISTANT

## 2025-03-18 PROCEDURE — 80048 BASIC METABOLIC PNL TOTAL CA: CPT | Performed by: PHYSICIAN ASSISTANT

## 2025-03-18 PROCEDURE — 94640 AIRWAY INHALATION TREATMENT: CPT

## 2025-03-18 PROCEDURE — 82330 ASSAY OF CALCIUM: CPT | Performed by: PHYSICIAN ASSISTANT

## 2025-03-18 PROCEDURE — NC001 PR NO CHARGE: Performed by: PHYSICIAN ASSISTANT

## 2025-03-18 PROCEDURE — 99232 SBSQ HOSP IP/OBS MODERATE 35: CPT | Performed by: INTERNAL MEDICINE

## 2025-03-18 PROCEDURE — 71250 CT THORAX DX C-: CPT

## 2025-03-18 RX ORDER — SODIUM CHLORIDE, SODIUM GLUCONATE, SODIUM ACETATE, POTASSIUM CHLORIDE, MAGNESIUM CHLORIDE, SODIUM PHOSPHATE, DIBASIC, AND POTASSIUM PHOSPHATE .53; .5; .37; .037; .03; .012; .00082 G/100ML; G/100ML; G/100ML; G/100ML; G/100ML; G/100ML; G/100ML
500 INJECTION, SOLUTION INTRAVENOUS ONCE
Status: COMPLETED | OUTPATIENT
Start: 2025-03-18 | End: 2025-03-18

## 2025-03-18 RX ORDER — CALCIUM GLUCONATE 20 MG/ML
2 INJECTION, SOLUTION INTRAVENOUS ONCE
Status: COMPLETED | OUTPATIENT
Start: 2025-03-18 | End: 2025-03-18

## 2025-03-18 RX ORDER — NYSTATIN 100000 [USP'U]/G
POWDER TOPICAL 2 TIMES DAILY
Status: DISCONTINUED | OUTPATIENT
Start: 2025-03-18 | End: 2025-03-22 | Stop reason: HOSPADM

## 2025-03-18 RX ADMIN — LIDOCAINE 5% 1 PATCH: 700 PATCH TOPICAL at 18:02

## 2025-03-18 RX ADMIN — NYSTATIN 1 APPLICATION: 100000 POWDER TOPICAL at 18:09

## 2025-03-18 RX ADMIN — HEPARIN SODIUM 5000 UNITS: 5000 INJECTION INTRAVENOUS; SUBCUTANEOUS at 21:24

## 2025-03-18 RX ADMIN — CALCIUM GLUCONATE 2 G: 20 INJECTION, SOLUTION INTRAVENOUS at 08:20

## 2025-03-18 RX ADMIN — LEVALBUTEROL HYDROCHLORIDE 1.25 MG: 1.25 SOLUTION RESPIRATORY (INHALATION) at 07:50

## 2025-03-18 RX ADMIN — PANTOPRAZOLE SODIUM 40 MG: 40 TABLET, DELAYED RELEASE ORAL at 18:01

## 2025-03-18 RX ADMIN — NICOTINE 1 PATCH: 14 PATCH, EXTENDED RELEASE TRANSDERMAL at 09:53

## 2025-03-18 RX ADMIN — ATORVASTATIN CALCIUM 40 MG: 40 TABLET, FILM COATED ORAL at 18:01

## 2025-03-18 RX ADMIN — DORNASE ALFA 5 MG: 1 SOLUTION RESPIRATORY (INHALATION) at 01:54

## 2025-03-18 RX ADMIN — TRAZODONE HYDROCHLORIDE 100 MG: 100 TABLET ORAL at 21:24

## 2025-03-18 RX ADMIN — ACETAMINOPHEN 975 MG: 325 TABLET, FILM COATED ORAL at 18:01

## 2025-03-18 RX ADMIN — PANTOPRAZOLE SODIUM 40 MG: 40 TABLET, DELAYED RELEASE ORAL at 12:51

## 2025-03-18 RX ADMIN — ESCITALOPRAM OXALATE 20 MG: 20 TABLET ORAL at 09:53

## 2025-03-18 RX ADMIN — CHLORHEXIDINE GLUCONATE 15 ML: 1.2 RINSE ORAL at 09:52

## 2025-03-18 RX ADMIN — SODIUM CHLORIDE, SODIUM GLUCONATE, SODIUM ACETATE, POTASSIUM CHLORIDE, MAGNESIUM CHLORIDE, SODIUM PHOSPHATE, DIBASIC, AND POTASSIUM PHOSPHATE 500 ML: .53; .5; .37; .037; .03; .012; .00082 INJECTION, SOLUTION INTRAVENOUS at 02:35

## 2025-03-18 RX ADMIN — ACETAMINOPHEN 975 MG: 325 TABLET, FILM COATED ORAL at 09:53

## 2025-03-18 RX ADMIN — BISACODYL 10 MG: 10 SUPPOSITORY RECTAL at 09:53

## 2025-03-18 RX ADMIN — ALTEPLASE 10 MG: 2.2 INJECTION, POWDER, LYOPHILIZED, FOR SOLUTION INTRAVENOUS at 01:54

## 2025-03-18 RX ADMIN — HEPARIN SODIUM 5000 UNITS: 5000 INJECTION INTRAVENOUS; SUBCUTANEOUS at 14:22

## 2025-03-18 RX ADMIN — ACETAMINOPHEN 975 MG: 325 TABLET, FILM COATED ORAL at 02:03

## 2025-03-18 RX ADMIN — QUETIAPINE 300 MG: 200 TABLET ORAL at 21:24

## 2025-03-18 RX ADMIN — HEPARIN SODIUM 5000 UNITS: 5000 INJECTION INTRAVENOUS; SUBCUTANEOUS at 05:21

## 2025-03-18 RX ADMIN — LEVALBUTEROL HYDROCHLORIDE 1.25 MG: 1.25 SOLUTION RESPIRATORY (INHALATION) at 19:59

## 2025-03-18 RX ADMIN — SENNOSIDES AND DOCUSATE SODIUM 1 TABLET: 50; 8.6 TABLET ORAL at 09:53

## 2025-03-18 RX ADMIN — POLYETHYLENE GLYCOL 3350 17 G: 17 POWDER, FOR SOLUTION ORAL at 09:52

## 2025-03-18 RX ADMIN — LEVALBUTEROL HYDROCHLORIDE 1.25 MG: 1.25 SOLUTION RESPIRATORY (INHALATION) at 14:45

## 2025-03-18 NOTE — CASE MANAGEMENT
Case Management Progress Note    Patient name Chuy Norton  Location  SDU/-01 S* MRN [id number]  : 1960 Date 3/18/2025       LOS (days): 13  Geometric Mean LOS (GMLOS) (days): 4.9  Days to GMLOS:-8        OBJECTIVE:        Current admission status: Inpatient  Preferred Pharmacy:   [facility] [facility] [address]  [address] [place] [address]  Phone: [phone] Fax: [phone]    Primary Care Provider: Savanna Pan DO    Primary Insurance: [facility]  Secondary Insurance: JOEL MUNOZ    PROGRESS NOTE:  Message [facility] from Ivy from [facility], Pt is not service connected for SNF and will need to go through private insurance. Also per Ivy, Pt is not eligible for acute rehab. Need updated PT/OT notes and to speak with Pt re: SNF.

## 2025-03-18 NOTE — PROGRESS NOTES
Progress Note - Critical Care/ICU   Name: Chuy Norton 64 y.o. male I MRN: [id number]  Unit/Bed#: -01 SDU I Date of Admission: 3/5/2025   Date of Service: 3/18/2025 I Hospital Day: 13      Assessment & Plan  Acute respiratory failure with hypoxia (HCC)  Patient brought in by facility staff for SOB, cough x2 weeks and dizziness with standing  At baseline patient states he does not wear oxygen but does wear nocturnal CPAP  In ED, initially stable on 4L NC but had increase WOB requiring BIPAP. Patient was tried off BIPAP after receiving TRUJILLO neb and steroids but failed trial and placed back on BIPAP.   Flu/Covid/RSV negative  CXR: Patchy lingular and lower lobe opacities, concerning for pneumonia.   CT CAP: Multifocal pneumonia involving the left lower lobe, lingula, and medial right lower lobe. Fluid in the esophagus suggests aspiration as a possible etiology. Moderate size hiatal hernia and distended fluid-filled esophagus to the level of the thoracic inlet.   3/12 Lt chest tube placed for parapneumonic effusion    Plan  Continue BIPAP HS  Supplemental O2 for Sats > 88%, currently 14 L MFNC  Xopenex TID  Continue close monitoring of Lt Chest tube output, he has received 6 doses of TPA/Dornase, last dose 3/18 @ 0130  Plan to complete 6 doses of TPA/Dornase and then repeat CT chest to assess LT  parapneumonic effusion   Holding home coumadin while receiving TPA/Dornase due to high risk of bleeding  Bipolar 2 disorder, major depressive episode (HCC)  Outpatient: 20mg lexapro, 300mg Seroquel HS, 100mg trazadone HS    Plan  Cont home regimen  Primary hypertension  Outpatient: 12.5mg Carvedilol BID, 40mg lisinopril     Plan  Cont home regimen  PRN labetolol, for SBP >180   Hyperlipidemia  Outpatient: 40 atorvastatin    Plan  Continue statin    Cirrhosis (HCC)  Follows with [facility], last seen Jan 2025  History of cirrhosis of the liver due to history of alcohol and drug addiction  5/9/2024 CT C/A/P noted nodular  hepatic contours may indicate cirrhosis of the liver.   MELD 3.0: 20  LFT WNL   No ascites present   Close monitoring of INR when coumadin resumed  Left ventricular apical thrombus  Outpatient: 2.5mg Warfarin (Sun, Tues, Wed, Fri) and 5mg (Mon, Thurs, Sat)  Most recent INR: 1.4    Plan   Holding systemic AC while receiving TPA/Dornase for Lt Chest tube   DVT ppx: [facility]  Hepatitis C  Follows with [facility], last seen Jan 2025  History of hepatitis C antibody and stated treatment with Mavyret   May 2024 negative viral load    KENN (obstructive sleep apnea)  BIPAP HS  Left Parapneumonic effusion  See above under Acute Respiratory Failure with hypoxia  Dependence on nicotine from cigarettes  Continue NRT   Left lower lobe pneumonia    Ischemic cardiomyopathy    Disposition: Stepdown Level 1    ICU Core Measures     A: Assess, Prevent, and Manage Pain Has pain been assessed? Yes  Need for changes to pain regimen? Yes   B: Both SAT/SAT  N/A   C: Choice of Sedation RASS Goal: N/A patient not on sedation  Need for changes to sedation or analgesia regimen? NA   D: Delirium CAM-ICU: Negative   E: Early Mobility  Plan for early mobility? Yes   F: Family Engagement Plan for family engagement today? Yes         Prophylaxis:  VTE VTE covered by:  heparin (porcine), Subcutaneous, 5,000 Units at 03/18/25 0521       Stress Ulcer  covered bypantoprazole (PROTONIX) 40 mg tablet [[id number]] (Long-Term Med), pantoprazole (PROTONIX) EC tablet 40 mg [[id number]]         24 Hour Events : 65yo M with PMH Hep C cirrhosis, bipolar d/o, HTN, HLD, LV apical thrombus on home coumadin, KENN who is currently HD 13 for parapneumonic effusion s/p CT. He remains on MFNC. Overnight, he received his final dose (6 of 6) of TPA/dornase via CT. He denies complaints and reports feeling mildly improved. He received 500mL isolyte for borderline BP with improvement.   Subjective   Review of Systems: See HPI for Review of Systems    Objective :                    Vitals I/O      Most Recent Min/Max in 24hrs   Temp (!) 97.4 °F (36.3 °C) Temp  Min: 97.4 °F (36.3 °C)  Max: 98.6 °F (37 °C)   Pulse 65 Pulse  Min: 62  Max: 78   Resp (!) 11 Resp  Min: 11  Max: 44   /60 BP  Min: 85/54  Max: 166/77   O2 Sat 93 % SpO2  Min: 86 %  Max: 96 %      Intake/Output Summary (Last 24 hours) at 3/18/2025 0524  Last data filed at 3/17/2025 2200  Gross per 24 hour   Intake 1323.26 ml   Output 355 ml   Net 968.26 ml       Diet Cardiovascular; Cardiac    Invasive Monitoring           Physical Exam   Physical Exam  Vitals and nursing note reviewed.   Skin:     General: Skin is warm.      Capillary Refill: Capillary refill takes less than 2 seconds.      Coloration: Skin is not pale.   HENT:      Head: Normocephalic and atraumatic.   Cardiovascular:      Rate and Rhythm: Normal rate and regular rhythm.   Musculoskeletal:      Right lower leg: No edema.      Left lower leg: No edema.   Abdominal: General: Abdomen is flat. Bowel sounds are decreased.      Palpations: Abdomen is soft.      Tenderness: There is no abdominal tenderness.   Constitutional:       General: He is awake. He is not in acute distress.     Appearance: He is well-developed and well-nourished. He is ill-appearing.      Interventions: Nasal cannula in place.   Pulmonary:      Effort: Pulmonary effort is normal.      Breath sounds: Decreased breath sounds present.      Comments: L CT with intact dressing  Psychiatric:         Behavior: Behavior is cooperative.   Neurological:      General: No focal deficit present.      Mental Status: He is alert and oriented to person, place, and time.   Genitourinary/Anorectal:     Comments: Incontinent of urine         Diagnostic Studies        Lab Results: I have reviewed the following results:     Medications:  Scheduled PRN   acetaminophen, 975 mg, Q8H SHANNA  atorvastatin, 40 mg, Daily With Dinner  bisacodyl, 10 mg, Daily  [Held by provider] carvedilol, 25 mg, BID With  Meals  chlorhexidine, 15 mL, Q12H SHANNA  escitalopram, 20 mg, Daily  heparin (porcine), 5,000 Units, Q8H SHANNA  insulin lispro, 1-6 Units, 4x Daily (AC & HS)  levalbuterol, 1.25 mg, TID  lidocaine, 1 patch, Daily  [Held by provider] losartan, 100 mg, Daily  nicotine, 1 patch, Daily  pantoprazole, 40 mg, BID before lunch/dinner  polyethylene glycol, 17 g, Daily  QUEtiapine, 300 mg, HS  senna-docusate sodium, 1 tablet, BID  traZODone, 100 mg, HS      benzonatate, 200 mg, TID PRN  guaiFENesin, 200 mg, TID PRN  hydrALAZINE, 10 mg, Q6H PRN  HYDROmorphone, 0.5 mg, Q4H PRN  levalbuterol, 1.25 mg, Q6H PRN  oxyCODONE, 5 mg, Q6H PRN  oxyCODONE, 2.5 mg, Q6H PRN       Continuous          Labs:   CBC    Recent Labs     03/17/25  0531   WBC 13.04*   HGB 12.7   HCT 40.6        BMP    Recent Labs     03/17/25  0531   SODIUM 133*   K 4.1      CO2 23   AGAP 7   BUN 42*   CREATININE 1.68*   CALCIUM 7.3*       Coags    Recent Labs     03/17/25  0531   INR 1.40*        Additional Electrolytes  Recent Labs     03/17/25  0531   MG 2.5   PHOS 3.5   CAIONIZED 1.00*          Blood Gas    No recent results  Recent Labs     03/17/25 0223   PHVEN 7.455*   GQR0TZV 32.2*   PO2VEN 110.6*   [id number] 22.1*   BEVEN -1.0   [id number] 95.2*    LFTs  No recent results    Infectious  Recent Labs     03/17/25  0531   PROCALCITONI 0.21     Glucose  Recent Labs     03/17/25  0531   GLUC 154*

## 2025-03-18 NOTE — QUICK NOTE
03/18/25     Intrapleural Instillation of tPA/DNase - Dose #6     Under sterile conditions, L chest tube was flushed with 10 ml of 0.9% saline to confirm tube patency. 10mg tPA (30mL) and 5mg dornase (30mL) instilled via tube at 0200. The patient tolerated without complaints. Plan to unclamp at 0300 and return to wall suction. D/W MARY JO Herman. The medications were scanned into medical record by RN.     For any concerns or for any change in patient condition, contact Critical Care Team via [facility] Secure Chat.     NEHEMIAH Blair

## 2025-03-18 NOTE — TELEMEDICINE
e-Consult (IPC)  - Interventional Radiology  Chuy Norton 64 y.o. male MRN: [id number]  Unit/Bed#: -01 SDU Encounter: [id number]          Interventional Radiology has been consulted to evaluate Chuy Norton    We were consulted by Idania Rees concerning this patient with loculated left effusion.    Inpatient Consult to IR  Consult performed by: Lalo Bacon MD  Consult ordered by: Idania Rees PA-C        03/18/25    Assessment/Recommendation:   64 yr old male with left para pneumonic loculated effusion post chest tube and TPA. Now repeat CT showing two small loculated pockets , not communicating with superior pocket containing air and inferior pocket with intervening collapsed lung parenchyma.  Attempt can be made to place tube In superior pocket.    5-10 minutes, >50% of the total time devoted to medical consultative verbal/EMR discussion between providers. Written report will be generated in the EMR.     Thank you for allowing Interventional Radiology to participate in the care of Chuy Norton. Please don't hesitate to call or TigerText us with any questions.     Lalo Bacon MD

## 2025-03-18 NOTE — QUICK NOTE
I spoke to Dr. Adkins of thoracic surgery.  We looked at the CT chest completed 3/18 after 6 doses of tPA dornase.  There are some residual pockets of fluid most prominently in the apical posterior part of the left lung.  Dr. Adkins recommends IR chest tube to try to clear the apical posterior pocket.  Patient is little higher risk for surgery so we will defer thoracic surgery for now.  Patient can remain at [facility].  IR has been consulted.

## 2025-03-18 NOTE — ASSESSMENT & PLAN NOTE
Follows with [facility], last seen Jan 2025  History of cirrhosis of the liver due to history of alcohol and drug addiction  5/9/2024 CT C/A/P noted nodular hepatic contours may indicate cirrhosis of the liver.   MELD 3.0: 20  LFT WNL   No ascites present   Close monitoring of INR when coumadin resumed

## 2025-03-18 NOTE — ASSESSMENT & PLAN NOTE
Outpatient: 12.5mg Carvedilol BID, 40mg lisinopril     Plan  Cont home regimen  PRN labetolol, for SBP >180

## 2025-03-18 NOTE — ASSESSMENT & PLAN NOTE
Patient brought in by facility staff for SOB, cough x2 weeks and dizziness with standing  At baseline patient states he does not wear oxygen but does wear nocturnal CPAP  In ED, initially stable on 4L NC but had increase WOB requiring BIPAP. Patient was tried off BIPAP after receiving TRUJILLO neb and steroids but failed trial and placed back on BIPAP.   Flu/Covid/RSV negative  CXR: Patchy lingular and lower lobe opacities, concerning for pneumonia.   CT CAP: Multifocal pneumonia involving the left lower lobe, lingula, and medial right lower lobe. Fluid in the esophagus suggests aspiration as a possible etiology. Moderate size hiatal hernia and distended fluid-filled esophagus to the level of the thoracic inlet.   3/12 Lt chest tube placed for parapneumonic effusion    Plan  Continue BIPAP HS  Supplemental O2 for Sats > 88%, currently 14 L MFNC  Xopenex TID  Continue close monitoring of Lt Chest tube output, he has received 6 doses of TPA/Dornase, last dose 3/18 @ 0130  Plan to complete 6 doses of TPA/Dornase and then repeat CT chest to assess LT  parapneumonic effusion   Holding home coumadin while receiving TPA/Dornase due to high risk of bleeding

## 2025-03-18 NOTE — PLAN OF CARE
Problem: Potential for Falls  Goal: Patient will remain free of falls  Description: INTERVENTIONS:  - Educate patient/family on patient safety including physical limitations  - Instruct patient to call for assistance with activity   - Consult OT/PT to assist with strengthening/mobility   - Keep Call bell within reach  - Keep bed low and locked with side rails adjusted as appropriate  - Keep care items and personal belongings within reach  - Initiate and maintain comfort rounds  - Make Fall Risk Sign visible to staff  - Offer Toileting every 2 Hours, in advance of need  - Initiate/Maintain bed alarm  - Obtain necessary fall risk management equipment: socks  - Apply yellow socks and bracelet for high fall risk patients  - Consider moving patient to room near nurses station  Outcome: Progressing     Problem: Prexisting or High Potential for Compromised Skin Integrity  Goal: Skin integrity is maintained or improved  Description: INTERVENTIONS:  - Identify patients at risk for skin breakdown  - Assess and monitor skin integrity  - Assess and monitor nutrition and hydration status  - Monitor labs   - Assess for incontinence   - Turn and reposition patient  - Assist with mobility/ambulation  - Relieve pressure over bony prominences  - Avoid friction and shearing  - Provide appropriate hygiene as needed including keeping skin clean and dry  - Evaluate need for skin moisturizer/barrier cream  - Collaborate with interdisciplinary team   - Patient/family teaching  - Consider wound care consult   Outcome: Progressing     Problem: PAIN - ADULT  Goal: Verbalizes/displays adequate comfort level or baseline comfort level  Description: Interventions:  - Encourage patient to monitor pain and request assistance  - Assess pain using appropriate pain scale  - Administer analgesics based on type and severity of pain and evaluate response  - Implement non-pharmacological measures as appropriate and evaluate response  - Consider cultural  and social influences on pain and pain management  - Notify physician/advanced practitioner if interventions unsuccessful or patient reports new pain  Outcome: Progressing     Problem: INFECTION - ADULT  Goal: Absence or prevention of progression during hospitalization  Description: INTERVENTIONS:  - Assess and monitor for signs and symptoms of infection  - Monitor lab/diagnostic results  - Monitor all insertion sites, i.e. indwelling lines, tubes, and drains  - Monitor endotracheal if appropriate and nasal secretions for changes in amount and color  - [place] appropriate cooling/warming therapies per order  - Administer medications as ordered  - Instruct and encourage patient and family to use good hand hygiene technique  - Identify and instruct in appropriate isolation precautions for identified infection/condition  Outcome: Progressing  Goal: Absence of fever/infection during neutropenic period  Description: INTERVENTIONS:  - Monitor WBC    Outcome: Progressing     Problem: SAFETY ADULT  Goal: Patient will remain free of falls  Description: INTERVENTIONS:  - Educate patient/family on patient safety including physical limitations  - Instruct patient to call for assistance with activity   - Consult OT/PT to assist with strengthening/mobility   - Keep Call bell within reach  - Keep bed low and locked with side rails adjusted as appropriate  - Keep care items and personal belongings within reach  - Initiate and maintain comfort rounds  - Make Fall Risk Sign visible to staff  - Offer Toileting every 2 Hours, in advance of need  - Initiate/Maintain bed alarm  - Obtain necessary fall risk management equipment: socks  - Apply yellow socks and bracelet for high fall risk patients  - Consider moving patient to room near nurses station  Outcome: Progressing  Goal: Maintain or return to baseline ADL function  Description: INTERVENTIONS:  -  Assess patient's ability to carry out ADLs; assess patient's baseline for ADL  function and identify physical deficits which impact ability to perform ADLs (bathing, care of mouth/teeth, toileting, grooming, dressing, etc.)  - Assess/evaluate cause of self-care deficits   - Assess range of motion  - Assess patient's mobility; develop plan if impaired  - Assess patient's need for assistive devices and provide as appropriate  - Encourage maximum independence but intervene and supervise when necessary  - Involve family in performance of ADLs  - Assess for home care needs following discharge   - Consider OT consult to assist with ADL evaluation and planning for discharge  - Provide patient education as appropriate  Outcome: Progressing  Goal: Maintains/Returns to pre admission functional level  Description: INTERVENTIONS:  - Perform AM-PAC 6 Click Basic Mobility/ Daily Activity assessment daily.  - Set and communicate daily mobility goal to care team and patient/family/caregiver.   - Collaborate with rehabilitation services on mobility goals if consulted  - Perform Range of Motion 3 times a day.  - Reposition patient every 2 hours.  - Dangle patient 3 times a day  - Stand patient 3 times a day  - Ambulate patient 3 times a day  - Out of bed to chair 3 times a day   - Out of bed for meals 3 times a day  - Out of bed for toileting  - Record patient progress and toleration of activity level   Outcome: Progressing     Problem: DISCHARGE PLANNING  Goal: Discharge to home or other facility with appropriate resources  Description: INTERVENTIONS:  - Identify barriers to discharge w/patient and caregiver  - Arrange for needed discharge resources and transportation as appropriate  - Identify discharge learning needs (meds, wound care, etc.)  - Arrange for interpretive services to assist at discharge as needed  - Refer to Case Management Department for coordinating discharge planning if the patient needs post-hospital services based on physician/advanced practitioner order or complex needs related to  functional status, cognitive ability, or social support system  Outcome: Progressing     Problem: Knowledge Deficit  Goal: Patient/family/caregiver demonstrates understanding of disease process, treatment plan, medications, and discharge instructions  Description: Complete learning assessment and assess knowledge base.  Interventions:  - Provide teaching at level of understanding  - Provide teaching via preferred learning methods  Outcome: Progressing     Problem: CARDIOVASCULAR - ADULT  Goal: Maintains optimal cardiac output and hemodynamic stability  Description: INTERVENTIONS:  - Monitor I/O, vital signs and rhythm  - Monitor for S/S and trends of decreased cardiac output  - Administer and titrate ordered vasoactive medications to optimize hemodynamic stability  - Assess quality of pulses, skin color and temperature  - Assess for signs of decreased coronary artery perfusion  - Instruct patient to report change in severity of symptoms  Outcome: Progressing  Goal: Absence of cardiac dysrhythmias or at baseline rhythm  Description: INTERVENTIONS:  - Continuous cardiac monitoring, vital signs, obtain 12 lead EKG if ordered  - Administer antiarrhythmic and heart rate control medications as ordered  - Monitor electrolytes and administer replacement therapy as ordered  Outcome: Progressing     Problem: RESPIRATORY - ADULT  Goal: Achieves optimal ventilation and oxygenation  Description: INTERVENTIONS:  - Assess for changes in respiratory status  - Assess for changes in mentation and behavior  - Position to facilitate oxygenation and minimize respiratory effort  - Oxygen administered by appropriate delivery if ordered  - Initiate smoking cessation education as indicated  - Encourage broncho-pulmonary hygiene including cough, deep breathe, Incentive Spirometry  - Assess the need for suctioning and aspirate as needed  - Assess and instruct to report SOB or any respiratory difficulty  - Respiratory Therapy support as  indicated  Outcome: Progressing     Problem: METABOLIC, FLUID AND ELECTROLYTES - ADULT  Goal: Electrolytes maintained within normal limits  Description: INTERVENTIONS:  - Monitor labs and assess patient for signs and symptoms of electrolyte imbalances  - Administer electrolyte replacement as ordered  - Monitor response to electrolyte replacements, including repeat lab results as appropriate  - Instruct patient on fluid and nutrition as appropriate  Outcome: Progressing  Goal: Fluid balance maintained  Description: INTERVENTIONS:  - Monitor labs   - Monitor I/O and WT  - Instruct patient on fluid and nutrition as appropriate  - Assess for signs & symptoms of volume excess or deficit  Outcome: Progressing  Goal: Glucose maintained within target range  Description: INTERVENTIONS:  - Monitor Blood Glucose as ordered  - Assess for signs and symptoms of hyperglycemia and hypoglycemia  - Administer ordered medications to maintain glucose within target range  - Assess nutritional intake and initiate nutrition service referral as needed  Outcome: Progressing

## 2025-03-18 NOTE — ASSESSMENT & PLAN NOTE
Outpatient: 2.5mg Warfarin (Sun, Tues, Wed, Fri) and 5mg (Mon, Thurs, Sat)  Most recent INR: 1.4    Plan   Holding systemic AC while receiving TPA/Dornase for Lt Chest tube   DVT ppx: [facility]

## 2025-03-18 NOTE — PROGRESS NOTES
Progress Note - Critical Care/ICU   Name: Chuy Norton 64 y.o. male I MRN: [id number]  Unit/Bed#: -01 SDU I Date of Admission: 3/5/2025   Date of Service: 3/18/2025 I Hospital Day: 13       Critical Care Interval Transfer Note:    Brief Hospital Summary:   64-year-old male with a history of bipolar disorder, hep C cirrhosis, LV thrombus on Coumadin, multifocal pneumonia c/b parapneumonic effusion s/p chest tube 3/12 s/p 6 doses of TPA dornase. AHRF on 10 L MFNC.    Barriers to discharge:   Follow-up CT chest; may require thoracic surgery intervention  Wean supplemental O2 to maintain spO2 > 90%  Will need AC restarted for LV thrombus pending surgical plans     Consults: IP CONSULT TO PHARMACY    Recommended to review admission imaging for incidental findings and document in discharge navigator: Chart reviewed, no known incidental findings noted at this time.      Discharge Plan: Anticipate discharge in >72 hrs to discharge location to be determined pending rehab evaluations.       Patient seen and evaluated by Critical Care today and deemed to be appropriate for transfer to Stepdown Level 2. Spoke to Dr. Cooley from [facility] to accept transfer. Critical care can be contacted via SecureChat with any questions or concerns. Please use the Critical Care AP Role in Secure Chat for any provider inquires until the patient is transferred out of the ICU or until tomorrow at 0600.

## 2025-03-19 ENCOUNTER — APPOINTMENT (INPATIENT)
Dept: CT IMAGING | Facility: HOSPITAL | Age: 65
DRG: 871 | End: 2025-03-19
Attending: RADIOLOGY
Payer: COMMERCIAL

## 2025-03-19 ENCOUNTER — APPOINTMENT (INPATIENT)
Dept: RADIOLOGY | Facility: HOSPITAL | Age: 65
DRG: 871 | End: 2025-03-19
Payer: COMMERCIAL

## 2025-03-19 PROBLEM — Z00.8 ENCOUNTER FOR ASSESSMENT OF DECISION-MAKING CAPACITY: Status: ACTIVE | Noted: 2025-03-19

## 2025-03-19 LAB
ANION GAP SERPL CALCULATED.3IONS-SCNC: 3 MMOL/L (ref 4–13)
APTT PPP: 41 SECONDS (ref 23–34)
APTT PPP: 87 SECONDS (ref 23–34)
BUN SERPL-MCNC: 29 MG/DL (ref 5–25)
CALCIUM SERPL-MCNC: 7.6 MG/DL (ref 8.4–10.2)
CHLORIDE SERPL-SCNC: 105 MMOL/L (ref 96–108)
CO2 SERPL-SCNC: 27 MMOL/L (ref 21–32)
CREAT SERPL-MCNC: 1.33 MG/DL (ref 0.6–1.3)
ERYTHROCYTE [DISTWIDTH] IN BLOOD BY AUTOMATED COUNT: 17.8 % (ref 11.6–15.1)
GFR SERPL CREATININE-BSD FRML MDRD: 56 ML/MIN/1.73SQ M
GLUCOSE SERPL-MCNC: 104 MG/DL (ref 65–140)
GLUCOSE SERPL-MCNC: 124 MG/DL (ref 65–140)
GLUCOSE SERPL-MCNC: 125 MG/DL (ref 65–140)
GLUCOSE SERPL-MCNC: 126 MG/DL (ref 65–140)
GLUCOSE SERPL-MCNC: 97 MG/DL (ref 65–140)
GLUCOSE SERPL-MCNC: 99 MG/DL (ref 65–140)
HCT VFR BLD AUTO: 37.7 % (ref 36.5–49.3)
HGB BLD-MCNC: 11.9 G/DL (ref 12–17)
INR PPP: 1.2 (ref 0.85–1.19)
MAGNESIUM SERPL-MCNC: 1.9 MG/DL (ref 1.9–2.7)
MCH RBC QN AUTO: 29.6 PG (ref 26.8–34.3)
MCHC RBC AUTO-ENTMCNC: 31.6 G/DL (ref 31.4–37.4)
MCV RBC AUTO: 94 FL (ref 82–98)
PHOSPHATE SERPL-MCNC: 2.7 MG/DL (ref 2.3–4.1)
PLATELET # BLD AUTO: 168 THOUSANDS/UL (ref 149–390)
PMV BLD AUTO: 10.7 FL (ref 8.9–12.7)
POTASSIUM SERPL-SCNC: 4.6 MMOL/L (ref 3.5–5.3)
PROTHROMBIN TIME: 15.7 SECONDS (ref 12.3–15)
RBC # BLD AUTO: 4.02 MILLION/UL (ref 3.88–5.62)
SODIUM SERPL-SCNC: 135 MMOL/L (ref 135–147)
WBC # BLD AUTO: 6.98 THOUSAND/UL (ref 4.31–10.16)

## 2025-03-19 PROCEDURE — 87205 SMEAR GRAM STAIN: CPT | Performed by: INTERNAL MEDICINE

## 2025-03-19 PROCEDURE — C1769 GUIDE WIRE: HCPCS

## 2025-03-19 PROCEDURE — 94640 AIRWAY INHALATION TREATMENT: CPT

## 2025-03-19 PROCEDURE — 94660 CPAP INITIATION&MGMT: CPT

## 2025-03-19 PROCEDURE — 83735 ASSAY OF MAGNESIUM: CPT | Performed by: PHYSICIAN ASSISTANT

## 2025-03-19 PROCEDURE — 80048 BASIC METABOLIC PNL TOTAL CA: CPT | Performed by: PHYSICIAN ASSISTANT

## 2025-03-19 PROCEDURE — C1729 CATH, DRAINAGE: HCPCS

## 2025-03-19 PROCEDURE — 77012 CT SCAN FOR NEEDLE BIOPSY: CPT

## 2025-03-19 PROCEDURE — 85610 PROTHROMBIN TIME: CPT | Performed by: PHYSICIAN ASSISTANT

## 2025-03-19 PROCEDURE — 82948 REAGENT STRIP/BLOOD GLUCOSE: CPT

## 2025-03-19 PROCEDURE — 85027 COMPLETE CBC AUTOMATED: CPT | Performed by: PHYSICIAN ASSISTANT

## 2025-03-19 PROCEDURE — 94760 N-INVAS EAR/PLS OXIMETRY 1: CPT

## 2025-03-19 PROCEDURE — NC001 PR NO CHARGE: Performed by: RADIOLOGY

## 2025-03-19 PROCEDURE — 85730 THROMBOPLASTIN TIME PARTIAL: CPT | Performed by: INTERNAL MEDICINE

## 2025-03-19 PROCEDURE — 32557 INSERT CATH PLEURA W/ IMAGE: CPT | Performed by: RADIOLOGY

## 2025-03-19 PROCEDURE — 87070 CULTURE OTHR SPECIMN AEROBIC: CPT | Performed by: INTERNAL MEDICINE

## 2025-03-19 PROCEDURE — 99232 SBSQ HOSP IP/OBS MODERATE 35: CPT | Performed by: INTERNAL MEDICINE

## 2025-03-19 PROCEDURE — 84100 ASSAY OF PHOSPHORUS: CPT | Performed by: PHYSICIAN ASSISTANT

## 2025-03-19 PROCEDURE — 32557 INSERT CATH PLEURA W/ IMAGE: CPT

## 2025-03-19 RX ORDER — HEPARIN SODIUM 1000 [USP'U]/ML
4000 INJECTION, SOLUTION INTRAVENOUS; SUBCUTANEOUS EVERY 6 HOURS PRN
Status: DISCONTINUED | OUTPATIENT
Start: 2025-03-19 | End: 2025-03-22

## 2025-03-19 RX ORDER — LIDOCAINE HYDROCHLORIDE 10 MG/ML
INJECTION, SOLUTION EPIDURAL; INFILTRATION; INTRACAUDAL; PERINEURAL AS NEEDED
Status: COMPLETED | OUTPATIENT
Start: 2025-03-19 | End: 2025-03-19

## 2025-03-19 RX ORDER — FENTANYL CITRATE 50 UG/ML
INJECTION, SOLUTION INTRAMUSCULAR; INTRAVENOUS AS NEEDED
Status: COMPLETED | OUTPATIENT
Start: 2025-03-19 | End: 2025-03-19

## 2025-03-19 RX ORDER — FUROSEMIDE 10 MG/ML
40 INJECTION INTRAMUSCULAR; INTRAVENOUS ONCE
Status: DISCONTINUED | OUTPATIENT
Start: 2025-03-19 | End: 2025-03-19

## 2025-03-19 RX ORDER — HEPARIN SODIUM 1000 [USP'U]/ML
2000 INJECTION, SOLUTION INTRAVENOUS; SUBCUTANEOUS EVERY 6 HOURS PRN
Status: DISCONTINUED | OUTPATIENT
Start: 2025-03-19 | End: 2025-03-22

## 2025-03-19 RX ORDER — HEPARIN SODIUM 10000 [USP'U]/100ML
3-20 INJECTION, SOLUTION INTRAVENOUS
Status: DISCONTINUED | OUTPATIENT
Start: 2025-03-19 | End: 2025-03-22

## 2025-03-19 RX ADMIN — LEVALBUTEROL HYDROCHLORIDE 1.25 MG: 1.25 SOLUTION RESPIRATORY (INHALATION) at 19:06

## 2025-03-19 RX ADMIN — PANTOPRAZOLE SODIUM 40 MG: 40 TABLET, DELAYED RELEASE ORAL at 16:00

## 2025-03-19 RX ADMIN — LIDOCAINE HYDROCHLORIDE 8 ML: 10 INJECTION, SOLUTION EPIDURAL; INFILTRATION; INTRACAUDAL; PERINEURAL at 13:26

## 2025-03-19 RX ADMIN — ESCITALOPRAM OXALATE 20 MG: 20 TABLET ORAL at 10:18

## 2025-03-19 RX ADMIN — NICOTINE 1 PATCH: 14 PATCH, EXTENDED RELEASE TRANSDERMAL at 10:19

## 2025-03-19 RX ADMIN — HEPARIN SODIUM 5000 UNITS: 5000 INJECTION INTRAVENOUS; SUBCUTANEOUS at 05:33

## 2025-03-19 RX ADMIN — HEPARIN SODIUM 11.1 UNITS/KG/HR: 10000 INJECTION, SOLUTION INTRAVENOUS at 16:00

## 2025-03-19 RX ADMIN — PANTOPRAZOLE SODIUM 40 MG: 40 TABLET, DELAYED RELEASE ORAL at 11:26

## 2025-03-19 RX ADMIN — LEVALBUTEROL HYDROCHLORIDE 1.25 MG: 1.25 SOLUTION RESPIRATORY (INHALATION) at 07:29

## 2025-03-19 RX ADMIN — ACETAMINOPHEN 975 MG: 325 TABLET, FILM COATED ORAL at 10:18

## 2025-03-19 RX ADMIN — TRAZODONE HYDROCHLORIDE 100 MG: 100 TABLET ORAL at 21:27

## 2025-03-19 RX ADMIN — QUETIAPINE 300 MG: 200 TABLET ORAL at 21:27

## 2025-03-19 RX ADMIN — LIDOCAINE 5% 1 PATCH: 700 PATCH TOPICAL at 16:00

## 2025-03-19 RX ADMIN — FENTANYL CITRATE 100 MCG: 50 INJECTION, SOLUTION INTRAMUSCULAR; INTRAVENOUS at 13:25

## 2025-03-19 RX ADMIN — NYSTATIN 1 APPLICATION: 100000 POWDER TOPICAL at 10:19

## 2025-03-19 RX ADMIN — ATORVASTATIN CALCIUM 40 MG: 40 TABLET, FILM COATED ORAL at 16:00

## 2025-03-19 RX ADMIN — LEVALBUTEROL HYDROCHLORIDE 1.25 MG: 1.25 SOLUTION RESPIRATORY (INHALATION) at 14:02

## 2025-03-19 NOTE — BRIEF OP NOTE (RAD/CATH)
INTERVENTIONAL RADIOLOGY PROCEDURE NOTE    Date: 3/19/2025    Procedure: CT guided left chest tube placement  Procedure Summary       Date:  Room / Location:     Anesthesia Start:  Anesthesia Stop:     Procedure:  Diagnosis:     Scheduled Providers:  Responsible Provider:     Anesthesia Type: Not recorded ASA Status: Not recorded            Preoperative diagnosis:   1. Multifocal pneumonia    2. Abnormal CT scan    3. Hypoxia    4. Pneumonia    5. Pleural effusion         Postoperative diagnosis: Same.    Surgeon: Brendan Cespeeds DO     Assistant: None. No qualified resident was available.    Blood loss: minimal    Specimens: 30 cc salma fluid aspirated     Findings: 10 [place] APD placed in loculated left posterior/superior pleural effusion    Complications: None immediate.    Anesthesia: conscious sedation and local

## 2025-03-19 NOTE — ASSESSMENT & PLAN NOTE
Outpatient: 20mg lexapro, 300mg Seroquel HS, 100mg trazadone HS    Plan  Continue   Consult neuropsych for decision making capacity

## 2025-03-19 NOTE — ASSESSMENT & PLAN NOTE
Maintained on coumadin - currently on hold for tpa/dornase due to high risk for pleural bleeding  Started [facility] DVT ppx  Will need to reconsider anticoagulation daily depending on pleural procedures and tpa

## 2025-03-19 NOTE — ASSESSMENT & PLAN NOTE
Wt Readings from Last 3 Encounters:   03/19/25 102 kg (224 lb 13.9 oz)   02/07/25 95.9 kg (211 lb 6.4 oz)   01/02/25 91.2 kg (201 lb)      on admission  5 pound weight gain since admission, 13 pound weight gain since January  Echo 12/18/2024 shows LVEF 45%, systolic function mildly reduced, moderate solid fixed thrombus in the apex  Patient appears mildly volume overloaded likely in the setting of sepsis resuscitation  Given IV Lasix 40mg x1 today

## 2025-03-19 NOTE — PROGRESS NOTES
Patient arrived to IR for image guided chest tube placement    The procedure and risks were discussed with the patient.  All questions were answered. Informed consent was obtained.

## 2025-03-19 NOTE — ASSESSMENT & PLAN NOTE
CT chest with loculated left pleural effusion CT placed 3/12.  Pleural fluid studies suggest complicated parapneumonic effusion.  No evidence of empyema.  Cultures remain negative.  S/p 6 doses of tPA/DNAse    I spoke to Dr. Adkins of thoracic surgery on 3/18.  We looked at the CT chest completed 3/18 after 6 doses of tPA dornase.  There are some residual pockets of fluid most prominently in the apical posterior part of the left lung.  Dr. Adkins recommends IR chest tube to try to clear the apical posterior pocket.  Patient is little higher risk for surgery so we will defer thoracic surgery for now.  Patient can remain at [facility].  IR has been consulted and will place apical tube today    - we will probably remove the original chest tube placed 3/12 tomorrow at bedside

## 2025-03-19 NOTE — CASE MANAGEMENT
Case Management Discharge Planning Note    Patient name Chuy Norton  Location  SDU/-01 S* MRN [id number]  : 1960 Date 3/19/2025       Current Admission Date: 3/5/2025  Current Admission Diagnosis:Acute respiratory failure with hypoxia (HCC)   Patient Active Problem List    Diagnosis Date Noted Date Diagnosed    Left Parapneumonic effusion 2025     Ischemic cardiomyopathy 2025     Left lower lobe pneumonia 2025     KENN (obstructive sleep apnea) 2025     Stroke-like symptoms 2024     Thrombocytopenia (HCC) 2024     Acute respiratory failure with hypoxia (HCC) 2024     PUD (peptic ulcer disease) 2024     Abnormal CXR 2024     Shortness of breath 2024     Hepatitis C 05/15/2024     Acute pain of right knee 2024     Acute left-sided low back pain without sciatica 2024     Left ventricular apical thrombus 05/10/2024     Cirrhosis (HCC) 2024     Iron deficiency anemia 2024     History of CVA (cerebrovascular accident) 2021     Acute metabolic encephalopathy 2021     SIRS (systemic inflammatory response syndrome) (HCC) 06/15/2021     Patellofemoral pain syndrome of right knee 2020     Elevated d-dimer 2019     Left hip pain 2019     Encounter for medical examination to establish care 10/10/2018     Bipolar 2 disorder, major depressive episode (HCC) 2018     Attention deficit hyperactivity disorder (ADHD), combined type 2018     Hyperlipidemia 2015     Knee pain 2015     Spinal stenosis of lumbar region 2015     Dependence on nicotine from cigarettes 2015     Vitamin D deficiency 2015     Anxiety disorder 2015     Depressive disorder 2013     Low back pain 2013     Primary hypertension 10/18/2012     Alcohol dependence in remission (HCC) 2011       LOS (days): 14  Geometric Mean LOS (GMLOS) (days): 4.9  Days to GMLOS:-8.7      OBJECTIVE:  Risk of Unplanned Readmission Score: 47.66         Current admission status: Inpatient   Preferred Pharmacy:   [facility] - [facility] - [id number]  [address]  [place] [address]  Phone: [phone] Fax: [phone]    Primary Care Provider: Savanna Pan DO    Primary Insurance: [facility] [facility]  Secondary Insurance: JOEL CRAWFORD [facility]    DISCHARGE DETAILS:    Discharge planning discussed with:: patient, left message with Chuy-  at [facility]  [place] of Choice: Yes  Comments - Freedom of Choice: Pt agreeable for blan[place] referrals for SNF-referrals sent    Additional Comments: Met with Pt. Discussed SNF recommendation. Pt agreeable and has no preference. Pt agreeable for SNF referrals within 20 miles- referrals sent via [facility]. Call placed to Chuy at [facility]([phone]), left message re: status update and SNF referrls sent within 20 miles. Anticipate return call. Will need to obtain insurance auth prior to discharge. CM to follow.

## 2025-03-19 NOTE — PLAN OF CARE
Problem: Potential for Falls  Goal: Patient will remain free of falls  Description: INTERVENTIONS:  - Educate patient/family on patient safety including physical limitations  - Instruct patient to call for assistance with activity   - Consult OT/PT to assist with strengthening/mobility   - Keep Call bell within reach  - Keep bed low and locked with side rails adjusted as appropriate  - Keep care items and personal belongings within reach  - Initiate and maintain comfort rounds  - Make Fall Risk Sign visible to staff  - Offer Toileting every 2 Hours, in advance of need  - Initiate/Maintain bed alarm  - Obtain necessary fall risk management equipment: socks  - Apply yellow socks and bracelet for high fall risk patients  - Consider moving patient to room near nurses station  Outcome: Progressing     Problem: Prexisting or High Potential for Compromised Skin Integrity  Goal: Skin integrity is maintained or improved  Description: INTERVENTIONS:  - Identify patients at risk for skin breakdown  - Assess and monitor skin integrity  - Assess and monitor nutrition and hydration status  - Monitor labs   - Assess for incontinence   - Turn and reposition patient  - Assist with mobility/ambulation  - Relieve pressure over bony prominences  - Avoid friction and shearing  - Provide appropriate hygiene as needed including keeping skin clean and dry  - Evaluate need for skin moisturizer/barrier cream  - Collaborate with interdisciplinary team   - Patient/family teaching  - Consider wound care consult   Outcome: Progressing     Problem: PAIN - ADULT  Goal: Verbalizes/displays adequate comfort level or baseline comfort level  Description: Interventions:  - Encourage patient to monitor pain and request assistance  - Assess pain using appropriate pain scale  - Administer analgesics based on type and severity of pain and evaluate response  - Implement non-pharmacological measures as appropriate and evaluate response  - Consider cultural  and social influences on pain and pain management  - Notify physician/advanced practitioner if interventions unsuccessful or patient reports new pain  Outcome: Progressing     Problem: INFECTION - ADULT  Goal: Absence or prevention of progression during hospitalization  Description: INTERVENTIONS:  - Assess and monitor for signs and symptoms of infection  - Monitor lab/diagnostic results  - Monitor all insertion sites, i.e. indwelling lines, tubes, and drains  - Monitor endotracheal if appropriate and nasal secretions for changes in amount and color  - Lookout appropriate cooling/warming therapies per order  - Administer medications as ordered  - Instruct and encourage patient and family to use good hand hygiene technique  - Identify and instruct in appropriate isolation precautions for identified infection/condition  Outcome: Progressing  Goal: Absence of fever/infection during neutropenic period  Description: INTERVENTIONS:  - Monitor WBC    Outcome: Progressing     Problem: SAFETY ADULT  Goal: Patient will remain free of falls  Description: INTERVENTIONS:  - Educate patient/family on patient safety including physical limitations  - Instruct patient to call for assistance with activity   - Consult OT/PT to assist with strengthening/mobility   - Keep Call bell within reach  - Keep bed low and locked with side rails adjusted as appropriate  - Keep care items and personal belongings within reach  - Initiate and maintain comfort rounds  - Make Fall Risk Sign visible to staff  - Offer Toileting every 2 Hours, in advance of need  - Initiate/Maintain bed alarm  - Obtain necessary fall risk management equipment: socks  - Apply yellow socks and bracelet for high fall risk patients  - Consider moving patient to room near nurses station  Outcome: Progressing  Goal: Maintain or return to baseline ADL function  Description: INTERVENTIONS:  -  Assess patient's ability to carry out ADLs; assess patient's baseline for ADL  function and identify physical deficits which impact ability to perform ADLs (bathing, care of mouth/teeth, toileting, grooming, dressing, etc.)  - Assess/evaluate cause of self-care deficits   - Assess range of motion  - Assess patient's mobility; develop plan if impaired  - Assess patient's need for assistive devices and provide as appropriate  - Encourage maximum independence but intervene and supervise when necessary  - Involve family in performance of ADLs  - Assess for home care needs following discharge   - Consider OT consult to assist with ADL evaluation and planning for discharge  - Provide patient education as appropriate  Outcome: Progressing  Goal: Maintains/Returns to pre admission functional level  Description: INTERVENTIONS:  - Perform AM-PAC 6 Click Basic Mobility/ Daily Activity assessment daily.  - Set and communicate daily mobility goal to care team and patient/family/caregiver.   - Collaborate with rehabilitation services on mobility goals if consulted  - Perform Range of Motion 3 times a day.  - Reposition patient every 2 hours.  - Dangle patient 3 times a day  - Stand patient 3 times a day  - Ambulate patient 3 times a day  - Out of bed to chair 3 times a day   - Out of bed for meals 3 times a day  - Out of bed for toileting  - Record patient progress and toleration of activity level   Outcome: Progressing     Problem: DISCHARGE PLANNING  Goal: Discharge to home or other facility with appropriate resources  Description: INTERVENTIONS:  - Identify barriers to discharge w/patient and caregiver  - Arrange for needed discharge resources and transportation as appropriate  - Identify discharge learning needs (meds, wound care, etc.)  - Arrange for interpretive services to assist at discharge as needed  - Refer to Case Management Department for coordinating discharge planning if the patient needs post-hospital services based on physician/advanced practitioner order or complex needs related to  functional status, cognitive ability, or social support system  Outcome: Progressing     Problem: Knowledge Deficit  Goal: Patient/family/caregiver demonstrates understanding of disease process, treatment plan, medications, and discharge instructions  Description: Complete learning assessment and assess knowledge base.  Interventions:  - Provide teaching at level of understanding  - Provide teaching via preferred learning methods  Outcome: Progressing     Problem: CARDIOVASCULAR - ADULT  Goal: Maintains optimal cardiac output and hemodynamic stability  Description: INTERVENTIONS:  - Monitor I/O, vital signs and rhythm  - Monitor for S/S and trends of decreased cardiac output  - Administer and titrate ordered vasoactive medications to optimize hemodynamic stability  - Assess quality of pulses, skin color and temperature  - Assess for signs of decreased coronary artery perfusion  - Instruct patient to report change in severity of symptoms  Outcome: Progressing  Goal: Absence of cardiac dysrhythmias or at baseline rhythm  Description: INTERVENTIONS:  - Continuous cardiac monitoring, vital signs, obtain 12 lead EKG if ordered  - Administer antiarrhythmic and heart rate control medications as ordered  - Monitor electrolytes and administer replacement therapy as ordered  Outcome: Progressing     Problem: RESPIRATORY - ADULT  Goal: Achieves optimal ventilation and oxygenation  Description: INTERVENTIONS:  - Assess for changes in respiratory status  - Assess for changes in mentation and behavior  - Position to facilitate oxygenation and minimize respiratory effort  - Oxygen administered by appropriate delivery if ordered  - Initiate smoking cessation education as indicated  - Encourage broncho-pulmonary hygiene including cough, deep breathe, Incentive Spirometry  - Assess the need for suctioning and aspirate as needed  - Assess and instruct to report SOB or any respiratory difficulty  - Respiratory Therapy support as  indicated  Outcome: Progressing     Problem: METABOLIC, FLUID AND ELECTROLYTES - ADULT  Goal: Electrolytes maintained within normal limits  Description: INTERVENTIONS:  - Monitor labs and assess patient for signs and symptoms of electrolyte imbalances  - Administer electrolyte replacement as ordered  - Monitor response to electrolyte replacements, including repeat lab results as appropriate  - Instruct patient on fluid and nutrition as appropriate  Outcome: Progressing  Goal: Fluid balance maintained  Description: INTERVENTIONS:  - Monitor labs   - Monitor I/O and WT  - Instruct patient on fluid and nutrition as appropriate  - Assess for signs & symptoms of volume excess or deficit  Outcome: Progressing  Goal: Glucose maintained within target range  Description: INTERVENTIONS:  - Monitor Blood Glucose as ordered  - Assess for signs and symptoms of hyperglycemia and hypoglycemia  - Administer ordered medications to maintain glucose within target range  - Assess nutritional intake and initiate nutrition service referral as needed  Outcome: Progressing

## 2025-03-19 NOTE — ASSESSMENT & PLAN NOTE
Recent Labs     03/17/25  0531 03/18/25  0520 03/19/25  0534   INR 1.40* 1.36* 1.20*      Outpatient: 2.5mg Warfarin (Sun, Tues, Wed, Fri) and 5mg (Mon, Thurs, Sat)  Starting heparin GTT

## 2025-03-19 NOTE — SEDATION DOCUMENTATION
Left posterior upper chest tube inserted. 30ml clear yellow fluid removed and sent to lab. DSD applied and transferred back to ICU via bed on monitor. All connections reviewed with primary RN.

## 2025-03-19 NOTE — PROGRESS NOTES
"Progress Note - Hospitalist   Name: Chuy oNrton 64 y.o. male I MRN: [id number]  Unit/Bed#: -01 SDU I Date of Admission: 3/5/2025   Date of Service: 3/19/2025 I Hospital Day: 14    Assessment & Plan  Acute respiratory failure with hypoxia (HCC)  Patient brought in by facility staff for SOB, cough x2 weeks and dizziness with standing  At baseline patient states he does not wear oxygen but does wear nocturnal CPAP  S/p Chest tube on 3/12- s/p TPA/Dornase X6  S/p IR chest tube 3/18  Bipolar 2 disorder, major depressive episode (HCC)  Outpatient: 20mg lexapro, 300mg Seroquel HS, 100mg trazadone HS    Plan  Continue   Consult neuropsych for decision making capacity  Primary hypertension  Outpatient: 12.5mg Carvedilol BID, 40mg lisinopril   /72   Pulse 75   Temp 98.7 °F (37.1 °C) (Oral)   Resp 22   Ht 5' 8\" (1.727 m)   Wt 102 kg (224 lb 13.9 oz) Comment: rewighed x2; AP aware of gain  SpO2 96%   BMI 34.19 kg/m²     Plan  continue  PRN labetolol, for SBP >180  Hyperlipidemia  Outpatient: 40 atorvastatin    Plan  Continue statin   Cirrhosis (HCC)  Follows with [facility], last seen Jan 2025  History of cirrhosis of the liver due to history of alcohol and drug addiction.   5/9/2024 CT C/A/P noted nodular hepatic contours may indicate cirrhosis of the liver.   No ascites present  Left ventricular apical thrombus    Recent Labs     03/17/25  0531 03/18/25  0520 03/19/25  0534   INR 1.40* 1.36* 1.20*      Outpatient: 2.5mg Warfarin (Sun, Tues, Wed, Fri) and 5mg (Mon, Thurs, Sat)  Starting heparin GTT  Hepatitis C  Follows with [facility], last seen Jan 2025  History of hepatitis C antibody and stated treatment with Mavyret   May 2024 negative viral load  Left lower lobe pneumonia  CXR 3/6 showing increasing left basilar opacity  CT CAP with consolidation in LLL and lingula, and small consolidation in RLL  Procal remains elevated (2.75, 2.69, 0.94)  Urinary antigens negative. RP2 negative.   Placed on " ceftriaxone, Flagyl and azithromycin, tailored to IV ceftriaxone and IV vancomycin on 3/8.    Completed 7 days of ceftriaxone, prior to that completed 3 days of azithromycin.  Currently on vancomycin only.  Continue vancomycin day 7/7  CT chest 3/12 showed compressive atelectasis of the left lower lung with focal hypodensity and cystic changes suggesting parenchymal necrosis.  Likely in the setting of MRSA pneumonia.   Completed 7 days of vancomycin.  KENN (obstructive sleep apnea)  Maintained on CPAP at home  Continue BiPAP qhs for now  Ischemic cardiomyopathy    Left Parapneumonic effusion    Chest x-ray 3/12 showed significantly increased opacity in the left chest felt to be mostly due to large potentially partially loculated pleural effusion.  CT chest 3/12 showed loculated pleural effusion pockets in the left apex posteriorly and in the major fissure.  Small free effusion in the left lung base.  Compressive atelectasis of the left lower lung with focal hypodensity and cystic changes suggesting parenchymal necrosis  Chest tube was placed 3/12.  Patient transferred sd1  Dependence on nicotine from cigarettes    VTE  Prophylaxis:   Pharmacologic: in place  Mechanical VTE Prophylaxis in Place: Yes    Patient Centered Rounds: I have performed bedside rounds with nursing staff today.    Discussions with Specialists or Other Care Team Provider: case management    Education and Discussions with Family / Patient: patient declined call to family    Mobility:   Basic Mobility Inpatient Raw Score: 8  JH-HLM Goal: 3: Sit at edge of bed  JH-HLM Achieved: 2: Bed activities/Dependent transfer  JH-HLM Goal NOT achieved. Continue with multidisciplinary rounding and encourage appropriate mobility to improve upon JH-HLM goals.    Total Time Spent on Date of Encounter in care of patient: 45 mins. This time was spent on one or more of the following: performing physical exam; counseling and coordination of care; obtaining or reviewing  history; documenting in the medical record; reviewing/ordering tests, medications or procedures; communicating with other healthcare professionals and discussing with patient's family/caregivers.      Current Length of Stay: 14 day(s)    Current Patient Status: Inpatient        Code Status: Level 1 - Full Code    Discharge Plan: Pt will require continued inpatient hospitalization.    Subjective:   States has chest sorness, SOB, denies cough, fever    Patient is seen and examined at bedside.  All other ROS are negative.    Objective:     Vitals:   Temp (24hrs), Av.3 °F (36.8 °C), Min:98 °F (36.7 °C), Max:98.7 °F (37.1 °C)    Temp:  [98 °F (36.7 °C)-98.7 °F (37.1 °C)] 98.3 °F (36.8 °C)  HR:  [57-80] 67  Resp:  [14-30] 20  BP: (102-167)/(59-90) 162/77  SpO2:  [92 %-97 %] 92 %  Body mass index is 32.28 kg/m².     Input and Output Summary (last 24 hours):       Intake/Output Summary (Last 24 hours) at 3/19/2025 1744  Last data filed at 3/19/2025 1610  Gross per 24 hour   Intake 1430 ml   Output 90 ml   Net 1340 ml       Physical Exam:       GEN: No acute distress, comfortable  HEEENT: No JVD, PERRLA, no scleral icterus  RESP: Lungs clear to auscultation bilaterally  CV: RRR, +s1/s2   ABD: SOFT NON TENDER, POSITIVE BOWEL SOUNDS, NO DISTENTION  PSYCH: CALM  NEURO: Mentation baseline, NO FOCAL DEFICITS  SKIN: NO RASH  EXTREM: NO EDEMA    Additional Data:     Labs:    Results from last 7 days   Lab Units 25  0534 25  0520 25  0531   WBC Thousand/uL 6.98   < > 13.04*   HEMOGLOBIN g/dL 11.9*   < > 12.7   HEMATOCRIT % 37.7   < > 40.6   PLATELETS Thousands/uL 168   < > 158   SEGS PCT %  --   --  82*   LYMPHO PCT %  --   --  8*   MONO PCT %  --   --  8   EOS PCT %  --   --  1    < > = values in this interval not displayed.     Results from last 7 days   Lab Units 25  0534   SODIUM mmol/L 135   POTASSIUM mmol/L 4.6   CHLORIDE mmol/L 105   CO2 mmol/L 27   BUN mg/dL 29*   CREATININE mg/dL 1.33*   ANION  GAP mmol/L 3*   CALCIUM mg/dL 7.6*   GLUCOSE RANDOM mg/dL 99     Results from last 7 days   Lab Units 03/19/25  0534   INR  1.20*     Results from last 7 days   Lab Units 03/19/25  1543 03/19/25  1356 03/19/25  1140 03/19/25  0738 03/18/25  2127 03/18/25  1614 03/18/25  1134 03/18/25  0819 03/17/25  2106 03/17/25  1604 03/17/25  1146 03/17/25  0744   POC GLUCOSE mg/dl 126 104 125 97 126 119 99 97 276* 106 84 119         Results from last 7 days   Lab Units 03/18/25  0520 03/17/25  0531 03/17/25  0223   LACTIC ACID mmol/L  --   --  0.8   PROCALCITONIN ng/ml 0.15 0.21  --        Lines/Drains:  Invasive Devices       Peripheral Intravenous Line  Duration             Peripheral IV 03/16/25 Distal;Left;Ventral (anterior) Forearm 3 days              Drain  Duration             Chest Tube 7 days    Chest Tube 2 Left Posterior 10.2 Fr. <1 day                    Telemetry:        * I Have Reviewed All Lab Data Listed Above.           Imaging:     Results for orders placed during the hospital encounter of 03/05/25    XR chest portable    Narrative  XR CHEST PORTABLE    INDICATION: chest tube evaluation.    COMPARISON: Chest radiograph 3/15/2025, CT chest 3/14/2025    FINDINGS:    No new consolidation.    Unchanged positioning of the left chest tube with similar size of the left pleural effusion.    No visualized pneumothorax.  Mild cardiomegaly    Bones are unremarkable for age.    Normal upper abdomen.    Impression  Unchanged positioning of the left chest tube with similar size of the left pleural effusion.    The previously noted left apical pneumothorax not visualized        Resident: Randell Oliver I, the attending radiologist, have reviewed the images and agree with the final report above.    Workstation performed: [id number]    Results for orders placed during the hospital encounter of 03/05/25    XR chest 2 views    Narrative  XR CHEST PA AND LATERAL    INDICATION: Chest Pain.    COMPARISON: Radiograph and CTA  12/8/2024    FINDINGS:    There are patchy opacities within the lingula and left greater than right lower lobe. The upper lobes are grossly clear. No pneumothorax or pleural effusion.    Normal cardiomediastinal silhouette.    Bones are unremarkable for age.    Normal upper abdomen.    Impression  Patchy lingular and lower lobe opacities, concerning for pneumonia. Radiographic follow-up to resolution is recommended.        Workstation performed: [id number]      *I have reviewed all imaging reports listed above      Recent Cultures (last 7 days):           Last 24 Hours Medication List:   Current Facility-Administered Medications   Medication Dose Route Frequency Provider Last Rate    acetaminophen  975 mg Oral Q8H SHANNA HASMUKH Saldivar-THALIA      atorvastatin  40 mg Oral Daily With Dinner HASMUKH Saldivar-THALIA      benzonatate  200 mg Oral TID PRN HASMUKH Saldivar-THALIA      bisacodyl  10 mg Rectal Daily HASMUKH Saldivar-THALIA      [Held by provider] carvedilol  25 mg Oral BID With Meals HASMUKH Saldivar-THALIA      escitalopram  20 mg Oral Daily HASMUKH Saldivar-C      guaiFENesin  200 mg Oral TID PRN HASMUKH Saldivar-C      heparin (porcine)  3-20 Units/kg/hr (Order-Specific) Intravenous Titrated Caron Cooley MD 11.1 Units/kg/hr (03/19/25 1600)    heparin (porcine)  2,000 Units Intravenous Q6H PRN Caron Cooley MD      heparin (porcine)  4,000 Units Intravenous Q6H PRN Caron Cooley MD      hydrALAZINE  10 mg Intravenous Q6H PRN HASMUKH Saldivar-THALIA      HYDROmorphone  0.5 mg Intravenous Q4H PRN Idania Rees, PA-C      insulin lispro  1-6 Units Subcutaneous 4x Daily (AC & HS) HASMUKH Saldivar-THALIA      levalbuterol  1.25 mg Nebulization TID Idania Rees, PA-C      levalbuterol  1.25 mg Nebulization Q6H PRN Idania Rees, PA-C      lidocaine  1 patch Topical Daily HASMUKH Saldivar-THALIA      [Held by provider] losartan  100 mg Oral Daily Idania Rees, PA-C      nicotine  1 patch Transdermal  Daily Idania Rees, MANUEL      nystatin   Topical BID Caron Cooley MD      oxyCODONE  5 mg Oral Q6H PRN Idania Rees, MANUEL      oxyCODONE  2.5 mg Oral Q6H PRN Idania Rees, MANUEL      pantoprazole  40 mg Oral BID before lunch/dinner Idania Rees PA-C      polyethylene glycol  17 g Oral Daily Idania Rees, MANUEL      QUEtiapine  300 mg Oral HS Idania Rees, MANUEL      senna-docusate sodium  1 tablet Oral BID Idania Rees, PA-THALIA      traZODone  100 mg Oral HS Idania Rees PA-C          Today, Patient Was Seen By: Caron Cooley MD    ** Please Note: Dictation voice to text software may have been used in the creation of this document. **

## 2025-03-19 NOTE — ASSESSMENT & PLAN NOTE
"Outpatient: 12.5mg Carvedilol BID, 40mg lisinopril   /72   Pulse 75   Temp 98.7 °F (37.1 °C) (Oral)   Resp 22   Ht 5' 8\" (1.727 m)   Wt 102 kg (224 lb 13.9 oz) Comment: rewighed x2; AP aware of gain  SpO2 96%   BMI 34.19 kg/m²     Plan  continue  PRN labetolol, for SBP >180  "

## 2025-03-19 NOTE — ASSESSMENT & PLAN NOTE
Patient brought in by facility staff for SOB, cough x2 weeks and dizziness with standing  At baseline patient states he does not wear oxygen but does wear nocturnal CPAP  S/p Chest tube on 3/12- s/p TPA/Dornase X6  S/p IR chest tube 3/18

## 2025-03-19 NOTE — PROGRESS NOTES
Progress Note - Pulmonology   Name: Chuy Norton 64 y.o. male I MRN: [id number]  Unit/Bed#: -01 SDU I Date of Admission: 3/5/2025   Date of Service: 3/19/2025 I Hospital Day: 14    Assessment & Plan  Left Parapneumonic effusion  CT chest with loculated left pleural effusion CT placed 3/12.  Pleural fluid studies suggest complicated parapneumonic effusion.  No evidence of empyema.  Cultures remain negative.  S/p 6 doses of tPA/DNAse    I spoke to Dr. Adkins of thoracic surgery on 3/18.  We looked at the CT chest completed 3/18 after 6 doses of tPA dornase.  There are some residual pockets of fluid most prominently in the apical posterior part of the left lung.  Dr. Adkins recommends IR chest tube to try to clear the apical posterior pocket.  Patient is little higher risk for surgery so we will defer thoracic surgery for now.  Patient can remain at [facility].  IR has been consulted and will place apical tube today    - we will probably remove the original chest tube placed 3/12 tomorrow at bedside  Acute respiratory failure with hypoxia (HCC)  Needed 15 L at one point  Down titrated to 4L     Recommend pulmonary hygiene: Deep breathing with cough, OOB as tolerated, incentive spirometry and flutter valve  Will need home O2 eval prior to discharge     Left lower lobe pneumonia  With MRSA nasal culture positive, treating empirically with MRSA coverage  Completed 3 days of azithromycin and 7 days of Ceftriaxone  Completed 5 days of prednisone 40 mg  Vancomycin x 7 days completed on 3/14/2025    Speech cleared the patient for regular diet and thin liquids - no signs of aspiration  Repeat imaging in 4 to 6 weeks after discharge  KENN (obstructive sleep apnea)  Maintained on CPAP at home  Continue BiPAP qhs for now (as patient tolerates)  Left ventricular apical thrombus  Maintained on coumadin - currently on hold for tpa/dornase due to high risk for pleural bleeding  Started S[facility] DVT ppx  Will need to  reconsider anticoagulation daily depending on pleural procedures and tpa  Ischemic cardiomyopathy  Wt Readings from Last 3 Encounters:   03/19/25 102 kg (224 lb 13.9 oz)   02/07/25 95.9 kg (211 lb 6.4 oz)   01/02/25 91.2 kg (201 lb)      on admission  5 pound weight gain since admission, 13 pound weight gain since January  Echo 12/18/2024 shows LVEF 45%, systolic function mildly reduced, moderate solid fixed thrombus in the apex  Patient appears mildly volume overloaded likely in the setting of sepsis resuscitation  Given IV Lasix 40mg x1 today    Dependence on nicotine from cigarettes  Clinical concern for COPD - however no PFTs on file   Continue xopenex nebs TID - D/C atrovent  Will need outpatient follow-up imaging and PFTs as well as smoking cessation support    24 Hour Events : weaned to 5L overnight  Subjective : no new respiratory symptoms.  No cough/sputum.  Deconditioned.  Serous fluid from the chest tube    Objective :  Temp:  [98 °F (36.7 °C)-98.5 °F (36.9 °C)] 98.5 °F (36.9 °C)  HR:  [57-76] 64  BP: (102-165)/(59-89) 151/72  Resp:  [13-28] 22  SpO2:  [92 %-95 %] 94 %  O2 Device: Nasal cannula  Nasal Cannula O2 Flow Rate (L/min):  [6 L/min-8 L/min] 6 L/min  FiO2 (%):  [44] 44    Physical Exam  Vitals and nursing note reviewed.   Constitutional:       General: He is not in acute distress.     Appearance: Normal appearance. He is well-developed. He is not ill-appearing, toxic-appearing or diaphoretic.   HENT:      Head: Normocephalic and atraumatic.   Eyes:      Conjunctiva/sclera: Conjunctivae normal.   Cardiovascular:      Rate and Rhythm: Normal rate.   Pulmonary:      Effort: Pulmonary effort is normal. No respiratory distress.      Breath sounds: Examination of the left-lower field reveals decreased breath sounds. Decreased breath sounds present.   Abdominal:      Tenderness: There is no guarding.   Musculoskeletal:      Cervical back: Normal range of motion. No rigidity.   Neurological:       General: No focal deficit present.      Mental Status: He is alert and oriented to person, place, and time. Mental status is at baseline.   Psychiatric:         Mood and Affect: Mood normal.         Lab Results: I have reviewed the following results:   .     03/19/25  0534   WBC 6.98   HGB 11.9*   HCT 37.7      SODIUM 135   K 4.6      CO2 27   BUN 29*   CREATININE 1.33*   GLUC 99   MG 1.9   PHOS 2.7   INR 1.20*     ABG: No new results in last 24 hours.    Imaging Results Review: I personally reviewed the following image studies in PACS and associated radiology reports: CT chest. My interpretation of the radiology images/reports is: 3/18 - decreased loculated effusion.  LLL aeration improved.  Other Study Results Review: No additional pertinent studies reviewed.  PFT Results Reviewed: NA

## 2025-03-19 NOTE — PLAN OF CARE
Problem: Potential for Falls  Goal: Patient will remain free of falls  Description: INTERVENTIONS:  - Educate patient/family on patient safety including physical limitations  - Instruct patient to call for assistance with activity   - Consult OT/PT to assist with strengthening/mobility   - Keep Call bell within reach  - Keep bed low and locked with side rails adjusted as appropriate  - Keep care items and personal belongings within reach  - Initiate and maintain comfort rounds  - Make Fall Risk Sign visible to staff  - Offer Toileting every 2 Hours, in advance of need  - Initiate/Maintain bed alarm  - Obtain necessary fall risk management equipment: socks  - Apply yellow socks and bracelet for high fall risk patients  - Consider moving patient to room near nurses station  Outcome: Progressing     Problem: Prexisting or High Potential for Compromised Skin Integrity  Goal: Skin integrity is maintained or improved  Description: INTERVENTIONS:  - Identify patients at risk for skin breakdown  - Assess and monitor skin integrity  - Assess and monitor nutrition and hydration status  - Monitor labs   - Assess for incontinence   - Turn and reposition patient  - Assist with mobility/ambulation  - Relieve pressure over bony prominences  - Avoid friction and shearing  - Provide appropriate hygiene as needed including keeping skin clean and dry  - Evaluate need for skin moisturizer/barrier cream  - Collaborate with interdisciplinary team   - Patient/family teaching  - Consider wound care consult   Outcome: Progressing     Problem: PAIN - ADULT  Goal: Verbalizes/displays adequate comfort level or baseline comfort level  Description: Interventions:  - Encourage patient to monitor pain and request assistance  - Assess pain using appropriate pain scale  - Administer analgesics based on type and severity of pain and evaluate response  - Implement non-pharmacological measures as appropriate and evaluate response  - Consider cultural  and social influences on pain and pain management  - Notify physician/advanced practitioner if interventions unsuccessful or patient reports new pain  Outcome: Progressing     Problem: INFECTION - ADULT  Goal: Absence or prevention of progression during hospitalization  Description: INTERVENTIONS:  - Assess and monitor for signs and symptoms of infection  - Monitor lab/diagnostic results  - Monitor all insertion sites, i.e. indwelling lines, tubes, and drains  - Monitor endotracheal if appropriate and nasal secretions for changes in amount and color  - [place] appropriate cooling/warming therapies per order  - Administer medications as ordered  - Instruct and encourage patient and family to use good hand hygiene technique  - Identify and instruct in appropriate isolation precautions for identified infection/condition  Outcome: Progressing  Goal: Absence of fever/infection during neutropenic period  Description: INTERVENTIONS:  - Monitor WBC    Outcome: Progressing     Problem: SAFETY ADULT  Goal: Patient will remain free of falls  Description: INTERVENTIONS:  - Educate patient/family on patient safety including physical limitations  - Instruct patient to call for assistance with activity   - Consult OT/PT to assist with strengthening/mobility   - Keep Call bell within reach  - Keep bed low and locked with side rails adjusted as appropriate  - Keep care items and personal belongings within reach  - Initiate and maintain comfort rounds  - Make Fall Risk Sign visible to staff  - Offer Toileting every 2 Hours, in advance of need  - Initiate/Maintain bed alarm  - Obtain necessary fall risk management equipment: socks  - Apply yellow socks and bracelet for high fall risk patients  - Consider moving patient to room near nurses station  Outcome: Progressing  Goal: Maintain or return to baseline ADL function  Description: INTERVENTIONS:  -  Assess patient's ability to carry out ADLs; assess patient's baseline for ADL  function and identify physical deficits which impact ability to perform ADLs (bathing, care of mouth/teeth, toileting, grooming, dressing, etc.)  - Assess/evaluate cause of self-care deficits   - Assess range of motion  - Assess patient's mobility; develop plan if impaired  - Assess patient's need for assistive devices and provide as appropriate  - Encourage maximum independence but intervene and supervise when necessary  - Involve family in performance of ADLs  - Assess for home care needs following discharge   - Consider OT consult to assist with ADL evaluation and planning for discharge  - Provide patient education as appropriate  Outcome: Progressing  Goal: Maintains/Returns to pre admission functional level  Description: INTERVENTIONS:  - Perform AM-PAC 6 Click Basic Mobility/ Daily Activity assessment daily.  - Set and communicate daily mobility goal to care team and patient/family/caregiver.   - Collaborate with rehabilitation services on mobility goals if consulted  - Perform Range of Motion 3 times a day.  - Reposition patient every 2 hours.  - Dangle patient 3 times a day  - Stand patient 3 times a day  - Ambulate patient 3 times a day  - Out of bed to chair 3 times a day   - Out of bed for meals 3 times a day  - Out of bed for toileting  - Record patient progress and toleration of activity level   Outcome: Progressing     Problem: DISCHARGE PLANNING  Goal: Discharge to home or other facility with appropriate resources  Description: INTERVENTIONS:  - Identify barriers to discharge w/patient and caregiver  - Arrange for needed discharge resources and transportation as appropriate  - Identify discharge learning needs (meds, wound care, etc.)  - Arrange for interpretive services to assist at discharge as needed  - Refer to Case Management Department for coordinating discharge planning if the patient needs post-hospital services based on physician/advanced practitioner order or complex needs related to  functional status, cognitive ability, or social support system  Outcome: Progressing     Problem: Knowledge Deficit  Goal: Patient/family/caregiver demonstrates understanding of disease process, treatment plan, medications, and discharge instructions  Description: Complete learning assessment and assess knowledge base.  Interventions:  - Provide teaching at level of understanding  - Provide teaching via preferred learning methods  Outcome: Progressing     Problem: CARDIOVASCULAR - ADULT  Goal: Maintains optimal cardiac output and hemodynamic stability  Description: INTERVENTIONS:  - Monitor I/O, vital signs and rhythm  - Monitor for S/S and trends of decreased cardiac output  - Administer and titrate ordered vasoactive medications to optimize hemodynamic stability  - Assess quality of pulses, skin color and temperature  - Assess for signs of decreased coronary artery perfusion  - Instruct patient to report change in severity of symptoms  Outcome: Progressing  Goal: Absence of cardiac dysrhythmias or at baseline rhythm  Description: INTERVENTIONS:  - Continuous cardiac monitoring, vital signs, obtain 12 lead EKG if ordered  - Administer antiarrhythmic and heart rate control medications as ordered  - Monitor electrolytes and administer replacement therapy as ordered  Outcome: Progressing     Problem: RESPIRATORY - ADULT  Goal: Achieves optimal ventilation and oxygenation  Description: INTERVENTIONS:  - Assess for changes in respiratory status  - Assess for changes in mentation and behavior  - Position to facilitate oxygenation and minimize respiratory effort  - Oxygen administered by appropriate delivery if ordered  - Initiate smoking cessation education as indicated  - Encourage broncho-pulmonary hygiene including cough, deep breathe, Incentive Spirometry  - Assess the need for suctioning and aspirate as needed  - Assess and instruct to report SOB or any respiratory difficulty  - Respiratory Therapy support as  indicated  Outcome: Progressing     Problem: METABOLIC, FLUID AND ELECTROLYTES - ADULT  Goal: Electrolytes maintained within normal limits  Description: INTERVENTIONS:  - Monitor labs and assess patient for signs and symptoms of electrolyte imbalances  - Administer electrolyte replacement as ordered  - Monitor response to electrolyte replacements, including repeat lab results as appropriate  - Instruct patient on fluid and nutrition as appropriate  Outcome: Progressing  Goal: Fluid balance maintained  Description: INTERVENTIONS:  - Monitor labs   - Monitor I/O and WT  - Instruct patient on fluid and nutrition as appropriate  - Assess for signs & symptoms of volume excess or deficit  Outcome: Progressing  Goal: Glucose maintained within target range  Description: INTERVENTIONS:  - Monitor Blood Glucose as ordered  - Assess for signs and symptoms of hyperglycemia and hypoglycemia  - Administer ordered medications to maintain glucose within target range  - Assess nutritional intake and initiate nutrition service referral as needed  Outcome: Progressing

## 2025-03-19 NOTE — ASSESSMENT & PLAN NOTE
Needed 15 L at one point  Down titrated to 4L     Recommend pulmonary hygiene: Deep breathing with cough, OOB as tolerated, incentive spirometry and flutter valve  Will need home O2 eval prior to discharge

## 2025-03-20 ENCOUNTER — APPOINTMENT (INPATIENT)
Dept: RADIOLOGY | Facility: HOSPITAL | Age: 65
DRG: 871 | End: 2025-03-20
Payer: COMMERCIAL

## 2025-03-20 LAB
ANION GAP SERPL CALCULATED.3IONS-SCNC: 4 MMOL/L (ref 4–13)
APTT PPP: 101 SECONDS (ref 23–34)
APTT PPP: 51 SECONDS (ref 23–34)
APTT PPP: 57 SECONDS (ref 23–34)
BASOPHILS # BLD AUTO: 0.01 THOUSANDS/ÂΜL (ref 0–0.1)
BASOPHILS NFR BLD AUTO: 0 % (ref 0–1)
BUN SERPL-MCNC: 22 MG/DL (ref 5–25)
CALCIUM SERPL-MCNC: 7.4 MG/DL (ref 8.4–10.2)
CHLORIDE SERPL-SCNC: 105 MMOL/L (ref 96–108)
CO2 SERPL-SCNC: 25 MMOL/L (ref 21–32)
CREAT SERPL-MCNC: 1.08 MG/DL (ref 0.6–1.3)
EOSINOPHIL # BLD AUTO: 0.17 THOUSAND/ÂΜL (ref 0–0.61)
EOSINOPHIL NFR BLD AUTO: 3 % (ref 0–6)
ERYTHROCYTE [DISTWIDTH] IN BLOOD BY AUTOMATED COUNT: 17.3 % (ref 11.6–15.1)
GFR SERPL CREATININE-BSD FRML MDRD: 72 ML/MIN/1.73SQ M
GLUCOSE SERPL-MCNC: 100 MG/DL (ref 65–140)
GLUCOSE SERPL-MCNC: 113 MG/DL (ref 65–140)
GLUCOSE SERPL-MCNC: 113 MG/DL (ref 65–140)
GLUCOSE SERPL-MCNC: 158 MG/DL (ref 65–140)
GLUCOSE SERPL-MCNC: 99 MG/DL (ref 65–140)
HCT VFR BLD AUTO: 36.2 % (ref 36.5–49.3)
HGB BLD-MCNC: 11.5 G/DL (ref 12–17)
IMM GRANULOCYTES # BLD AUTO: 0.05 THOUSAND/UL (ref 0–0.2)
IMM GRANULOCYTES NFR BLD AUTO: 1 % (ref 0–2)
LYMPHOCYTES # BLD AUTO: 1.2 THOUSANDS/ÂΜL (ref 0.6–4.47)
LYMPHOCYTES NFR BLD AUTO: 19 % (ref 14–44)
MCH RBC QN AUTO: 29.5 PG (ref 26.8–34.3)
MCHC RBC AUTO-ENTMCNC: 31.8 G/DL (ref 31.4–37.4)
MCV RBC AUTO: 93 FL (ref 82–98)
MONOCYTES # BLD AUTO: 0.45 THOUSAND/ÂΜL (ref 0.17–1.22)
MONOCYTES NFR BLD AUTO: 7 % (ref 4–12)
NEUTROPHILS # BLD AUTO: 4.57 THOUSANDS/ÂΜL (ref 1.85–7.62)
NEUTS SEG NFR BLD AUTO: 70 % (ref 43–75)
NRBC BLD AUTO-RTO: 0 /100 WBCS
PLATELET # BLD AUTO: 166 THOUSANDS/UL (ref 149–390)
PMV BLD AUTO: 10.6 FL (ref 8.9–12.7)
POTASSIUM SERPL-SCNC: 5.1 MMOL/L (ref 3.5–5.3)
RBC # BLD AUTO: 3.9 MILLION/UL (ref 3.88–5.62)
SODIUM SERPL-SCNC: 134 MMOL/L (ref 135–147)
WBC # BLD AUTO: 6.45 THOUSAND/UL (ref 4.31–10.16)

## 2025-03-20 PROCEDURE — 80048 BASIC METABOLIC PNL TOTAL CA: CPT | Performed by: INTERNAL MEDICINE

## 2025-03-20 PROCEDURE — 97535 SELF CARE MNGMENT TRAINING: CPT

## 2025-03-20 PROCEDURE — 94760 N-INVAS EAR/PLS OXIMETRY 1: CPT

## 2025-03-20 PROCEDURE — 97530 THERAPEUTIC ACTIVITIES: CPT

## 2025-03-20 PROCEDURE — 99232 SBSQ HOSP IP/OBS MODERATE 35: CPT | Performed by: INTERNAL MEDICINE

## 2025-03-20 PROCEDURE — 85730 THROMBOPLASTIN TIME PARTIAL: CPT

## 2025-03-20 PROCEDURE — 82948 REAGENT STRIP/BLOOD GLUCOSE: CPT

## 2025-03-20 PROCEDURE — 71045 X-RAY EXAM CHEST 1 VIEW: CPT

## 2025-03-20 PROCEDURE — 85025 COMPLETE CBC W/AUTO DIFF WBC: CPT | Performed by: INTERNAL MEDICINE

## 2025-03-20 PROCEDURE — 94640 AIRWAY INHALATION TREATMENT: CPT

## 2025-03-20 PROCEDURE — 85730 THROMBOPLASTIN TIME PARTIAL: CPT | Performed by: INTERNAL MEDICINE

## 2025-03-20 PROCEDURE — 94660 CPAP INITIATION&MGMT: CPT

## 2025-03-20 RX ORDER — WARFARIN SODIUM 5 MG/1
5 TABLET ORAL
Status: DISCONTINUED | OUTPATIENT
Start: 2025-03-20 | End: 2025-03-22 | Stop reason: HOSPADM

## 2025-03-20 RX ADMIN — LIDOCAINE 5% 1 PATCH: 700 PATCH TOPICAL at 17:03

## 2025-03-20 RX ADMIN — ATORVASTATIN CALCIUM 40 MG: 40 TABLET, FILM COATED ORAL at 17:03

## 2025-03-20 RX ADMIN — SENNOSIDES AND DOCUSATE SODIUM 1 TABLET: 50; 8.6 TABLET ORAL at 17:03

## 2025-03-20 RX ADMIN — ESCITALOPRAM OXALATE 20 MG: 20 TABLET ORAL at 08:55

## 2025-03-20 RX ADMIN — HEPARIN SODIUM 2000 UNITS: 1000 INJECTION INTRAVENOUS; SUBCUTANEOUS at 15:56

## 2025-03-20 RX ADMIN — PANTOPRAZOLE SODIUM 40 MG: 40 TABLET, DELAYED RELEASE ORAL at 11:48

## 2025-03-20 RX ADMIN — TRAZODONE HYDROCHLORIDE 100 MG: 100 TABLET ORAL at 21:53

## 2025-03-20 RX ADMIN — ACETAMINOPHEN 975 MG: 325 TABLET, FILM COATED ORAL at 11:48

## 2025-03-20 RX ADMIN — QUETIAPINE 300 MG: 200 TABLET ORAL at 21:53

## 2025-03-20 RX ADMIN — LEVALBUTEROL HYDROCHLORIDE 1.25 MG: 1.25 SOLUTION RESPIRATORY (INHALATION) at 19:27

## 2025-03-20 RX ADMIN — LEVALBUTEROL HYDROCHLORIDE 1.25 MG: 1.25 SOLUTION RESPIRATORY (INHALATION) at 13:45

## 2025-03-20 RX ADMIN — INSULIN LISPRO 1 UNITS: 100 INJECTION, SOLUTION INTRAVENOUS; SUBCUTANEOUS at 11:48

## 2025-03-20 RX ADMIN — HEPARIN SODIUM 2000 UNITS: 1000 INJECTION INTRAVENOUS; SUBCUTANEOUS at 23:53

## 2025-03-20 RX ADMIN — HEPARIN SODIUM 11.1 UNITS/KG/HR: 10000 INJECTION, SOLUTION INTRAVENOUS at 18:05

## 2025-03-20 RX ADMIN — LEVALBUTEROL HYDROCHLORIDE 1.25 MG: 1.25 SOLUTION RESPIRATORY (INHALATION) at 09:22

## 2025-03-20 RX ADMIN — PANTOPRAZOLE SODIUM 40 MG: 40 TABLET, DELAYED RELEASE ORAL at 17:03

## 2025-03-20 RX ADMIN — NICOTINE 1 PATCH: 14 PATCH, EXTENDED RELEASE TRANSDERMAL at 08:56

## 2025-03-20 NOTE — PLAN OF CARE
Problem: Potential for Falls  Goal: Patient will remain free of falls  Description: INTERVENTIONS:  - Educate patient/family on patient safety including physical limitations  - Instruct patient to call for assistance with activity   - Consult OT/PT to assist with strengthening/mobility   - Keep Call bell within reach  - Keep bed low and locked with side rails adjusted as appropriate  - Keep care items and personal belongings within reach  - Initiate and maintain comfort rounds  - Make Fall Risk Sign visible to staff  - Offer Toileting every 2 Hours, in advance of need  - Initiate/Maintain bed alarm  - Obtain necessary fall risk management equipment: socks  - Apply yellow socks and bracelet for high fall risk patients  - Consider moving patient to room near nurses station  Outcome: Progressing     Problem: Prexisting or High Potential for Compromised Skin Integrity  Goal: Skin integrity is maintained or improved  Description: INTERVENTIONS:  - Identify patients at risk for skin breakdown  - Assess and monitor skin integrity  - Assess and monitor nutrition and hydration status  - Monitor labs   - Assess for incontinence   - Turn and reposition patient  - Assist with mobility/ambulation  - Relieve pressure over bony prominences  - Avoid friction and shearing  - Provide appropriate hygiene as needed including keeping skin clean and dry  - Evaluate need for skin moisturizer/barrier cream  - Collaborate with interdisciplinary team   - Patient/family teaching  - Consider wound care consult   Outcome: Progressing     Problem: PAIN - ADULT  Goal: Verbalizes/displays adequate comfort level or baseline comfort level  Description: Interventions:  - Encourage patient to monitor pain and request assistance  - Assess pain using appropriate pain scale  - Administer analgesics based on type and severity of pain and evaluate response  - Implement non-pharmacological measures as appropriate and evaluate response  - Consider cultural  and social influences on pain and pain management  - Notify physician/advanced practitioner if interventions unsuccessful or patient reports new pain  Outcome: Progressing     Problem: INFECTION - ADULT  Goal: Absence or prevention of progression during hospitalization  Description: INTERVENTIONS:  - Assess and monitor for signs and symptoms of infection  - Monitor lab/diagnostic results  - Monitor all insertion sites, i.e. indwelling lines, tubes, and drains  - Monitor endotracheal if appropriate and nasal secretions for changes in amount and color  - [place] appropriate cooling/warming therapies per order  - Administer medications as ordered  - Instruct and encourage patient and family to use good hand hygiene technique  - Identify and instruct in appropriate isolation precautions for identified infection/condition  Outcome: Progressing  Goal: Absence of fever/infection during neutropenic period  Description: INTERVENTIONS:  - Monitor WBC    Outcome: Progressing     Problem: SAFETY ADULT  Goal: Patient will remain free of falls  Description: INTERVENTIONS:  - Educate patient/family on patient safety including physical limitations  - Instruct patient to call for assistance with activity   - Consult OT/PT to assist with strengthening/mobility   - Keep Call bell within reach  - Keep bed low and locked with side rails adjusted as appropriate  - Keep care items and personal belongings within reach  - Initiate and maintain comfort rounds  - Make Fall Risk Sign visible to staff  - Offer Toileting every 2 Hours, in advance of need  - Initiate/Maintain bed alarm  - Obtain necessary fall risk management equipment: socks  - Apply yellow socks and bracelet for high fall risk patients  - Consider moving patient to room near nurses station  Outcome: Progressing  Goal: Maintain or return to baseline ADL function  Description: INTERVENTIONS:  -  Assess patient's ability to carry out ADLs; assess patient's baseline for ADL  function and identify physical deficits which impact ability to perform ADLs (bathing, care of mouth/teeth, toileting, grooming, dressing, etc.)  - Assess/evaluate cause of self-care deficits   - Assess range of motion  - Assess patient's mobility; develop plan if impaired  - Assess patient's need for assistive devices and provide as appropriate  - Encourage maximum independence but intervene and supervise when necessary  - Involve family in performance of ADLs  - Assess for home care needs following discharge   - Consider OT consult to assist with ADL evaluation and planning for discharge  - Provide patient education as appropriate  Outcome: Progressing  Goal: Maintains/Returns to pre admission functional level  Description: INTERVENTIONS:  - Perform AM-PAC 6 Click Basic Mobility/ Daily Activity assessment daily.  - Set and communicate daily mobility goal to care team and patient/family/caregiver.   - Collaborate with rehabilitation services on mobility goals if consulted  - Perform Range of Motion 3 times a day.  - Reposition patient every 2 hours.  - Dangle patient 3 times a day  - Stand patient 3 times a day  - Ambulate patient 3 times a day  - Out of bed to chair 3 times a day   - Out of bed for meals 3 times a day  - Out of bed for toileting  - Record patient progress and toleration of activity level   Outcome: Progressing     Problem: DISCHARGE PLANNING  Goal: Discharge to home or other facility with appropriate resources  Description: INTERVENTIONS:  - Identify barriers to discharge w/patient and caregiver  - Arrange for needed discharge resources and transportation as appropriate  - Identify discharge learning needs (meds, wound care, etc.)  - Arrange for interpretive services to assist at discharge as needed  - Refer to Case Management Department for coordinating discharge planning if the patient needs post-hospital services based on physician/advanced practitioner order or complex needs related to  functional status, cognitive ability, or social support system  Outcome: Progressing     Problem: Knowledge Deficit  Goal: Patient/family/caregiver demonstrates understanding of disease process, treatment plan, medications, and discharge instructions  Description: Complete learning assessment and assess knowledge base.  Interventions:  - Provide teaching at level of understanding  - Provide teaching via preferred learning methods  Outcome: Progressing     Problem: CARDIOVASCULAR - ADULT  Goal: Maintains optimal cardiac output and hemodynamic stability  Description: INTERVENTIONS:  - Monitor I/O, vital signs and rhythm  - Monitor for S/S and trends of decreased cardiac output  - Administer and titrate ordered vasoactive medications to optimize hemodynamic stability  - Assess quality of pulses, skin color and temperature  - Assess for signs of decreased coronary artery perfusion  - Instruct patient to report change in severity of symptoms  Outcome: Progressing  Goal: Absence of cardiac dysrhythmias or at baseline rhythm  Description: INTERVENTIONS:  - Continuous cardiac monitoring, vital signs, obtain 12 lead EKG if ordered  - Administer antiarrhythmic and heart rate control medications as ordered  - Monitor electrolytes and administer replacement therapy as ordered  Outcome: Progressing     Problem: RESPIRATORY - ADULT  Goal: Achieves optimal ventilation and oxygenation  Description: INTERVENTIONS:  - Assess for changes in respiratory status  - Assess for changes in mentation and behavior  - Position to facilitate oxygenation and minimize respiratory effort  - Oxygen administered by appropriate delivery if ordered  - Initiate smoking cessation education as indicated  - Encourage broncho-pulmonary hygiene including cough, deep breathe, Incentive Spirometry  - Assess the need for suctioning and aspirate as needed  - Assess and instruct to report SOB or any respiratory difficulty  - Respiratory Therapy support as  indicated  Outcome: Progressing     Problem: METABOLIC, FLUID AND ELECTROLYTES - ADULT  Goal: Electrolytes maintained within normal limits  Description: INTERVENTIONS:  - Monitor labs and assess patient for signs and symptoms of electrolyte imbalances  - Administer electrolyte replacement as ordered  - Monitor response to electrolyte replacements, including repeat lab results as appropriate  - Instruct patient on fluid and nutrition as appropriate  Outcome: Progressing  Goal: Fluid balance maintained  Description: INTERVENTIONS:  - Monitor labs   - Monitor I/O and WT  - Instruct patient on fluid and nutrition as appropriate  - Assess for signs & symptoms of volume excess or deficit  Outcome: Progressing  Goal: Glucose maintained within target range  Description: INTERVENTIONS:  - Monitor Blood Glucose as ordered  - Assess for signs and symptoms of hyperglycemia and hypoglycemia  - Administer ordered medications to maintain glucose within target range  - Assess nutritional intake and initiate nutrition service referral as needed  Outcome: Progressing

## 2025-03-20 NOTE — QUICK NOTE
Left lateral chest tube was removed at 1135. Occlusive dressing applied. Patient tolerated well. Will check CXR in 6 hours.

## 2025-03-20 NOTE — PROGRESS NOTES
"Progress Note - Hospitalist   Name: Chuy Norton 64 y.o. male I MRN: [id number]  Unit/Bed#: -01 SDU I Date of Admission: 3/5/2025   Date of Service: 3/20/2025 I Hospital Day: 15    Assessment & Plan  Acute respiratory failure with hypoxia (HCC)  Patient brought in by facility staff for SOB, cough x2 weeks and dizziness with standing  At baseline patient states he does not wear oxygen but does wear nocturnal CPAP  S/p Chest tube on 3/12- s/p TPA/Dornase X6- chest tube removed 3/20  S/p IR chest tube 3/18  Bipolar 2 disorder, major depressive episode (HCC)  Outpatient: 20mg lexapro, 300mg Seroquel HS, 100mg trazadone HS    Plan  Continue   Consult neuropsych for decision making capacity  Primary hypertension  Outpatient: 12.5mg Carvedilol BID, 40mg lisinopril   /77 (BP Location: Right arm)   Pulse 75   Temp 98 °F (36.7 °C) (Oral)   Resp 20   Ht 5' 8\" (1.727 m)   Wt 97 kg (213 lb 13.5 oz)   SpO2 95%   BMI 32.52 kg/m²     Plan  continue  PRN labetolol, for SBP >180  Hyperlipidemia  Outpatient: 40 atorvastatin    Plan  Continue statin   Cirrhosis (HCC)  Follows with [facility], last seen Jan 2025  History of cirrhosis of the liver due to history of alcohol and drug addiction.   5/9/2024 CT C/A/P noted nodular hepatic contours may indicate cirrhosis of the liver.   No ascites present  Left ventricular apical thrombus    Recent Labs     03/18/25  0520 03/19/25  0534   INR 1.36* 1.20*      Outpatient: 2.5mg Warfarin (Sun, Tues, Wed, Fri) and 5mg (Mon, Thurs, Sat)  Starting heparin GTT-->warfarin  Hepatitis C  Follows with [facility], last seen Jan 2025  History of hepatitis C antibody and stated treatment with Mavyret   May 2024 negative viral load  Left lower lobe pneumonia  CXR 3/6 showing increasing left basilar opacity  CT CAP with consolidation in LLL and lingula, and small consolidation in RLL  Procal remains elevated (2.75, 2.69, 0.94)  Urinary antigens negative. RP2 negative.   Placed on ceftriaxone, " Flagyl and azithromycin, tailored to IV ceftriaxone and IV vancomycin on 3/8.    Completed 7 days of ceftriaxone, prior to that completed 3 days of azithromycin.  Currently on vancomycin only.  Continue vancomycin day 7/7  CT chest 3/12 showed compressive atelectasis of the left lower lung with focal hypodensity and cystic changes suggesting parenchymal necrosis.  Likely in the setting of MRSA pneumonia.   Completed 7 days of vancomycin.  KENN (obstructive sleep apnea)  Maintained on CPAP at home  Continue BiPAP qhs for now  Ischemic cardiomyopathy    Left Parapneumonic effusion    Chest x-ray 3/12 showed significantly increased opacity in the left chest felt to be mostly due to large potentially partially loculated pleural effusion.  CT chest 3/12 showed loculated pleural effusion pockets in the left apex posteriorly and in the major fissure.  Small free effusion in the left lung base.  Compressive atelectasis of the left lower lung with focal hypodensity and cystic changes suggesting parenchymal necrosis  Chest tube was placed 3/12, removed 3/20  IR chest tube placed on 3/19  Dependence on nicotine from cigarettes    Encounter for assessment of decision-making capacity      VTE Pharmacologic Prophylaxis:   High Risk (Score >/= 5) - Pharmacological DVT Prophylaxis Ordered: heparin drip. Sequential Compression Devices Ordered.    Mobility:   Basic Mobility Inpatient Raw Score: 10  JH-HLM Goal: 4: Move to chair/commode  JH-HLM Achieved: 4: Move to chair/commode  JH-HLM Goal achieved. Continue to encourage appropriate mobility.    Patient Centered Rounds: I performed bedside rounds with nursing staff today.   Discussions with Specialists or Other Care Team Provider: pulmnology, CM, nursing    Education and Discussions with Family / Patient: Patient declined call to .     Current Length of Stay: 15 day(s)  Current Patient Status: Inpatient   Certification Statement: The patient will continue to require  additional inpatient hospital stay due to Chest tube, disposition  Discharge Plan:  not ready     Code Status: Level 1 - Full Code    Subjective   Denies complaints. NO acute overnight events.     Objective :  Temp:  [98 °F (36.7 °C)-98.7 °F (37.1 °C)] 98 °F (36.7 °C)  HR:  [64-75] 75  BP: ()/(59-90) 145/77  Resp:  [14-30] 20  SpO2:  [92 %-96 %] 95 %  O2 Device: Nasal cannula  Nasal Cannula O2 Flow Rate (L/min):  [2 L/min-4 L/min] 2 L/min    Body mass index is 32.52 kg/m².     Input and Output Summary (last 24 hours):     Intake/Output Summary (Last 24 hours) at 3/20/2025 1354  Last data filed at 3/20/2025 1200  Gross per 24 hour   Intake 1425.57 ml   Output 471 ml   Net 954.57 ml       Physical Exam  Vitals reviewed.   HENT:      Head: Normocephalic.      Mouth/Throat:      Mouth: Mucous membranes are moist.      Pharynx: Oropharynx is clear.   Eyes:      Conjunctiva/sclera: Conjunctivae normal.   Cardiovascular:      Rate and Rhythm: Normal rate.      Pulses: Normal pulses.   Pulmonary:      Effort: Pulmonary effort is normal.      Breath sounds: Rhonchi present.   Abdominal:      General: Bowel sounds are normal.      Palpations: Abdomen is soft.   Skin:     General: Skin is warm and dry.      Capillary Refill: Capillary refill takes 2 to 3 seconds.   Neurological:      Mental Status: He is alert. Mental status is at baseline.           Lines/Drains:  Lines/Drains/Airways       Active Status       Name Placement date Placement time Site Days    Chest Tube 2 Left Posterior 10.2 Fr. 03/19/25  1326  Posterior  1                            Lab Results: I have reviewed the following results:   Results from last 7 days   Lab Units 03/20/25  0510   WBC Thousand/uL 6.45   HEMOGLOBIN g/dL 11.5*   HEMATOCRIT % 36.2*   PLATELETS Thousands/uL 166   SEGS PCT % 70   LYMPHO PCT % 19   MONO PCT % 7   EOS PCT % 3     Results from last 7 days   Lab Units 03/20/25  0559   SODIUM mmol/L 134*   POTASSIUM mmol/L 5.1   CHLORIDE  mmol/L 105   CO2 mmol/L 25   BUN mg/dL 22   CREATININE mg/dL 1.08   ANION GAP mmol/L 4   CALCIUM mg/dL 7.4*   GLUCOSE RANDOM mg/dL 100     Results from last 7 days   Lab Units 03/19/25  0534   INR  1.20*     Results from last 7 days   Lab Units 03/20/25  1058 03/20/25  0732 03/19/25  2130 03/19/25  1543 03/19/25  1356 03/19/25  1140 03/19/25  0738 03/18/25  2127 03/18/25  1614 03/18/25  1134 03/18/25  0819 03/17/25  2106   POC GLUCOSE mg/dl 158* 113 124 126 104 125 97 126 119 99 97 276*         Results from last 7 days   Lab Units 03/18/25  0520 03/17/25  0531 03/17/25  0223   LACTIC ACID mmol/L  --   --  0.8   PROCALCITONIN ng/ml 0.15 0.21  --        Recent Cultures (last 7 days):   Results from last 7 days   Lab Units 03/19/25  1333   GRAM STAIN RESULT  No Polys or Bacteria seen   BODY FLUID CULTURE, STERILE  No growth       Imaging Results Review: I reviewed radiology reports from this admission including: chest xray.  Other Study Results Review: No additional pertinent studies reviewed.    Last 24 Hours Medication List:     Current Facility-Administered Medications:     acetaminophen (TYLENOL) tablet 975 mg, Q8H SHANNA    atorvastatin (LIPITOR) tablet 40 mg, Daily With Dinner    benzonatate (TESSALON PERLES) capsule 200 mg, TID PRN    bisacodyl (DULCOLAX) rectal suppository 10 mg, Daily    [Held by provider] carvedilol (COREG) tablet 25 mg, BID With Meals    escitalopram (LEXAPRO) tablet 20 mg, Daily    guaiFENesin (ROBITUSSIN) oral liquid 200 mg, TID PRN    heparin (porcine) 25,000 units in 0.45% NaCl 250 mL infusion (premix), Titrated, Last Rate: 9.1 Units/kg/hr (03/20/25 0719)    heparin (porcine) injection 2,000 Units, Q6H PRN    heparin (porcine) injection 4,000 Units, Q6H PRN    hydrALAZINE (APRESOLINE) injection 10 mg, Q6H PRN    HYDROmorphone (DILAUDID) injection 0.5 mg, Q4H PRN    insulin lispro (HumALOG/ADMELOG) 100 units/mL subcutaneous injection 1-6 Units, 4x Daily (AC & HS) **AND** Fingerstick Glucose  (POCT), 4x Daily AC and at bedtime    levalbuterol (XOPENEX) inhalation solution 1.25 mg, TID    levalbuterol (XOPENEX) inhalation solution 1.25 mg, Q6H PRN    lidocaine (LIDODERM) 5 % patch 1 patch, Daily    [Held by provider] losartan (COZAAR) tablet 100 mg, Daily    nicotine (NICODERM CQ) 14 mg/24hr TD 24 hr patch 1 patch, Daily    nystatin (MYCOSTATIN) powder, BID    oxyCODONE (ROXICODONE) IR tablet 5 mg, Q6H PRN    oxyCODONE (ROXICODONE) split tablet 2.5 mg, Q6H PRN    pantoprazole (PROTONIX) EC tablet 40 mg, BID before lunch/dinner    polyethylene glycol (MIRALAX) packet 17 g, Daily    QUEtiapine (SEROquel) tablet 300 mg, HS    senna-docusate sodium (SENOKOT S) 8.6-50 mg per tablet 1 tablet, BID    traZODone (DESYREL) tablet 100 mg, HS    warfarin (COUMADIN) tablet 5 mg, Daily (warfarin)    Administrative Statements   Today, Patient Was Seen By: Caron Cooley MD  I have spent a total time of 45 minutes in caring for this patient on the day of the visit/encounter including Diagnostic results, Impressions, Counseling / Coordination of care, Documenting in the medical record, Reviewing/placing orders in the medical record (including tests, medications, and/or procedures), Obtaining or reviewing history  , and Communicating with other healthcare professionals .    **Please Note: This note may have been constructed using a voice recognition system.**

## 2025-03-20 NOTE — ASSESSMENT & PLAN NOTE
Recent Labs     03/18/25  0520 03/19/25  0534   INR 1.36* 1.20*      Outpatient: 2.5mg Warfarin (Sun, Tues, Wed, Fri) and 5mg (Mon, Thurs, Sat)  Starting heparin GTT-->warfarin

## 2025-03-20 NOTE — ASSESSMENT & PLAN NOTE
"Outpatient: 12.5mg Carvedilol BID, 40mg lisinopril   /77 (BP Location: Right arm)   Pulse 75   Temp 98 °F (36.7 °C) (Oral)   Resp 20   Ht 5' 8\" (1.727 m)   Wt 97 kg (213 lb 13.5 oz)   SpO2 95%   BMI 32.52 kg/m²     Plan  continue  PRN labetolol, for SBP >180  "

## 2025-03-20 NOTE — PLAN OF CARE
Problem: Potential for Falls  Goal: Patient will remain free of falls  Description: INTERVENTIONS:  - Educate patient/family on patient safety including physical limitations  - Instruct patient to call for assistance with activity   - Consult OT/PT to assist with strengthening/mobility   - Keep Call bell within reach  - Keep bed low and locked with side rails adjusted as appropriate  - Keep care items and personal belongings within reach  - Initiate and maintain comfort rounds  - Make Fall Risk Sign visible to staff  - Offer Toileting every 2 Hours, in advance of need  - Initiate/Maintain bed alarm  - Obtain necessary fall risk management equipment: socks  - Apply yellow socks and bracelet for high fall risk patients  - Consider moving patient to room near nurses station  Outcome: Progressing     Problem: Prexisting or High Potential for Compromised Skin Integrity  Goal: Skin integrity is maintained or improved  Description: INTERVENTIONS:  - Identify patients at risk for skin breakdown  - Assess and monitor skin integrity  - Assess and monitor nutrition and hydration status  - Monitor labs   - Assess for incontinence   - Turn and reposition patient  - Assist with mobility/ambulation  - Relieve pressure over bony prominences  - Avoid friction and shearing  - Provide appropriate hygiene as needed including keeping skin clean and dry  - Evaluate need for skin moisturizer/barrier cream  - Collaborate with interdisciplinary team   - Patient/family teaching  - Consider wound care consult   Outcome: Progressing     Problem: PAIN - ADULT  Goal: Verbalizes/displays adequate comfort level or baseline comfort level  Description: Interventions:  - Encourage patient to monitor pain and request assistance  - Assess pain using appropriate pain scale  - Administer analgesics based on type and severity of pain and evaluate response  - Implement non-pharmacological measures as appropriate and evaluate response  - Consider cultural  and social influences on pain and pain management  - Notify physician/advanced practitioner if interventions unsuccessful or patient reports new pain  Outcome: Progressing     Problem: INFECTION - ADULT  Goal: Absence or prevention of progression during hospitalization  Description: INTERVENTIONS:  - Assess and monitor for signs and symptoms of infection  - Monitor lab/diagnostic results  - Monitor all insertion sites, i.e. indwelling lines, tubes, and drains  - Monitor endotracheal if appropriate and nasal secretions for changes in amount and color  - [place] appropriate cooling/warming therapies per order  - Administer medications as ordered  - Instruct and encourage patient and family to use good hand hygiene technique  - Identify and instruct in appropriate isolation precautions for identified infection/condition  Outcome: Progressing  Goal: Absence of fever/infection during neutropenic period  Description: INTERVENTIONS:  - Monitor WBC    Outcome: Progressing     Problem: SAFETY ADULT  Goal: Patient will remain free of falls  Description: INTERVENTIONS:  - Educate patient/family on patient safety including physical limitations  - Instruct patient to call for assistance with activity   - Consult OT/PT to assist with strengthening/mobility   - Keep Call bell within reach  - Keep bed low and locked with side rails adjusted as appropriate  - Keep care items and personal belongings within reach  - Initiate and maintain comfort rounds  - Make Fall Risk Sign visible to staff  - Offer Toileting every 2 Hours, in advance of need  - Initiate/Maintain bed alarm  - Obtain necessary fall risk management equipment: socks  - Apply yellow socks and bracelet for high fall risk patients  - Consider moving patient to room near nurses station  Outcome: Progressing  Goal: Maintain or return to baseline ADL function  Description: INTERVENTIONS:  -  Assess patient's ability to carry out ADLs; assess patient's baseline for ADL  function and identify physical deficits which impact ability to perform ADLs (bathing, care of mouth/teeth, toileting, grooming, dressing, etc.)  - Assess/evaluate cause of self-care deficits   - Assess range of motion  - Assess patient's mobility; develop plan if impaired  - Assess patient's need for assistive devices and provide as appropriate  - Encourage maximum independence but intervene and supervise when necessary  - Involve family in performance of ADLs  - Assess for home care needs following discharge   - Consider OT consult to assist with ADL evaluation and planning for discharge  - Provide patient education as appropriate  Outcome: Progressing  Goal: Maintains/Returns to pre admission functional level  Description: INTERVENTIONS:  - Perform AM-PAC 6 Click Basic Mobility/ Daily Activity assessment daily.  - Set and communicate daily mobility goal to care team and patient/family/caregiver.   - Collaborate with rehabilitation services on mobility goals if consulted  - Perform Range of Motion 3 times a day.  - Reposition patient every 2 hours.  - Dangle patient 3 times a day  - Stand patient 3 times a day  - Ambulate patient 3 times a day  - Out of bed to chair 3 times a day   - Out of bed for meals 3 times a day  - Out of bed for toileting  - Record patient progress and toleration of activity level   Outcome: Progressing     Problem: DISCHARGE PLANNING  Goal: Discharge to home or other facility with appropriate resources  Description: INTERVENTIONS:  - Identify barriers to discharge w/patient and caregiver  - Arrange for needed discharge resources and transportation as appropriate  - Identify discharge learning needs (meds, wound care, etc.)  - Arrange for interpretive services to assist at discharge as needed  - Refer to Case Management Department for coordinating discharge planning if the patient needs post-hospital services based on physician/advanced practitioner order or complex needs related to  functional status, cognitive ability, or social support system  Outcome: Progressing     Problem: Knowledge Deficit  Goal: Patient/family/caregiver demonstrates understanding of disease process, treatment plan, medications, and discharge instructions  Description: Complete learning assessment and assess knowledge base.  Interventions:  - Provide teaching at level of understanding  - Provide teaching via preferred learning methods  Outcome: Progressing     Problem: CARDIOVASCULAR - ADULT  Goal: Maintains optimal cardiac output and hemodynamic stability  Description: INTERVENTIONS:  - Monitor I/O, vital signs and rhythm  - Monitor for S/S and trends of decreased cardiac output  - Administer and titrate ordered vasoactive medications to optimize hemodynamic stability  - Assess quality of pulses, skin color and temperature  - Assess for signs of decreased coronary artery perfusion  - Instruct patient to report change in severity of symptoms  Outcome: Progressing  Goal: Absence of cardiac dysrhythmias or at baseline rhythm  Description: INTERVENTIONS:  - Continuous cardiac monitoring, vital signs, obtain 12 lead EKG if ordered  - Administer antiarrhythmic and heart rate control medications as ordered  - Monitor electrolytes and administer replacement therapy as ordered  Outcome: Progressing     Problem: RESPIRATORY - ADULT  Goal: Achieves optimal ventilation and oxygenation  Description: INTERVENTIONS:  - Assess for changes in respiratory status  - Assess for changes in mentation and behavior  - Position to facilitate oxygenation and minimize respiratory effort  - Oxygen administered by appropriate delivery if ordered  - Initiate smoking cessation education as indicated  - Encourage broncho-pulmonary hygiene including cough, deep breathe, Incentive Spirometry  - Assess the need for suctioning and aspirate as needed  - Assess and instruct to report SOB or any respiratory difficulty  - Respiratory Therapy support as  indicated  Outcome: Progressing     Problem: METABOLIC, FLUID AND ELECTROLYTES - ADULT  Goal: Electrolytes maintained within normal limits  Description: INTERVENTIONS:  - Monitor labs and assess patient for signs and symptoms of electrolyte imbalances  - Administer electrolyte replacement as ordered  - Monitor response to electrolyte replacements, including repeat lab results as appropriate  - Instruct patient on fluid and nutrition as appropriate  Outcome: Progressing  Goal: Fluid balance maintained  Description: INTERVENTIONS:  - Monitor labs   - Monitor I/O and WT  - Instruct patient on fluid and nutrition as appropriate  - Assess for signs & symptoms of volume excess or deficit  Outcome: Progressing  Goal: Glucose maintained within target range  Description: INTERVENTIONS:  - Monitor Blood Glucose as ordered  - Assess for signs and symptoms of hyperglycemia and hypoglycemia  - Administer ordered medications to maintain glucose within target range  - Assess nutritional intake and initiate nutrition service referral as needed  Outcome: Progressing

## 2025-03-20 NOTE — ASSESSMENT & PLAN NOTE
Needed 15 L at one point  Down titrated to 3L     Recommend pulmonary hygiene: Deep breathing with cough, OOB as tolerated, incentive spirometry and flutter valve  Will need home O2 eval prior to discharge

## 2025-03-20 NOTE — PLAN OF CARE
Problem: PHYSICAL THERAPY ADULT  Goal: Performs mobility at highest level of function for planned discharge setting.  See evaluation for individualized goals.  Description: Treatment/Interventions: LE strengthening/ROM, Functional transfer training, Therapeutic exercise, Endurance training, Cognitive reorientation, Patient/family training, Equipment eval/education, Bed mobility, Gait training, Spoke to nursing, OT          See flowsheet documentation for full assessment, interventions and recommendations.  Outcome: Not Progressing  Note: Prognosis: Guarded  Problem List: Decreased strength, Decreased endurance, Impaired balance, Decreased mobility, Decreased cognition, Impaired judgement, Decreased safety awareness, Obesity  Assessment: Patient was received supine in bed. S/p IR chest tube 3/18. Patient agreeable to session. Limited verbal communication with flat affect. Patient was incontinent of bowel and was dependent for hazel-care. He required max A x 2 for bed mobility, transfers, and a short amb distance to the chair. Patient is very unsteady and fatigues quickly. He is extremely deconditioned. Recommending level 2 resources. Moderate intensity. The patient's AM-PAC Basic Mobility Inpatient Short Form Raw Score is 10. A Raw score of less than or equal to 17 suggests the patient may benefit from discharge to post-acute rehabilitation services. Please also refer to the recommendation of the Physical Therapist for safe discharge planning.  Barriers to Discharge: Inaccessible home environment, Decreased caregiver support     Rehab Resource Intensity Level, PT: II (Moderate Resource Intensity)    See flowsheet documentation for full assessment.

## 2025-03-20 NOTE — ASSESSMENT & PLAN NOTE
CT chest with loculated left pleural effusion CT placed 3/12.  Pleural fluid studies suggest complicated parapneumonic effusion.  No evidence of empyema.  Cultures remain negative.  S/p 6 doses of tPA/DNAse    I spoke to Dr. Adkins of thoracic surgery on 3/18.  We looked at the CT chest completed 3/18 after 6 doses of tPA dornase.  There are some residual pockets of fluid most prominently in the apical posterior part of the left lung.  Dr. Adkins recommends IR chest tube to try to clear the apical posterior pocket.  Patient is little higher risk for surgery so we will defer thoracic surgery for now.  Patient can remain at [facility].  IR has been consulted and will place apical tube today    - we will probably remove the original chest tube placed 3/12 tomorrow at bedside

## 2025-03-20 NOTE — CONSULTS
Consultation - Neuropsychology/Psychology Department  Chuy Norton 64 y.o. male MRN: [id number]  Unit/Bed#: -01 SDU Encounter: [id number]        Reason for Consultation:  Chuy Norton is a 64 y.o. year old male who was referred for a Neuropsychological Exam to assess cognitive functioning and comment on capacity to make informed medical decisions.      History of Present Illness  left lower lobe pneumonia  Physician Requesting Consult: Caron Cooley MD    PROBLEM LIST:  Patient Active Problem List   Diagnosis    Bipolar 2 disorder, major depressive episode (HCC)    Attention deficit hyperactivity disorder (ADHD), combined type    Alcohol dependence in remission (HCC)    Anxiety disorder    Depressive disorder    Primary hypertension    Hyperlipidemia    Knee pain    Low back pain    Spinal stenosis of lumbar region    Dependence on nicotine from cigarettes    Vitamin D deficiency    Encounter for medical examination to establish care    Left hip pain    Elevated d-dimer    Patellofemoral pain syndrome of right knee    SIRS (systemic inflammatory response syndrome) (HCC)    History of CVA (cerebrovascular accident)    Acute metabolic encephalopathy    Cirrhosis (HCC)    Iron deficiency anemia    Left ventricular apical thrombus    Acute pain of right knee    Acute left-sided low back pain without sciatica    Hepatitis C    Abnormal CXR    Shortness of breath    PUD (peptic ulcer disease)    Thrombocytopenia (HCC)    Acute respiratory failure with hypoxia (HCC)    Stroke-like symptoms    Left lower lobe pneumonia    KENN (obstructive sleep apnea)    Ischemic cardiomyopathy    Left Parapneumonic effusion    Encounter for assessment of decision-making capacity         Historical Information   Past Medical History:   Diagnosis Date    Alcohol abuse     Depression     Drug therapy     GERD (gastroesophageal reflux disease)     Hepatitis C     Hypertension 01/2012    Knee pain, bilateral     Left ventricular  "apical thrombus     Psychiatric disorder     depression, anxiety    Renal disorder     Self-injurious behavior     Spinal stenosis of lumbar region     Suicide attempt (HCC)      Past Surgical History:   Procedure Laterality Date    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    HERNIA REPAIR  1997    IR CHEST TUBE PLACEMENT  3/19/2025     Social History   Social History     Substance and Sexual Activity   Alcohol Use Not Currently    Alcohol/week: 0.0 standard drinks of alcohol    Comment: Stopped drinking for several months - recovered alcoholic     Social History     Substance and Sexual Activity   Drug Use Not Currently    Types: Marijuana    Comment: Last used marijuana 2 months ago.- no longer smoking     Social History     Tobacco Use   Smoking Status Every Day    Current packs/day: 0.50    Types: Cigarettes, Cigars   Smokeless Tobacco Never   Tobacco Comments    PT \"3 CIGARS A DAY\" - quit smoking cigars     Family History:   Family History   Problem Relation Age of Onset    Depression Mother     Depression Father     No Known Problems Sister     No Known Problems Brother        Meds/Allergies   current meds:   Current Facility-Administered Medications:     acetaminophen (TYLENOL) tablet 975 mg, Q8H SHANNA    atorvastatin (LIPITOR) tablet 40 mg, Daily With Dinner    benzonatate (TESSALON PERLES) capsule 200 mg, TID PRN    bisacodyl (DULCOLAX) rectal suppository 10 mg, Daily    [Held by provider] carvedilol (COREG) tablet 25 mg, BID With Meals    escitalopram (LEXAPRO) tablet 20 mg, Daily    guaiFENesin (ROBITUSSIN) oral liquid 200 mg, TID PRN    heparin (porcine) 25,000 units in 0.45% NaCl 250 mL infusion (premix), Titrated, Last Rate: 9.1 Units/kg/hr (03/20/25 0719)    heparin (porcine) injection 2,000 Units, Q6H PRN    heparin (porcine) injection 4,000 Units, Q6H PRN    hydrALAZINE (APRESOLINE) injection 10 mg, Q6H PRN    HYDROmorphone (DILAUDID) injection 0.5 mg, Q4H PRN    insulin lispro (HumALOG/ADMELOG) 100 " units/mL subcutaneous injection 1-6 Units, 4x Daily (AC & HS) **AND** Fingerstick Glucose (POCT), 4x Daily AC and at bedtime    levalbuterol (XOPENEX) inhalation solution 1.25 mg, TID    levalbuterol (XOPENEX) inhalation solution 1.25 mg, Q6H PRN    lidocaine (LIDODERM) 5 % patch 1 patch, Daily    [Held by provider] losartan (COZAAR) tablet 100 mg, Daily    nicotine (NICODERM CQ) 14 mg/24hr TD 24 hr patch 1 patch, Daily    nystatin (MYCOSTATIN) powder, BID    oxyCODONE (ROXICODONE) IR tablet 5 mg, Q6H PRN    oxyCODONE (ROXICODONE) split tablet 2.5 mg, Q6H PRN    pantoprazole (PROTONIX) EC tablet 40 mg, BID before lunch/dinner    polyethylene glycol (MIRALAX) packet 17 g, Daily    QUEtiapine (SEROquel) tablet 300 mg, HS    senna-docusate sodium (SENOKOT S) 8.6-50 mg per tablet 1 tablet, BID    traZODone (DESYREL) tablet 100 mg, HS    Allergies   Allergen Reactions    Haloperidol Tremor and Vomiting     Other reaction(s): jittery         Family and Social Support:   Discharge planning discussed with:: patient, left message with Chuy-  at [facility]  [place] of Choice: Yes      Behavioral Observations: Patient was alert, UNABLE to accurately state the season of year, day/week, day/month and name of hospital; affect appeared flat, poor eye contact, and patient denied depressed mood and anxiety; patient denied medical history and unable to describe what he is being treated for in hospital; states he is able to go home today and can care for himself    Cognitive Examination    General Cognitive Functioning MMSE = [id number];     Attention/Concentration Auditory Selective Attention = Within Normal Limits; ; Auditory Vigilance = Within Normal Limits; Information Processing Speed = Within Normal Limits    Frontal Systems/Executive Functioning Mental Flexibility/Cognitive Control = Impaired; Working Memory = Impaired Abstract Reasoning = Impaired;  Generative Ability = Impaired,      Language Functioning Confrontation naming = Within Normal Limits, Phonemic Fluency = Impaired; Semantic Retrieval = Impaired; Comprehension of Complex Ideational Material = Impaired; Praxis = Within Normal Limits; Repetition = Within Normal Limits; Basic Reading = Within Normal Limits;  Following Commands = Within Normal Limits    Memory Functioning Narrative Recall - Short Delay = Impaired; Long Delay Narrative Recall = Impaired; Three word recall = Impaired    Visuo-Spatial Abilities Not Assessed    Functional Knowledge  Health & Safety Knowledge = Impaired;     Summary/Impression:  Results of Neuropsychological exam revealed diffuse cognitive dysfunction and on a measure assessing awareness of personal health status and ability to evaluate health problems, handle medical emergencies and take safety precautions, patient performed in the IMPAIRED range of functioning. During this encounter, patient does not appear to have capacity to make fully informed medical decisions.

## 2025-03-20 NOTE — PLAN OF CARE
Problem: OCCUPATIONAL THERAPY ADULT  Goal: Performs self-care activities at highest level of function for planned discharge setting.  See evaluation for individualized goals.  Description: Treatment Interventions: ADL retraining, Functional transfer training, UE strengthening/ROM, Endurance training, Cognitive reorientation, Patient/family training, Equipment evaluation/education, Compensatory technique education, Continued evaluation          See flowsheet documentation for full assessment, interventions and recommendations.   Note: Limitation: Decreased ADL status, Decreased UE strength, Decreased Safe judgement during ADL, Decreased cognition, Decreased endurance, Decreased self-care trans, Decreased high-level ADLs  Prognosis: Fair  Assessment: Pt seen for OT tx session with focus on bed mobility, transfers, functional mobility, sitting EOB tolerance, standing tolerance, ADL status, and activity tolerance. Patient agreeable to OT treatment session. Pt received supine in bed. Pt able to tolerate OOB mobility. Pt incontinent of large amount of bowel and urine upon entrance for OT session. Pt demonstrated the need for max Ax2 for rolling R and L and max Ax2 for hazel care needs and pad changes. Pt demonstrated bed mobility at max Ax2, STS transfer from EOB with max Ax2, and ambulation of ~4ft from EOB <> recliner chair with max Ax2; x3 for line management 2* to various lines. Pt required VC's for step progression, and safety.  Pt's functional performance continues to be limited 2* to generalized weakness, fatigue, decreased standing tolerance, decreased activity tolerance, balance, and endurance. Patient continues to be functioning below baseline level, occupational performance remains limited secondary to factors listed above, and pt at increased risk for falls and injury. The patient's raw score on the -PAC Daily Activity inpatient short form is 13, standardized score is 32.03, less than 39.4. Patients at this  level are likely to benefit from DC to post-acute rehabilitation services. Please refer to the recommendation of the Occupational Therapist for safe DC planning.  From OT standpoint, recommendation at time of D/C would be DC with Level II - Moderate Rehab Resource Intensity resources. Patient to benefit from continued Occupational Therapy treatment while in the hospital to address deficits as defined above and maximize level of functional independence with ADLs and functional mobility. Pt left seated OOB to Recliner with call bell in reach, tray table in reach, needs met, chair alarm activated, and RN informed.     Rehab Resource Intensity Level, OT: II (Moderate Resource Intensity)

## 2025-03-20 NOTE — OCCUPATIONAL THERAPY NOTE
Occupational Therapy Treatment Note     Patient Name: Chuy Norton  Today's Date: 3/20/2025  Problem List  Principal Problem:    Acute respiratory failure with hypoxia (HCC)  Active Problems:    Bipolar 2 disorder, major depressive episode (HCC)    Primary hypertension    Hyperlipidemia    Dependence on nicotine from cigarettes    Cirrhosis (HCC)    Left ventricular apical thrombus    Hepatitis C    Left lower lobe pneumonia    KENN (obstructive sleep apnea)    Ischemic cardiomyopathy    Left Parapneumonic effusion    Encounter for assessment of decision-making capacity        03/20/25 1050   OT Last Visit   OT Visit Date 03/20/25   Note Type   Note Type Treatment   Pain Assessment   Pain Assessment Tool Vincent-Baker FACES   Vincent-Baker FACES Pain Rating 8   Restrictions/Precautions   Weight Bearing Precautions Per Order No   Other Precautions Cognitive;Chair Alarm;Bed Alarm;Multiple lines;Fall Risk;Pain;Telemetry;O2  (3L O2 via NC, 2 chest tubes)   ADL   Toileting Assistance  2  Maximal Assistance   Toileting Deficit Perineal hygiene;Increased time to complete   Bed Mobility   Rolling R 2  Maximal assistance   Additional items Assist x 2;Bedrails;Increased time required;Verbal cues;LE management   Rolling L 2  Maximal assistance   Additional items Assist x 2;Bedrails;Increased time required;Verbal cues;LE management   Supine to Sit 2  Maximal assistance   Additional items Assist x 2;HOB elevated;Increased time required;Verbal cues;LE management   Additional Comments Upon entering room, pt incontinent of large bowel and bladder. Assisted patient with rolling for hazel care. Once seated EOB pt able maintain upright postioning w/ supervision   Transfers   Sit to Stand 2  Maximal assistance   Additional items Assist x 2;Armrests;Increased time required;Verbal cues   Stand to Sit 2  Maximal assistance   Additional items Assist x 2;Armrests;Increased time required;Verbal cues   Stand pivot 2  Maximal assistance  "  Additional items Assist x 2;Armrests;Increased time required;Verbal cues   Functional Mobility   Functional Mobility 2  Maximal assistance   Additional Comments x2   Additional items Hand hold assistance  (approx 4ft from EOB <> recliner chair)   Subjective   Subjective \"Im in a lot of pain.\"   Cognition   Overall Cognitive Status Impaired   Arousal/Participation Alert   Attention Attends with cues to redirect   Orientation Level Oriented to person;Oriented to place;Oriented to time;Disoriented to situation   Memory Decreased recall of recent events;Decreased short term memory;Decreased recall of precautions   Following Commands Follows one step commands with increased time or repetition   Comments Flat affect and limited verbalizations   Activity Tolerance   Activity Tolerance Patient limited by pain;Patient limited by fatigue   Medical Staff Made Aware PT Deidra, PCA Adore   Assessment   Assessment Pt seen for OT tx session with focus on bed mobility, transfers, functional mobility, sitting EOB tolerance, standing tolerance, ADL status, and activity tolerance. Patient agreeable to OT treatment session. Pt received supine in bed. Pt able to tolerate OOB mobility. Pt incontinent of large amount of bowel and urine upon entrance for OT session. Pt demonstrated the need for max Ax2 for rolling R and L and max Ax2 for hazel care needs and pad changes. Pt demonstrated bed mobility at max Ax2, STS transfer from EOB with max Ax2, and ambulation of ~4ft from EOB <> recliner chair with max Ax2; x3 for line management 2* to various lines. Pt required VC's for step progression, and safety.  Pt's functional performance continues to be limited 2* to generalized weakness, fatigue, decreased standing tolerance, decreased activity tolerance, balance, and endurance. Patient continues to be functioning below baseline level, occupational performance remains limited secondary to factors listed above, and pt at increased risk for falls " and injury. The patient's raw score on the AM-PAC Daily Activity inpatient short form is 13, standardized score is 32.03, less than 39.4. Patients at this level are likely to benefit from DC to post-acute rehabilitation services. Please refer to the recommendation of the Occupational Therapist for safe DC planning.  From OT standpoint, recommendation at time of D/C would be DC with Level II - Moderate Rehab Resource Intensity resources. Patient to benefit from continued Occupational Therapy treatment while in the hospital to address deficits as defined above and maximize level of functional independence with ADLs and functional mobility. Pt left seated OOB to Recliner with call bell in reach, tray table in reach, needs met, chair alarm activated, and RN informed.   Plan   Treatment Interventions ADL retraining;Functional transfer training;UE strengthening/ROM;Endurance training;Cognitive reorientation;Patient/family training;Equipment evaluation/education;Compensatory technique education;Continued evaluation   Goal Expiration Date 03/23/25   OT Treatment Day 2   OT Frequency 2-3x/wk   Discharge Recommendation   Rehab Resource Intensity Level, OT II (Moderate Resource Intensity)   AM-PAC Daily Activity Inpatient   Lower Body Dressing 2   Bathing 2   Toileting 2   Upper Body Dressing 2   Grooming 2   Eating 3   Daily Activity Raw Score 13   Daily Activity Standardized Score (Calc for Raw Score >=11) 32.03   AM-PAC Applied Cognition Inpatient   Following a Speech/Presentation 2   Understanding Ordinary Conversation 3   Taking Medications 2   Remembering Where Things Are Placed or Put Away 2   Remembering List of 4-5 Errands 2   Taking Care of Complicated Tasks 2   Applied Cognition Raw Score 13   Applied Cognition Standardized Score 30.46   End of Consult   Education Provided Yes   Patient Position at End of Consult Bedside chair;Bed/Chair alarm activated;All needs within reach   Nurse Communication Nurse aware of  consult       Magaly Dc, OTR/L

## 2025-03-20 NOTE — ASSESSMENT & PLAN NOTE
Patient brought in by facility staff for SOB, cough x2 weeks and dizziness with standing  At baseline patient states he does not wear oxygen but does wear nocturnal CPAP  S/p Chest tube on 3/12- s/p TPA/Dornase X6- chest tube removed 3/20  S/p IR chest tube 3/18

## 2025-03-20 NOTE — ASSESSMENT & PLAN NOTE
Chest x-ray 3/12 showed significantly increased opacity in the left chest felt to be mostly due to large potentially partially loculated pleural effusion.  CT chest 3/12 showed loculated pleural effusion pockets in the left apex posteriorly and in the major fissure.  Small free effusion in the left lung base.  Compressive atelectasis of the left lower lung with focal hypodensity and cystic changes suggesting parenchymal necrosis  Chest tube was placed 3/12, removed 3/20  IR chest tube placed on 3/19

## 2025-03-20 NOTE — PHYSICAL THERAPY NOTE
"                                                                                  PHYSICAL THERAPY Note     03/20/25 1114   PT Last Visit   PT Visit Date 03/20/25   Note Type   Note Type Treatment   Pain Assessment   Pain Assessment Tool Vincent-Baker FACES   Vincent-Baker FACES Pain Rating 0   Restrictions/Precautions   Weight Bearing Precautions Per Order No   Other Precautions Cognitive;Chair Alarm;Bed Alarm;Multiple lines;Telemetry;O2;Fall Risk   General   Chart Reviewed Yes   Additional Pertinent History Deemed incompetent   Response to Previous Treatment Patient with no complaints from previous session.   Family/Caregiver Present No   Cognition   Overall Cognitive Status Impaired   Arousal/Participation Alert;Cooperative   Attention Attends with cues to redirect   Memory Decreased recall of recent events;Decreased recall of precautions;Decreased short term memory   Following Commands Follows one step commands with increased time or repetition   Comments Flat affect. Limited verbal communication.   Subjective   Subjective \"Lets do it.\"   Bed Mobility   Rolling R 2  Maximal assistance   Additional items Assist x 2;HOB elevated;Bedrails;Increased time required;Verbal cues;LE management   Rolling L 2  Maximal assistance   Additional items Assist x 2;HOB elevated;Bedrails;Increased time required;Verbal cues;LE management   Supine to Sit 2  Maximal assistance   Additional items Assist x 2;HOB elevated;Bedrails;Increased time required;Verbal cues;LE management   Additional Comments Patient found to be incontinent of a large amount of stool and urine. Rolling performed for dependent hazel-care.   Transfers   Sit to Stand 2  Maximal assistance   Additional items Assist x 2;Armrests;Increased time required;Verbal cues   Stand to Sit 2  Maximal assistance   Additional items Assist x 2;Armrests;Increased time required;Verbal cues   Ambulation/Elevation   Gait pattern Shuffling;Narrow DAVID;Forward Flexion;Decreased foot clearance "   Gait Assistance 2  Maximal assist   Additional items Assist x 2;Verbal cues;Tactile cues   Assistive Device None  (B/L HHA)   Distance 2ft  (bed to chair)   Ambulation/Elevation Additional Comments DIfficulty with sequencing   Balance   Static Sitting Fair +   Dynamic Sitting Fair   Static Standing Poor -   Dynamic Standing Poor -   Ambulatory Poor -   Endurance Deficit   Endurance Deficit Yes   Endurance Deficit Description Easily fatigued   Activity Tolerance   Activity Tolerance Patient limited by fatigue   Medical Staff Made Aware Magaly GLOVER  (PCA-adore present to assist with line management)   Nurse Made Aware Susan CAMARGO   Assessment   Prognosis Guarded   Problem List Decreased strength;Decreased endurance;Impaired balance;Decreased mobility;Decreased cognition;Impaired judgement;Decreased safety awareness;Obesity   Assessment Patient was received supine in bed. S/p IR chest tube 3/18. Patient agreeable to session. Limited verbal communication with flat affect. Patient was incontinent of bowel and was dependent for hazel-care. He required max A x 2 for bed mobility, transfers, and a short amb distance to the chair. Patient is very unsteady and fatigues quickly. He is extremely deconditioned. Recommending level 2 resources. Moderate intensity. The patient's AM-PAC Basic Mobility Inpatient Short Form Raw Score is 10. A Raw score of less than or equal to 17 suggests the patient may benefit from discharge to post-acute rehabilitation services. Please also refer to the recommendation of the Physical Therapist for safe discharge planning.   Barriers to Discharge Inaccessible home environment;Decreased caregiver support   Goals   Patient Goals None stated   STG Expiration Date 03/27/25   PT Treatment Day 2   Plan   Treatment/Interventions Functional transfer training;LE strengthening/ROM;Therapeutic exercise;Endurance training;Patient/family training;Cognitive reorientation;Equipment eval/education;Bed mobility;Gait  training;Spoke to nursing;OT   Progress Slow progress, decreased activity tolerance   PT Frequency 2-3x/wk   Discharge Recommendation   Rehab Resource Intensity Level, PT II (Moderate Resource Intensity)   AM-PAC Basic Mobility Inpatient   Turning in Flat Bed Without Bedrails 2   Lying on Back to Sitting on Edge of Flat Bed Without Bedrails 2   Moving Bed to Chair 2   Standing Up From Chair Using Arms 2   Walk in Room 1   Climb 3-5 Stairs With Railing 1   Basic Mobility Inpatient Raw Score 10   Turning Head Towards Sound 3   Follow Simple Instructions 3   Low Function Basic Mobility Raw Score  16   Low Function Basic Mobility Standardized Score  25.72   [facility] Highest Level Of Mobility   -HL Goal 4: Move to chair/commode   -HL Achieved 4: Move to chair/commode   Education   Education Provided Mobility training   Patient Reinforcement needed   End of Consult   Patient Position at End of Consult Bedside chair;Bed/Chair alarm activated;All needs within reach           Waleska Khan        Patient Name: Chuy Norton  Today's Date: 3/20/2025

## 2025-03-20 NOTE — ASSESSMENT & PLAN NOTE
Wt Readings from Last 3 Encounters:   03/20/25 97 kg (213 lb 13.5 oz)   02/07/25 95.9 kg (211 lb 6.4 oz)   01/02/25 91.2 kg (201 lb)      on admission  5 pound weight gain since admission, 13 pound weight gain since January  Echo 12/18/2024 shows LVEF 45%, systolic function mildly reduced, moderate solid fixed thrombus in the apex  Patient appears mildly volume overloaded likely in the setting of sepsis resuscitation  Given IV Lasix 40mg x1 today

## 2025-03-21 ENCOUNTER — APPOINTMENT (INPATIENT)
Dept: RADIOLOGY | Facility: HOSPITAL | Age: 65
DRG: 871 | End: 2025-03-21
Payer: COMMERCIAL

## 2025-03-21 LAB
ANION GAP SERPL CALCULATED.3IONS-SCNC: 5 MMOL/L (ref 4–13)
APTT PPP: 108 SECONDS (ref 23–34)
APTT PPP: 59 SECONDS (ref 23–34)
APTT PPP: 87 SECONDS (ref 23–34)
BASOPHILS # BLD AUTO: 0.01 THOUSANDS/ÂΜL (ref 0–0.1)
BASOPHILS NFR BLD AUTO: 0 % (ref 0–1)
BUN SERPL-MCNC: 21 MG/DL (ref 5–25)
CALCIUM SERPL-MCNC: 7.7 MG/DL (ref 8.4–10.2)
CHLORIDE SERPL-SCNC: 107 MMOL/L (ref 96–108)
CO2 SERPL-SCNC: 23 MMOL/L (ref 21–32)
CREAT SERPL-MCNC: 1.1 MG/DL (ref 0.6–1.3)
EOSINOPHIL # BLD AUTO: 0.17 THOUSAND/ÂΜL (ref 0–0.61)
EOSINOPHIL NFR BLD AUTO: 3 % (ref 0–6)
ERYTHROCYTE [DISTWIDTH] IN BLOOD BY AUTOMATED COUNT: 16.8 % (ref 11.6–15.1)
GFR SERPL CREATININE-BSD FRML MDRD: 71 ML/MIN/1.73SQ M
GLUCOSE SERPL-MCNC: 100 MG/DL (ref 65–140)
GLUCOSE SERPL-MCNC: 109 MG/DL (ref 65–140)
GLUCOSE SERPL-MCNC: 118 MG/DL (ref 65–140)
GLUCOSE SERPL-MCNC: 131 MG/DL (ref 65–140)
GLUCOSE SERPL-MCNC: 99 MG/DL (ref 65–140)
HCT VFR BLD AUTO: 35 % (ref 36.5–49.3)
HGB BLD-MCNC: 11.2 G/DL (ref 12–17)
IMM GRANULOCYTES # BLD AUTO: 0.05 THOUSAND/UL (ref 0–0.2)
IMM GRANULOCYTES NFR BLD AUTO: 1 % (ref 0–2)
INR PPP: 1.05 (ref 0.85–1.19)
LYMPHOCYTES # BLD AUTO: 1.17 THOUSANDS/ÂΜL (ref 0.6–4.47)
LYMPHOCYTES NFR BLD AUTO: 20 % (ref 14–44)
MCH RBC QN AUTO: 29.9 PG (ref 26.8–34.3)
MCHC RBC AUTO-ENTMCNC: 32 G/DL (ref 31.4–37.4)
MCV RBC AUTO: 93 FL (ref 82–98)
MONOCYTES # BLD AUTO: 0.35 THOUSAND/ÂΜL (ref 0.17–1.22)
MONOCYTES NFR BLD AUTO: 6 % (ref 4–12)
NEUTROPHILS # BLD AUTO: 4.05 THOUSANDS/ÂΜL (ref 1.85–7.62)
NEUTS SEG NFR BLD AUTO: 70 % (ref 43–75)
NRBC BLD AUTO-RTO: 0 /100 WBCS
PLATELET # BLD AUTO: 166 THOUSANDS/UL (ref 149–390)
PMV BLD AUTO: 10.2 FL (ref 8.9–12.7)
POTASSIUM SERPL-SCNC: 4.3 MMOL/L (ref 3.5–5.3)
PROTHROMBIN TIME: 14.2 SECONDS (ref 12.3–15)
RBC # BLD AUTO: 3.75 MILLION/UL (ref 3.88–5.62)
SODIUM SERPL-SCNC: 135 MMOL/L (ref 135–147)
WBC # BLD AUTO: 5.8 THOUSAND/UL (ref 4.31–10.16)

## 2025-03-21 PROCEDURE — 85610 PROTHROMBIN TIME: CPT | Performed by: INTERNAL MEDICINE

## 2025-03-21 PROCEDURE — 94640 AIRWAY INHALATION TREATMENT: CPT

## 2025-03-21 PROCEDURE — 97535 SELF CARE MNGMENT TRAINING: CPT

## 2025-03-21 PROCEDURE — 71045 X-RAY EXAM CHEST 1 VIEW: CPT

## 2025-03-21 PROCEDURE — 80048 BASIC METABOLIC PNL TOTAL CA: CPT | Performed by: PHYSICIAN ASSISTANT

## 2025-03-21 PROCEDURE — 97530 THERAPEUTIC ACTIVITIES: CPT

## 2025-03-21 PROCEDURE — 94660 CPAP INITIATION&MGMT: CPT

## 2025-03-21 PROCEDURE — 99232 SBSQ HOSP IP/OBS MODERATE 35: CPT | Performed by: INTERNAL MEDICINE

## 2025-03-21 PROCEDURE — 82948 REAGENT STRIP/BLOOD GLUCOSE: CPT

## 2025-03-21 PROCEDURE — 94668 MNPJ CHEST WALL SBSQ: CPT

## 2025-03-21 PROCEDURE — 94760 N-INVAS EAR/PLS OXIMETRY 1: CPT

## 2025-03-21 PROCEDURE — 85025 COMPLETE CBC W/AUTO DIFF WBC: CPT | Performed by: PHYSICIAN ASSISTANT

## 2025-03-21 PROCEDURE — 85730 THROMBOPLASTIN TIME PARTIAL: CPT | Performed by: INTERNAL MEDICINE

## 2025-03-21 RX ORDER — LEVALBUTEROL INHALATION SOLUTION 1.25 MG/3ML
1.25 SOLUTION RESPIRATORY (INHALATION)
Status: DISCONTINUED | OUTPATIENT
Start: 2025-03-21 | End: 2025-03-22 | Stop reason: HOSPADM

## 2025-03-21 RX ADMIN — WARFARIN SODIUM 5 MG: 5 TABLET ORAL at 17:14

## 2025-03-21 RX ADMIN — PANTOPRAZOLE SODIUM 40 MG: 40 TABLET, DELAYED RELEASE ORAL at 11:26

## 2025-03-21 RX ADMIN — HEPARIN SODIUM 2000 UNITS: 1000 INJECTION INTRAVENOUS; SUBCUTANEOUS at 08:05

## 2025-03-21 RX ADMIN — LIDOCAINE 5% 1 PATCH: 700 PATCH TOPICAL at 17:14

## 2025-03-21 RX ADMIN — QUETIAPINE 300 MG: 200 TABLET ORAL at 21:09

## 2025-03-21 RX ADMIN — LEVALBUTEROL HYDROCHLORIDE 1.25 MG: 1.25 SOLUTION RESPIRATORY (INHALATION) at 19:54

## 2025-03-21 RX ADMIN — PANTOPRAZOLE SODIUM 40 MG: 40 TABLET, DELAYED RELEASE ORAL at 17:14

## 2025-03-21 RX ADMIN — SENNOSIDES AND DOCUSATE SODIUM 1 TABLET: 50; 8.6 TABLET ORAL at 17:14

## 2025-03-21 RX ADMIN — ESCITALOPRAM OXALATE 20 MG: 20 TABLET ORAL at 08:11

## 2025-03-21 RX ADMIN — SENNOSIDES AND DOCUSATE SODIUM 1 TABLET: 50; 8.6 TABLET ORAL at 08:11

## 2025-03-21 RX ADMIN — NYSTATIN: 100000 POWDER TOPICAL at 17:14

## 2025-03-21 RX ADMIN — POLYETHYLENE GLYCOL 3350 17 G: 17 POWDER, FOR SOLUTION ORAL at 08:11

## 2025-03-21 RX ADMIN — HEPARIN SODIUM 13.1 UNITS/KG/HR: 10000 INJECTION, SOLUTION INTRAVENOUS at 18:39

## 2025-03-21 RX ADMIN — ATORVASTATIN CALCIUM 40 MG: 40 TABLET, FILM COATED ORAL at 17:14

## 2025-03-21 RX ADMIN — NICOTINE 1 PATCH: 14 PATCH, EXTENDED RELEASE TRANSDERMAL at 08:12

## 2025-03-21 RX ADMIN — LEVALBUTEROL HYDROCHLORIDE 1.25 MG: 1.25 SOLUTION RESPIRATORY (INHALATION) at 07:14

## 2025-03-21 RX ADMIN — TRAZODONE HYDROCHLORIDE 100 MG: 100 TABLET ORAL at 21:09

## 2025-03-21 NOTE — ASSESSMENT & PLAN NOTE
Requiring 15 L at one point  Currently down to 2L    Recommend pulmonary hygiene: Deep breathing with cough, OOB as tolerated, incentive spirometry and flutter valve  Will need home O2 eval prior to discharge

## 2025-03-21 NOTE — PLAN OF CARE
Problem: Potential for Falls  Goal: Patient will remain free of falls  Description: INTERVENTIONS:  - Educate patient/family on patient safety including physical limitations  - Instruct patient to call for assistance with activity   - Consult OT/PT to assist with strengthening/mobility   - Keep Call bell within reach  - Keep bed low and locked with side rails adjusted as appropriate  - Keep care items and personal belongings within reach  - Initiate and maintain comfort rounds  - Make Fall Risk Sign visible to staff  - Offer Toileting every 2 Hours, in advance of need  - Initiate/Maintain bed alarm  - Obtain necessary fall risk management equipment: socks  - Apply yellow socks and bracelet for high fall risk patients  - Consider moving patient to room near nurses station  Outcome: Progressing     Problem: Prexisting or High Potential for Compromised Skin Integrity  Goal: Skin integrity is maintained or improved  Description: INTERVENTIONS:  - Identify patients at risk for skin breakdown  - Assess and monitor skin integrity  - Assess and monitor nutrition and hydration status  - Monitor labs   - Assess for incontinence   - Turn and reposition patient  - Assist with mobility/ambulation  - Relieve pressure over bony prominences  - Avoid friction and shearing  - Provide appropriate hygiene as needed including keeping skin clean and dry  - Evaluate need for skin moisturizer/barrier cream  - Collaborate with interdisciplinary team   - Patient/family teaching  - Consider wound care consult   Outcome: Progressing     Problem: PAIN - ADULT  Goal: Verbalizes/displays adequate comfort level or baseline comfort level  Description: Interventions:  - Encourage patient to monitor pain and request assistance  - Assess pain using appropriate pain scale  - Administer analgesics based on type and severity of pain and evaluate response  - Implement non-pharmacological measures as appropriate and evaluate response  - Consider cultural  and social influences on pain and pain management  - Notify physician/advanced practitioner if interventions unsuccessful or patient reports new pain  Outcome: Progressing     Problem: INFECTION - ADULT  Goal: Absence or prevention of progression during hospitalization  Description: INTERVENTIONS:  - Assess and monitor for signs and symptoms of infection  - Monitor lab/diagnostic results  - Monitor all insertion sites, i.e. indwelling lines, tubes, and drains  - Monitor endotracheal if appropriate and nasal secretions for changes in amount and color  - [place] appropriate cooling/warming therapies per order  - Administer medications as ordered  - Instruct and encourage patient and family to use good hand hygiene technique  - Identify and instruct in appropriate isolation precautions for identified infection/condition  Outcome: Progressing  Goal: Absence of fever/infection during neutropenic period  Description: INTERVENTIONS:  - Monitor WBC    Outcome: Progressing     Problem: SAFETY ADULT  Goal: Patient will remain free of falls  Description: INTERVENTIONS:  - Educate patient/family on patient safety including physical limitations  - Instruct patient to call for assistance with activity   - Consult OT/PT to assist with strengthening/mobility   - Keep Call bell within reach  - Keep bed low and locked with side rails adjusted as appropriate  - Keep care items and personal belongings within reach  - Initiate and maintain comfort rounds  - Make Fall Risk Sign visible to staff  - Offer Toileting every 2 Hours, in advance of need  - Initiate/Maintain bed alarm  - Obtain necessary fall risk management equipment: socks  - Apply yellow socks and bracelet for high fall risk patients  - Consider moving patient to room near nurses station  Outcome: Progressing  Goal: Maintain or return to baseline ADL function  Description: INTERVENTIONS:  -  Assess patient's ability to carry out ADLs; assess patient's baseline for ADL  function and identify physical deficits which impact ability to perform ADLs (bathing, care of mouth/teeth, toileting, grooming, dressing, etc.)  - Assess/evaluate cause of self-care deficits   - Assess range of motion  - Assess patient's mobility; develop plan if impaired  - Assess patient's need for assistive devices and provide as appropriate  - Encourage maximum independence but intervene and supervise when necessary  - Involve family in performance of ADLs  - Assess for home care needs following discharge   - Consider OT consult to assist with ADL evaluation and planning for discharge  - Provide patient education as appropriate  Outcome: Progressing  Goal: Maintains/Returns to pre admission functional level  Description: INTERVENTIONS:  - Perform AM-PAC 6 Click Basic Mobility/ Daily Activity assessment daily.  - Set and communicate daily mobility goal to care team and patient/family/caregiver.   - Collaborate with rehabilitation services on mobility goals if consulted  - Perform Range of Motion 3 times a day.  - Reposition patient every 2 hours.  - Dangle patient 3 times a day  - Stand patient 3 times a day  - Ambulate patient 3 times a day  - Out of bed to chair 3 times a day   - Out of bed for meals 3 times a day  - Out of bed for toileting  - Record patient progress and toleration of activity level   Outcome: Progressing     Problem: DISCHARGE PLANNING  Goal: Discharge to home or other facility with appropriate resources  Description: INTERVENTIONS:  - Identify barriers to discharge w/patient and caregiver  - Arrange for needed discharge resources and transportation as appropriate  - Identify discharge learning needs (meds, wound care, etc.)  - Arrange for interpretive services to assist at discharge as needed  - Refer to Case Management Department for coordinating discharge planning if the patient needs post-hospital services based on physician/advanced practitioner order or complex needs related to  functional status, cognitive ability, or social support system  Outcome: Progressing     Problem: Knowledge Deficit  Goal: Patient/family/caregiver demonstrates understanding of disease process, treatment plan, medications, and discharge instructions  Description: Complete learning assessment and assess knowledge base.  Interventions:  - Provide teaching at level of understanding  - Provide teaching via preferred learning methods  Outcome: Progressing     Problem: CARDIOVASCULAR - ADULT  Goal: Maintains optimal cardiac output and hemodynamic stability  Description: INTERVENTIONS:  - Monitor I/O, vital signs and rhythm  - Monitor for S/S and trends of decreased cardiac output  - Administer and titrate ordered vasoactive medications to optimize hemodynamic stability  - Assess quality of pulses, skin color and temperature  - Assess for signs of decreased coronary artery perfusion  - Instruct patient to report change in severity of symptoms  Outcome: Progressing  Goal: Absence of cardiac dysrhythmias or at baseline rhythm  Description: INTERVENTIONS:  - Continuous cardiac monitoring, vital signs, obtain 12 lead EKG if ordered  - Administer antiarrhythmic and heart rate control medications as ordered  - Monitor electrolytes and administer replacement therapy as ordered  Outcome: Progressing     Problem: RESPIRATORY - ADULT  Goal: Achieves optimal ventilation and oxygenation  Description: INTERVENTIONS:  - Assess for changes in respiratory status  - Assess for changes in mentation and behavior  - Position to facilitate oxygenation and minimize respiratory effort  - Oxygen administered by appropriate delivery if ordered  - Initiate smoking cessation education as indicated  - Encourage broncho-pulmonary hygiene including cough, deep breathe, Incentive Spirometry  - Assess the need for suctioning and aspirate as needed  - Assess and instruct to report SOB or any respiratory difficulty  - Respiratory Therapy support as  indicated  Outcome: Progressing     Problem: METABOLIC, FLUID AND ELECTROLYTES - ADULT  Goal: Electrolytes maintained within normal limits  Description: INTERVENTIONS:  - Monitor labs and assess patient for signs and symptoms of electrolyte imbalances  - Administer electrolyte replacement as ordered  - Monitor response to electrolyte replacements, including repeat lab results as appropriate  - Instruct patient on fluid and nutrition as appropriate  Outcome: Progressing  Goal: Fluid balance maintained  Description: INTERVENTIONS:  - Monitor labs   - Monitor I/O and WT  - Instruct patient on fluid and nutrition as appropriate  - Assess for signs & symptoms of volume excess or deficit  Outcome: Progressing  Goal: Glucose maintained within target range  Description: INTERVENTIONS:  - Monitor Blood Glucose as ordered  - Assess for signs and symptoms of hyperglycemia and hypoglycemia  - Administer ordered medications to maintain glucose within target range  - Assess nutritional intake and initiate nutrition service referral as needed  Outcome: Progressing

## 2025-03-21 NOTE — ASSESSMENT & PLAN NOTE
Outpatient: 20mg lexapro, 300mg Seroquel HS, 100mg trazadone HS    Plan  Continue   Patient has impaired decision making capacity as per Neuropsych

## 2025-03-21 NOTE — ASSESSMENT & PLAN NOTE
Recent Labs     03/19/25  0534 03/21/25  0618   INR 1.20* 1.05      Outpatient: 2.5mg Warfarin (Sun, Tues, Wed, Fri) and 5mg (Mon, Thurs, Sat)  Starting heparin GTT-->warfarin

## 2025-03-21 NOTE — DISCHARGE SUPPORT
Case Management Assessment & Discharge Planning Note    Patient name Chuy Norton  Location /-01 MRN [id number]  : 1960 Date 3/21/2025       Current Admission Date: 3/5/2025  Current Admission Diagnosis:Acute respiratory failure with hypoxia (HCC)   Patient Active Problem List    Diagnosis Date Noted Date Diagnosed    Encounter for assessment of decision-making capacity 2025     Left Parapneumonic effusion 2025     Ischemic cardiomyopathy 2025     Left lower lobe pneumonia 2025     KENN (obstructive sleep apnea) 2025     Stroke-like symptoms 2024     Thrombocytopenia (HCC) 2024     Acute respiratory failure with hypoxia ([facility]) 2024     PUD (peptic ulcer disease) 2024     Abnormal CXR 2024     Shortness of breath 2024     Hepatitis C 05/15/2024     Acute pain of right knee 2024     Acute left-sided low back pain without sciatica 2024     Left ventricular apical thrombus 05/10/2024     Cirrhosis (HCC) 2024     Iron deficiency anemia 2024     History of CVA (cerebrovascular accident) 2021     Acute metabolic encephalopathy 2021     SIRS (systemic inflammatory response syndrome) ([facility]) 06/15/2021     Patellofemoral pain syndrome of right knee 2020     Elevated d-dimer 2019     Left hip pain 2019     Encounter for medical examination to establish care 10/10/2018     Bipolar 2 disorder, major depressive episode (HCC) 2018     Attention deficit hyperactivity disorder (ADHD), combined type 2018     Hyperlipidemia 2015     Knee pain 2015     Spinal stenosis of lumbar region 2015     Dependence on nicotine from cigarettes 2015     Vitamin D deficiency 2015     Anxiety disorder 2015     Depressive disorder 2013     Low back pain 2013     Primary hypertension 10/18/2012     Alcohol dependence in remission (HCC) 2011        LOS (days): 16  Geometric Mean LOS (GMLOS) (days): 4.9  Days to GMLOS:-10.9   Facility Authorization Approved  [facility] received approved auth for: SNF (Auto-Approved once in network facililty was confirmed)  Insurance: "[employer]  Authorization Obtained Via: Phone  Name/Phone # of Rep who called in determination (if applicable): Aleah  Facility Name: [facility] ([facility] confirmed that facility is in-network)  Approved Authorization Number: [id number]  Start of Care Date: 03/22/25  Next Review Date:  (2 buisness days post SNF admission)  Continued Stay Care Coordinator Name: not provided  Submit Next Review to: [employer]here or Fax# [phone]   notified: Kareem NICHOLSON     Updates to authorization status will be noted in chart.    Please reach out to CM for updates on any clinical information.

## 2025-03-21 NOTE — ASSESSMENT & PLAN NOTE
"Outpatient: 12.5mg Carvedilol BID, 40mg lisinopril   /78   Pulse 66   Temp 97.7 °F (36.5 °C)   Resp 16   Ht 5' 8\" (1.727 m)   Wt 97 kg (213 lb 13.5 oz)   SpO2 92%   BMI 32.52 kg/m²     Plan  continue  PRN labetolol, for SBP >180  "

## 2025-03-21 NOTE — PROGRESS NOTES
Progress Note - Pulmonology   Name: Chuy Norton 64 y.o. male I MRN: [id number]  Unit/Bed#: -01 I Date of Admission: 3/5/2025   Date of Service: 3/21/2025 I Hospital Day: 16    Assessment & Plan  Left Parapneumonic effusion  CT chest with loculated left pleural effusion CT placed 3/12.  Pleural fluid studies suggest complicated parapneumonic effusion.  No evidence of empyema.  Cultures remain negative.  S/p 6 doses of tPA/DNAse    - I spoke to Dr. Adkins of thoracic surgery on 3/18.  We looked at the CT chest completed 3/18 after 6 doses of tPA dornase.  There are some residual pockets of fluid most prominently in the apical posterior part of the left lung.  Dr. Adkins recommends IR chest tube to try to clear the apical posterior pocket.  - Patient is little higher risk for surgery so we will defer thoracic surgery for now.   - IR placed apical tube 3/19. Chest tube remains to -20 suction with 60ml serous drainage over past 24H. Discussed with Dr Ruff, and plan for chest tube removal today.   - Original chest tube placed 3/12 was removed yesterday at bedside  Acute respiratory failure with hypoxia (HCC)  Requiring 15 L at one point  Currently down to 2L    Recommend pulmonary hygiene: Deep breathing with cough, OOB as tolerated, incentive spirometry and flutter valve  Will need home O2 eval prior to discharge     Left lower lobe pneumonia  With MRSA nasal culture positive, treating empirically with MRSA coverage  Completed 3 days of azithromycin and 7 days of Ceftriaxone  Completed 5 days of prednisone 40 mg  Vancomycin x 7 days completed on 3/14/2025    Speech cleared the patient for regular diet and thin liquids - no signs of aspiration  Repeat imaging in 4 to 6 weeks after discharge  KENN (obstructive sleep apnea)  Maintained on CPAP at home  Continue BiPAP qhs for now (as patient tolerates)  Left ventricular apical thrombus  Maintained on coumadin - currently on hold for tpa/dornase due to high risk  for pleural bleeding  Started SQH DVT ppx  Will need to reconsider anticoagulation daily depending on pleural procedures and tpa  Ischemic cardiomyopathy  Wt Readings from Last 3 Encounters:   03/20/25 97 kg (213 lb 13.5 oz)   02/07/25 95.9 kg (211 lb 6.4 oz)   01/02/25 91.2 kg (201 lb)      on admission  5 pound weight gain since admission, 13 pound weight gain since January  Echo 12/18/2024 shows LVEF 45%, systolic function mildly reduced, moderate solid fixed thrombus in the apex  Patient appears mildly volume overloaded likely in the setting of sepsis resuscitation  Given IV Lasix 40mg x1 on 3/19    Dependence on nicotine from cigarettes  Clinical concern for COPD - however no PFTs on file   Continue xopenex nebs TID - D/C atrovent  Will need outpatient follow-up imaging and PFTs as well as smoking cessation support  Encounter for assessment of decision-making capacity      24 Hour Events : None  Subjective : Chuy is resting in bed. He states breathing feels fine. Denies any shortness of breath or chest discomfort.     Objective :  Temp:  [97.7 °F (36.5 °C)-98.7 °F (37.1 °C)] 97.7 °F (36.5 °C)  HR:  [66-79] 66  BP: (118-153)/(67-93) 153/78  Resp:  [16-20] 16  SpO2:  [89 %-95 %] 92 %  O2 Device: Nasal cannula  Nasal Cannula O2 Flow Rate (L/min):  [2 L/min] 2 L/min    Physical Exam  General appearance:   Alert and awake, in no acute distress  Head:   Normocephalic, without obvious abnormality, atraumatic  Eyes:   No scleral icterus   Lungs:  Pulmonary effort non labored at rest   Breath sounds: Decrease bibasilar breath sounds with anterior auscultation. No wheezes.  Cardiovascular:   Regular rate and rhythm. No murmurs. Capillary refill < 3 seconds.  Abdomen:    No appreciable distension or tenderness  Extremities:   No deformity. No clubbing present. No edema to extremities.   Skin:   Warm and dry. Intact. Color appropriate for ethnicity.  Neurologic:   No acute focal deficits are noted.  Psychiatric:    Normal mood. Answers questions appropriately.       Lab Results: I have reviewed the following results:   .     03/21/25  0618   WBC 5.80   HGB 11.2*   HCT 35.0*      SODIUM 135   K 4.3      CO2 23   BUN 21   CREATININE 1.10   GLUC 100   PTT 59*   INR 1.05     ABG: No new results in last 24 hours.    Imaging Results Review: I reviewed radiology reports from this admission including: chest xray and CT chest.  Other Study Results Review: No additional pertinent studies reviewed.  PFT Results Reviewed: N/A      Ninoska Causey, MSN RN FNP-BC  Nurse Practitioner  [facility]

## 2025-03-21 NOTE — ASSESSMENT & PLAN NOTE
Wt Readings from Last 3 Encounters:   03/20/25 97 kg (213 lb 13.5 oz)   02/07/25 95.9 kg (211 lb 6.4 oz)   01/02/25 91.2 kg (201 lb)      on admission  5 pound weight gain since admission, 13 pound weight gain since January  Echo 12/18/2024 shows LVEF 45%, systolic function mildly reduced, moderate solid fixed thrombus in the apex  Patient appears mildly volume overloaded likely in the setting of sepsis resuscitation  Given IV Lasix 40mg x1 on 3/19

## 2025-03-21 NOTE — ASSESSMENT & PLAN NOTE
CT chest with loculated left pleural effusion CT placed 3/12.  Pleural fluid studies suggest complicated parapneumonic effusion.  No evidence of empyema.  Cultures remain negative.  S/p 6 doses of tPA/DNAse    - I spoke to Dr. Adkins of thoracic surgery on 3/18.  We looked at the CT chest completed 3/18 after 6 doses of tPA dornase.  There are some residual pockets of fluid most prominently in the apical posterior part of the left lung.  Dr. Adkins recommends IR chest tube to try to clear the apical posterior pocket.  - Patient is little higher risk for surgery so we will defer thoracic surgery for now.   - IR placed apical tube 3/19. Chest tube remains to -20 suction with 60ml serous drainage over past 24H. Discussed with Dr Ruff, and plan for chest tube removal today.   - Original chest tube placed 3/12 was removed yesterday at bedside

## 2025-03-21 NOTE — CASE MANAGEMENT
Case Management Progress Note    Patient name Chuy Norton  Location /-01 MRN [id number]  : 1960 Date 3/21/2025       LOS (days): 16  Geometric Mean LOS (GMLOS) (days): 4.9  Days to GMLOS:-10.9        OBJECTIVE:        Current admission status: Inpatient  Preferred Pharmacy:   [facility] [address]  [address] [address]  Phone: [phone] Fax: [phone]    Primary Care Provider: Savanna Pan DO    Primary Insurance: [facility] [employer] [facility]  Secondary Insurance: JOEL MUNOZ    PROGRESS NOTE:  Call placed to Pt's sister(María Elena: [phone]), discussed role of case management and discharge planning. Pt's sister reports she recently got in contact with Pt after being estranged for 15years. CM informed Pt's sister that Pt will need SNF and sister is requesting Pt go to a facility near [facility]. Reviewed SNF list and sister is agreeable for [facility]. Pt's sister reports she has MS and does not drive. Pt's sister agreeable to sign Pt into rehab facility  and help make decisions but does not want to be financially responsible for Pt. Pt's sister reports that admissions paperwork can be emailed to her at [email or web address]. Relayed same information to [facility] liaison. SNF auth request sent to  discharge support- auth obtained and given to [facility] liaison via [employer]. PT's sister's phone number and email also given to [facility] via [employer]. CM to follow.  Left message for Chuy at [facility]([phone]) to inform that Pt will be going to [facility] tentatively over weekend.

## 2025-03-21 NOTE — PLAN OF CARE
Problem: PAIN - ADULT  Goal: Verbalizes/displays adequate comfort level or baseline comfort level  Description: Interventions:  - Encourage patient to monitor pain and request assistance  - Assess pain using appropriate pain scale  - Administer analgesics based on type and severity of pain and evaluate response  - Implement non-pharmacological measures as appropriate and evaluate response  - Consider cultural and social influences on pain and pain management  - Notify physician/advanced practitioner if interventions unsuccessful or patient reports new pain  Outcome: Progressing     Problem: INFECTION - ADULT  Goal: Absence or prevention of progression during hospitalization  Description: INTERVENTIONS:  - Assess and monitor for signs and symptoms of infection  - Monitor lab/diagnostic results  - Monitor all insertion sites, i.e. indwelling lines, tubes, and drains  - Monitor endotracheal if appropriate and nasal secretions for changes in amount and color  - [place] appropriate cooling/warming therapies per order  - Administer medications as ordered  - Instruct and encourage patient and family to use good hand hygiene technique  - Identify and instruct in appropriate isolation precautions for identified infection/condition  Outcome: Progressing  Goal: Absence of fever/infection during neutropenic period  Description: INTERVENTIONS:  - Monitor WBC    Outcome: Progressing     Problem: Knowledge Deficit  Goal: Patient/family/caregiver demonstrates understanding of disease process, treatment plan, medications, and discharge instructions  Description: Complete learning assessment and assess knowledge base.  Interventions:  - Provide teaching at level of understanding  - Provide teaching via preferred learning methods  Outcome: Progressing     Problem: METABOLIC, FLUID AND ELECTROLYTES - ADULT  Goal: Electrolytes maintained within normal limits  Description: INTERVENTIONS:  - Monitor labs and assess patient for signs  and symptoms of electrolyte imbalances  - Administer electrolyte replacement as ordered  - Monitor response to electrolyte replacements, including repeat lab results as appropriate  - Instruct patient on fluid and nutrition as appropriate  Outcome: Progressing  Goal: Fluid balance maintained  Description: INTERVENTIONS:  - Monitor labs   - Monitor I/O and WT  - Instruct patient on fluid and nutrition as appropriate  - Assess for signs & symptoms of volume excess or deficit  Outcome: Progressing  Goal: Glucose maintained within target range  Description: INTERVENTIONS:  - Monitor Blood Glucose as ordered  - Assess for signs and symptoms of hyperglycemia and hypoglycemia  - Administer ordered medications to maintain glucose within target range  - Assess nutritional intake and initiate nutrition service referral as needed  Outcome: Progressing

## 2025-03-21 NOTE — DISCHARGE SUPPORT
Case Management Assessment & Discharge Planning Note    Patient name Chuy Norton  Location /-01 MRN [id number]  : 1960 Date 3/21/2025       Current Admission Date: 3/5/2025  Current Admission Diagnosis:Acute respiratory failure with hypoxia (HCC)   Patient Active Problem List    Diagnosis Date Noted Date Diagnosed    Encounter for assessment of decision-making capacity 2025     Left Parapneumonic effusion 2025     Ischemic cardiomyopathy 2025     Left lower lobe pneumonia 2025     KENN (obstructive sleep apnea) 2025     Stroke-like symptoms 2024     Thrombocytopenia (HCC) 2024     Acute respiratory failure with hypoxia ([facility]) 2024     PUD (peptic ulcer disease) 2024     Abnormal CXR 2024     Shortness of breath 2024     Hepatitis C 05/15/2024     Acute pain of right knee 2024     Acute left-sided low back pain without sciatica 2024     Left ventricular apical thrombus 05/10/2024     Cirrhosis (HCC) 2024     Iron deficiency anemia 2024     History of CVA (cerebrovascular accident) 2021     Acute metabolic encephalopathy 2021     SIRS (systemic inflammatory response syndrome) ([facility]) 06/15/2021     Patellofemoral pain syndrome of right knee 2020     Elevated d-dimer 2019     Left hip pain 2019     Encounter for medical examination to establish care 10/10/2018     Bipolar 2 disorder, major depressive episode (HCC) 2018     Attention deficit hyperactivity disorder (ADHD), combined type 2018     Hyperlipidemia 2015     Knee pain 2015     Spinal stenosis of lumbar region 2015     Dependence on nicotine from cigarettes 2015     Vitamin D deficiency 2015     Anxiety disorder 2015     Depressive disorder 2013     Low back pain 2013     Primary hypertension 10/18/2012     Alcohol dependence in remission (HCC) 2011      "  LOS (days): 16  Geometric Mean LOS (GMLOS) (days): 4.9  Days to GMLOS:-10.9   Facility Authorization Initiated  [facility] received request for auth from : Kareem NICHOLSON  Authorization Request Submitted for: SNF  Requested Start of Care Date: 03/22/25  Facility Name: [facility]  Facility NPI: [id number]  Facility MD: Dr Jessie Shen  Facility MD NPI: [id number]  Authorization initiated by contacting insurance: [employer] (Primary insurnace listed is [employer], per  request auth was submitted to [employer])  Via: [employer]  Clinicals submitted via: Portal Attachment  Pending reference #: [id number]  Other Notes: Rec'd message upon submission, \"Facility is out-of-network. You can switch to an in-network facility, proceed with your out-of-network request, or ask for an exception below if you believe in-network rates should apply.\"   notified: Kareem NICHOLSON     Updates to authorization status will be noted in chart.    Please reach out to CM for updates on any clinical information.  "

## 2025-03-21 NOTE — PHYSICAL THERAPY NOTE
"                                                                                  PHYSICAL THERAPY NOTE       03/21/25 1346   PT Last Visit   PT Visit Date 03/21/25   Note Type   Note Type Treatment for insurance authorization   Pain Assessment   Pain Assessment Tool Vincent-Baker FACES   Vincent-Baker FACES Pain Rating 2   Pain Location/Orientation Location: Generalized   Hospital Pain Intervention(s) Repositioned   Restrictions/Precautions   Weight Bearing Precautions Per Order No   Other Precautions Cognitive;Chair Alarm;Bed Alarm;Multiple lines;O2;Fall Risk;Pain   General   Chart Reviewed Yes   Response to Previous Treatment Patient unable to report, no changes reported from family or staff   Family/Caregiver Present No   Cognition   Overall Cognitive Status Impaired   Arousal/Participation Alert;Cooperative   Attention Attends with cues to redirect   Memory Decreased recall of recent events;Decreased recall of precautions;Decreased short term memory   Following Commands Follows one step commands with increased time or repetition   Comments Flat affect   Subjective   Subjective \"I am tired.\"   Bed Mobility   Supine to Sit 4  Minimal assistance   Additional items Assist x 1;HOB elevated;Bedrails;Verbal cues;LE management   Sit to Supine 4  Minimal assistance   Additional items Assist x 2;HOB elevated;Bedrails;Increased time required;Verbal cues;LE management   Additional Comments Patient found to be incontinent of a large amount of stool.   Transfers   Sit to Stand 4  Minimal assistance   Additional items Assist x 1;Armrests;Verbal cues   Stand to Sit 4  Minimal assistance   Additional items Assist x 1;Armrests;Verbal cues   Additional Comments approx 6-7 sit<>stand transfers completed for hazel-care. Excessive amount of time to complete due to patient actively having a bowel movement. Patient stood for approx 30 seconds to 1 minute each trial. Linens changed.   Balance   Static Sitting Fair +   Dynamic Sitting Fair + "   Static Standing Fair   Dynamic Standing Fair   Endurance Deficit   Endurance Deficit Yes   Endurance Deficit Description easily fatigued   Activity Tolerance   Activity Tolerance Patient limited by fatigue   Medical Staff Made Aware Magaly GLOVER   Nurse Made Aware RNRadha   Assessment   Prognosis Guarded   Problem List Decreased strength;Decreased endurance;Impaired balance;Decreased mobility;Decreased cognition;Impaired judgement;Decreased safety awareness;Obesity;Pain   Assessment Patient was received supine in bed incontinent of a large amount of stool. Patient progressed since last session and required less assistance for bed mobility and transfers. Patient had to complete multiple transfer trials due to hazel-care and patient actively having a bowel movement. Linens also needed to be changed. Patient fatigued after multiple standing trials and further mobility was not attempted due to this. Anticipate continued progress. Recommending level 2 resources. The patient's [facility] Basic Mobility Inpatient Short Form Raw Score is 15. A Raw score of less than or equal to 17 suggests the patient may benefit from discharge to post-acute rehabilitation services. Please also refer to the recommendation of the Physical Therapist for safe discharge planning.   Barriers to Discharge Inaccessible home environment;Decreased caregiver support   Goals   Patient Goals None stated   STG Expiration Date 03/27/25   PT Treatment Day 3   Plan   Treatment/Interventions Functional transfer training;LE strengthening/ROM;Therapeutic exercise;Endurance training;Cognitive reorientation;Patient/family training;Equipment eval/education;Bed mobility;Gait training;Spoke to nursing;OT   Progress Progressing toward goals   PT Frequency 2-3x/wk   Discharge Recommendation   Rehab Resource Intensity Level, PT II (Moderate Resource Intensity)   AM-PAC Basic Mobility Inpatient   Turning in Flat Bed Without Bedrails 3   Lying on Back to Sitting on Edge of  Flat Bed Without Bedrails 3   Moving Bed to Chair 3   Standing Up From Chair Using Arms 3   Walk in Room 2   Climb 3-5 Stairs With Railing 1   Basic Mobility Inpatient Raw Score 15   Basic Mobility Standardized Score 36.97   [facility] Highest Level Of Mobility   -[facility] Goal 4: Move to chair/commode   -HL Achieved 5: Stand (1 or more minutes)   Education   Education Provided Mobility training;Assistive device   Patient Reinforcement needed   End of Consult   Patient Position at End of Consult Bed/Chair alarm activated;All needs within reach;Supine     Waleska Khan            Patient Name: Chuy Norton  Today's Date: 3/21/2025   Type Of Destruction Used: Curettage

## 2025-03-21 NOTE — DISCHARGE SUPPORT
Case Management Assessment & Discharge Planning Note    Patient name Chuy Norton  Location /-01 MRN [id number]  : 1960 Date 3/21/2025       Current Admission Date: 3/5/2025  Current Admission Diagnosis:Acute respiratory failure with hypoxia (HCC)   Patient Active Problem List    Diagnosis Date Noted Date Diagnosed    Encounter for assessment of decision-making capacity 2025     Left Parapneumonic effusion 2025     Ischemic cardiomyopathy 2025     Left lower lobe pneumonia 2025     KENN (obstructive sleep apnea) 2025     Stroke-like symptoms 2024     Thrombocytopenia (HCC) 2024     Acute respiratory failure with hypoxia ([facility]) 2024     PUD (peptic ulcer disease) 2024     Abnormal CXR 2024     Shortness of breath 2024     Hepatitis C 05/15/2024     Acute pain of right knee 2024     Acute left-sided low back pain without sciatica 2024     Left ventricular apical thrombus 05/10/2024     Cirrhosis (HCC) 2024     Iron deficiency anemia 2024     History of CVA (cerebrovascular accident) 2021     Acute metabolic encephalopathy 2021     SIRS (systemic inflammatory response syndrome) ([facility]) 06/15/2021     Patellofemoral pain syndrome of right knee 2020     Elevated d-dimer 2019     Left hip pain 2019     Encounter for medical examination to establish care 10/10/2018     Bipolar 2 disorder, major depressive episode (HCC) 2018     Attention deficit hyperactivity disorder (ADHD), combined type 2018     Hyperlipidemia 2015     Knee pain 2015     Spinal stenosis of lumbar region 2015     Dependence on nicotine from cigarettes 2015     Vitamin D deficiency 2015     Anxiety disorder 2015     Depressive disorder 2013     Low back pain 2013     Primary hypertension 10/18/2012     Alcohol dependence in remission (HCC) 2011        LOS (days): 16  Geometric Mean LOS (GMLOS) (days): 4.9  Days to GMLOS:-10.9   Other  Transport Auth: Not Req’d  Other Notes: [facility] does not require auth for BLS transport from hospital to SNF   Notified: Kareem NICHOLSON     Please reach out to CM for updates on any clinical information.

## 2025-03-21 NOTE — PROGRESS NOTES
"Progress Note - Hospitalist   Name: Chuy Norton 64 y.o. male I MRN: [id number]  Unit/Bed#: -01 I Date of Admission: 3/5/2025   Date of Service: 3/21/2025 I Hospital Day: 16    Assessment & Plan  Acute respiratory failure with hypoxia (HCC)  Patient brought in by facility staff for SOB, cough x2 weeks and dizziness with standing  At baseline patient states he does not wear oxygen but does wear nocturnal CPAP  S/p Chest tube on 3/12- s/p TPA/Dornase X6- chest tube removed 3/20  S/p IR chest tube 3/18- consulting IR for removal today  Bipolar 2 disorder, major depressive episode (HCC)  Outpatient: 20mg lexapro, 300mg Seroquel HS, 100mg trazadone HS    Plan  Continue   Patient has impaired decision making capacity as per Neuropsych  Primary hypertension  Outpatient: 12.5mg Carvedilol BID, 40mg lisinopril   /78   Pulse 66   Temp 97.7 °F (36.5 °C)   Resp 16   Ht 5' 8\" (1.727 m)   Wt 97 kg (213 lb 13.5 oz)   SpO2 92%   BMI 32.52 kg/m²     Plan  continue  PRN labetolol, for SBP >180  Hyperlipidemia  Outpatient: 40 atorvastatin    Plan  Continue statin   Cirrhosis (HCC)  Follows with WALE LOTT, last seen Jan 2025  History of cirrhosis of the liver due to history of alcohol and drug addiction.   5/9/2024 CT C/A/P noted nodular hepatic contours may indicate cirrhosis of the liver.   No ascites present  Left ventricular apical thrombus    Recent Labs     03/19/25  0534 03/21/25  0618   INR 1.20* 1.05      Outpatient: 2.5mg Warfarin (Sun, Tues, Wed, Fri) and 5mg (Mon, Thurs, Sat)  Starting heparin GTT-->warfarin  Hepatitis C  Follows with WALE LOTT, last seen Jan 2025  History of hepatitis C antibody and stated treatment with Mavyret   May 2024 negative viral load  Left lower lobe pneumonia  CXR 3/6 showing increasing left basilar opacity  CT CAP with consolidation in LLL and lingula, and small consolidation in RLL  Procal remains elevated (2.75, 2.69, 0.94)  Urinary antigens negative. RP2 negative.   Placed on " ceftriaxone, Flagyl and azithromycin, tailored to IV ceftriaxone and IV vancomycin on 3/8.    Completed 7 days of ceftriaxone, prior to that completed 3 days of azithromycin.  Currently on vancomycin only.  Continue vancomycin day 7/7  CT chest 3/12 showed compressive atelectasis of the left lower lung with focal hypodensity and cystic changes suggesting parenchymal necrosis.  Likely in the setting of MRSA pneumonia.   Completed 7 days of vancomycin.  KENN (obstructive sleep apnea)  Maintained on CPAP at home  Continue BiPAP qhs for now  Ischemic cardiomyopathy    Left Parapneumonic effusion    Chest x-ray 3/12 showed significantly increased opacity in the left chest felt to be mostly due to large potentially partially loculated pleural effusion.  CT chest 3/12 showed loculated pleural effusion pockets in the left apex posteriorly and in the major fissure.  Small free effusion in the left lung base.  Compressive atelectasis of the left lower lung with focal hypodensity and cystic changes suggesting parenchymal necrosis  Chest tube was placed 3/12, removed 3/20  IR chest tube placed on 3/19  Dependence on nicotine from cigarettes    Encounter for assessment of decision-making capacity      VTE Pharmacologic Prophylaxis:   High Risk (Score >/= 5) - Pharmacological DVT Prophylaxis Ordered: heparin drip. Sequential Compression Devices Ordered.    Mobility:   Basic Mobility Inpatient Raw Score: 10  JH-HLM Goal: 4: Move to chair/commode  JH-HLM Achieved: 4: Move to chair/commode  JH-HLM Goal achieved. Continue to encourage appropriate mobility.    Patient Centered Rounds: I performed bedside rounds with nursing staff today.   Discussions with Specialists or Other Care Team Provider: pulmnology, CM, nursing    Education and Discussions with Family / Patient: Updated  (sister) via phone.    Current Length of Stay: 16 day(s)  Current Patient Status: Inpatient   Certification Statement: The patient will continue  to require additional inpatient hospital stay due to Chest tube, disposition  Discharge Plan:  not ready     Code Status: Level 1 - Full Code    Subjective   Denies complaints. NO acute overnight events.     Objective :  Temp:  [97.7 °F (36.5 °C)-98.7 °F (37.1 °C)] 97.7 °F (36.5 °C)  HR:  [66-79] 66  BP: (118-153)/(67-93) 153/78  Resp:  [16-20] 16  SpO2:  [89 %-95 %] 92 %  O2 Device: Nasal cannula  Nasal Cannula O2 Flow Rate (L/min):  [2 L/min] 2 L/min    Body mass index is 32.52 kg/m².     Input and Output Summary (last 24 hours):     Intake/Output Summary (Last 24 hours) at 3/21/2025 1326  Last data filed at 3/21/2025 1302  Gross per 24 hour   Intake 0 ml   Output 15 ml   Net -15 ml       Physical Exam  Vitals reviewed.   HENT:      Head: Normocephalic.      Mouth/Throat:      Mouth: Mucous membranes are moist.      Pharynx: Oropharynx is clear.   Eyes:      Conjunctiva/sclera: Conjunctivae normal.   Cardiovascular:      Rate and Rhythm: Normal rate.      Pulses: Normal pulses.   Pulmonary:      Effort: Pulmonary effort is normal.      Breath sounds: Rhonchi present.   Abdominal:      General: Bowel sounds are normal.      Palpations: Abdomen is soft.   Skin:     General: Skin is warm and dry.      Capillary Refill: Capillary refill takes 2 to 3 seconds.   Neurological:      Mental Status: He is alert. Mental status is at baseline.           Lines/Drains:  Lines/Drains/Airways       Active Status       Name Placement date Placement time Site Days    Chest Tube 2 Left Posterior 10.2 Fr. 03/19/25  1326  Posterior  2                            Lab Results: I have reviewed the following results:   Results from last 7 days   Lab Units 03/21/25  0618   WBC Thousand/uL 5.80   HEMOGLOBIN g/dL 11.2*   HEMATOCRIT % 35.0*   PLATELETS Thousands/uL 166   SEGS PCT % 70   LYMPHO PCT % 20   MONO PCT % 6   EOS PCT % 3     Results from last 7 days   Lab Units 03/21/25  0618   SODIUM mmol/L 135   POTASSIUM mmol/L 4.3   CHLORIDE  mmol/L 107   CO2 mmol/L 23   BUN mg/dL 21   CREATININE mg/dL 1.10   ANION GAP mmol/L 5   CALCIUM mg/dL 7.7*   GLUCOSE RANDOM mg/dL 100     Results from last 7 days   Lab Units 03/21/25  0618   INR  1.05     Results from last 7 days   Lab Units 03/21/25  1045 03/21/25  0711 03/20/25  2124 03/20/25  1634 03/20/25  1058 03/20/25  0732 03/19/25  2130 03/19/25  1543 03/19/25  1356 03/19/25  1140 03/19/25  0738 03/18/25  2127   POC GLUCOSE mg/dl 131 99 99 113 158* 113 124 126 104 125 97 126         Results from last 7 days   Lab Units 03/18/25  0520 03/17/25  0531 03/17/25  0223   LACTIC ACID mmol/L  --   --  0.8   PROCALCITONIN ng/ml 0.15 0.21  --        Recent Cultures (last 7 days):   Results from last 7 days   Lab Units 03/19/25  1333   GRAM STAIN RESULT  No Polys or Bacteria seen   BODY FLUID CULTURE, STERILE  No growth       Imaging Results Review: I reviewed radiology reports from this admission including: chest xray.  Other Study Results Review: No additional pertinent studies reviewed.    Last 24 Hours Medication List:     Current Facility-Administered Medications:     atorvastatin (LIPITOR) tablet 40 mg, Daily With Dinner    benzonatate (TESSALON PERLES) capsule 200 mg, TID PRN    bisacodyl (DULCOLAX) rectal suppository 10 mg, Daily    [Held by provider] carvedilol (COREG) tablet 25 mg, BID With Meals    escitalopram (LEXAPRO) tablet 20 mg, Daily    guaiFENesin (ROBITUSSIN) oral liquid 200 mg, TID PRN    heparin (porcine) 25,000 units in 0.45% NaCl 250 mL infusion (premix), Titrated, Last Rate: 15.1 Units/kg/hr (03/21/25 0807)    heparin (porcine) injection 2,000 Units, Q6H PRN    heparin (porcine) injection 4,000 Units, Q6H PRN    hydrALAZINE (APRESOLINE) injection 10 mg, Q6H PRN    HYDROmorphone (DILAUDID) injection 0.5 mg, Q4H PRN    insulin lispro (HumALOG/ADMELOG) 100 units/mL subcutaneous injection 1-6 Units, 4x Daily (AC & HS) **AND** Fingerstick Glucose (POCT), 4x Daily AC and at bedtime    levalbuterol  (XOPENEX) inhalation solution 1.25 mg, Q6H PRN    levalbuterol (XOPENEX) inhalation solution 1.25 mg, Q12H    lidocaine (LIDODERM) 5 % patch 1 patch, Daily    [Held by provider] losartan (COZAAR) tablet 100 mg, Daily    nicotine (NICODERM CQ) 14 mg/24hr TD 24 hr patch 1 patch, Daily    nystatin (MYCOSTATIN) powder, BID    oxyCODONE (ROXICODONE) IR tablet 5 mg, Q6H PRN    oxyCODONE (ROXICODONE) split tablet 2.5 mg, Q6H PRN    pantoprazole (PROTONIX) EC tablet 40 mg, BID before lunch/dinner    polyethylene glycol (MIRALAX) packet 17 g, Daily    QUEtiapine (SEROquel) tablet 300 mg, HS    senna-docusate sodium (SENOKOT S) 8.6-50 mg per tablet 1 tablet, BID    traZODone (DESYREL) tablet 100 mg, HS    warfarin (COUMADIN) tablet 5 mg, Daily (warfarin)    Administrative Statements   Today, Patient Was Seen By: Caron Cooley MD  I have spent a total time of 45 minutes in caring for this patient on the day of the visit/encounter including Diagnostic results, Impressions, Counseling / Coordination of care, Documenting in the medical record, Reviewing/placing orders in the medical record (including tests, medications, and/or procedures), Obtaining or reviewing history  , and Communicating with other healthcare professionals .    **Please Note: This note may have been constructed using a voice recognition system.**

## 2025-03-21 NOTE — PLAN OF CARE
Problem: PHYSICAL THERAPY ADULT  Goal: Performs mobility at highest level of function for planned discharge setting.  See evaluation for individualized goals.  Description: Treatment/Interventions: LE strengthening/ROM, Functional transfer training, Therapeutic exercise, Endurance training, Cognitive reorientation, Patient/family training, Equipment eval/education, Bed mobility, Gait training, Spoke to nursing, OT          See flowsheet documentation for full assessment, interventions and recommendations.  Outcome: Progressing  Note: Prognosis: Guarded  Problem List: Decreased strength, Decreased endurance, Impaired balance, Decreased mobility, Decreased cognition, Impaired judgement, Decreased safety awareness, Obesity, Pain  Assessment: Patient was received supine in bed incontinent of a large amount of stool. Patient progressed since last session and required less assistance for bed mobility and transfers. Patient had to complete multiple transfer trials due to hazel-care and patient actively having a bowel movement. Linens also needed to be changed. Patient fatigued after multiple standing trials and further mobility was not attempted due to this. Anticipate continued progress. Recommending level 2 resources. The patient's AM-PAC Basic Mobility Inpatient Short Form Raw Score is 15. A Raw score of less than or equal to 17 suggests the patient may benefit from discharge to post-acute rehabilitation services. Please also refer to the recommendation of the Physical Therapist for safe discharge planning.  Barriers to Discharge: Inaccessible home environment, Decreased caregiver support     Rehab Resource Intensity Level, PT: II (Moderate Resource Intensity)    See flowsheet documentation for full assessment.

## 2025-03-21 NOTE — ASSESSMENT & PLAN NOTE
Patient brought in by facility staff for SOB, cough x2 weeks and dizziness with standing  At baseline patient states he does not wear oxygen but does wear nocturnal CPAP  S/p Chest tube on 3/12- s/p TPA/Dornase X6- chest tube removed 3/20  S/p IR chest tube 3/18- consulting IR for removal today

## 2025-03-21 NOTE — PLAN OF CARE
Problem: OCCUPATIONAL THERAPY ADULT  Goal: Performs self-care activities at highest level of function for planned discharge setting.  See evaluation for individualized goals.  Description: Treatment Interventions: ADL retraining, Functional transfer training, UE strengthening/ROM, Endurance training, Cognitive reorientation, Patient/family training, Equipment evaluation/education, Compensatory technique education, Continued evaluation          See flowsheet documentation for full assessment, interventions and recommendations.   Note: Limitation: Decreased ADL status, Decreased UE strength, Decreased Safe judgement during ADL, Decreased cognition, Decreased endurance, Decreased self-care trans, Decreased high-level ADLs  Prognosis: Fair  Assessment: Pt seen for OT tx session with focus on bed mobility, standing tolerance, ADL status, endurance, and balance. Patient agreeable to OT treatment session. Pt received supine in bed. Pt able to tolerate OOB mobility despite recent OOB with RN staff. Pt demonstrated min Ax1 for all bed mobility. Once seated EOB pt incontinent of large amount of stool/urine through brief.  Pt completed 6 STS's from EOB with min Ax1 for hazel care needs; initial STS soiled brief doffed. Pt able to tolerate 30-60 seconds each STS trial before requiring seated rest break. Upon 6th stand, brief donned, linens changed, and pads change. Pt demonstrated the need for MaxA with toileting overall. Pt with increased fatigue after various STS trials, further mobility deferred. Pt's functional performance continues to be limited and pt continues to be functioning below baseline level, occupational performance remains limited secondary to factors listed above, and pt at increased risk for falls and injury. The patient's raw score on the AM-PAC Daily Activity inpatient short form is 14, standardized score is 33.39, less than 39.4. Patients at this level are likely to benefit from DC to post-acute  rehabilitation services. Please refer to the recommendation of the Occupational Therapist for safe DC planning.  From OT standpoint, recommendation at time of D/C would be DC with Level II - Moderate Rehab Resource Intensity resources. Patient to benefit from continued Occupational Therapy treatment while in the hospital to address deficits as defined above and maximize level of functional independence with ADLs and functional mobility. Pt left supine in bed with call bell in reach, tray table in reach, needs met, bed alarm activated, and RN informed.     Rehab Resource Intensity Level, OT: II (Moderate Resource Intensity)

## 2025-03-21 NOTE — OCCUPATIONAL THERAPY NOTE
Occupational Therapy Treatment Note     Patient Name: Chuy Norton  Today's Date: 3/21/2025  Problem List  Principal Problem:    Acute respiratory failure with hypoxia (HCC)  Active Problems:    Bipolar 2 disorder, major depressive episode (HCC)    Primary hypertension    Hyperlipidemia    Dependence on nicotine from cigarettes    Cirrhosis (HCC)    Left ventricular apical thrombus    Hepatitis C    Left lower lobe pneumonia    KENN (obstructive sleep apnea)    Ischemic cardiomyopathy    Left Parapneumonic effusion    Encounter for assessment of decision-making capacity        03/21/25 1326   OT Last Visit   OT Visit Date 03/21/25   Note Type   Note Type Treatment for insurance authorization   Pain Assessment   Pain Assessment Tool Vincent-Baker FACES   Vincent-Baker FACES Pain Rating 2   Pain Location/Orientation Location: Generalized   Patient's Stated Pain Goal No pain   Hospital Pain Intervention(s) Repositioned   Restrictions/Precautions   Weight Bearing Precautions Per Order No   Other Precautions Cognitive;Chair Alarm;Bed Alarm;Multiple lines;O2;Fall Risk;Pain  (2L O2 via NC, 1 chest tube)   ADL   Toileting Assistance  2  Maximal Assistance   Toileting Deficit Clothing management up;Clothing management down;Perineal hygiene;Supervison/safety;Verbal cueing   Bed Mobility   Supine to Sit 4  Minimal assistance   Additional items Assist x 1;HOB elevated;Bedrails;Verbal cues;LE management   Sit to Supine 4  Minimal assistance   Additional items Assist x 2;HOB elevated;Bedrails;Increased time required;Verbal cues;LE management   Additional Comments Once seated EOB pt incontinent of large amount of stool and urine, soiled through brief   Transfers   Sit to Stand 4  Minimal assistance   Additional items Assist x 1;Armrests;Verbal cues   Stand to Sit 4  Minimal assistance   Additional items Assist x 1;Armrests;Verbal cues   Additional Comments Pt completed approx. 6 STS's for hazel care. Pt actively having bowel movement.  "Pt able to stand approx. 30-60 seconds with each stand trial. Last STS new breif donned, linens, and pads changed. Pt w/ increased fatigued, further mobility deferred at this time.   Subjective   Subjective \"I'm a little better today.\"   Cognition   Overall Cognitive Status Impaired   Arousal/Participation Alert;Cooperative   Attention Attends with cues to redirect   Memory Decreased recall of recent events;Decreased recall of precautions;Decreased short term memory   Following Commands Follows one step commands with increased time or repetition   Comments Pt with limited verbalizations t/o session and flat affect.   Activity Tolerance   Activity Tolerance Patient limited by fatigue   Medical Staff Made Aware PT Deidra, RN Radha, PCA Pauline   Assessment   Assessment Pt seen for OT tx session with focus on bed mobility, standing tolerance, ADL status, endurance, and balance. Patient agreeable to OT treatment session. Pt received supine in bed. Pt able to tolerate OOB mobility despite recent OOB with RN staff. Pt demonstrated min Ax1 for all bed mobility. Once seated EOB pt incontinent of large amount of stool/urine through brief.  Pt completed 6 STS's from EOB with min Ax1 for hazel care needs; initial STS soiled brief doffed. Pt able to tolerate 30-60 seconds each STS trial before requiring seated rest break. Upon 6th stand, brief donned, linens changed, and pads change. Pt demonstrated the need for MaxA with toileting overall. Pt with increased fatigue after various STS trials, further mobility deferred. Pt's functional performance continues to be limited and pt continues to be functioning below baseline level, occupational performance remains limited secondary to factors listed above, and pt at increased risk for falls and injury. The patient's raw score on the AM-PAC Daily Activity inpatient short form is 14, standardized score is 33.39, less than 39.4. Patients at this level are likely to benefit from DC to post-acute " rehabilitation services. Please refer to the recommendation of the Occupational Therapist for safe DC planning.  From OT standpoint, recommendation at time of D/C would be DC with Level II - Moderate Rehab Resource Intensity resources. Patient to benefit from continued Occupational Therapy treatment while in the hospital to address deficits as defined above and maximize level of functional independence with ADLs and functional mobility. Pt left supine in bed with call bell in reach, tray table in reach, needs met, bed alarm activated, and RN informed.   Plan   Treatment Interventions ADL retraining;Functional transfer training;UE strengthening/ROM;Endurance training;Cognitive reorientation;Patient/family training;Equipment evaluation/education;Compensatory technique education;Continued evaluation   Goal Expiration Date 03/23/25   OT Treatment Day 3   OT Frequency 2-3x/wk   Discharge Recommendation   Rehab Resource Intensity Level, OT II (Moderate Resource Intensity)   AM-PAC Daily Activity Inpatient   Lower Body Dressing 2   Bathing 2   Toileting 2   Upper Body Dressing 2   Grooming 3   Eating 3   Daily Activity Raw Score 14   Daily Activity Standardized Score (Calc for Raw Score >=11) 33.39   AM-PAC Applied Cognition Inpatient   Following a Speech/Presentation 2   Understanding Ordinary Conversation 3   Taking Medications 2   Remembering Where Things Are Placed or Put Away 2   Remembering List of 4-5 Errands 2   Taking Care of Complicated Tasks 2   Applied Cognition Raw Score 13   Applied Cognition Standardized Score 30.46   End of Consult   Education Provided Yes   Patient Position at End of Consult Supine;Bed/Chair alarm activated;All needs within reach   Nurse Communication Nurse aware of consult         Magaly Dc OTR/L

## 2025-03-21 NOTE — DISCHARGE INSTRUCTIONS
Drainage Tube Removal    Your drainage tube was removed today.    What you need know at home:   Keep a clean dry dressing at the tube site until the small opening closes. It will take twenty four to forty eight hours. Keep the site dry until it heals. A small amount of drainage on your dressing is normal. Resume your normal diet. Small sips of flat soda will help with any nausea.     Contact Interventional Radiology for any of the following:                                   You have pain, fever greater than 101, shaking chills.  If you have increased redness or swelling at the site.   I the drainage from your site does not stop.  If the site drains pus or has a bad odor.     Contact Interventional Radiology at [phone]   ([facility] PATIENTS: Contact Interventional Radiology at [phone]) ([facility] PATIENTS: Contact Interventional Radiology at [phone]) if:

## 2025-03-21 NOTE — UTILIZATION REVIEW
Continued Stay Review    Date: 3/21/25                           Current Patient Class: Inpatient  Current Level of Care: med surg    HPI:64 y.o. male initially admitted on 3/5/25 inpatient   Cough for 2 weeks prior to arrival.   Found to have multifocal pneumonia c/b parapneumonic effusion.  S/p Chest tube on 3/12- s/p TPA/Dornase X6- chest tube removed 3/20.  S/p IR chest tube 3/18.   Oxygen needs escalated to non rebreather during stay.   Completed 3 days of azithromycin and 7 days of Ceftriaxone.  Completed 5 days of prednisone 40 mg.  Vancomycin x 7 days completed on 3/14/2025  Current Diagnosis: acute respiratory failure with hypoxia/left ventricular apical thrombus/Left lower lobe pneumonia/left parapneumonic effusion.     Assessment/Plan:     3/21/2025 .  Patient presents with improved breathing  On exam:  IR left apical tube minimal serous output   Abnormal labs or imaging  Diagnosis/Plan     Left parapneumonic effusions/Acute hypoxic respiratory failure/Left lung pneumonia, positive MRSA nasal culture status post 7 days of vancomycin/Obstructive sleep apnea/Left ventricular apical thrombus/Ischemic cardiomyopathy/Tobacco use.  Consult IR to remove left apical chest tube. Pulmonary chest tube removed yesterday. Wean to 2 L, continue to wean supplemental oxygen for SpO2 goal greater than 88%.  Monitor off antibiotics.  OOB, PT, pulmonary toilet, incentive spirometry.  Continue heparin drip.  Home coumadin on hold.      Medications:   Scheduled Medications:  atorvastatin, 40 mg, Oral, Daily With Dinner  bisacodyl, 10 mg, Rectal, Daily  [Held by provider] carvedilol, 25 mg, Oral, BID With Meals  escitalopram, 20 mg, Oral, Daily  insulin lispro, 1-6 Units, Subcutaneous, 4x Daily (AC & HS)  levalbuterol, 1.25 mg, Nebulization, Q12H  lidocaine, 1 patch, Topical, Daily  [Held by provider] losartan, 100 mg, Oral, Daily  nicotine, 1 patch, Transdermal, Daily  nystatin, , Topical, BID  pantoprazole, 40 mg, Oral, BID  before lunch/dinner  polyethylene glycol, 17 g, Oral, Daily  QUEtiapine, 300 mg, Oral, HS  senna-docusate sodium, 1 tablet, Oral, BID  traZODone, 100 mg, Oral, HS  warfarin, 5 mg, Oral, Daily (warfarin)    Continuous IV Infusions:  heparin (porcine), 3-20 Units/kg/hr (Order-Specific), Intravenous, Titrated      PRN Meds:  benzonatate, 200 mg, Oral, TID PRN  guaiFENesin, 200 mg, Oral, TID PRN  heparin (porcine), 2,000 Units, Intravenous, Q6H PRN - x 1 3/21/25  heparin (porcine), 4,000 Units, Intravenous, Q6H PRN  hydrALAZINE, 10 mg, Intravenous, Q6H PRN  HYDROmorphone, 0.5 mg, Intravenous, Q4H PRN  levalbuterol, 1.25 mg, Nebulization, Q6H PRN  oxyCODONE, 5 mg, Oral, Q6H PRN  oxyCODONE, 2.5 mg, Oral, Q6H PRN    Discharge Plan:  to be determined.     Vital Signs (last 3 days)       Date/Time Temp Pulse Resp BP MAP (mmHg) SpO2 FiO2 (%) Calculated FIO2 (%) - Nasal Cannula O2 Flow Rate (L/min) Nasal Cannula O2 Flow Rate (L/min) O2 Device O2 Interface Device Patient Position - Orthostatic VS Maxwell Coma Scale Score Pain    03/21/25 0800 -- -- -- -- -- -- -- 28 -- 2 L/min Nasal cannula -- -- -- No Pain    03/21/25 0746 -- -- -- -- -- 92 % -- -- -- -- -- -- -- -- --    03/21/25 07:21:44 97.7 °F (36.5 °C) 66 16 153/78 103 93 % -- -- -- -- -- -- -- -- --    03/21/25 0715 -- -- -- -- -- 92 % -- 28 -- 2 L/min Nasal cannula -- -- -- --    03/21/25 0424 -- -- -- -- -- -- -- -- -- -- -- Full face mask -- -- --    03/20/25 2249 -- -- -- -- -- -- -- -- -- -- -- Full face mask -- -- --    03/20/25 21:33:03 97.7 °F (36.5 °C) 79 -- 148/93 111 89 % -- -- -- -- -- -- Lying -- --    03/20/25 1953 -- -- -- -- -- -- -- 28 -- 2 L/min Nasal cannula -- -- 15 No Pain    03/20/25 1927 -- -- -- -- -- 95 % -- 28 -- 2 L/min Nasal cannula -- -- -- --    03/20/25 1700 -- -- -- -- -- -- -- 28 -- 2 L/min Nasal cannula -- -- -- --    03/20/25 16:18:59 98.6 °F (37 °C) 73 -- 143/79 100 94 % -- -- -- -- -- -- -- -- --    03/20/25 1542 98.7 °F (37.1 °C) 73  20 118/67 87 94 % -- -- -- -- -- -- Sitting -- --    03/20/25 1345 -- -- -- -- -- 95 % -- 28 -- 2 L/min Nasal cannula -- -- -- --    03/20/25 1148 -- -- -- -- -- -- -- -- -- -- -- -- -- -- No Pain    03/20/25 1140 98 °F (36.7 °C) 75 20 145/77 105 95 % -- 28 3 L/min 2 L/min Nasal cannula -- Lying -- --    03/20/25 0922 -- -- -- -- -- 95 % -- 28 -- 2 L/min Nasal cannula -- -- -- --    03/20/25 0800 -- -- -- -- -- -- -- -- -- -- -- -- -- 15 No Pain    03/20/25 0732 98.5 °F (36.9 °C) 69 19 159/81 113 96 % -- 32 -- 3 L/min Nasal cannula -- Lying -- --    03/20/25 0700 -- 64 18 -- -- -- -- -- -- -- -- -- -- -- --    03/20/25 0515 -- -- -- -- -- 94 % -- 32 -- 3 L/min Nasal cannula -- -- -- --    03/20/25 0400 98.3 °F (36.8 °C) 66 14 95/59 72 93 % -- -- -- -- BiPAP -- Lying 15 No Pain    03/20/25 0251 -- -- -- -- -- 93 % -- -- 3 L/min -- BiPAP Full face mask -- -- --    03/20/25 0000 98.5 °F (36.9 °C) 65 21 151/70 101 93 % -- -- -- -- BiPAP -- Lying 15 No Pain    03/19/25 2217 -- -- -- -- -- 92 % -- -- 3 L/min -- BiPAP Full face mask -- -- --    03/19/25 2000 -- -- -- -- -- -- -- -- -- -- -- -- -- 15 No Pain    03/19/25 1907 -- -- -- -- -- 95 % -- 32 -- 3 L/min Nasal cannula -- -- -- --    03/19/25 1902 -- 71 24 173/90 122 95 % -- 32 -- 3 L/min Nasal cannula -- Lying -- No Pain    03/19/25 1859 98.7 °F (37.1 °C) 71 26 -- -- 95 % -- -- -- -- -- -- -- -- --    03/19/25 1600 -- -- -- -- -- -- -- -- -- -- -- -- -- 15 --    03/19/25 1539 98.3 °F (36.8 °C) 67 20 162/77 111 92 % -- 32 -- 3 L/min Nasal cannula -- Lying -- No Pain    03/19/25 1402 -- -- -- -- -- 92 % -- 36 -- 4 L/min Nasal cannula -- -- -- --    03/19/25 1400 -- 71 30 145/72 98 -- -- -- -- -- -- -- -- -- --    03/19/25 13:39:52 -- 75 22 123/72 -- 96 % -- -- -- -- -- -- -- -- --    03/19/25 13:34:40 -- 74 21 134/70 -- 97 % -- -- -- -- -- -- -- -- --    03/19/25 13:15:45 -- 80 22 167/90 -- 97 % -- -- -- -- -- -- -- -- --    03/19/25 1200 -- 79 18 148/73 104 92 % -- --  -- -- -- -- -- 15 No Pain    03/19/25 1141 98.7 °F (37.1 °C) 68 21 134/81 102 93 % -- -- -- -- Nasal cannula -- Lying -- No Pain    03/19/25 1018 -- -- -- -- -- -- -- -- -- -- -- -- -- -- Med Not Given for Pain - for MAR use only    03/19/25 0800 -- 66 24 156/78 111 94 % -- -- -- -- -- -- -- 15 No Pain    03/19/25 0740 98.5 °F (36.9 °C) 64 22 151/72 103 94 % -- -- -- -- Nasal cannula -- Sitting -- No Pain    03/19/25 0729 -- -- -- -- -- 93 % -- 44 -- 6 L/min Nasal cannula -- -- -- --    03/19/25 0400 98.3 °F (36.8 °C) 57 17 122/63 88 95 % -- -- -- -- BiPAP -- Lying 15 No Pain    03/19/25 0231 -- -- -- -- -- -- 44 -- 6 L/min -- BiPAP Full face mask -- -- --    03/19/25 0000 98 °F (36.7 °C) 64 14 102/59 76 95 % -- -- -- -- BiPAP -- Lying 15 No Pain    03/18/25 2311 -- -- -- -- -- -- 44 -- 6 L/min -- BiPAP Full face mask -- -- --    03/18/25 2035 98.2 °F (36.8 °C) 76 22 132/63 91 92 % -- 44 -- 6 L/min Nasal cannula -- Lying -- No Pain    03/18/25 2000 -- -- -- -- -- -- -- -- -- -- -- -- -- 15 No Pain    03/18/25 1959 -- -- -- -- -- -- -- 44 -- 6 L/min Nasal cannula -- -- -- --    03/18/25 1801 -- -- -- -- -- -- -- -- -- -- -- -- -- -- Med Not Given for Pain - for MAR use only    03/18/25 1615 98.2 °F (36.8 °C) 65 15 132/72 95 93 % -- -- -- -- Nasal cannula -- Lying -- No Pain    03/18/25 1600 -- -- -- -- -- -- -- -- -- -- -- -- -- 15 No Pain    03/18/25 1445 -- -- -- -- -- 93 % -- 44 -- 6 L/min Nasal cannula -- -- -- --    03/18/25 1209 -- 60 13 139/70 97 93 % -- 52 -- 8 L/min Mid flow nasal cannula -- -- -- --    03/18/25 1200 -- -- -- -- -- -- -- -- -- -- -- -- -- 15 No Pain    03/18/25 1100 98.1 °F (36.7 °C) 60 16 132/62 89 93 % -- -- -- -- Mid flow nasal cannula -- Lying -- --    03/18/25 1000 -- 72 28 165/89 122 95 % -- -- -- -- -- -- -- -- --    03/18/25 0953 -- -- -- -- -- -- -- -- -- -- -- -- -- -- No Pain    03/18/25 0900 -- 57 12 108/60 77 92 % -- -- -- -- -- -- -- -- --    03/18/25 0800 97.9 °F (36.6 °C) 64  11 105/59 76 94 % -- -- -- -- Mid flow nasal cannula -- Lying 15 No Pain    03/18/25 0752 -- -- -- -- -- 95 % -- 60 -- 10 L/min Mid flow nasal cannula -- -- -- --    03/18/25 0700 -- 61 15 124/77 96 94 % -- -- -- -- -- -- -- -- --    03/18/25 0600 -- 64 10 -- -- 93 % -- -- -- -- -- -- -- -- --    03/18/25 0500 -- 62 10 121/67 87 93 % -- -- -- -- -- -- -- -- --    03/18/25 0430 -- -- -- -- -- -- -- -- -- -- -- -- -- 15 No Pain    03/18/25 0400 -- 65 11 99/55 68 94 % -- -- -- -- -- -- -- -- --    03/18/25 0330 97.4 °F (36.3 °C) 65 11 100/60 72 93 % -- -- -- -- Mid flow nasal cannula -- Lying -- --    03/18/25 0300 -- 63 12 91/53 65 93 % -- -- -- -- -- -- -- -- No Pain    03/18/25 0203 -- -- -- -- -- -- -- -- -- -- -- -- -- -- 5    03/18/25 0200 -- 67 11 166/77 110 96 % -- -- -- -- -- -- -- -- --    03/18/25 0109 -- -- -- -- -- 94 % -- 76 -- 14 L/min Mid flow nasal cannula MFNC prongs -- -- --    03/18/25 0100 -- 64 22 93/57 70 91 % -- -- -- -- -- -- -- -- --    03/18/25 0030 -- -- -- -- -- -- -- -- -- -- -- -- -- 15 No Pain    03/18/25 0000 -- 62 19 109/58 80 93 % -- -- -- -- -- -- -- -- --          Weight (last 2 days)       Date/Time Weight    03/20/25 0523 97 (213.85)    03/19/25 1400 96.3 (212.3)     Weight: o2 tank taken off bed. at 03/19/25 1400    03/19/25 0525 102 (224.87)     Weight: rewighed x2; AP aware of gain at 03/19/25 0525          Pertinent Labs/Diagnostic Results:   Radiology:  XR chest portable   Final Interpretation by Myra Marshall MD (03/21 0704)      Left chest tube removal with small left pleural effusion and left base atelectasis. Previous trace left apical pneumothorax not visible.      Hiatal hernia.            Workstation performed: [id number]         XR chest portable   Final Interpretation by Myra Marshall MD (03/21 0703)      Left chest tube and left apical pigtail catheter with moderate left base opacity, likely effusion and atelectasis.      Probable trace left apical  pneumothorax.      Hiatal hernia.            Workstation performed: [id number]         IR chest tube placement   Final Interpretation by Brendan Cespedes DO (03/19 1433)   Impression: Successful CT-guided placement of a 10 Welsh pigtail chest tube into the left superior posterior loculated collection containing an air-fluid level            Workstation performed: [id number]         CT chest wo contrast   Final Interpretation by Kirit Charles MD (03/18 1615)      Decreased size of the loculated pleural effusion on the left toward the apex with small volume of air introduced as seen on image 2/12 consistent with a decreasing hydropneumothorax.      Improved aeration of the left lower lobe with decreased compressive atelectasis.      Stable small right pleural effusion.      Increased chest wall anasarca with increased fluid noted within the chest wall musculature on image 2/100.      Stable hiatal hernia.               Workstation performed: [id number]         US kidney and bladder   Final Interpretation by Michael Butcher MD (03/17 1637)      No hydronephrosis.      Low-level debris in the bladder lumen should be correlated with urinalysis.            Workstation performed: [id number]         XR chest portable   Final Interpretation by Breanne Barnett MD (03/16 1126)      Unchanged positioning of the left chest tube with similar size of the left pleural effusion.      The previously noted left apical pneumothorax not visualized            Resident: Randell Oliver I, the attending radiologist, have reviewed the images and agree with the final report above.      Workstation performed: [id number]         XR abdomen 1 view kub   Final Interpretation by Eugenio Villegas MD (03/16 1026)      Nonobstructive bowel gas pattern.               Workstation performed: [id number]         XR chest portable   Final Interpretation by Lalo Bacon MD (03/16 0920)   Left chest tube in place    Decreased left effusion with underlying distal changes with trace left apical pneumothorax..            Workstation performed: [id number]         CT chest wo contrast   Final Interpretation by Kwadwo Mcclendon DO (03/15 0710)      1.  Limited assessment without IV contrast. Small left pleural effusion which is probably free-flowing at the left lung base appears grossly stable to slightly decreased status post placement of left chest tube. A few locules of pleural gas.      2.  Pockets of loculated pleural fluid in the posterior left apex and along the major fissure in the mid chest appear increased in size from previous study. These do not appear to be in communication with the left chest tube.      3.  Near complete compressive atelectasis of the left lower lobe. Small locules of gas in the peripheral collapsed lung are more conspicuous than the prior study. Some element of cavitary pneumonia can not be excluded.      4.  Additional incidental findings as above.               Workstation performed: [id number]         XR chest portable   Final Interpretation by Oj Wells MD (03/13 3859)      Stable position of left-sided chest tube.      Stable left pleural effusion and left lung base opacity.            Workstation performed: [id number]         XR chest portable   Final Interpretation by Ruben Wilkins MD (03/12 1628)      Slightly decreased left pleural effusion status post chest tube placement.            Workstation performed: [id number]         CT chest wo contrast   Final Interpretation by Lalo Bacon MD (03/12 1304)      Loculated pleural fluid pockets in the left apex posteriorly and in the major fissure.   Small free effusion in the left lung base.      Compressive atelectasis of the left lower lung with focal hypodensity and cystic changes suggesting parenchymal necrosis.               Workstation performed: [id number]         XR chest portable   Final Interpretation by  Maxim Lopes MD (03/12 1022)      Significantly increased opacity in the left chest felt to mostly be due to large potentially partially loculated pleural effusion.      See above      The study was marked in EPIC for immediate notification.            Workstation performed: [id number]         XR chest portable   Final Interpretation by Tyron White MD (03/11 1629)      Worsening left-sided pneumonia with probable small pleural effusion.      The study was marked in EPIC for immediate notification.            Workstation performed: [id number]         XR chest portable ICU   Final Interpretation by Bradley Landon Kocher, MD (03/07 0725)      Increasing left basilar opacity in keeping with effusion and a component of pneumonia or atelectasis.            Workstation performed: [id number]         CT chest abdomen pelvis w contrast   Final Interpretation by Leigh Epps MD (03/05 6233)      Multifocal pneumonia involving the left lower lobe, lingula, and medial right lower lobe. Fluid in the esophagus suggests aspiration as a possible etiology.      Moderate size hiatal hernia and distended fluid-filled esophagus to the level of the thoracic inlet.      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      The study was marked in EPIC for immediate notification.               Workstation performed: [id number]         XR chest 2 views   Final Interpretation by Cj Bojorquez MD (03/05 1523)      Patchy lingular and lower lobe opacities, concerning for pneumonia. Radiographic follow-up to resolution is recommended.            Workstation performed: [id number]         XR chest portable    (Results Pending)     Cardiology:  ECG 12 lead   Final Result by Kale Estevez MD (03/11 8915)   Normal sinus rhythm   Right superior axis deviation   Inferior infarct (cited on or before 19-Sep-2018)    Possible Anterior infarct , age undetermined   Abnormal ECG   When compared with ECG of 11-Mar-2025 04:51, (unconfirmed)   QT has lengthened   Confirmed by Kale Estevez ([id number]) on 3/11/2025 11:35:00 PM      ECG 12 lead   Final Result by Kale Estevez MD (03/11 7809)   ** Suspect arm lead reversal, interpretation assumes no reversal   Normal sinus rhythm   Right superior axis deviation   Inferior infarct (cited on or before 19-Sep-2018)   Abnormal ECG   When compared with ECG of 11-Mar-2025 04:51, (unconfirmed)   No significant change was found   Confirmed by Kale Estevez ([id number]) on 3/11/2025 11:34:54 PM      ECG 12 lead   Final Result by Kale Estevez MD (03/11 5511)   Normal sinus rhythm   Right superior axis deviation   Inferior infarct (cited on or before 19-Sep-2018)   Possible Anterior infarct (cited on or before 19-Sep-2018)   Abnormal ECG   When compared with ECG of 10-Mar-2025 07:30,   QRS axis Shifted left   Confirmed by Kale Estevez ([id number]) on 3/11/2025 11:18:22 PM      ECG 12 lead   Final Result by Kale Estevez MD (03/10 5202)   Normal sinus rhythm   Minimal voltage criteria for LVH, may be normal variant ( R in aVL )   Inferior infarct (cited on or before 19-Sep-2018)   Anterolateral infarct (cited on or before 19-Sep-2018)   Abnormal ECG   When compared with ECG of 06-Mar-2025 21:22,   T wave inversion more evident in Lateral leads   Confirmed by Kale Estevez ([id number]) on 3/10/2025 11:39:20 AM      ECG 12 lead   Final Result by Omari Davalos MD (03/07 8582)   Normal sinus rhythm   Inferior infarct (cited on or before 19-Sep-2018)   Anterior infarct (cited on or before 19-Sep-2018)   Abnormal ECG   When compared with ECG of 05-Mar-2025 18:29,   No significant change was found   Confirmed by Omari Davalos ([id number]) on 3/7/2025 7:52:37 AM      ECG 12 lead   Final Result by Omari Davalos MD (03/06 0752)   Sinus rhythm with 1st degree A-V block   Inferior infarct (cited on  or before 19-Sep-2018)   Anterolateral infarct (cited on or before 19-Sep-2018)   Abnormal ECG   When compared with ECG of 05-Mar-2025 13:01,   No significant change was found   Confirmed by Omari Davalos ([id number]) on 3/6/2025 7:52:33 AM      ECG 12 lead   Final Result by Omari Davalos MD (03/05 1449)   Sinus rhythm with 1st degree A-V block   Inferior infarct   Anterolateral infarct   Abnormal ECG   When compared with ECG of 17-Dec-2024 17:19, (unconfirmed)   No significant change was found   Confirmed by Omari Davalos ([id number]) on 3/5/2025 2:49:54 PM        GI:  No orders to display     Results from last 7 days   Lab Units 03/21/25  0618 03/20/25  0510 03/19/25  0534 03/18/25  0520 03/17/25  0531   WBC Thousand/uL 5.80 6.45 6.98 10.04 13.04*   HEMOGLOBIN g/dL 11.2* 11.5* 11.9* 11.7* 12.7   HEMATOCRIT % 35.0* 36.2* 37.7 36.4* 40.6   PLATELETS Thousands/uL 166 166 168 162 158   TOTAL NEUT ABS Thousands/µL 4.05 4.57  --   --  10.84*     Results from last 7 days   Lab Units 03/21/25  0618 03/20/25  0559 03/19/25  0534 03/18/25  0520 03/17/25  0531 03/16/25  0441   SODIUM mmol/L 135 134* 135 135 133* 134*   POTASSIUM mmol/L 4.3 5.1 4.6 4.2 4.1 4.6   CHLORIDE mmol/L 107 105 105 105 103 105   CO2 mmol/L 23 25 27 26 23 22   ANION GAP mmol/L 5 4 3* 4 7 7   BUN mg/dL 21 22 29* 36* 42* 36*   CREATININE mg/dL 1.10 1.08 1.33* 1.37* 1.68* 1.43*   EGFR ml/min/1.73sq m 71 72 56 54 42 51   CALCIUM mg/dL 7.7* 7.4* 7.6* 7.6* 7.3* 7.9*   CALCIUM, IONIZED mmol/L  --   --   --  1.07* 1.00*  --    MAGNESIUM mg/dL  --   --  1.9  --  2.5 1.9   PHOSPHORUS mg/dL  --   --  2.7  --  3.5 3.2     Results from last 7 days   Lab Units 03/21/25  1045 03/21/25  0711 03/20/25  2124 03/20/25  1634 03/20/25  1058 03/20/25  0732 03/19/25  2130 03/19/25  1543 03/19/25  1356 03/19/25  1140 03/19/25  0738 03/18/25 2127   POC GLUCOSE mg/dl 131 99 99 113 158* 113 124 126 104 125 97 126     Results from last 7 days   Lab Units 03/21/25  0618  03/20/25  0559 03/19/25  0534 03/18/25  0520 03/17/25  0531 03/16/25  0441 03/15/25  0316   GLUCOSE RANDOM mg/dL 100 100 99 102 154* 130 109     Results from last 7 days   Lab Units 03/17/25  0223   PH STEPHANIE  7.455*   PCO2 STEPHANIE mm Hg 32.2*   PO2 STEPHANIE mm Hg 110.6*   HCO3 STEPHANIE mmol/L 22.1*   BASE EXC STEPHANIE mmol/L -1.0   O2 CONTENT STEPHANIE ml/dL 17.2   O2 HGB, VENOUS % 95.2*     Results from last 7 days   Lab Units 03/21/25  0618 03/20/25  2306 03/20/25  1334 03/19/25  1519 03/19/25  0534 03/18/25  0520   PROTIME seconds 14.2  --   --   --  15.7* 17.2*   INR  1.05  --   --   --  1.20* 1.36*   PTT seconds 59* 57* 51*   < >  --   --     < > = values in this interval not displayed.     Results from last 7 days   Lab Units 03/18/25  0520 03/17/25  0531   PROCALCITONIN ng/ml 0.15 0.21     Results from last 7 days   Lab Units 03/17/25  0223   LACTIC ACID mmol/L 0.8     Results from last 7 days   Lab Units 03/17/25  2154   CLARITY UA  Clear   COLOR UA  Yellow   SPEC GRAV UA  >=1.030   PH UA  5.0   GLUCOSE UA mg/dl Negative   KETONES UA mg/dl Negative   BLOOD UA  Trace*   PROTEIN UA mg/dl Trace*   NITRITE UA  Negative   BILIRUBIN UA  Negative   UROBILINOGEN UA (BE) mg/dl <2.0   LEUKOCYTES UA  Negative   WBC UA /hpf 1-2*   RBC UA /hpf 0-1*   BACTERIA UA /hpf Moderate*   EPITHELIAL CELLS WET PREP /hpf Occasional     Results from last 7 days   Lab Units 03/19/25  1333   GRAM STAIN RESULT  No Polys or Bacteria seen   BODY FLUID CULTURE, STERILE  No growth       Network Utilization Review Department  ATTENTION: Please call with any questions or concerns to [phone] and carefully listen to the prompts so that you are directed to the right person. All voicemails are confidential.   For Discharge needs, contact Care Management DC Support Team at [phone] opt. [phone]  Send all requests for admission clinical reviews, approved or denied determinations and any other requests to dedicated fax number below belonging to the campus where the patient  is receiving treatment. List of dedicated fax numbers for the Facilities:  FACILITY NAME UR FAX NUMBER   ADMISSION DENIALS (Administrative/Medical Necessity) [phone]   DISCHARGE SUPPORT TEAM (NETWORK) [phone]   PARENT CHILD HEALTH (Maternity/NICU/Pediatrics) [phone]   [facility] [phone]   [facility] [phone]   [facility] [phone]   [facility] [phone]   [facility] [phone]   [facility] [phone]   [facility] [phone]   [facility] [phone]   [facility] [phone]   [facility] [phone]   [facility] [phone]   [facility] [phone]

## 2025-03-21 NOTE — PROGRESS NOTES
Patient:  DAVID JEONG    MRN:  [id number]    Aidin Request ID:  [id number]    Level of care reserved:  Skilled Nursing Facility    Partner Reserved:  [facility], [place] [address] [phone]    Clinical needs requested:    Geography searched:  20 miles around [id number]    Start of Service:    Request sent:  9:21am EDT on 3/19/2025 by Shawanda Guido    Partner reserved:  1:55pm EDT on 3/21/2025 by Shawanda Guido    Choice list shared:  1:53pm EDT on 3/21/2025 by Shawanda Guido

## 2025-03-22 VITALS
DIASTOLIC BLOOD PRESSURE: 99 MMHG | BODY MASS INDEX: 32.41 KG/M2 | HEART RATE: 71 BPM | OXYGEN SATURATION: 92 % | SYSTOLIC BLOOD PRESSURE: 158 MMHG | HEIGHT: 68 IN | WEIGHT: 213.85 LBS | RESPIRATION RATE: 16 BRPM | TEMPERATURE: 97.9 F

## 2025-03-22 LAB
APTT PPP: 84 SECONDS (ref 23–34)
BACTERIA SPEC BFLD CULT: NO GROWTH
GLUCOSE SERPL-MCNC: 117 MG/DL (ref 65–140)
GLUCOSE SERPL-MCNC: 97 MG/DL (ref 65–140)
GRAM STN SPEC: NORMAL
INR PPP: 1.07 (ref 0.85–1.19)
PROTHROMBIN TIME: 14.4 SECONDS (ref 12.3–15)

## 2025-03-22 PROCEDURE — 99232 SBSQ HOSP IP/OBS MODERATE 35: CPT | Performed by: INTERNAL MEDICINE

## 2025-03-22 PROCEDURE — 85610 PROTHROMBIN TIME: CPT | Performed by: INTERNAL MEDICINE

## 2025-03-22 PROCEDURE — 94640 AIRWAY INHALATION TREATMENT: CPT

## 2025-03-22 PROCEDURE — 94660 CPAP INITIATION&MGMT: CPT

## 2025-03-22 PROCEDURE — 82948 REAGENT STRIP/BLOOD GLUCOSE: CPT

## 2025-03-22 PROCEDURE — 99239 HOSP IP/OBS DSCHRG MGMT >30: CPT | Performed by: INTERNAL MEDICINE

## 2025-03-22 PROCEDURE — 85730 THROMBOPLASTIN TIME PARTIAL: CPT | Performed by: INTERNAL MEDICINE

## 2025-03-22 PROCEDURE — 94760 N-INVAS EAR/PLS OXIMETRY 1: CPT

## 2025-03-22 RX ORDER — CARVEDILOL 12.5 MG/1
12.5 TABLET ORAL 2 TIMES DAILY WITH MEALS
Status: DISCONTINUED | OUTPATIENT
Start: 2025-03-22 | End: 2025-03-22 | Stop reason: HOSPADM

## 2025-03-22 RX ORDER — ENOXAPARIN SODIUM 100 MG/ML
1 INJECTION SUBCUTANEOUS EVERY 12 HOURS SCHEDULED
Start: 2025-03-22 | End: 2025-03-27

## 2025-03-22 RX ORDER — LIDOCAINE 50 MG/G
1 PATCH TOPICAL DAILY
Start: 2025-03-22

## 2025-03-22 RX ORDER — ENOXAPARIN SODIUM 100 MG/ML
1 INJECTION SUBCUTANEOUS EVERY 12 HOURS SCHEDULED
Status: DISCONTINUED | OUTPATIENT
Start: 2025-03-22 | End: 2025-03-22 | Stop reason: HOSPADM

## 2025-03-22 RX ADMIN — ENOXAPARIN SODIUM 100 MG: 100 INJECTION SUBCUTANEOUS at 11:47

## 2025-03-22 RX ADMIN — SENNOSIDES AND DOCUSATE SODIUM 1 TABLET: 50; 8.6 TABLET ORAL at 08:26

## 2025-03-22 RX ADMIN — ESCITALOPRAM OXALATE 20 MG: 20 TABLET ORAL at 08:26

## 2025-03-22 RX ADMIN — LEVALBUTEROL HYDROCHLORIDE 1.25 MG: 1.25 SOLUTION RESPIRATORY (INHALATION) at 07:10

## 2025-03-22 RX ADMIN — PANTOPRAZOLE SODIUM 40 MG: 40 TABLET, DELAYED RELEASE ORAL at 11:48

## 2025-03-22 RX ADMIN — NICOTINE 1 PATCH: 14 PATCH, EXTENDED RELEASE TRANSDERMAL at 08:25

## 2025-03-22 RX ADMIN — NYSTATIN: 100000 POWDER TOPICAL at 08:27

## 2025-03-22 RX ADMIN — POLYETHYLENE GLYCOL 3350 17 G: 17 POWDER, FOR SOLUTION ORAL at 08:24

## 2025-03-22 NOTE — ASSESSMENT & PLAN NOTE
Recent Labs     03/21/25  0618 03/22/25  0324   INR 1.05 1.07      Outpatient: 2.5mg Warfarin (Sun, Tues, Wed, Fri) and 5mg (Mon, Thurs, Sat)  Will switch to Lovenox to warfarin bridge  PT/INR in 3 days

## 2025-03-22 NOTE — CASE MANAGEMENT
Case Management Progress Note    Patient name Chuy Norton  Location /-01 MRN [id number]  : 1960 Date 3/22/2025       LOS (days): 17  Geometric Mean LOS (GMLOS) (days): 4.9  Days to GMLOS:-11.8        OBJECTIVE:        Current admission status: Inpatient  Preferred Pharmacy:   [facility] - [place], [place] - [address]  [address] [address]  Phone: [phone] Fax: [phone]    Primary Care Provider: Savanna Pan DO    [facility]  Secondary Insurance: JOEL MUNOZ    PROGRESS NOTE: Pt to dc to [facility] today at 1:15pm. Cm notified pts sister María Elena. Attending, nursing and facility aware.

## 2025-03-22 NOTE — ASSESSMENT & PLAN NOTE
Max O2 req 15L  Currently down to 1-2L    Recommend pulmonary hygiene: Deep breathing with cough, OOB as tolerated, incentive spirometry and flutter valve  Will need rehab

## 2025-03-22 NOTE — DISCHARGE SUMMARY
"Discharge Summary - Hospitalist   Name: Chuy Norton 64 y.o. male I MRN: [id number]  Unit/Bed#: -01 I Date of Admission: 3/5/2025   Date of Service: 3/22/2025 I Hospital Day: 17     Assessment & Plan  Acute respiratory failure with hypoxia (HCC)  Patient brought in by facility staff for SOB, cough x2 weeks and dizziness with standing  At baseline patient states he does not wear oxygen but does wear nocturnal CPAP  S/p Chest tube on 3/12- s/p TPA/Dornase X6- chest tube removed 3/20  S/p IR chest tube 3/18- consulting IR for removal today  Discharging with oxygen 2 L  Bipolar 2 disorder, major depressive episode (HCC)  Outpatient: 20mg lexapro, 300mg Seroquel HS, 100mg trazadone HS    Plan  Continue   Patient has impaired decision making capacity as per Neuropsych  Primary hypertension  Outpatient: 12.5mg Carvedilol BID, 40mg lisinopril   /99   Pulse 71   Temp 97.9 °F (36.6 °C)   Resp 16   Ht 5' 8\" (1.727 m)   Wt 97 kg (213 lb 13.5 oz)   SpO2 92%   BMI 32.52 kg/m²     Plan  continue  PRN labetolol, for SBP >180  Hyperlipidemia  Outpatient: 40 atorvastatin    Plan  Continue statin   Cirrhosis (HCC)  Follows with [facility], last seen Jan 2025  History of cirrhosis of the liver due to history of alcohol and drug addiction.   5/9/2024 CT C/A/P noted nodular hepatic contours may indicate cirrhosis of the liver.   No ascites present  LV (left ventricular) mural thrombus    Recent Labs     03/21/25  0618 03/22/25  0324   INR 1.05 1.07      Outpatient: 2.5mg Warfarin (Sun, Tues, Wed, Fri) and 5mg (Mon, Thurs, Sat)  Will switch to Lovenox to warfarin bridge  PT/INR in 3 days  Hepatitis C  Follows with [facility], last seen Jan 2025  History of hepatitis C antibody and stated treatment with Mavyret   May 2024 negative viral load  Left lower lobe pneumonia  CXR 3/6 showing increasing left basilar opacity  CT CAP with consolidation in LLL and lingula, and small consolidation in RLL  Procal remains elevated (2.75, " 2.69, 0.94)  Urinary antigens negative. RP2 negative.   Placed on ceftriaxone, Flagyl and azithromycin, tailored to IV ceftriaxone and IV vancomycin on 3/8.    Completed 7 days of ceftriaxone, prior to that completed 3 days of azithromycin.  Currently on vancomycin only.  Continue vancomycin day 7/7  CT chest 3/12 showed compressive atelectasis of the left lower lung with focal hypodensity and cystic changes suggesting parenchymal necrosis.  Likely in the setting of MRSA pneumonia.   Completed 7 days of vancomycin.  KENN (obstructive sleep apnea)  Maintained on CPAP at home  Ischemic cardiomyopathy    Left Parapneumonic effusion    Chest x-ray 3/12 showed significantly increased opacity in the left chest felt to be mostly due to large potentially partially loculated pleural effusion.  CT chest 3/12 showed loculated pleural effusion pockets in the left apex posteriorly and in the major fissure.  Small free effusion in the left lung base.  Compressive atelectasis of the left lower lung with focal hypodensity and cystic changes suggesting parenchymal necrosis  Chest tube was placed 3/12, removed 3/20  IR chest tube placed on 3/19, removed 3/21  Dependence on nicotine from cigarettes    Encounter for assessment of decision-making capacity       Medical Problems       Resolved Problems  Date Reviewed: 3/17/2025          Resolved    MARIELY (acute kidney injury) (HCC) 3/11/2025     Resolved by  Zainab Ribeiro MD    Severe sepsis (HCC) 3/10/2025     Resolved by  Zainab Ribeiro MD    Chest pain on breathing 3/11/2025     Resolved by  Zainab Ribeiro MD        Discharging Physician / Practitioner: Caron Cooley MD  PCP: Savanna Pan DO  Admission Date:   Admission Orders (From admission, onward)       Ordered        03/05/25 1939  INPATIENT ADMISSION  Once            03/05/25 1932  INPATIENT ADMISSION  Once,   Status:  Canceled                          Discharge Date: 03/22/25    Disposition:    Other  Skilled Nursing Facility at [facility]    Reason for Admission:   Chief Complaint   Patient presents with    Shortness of Breath     Pt reports SOB. Pt has cough x2 weeks. Pt reports dizziness with standing        Discharge Diagnoses:   Please see assessment and plan section above for further details regarding discharge diagnoses.     Consultations During Hospital Stay:  Pulmonology  Behavioral health    Procedures Performed:   Chest tube x 2    Significant Findings / Test Results:   XR chest portable ICU  Result Date: 3/7/2025  Impression: Increasing left basilar opacity in keeping with effusion and a component of pneumonia or atelectasis. Workstation performed: [id number]     CT chest abdomen pelvis w contrast  Result Date: 3/5/2025  Impression: Multifocal pneumonia involving the left lower lobe, lingula, and medial right lower lobe. Fluid in the esophagus suggests aspiration as a possible etiology. Moderate size hiatal hernia and distended fluid-filled esophagus to the level of the thoracic inlet. 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 The study was marked in EPIC for immediate notification. Workstation performed: [id number]     XR chest 2 views  Result Date: 3/5/2025  Impression: Patchy lingular and lower lobe opacities, concerning for pneumonia. Radiographic follow-up to resolution is recommended. Workstation performed: [id number]       XR chest portable  Result Date: 3/21/2025  Impression Persistent small left pleural effusion and left basilar atelectasis. No visible pneumothorax. Workstation performed: [id number]     XR chest portable  Result Date: 3/21/2025  Impression Left chest tube removal with small left pleural effusion and left base atelectasis. Previous trace left apical pneumothorax not visible. Hiatal hernia. Workstation performed: [id number]        Outpatient Tests Requested:   PT/INR in 3  "days  Requesting follow-up with pulmonology office, please call and make appointment in 2 to 3 weeks    Complications:  none    Hospital Course:    Chuy Norton is a 64 y.o. male patient who originally presented to the hospital on 3/5/2025 due to acute respiratory failure likely secondary to MRSA PNA but then developed parapneumonic effusions. The L effusion is loculated, s/p 6 doses of TPA/Dornase.  Repeat CT chest showed some residual pockets of fluid in apical posterior part of the lung.  On discussion with thoracic surgery IR chest tube placed and apical posterior pocket.  With chest tube placement, effusion cleared.both chest tubes removed and currently saturating above 90% on 2 L of oxygen.     Patient has history of left ventricular thrombus.  Maintained at home on Coumadin.  After tPA dornase, patient was restarted on heparin gtt. with warfarin bridge.  Patient will be switched to Lovenox to warfarin bridge on discharge.  Recommend repeat PT/INR in 3 days      Patient will need follow-up with PCP, pulmonology office in 2 to 3 weeks.  Please call pulmonology office to make appointment    Condition at Discharge: stable    Discharge Day Visit / Exam:   Subjective:  no complaints  Vitals: Blood Pressure: 158/99 (03/22/25 0712)  Pulse: 71 (03/22/25 0712)  Temperature: 97.9 °F (36.6 °C) (03/22/25 0712)  Temp Source: Temporal (03/21/25 1506)  Respirations: 16 (03/21/25 1506)  Height: 5' 8\" (172.7 cm) (03/05/25 2041)  Weight - Scale: 97 kg (213 lb 13.5 oz) (03/20/25 0523)  SpO2: 92 % (03/22/25 0750)  Physical Exam     Gen.-Patient comfortable   Neck- Supple. No thyromegaly or lymphadenopathy  Lungs-Clear bilaterally without any wheeze or rales   Heart S1-S2, regular rate and rhythm, no murmurs  Abdomen-soft nontender, no organomegaly. Bowel sounds present  Extremities-no cyanosi,  clubbing or edema  Skin- no rash  Neuro-nonfocal     Discussion with Family: called sister    Medication Adjustments and Discharge " Medications:  Discharge Medication List: See after visit summary for reconciled discharge medications.   Medication Dosing Tapers - Please refer to Discharge Medication List for details on any medication dosing tapers (if applicable to patient).   Summary of Medication Adjustments made as a result of this hospitalization: as in avs  Medications being temporarily held (include recommended restart time): as in avs    Wound Care Recommendations:  When applicable, please see wound care section of After Visit Summary.    Instructions for any Catheters / Lines Present at Discharge (including removal date, if applicable):     Diet Recommendations at Discharge:  Diet -        Diet Orders   (From admission, onward)                 Start     Ordered    03/06/25 1306  Diet Cardiovascular; Cardiac  Diet effective now        References:    Adult Nutrition Support Algorithm    RD Therapeutic Diet Order Protocol   Question Answer Comment   Diet Type Cardiovascular    Cardiac Cardiac    RD to adjust diet per protocol? Yes        03/06/25 1305                    Mobility at time of Discharge:   Basic Mobility Inpatient Raw Score: 15  JH-HLM Goal: 4: Move to chair/commode  JH-HLM Achieved: 4: Move to chair/commode  HLM Goal achieved. Continue to encourage appropriate mobility.    Goals of Care Discussions:  Code Status at Discharge: Level 1 - Full Code  Goals of care were not discussed during this admission.    Discharge instructions/Information to patient and family:   See After Visit Summary (AVS) titled Discharge Instructions for information provided to patient and family.      Planned Readmission: none      Administrative Statements   Discharge Statement:  I have spent a total time of 33 minutes in caring for this patient on the day of the visit/encounter. >30 minutes of time was spent on: Diagnostic results, Risk factor reductions, Impressions, Counseling / Coordination of care, Documenting in the medical record, Reviewing /  ordering tests, medicine, procedures  , and Communicating with other healthcare professionals .    **Please Note: This note may have been constructed using a voice recognition system.**

## 2025-03-22 NOTE — ASSESSMENT & PLAN NOTE
Maintained on coumadin - held for tpa/dornase due to high risk for pleural bleeding  Restarted coumadin

## 2025-03-22 NOTE — PROGRESS NOTES
Progress Note - Pulmonology   Name: Chuy Norton 64 y.o. male I MRN: [id number]  Unit/Bed#: -01 I Date of Admission: 3/5/2025   Date of Service: 3/22/2025 I Hospital Day: 17    Assessment & Plan  Left Parapneumonic effusion  CT chest with loculated left pleural effusion CT placed 3/12.  Pleural fluid studies suggest complicated parapneumonic effusion.  Cultures remain negative.  Pulm placed chest tube 3/12.  S/p 6 doses of tPA/DNAse    - I spoke to Dr. Adkins of thoracic surgery on 3/18.  Reviewed CT chest 3/18 after 6 doses of tPA dornase.  There are some residual pockets of fluid in the apical posterior part of the left lung.  Dr. Adkins recommends IR chest tube to try to clear the apical posterior pocket.  - Patient is higher risk for surgery  - chest tube placed 3/12 removed 3/20  - IR chest tube placed 3/19, removed 3/21  - Repeat CT in 4 to 6 weeks after discharge  - Pulmonary will sign off please call with any questions  - He's going to rehab and rehab facility can call our office to make FU appointment in 2-3 weeks after discharge    Acute respiratory failure with hypoxia (HCC)  Max O2 req 15L  Currently down to 1-2L    Recommend pulmonary hygiene: Deep breathing with cough, OOB as tolerated, incentive spirometry and flutter valve  Will need rehab    Left lower lobe pneumonia  With MRSA nasal culture positive, treating empirically with MRSA coverage  Completed 3 days of azithromycin and 7 days of Ceftriaxone  Completed 5 days of prednisone 40 mg  Vancomycin x 7 days completed on 3/14/2025  Speech cleared the patient for regular diet and thin liquids - no signs of aspiration    LV (left ventricular) mural thrombus  Maintained on coumadin - held for tpa/dornase due to high risk for pleural bleeding  Restarted coumadin  KENN (obstructive sleep apnea)  QHS CPAP   Ischemic cardiomyopathy  Wt Readings from Last 3 Encounters:   03/20/25 97 kg (213 lb 13.5 oz)   02/07/25 95.9 kg (211 lb 6.4 oz)   01/02/25  91.2 kg (201 lb)      on admission  5 pound weight gain since admission, 13 pound weight gain since January  Echo 12/18/2024 shows LVEF 45%, systolic function mildly reduced, moderate solid fixed thrombus in the apex  Diuresis per primary team    Dependence on nicotine from cigarettes  No PFTs on file   Continue xopenex nebs TID - D/C atrovent  Will need outpatient follow-up imaging and PFTs as well as smoking cessation support  Encounter for assessment of decision-making capacity  Evaluated by neuropsych and did not have capacity    24 Hour Events : on 1-2L  Subjective : he is lying in bed and feels the same.  No cough/sputum.  No fevers    Objective :  Temp:  [97.9 °F (36.6 °C)-98.1 °F (36.7 °C)] 97.9 °F (36.6 °C)  HR:  [71-77] 71  BP: (130-158)/(77-99) 158/99  Resp:  [16] 16  SpO2:  [89 %-96 %] 92 %  O2 Device: Nasal cannula  Nasal Cannula O2 Flow Rate (L/min):  [2 L/min] 2 L/min    Physical Exam  Vitals and nursing note reviewed.   Constitutional:       General: He is not in acute distress.     Appearance: Normal appearance. He is well-developed. He is not ill-appearing, toxic-appearing or diaphoretic.   HENT:      Head: Normocephalic and atraumatic.   Eyes:      Conjunctiva/sclera: Conjunctivae normal.   Cardiovascular:      Rate and Rhythm: Normal rate.   Pulmonary:      Effort: Pulmonary effort is normal. No respiratory distress.   Abdominal:      Tenderness: There is no guarding.   Musculoskeletal:      Cervical back: Normal range of motion. No rigidity.   Neurological:      General: No focal deficit present.      Mental Status: He is alert and oriented to person, place, and time. Mental status is at baseline.   Psychiatric:         Mood and Affect: Mood normal.         Lab Results: I have reviewed the following results:   .     03/22/25  0324   PTT 84*   INR 1.07     ABG: No new results in last 24 hours.    Imaging Results Review: I personally reviewed the following image studies in PACS and  associated radiology reports: chest xray. My interpretation of the radiology images/reports is: CXR 3/21 - small L effusion atelectasis.  Other Study Results Review: Other studies reviewed include: ECHO , CT chest  PFT Results Reviewed: NA

## 2025-03-22 NOTE — ASSESSMENT & PLAN NOTE
Chest x-ray 3/12 showed significantly increased opacity in the left chest felt to be mostly due to large potentially partially loculated pleural effusion.  CT chest 3/12 showed loculated pleural effusion pockets in the left apex posteriorly and in the major fissure.  Small free effusion in the left lung base.  Compressive atelectasis of the left lower lung with focal hypodensity and cystic changes suggesting parenchymal necrosis  Chest tube was placed 3/12, removed 3/20  IR chest tube placed on 3/19, removed 3/21

## 2025-03-22 NOTE — ASSESSMENT & PLAN NOTE
CT chest with loculated left pleural effusion CT placed 3/12.  Pleural fluid studies suggest complicated parapneumonic effusion.  Cultures remain negative.  Pulm placed chest tube 3/12.  S/p 6 doses of tPA/DNAse    - I spoke to Dr. Adkins of thoracic surgery on 3/18.  Reviewed CT chest 3/18 after 6 doses of tPA dornase.  There are some residual pockets of fluid in the apical posterior part of the left lung.  Dr. Adkins recommends IR chest tube to try to clear the apical posterior pocket.  - Patient is higher risk for surgery  - chest tube placed 3/12 removed 3/20  - IR chest tube placed 3/19, removed 3/21  - Repeat CT in 4 to 6 weeks after discharge  - Pulmonary will sign off please call with any questions  - He's going to rehab and rehab facility can call our office to make FU appointment in 2-3 weeks after discharge

## 2025-03-22 NOTE — ASSESSMENT & PLAN NOTE
No PFTs on file   Continue xopenex nebs TID - D/C atrovent  Will need outpatient follow-up imaging and PFTs as well as smoking cessation support

## 2025-03-22 NOTE — ASSESSMENT & PLAN NOTE
With MRSA nasal culture positive, treating empirically with MRSA coverage  Completed 3 days of azithromycin and 7 days of Ceftriaxone  Completed 5 days of prednisone 40 mg  Vancomycin x 7 days completed on 3/14/2025  Speech cleared the patient for regular diet and thin liquids - no signs of aspiration

## 2025-03-22 NOTE — ASSESSMENT & PLAN NOTE
Patient brought in by facility staff for SOB, cough x2 weeks and dizziness with standing  At baseline patient states he does not wear oxygen but does wear nocturnal CPAP  S/p Chest tube on 3/12- s/p TPA/Dornase X6- chest tube removed 3/20  S/p IR chest tube 3/18- consulting IR for removal today  Discharging with oxygen 2 L

## 2025-03-22 NOTE — PLAN OF CARE
Problem: Potential for Falls  Goal: Patient will remain free of falls  Description: INTERVENTIONS:  - Educate patient/family on patient safety including physical limitations  - Instruct patient to call for assistance with activity   - Consult OT/PT to assist with strengthening/mobility   - Keep Call bell within reach  - Keep bed low and locked with side rails adjusted as appropriate  - Keep care items and personal belongings within reach  - Initiate and maintain comfort rounds  - Make Fall Risk Sign visible to staff  - Offer Toileting every 2 Hours, in advance of need  - Initiate/Maintain bed alarm  - Obtain necessary fall risk management equipment: socks  - Apply yellow socks and bracelet for high fall risk patients  - Consider moving patient to room near nurses station  Outcome: Progressing     Problem: Prexisting or High Potential for Compromised Skin Integrity  Goal: Skin integrity is maintained or improved  Description: INTERVENTIONS:  - Identify patients at risk for skin breakdown  - Assess and monitor skin integrity  - Assess and monitor nutrition and hydration status  - Monitor labs   - Assess for incontinence   - Turn and reposition patient  - Assist with mobility/ambulation  - Relieve pressure over bony prominences  - Avoid friction and shearing  - Provide appropriate hygiene as needed including keeping skin clean and dry  - Evaluate need for skin moisturizer/barrier cream  - Collaborate with interdisciplinary team   - Patient/family teaching  - Consider wound care consult   Outcome: Progressing     Problem: PAIN - ADULT  Goal: Verbalizes/displays adequate comfort level or baseline comfort level  Description: Interventions:  - Encourage patient to monitor pain and request assistance  - Assess pain using appropriate pain scale  - Administer analgesics based on type and severity of pain and evaluate response  - Implement non-pharmacological measures as appropriate and evaluate response  - Consider cultural  and social influences on pain and pain management  - Notify physician/advanced practitioner if interventions unsuccessful or patient reports new pain  Outcome: Progressing     Problem: INFECTION - ADULT  Goal: Absence or prevention of progression during hospitalization  Description: INTERVENTIONS:  - Assess and monitor for signs and symptoms of infection  - Monitor lab/diagnostic results  - Monitor all insertion sites, i.e. indwelling lines, tubes, and drains  - Monitor endotracheal if appropriate and nasal secretions for changes in amount and color  - Mouthcard appropriate cooling/warming therapies per order  - Administer medications as ordered  - Instruct and encourage patient and family to use good hand hygiene technique  - Identify and instruct in appropriate isolation precautions for identified infection/condition  Outcome: Progressing  Goal: Absence of fever/infection during neutropenic period  Description: INTERVENTIONS:  - Monitor WBC    Outcome: Progressing     Problem: SAFETY ADULT  Goal: Patient will remain free of falls  Description: INTERVENTIONS:  - Educate patient/family on patient safety including physical limitations  - Instruct patient to call for assistance with activity   - Consult OT/PT to assist with strengthening/mobility   - Keep Call bell within reach  - Keep bed low and locked with side rails adjusted as appropriate  - Keep care items and personal belongings within reach  - Initiate and maintain comfort rounds  - Make Fall Risk Sign visible to staff  - Offer Toileting every 2 Hours, in advance of need  - Initiate/Maintain bed alarm  - Obtain necessary fall risk management equipment: socks  - Apply yellow socks and bracelet for high fall risk patients  - Consider moving patient to room near nurses station  Outcome: Progressing  Goal: Maintain or return to baseline ADL function  Description: INTERVENTIONS:  -  Assess patient's ability to carry out ADLs; assess patient's baseline for ADL  function and identify physical deficits which impact ability to perform ADLs (bathing, care of mouth/teeth, toileting, grooming, dressing, etc.)  - Assess/evaluate cause of self-care deficits   - Assess range of motion  - Assess patient's mobility; develop plan if impaired  - Assess patient's need for assistive devices and provide as appropriate  - Encourage maximum independence but intervene and supervise when necessary  - Involve family in performance of ADLs  - Assess for home care needs following discharge   - Consider OT consult to assist with ADL evaluation and planning for discharge  - Provide patient education as appropriate  Outcome: Progressing  Goal: Maintains/Returns to pre admission functional level  Description: INTERVENTIONS:  - Perform AM-PAC 6 Click Basic Mobility/ Daily Activity assessment daily.  - Set and communicate daily mobility goal to care team and patient/family/caregiver.   - Collaborate with rehabilitation services on mobility goals if consulted  - Perform Range of Motion 3 times a day.  - Reposition patient every 2 hours.  - Dangle patient 3 times a day  - Stand patient 3 times a day  - Ambulate patient 3 times a day  - Out of bed to chair 3 times a day   - Out of bed for meals 3 times a day  - Out of bed for toileting  - Record patient progress and toleration of activity level   Outcome: Progressing     Problem: DISCHARGE PLANNING  Goal: Discharge to home or other facility with appropriate resources  Description: INTERVENTIONS:  - Identify barriers to discharge w/patient and caregiver  - Arrange for needed discharge resources and transportation as appropriate  - Identify discharge learning needs (meds, wound care, etc.)  - Arrange for interpretive services to assist at discharge as needed  - Refer to Case Management Department for coordinating discharge planning if the patient needs post-hospital services based on physician/advanced practitioner order or complex needs related to  functional status, cognitive ability, or social support system  Outcome: Progressing     Problem: Knowledge Deficit  Goal: Patient/family/caregiver demonstrates understanding of disease process, treatment plan, medications, and discharge instructions  Description: Complete learning assessment and assess knowledge base.  Interventions:  - Provide teaching at level of understanding  - Provide teaching via preferred learning methods  Outcome: Progressing     Problem: CARDIOVASCULAR - ADULT  Goal: Maintains optimal cardiac output and hemodynamic stability  Description: INTERVENTIONS:  - Monitor I/O, vital signs and rhythm  - Monitor for S/S and trends of decreased cardiac output  - Administer and titrate ordered vasoactive medications to optimize hemodynamic stability  - Assess quality of pulses, skin color and temperature  - Assess for signs of decreased coronary artery perfusion  - Instruct patient to report change in severity of symptoms  Outcome: Progressing  Goal: Absence of cardiac dysrhythmias or at baseline rhythm  Description: INTERVENTIONS:  - Continuous cardiac monitoring, vital signs, obtain 12 lead EKG if ordered  - Administer antiarrhythmic and heart rate control medications as ordered  - Monitor electrolytes and administer replacement therapy as ordered  Outcome: Progressing     Problem: RESPIRATORY - ADULT  Goal: Achieves optimal ventilation and oxygenation  Description: INTERVENTIONS:  - Assess for changes in respiratory status  - Assess for changes in mentation and behavior  - Position to facilitate oxygenation and minimize respiratory effort  - Oxygen administered by appropriate delivery if ordered  - Initiate smoking cessation education as indicated  - Encourage broncho-pulmonary hygiene including cough, deep breathe, Incentive Spirometry  - Assess the need for suctioning and aspirate as needed  - Assess and instruct to report SOB or any respiratory difficulty  - Respiratory Therapy support as  indicated  Outcome: Progressing     Problem: METABOLIC, FLUID AND ELECTROLYTES - ADULT  Goal: Electrolytes maintained within normal limits  Description: INTERVENTIONS:  - Monitor labs and assess patient for signs and symptoms of electrolyte imbalances  - Administer electrolyte replacement as ordered  - Monitor response to electrolyte replacements, including repeat lab results as appropriate  - Instruct patient on fluid and nutrition as appropriate  Outcome: Progressing  Goal: Fluid balance maintained  Description: INTERVENTIONS:  - Monitor labs   - Monitor I/O and WT  - Instruct patient on fluid and nutrition as appropriate  - Assess for signs & symptoms of volume excess or deficit  Outcome: Progressing  Goal: Glucose maintained within target range  Description: INTERVENTIONS:  - Monitor Blood Glucose as ordered  - Assess for signs and symptoms of hyperglycemia and hypoglycemia  - Administer ordered medications to maintain glucose within target range  - Assess nutritional intake and initiate nutrition service referral as needed  Outcome: Progressing

## 2025-03-22 NOTE — ASSESSMENT & PLAN NOTE
Wt Readings from Last 3 Encounters:   03/20/25 97 kg (213 lb 13.5 oz)   02/07/25 95.9 kg (211 lb 6.4 oz)   01/02/25 91.2 kg (201 lb)      on admission  5 pound weight gain since admission, 13 pound weight gain since January  Echo 12/18/2024 shows LVEF 45%, systolic function mildly reduced, moderate solid fixed thrombus in the apex  Diuresis per primary team

## 2025-03-22 NOTE — ASSESSMENT & PLAN NOTE
"Outpatient: 12.5mg Carvedilol BID, 40mg lisinopril   /99   Pulse 71   Temp 97.9 °F (36.6 °C)   Resp 16   Ht 5' 8\" (1.727 m)   Wt 97 kg (213 lb 13.5 oz)   SpO2 92%   BMI 32.52 kg/m²     Plan  continue  PRN labetolol, for SBP >180  "

## 2025-03-22 NOTE — ASSESSMENT & PLAN NOTE
Outpatient: 20mg lexapro, 300mg Seroquel HS, 100mg trazadone HS    Plan  Continue   Patient has impaired decision making capacity as per Neuropsych   21

## 2025-03-24 NOTE — UTILIZATION REVIEW
NOTIFICATION OF ADMISSION DISCHARGE   This is a Notification of Discharge from [facility]. Please be advised that this patient has been discharge from our facility. Below you will find the admission and discharge date and time including the patient’s disposition.   UTILIZATION REVIEW CONTACT:  Sara Ulrich  Utilization   Network Utilization Review Department  Phone: [phone] x carefully listen to the prompts. All voicemails are confidential.  Email: [email or web address]     ADMISSION INFORMATION  PRESENTATION DATE: 3/5/2025  2:25 PM  OBERVATION ADMISSION DATE: N/A  INPATIENT ADMISSION DATE: 3/5/25  7:39 PM   DISCHARGE DATE: 3/22/2025  2:47 PM   DISPOSITION:Non [facility] SNF/TCU/SNU    Network Utilization Review Department  ATTENTION: Please call with any questions or concerns to [phone] and carefully listen to the prompts so that you are directed to the right person. All voicemails are confidential.   For Discharge needs, contact Care Management DC Support Team at [phone] opt. 2  Send all requests for admission clinical reviews, approved or denied determinations and any other requests to dedicated fax number below belonging to the campus where the patient is receiving treatment. List of dedicated fax numbers for the Facilities:  FACILITY NAME UR FAX NUMBER   ADMISSION DENIALS (Administrative/Medical Necessity) [phone]   DISCHARGE SUPPORT TEAM ([facility]) [phone]   PARENT CHILD HEALTH (Maternity/NICU/Pediatrics) [phone]   [facility] [phone]   [facility] [phone]   [facility] [phone]   [facility] [phone]   [facility] [phone]   [facility] [phone]   [facility] [phone]   [facility]  [phone]   [facility] [phone]   [facility] [phone]   [facility] [phone]   [facility] [phone]

## 2025-03-25 ENCOUNTER — TELEPHONE (OUTPATIENT)
Age: 65
End: 2025-03-25

## 2025-03-25 NOTE — TELEPHONE ENCOUNTER
Patients GI provider:  Dr. Mix    Number to return call: [phone]    Reason for call: Sayra from [facility] called to schedule an appointment for PT, per provider for possible esophageal dysphagia. [facility] might be [facility]. They will contact them and give a call back if the referral is needed.      Scheduled procedure/appointment date if applicable: Appt: 04/21/25

## 2025-04-01 ENCOUNTER — OFFICE VISIT (OUTPATIENT)
Age: 65
End: 2025-04-01
Payer: COMMERCIAL

## 2025-04-01 VITALS
SYSTOLIC BLOOD PRESSURE: 142 MMHG | OXYGEN SATURATION: 94 % | HEIGHT: 68 IN | TEMPERATURE: 97.1 F | HEART RATE: 66 BPM | DIASTOLIC BLOOD PRESSURE: 80 MMHG | WEIGHT: 210 LBS | BODY MASS INDEX: 31.83 KG/M2

## 2025-04-01 DIAGNOSIS — J18.9 PNEUMONIA OF LEFT LOWER LOBE DUE TO INFECTIOUS ORGANISM: Primary | ICD-10-CM

## 2025-04-01 DIAGNOSIS — J96.11 CHRONIC HYPOXIC RESPIRATORY FAILURE (HCC): ICD-10-CM

## 2025-04-01 DIAGNOSIS — F17.210 CIGARETTE NICOTINE DEPENDENCE WITHOUT COMPLICATION: ICD-10-CM

## 2025-04-01 DIAGNOSIS — G47.33 OSA (OBSTRUCTIVE SLEEP APNEA): Chronic | ICD-10-CM

## 2025-04-01 PROCEDURE — 99214 OFFICE O/P EST MOD 30 MIN: CPT

## 2025-04-01 PROCEDURE — 94618 PULMONARY STRESS TESTING: CPT

## 2025-04-01 RX ORDER — ALBUTEROL SULFATE 90 UG/1
2 INHALANT RESPIRATORY (INHALATION) EVERY 6 HOURS PRN
Qty: 8.5 G | Refills: 1 | Status: SHIPPED | OUTPATIENT
Start: 2025-04-01

## 2025-04-01 NOTE — PATIENT INSTRUCTIONS
- Complete CT chest, expected around 4/26/25  - Complete PFTs in 1-2 months  - Please establish with [employer] so we can obtain CPAP compliance report  - You can discontinue your home oxygen

## 2025-04-01 NOTE — PROGRESS NOTES
Follow-up  Visit - Pulmonary Medicine   Name: Chuy Norton      : 1960      MRN: [id number]  Encounter Provider: NEHEMIAH Koch  Encounter Date: 2025   Encounter department: [facility]  :  Assessment & Plan  Pneumonia of left lower lobe due to infectious organism  With complicated left parapneumonic effusion requiring multiple chest tubes and tpa/dornase during admission  Feels his respiratory symptoms have greatly improved since hospital discharge  Repeat CT chest, expected 25  Start albuterol inhaler q6h prn. He currently has no inhalers and I would like him at the very minimum to have rescue inhaler if needed. Would likely benefit from LAMA/LABA, can discuss following PFTS  Orders:    Complete PFT with post bronchodilator; Future    CT chest without contrast; Future    albuterol (ProAir HFA) 90 mcg/act inhaler; Inhale 2 puffs every 6 (six) hours as needed for wheezing    Chronic hypoxic respiratory failure (HCC)  Suspect this is secondary to complicated course of pneumonia, as well as likely underlying COPD. He was discharged home from hospital with 2L continuous supplemental oxygen  Will complete 6MW today in office  Patient was initially saturating 95% on room air at rest, and then made to ambulate, oxygen saturations remained at 93% on exertion with room air  Discussed with the patient he no longer requires oxygen and can discontinue use.  Given his continued smoking, I strongly suspect he has underlying COPD and would like to establish baseline lung function on him. He will complete PFTS. I have asked him to wait 1-2 months to allow for resolution of his complicated pneumonia  Orders:    Complete PFT with post bronchodilator; Future    POCT Oxygen Titration    Discontinue home oxygen    KENN (obstructive sleep apnea)  On CPAP, wearing every night per patient, mostly tolerating well  Instructed on his facility paperwork to establish with DME so we can  obtain compliance       Cigarette nicotine dependence without complication  Continues to smoke 1/2ppd.  No interest in quitting. We discussed importance of smoking cessation.  Orders:    Complete PFT with post bronchodilator; Future    albuterol (ProAir HFA) 90 mcg/act inhaler; Inhale 2 puffs every 6 (six) hours as needed for wheezing      Return in about 4 months (around 8/1/2025) for Next scheduled follow up with physician.    History of Present Illness   Chuy Norton is a 65 y.o. male who presents for hospital follow up. He was admitted for an elongated hospital course 3/5/25-3/22/25 with complicated LLL pneumonia, left parapneumonic loculated effusion requiring two chest tubes and tpa/dornase instillation. He was treated with azithromycin, ceftriaxone and vancomycin for his pneumonia. Was requiring up to 15L oxygen during his hopsitalization, did require some time in ICU. Discharged home on 2L oxygen.     He currently feels his respiratory symptoms have returned to baseline. He denies any chest discomfort, wheezing, shortness of breath of cough. To note patient does appear dyspneic following his 6MW in office today. He states following hospital discharge he wore his oxygen for about 3 weeks, however is no longer using it. He is not maintained on any inhaled regimen.     He continues to smoke 1/2ppd. He states he has no interest in quitting.     Review of Systems   Constitutional:  Negative for chills and fever.   HENT:  Negative for congestion, ear pain and sore throat.    Eyes:  Negative for pain and visual disturbance.   Respiratory:  Negative for cough, chest tightness, shortness of breath and wheezing.    Cardiovascular:  Negative for chest pain and palpitations.   Musculoskeletal:  Negative for arthralgias and back pain.   Skin:  Negative for color change and rash.   Neurological:  Negative for syncope and headaches.   All other systems reviewed and are negative.      Aside from what is mentioned in the  HPI, ROS is otherwise negative    Medical History Reviewed by provider this encounter:  Tobacco  Allergies  Meds  Problems  Med Hx  Surg Hx  Fam Hx     .  Current Outpatient Medications on File Prior to Visit   Medication Sig Dispense Refill    acetaminophen (TYLENOL) 650 mg CR tablet Take 1 tablet (650 mg total) by mouth every 8 (eight) hours as needed for mild pain 30 tablet 0    atorvastatin (LIPITOR) 40 mg tablet Take 1 tablet (40 mg total) by mouth daily with dinner 30 tablet 0    benzonatate (TESSALON) 200 MG capsule Take 1 capsule (200 mg total) by mouth 3 (three) times a day as needed for cough 20 capsule 0    carvedilol (COREG) 12.5 mg tablet Take 1 tablet (12.5 mg total) by mouth 2 (two) times a day with meals 180 tablet 1    diclofenac sodium (Voltaren) 1 % Apply 2 g topically 4 (four) times a day 1 Tube 0    escitalopram (LEXAPRO) 20 mg tablet Take 1 tablet (20 mg total) by mouth daily 21 tablet 0    ferrous sulfate 325 (65 Fe) mg tablet Take 1 tablet (325 mg total) by mouth daily with breakfast Do not start before December 7, 2024. 30 tablet 0    guaiFENesin (ROBITUSSIN) 100 MG/5ML oral liquid Take 10 mL (200 mg total) by mouth 3 (three) times a day as needed for cough 120 mL 0    lidocaine (LIDODERM) 5 % Apply 1 patch topically over 12 hours daily Remove & Discard patch within 12 hours or as directed by MD      nicotine polacrilex (NICORETTE) 2 mg gum Chew 1 each (2 mg total) every 3 (three) hours as needed for smoking cessation 100 each 0    pantoprazole (PROTONIX) 40 mg tablet Take 1 tablet (40 mg total) by mouth 2 (two) times a day before lunch and dinner Do not start before December 7, 2024. 30 tablet 0    polyethylene glycol (MIRALAX) 17 g packet Take 17 g by mouth daily as needed (constipation) 30 each 0    QUEtiapine (SEROquel) 300 mg tablet Take 1 tablet (300 mg total) by mouth daily at bedtime 30 tablet 0    traZODone (DESYREL) 100 mg tablet Take 1 tablet (100 mg total) by mouth daily  "at bedtime 21 tablet 0    warfarin (COUMADIN) 5 mg tablet Take 1 tablet (5 mg total) by mouth daily Do not start before December 21, 2024.      enoxaparin (LOVENOX) 100 mg/mL Inject 1 mL (100 mg total) under the skin every 12 (twelve) hours for 5 days (Patient not taking: Reported on 4/1/2025)       No current facility-administered medications on file prior to visit.      Social History     Tobacco Use    Smoking status: Every Day     Current packs/day: 0.50     Types: Cigarettes, Cigars    Smokeless tobacco: Never    Tobacco comments:     PT \"3 CIGARS A DAY\" - quit smoking cigars   Vaping Use    Vaping status: Never Used   Substance and Sexual Activity    Alcohol use: Not Currently     Alcohol/week: 0.0 standard drinks of alcohol     Comment: Stopped drinking for several months - recovered alcoholic    Drug use: Not Currently     Types: Marijuana     Comment: Last used marijuana 2 months ago.- no longer smoking    Sexual activity: Never        Medical History Reviewed by provider this encounter:  Tobacco  Allergies  Meds  Problems  Med Hx  Surg Hx  Fam Hx     .    Objective   /80 (BP Location: Left arm, Patient Position: Sitting, Cuff Size: Standard)   Pulse 66   Temp (!) 97.1 °F (36.2 °C) (Tympanic)   Ht 5' 8\" (1.727 m)   Wt 95.3 kg (210 lb)   SpO2 94%   BMI 31.93 kg/m²     Physical Exam  Vitals and nursing note reviewed.   Constitutional:       General: He is not in acute distress.  HENT:      Head: Normocephalic and atraumatic.      Nose: Nose normal.   Eyes:      Conjunctiva/sclera: Conjunctivae normal.   Cardiovascular:      Rate and Rhythm: Normal rate.   Pulmonary:      Effort: Pulmonary effort is normal. No respiratory distress.      Breath sounds: Decreased breath sounds (bibasilar) present. No wheezing or rhonchi.   Abdominal:      General: Abdomen is flat.   Musculoskeletal:         General: No swelling. Normal range of motion.      Cervical back: Normal range of motion and neck " "supple.   Neurological:      General: No focal deficit present.      Mental Status: He is alert and oriented to person, place, and time.   Psychiatric:         Mood and Affect: Mood normal.         Behavior: Behavior normal.           Diagnostic Data:  Labs: I personally reviewed the most recent laboratory data pertinent to today's visit.      Radiology results:  Radiology Results Review: I have reviewed radiology reports from 3/21/25 including: chest xray.  Persistent small left pleural effusion and left basilar atelectasis. No visible pneumothorax.      PFT/spirometry results:  No results found for: \"FEV1\", \"FVC\", \"AXT8FXC\", \"TLC\", \"DLCO\"       Oximetry testing:  See above      ROSIBEL Venegas RN FNP-BC  Nurse Practitioner  [facility]    "

## 2025-04-01 NOTE — ASSESSMENT & PLAN NOTE
On CPAP, wearing every night per patient, mostly tolerating well  Instructed on his facility paperwork to establish with DME so we can obtain compliance

## 2025-04-01 NOTE — ASSESSMENT & PLAN NOTE
With complicated left parapneumonic effusion requiring multiple chest tubes and tpa/dornase during admission  Feels his respiratory symptoms have greatly improved since hospital discharge  Repeat CT chest, expected 4/26/25  Start albuterol inhaler q6h prn. He currently has no inhalers and I would like him at the very minimum to have rescue inhaler if needed. Would likely benefit from LAMA/LABA, can discuss following PFTS  Orders:    Complete PFT with post bronchodilator; Future    CT chest without contrast; Future    albuterol (ProAir HFA) 90 mcg/act inhaler; Inhale 2 puffs every 6 (six) hours as needed for wheezing

## 2025-04-01 NOTE — ASSESSMENT & PLAN NOTE
Continues to smoke 1/2ppd.  No interest in quitting. We discussed importance of smoking cessation.  Orders:    Complete PFT with post bronchodilator; Future    albuterol (ProAir HFA) 90 mcg/act inhaler; Inhale 2 puffs every 6 (six) hours as needed for wheezing

## 2025-04-01 NOTE — ASSESSMENT & PLAN NOTE
Suspect this is secondary to complicated course of pneumonia, as well as likely underlying COPD. He was discharged home from hospital with 2L continuous supplemental oxygen  Will complete 6MW today in office  Patient was initially saturating 95% on room air at rest, and then made to ambulate, oxygen saturations remained at 93% on exertion with room air  Discussed with the patient he no longer requires oxygen and can discontinue use.  Given his continued smoking, I strongly suspect he has underlying COPD and would like to establish baseline lung function on him. He will complete PFTS. I have asked him to wait 1-2 months to allow for resolution of his complicated pneumonia  Orders:    Complete PFT with post bronchodilator; Future    POCT Oxygen Titration    Discontinue home oxygen

## 2025-04-02 LAB
DME PARACHUTE DELIVERY DATE REQUESTED: NORMAL
DME PARACHUTE ITEM DESCRIPTION: NORMAL
DME PARACHUTE ORDER STATUS: NORMAL
DME PARACHUTE SUPPLIER NAME: NORMAL
DME PARACHUTE SUPPLIER PHONE: NORMAL

## 2025-04-16 PROBLEM — J18.9 LEFT LOWER LOBE PNEUMONIA: Status: RESOLVED | Noted: 2025-03-07 | Resolved: 2025-04-16

## 2025-04-21 ENCOUNTER — OFFICE VISIT (OUTPATIENT)
Dept: GASTROENTEROLOGY | Facility: CLINIC | Age: 65
End: 2025-04-21
Payer: COMMERCIAL

## 2025-04-21 VITALS
SYSTOLIC BLOOD PRESSURE: 165 MMHG | BODY MASS INDEX: 24.25 KG/M2 | HEIGHT: 68 IN | DIASTOLIC BLOOD PRESSURE: 93 MMHG | WEIGHT: 160 LBS

## 2025-04-21 DIAGNOSIS — D50.0 IRON DEFICIENCY ANEMIA DUE TO CHRONIC BLOOD LOSS: ICD-10-CM

## 2025-04-21 DIAGNOSIS — K70.30 ALCOHOLIC CIRRHOSIS OF LIVER WITHOUT ASCITES (HCC): Primary | Chronic | ICD-10-CM

## 2025-04-21 DIAGNOSIS — R13.14 PHARYNGOESOPHAGEAL DYSPHAGIA: ICD-10-CM

## 2025-04-21 DIAGNOSIS — B19.20 HEPATITIS C VIRUS INFECTION WITHOUT HEPATIC COMA, UNSPECIFIED CHRONICITY: ICD-10-CM

## 2025-04-21 DIAGNOSIS — K27.9 PUD (PEPTIC ULCER DISEASE): ICD-10-CM

## 2025-04-21 PROCEDURE — 99214 OFFICE O/P EST MOD 30 MIN: CPT | Performed by: INTERNAL MEDICINE

## 2025-04-21 NOTE — ASSESSMENT & PLAN NOTE
Questionable swallowing difficulty and aspiration per nursing home.  Patient without specific complaints.  Negative EGD December 2020.  CAT scan from March 2025 with hiatal hernia and some fluid in the stomach.   -Follow-up for now  -Would treat conservatively.  Patient not a candidate for hiatal hernia surgery  -If concerned would have speech pathology evaluate patient

## 2025-04-21 NOTE — ASSESSMENT & PLAN NOTE
Secondary to peptic ulcer disease and hiatal hernia with Poli erosions.  Maintained chronically on iron and PPI.  Hemoglobin normal on admission to the hospital recently but then decreased but remained stable between 11 and 12  -Continue PPI and iron

## 2025-04-21 NOTE — ASSESSMENT & PLAN NOTE
Gastric ulcer in 2024.  Follow-up EGD December 2024 revealed ulcers that healed.  Maintained chronically on PPI

## 2025-04-21 NOTE — ASSESSMENT & PLAN NOTE
Compensated cirrhosis secondary to hepatitis C and alcohol.  No evidence of hepatic encephalopathy or ascites/edema.  Last variceal screening December 2024.  Last hepatoma screening March 2025.  MELD score 9 on recent blood work from the hospital  -Continue to follow for now

## 2025-04-21 NOTE — PROGRESS NOTES
Name: Chuy Norton      : 1960      MRN: [id number]  Encounter Provider: Omari Mix MD  Encounter Date: 2025   Encounter department: [facility]  :  Assessment & Plan  Alcoholic cirrhosis of liver without ascites (HCC)  Compensated cirrhosis secondary to hepatitis C and alcohol.  No evidence of hepatic encephalopathy or ascites/edema.  Last variceal screening 2024.  Last hepatoma screening 2025.  MELD score 9 on recent blood work from the hospital  -Continue to follow for now       Hepatitis C virus infection without hepatic coma, unspecified chronicity  Treated with Mavyret in the past.  Negative viral load 2024       Pharyngoesophageal dysphagia  Questionable swallowing difficulty and aspiration per nursing home.  Patient without specific complaints.  Negative EGD 2020.  CAT scan from 2025 with hiatal hernia and some fluid in the stomach.   -Follow-up for now  -Would treat conservatively.  Patient not a candidate for hiatal hernia surgery  -If concerned would have speech pathology evaluate patient       Iron deficiency anemia due to chronic blood loss  Secondary to peptic ulcer disease and hiatal hernia with Poli erosions.  Maintained chronically on iron and PPI.  Hemoglobin normal on admission to the hospital recently but then decreased but remained stable between  and 12  -Continue PPI and iron       PUD (peptic ulcer disease)  Gastric ulcer in .  Follow-up EGD 2024 revealed ulcers that healed.  Maintained chronically on PPI           History of Present Illness   Chuy Norton is a 65 y.o. male who presents for follow-up after recent hospitalization for respiratory compromise.  He has a history of compensated cirrhosis secondary to alcohol use and hepatitis C.  He was treated with Mavyret in the past and his viral load was - May 2024.  He denies any ascites, lower extremity edema, or encephalopathy.   He also has a history of iron deficiency anemia with sepsis secondary to peptic ulcer disease as well as a hiatal hernia with Poli's erosions.  He is been maintained chronically on Protonix twice a day and iron supplementation.  His CBC was stable between 11 and 12 during recent hospitalization.  He is now residing at a nursing home.  There was some question about aspiration and was asked to get back to see us.  He denies any nausea or vomiting.  He denies any coughing when he swallows.  He denies any dysphagia, odynophagia, or early satiety.  His last EGD was December 2024.  HPI  Information from patient and paperwork from nursing home  Review of Systems A complete review of systems is negative other than that noted above in the HPI.      Current Outpatient Medications   Medication Sig Dispense Refill    acetaminophen (TYLENOL) 650 mg CR tablet Take 1 tablet (650 mg total) by mouth every 8 (eight) hours as needed for mild pain 30 tablet 0    atorvastatin (LIPITOR) 40 mg tablet Take 1 tablet (40 mg total) by mouth daily with dinner 30 tablet 0    carvedilol (COREG) 12.5 mg tablet Take 1 tablet (12.5 mg total) by mouth 2 (two) times a day with meals 180 tablet 1    diclofenac sodium (Voltaren) 1 % Apply 2 g topically 4 (four) times a day 1 Tube 0    escitalopram (LEXAPRO) 20 mg tablet Take 1 tablet (20 mg total) by mouth daily 21 tablet 0    ferrous sulfate 325 (65 Fe) mg tablet Take 1 tablet (325 mg total) by mouth daily with breakfast Do not start before December 7, 2024. 30 tablet 0    guaiFENesin (ROBITUSSIN) 100 MG/5ML oral liquid Take 10 mL (200 mg total) by mouth 3 (three) times a day as needed for cough 120 mL 0    lidocaine (LIDODERM) 5 % Apply 1 patch topically over 12 hours daily Remove & Discard patch within 12 hours or as directed by MD      nicotine polacrilex (NICORETTE) 2 mg gum Chew 1 each (2 mg total) every 3 (three) hours as needed for smoking cessation 100 each 0    pantoprazole (PROTONIX) 40  "mg tablet Take 1 tablet (40 mg total) by mouth 2 (two) times a day before lunch and dinner Do not start before December 7, 2024. 30 tablet 0    polyethylene glycol (MIRALAX) 17 g packet Take 17 g by mouth daily as needed (constipation) 30 each 0    QUEtiapine (SEROquel) 300 mg tablet Take 1 tablet (300 mg total) by mouth daily at bedtime 30 tablet 0    traZODone (DESYREL) 100 mg tablet Take 1 tablet (100 mg total) by mouth daily at bedtime 21 tablet 0    warfarin (COUMADIN) 5 mg tablet Take 1 tablet (5 mg total) by mouth daily Do not start before December 21, 2024.       No current facility-administered medications for this visit.     Objective   /93 (BP Location: Left arm, Patient Position: Sitting)   Ht 5' 8\" (1.727 m)   Wt 72.6 kg (160 lb) Comment: pt provided;  presented in WC today  BMI 24.33 kg/m²     Physical Exam   General appearance: alert, appears stated age and cooperative  Eyes: PERLLA, EOMI, no icterus   Head: Normocephalic, without obvious abnormality, atraumatic  Lungs: clear to auscultation bilaterally  Heart: regular rate and rhythm, S1, S2 normal, no murmur, click, rub or gallop  Abdomen: soft, non-tender; bowel sounds normal; no masses,  no organomegaly  Extremities: extremities normal, atraumatic, no cyanosis or edema  Neurologic: Grossly normal.  No asterixis      Lab Results: I personally reviewed relevant lab results. CBC/BMP: No new results in last 24 hours. , Creatinine Clearance: CrCl cannot be calculated (Patient's most recent lab result is older than the maximum 7 days allowed.)., LFTs: No new results in last 24 hours. , PTT/INR:No new results in last 24 hours.     Radiology Results Review: I have reviewed radiology reports from [facility] including: CT abdomen/pelvis.  Results for orders placed during the hospital encounter of 11/29/24    Colonoscopy    Impression  Moderate left and right-sided diverticulosis  3 mm polyp in ascending colon removed with forcep biopsy  Small internal " hemorrhoid  Fair prep    RECOMMENDATION:  Repeat colonoscopy in 5 years, due: 11/30/2029  Inadequate bowel preparation    Will call with polyp pathology in 1 to 2 weeks  Resume regular diet and medications  Repeat colonoscopy in 5 years to suboptimal prep and 1 small polyp  Suspect anemia related to hiatal hernia and Poli's erosions  Would continue PPI and iron supplement chronically              Omari Mix MD

## 2025-04-24 ENCOUNTER — TELEPHONE (OUTPATIENT)
Age: 65
End: 2025-04-24

## 2025-04-24 NOTE — TELEPHONE ENCOUNTER
Patients GI provider:  Dr. Mix     Number to return call: [phone] Yolanda Self, Speech Therapy at [facility]     Reason for call: Yolanda Self, speech therapy at [facility], contacted office to update provider. Patient is currently seeing speech therapy which was recommend at recent office visit. She wanted to make provider aware patient has been undergoing speech therapy. Patient had recent multifacial pneumonia, where fluid filled esophagus. Patient is impulsive and does like to eat fast. Instrumental swallow study was completed which concluded mild oral dysphasia and weakness. Their recommendation was to be evaluated by GI with concerns of the esophagus. They were possibly suggesting EGD for evaluation. Yolanda can be contacted at [phone] with any further questions.

## 2025-04-24 NOTE — TELEPHONE ENCOUNTER
Yolanda from [facility] called stated she is returning a call from someone. Maranda transferred over to Clinical team Niki for further assistance

## 2025-04-25 ENCOUNTER — HOSPITAL ENCOUNTER (OUTPATIENT)
Dept: CT IMAGING | Facility: HOSPITAL | Age: 65
Discharge: HOME | End: 2025-04-25
Payer: COMMERCIAL

## 2025-04-25 DIAGNOSIS — J18.9 PNEUMONIA OF LEFT LOWER LOBE DUE TO INFECTIOUS ORGANISM: ICD-10-CM

## 2025-04-25 PROCEDURE — 71250 CT THORAX DX C-: CPT

## 2025-05-05 ENCOUNTER — RESULTS FOLLOW-UP (OUTPATIENT)
Dept: OTHER | Facility: HOSPITAL | Age: 65
End: 2025-05-05

## 2025-05-07 ENCOUNTER — ANTICOAG VISIT (OUTPATIENT)
Dept: CARDIOLOGY CLINIC | Facility: CLINIC | Age: 65
End: 2025-05-07

## 2025-05-07 DIAGNOSIS — I51.3 LV (LEFT VENTRICULAR) MURAL THROMBUS: Primary | ICD-10-CM

## 2025-05-07 LAB — INR PPP: 3.8 (ref 0.85–1.19)

## 2025-05-07 NOTE — PROGRESS NOTES
Received INR result from Rose Torres ([facility])  Phone # [phone]  Left message for Rose to call office, will need to discuss what dose patient has been getting at rehab, then will be able to dose patient. Phone # provided.     Rose returned my call, states patient has been getting 6 mg daily.   Advised to hold tonight, then take 5 mg daily, will recheck next week 5/13/25  Physician order faxed

## 2025-05-14 ENCOUNTER — ANTICOAG VISIT (OUTPATIENT)
Dept: CARDIOLOGY CLINIC | Facility: CLINIC | Age: 65
End: 2025-05-14

## 2025-05-14 DIAGNOSIS — I51.3 LV (LEFT VENTRICULAR) MURAL THROMBUS: Primary | ICD-10-CM

## 2025-05-14 LAB — INR PPP: 3.9 (ref 0.85–1.19)

## 2025-05-14 NOTE — PROGRESS NOTES
Spoke with Rose at [facility], advised INR still high, advised will hold tonight, then have patient take 2.5 mg Mon Wed Fri, 5 mg all other days, will recheck next week 5/20/25    Physician order faxed.

## 2025-05-20 ENCOUNTER — OFFICE VISIT (OUTPATIENT)
Dept: NEUROLOGY | Facility: CLINIC | Age: 65
End: 2025-05-20
Payer: COMMERCIAL

## 2025-05-20 VITALS
HEART RATE: 65 BPM | WEIGHT: 200 LBS | BODY MASS INDEX: 30.31 KG/M2 | HEIGHT: 68 IN | SYSTOLIC BLOOD PRESSURE: 150 MMHG | DIASTOLIC BLOOD PRESSURE: 90 MMHG

## 2025-05-20 DIAGNOSIS — I10 PRIMARY HYPERTENSION: ICD-10-CM

## 2025-05-20 DIAGNOSIS — Z86.73 HISTORY OF CVA (CEREBROVASCULAR ACCIDENT): Primary | ICD-10-CM

## 2025-05-20 DIAGNOSIS — I65.23 CAROTID ARTERY PLAQUE, BILATERAL: ICD-10-CM

## 2025-05-20 DIAGNOSIS — G47.33 OSA (OBSTRUCTIVE SLEEP APNEA): Chronic | ICD-10-CM

## 2025-05-20 DIAGNOSIS — I25.5 ISCHEMIC CARDIOMYOPATHY: ICD-10-CM

## 2025-05-20 DIAGNOSIS — E78.5 HYPERLIPIDEMIA: ICD-10-CM

## 2025-05-20 PROBLEM — R29.90 STROKE-LIKE SYMPTOMS: Status: RESOLVED | Noted: 2024-12-17 | Resolved: 2025-05-20

## 2025-05-20 PROCEDURE — 99214 OFFICE O/P EST MOD 30 MIN: CPT | Performed by: PSYCHIATRY & NEUROLOGY

## 2025-05-20 RX ORDER — ASPIRIN 81 MG/1
81 TABLET, CHEWABLE ORAL DAILY
Qty: 90 TABLET | Refills: 3 | Status: SHIPPED | OUTPATIENT
Start: 2025-05-20

## 2025-05-20 NOTE — ASSESSMENT & PLAN NOTE
Patient Instructions   Stroke: Chuy presents for a hospital follow-up with regard to his prior history of stroke.  In particular, in December 2024 he presented to the hospital with left-sided weakness and MRI confirmed multiple prior ischemic strokes but no acute stroke at that time.  TIA remains a possible diagnosis from that hospitalization or a reemergence of prior stroke symptoms.  Regardless, it is important that we engage in proper secondary stroke prevention at this point in time  - He should continue warfarin under the direction of the cardiology group.  I would recommend that we add baby aspirin once per day.  Although this will increase his risk for bleeding complication, given his event this past December, the bilateral carotid plaque on his CT angiogram, and multiple prior ischemic strokes I believe it is appropriate.  He can take 81 mg of aspirin once per day  - We will repeat a carotid Doppler ultrasound in 1 month which I ordered for him  - He should continue atorvastatin and have a target LDL cholesterol of less than 70 which he has achieved.  We also  recommend a target hemoglobin A1c of less than 7%  - His blood pressure should occasionally be checked and the target should be less than 130/80 most of the time.  If the numbers are frequently higher than that I would recommend checking once per day for a few weeks to establish a trend and working with his primary caregiver/primary care team  - He would benefit from a course of physical and Occupational Therapy for strength, balance, endurance, and coordination.  I ordered that for him today as well  - I gave him a prescription today for a complete blood count.  This should be checked in roughly 2 weeks or at the next time his INR needs to be checked around that timeframe.  That will help us to ensure that he is not having any reduced blood counts with the addition of aspirin    He will see me directly in 5 months time either for an in office visit  or a virtual visit at his discretion.  If he were to have new strokelike symptoms such as sudden painless loss of vision or double vision, difficulty speaking or swallowing, vertigo/room spinning that does not quickly resolve, or weakness/numbness/loss of coordination affecting 1 side of the face or body he should proceed by ambulance to the nearest emergency room immediately.  Given his combination of Coumadin and aspirin if he were to fall and strike his head or to suddenly develop the worst headache of his life he should likewise proceed by ambulance to the nearest emergency room.    Orders:    aspirin 81 mg chewable tablet; Chew 1 tablet (81 mg total) daily    Ambulatory Referral to Physical Therapy; Future    Ambulatory referral to Occupational Therapy; Future    CBC and differential; Future

## 2025-05-20 NOTE — PROGRESS NOTES
Name: Chuy Norton      : 1960      MRN: [id number]  Encounter Provider: vOidio Mcdonald MD  Encounter Date: 2025   Encounter department: [facility]  :  Assessment & Plan  History of CVA (cerebrovascular accident)  Patient Instructions   Stroke: Chuy presents for a hospital follow-up with regard to his prior history of stroke.  In particular, in 2024 he presented to the hospital with left-sided weakness and MRI confirmed multiple prior ischemic strokes but no acute stroke at that time.  TIA remains a possible diagnosis from that hospitalization or a reemergence of prior stroke symptoms.  Regardless, it is important that we engage in proper secondary stroke prevention at this point in time  - He should continue warfarin under the direction of the cardiology group.  I would recommend that we add baby aspirin once per day.  Although this will increase his risk for bleeding complication, given his event this past December, the bilateral carotid plaque on his CT angiogram, and multiple prior ischemic strokes I believe it is appropriate.  He can take 81 mg of aspirin once per day  - We will repeat a carotid Doppler ultrasound in 1 month which I ordered for him  - He should continue atorvastatin and have a target LDL cholesterol of less than 70 which he has achieved.  We also  recommend a target hemoglobin A1c of less than 7%  - His blood pressure should occasionally be checked and the target should be less than 130/80 most of the time.  If the numbers are frequently higher than that I would recommend checking once per day for a few weeks to establish a trend and working with his primary caregiver/primary care team  - He would benefit from a course of physical and Occupational Therapy for strength, balance, endurance, and coordination.  I ordered that for him today as well  - I gave him a prescription today for a complete blood count.  This should be checked in roughly 2  weeks or at the next time his INR needs to be checked around that timeframe.  That will help us to ensure that he is not having any reduced blood counts with the addition of aspirin    He will see me directly in 5 months time either for an in office visit or a virtual visit at his discretion.  If he were to have new strokelike symptoms such as sudden painless loss of vision or double vision, difficulty speaking or swallowing, vertigo/room spinning that does not quickly resolve, or weakness/numbness/loss of coordination affecting 1 side of the face or body he should proceed by ambulance to the nearest emergency room immediately.  Given his combination of Coumadin and aspirin if he were to fall and strike his head or to suddenly develop the worst headache of his life he should likewise proceed by ambulance to the nearest emergency room.    Orders:    aspirin 81 mg chewable tablet; Chew 1 tablet (81 mg total) daily    Ambulatory Referral to Physical Therapy; Future    Ambulatory referral to Occupational Therapy; Future    CBC and differential; Future    Primary hypertension         Ischemic cardiomyopathy         KENN (obstructive sleep apnea)         Hyperlipidemia         Carotid artery plaque, bilateral    Orders:    VAS carotid complete study; Future    VAS carotid complete study; Future          History of Present Illness   CVA/TIA-like Symptoms  Pertinent negatives include no fatigue, fever, headaches, myalgias, nausea, neck pain, numbness, rash, vomiting or weakness.      History of Present Illness  The patient presents for a hospital follow-up regarding his prior history of stroke.    He was last evaluated in the hospital in 12/2024 due to elevated blood pressure and left-sided weakness. Since then, he has experienced an improvement in his left-sided weakness and reports no new stroke symptoms such as sudden visual disturbances, speech difficulties, swallowing issues, numbness, or weakness. He has not had any  falls and is currently undergoing physical therapy, which includes walking exercises. He reports satisfactory sleep quality and no appetite issues. His mood remains stable, with no signs of anxiety, depression, or anger spells. He also reports no bleeding or bruising incidents, including nosebleeds or blood in the urine. He has a scab on his face (right side). He is right-handed and reports no numbness, tingling, or pain in his feet. He continues to smoke half a pack of cigarettes daily and expresses no desire to quit. He has been experiencing twitching and closing of his left eye for some time, but it is not painful and does not cause him discomfort.  We reviewed his MRI images together.    He resides long term at his current facility.  His care coordinator was also in the visit and she confirmed his history.    INTERVAL: Since last visit, he has experienced an improvement in his left-sided weakness and reports no new stroke symptoms. He has not had any falls and is not currently undergoing physical therapy.    SOCIAL HISTORY  Tobacco: Smokes about half a pack a day.  Sleep: Sleeps well at night.    MEDICATIONS  CURRENT MEDS:  Coumadin  Atorvastatin  PREVIOUS MEDS:  Baby Aspirin 81 mg Oral Once daily  Start Date: 12/2024        Review of Systems   Constitutional:  Negative for appetite change, fatigue and fever.   HENT: Negative.  Negative for hearing loss, tinnitus, trouble swallowing and voice change.    Eyes: Negative.  Negative for photophobia, pain and visual disturbance.   Respiratory: Negative.  Negative for shortness of breath.    Cardiovascular: Negative.  Negative for palpitations.   Gastrointestinal: Negative.  Negative for nausea and vomiting.   Endocrine: Negative.  Negative for cold intolerance.   Genitourinary: Negative.  Negative for dysuria, frequency and urgency.   Musculoskeletal:  Negative for back pain, gait problem, myalgias, neck pain and neck stiffness.   Skin: Negative.  Negative for rash.  "  Allergic/Immunologic: Negative.    Neurological:  Negative for dizziness, tremors, seizures, syncope, facial asymmetry, speech difficulty, weakness, light-headedness, numbness and headaches.   Hematological: Negative.  Does not bruise/bleed easily.   Psychiatric/Behavioral: Negative.  Negative for confusion, hallucinations and sleep disturbance.     I have personally reviewed the MA's review of systems and made changes as necessary.         Objective   /90 (BP Location: Left arm, Patient Position: Sitting, Cuff Size: Standard)   Pulse 65   Ht 5' 8\" (1.727 m)   Wt 90.7 kg (200 lb)   BMI 30.41 kg/m²     Physical Exam  Neurological Exam    Physical Exam  Cranial Nerve Examination    CN II: Visual fields intact.    CN III IV VI: Pupils are reactive to light and accommodation. Extraocular movements intact.    CN V: Facial sensation is intact bilaterally.    CN VII: Facial movements are symmetrical.    CN VIII: Hearing is intact bilaterally.    CN X: Palate elevation is normal.    CN XI: Shoulder shrug and head turn strength are normal.    CN XII: Tongue is midline with normal movements.    He is edentulous.  Strong smell of tobacco.  Noted 2 healing scaps on the right posterior face, one with a flesh colored enlarged base    Motor Examination    Muscle Bulk and Tone: Normal muscle bulk    Strength: Symmetric weakness in arms. Hand  strength is normal.  Missing his second digit on the right hand.  Noted clubbing in the distal fingers bilaterally with dark stains.    Coordination: Finger-to-nose test normal. Slight difficulty with left arm.    Upper Extremity Drift Test: No upper extremity drift.    Sensory Examination    Light Touch, Vibration and Proprioception: Sensation intact in all four extremities.    His gait was stable              "

## 2025-05-20 NOTE — PATIENT INSTRUCTIONS
Stroke: Chuy presents for a hospital follow-up with regard to his prior history of stroke.  In particular, in December 2024 he presented to the hospital with left-sided weakness and MRI confirmed multiple prior ischemic strokes but no acute stroke at that time.  TIA remains a possible diagnosis from that hospitalization or a reemergence of prior stroke symptoms.  Regardless, it is important that we engage in proper secondary stroke prevention at this point in time  - He should continue warfarin under the direction of the cardiology group.  I would recommend that we add baby aspirin once per day.  Although this will increase his risk for bleeding complication, given his event this past December, the bilateral carotid plaque on his CT angiogram, and multiple prior ischemic strokes I believe it is appropriate.  He can take 81 mg of aspirin once per day  - We will repeat a carotid Doppler ultrasound in 1 month which I ordered for him  - He should continue atorvastatin and have a target LDL cholesterol of less than 70 which he has achieved.  We also  recommend a target hemoglobin A1c of less than 7%  - His blood pressure should occasionally be checked and the target should be less than 130/80 most of the time.  If the numbers are frequently higher than that I would recommend checking once per day for a few weeks to establish a trend and working with his primary caregiver/primary care team  - He would benefit from a course of physical and Occupational Therapy for strength, balance, endurance, and coordination.  I ordered that for him today as well  - I gave him a prescription today for a complete blood count.  This should be checked in roughly 2 weeks or at the next time his INR needs to be checked around that timeframe.  That will help us to ensure that he is not having any reduced blood counts with the addition of aspirin    He will see me directly in 5 months time either for an in office visit or a virtual visit at  his discretion.  If he were to have new strokelike symptoms such as sudden painless loss of vision or double vision, difficulty speaking or swallowing, vertigo/room spinning that does not quickly resolve, or weakness/numbness/loss of coordination affecting 1 side of the face or body he should proceed by ambulance to the nearest emergency room immediately.  Given his combination of Coumadin and aspirin if he were to fall and strike his head or to suddenly develop the worst headache of his life he should likewise proceed by ambulance to the nearest emergency room.

## 2025-05-23 ENCOUNTER — ANTICOAG VISIT (OUTPATIENT)
Dept: CARDIOLOGY CLINIC | Facility: CLINIC | Age: 65
End: 2025-05-23

## 2025-05-23 DIAGNOSIS — I51.3 LV (LEFT VENTRICULAR) MURAL THROMBUS: Primary | ICD-10-CM

## 2025-05-23 LAB — INR PPP: 3.3 (ref 0.85–1.19)

## 2025-05-23 NOTE — PROGRESS NOTES
Spoke with Jo-Ann from [facility], calling with INR 3.3  She confirmed dose patient taking.  Advised will hold tonight, then take 5 mg Mon Wed Fri, 2.5 mg all other days, will recheck 6/3/25    Physician order faxed

## 2025-05-27 ENCOUNTER — ANTICOAG VISIT (OUTPATIENT)
Dept: CARDIOLOGY CLINIC | Facility: CLINIC | Age: 65
End: 2025-05-27

## 2025-05-27 DIAGNOSIS — I51.3 LV (LEFT VENTRICULAR) MURAL THROMBUS: Primary | ICD-10-CM

## 2025-05-27 LAB — INR PPP: 2.1 (ref 0.85–1.19)

## 2025-05-27 NOTE — PROGRESS NOTES
Received INR from [facility].   INR good, will continue 5 mg Mon Wed Fri, 2.5 mg all other days, will recheck next week 6/3/25  Physician order faxed.

## 2025-06-03 ENCOUNTER — HOSPITAL ENCOUNTER (OUTPATIENT)
Dept: PULMONOLOGY | Facility: HOSPITAL | Age: 65
Discharge: HOME | End: 2025-06-03
Payer: COMMERCIAL

## 2025-06-03 DIAGNOSIS — J18.9 PNEUMONIA OF LEFT LOWER LOBE DUE TO INFECTIOUS ORGANISM: ICD-10-CM

## 2025-06-03 DIAGNOSIS — F17.210 CIGARETTE NICOTINE DEPENDENCE WITHOUT COMPLICATION: ICD-10-CM

## 2025-06-03 DIAGNOSIS — J96.11 CHRONIC HYPOXIC RESPIRATORY FAILURE (HCC): ICD-10-CM

## 2025-06-03 LAB — INR PPP: 1.9 (ref 0.85–1.19)

## 2025-06-03 PROCEDURE — 94760 N-INVAS EAR/PLS OXIMETRY 1: CPT

## 2025-06-03 PROCEDURE — 94729 DIFFUSING CAPACITY: CPT

## 2025-06-03 PROCEDURE — 94729 DIFFUSING CAPACITY: CPT | Performed by: INTERNAL MEDICINE

## 2025-06-03 PROCEDURE — 94726 PLETHYSMOGRAPHY LUNG VOLUMES: CPT | Performed by: INTERNAL MEDICINE

## 2025-06-03 PROCEDURE — 94726 PLETHYSMOGRAPHY LUNG VOLUMES: CPT

## 2025-06-03 PROCEDURE — 94060 EVALUATION OF WHEEZING: CPT

## 2025-06-03 PROCEDURE — 94060 EVALUATION OF WHEEZING: CPT | Performed by: INTERNAL MEDICINE

## 2025-06-03 RX ORDER — ALBUTEROL SULFATE 0.83 MG/ML
2.5 SOLUTION RESPIRATORY (INHALATION) ONCE
Status: COMPLETED | OUTPATIENT
Start: 2025-06-03 | End: 2025-06-03

## 2025-06-03 RX ADMIN — ALBUTEROL SULFATE 2.5 MG: 2.5 SOLUTION RESPIRATORY (INHALATION) at 08:55

## 2025-06-05 ENCOUNTER — ANTICOAG VISIT (OUTPATIENT)
Dept: CARDIOLOGY CLINIC | Facility: CLINIC | Age: 65
End: 2025-06-05

## 2025-06-05 DIAGNOSIS — I51.3 LV (LEFT VENTRICULAR) MURAL THROMBUS: Primary | ICD-10-CM

## 2025-06-05 NOTE — PROGRESS NOTES
Rose from [facility] called to see if we received INR from Tues.  I see results in media, but we were not notified.    Advised INR low, will have patient take 2.5 mg Mon Wed Fri, 5 mg all other days, will recheck in 2 weeks 6/17/25  Physician order faxed.

## 2025-06-25 ENCOUNTER — TELEPHONE (OUTPATIENT)
Age: 65
End: 2025-06-25

## 2025-06-25 NOTE — TELEPHONE ENCOUNTER
Spoke with Sonam, advised I have never seen a [facility] referral #, advised she might want to call the [facility]. She will try that.

## 2025-06-25 NOTE — TELEPHONE ENCOUNTER
Received call from Sonam from [facility] stating they are trying to bill the INR done on 6/3 to his VA insurance but needs an auth number. Unable to locate auth number in pt's chart. Called [facility] but they were unsure as well.    Please review and advise if pt has a VA auth number on file. Thanks!    Sonam callback number: [phone]. She stated she works 8:30am-12:30pm but can leave her a message.

## 2025-07-07 ENCOUNTER — APPOINTMENT (OUTPATIENT)
Dept: RADIOLOGY | Facility: HOSPITAL | Age: 65
End: 2025-07-07
Payer: COMMERCIAL

## 2025-07-07 ENCOUNTER — OFFICE VISIT (OUTPATIENT)
Dept: URGENT CARE | Facility: CLINIC | Age: 65
End: 2025-07-07
Payer: COMMERCIAL

## 2025-07-07 ENCOUNTER — HOSPITAL ENCOUNTER (EMERGENCY)
Facility: HOSPITAL | Age: 65
Discharge: HOME | End: 2025-07-07
Attending: EMERGENCY MEDICINE | Admitting: EMERGENCY MEDICINE
Payer: COMMERCIAL

## 2025-07-07 VITALS
HEART RATE: 75 BPM | BODY MASS INDEX: 30.65 KG/M2 | HEIGHT: 68 IN | DIASTOLIC BLOOD PRESSURE: 108 MMHG | SYSTOLIC BLOOD PRESSURE: 194 MMHG | WEIGHT: 202.2 LBS | TEMPERATURE: 96.8 F | RESPIRATION RATE: 16 BRPM | OXYGEN SATURATION: 99 %

## 2025-07-07 VITALS
RESPIRATION RATE: 16 BRPM | SYSTOLIC BLOOD PRESSURE: 176 MMHG | OXYGEN SATURATION: 97 % | TEMPERATURE: 97.5 F | DIASTOLIC BLOOD PRESSURE: 91 MMHG | HEART RATE: 72 BPM

## 2025-07-07 DIAGNOSIS — I10 HYPERTENSION: ICD-10-CM

## 2025-07-07 DIAGNOSIS — H11.32 SUBCONJUNCTIVAL HEMORRHAGE OF LEFT EYE: Primary | ICD-10-CM

## 2025-07-07 LAB
4HR DELTA HS TROPONIN: 4 NG/L
ALBUMIN SERPL BCG-MCNC: 4.3 G/DL (ref 3.5–5)
ALP SERPL-CCNC: 78 U/L (ref 34–104)
ALT SERPL W P-5'-P-CCNC: 13 U/L (ref 7–52)
ANION GAP SERPL CALCULATED.3IONS-SCNC: 7 MMOL/L (ref 4–13)
APTT PPP: 41 SECONDS (ref 23–34)
AST SERPL W P-5'-P-CCNC: 19 U/L (ref 13–39)
BASOPHILS # BLD AUTO: 0.03 THOUSANDS/ÂΜL (ref 0–0.1)
BASOPHILS NFR BLD AUTO: 1 % (ref 0–1)
BILIRUB SERPL-MCNC: 0.44 MG/DL (ref 0.2–1)
BUN SERPL-MCNC: 18 MG/DL (ref 5–25)
CALCIUM SERPL-MCNC: 9.2 MG/DL (ref 8.4–10.2)
CARDIAC TROPONIN I PNL SERPL HS: 12 NG/L (ref ?–50)
CARDIAC TROPONIN I PNL SERPL HS: 16 NG/L (ref ?–50)
CHLORIDE SERPL-SCNC: 108 MMOL/L (ref 96–108)
CO2 SERPL-SCNC: 22 MMOL/L (ref 21–32)
CREAT SERPL-MCNC: 1.16 MG/DL (ref 0.6–1.3)
EOSINOPHIL # BLD AUTO: 0.25 THOUSAND/ÂΜL (ref 0–0.61)
EOSINOPHIL NFR BLD AUTO: 5 % (ref 0–6)
ERYTHROCYTE [DISTWIDTH] IN BLOOD BY AUTOMATED COUNT: 13.6 % (ref 11.6–15.1)
GFR SERPL CREATININE-BSD FRML MDRD: 66 ML/MIN/1.73SQ M
GLUCOSE SERPL-MCNC: 92 MG/DL (ref 65–140)
HCT VFR BLD AUTO: 43.4 % (ref 36.5–49.3)
HGB BLD-MCNC: 14.5 G/DL (ref 12–17)
IMM GRANULOCYTES # BLD AUTO: 0.02 THOUSAND/UL (ref 0–0.2)
IMM GRANULOCYTES NFR BLD AUTO: 0 % (ref 0–2)
INR PPP: 2.37 (ref 0.85–1.19)
LYMPHOCYTES # BLD AUTO: 1.02 THOUSANDS/ÂΜL (ref 0.6–4.47)
LYMPHOCYTES NFR BLD AUTO: 22 % (ref 14–44)
MCH RBC QN AUTO: 31.3 PG (ref 26.8–34.3)
MCHC RBC AUTO-ENTMCNC: 33.4 G/DL (ref 31.4–37.4)
MCV RBC AUTO: 94 FL (ref 82–98)
MONOCYTES # BLD AUTO: 0.7 THOUSAND/ÂΜL (ref 0.17–1.22)
MONOCYTES NFR BLD AUTO: 15 % (ref 4–12)
NEUTROPHILS # BLD AUTO: 2.73 THOUSANDS/ÂΜL (ref 1.85–7.62)
NEUTS SEG NFR BLD AUTO: 57 % (ref 43–75)
NRBC BLD AUTO-RTO: 0 /100 WBCS
PLATELET # BLD AUTO: 131 THOUSANDS/UL (ref 149–390)
POTASSIUM SERPL-SCNC: 4.2 MMOL/L (ref 3.5–5.3)
PROT SERPL-MCNC: 7.5 G/DL (ref 6.4–8.4)
PROTHROMBIN TIME: 26.2 SECONDS (ref 12.3–15)
RBC # BLD AUTO: 4.63 MILLION/UL (ref 3.88–5.62)
SODIUM SERPL-SCNC: 137 MMOL/L (ref 135–147)
WBC # BLD AUTO: 4.75 THOUSAND/UL (ref 4.31–10.16)

## 2025-07-07 PROCEDURE — 85025 COMPLETE CBC W/AUTO DIFF WBC: CPT | Performed by: EMERGENCY MEDICINE

## 2025-07-07 PROCEDURE — 99213 OFFICE O/P EST LOW 20 MIN: CPT | Performed by: PHYSICIAN ASSISTANT

## 2025-07-07 PROCEDURE — 85610 PROTHROMBIN TIME: CPT | Performed by: PHYSICIAN ASSISTANT

## 2025-07-07 PROCEDURE — 99283 EMERGENCY DEPT VISIT LOW MDM: CPT

## 2025-07-07 PROCEDURE — S9088 SERVICES PROVIDED IN URGENT: HCPCS | Performed by: PHYSICIAN ASSISTANT

## 2025-07-07 PROCEDURE — 99285 EMERGENCY DEPT VISIT HI MDM: CPT | Performed by: PHYSICIAN ASSISTANT

## 2025-07-07 PROCEDURE — 80053 COMPREHEN METABOLIC PANEL: CPT | Performed by: EMERGENCY MEDICINE

## 2025-07-07 PROCEDURE — 71046 X-RAY EXAM CHEST 2 VIEWS: CPT

## 2025-07-07 PROCEDURE — 93005 ELECTROCARDIOGRAM TRACING: CPT

## 2025-07-07 PROCEDURE — 84484 ASSAY OF TROPONIN QUANT: CPT | Performed by: EMERGENCY MEDICINE

## 2025-07-07 PROCEDURE — 36415 COLL VENOUS BLD VENIPUNCTURE: CPT

## 2025-07-07 PROCEDURE — 85730 THROMBOPLASTIN TIME PARTIAL: CPT | Performed by: PHYSICIAN ASSISTANT

## 2025-07-07 NOTE — PATIENT INSTRUCTIONS
Patient was educated on hemorrhage in left eye.  Patient was told any changes in vision go directly to ED.  Patient was told his blood pressure is extremely high and should be further evaluated.  Follow-up with PCP.

## 2025-07-07 NOTE — PROGRESS NOTES
"[facility]  Name: Chuy Norton      : 1960      MRN: [id number]  Encounter Provider: Mattie Acevedo PA-C  Encounter Date: 2025   Encounter department: [facility] [place]  :  Assessment & Plan  Subconjunctival hemorrhage of left eye    Orders:    Transfer to other facility    Declined Foreign body check.     Patient Instructions    Patient was educated on hemorrhage in left eye.  Patient was told any changes in vision go directly to ED.  Patient was told his blood pressure is extremely high and should be further evaluated.  Follow-up with PCP.  Follow up with PCP in 3-5 days.  Proceed to  ER if symptoms worsen.    If tests are performed, our office will contact you with results only if changes need to made to the care plan discussed with you at the visit. You can review your full results on [facility] MyChart.    Chief Complaint:   Chief Complaint   Patient presents with    Conjunctivitis     Pt c/o SHANNA in left eye. Pt c/o itching and red left eye,\"once in a while\". Symptoms started three weeks ago.      History of Present Illness   Patient is a 65-year-old male who presents today to the urgent care with caregiver .  Patient reports redness in his left eye for a few weeks.  Denies any injury or trauma.  Denies any foreign body in left eye.  Denies any visual changes in left eye.  Patient does admit history of high blood pressure that he is on medication for.  Admits being on blood thinner coumadin.  [facility] [place]  Denies any current chest pain or shortness of breath.  Admits allergies to haloperidol    Conjunctivitis   Associated symptoms include eye redness. Pertinent negatives include no eye discharge.         Review of Systems   Constitutional: Negative.    Eyes:  Positive for redness. Negative for discharge.   Respiratory: Negative.     Cardiovascular: Negative.    Psychiatric/Behavioral: Negative.       Past Medical History   Past Medical " "History[1]  Past Surgical History[2]  Family History[3]  he reports that he has been smoking cigarettes and cigars. He has never used smokeless tobacco. He reports that he does not currently use alcohol. He reports that he does not currently use drugs after having used the following drugs: Marijuana.  Current Outpatient Medications   Medication Instructions    acetaminophen (TYLENOL) 650 mg, Oral, Every 8 hours PRN    aspirin 81 mg, Oral, Daily    atorvastatin (LIPITOR) 40 mg, Oral, Daily with dinner    carvedilol (COREG) 12.5 mg, Oral, 2 times daily with meals    diclofenac sodium (VOLTAREN) 2 g, Topical, 4 times daily    escitalopram (LEXAPRO) 20 mg, Oral, Daily    ferrous sulfate 325 mg, Oral, Daily with breakfast    guaiFENesin (ROBITUSSIN) 200 mg, Oral, 3 times daily PRN    lidocaine (LIDODERM) 5 % 1 patch, Topical, Daily, Remove & Discard patch within 12 hours or as directed by MD    nicotine polacrilex (NICORETTE) 2 mg, Mouth/Throat, Every 3 hours PRN    pantoprazole (PROTONIX) 40 mg, Oral, 2 times daily (before lunch and dinner)    polyethylene glycol (MIRALAX) 17 g, Oral, Daily PRN    QUEtiapine (SEROQUEL) 300 mg, Oral, Daily at bedtime    traZODone (DESYREL) 100 mg, Oral, Daily at bedtime    warfarin (COUMADIN) 5 mg, Oral, Daily (warfarin)   Allergies[4]     Objective   BP (!) 191/102   Pulse 75   Temp (!) 96.8 °F (36 °C) (Tympanic)   Resp 16   Ht 5' 8\" (1.727 m)   Wt 91.7 kg (202 lb 3.2 oz)   SpO2 99%   BMI 30.74 kg/m²      Physical Exam  Vitals and nursing note reviewed.   Constitutional:       Appearance: Normal appearance.   HENT:      Head: Normocephalic.      Right Ear: Tympanic membrane, ear canal and external ear normal.      Left Ear: Tympanic membrane, ear canal and external ear normal.      Mouth/Throat:      Comments: Airway patent    Eyes:      Comments: Left sclera has hemorrhage noted on inner left eye.    No discharge from left eye.     Cardiovascular:      Rate and Rhythm: Normal " "rate and regular rhythm.      Heart sounds: Normal heart sounds.   Pulmonary:      Breath sounds: Normal breath sounds. No wheezing.     Neurological:      General: No focal deficit present.      Mental Status: He is alert and oriented to person, place, and time.     Psychiatric:         Mood and Affect: Mood normal.         Behavior: Behavior normal.         Portions of the record may have been created with voice recognition software.  Occasional wrong word or \"sound a like\" substitutions may have occurred due to the inherent limitations of voice recognition software.  Read the chart carefully and recognize, using context, where substitutions have occurred.         [1]   Past Medical History:  Diagnosis Date    Alcohol abuse     Depression     Drug therapy     GERD (gastroesophageal reflux disease)     Hepatitis C     Hypertension 01/2012    Knee pain, bilateral     Left ventricular apical thrombus     Psychiatric disorder     depression, anxiety    Renal disorder     Self-injurious behavior     Spinal stenosis of lumbar region     Stroke-like symptoms 12/17/2024    Suicide attempt (HCC)    [2]   Past Surgical History:  Procedure Laterality Date    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    HERNIA REPAIR  1997    IR CHEST TUBE PLACEMENT  3/19/2025   [3]   Family History  Problem Relation Name Age of Onset    Depression Mother      Depression Father      No Known Problems Sister      No Known Problems Brother     [4]   Allergies  Allergen Reactions    Haloperidol Tremor and Vomiting     Other reaction(s): jittery     "

## 2025-07-08 LAB
ATRIAL RATE: 72 BPM
P AXIS: 53 DEGREES
PR INTERVAL: 230 MS
QRS AXIS: -29 DEGREES
QRSD INTERVAL: 100 MS
QT INTERVAL: 372 MS
QTC INTERVAL: 408 MS
T WAVE AXIS: 97 DEGREES
VENTRICULAR RATE: 72 BPM

## 2025-07-08 PROCEDURE — 93010 ELECTROCARDIOGRAM REPORT: CPT | Performed by: INTERNAL MEDICINE

## 2025-07-08 NOTE — ED PROVIDER NOTES
Time reflects when diagnosis was documented in both MDM as applicable and the Disposition within this note       Time User Action Codes Description Comment    7/7/2025  8:58 PM Mattie Archuleta Add [H11.32] Subconjunctival hemorrhage of left eye     7/7/2025  8:58 PM Mattie Archuleta Add [I10] Hypertension           ED Disposition       ED Disposition   Discharge    Condition   Stable    Date/Time   Mon Jul 7, 2025  8:57 PM    Comment   Chuy Norton discharge to home/self care.                   Assessment & Plan       Medical Decision Making  Patient with left subconjunctival hemorrahge, will check INR to r/o supratherapeutic INR.  Patient with HTN, will continue to monitor.  Patient has asymptomatic HTN, no further workup needed.    INR in therapeutic range, no need to adjust, advised f/u with PCP for BP check in 2-3 days.  Return precautions given.     Amount and/or Complexity of Data Reviewed  External Data Reviewed: ECG.  Labs: ordered.  Radiology: ordered and independent interpretation performed.  ECG/medicine tests: ordered and independent interpretation performed.             Medications - No data to display    ED Risk Strat Scores   HEART Risk Score      Flowsheet Row Most Recent Value   Heart Score Risk Calculator    History 0 Filed at: 07/07/2025 2155   ECG 0 Filed at: 07/07/2025 2155   Age 1 Filed at: 07/07/2025 2155   Risk Factors 2 Filed at: 07/07/2025 2155   Troponin 1 Filed at: 07/07/2025 2155   HEART Score 4 Filed at: 07/07/2025 2155          HEART Risk Score      Flowsheet Row Most Recent Value   Heart Score Risk Calculator    History 0 Filed at: 07/07/2025 2155   ECG 0 Filed at: 07/07/2025 2155   Age 1 Filed at: 07/07/2025 2155   Risk Factors 2 Filed at: 07/07/2025 2155   Troponin 1 Filed at: 07/07/2025 2155   HEART Score 4 Filed at: 07/07/2025 2155                      No data recorded        SBIRT 22yo+      Flowsheet Row Most Recent Value   Initial Alcohol Screen: US AUDIT-C     1. How  "often do you have a drink containing alcohol? 0 Filed at: 07/07/2025 1547   2. How many drinks containing alcohol do you have on a typical day you are drinking?  0 Filed at: 07/07/2025 1547   3a. Male UNDER 65: How often do you have five or more drinks on one occasion? 0 Filed at: 07/07/2025 1547   Audit-C Score 0 Filed at: 07/07/2025 1547   PETRA: How many times in the past year have you...    Used an illegal drug or used a prescription medication for non-medical reasons? Never Filed at: 07/07/2025 1543                            History of Present Illness       Chief Complaint   Patient presents with    Eye Problem     Pt to ED. Pt states that someone noticed redness to his L eye. Pt was seen at  and was told to come to ED for \"popped blood vessel in the eye and high blood pressure\". Pt denies any vision changes, HA, dizziness, or CP.     High Blood Pressure       Past Medical History[1]   Past Surgical History[2]   Family History[3]   Social History[4]   E-Cigarette/Vaping    E-Cigarette Use Never User       E-Cigarette/Vaping Substances    Nicotine No     THC No     CBD No     Flavoring No     Other No     Unknown No       I have reviewed and agree with the history as documented.     Patient is a 66 y/o M with h/o HTN, bipolar d/o that presents to the ED with left eye redness that he noticed today.  Patient has subconjunctival hemorrhage.  He states he does have a chronic cough.  NO trauma to the eye.  No pain to the eye.  Patient's BP is elevated.  He denies headache, chest pain or SOB.  Patient is on coumadin and does not remember the last time his INR was checked.       History provided by:  Patient  Eye Problem  Associated symptoms: redness    Associated symptoms: no discharge, no headaches, no itching, no nausea, no numbness, no vomiting and no weakness        Review of Systems   Constitutional:  Negative for fever.   Eyes:  Positive for redness. Negative for pain, discharge and itching.   Respiratory:  " Positive for cough (chronic). Negative for shortness of breath.    Cardiovascular:  Negative for chest pain, palpitations and leg swelling.   Gastrointestinal:  Negative for nausea and vomiting.   Skin:  Negative for color change, pallor and rash.   Neurological:  Negative for dizziness, speech difficulty, weakness, light-headedness, numbness and headaches.   Psychiatric/Behavioral:  Negative for confusion.    All other systems reviewed and are negative.          Objective       ED Triage Vitals [07/07/25 1545]   Temperature Pulse Blood Pressure Respirations SpO2 Patient Position - Orthostatic VS   97.5 °F (36.4 °C) 72 (!) 199/102 16 97 % Sitting      Temp Source Heart Rate Source BP Location FiO2 (%) Pain Score    Temporal Monitor Left arm -- No Pain      Vitals      Date and Time Temp Pulse SpO2 Resp BP Pain Score FACES Pain Rating User   07/07/25 2100 -- -- -- -- 176/91 -- -- SV   07/07/25 2046 -- -- -- -- 173/99 -- -- CMD   07/07/25 1545 97.5 °F (36.4 °C) 72 97 % 16 199/102 No Pain -- RN            Physical Exam  Vitals and nursing note reviewed.   Constitutional:       General: He is not in acute distress.     Appearance: Normal appearance. He is well-developed and well-groomed. He is not ill-appearing or diaphoretic.   HENT:      Head: Normocephalic and atraumatic.      Right Ear: Hearing normal.      Left Ear: Hearing normal.      Nose: Nose normal.     Eyes:      General: Lids are normal.      Pupils: Pupils are equal, round, and reactive to light.      Comments: Left subconjunctival hemorrhage     Cardiovascular:      Rate and Rhythm: Normal rate and regular rhythm.   Pulmonary:      Effort: Pulmonary effort is normal.      Breath sounds: Normal breath sounds.     Musculoskeletal:         General: Normal range of motion.      Cervical back: Normal range of motion and neck supple.     Skin:     General: Skin is warm and dry.      Coloration: Skin is not jaundiced or pale.      Findings: No rash.      Neurological:      General: No focal deficit present.      Mental Status: He is alert and oriented to person, place, and time.      Cranial Nerves: No cranial nerve deficit.      Motor: No weakness.     Psychiatric:         Behavior: Behavior is cooperative.         Results Reviewed       Procedure Component Value Units Date/Time    HS Troponin I 4hr [[id number]]  (Normal) Collected: 07/07/25 2030    Lab Status: Final result Specimen: Blood from Arm, Right Updated: 07/07/25 2057     hs TnI 4hr 16 ng/L      Delta 4hr hsTnI 4 ng/L     Protime-INR [[id number]]  (Abnormal) Collected: 07/07/25 2031    Lab Status: Final result Specimen: Blood from Arm, Right Updated: 07/07/25 2053     Protime 26.2 seconds      INR 2.37    Narrative:      INR Therapeutic Range    Indication                                             INR Range      Atrial Fibrillation                                               2.0-3.0  Hypercoagulable State                                    2.0.2.3  Left Ventricular Asist Device                            2.0-3.0  Mechanical Heart Valve                                  -    Aortic(with afib, MI, embolism, HF, LA enlargement,    and/or coagulopathy)                                     2.0-3.0 (2.5-3.5)     Mitral                                                             2.5-3.5  Prosthetic/Bioprosthetic Heart Valve               2.0-3.0  Venous thromboembolism (VTE: VT, PE        2.0-3.0    APTT [[id number]]  (Abnormal) Collected: 07/07/25 2031    Lab Status: Final result Specimen: Blood from Arm, Right Updated: 07/07/25 2053     PTT 41 seconds     CBC and differential [[id number]]  (Abnormal) Collected: 07/07/25 1549    Lab Status: Final result Specimen: Blood from Arm, Right Updated: 07/07/25 1624     WBC 4.75 Thousand/uL      RBC 4.63 Million/uL      Hemoglobin 14.5 g/dL      Hematocrit 43.4 %      MCV 94 fL      MCH 31.3 pg      MCHC 33.4 g/dL      RDW 13.6 %      Platelets 131 Thousands/uL       nRBC 0 /100 WBCs      Segmented % 57 %      Immature Grans % 0 %      Lymphocytes % 22 %      Monocytes % 15 %      Eosinophils Relative 5 %      Basophils Relative 1 %      Absolute Neutrophils 2.73 Thousands/µL      Absolute Immature Grans 0.02 Thousand/uL      Absolute Lymphocytes 1.02 Thousands/µL      Absolute Monocytes 0.70 Thousand/µL      Eosinophils Absolute 0.25 Thousand/µL      Basophils Absolute 0.03 Thousands/µL     HS Troponin I 2hr [[id number]]     Lab Status: No result Specimen: Blood     HS Troponin 0hr (reflex protocol) [[id number]]  (Normal) Collected: 07/07/25 1549    Lab Status: Final result Specimen: Blood from Arm, Right Updated: 07/07/25 1618     hs TnI 0hr 12 ng/L     Comprehensive metabolic panel [[id number]] Collected: 07/07/25 1549    Lab Status: Final result Specimen: Blood from Arm, Right Updated: 07/07/25 1610     Sodium 137 mmol/L      Potassium 4.2 mmol/L      Chloride 108 mmol/L      CO2 22 mmol/L      ANION GAP 7 mmol/L      BUN 18 mg/dL      Creatinine 1.16 mg/dL      Glucose 92 mg/dL      Calcium 9.2 mg/dL      AST 19 U/L      ALT 13 U/L      Alkaline Phosphatase 78 U/L      Total Protein 7.5 g/dL      Albumin 4.3 g/dL      Total Bilirubin 0.44 mg/dL      eGFR 66 ml/min/1.73sq m     Narrative:      National Kid[employer] Disease Foundation guidelines for Chronic Kidney Disease (CKD):     Stage 1 with normal or high GFR (GFR > 90 mL/min/1.73 square meters)    Stage 2 Mild CKD (GFR = 60-89 mL/min/1.73 square meters)    Stage 3A Moderate CKD (GFR = 45-59 mL/min/1.73 square meters)    Stage 3B Moderate CKD (GFR = 30-44 mL/min/1.73 square meters)    Stage 4 Severe CKD (GFR = 15-29 mL/min/1.73 square meters)    Stage 5 End Stage CKD (GFR <15 mL/min/1.73 square meters)  Note: GFR calculation is accurate only with a steady state creatinine            XR chest 2 views   ED Interpretation by Mattie Archuleta PA-C (07/07 2057)   No acute abnormalities.           ECG 12 Lead Documentation  Only    Date/Time: 7/7/2025 3:00 PM    Performed by: Matite Archuleta PA-C  Authorized by: Mattie Archuleta PA-C    Indications / Diagnosis:  HTN  ECG reviewed by me, the ED Provider: yes    Patient location:  ED  Previous ECG:     Previous ECG:  Compared to current    Similarity:  No change  Rate:     ECG rate:  72  Rhythm:     Rhythm: sinus rhythm    Conduction:     Conduction: abnormal      Abnormal conduction: 1st degree    ST segments:     ST segments:  Normal      ED Medication and Procedure Management   Prior to Admission Medications   Prescriptions Last Dose Informant Patient Reported? Taking?   QUEtiapine (SEROquel) 300 mg tablet  Outside Facility (Specify) No No   Sig: Take 1 tablet (300 mg total) by mouth daily at bedtime   acetaminophen (TYLENOL) 650 mg CR tablet  Outside Facility (Specify) No No   Sig: Take 1 tablet (650 mg total) by mouth every 8 (eight) hours as needed for mild pain   Patient not taking: Reported on 5/20/2025   aspirin 81 mg chewable tablet   No No   Sig: Chew 1 tablet (81 mg total) daily   atorvastatin (LIPITOR) 40 mg tablet  Outside Facility (Specify) No No   Sig: Take 1 tablet (40 mg total) by mouth daily with dinner   carvedilol (COREG) 12.5 mg tablet  Outside Facility (Specify) No No   Sig: Take 1 tablet (12.5 mg total) by mouth 2 (two) times a day with meals   diclofenac sodium (Voltaren) 1 %  Outside Facility (Specify) No No   Sig: Apply 2 g topically 4 (four) times a day   escitalopram (LEXAPRO) 20 mg tablet  Outside Facility (Specify) No No   Sig: Take 1 tablet (20 mg total) by mouth daily   ferrous sulfate 325 (65 Fe) mg tablet  Outside Facility (Specify) No No   Sig: Take 1 tablet (325 mg total) by mouth daily with breakfast Do not start before December 7, 2024.   guaiFENesin (ROBITUSSIN) 100 MG/5ML oral liquid  Outside Facility (Specify) No No   Sig: Take 10 mL (200 mg total) by mouth 3 (three) times a day as needed for cough   lidocaine (LIDODERM) 5 %   Outside Facility (Specify) No No   Sig: Apply 1 patch topically over 12 hours daily Remove & Discard patch within 12 hours or as directed by MD   nicotine polacrilex (NICORETTE) 2 mg gum  Outside Facility (Specify) No No   Sig: Chew 1 each (2 mg total) every 3 (three) hours as needed for smoking cessation   pantoprazole (PROTONIX) 40 mg tablet  Outside Facility (Specify) No No   Sig: Take 1 tablet (40 mg total) by mouth 2 (two) times a day before lunch and dinner Do not start before December 7, 2024.   polyethylene glycol (MIRALAX) 17 g packet  Outside Facility (Specify) No No   Sig: Take 17 g by mouth daily as needed (constipation)   traZODone (DESYREL) 100 mg tablet  Outside Facility (Specify) No No   Sig: Take 1 tablet (100 mg total) by mouth daily at bedtime   warfarin (COUMADIN) 5 mg tablet  Outside Facility (Specify) No No   Sig: Take 1 tablet (5 mg total) by mouth daily Do not start before December 21, 2024.      Facility-Administered Medications: None     Discharge Medication List as of 7/7/2025  9:00 PM        CONTINUE these medications which have NOT CHANGED    Details   acetaminophen (TYLENOL) 650 mg CR tablet Take 1 tablet (650 mg total) by mouth every 8 (eight) hours as needed for mild pain, Starting Tue 12/10/2024, Normal      aspirin 81 mg chewable tablet Chew 1 tablet (81 mg total) daily, Starting Tue 5/20/2025, Print      atorvastatin (LIPITOR) 40 mg tablet Take 1 tablet (40 mg total) by mouth daily with dinner, Starting Wed 5/15/2024, Normal      carvedilol (COREG) 12.5 mg tablet Take 1 tablet (12.5 mg total) by mouth 2 (two) times a day with meals, Starting Fri 2/21/2025, Normal      diclofenac sodium (Voltaren) 1 % Apply 2 g topically 4 (four) times a day, Starting Wed 9/23/2020, Normal      escitalopram (LEXAPRO) 20 mg tablet Take 1 tablet (20 mg total) by mouth daily, Starting Wed 5/15/2024, Normal      ferrous sulfate 325 (65 Fe) mg tablet Take 1 tablet (325 mg total) by mouth daily with  breakfast Do not start before December 7, 2024., Starting Sat 12/7/2024, Normal      guaiFENesin (ROBITUSSIN) 100 MG/5ML oral liquid Take 10 mL (200 mg total) by mouth 3 (three) times a day as needed for cough, Starting Wed 10/23/2024, Normal      lidocaine (LIDODERM) 5 % Apply 1 patch topically over 12 hours daily Remove & Discard patch within 12 hours or as directed by MD, Starting Sat 3/22/2025, No Print      nicotine polacrilex (NICORETTE) 2 mg gum Chew 1 each (2 mg total) every 3 (three) hours as needed for smoking cessation, Starting Wed 5/15/2024, Normal      pantoprazole (PROTONIX) 40 mg tablet Take 1 tablet (40 mg total) by mouth 2 (two) times a day before lunch and dinner Do not start before December 7, 2024., Starting Sat 12/7/2024, Normal      polyethylene glycol (MIRALAX) 17 g packet Take 17 g by mouth daily as needed (constipation), Starting Wed 5/15/2024, Normal      QUEtiapine (SEROquel) 300 mg tablet Take 1 tablet (300 mg total) by mouth daily at bedtime, Starting Wed 5/15/2024, Normal      traZODone (DESYREL) 100 mg tablet Take 1 tablet (100 mg total) by mouth daily at bedtime, Starting Wed 5/15/2024, Normal      warfarin (COUMADIN) 5 mg tablet Take 1 tablet (5 mg total) by mouth daily Do not start before December 21, 2024., Starting Sat 12/21/2024, No Print           No discharge procedures on file.  ED SEPSIS DOCUMENTATION   Time reflects when diagnosis was documented in both MDM as applicable and the Disposition within this note       Time User Action Codes Description Comment    7/7/2025  8:58 PM Mattie Archuleta Add [H11.32] Subconjunctival hemorrhage of left eye     7/7/2025  8:58 PM Mattie Archuleta [I10] Hypertension                      [1]   Past Medical History:  Diagnosis Date    Alcohol abuse     Depression     Drug therapy     GERD (gastroesophageal reflux disease)     Hepatitis C     Hypertension 01/2012    Knee pain, bilateral     Left ventricular apical thrombus      "Psychiatric disorder     depression, anxiety    Renal disorder     Self-injurious behavior     Spinal stenosis of lumbar region     Stroke-like symptoms 12/17/2024    Suicide attempt (HCC)    [2]   Past Surgical History:  Procedure Laterality Date    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    HERNIA REPAIR  1997    IR CHEST TUBE PLACEMENT  3/19/2025   [3]   Family History  Problem Relation Name Age of Onset    Depression Mother      Depression Father      No Known Problems Sister      No Known Problems Brother     [4]   Social History  Tobacco Use    Smoking status: Every Day     Current packs/day: 0.50     Types: Cigarettes, Cigars    Smokeless tobacco: Never    Tobacco comments:     PT \"3 CIGARS A DAY\" - quit smoking cigars   Vaping Use    Vaping status: Never Used   Substance Use Topics    Alcohol use: Not Currently     Alcohol/week: 0.0 standard drinks of alcohol     Comment: Stopped drinking for several months - recovered alcoholic    Drug use: Not Currently     Types: Marijuana     Comment: Last used marijuana 2 months ago.- no longer smoking        Mattie Archuleta PA-C  07/07/25 2156    "

## 2025-07-08 NOTE — DISCHARGE INSTRUCTIONS
Avoid rubbing eye.  Monitor BP at home and follow up with family doctor for recheck.   Return to ER if symptoms worsen.

## 2025-07-09 ENCOUNTER — ANTICOAG VISIT (OUTPATIENT)
Dept: CARDIOLOGY CLINIC | Facility: CLINIC | Age: 65
End: 2025-07-09

## 2025-07-09 DIAGNOSIS — I51.3 LV (LEFT VENTRICULAR) MURAL THROMBUS: Primary | ICD-10-CM

## 2025-07-09 NOTE — PROGRESS NOTES
Reviewed chart, patient in ED 7/7/25, INR obtained.  Spoke with Rose at [facility], advised patient had INR in ED, INR good, will continue 2.5 mg Mon Wed Fri, 5 mg all other days, will recheck in 2 weeks 7/22/25    Physician order faxed